# Patient Record
Sex: FEMALE | Race: WHITE | NOT HISPANIC OR LATINO | Employment: OTHER | URBAN - METROPOLITAN AREA
[De-identification: names, ages, dates, MRNs, and addresses within clinical notes are randomized per-mention and may not be internally consistent; named-entity substitution may affect disease eponyms.]

---

## 2017-01-13 DIAGNOSIS — G25.81 RESTLESS LEG SYNDROME: ICD-10-CM

## 2017-01-13 DIAGNOSIS — G40.909 SEIZURE DISORDER (HCC): ICD-10-CM

## 2017-01-13 RX ORDER — PHENOBARBITAL 100 MG/1
TABLET ORAL
Qty: 60 TABLET | Refills: 5 | OUTPATIENT
Start: 2017-01-13 | End: 2017-01-13 | Stop reason: SDUPTHER

## 2017-01-13 RX ORDER — GABAPENTIN 300 MG/1
300 CAPSULE ORAL 3 TIMES DAILY
Qty: 270 CAPSULE | Refills: 1
Start: 2017-01-13 | End: 2017-01-17 | Stop reason: SDUPTHER

## 2017-01-16 RX ORDER — PHENOBARBITAL 100 MG/1
TABLET ORAL
Qty: 60 TABLET | Refills: 0 | Status: SHIPPED | OUTPATIENT
Start: 2017-01-16 | End: 2017-01-17 | Stop reason: SDUPTHER

## 2017-01-16 NOTE — TELEPHONE ENCOUNTER
1/16/17    Can you print a refill Rx for this pt please?    Herson has been check, printed, and in herson folder waiting on response.     Last OV was on 10/25/16 and next OV is on 1/24/17.

## 2017-01-17 ENCOUNTER — TELEPHONE (OUTPATIENT)
Dept: INTERNAL MEDICINE | Facility: CLINIC | Age: 67
End: 2017-01-17

## 2017-01-17 DIAGNOSIS — G40.909 SEIZURE DISORDER (HCC): ICD-10-CM

## 2017-01-17 RX ORDER — PHENOBARBITAL 100 MG/1
TABLET ORAL
Qty: 60 TABLET | Refills: 0 | Status: CANCELLED | OUTPATIENT
Start: 2017-01-17

## 2017-01-17 RX ORDER — GABAPENTIN 300 MG/1
300 CAPSULE ORAL 3 TIMES DAILY
Qty: 270 CAPSULE | Refills: 1 | OUTPATIENT
Start: 2017-01-17 | End: 2020-11-01 | Stop reason: HOSPADM

## 2017-01-17 RX ORDER — PHENOBARBITAL 100 MG/1
TABLET ORAL
Qty: 60 TABLET | Refills: 0 | OUTPATIENT
Start: 2017-01-17 | End: 2017-02-22 | Stop reason: SDUPTHER

## 2017-01-17 NOTE — TELEPHONE ENCOUNTER
----- Message from Jenelle Pena sent at 1/17/2017  9:19 AM EST -----  Contact: PATIENT  PATIENT IS CALLING BACK ABOUT HER SEIZURE MEDICATION AND SAYS SHE REALLY NEEDS THIS SCRIPT TODAY. SHE'S STARTING TO FEEL LIKE SHE IS GOING HAVE A SEIZURE BECAUSE SHE HAS BEEN OUT OF THIS MEDICATION SINCE FRIDAY AND IS AFRAID SHE IS GOING TO GET SICK. SHE HAS CALLED SEVERAL TIMES THE PAST FEW DAYS ABOUT THIS MEDICATION. YOU CAN CALL HER BACK [phone]. CAN WE GET SIGNATURE SO THEY CAN  SCRIPT TODAY IS WHAT PATIENT IS CALLING ABOUT?

## 2017-01-17 NOTE — TELEPHONE ENCOUNTER
1/17/17    This msg has been completed under another msg dated for 1/17/17. Rx has been phoned in to pharmacy. Pt could not be notified because the pt's phone number and emergency contact number were disconnected.

## 2017-01-20 ENCOUNTER — APPOINTMENT (OUTPATIENT)
Dept: CARDIOLOGY | Facility: HOSPITAL | Age: 67
End: 2017-01-20
Attending: EMERGENCY MEDICINE

## 2017-01-20 ENCOUNTER — TELEPHONE (OUTPATIENT)
Dept: INTERNAL MEDICINE | Facility: CLINIC | Age: 67
End: 2017-01-20

## 2017-01-20 ENCOUNTER — APPOINTMENT (OUTPATIENT)
Dept: GENERAL RADIOLOGY | Facility: HOSPITAL | Age: 67
End: 2017-01-20

## 2017-01-20 ENCOUNTER — HOSPITAL ENCOUNTER (EMERGENCY)
Facility: HOSPITAL | Age: 67
Discharge: HOME | End: 2017-01-20
Attending: EMERGENCY MEDICINE | Admitting: EMERGENCY MEDICINE

## 2017-01-20 ENCOUNTER — APPOINTMENT (OUTPATIENT)
Dept: MRI IMAGING | Facility: HOSPITAL | Age: 67
End: 2017-01-20
Attending: EMERGENCY MEDICINE

## 2017-01-20 VITALS
SYSTOLIC BLOOD PRESSURE: 131 MMHG | TEMPERATURE: 98 F | OXYGEN SATURATION: 95 % | WEIGHT: 94 LBS | DIASTOLIC BLOOD PRESSURE: 91 MMHG | RESPIRATION RATE: 18 BRPM | BODY MASS INDEX: 19.73 KG/M2 | HEART RATE: 66 BPM | HEIGHT: 58 IN

## 2017-01-20 DIAGNOSIS — R29.898 WEAKNESS OF LEFT LOWER EXTREMITY: ICD-10-CM

## 2017-01-20 DIAGNOSIS — M79.605 LOWER EXTREMITY PAIN, LEFT: Primary | ICD-10-CM

## 2017-01-20 DIAGNOSIS — G40.409 TONIC-CLONIC GENERALIZED SEIZURE (HCC): ICD-10-CM

## 2017-01-20 DIAGNOSIS — M06.9 RHEUMATOID ARTHRITIS, INVOLVING UNSPECIFIED SITE, UNSPECIFIED RHEUMATOID FACTOR PRESENCE: ICD-10-CM

## 2017-01-20 LAB
ALBUMIN SERPL-MCNC: 4.3 G/DL (ref 3.2–4.8)
ALBUMIN/GLOB SERPL: 1.3 G/DL (ref 1.5–2.5)
ALP SERPL-CCNC: 90 U/L (ref 25–100)
ALT SERPL W P-5'-P-CCNC: 15 U/L (ref 7–40)
ANION GAP SERPL CALCULATED.3IONS-SCNC: 14 MMOL/L (ref 3–11)
AST SERPL-CCNC: 20 U/L (ref 0–33)
BASOPHILS # BLD AUTO: 0.02 10*3/MM3 (ref 0–0.2)
BASOPHILS NFR BLD AUTO: 0.3 % (ref 0–1)
BH CV LOWER VASCULAR LEFT COMMON FEMORAL AUGMENT: NORMAL
BH CV LOWER VASCULAR LEFT COMMON FEMORAL COMPRESS: NORMAL
BH CV LOWER VASCULAR LEFT COMMON FEMORAL PHASIC: NORMAL
BH CV LOWER VASCULAR LEFT COMMON FEMORAL SPONT: NORMAL
BH CV LOWER VASCULAR LEFT DISTAL FEMORAL COMPRESS: NORMAL
BH CV LOWER VASCULAR LEFT GASTRONEMIUS COMPRESS: NORMAL
BH CV LOWER VASCULAR LEFT GREATER SAPH AK COMPRESS: NORMAL
BH CV LOWER VASCULAR LEFT GREATER SAPH BK COMPRESS: NORMAL
BH CV LOWER VASCULAR LEFT LESSER SAPH COMPRESS: NORMAL
BH CV LOWER VASCULAR LEFT MID FEMORAL AUGMENT: NORMAL
BH CV LOWER VASCULAR LEFT MID FEMORAL COMPRESS: NORMAL
BH CV LOWER VASCULAR LEFT MID FEMORAL PHASIC: NORMAL
BH CV LOWER VASCULAR LEFT MID FEMORAL SPONT: NORMAL
BH CV LOWER VASCULAR LEFT PERONEAL COMPRESS: NORMAL
BH CV LOWER VASCULAR LEFT POPLITEAL AUGMENT: NORMAL
BH CV LOWER VASCULAR LEFT POPLITEAL COMPRESS: NORMAL
BH CV LOWER VASCULAR LEFT POPLITEAL PHASIC: NORMAL
BH CV LOWER VASCULAR LEFT POPLITEAL SPONT: NORMAL
BH CV LOWER VASCULAR LEFT POSTERIOR TIBIAL COMPRESS: NORMAL
BH CV LOWER VASCULAR LEFT PROXIMAL FEMORAL COMPRESS: NORMAL
BH CV LOWER VASCULAR LEFT SAPHENOFEMORAL JUNCTION AUGMENT: NORMAL
BH CV LOWER VASCULAR LEFT SAPHENOFEMORAL JUNCTION COMPRESS: NORMAL
BH CV LOWER VASCULAR LEFT SAPHENOFEMORAL JUNCTION PHASIC: NORMAL
BH CV LOWER VASCULAR LEFT SAPHENOFEMORAL JUNCTION SPONT: NORMAL
BH CV LOWER VASCULAR RIGHT COMMON FEMORAL AUGMENT: NORMAL
BH CV LOWER VASCULAR RIGHT COMMON FEMORAL COMPRESS: NORMAL
BH CV LOWER VASCULAR RIGHT COMMON FEMORAL PHASIC: NORMAL
BH CV LOWER VASCULAR RIGHT COMMON FEMORAL SPONT: NORMAL
BILIRUB SERPL-MCNC: 0.2 MG/DL (ref 0.3–1.2)
BNP SERPL-MCNC: 34 PG/ML (ref 0–100)
BUN BLD-MCNC: 7 MG/DL (ref 9–23)
BUN/CREAT SERPL: 10 (ref 7–25)
CALCIUM SPEC-SCNC: 9.6 MG/DL (ref 8.7–10.4)
CHLORIDE SERPL-SCNC: 96 MMOL/L (ref 99–109)
CO2 SERPL-SCNC: 23 MMOL/L (ref 20–31)
CREAT BLD-MCNC: 0.7 MG/DL (ref 0.6–1.3)
DEPRECATED RDW RBC AUTO: 54.6 FL (ref 37–54)
EOSINOPHIL # BLD AUTO: 0.15 10*3/MM3 (ref 0.1–0.3)
EOSINOPHIL NFR BLD AUTO: 2.1 % (ref 0–3)
ERYTHROCYTE [DISTWIDTH] IN BLOOD BY AUTOMATED COUNT: 16.2 % (ref 11.3–14.5)
GFR SERPL CREATININE-BSD FRML MDRD: 83 ML/MIN/1.73
GLOBULIN UR ELPH-MCNC: 3.2 GM/DL
GLUCOSE BLD-MCNC: 74 MG/DL (ref 70–100)
HCT VFR BLD AUTO: 35.7 % (ref 34.5–44)
HGB BLD-MCNC: 11.8 G/DL (ref 11.5–15.5)
HOLD SPECIMEN: NORMAL
HOLD SPECIMEN: NORMAL
IMM GRANULOCYTES # BLD: 0.02 10*3/MM3 (ref 0–0.03)
IMM GRANULOCYTES NFR BLD: 0.3 % (ref 0–0.6)
LIPASE SERPL-CCNC: 34 U/L (ref 6–51)
LYMPHOCYTES # BLD AUTO: 1.96 10*3/MM3 (ref 0.6–4.8)
LYMPHOCYTES NFR BLD AUTO: 27.8 % (ref 24–44)
MCH RBC QN AUTO: 30.4 PG (ref 27–31)
MCHC RBC AUTO-ENTMCNC: 33.1 G/DL (ref 32–36)
MCV RBC AUTO: 92 FL (ref 80–99)
MONOCYTES # BLD AUTO: 0.58 10*3/MM3 (ref 0–1)
MONOCYTES NFR BLD AUTO: 8.2 % (ref 0–12)
NEUTROPHILS # BLD AUTO: 4.32 10*3/MM3 (ref 1.5–8.3)
NEUTROPHILS NFR BLD AUTO: 61.3 % (ref 41–71)
PHENOBARB SERPL-MCNC: 34 MCG/ML (ref 15–40)
PLATELET # BLD AUTO: 310 10*3/MM3 (ref 150–450)
PMV BLD AUTO: 9.3 FL (ref 6–12)
POTASSIUM BLD-SCNC: 3.8 MMOL/L (ref 3.5–5.5)
PROT SERPL-MCNC: 7.5 G/DL (ref 5.7–8.2)
RBC # BLD AUTO: 3.88 10*6/MM3 (ref 3.89–5.14)
SODIUM BLD-SCNC: 133 MMOL/L (ref 132–146)
TROPONIN I SERPL-MCNC: 0 NG/ML (ref 0–0.07)
TROPONIN I SERPL-MCNC: 0.01 NG/ML (ref 0–0.07)
WBC NRBC COR # BLD: 7.05 10*3/MM3 (ref 3.5–10.8)
WHOLE BLOOD HOLD SPECIMEN: NORMAL
WHOLE BLOOD HOLD SPECIMEN: NORMAL

## 2017-01-20 PROCEDURE — 70551 MRI BRAIN STEM W/O DYE: CPT

## 2017-01-20 PROCEDURE — 71010 HC CHEST PA OR AP: CPT

## 2017-01-20 PROCEDURE — 36415 COLL VENOUS BLD VENIPUNCTURE: CPT

## 2017-01-20 PROCEDURE — 84484 ASSAY OF TROPONIN QUANT: CPT

## 2017-01-20 PROCEDURE — 93005 ELECTROCARDIOGRAM TRACING: CPT | Performed by: EMERGENCY MEDICINE

## 2017-01-20 PROCEDURE — 93971 EXTREMITY STUDY: CPT

## 2017-01-20 PROCEDURE — 83690 ASSAY OF LIPASE: CPT | Performed by: EMERGENCY MEDICINE

## 2017-01-20 PROCEDURE — 93971 EXTREMITY STUDY: CPT | Performed by: INTERNAL MEDICINE

## 2017-01-20 PROCEDURE — 86431 RHEUMATOID FACTOR QUANT: CPT | Performed by: HOSPITALIST

## 2017-01-20 PROCEDURE — 85025 COMPLETE CBC W/AUTO DIFF WBC: CPT | Performed by: EMERGENCY MEDICINE

## 2017-01-20 PROCEDURE — 80053 COMPREHEN METABOLIC PANEL: CPT | Performed by: EMERGENCY MEDICINE

## 2017-01-20 PROCEDURE — 99284 EMERGENCY DEPT VISIT MOD MDM: CPT

## 2017-01-20 PROCEDURE — 93005 ELECTROCARDIOGRAM TRACING: CPT

## 2017-01-20 PROCEDURE — 83880 ASSAY OF NATRIURETIC PEPTIDE: CPT | Performed by: EMERGENCY MEDICINE

## 2017-01-20 PROCEDURE — 80184 ASSAY OF PHENOBARBITAL: CPT | Performed by: EMERGENCY MEDICINE

## 2017-01-20 RX ORDER — ASPIRIN 81 MG/1
324 TABLET, CHEWABLE ORAL ONCE
Status: COMPLETED | OUTPATIENT
Start: 2017-01-20 | End: 2017-01-20

## 2017-01-20 RX ORDER — ACETAMINOPHEN 500 MG
1000 TABLET ORAL ONCE
Status: COMPLETED | OUTPATIENT
Start: 2017-01-20 | End: 2017-01-20

## 2017-01-20 RX ORDER — SODIUM CHLORIDE 0.9 % (FLUSH) 0.9 %
10 SYRINGE (ML) INJECTION AS NEEDED
Status: DISCONTINUED | OUTPATIENT
Start: 2017-01-20 | End: 2017-01-20 | Stop reason: HOSPADM

## 2017-01-20 RX ADMIN — ACETAMINOPHEN 1000 MG: 500 TABLET, FILM COATED ORAL at 15:34

## 2017-01-20 RX ADMIN — ASPIRIN 324 MG: 81 TABLET, CHEWABLE ORAL at 14:49

## 2017-01-20 NOTE — ED PROVIDER NOTES
Subjective   HPI Comments: An Abreu is a 66 y.o.female with a hx of seizures who presents to the ED c/o LLE pain, which onset this morning. Pt states this morning her left leg was too weak to support her. She notes pain behind her left knee for the last 2 hours. She notes she had a seizure, HA and CP yesterday. She was out of her phenobarbital for several days and restarted it 2 days ago. She has no CP upon evaluation in the ED. she tells me that her left leg gives out when she tries to walk on it.  She doesn't think that's due to pain but rather due to a new weakness in her leg.  Dr. Batres is her PCP.    SHx of CABG in 2003.    Patient is a 66 y.o. female presenting with lower extremity pain.   History provided by:  Patient  History limited by: Pt is a poor historian.  Lower Extremity Issue   Location:  Knee and leg  Injury: no    Leg location:  L leg  Knee location:  L knee  Pain details:     Radiates to:  Does not radiate    Severity:  Moderate    Onset quality:  Unable to specify    Timing:  Constant  Chronicity:  New  Dislocation: no    Foreign body present:  No foreign bodies  Associated symptoms: muscle weakness        Review of Systems   Cardiovascular: Positive for chest pain.   Neurological: Positive for seizures and headaches.   All other systems reviewed and are negative.      Past Medical History   Diagnosis Date   • Hyperlipidemia        Allergies   Allergen Reactions   • Carbamazepine    • Codeine    • Dilantin [Phenytoin]    • Keflex  [Cephalexin]    • Sulfamethoxazole-Trimethoprim        Past Surgical History   Procedure Laterality Date   • Appendectomy     • Cholecystectomy     • Colostomy     • Hysterectomy     • Iliac artery stent         History reviewed. No pertinent family history.    Social History     Social History   • Marital status:      Spouse name: N/A   • Number of children: N/A   • Years of education: N/A     Social History Main Topics   • Smoking status: Current  Every Day Smoker     Types: Cigarettes, Electronic Cigarette     Last attempt to quit: 8/12/2016   • Smokeless tobacco: Never Used      Comment: smokes 1 cigarette a day   • Alcohol use No   • Drug use: No   • Sexual activity: Defer     Other Topics Concern   • None     Social History Narrative         Objective   Physical Exam   Constitutional: She is oriented to person, place, and time. She appears well-developed and well-nourished.   HENT:   Head: Normocephalic and atraumatic.   Right Ear: External ear normal.   Left Ear: External ear normal.   Nose: Nose normal.   Eyes: Conjunctivae are normal.   Neck: Normal range of motion. Neck supple.   Cardiovascular: Normal rate, regular rhythm and normal heart sounds.  Exam reveals no gallop and no friction rub.    No murmur heard.  Pulmonary/Chest: Effort normal and breath sounds normal. No respiratory distress. She has no wheezes. She has no rales.   Abdominal: Soft. There is no tenderness.   Musculoskeletal: Normal range of motion. She exhibits tenderness (Left calf tenderness.). She exhibits no edema (No joint edema.).   Neurological: She is alert and oriented to person, place, and time.   Skin: Skin is warm and dry. No erythema (No joint erythema.).   Psychiatric: She has a normal mood and affect. Her behavior is normal. Judgment and thought content normal.   Nursing note and vitals reviewed.      Procedures         ED Course  ED Course   Comment By Time   I discussed Mrs. Abreu's Doppler results with the tach.  She has given me a preliminary report that there is no DVT. Bart Mendez MD 01/20 1600   Mrs. Hams MRI has been red and compared with a prior MRI.  She has had several prior strokes but there is nothing new.  It is not clear whether her left leg giving out is due to pain or any neurological problem.  It may be a radiculopathy although she has no back pain. Bart Mendez MD 01/20 1700   Her phenobarbital level is therapeutic. Bart Mendez MD  01/20 1701   Mrs. Abreu is resting comfortably.  Her phenobarbital level is therapeutic.  I discussed findings with her.  We are awaiting her second troponin, if that's reassuring Will discharge.  Will write a prescription for a walker.  I spoke with her about state [place] bitting her from driving for the next 90 days and she is aware of that.  She tells me her leg still hurts although appears very comfortable.  She relates that she has a history of rheumatoid arthritis, at this point I don't see evidence of an acute flareup of that. Bart Mendez MD 01/20 1709   Second troponin as negative. Bart Mendez MD 01/20 1737         Course of Care      Lab Results (last 24 hours)     Procedure Component Value Units Date/Time    CBC & Differential [[id number]] Collected:  01/20/17 1419    Specimen:  Blood Updated:  01/20/17 1435    Narrative:       The following orders were created for panel order CBC & Differential.  Procedure                               Abnormality         Status                     ---------                               -----------         ------                     CBC Auto Differential[[id number]]         Abnormal            Final result                 Please view results for these tests on the individual orders.    Comprehensive Metabolic Panel [[id number]]  (Abnormal) Collected:  01/20/17 1419    Specimen:  Blood Updated:  01/20/17 1521     Glucose 74 mg/dL      BUN 7 (L) mg/dL      Creatinine 0.70 mg/dL      Sodium 133 mmol/L      Potassium 3.8 mmol/L      Chloride 96 (L) mmol/L      CO2 23.0 mmol/L      Calcium 9.6 mg/dL      Total Protein 7.5 g/dL      Albumin 4.30 g/dL      ALT (SGPT) 15 U/L      AST (SGOT) 20 U/L      Alkaline Phosphatase 90 U/L      Total Bilirubin 0.2 (L) mg/dL      eGFR Non African Amer 83 mL/min/1.73      Globulin 3.2 gm/dL      A/G Ratio 1.3 (L) g/dL      BUN/Creatinine Ratio 10.0      Anion Gap 14.0 (H) mmol/L     Narrative:       [employer]  Guidelines    Stage                           Description                             GFR                      1                               Normal or High                          90+  2                               Mild decrease                            60-89  3                               Moderate decrease                   30-59  4                               Severe decrease                       15-29  5                               Kidney failure                             <15    Lipase [[id number]]  (Normal) Collected:  01/20/17 1419    Specimen:  Blood Updated:  01/20/17 1632     Lipase 34 U/L     BNP [[id number]]  (Normal) Collected:  01/20/17 1419    Specimen:  Blood Updated:  01/20/17 1456     BNP 34.0 pg/mL     CBC Auto Differential [[id number]]  (Abnormal) Collected:  01/20/17 1419    Specimen:  Blood Updated:  01/20/17 1435     WBC 7.05 10*3/mm3      RBC 3.88 (L) 10*6/mm3      Hemoglobin 11.8 g/dL      Hematocrit 35.7 %      MCV 92.0 fL      MCH 30.4 pg      MCHC 33.1 g/dL      RDW 16.2 (H) %      RDW-SD 54.6 (H) fl      MPV 9.3 fL      Platelets 310 10*3/mm3      Neutrophil % 61.3 %      Lymphocyte % 27.8 %      Monocyte % 8.2 %      Eosinophil % 2.1 %      Basophil % 0.3 %      Immature Grans % 0.3 %      Neutrophils, Absolute 4.32 10*3/mm3      Lymphocytes, Absolute 1.96 10*3/mm3      Monocytes, Absolute 0.58 10*3/mm3      Eosinophils, Absolute 0.15 10*3/mm3      Basophils, Absolute 0.02 10*3/mm3      Immature Grans, Absolute 0.02 10*3/mm3     Phenobarbital Level [[id number]]  (Normal) Collected:  01/20/17 1419    Specimen:  Blood Updated:  01/20/17 1700     Phenobarbital 34.0 mcg/mL     POC Troponin, Rapid [[id number]]  (Normal) Collected:  01/20/17 1422    Specimen:  Blood Updated:  01/20/17 1440     Troponin I 0.00 ng/mL       Serial Number: [id number]    : [id number]       POC Troponin, Rapid [[id number]]  (Normal) Collected:  01/20/17 1716    Specimen:  Blood Updated:  01/20/17 3644      Troponin I 0.01 ng/mL       Serial Number: [id number]    : [id number]             Note: In addition to lab results from this visit, the labs listed above may include labs taken at another facility or during a different encounter within the last 24 hours. Please correlate lab times with ED admission and discharge times for further clarification of the services performed during this visit.    MRI Brain Without Contrast   Preliminary Result   Old infarcts of the left cerebellum, left frontal lobe and   right corona radiata. No new infarct or other new intracranial disease   is seen.       DICTATED:     01/20/2017   EDITED:          01/20/2017          XR Chest 1 View   Preliminary Result   Stable portable chest exam with chronic appearing changes as   noted. No acute chest disease is seen.       D:  01/20/2017   E:  01/20/2017              Vitals:    01/20/17 1600 01/20/17 1725 01/20/17 1726 01/20/17 1755   BP: 139/58 131/91     BP Location:       Patient Position:       Pulse: 64  66    Resp:       Temp:    98 °F (36.7 °C)   TempSrc:    Oral   SpO2: 93% 94% 95%    Weight:       Height:           Medications   aspirin chewable tablet 324 mg (324 mg Oral Given 1/20/17 1449)   acetaminophen (TYLENOL) tablet 1,000 mg (1,000 mg Oral Given 1/20/17 1534)       ECG/EMG Results (last 24 hours)     Procedure Component Value Units Date/Time    ECG 12 Lead [[id number]] Collected:  01/20/17 1415     Updated:  01/20/17 1511    ECG 12 Lead [[id number]] Collected:  01/20/17 1709     Updated:  01/20/17 1729                      MDM  Number of Diagnoses or Management Options  Lower extremity pain, left: new and requires workup  Rheumatoid arthritis, involving unspecified site, unspecified rheumatoid factor presence:   Tonic-clonic generalized seizure:   Weakness of left lower extremity: new and requires workup     Amount and/or Complexity of Data Reviewed  Clinical lab tests: ordered and reviewed  Tests in the radiology section of CPT®:  ordered and reviewed  Review and summarize past medical records: yes  Independent visualization of images, tracings, or specimens: yes    Patient Progress  Patient progress: improved      Final diagnoses:   Lower extremity pain, left   Tonic-clonic generalized seizure   Weakness of left lower extremity   Rheumatoid arthritis, involving unspecified site, unspecified rheumatoid factor presence       Documentation assistance provided by aaliyah Rodrigez.  Information recorded by the scribe was done at my direction and has been verified and validated by me.     Jose Alfredo Rodrigez  01/20/17 1538       Jose Alfredo Rodrigez  01/20/17 1553       Jose Alfredo Rodrigez  01/20/17 1714       Jose Alfredo Rodrigez  01/20/17 1738       Bart Mendez MD  01/20/17 0223

## 2017-01-20 NOTE — TELEPHONE ENCOUNTER
Patient called and stated and that she was on her way to the ER because she was hurting and patient stated that she has been out of her seizure medication and she thinks she has had several seizure states she is in a lot of pain and would just like for Dr grayson to know the shape she is in.      Called patient no answer and no available voicemail     ----- Message from Jenelle Pena sent at 1/20/2017 10:11 AM EST -----  Contact: PATIENT  PATIENT THINKS SHE HAD A SEIZURE WHILE SHE WAS OUT OF HER MEDICATION SEIZURE MEDICATION AND HAS SOME CONCERNS TO TALK TO YOU ABOUT WHAT SHE IS EXPERINCING. SHE DOESN'T FEEL RIGHT IN THE HEAD AND IS VERY WEAK. SHE IS WORRIED AND WANTS YOU TO CALL HER BACK. SHE ALSO WANTS TO SEE IF DR. GRAYSON WILL REFER HER TO A NEUROLOGIST AND A DIFFERENT CARDIOLOGIST. SHE DID NOT CARE FOR THE LAST CARDIOLOGIST SHE SAW THAT WE REFERRED HER TO. YOU CAN CALL HER BACK [phone]

## 2017-01-24 ENCOUNTER — OFFICE VISIT (OUTPATIENT)
Dept: INTERNAL MEDICINE | Facility: CLINIC | Age: 67
End: 2017-01-24

## 2017-01-24 ENCOUNTER — HOSPITAL ENCOUNTER (OUTPATIENT)
Dept: GENERAL RADIOLOGY | Facility: HOSPITAL | Age: 67
Discharge: HOME | End: 2017-01-24
Attending: HOSPITALIST | Admitting: HOSPITALIST

## 2017-01-24 VITALS
OXYGEN SATURATION: 95 % | HEIGHT: 58 IN | HEART RATE: 70 BPM | DIASTOLIC BLOOD PRESSURE: 52 MMHG | SYSTOLIC BLOOD PRESSURE: 108 MMHG

## 2017-01-24 DIAGNOSIS — R29.898 WEAKNESS OF LEFT LEG: ICD-10-CM

## 2017-01-24 DIAGNOSIS — M19.90 ARTHRITIS: Primary | ICD-10-CM

## 2017-01-24 DIAGNOSIS — G89.29 CHRONIC MIDLINE LOW BACK PAIN WITH LEFT-SIDED SCIATICA: ICD-10-CM

## 2017-01-24 DIAGNOSIS — M54.42 CHRONIC MIDLINE LOW BACK PAIN WITH LEFT-SIDED SCIATICA: ICD-10-CM

## 2017-01-24 PROCEDURE — 72100 X-RAY EXAM L-S SPINE 2/3 VWS: CPT

## 2017-01-24 PROCEDURE — 72170 X-RAY EXAM OF PELVIS: CPT

## 2017-01-24 PROCEDURE — 99214 OFFICE O/P EST MOD 30 MIN: CPT | Performed by: HOSPITALIST

## 2017-01-24 NOTE — PROGRESS NOTES
"Subjective   An Abreu is a 66 y.o. female.   ED follow up visit ([facility]  due to Lower extremity pain, left; Tonic-clonic generalized seizure; Weakness of left lower extremity; Rheumatoid arthritis, involving unspecified site, unspecified rheumatoid factor presence )      HPI Comments: She states she is weak in her left leg. Since she woke up in the morning on 1/18 unable to walk due to weakness. She was seen in the ED and MRI Brain w/o contrast was negative for new stroke. She is having swelling in the left  leg. She had a seizure on 1/17/17 and she went to bed and woke up with weakness. Her daughter reports that she has been having seizures off and on. She has not been to the hopsital for these episodes because she has them all the time since she was a child. They took her to the hospital on the 20th because she was still having weakness. She is having joint pain but her back also hurts. She reports falling from a counter trying to get something from a cabinet that was up high. Not sure about the time course of this. She did not have a back xray in the ER. She says it her leg hurts or maybe her hips. She doesn't think she can do PT as a result.        The following portions of the patient's history were reviewed and updated as appropriate: allergies, current medications, past family history, past medical history, past social history, past surgical history and problem list.    Review of Systems   Constitutional: Positive for activity change.   HENT: Negative for congestion.    Cardiovascular: Positive for leg swelling. Negative for chest pain and palpitations.   Musculoskeletal: Positive for arthralgias, back pain, gait problem and neck pain.   Neurological: Negative for light-headedness and headaches.   Psychiatric/Behavioral: Negative for agitation, behavioral problems and confusion.       Objective  Blood pressure 108/52, pulse 70, height 58\" (147.3 cm), SpO2 95 " %.      Physical Exam   Constitutional: She appears listless.   HENT:   Head: Normocephalic and atraumatic.   Eyes: Conjunctivae and EOM are normal. Pupils are equal, round, and reactive to light.   Neck: Normal range of motion. Neck supple.   Cardiovascular: Normal rate, regular rhythm and normal heart sounds.    Pulmonary/Chest: Effort normal and breath sounds normal.   Musculoskeletal: Normal range of motion.   Neurological: She appears listless. A sensory deficit is present. She exhibits abnormal muscle tone.   Reflex Scores:       Patellar reflexes are 0 on the right side and 1+ on the left side.  Left leg feels numb and strength is 3/5 in the left leg . Normal in all other.       Assessment/Plan   An was seen today for ed follow up visit.    Diagnoses and all orders for this visit:    Arthritis  -     Rheumatoid Factor    Weakness of left leg  -     MRI Lumbar Spine With & Without Contrast  -     EMG & Nerve Conduction Test; Future    Chronic midline low back pain with left-sided sciatica  -     MRI Lumbar Spine With & Without Contrast  -     XR Spine Lumbar 2 or 3 View  -     XR Pelvis 1 or 2 View

## 2017-01-24 NOTE — MR AVS SNAPSHOT
An Abreu   1/24/2017 2:00 PM   Office Visit    Dept Phone:  [phone]   Encounter #:  [id number]    Provider:  Manjinder Batres MD   Department:  [facility] INTERNAL MEDICINE AND ENDOCRINOLOGY [place]                Your Full Care Plan              Your Updated Medication List          This list is accurate as of: 1/24/17  3:17 PM.  Always use your most recent med list.                albuterol 108 (90 BASE) MCG/ACT inhaler   Commonly known as:  VENTOLIN HFA   Inhale 2 puffs Every 4 (Four) Hours As Needed for wheezing or shortness of air.       clopidogrel 75 MG tablet   Commonly known as:  PLAVIX   Take 1 tablet by mouth Daily.       cyanocobalamin 1000 MCG/ML injection   Inject 1 mL into the shoulder, thigh, or buttocks Every 7 (Seven) Days.       diltiaZEM  MG 24 hr capsule   Commonly known as:  CARTIA XT   Take 1 capsule by mouth Daily.       esomeprazole 40 MG capsule   Commonly known as:  nexIUM   Take 1 capsule by mouth 2 (Two) Times a Day.       furosemide 40 MG tablet   Commonly known as:  LASIX   TAKE 1 TABLET BY MOUTH EVERY DAY       gabapentin 300 MG capsule   Commonly known as:  NEURONTIN   Take 1 capsule by mouth 3 (Three) Times a Day.       isosorbide mononitrate 60 MG 24 hr tablet   Commonly known as:  IMDUR   Take 1 tablet by mouth Daily.       lisinopril 40 MG tablet   Commonly known as:  PRINIVIL,ZESTRIL   Take 1 tablet by mouth Daily.       LORazepam 1 MG tablet   Commonly known as:  ATIVAN   Take 1 tablet by mouth 2 (Two) Times a Day.       Nitroglycerin 400 MCG/SPRAY aerosol solution       PHENobarbital 100 MG tablet   Commonly known as:  LUMINAL   TAKE 1 1/2 TABLET BY MOUTH EVERY DAY       * rosuvastatin 40 MG tablet   Commonly known as:  CRESTOR   Take 1 tablet by mouth Daily.       * rosuvastatin 40 MG tablet   Commonly known as:  CRESTOR   Take 1 tablet by mouth Daily.       SPIRIVA HANDIHALER 18 MCG per inhalation capsule   Generic drug:   tiotropium   Place 2 puffs into inhaler and inhale Daily.       * Notice:  This list has 2 medication(s) that are the same as other medications prescribed for you. Read the directions carefully, and ask your doctor or other care provider to review them with you.            We Performed the Following     MRI Lumbar Spine With & Without Contrast     Rheumatoid Factor     XR Pelvis 1 or 2 View     XR Spine Lumbar 2 or 3 View       You Were Diagnosed With        Codes Comments    Arthritis    -  Primary ICD-10-CM: M19.90  ICD-9-CM: 716.90     Weakness of left leg     ICD-10-CM: M62.81  ICD-9-CM: 729.89     Chronic midline low back pain with left-sided sciatica     ICD-10-CM: M54.42, G89.29  ICD-9-CM: 724.2, 724.3, 338.29       Instructions     None    Patient Instructions History      Upcoming Appointments     Visit Type Date Time Department    FOLLOW UP 2017  2:00 PM [facility]    FOLLOW UP 2017  3:00 PM [facility]      [employer] Signup     [facility] [employer] allows you to send messages to your doctor, view your test results, renew your prescriptions, schedule appointments, and more. To sign up, go to [employer] and click on the Sign Up Now link in the New User? box. Enter your [employer] Activation Code exactly as it appears below along with the last four digits of your Social Security Number and your Date of Birth () to complete the sign-up process. If you do not sign up before the expiration date, you must request a new code.    [employer] Activation Code: [email or web address]  Expires: 2/3/2017  5:13 PM    If you have questions, you can email [email or web address] or call [phone] to talk to our [employer] staff. Remember, [employer] is NOT to be used for urgent needs. For medical emergencies, dial 911.               Other Info from Your Visit           Your Appointments     2017  3:00 PM EST   Follow Up with Manjinder Batres MD   [facility] INTERNAL MEDICINE AND  "ENDOCRINOLOGY [place] (--)    [address]  [facility] [place] [address] [phone]           Arrive 15 minutes prior to appointment.              Allergies     Carbamazepine      Codeine      Dilantin [Phenytoin]      Keflex  [Cephalexin]      Sulfamethoxazole-trimethoprim        Reason for Visit     ED follow up visit ED follow visit from 1/20/17 at [facility]  due to Lower extremity pain, left; Tonic-clonic generalized seizure; Weakness of left lower extremity; Rheumatoid arthritis, involving unspecified site, unspecified rheumatoid factor presence       Vital Signs     Blood Pressure Pulse Height Oxygen Saturation Smoking Status       108/52 70 58\" (147.3 cm) 95% Current Every Day Smoker       Problems and Diagnoses Noted     Arthritis    Weakness of left leg        Chronic midline low back pain with left-sided sciatica            "

## 2017-01-25 ENCOUNTER — TELEPHONE (OUTPATIENT)
Dept: INTERNAL MEDICINE | Facility: CLINIC | Age: 67
End: 2017-01-25

## 2017-01-25 DIAGNOSIS — R29.898 LEFT LEG WEAKNESS: ICD-10-CM

## 2017-01-25 DIAGNOSIS — M19.90 ARTHRITIS: Primary | ICD-10-CM

## 2017-01-25 DIAGNOSIS — M54.42 CHRONIC LEFT-SIDED LOW BACK PAIN WITH LEFT-SIDED SCIATICA: ICD-10-CM

## 2017-01-25 DIAGNOSIS — G89.29 CHRONIC LEFT-SIDED LOW BACK PAIN WITH LEFT-SIDED SCIATICA: ICD-10-CM

## 2017-01-25 LAB — RHEUMATOID FACT SERPL-ACNC: NEGATIVE [IU]/ML

## 2017-01-25 RX ORDER — SULINDAC 200 MG/1
200 TABLET ORAL 2 TIMES DAILY
Qty: 60 TABLET | Refills: 0 | Status: SHIPPED | OUTPATIENT
Start: 2017-01-25 | End: 2017-04-15 | Stop reason: SDUPTHER

## 2017-01-25 NOTE — TELEPHONE ENCOUNTER
1/25/17    Called and left a v/m stating that a Rx was sent over to [facility] pharmacy on [address] today and they can call the pharmacy to see if it is ready for .

## 2017-01-25 NOTE — TELEPHONE ENCOUNTER
1/25/17    Do you want to send in some pain meds for this pt?    I see gabapentin 300 mg, 1 tab tid was phoned in on 1/17/17.    Please advise.

## 2017-01-25 NOTE — TELEPHONE ENCOUNTER
----- Message from Nikki Mahajan sent at 1/24/2017  4:20 PM EST -----  THE PATIENT IS NEEDING DR GRAYSON TO CALL HER IN SOME PAIN MEDICATION . CALL BACK NUMBER [phone]

## 2017-01-25 NOTE — TELEPHONE ENCOUNTER
She gets narcotics from someone else so  she can get sulindac 200 mg i sent the script in. Make sure it went to her correct pharmacy

## 2017-02-01 PROBLEM — G89.29 CHRONIC LEFT-SIDED LOW BACK PAIN WITH LEFT-SIDED SCIATICA: Status: ACTIVE | Noted: 2017-02-01

## 2017-02-01 PROBLEM — M54.42 CHRONIC LEFT-SIDED LOW BACK PAIN WITH LEFT-SIDED SCIATICA: Status: ACTIVE | Noted: 2017-02-01

## 2017-02-01 PROBLEM — R29.898 LEFT LEG WEAKNESS: Status: ACTIVE | Noted: 2017-02-01

## 2017-02-02 RX ORDER — ROSUVASTATIN CALCIUM 40 MG/1
TABLET, COATED ORAL
Qty: 30 TABLET | Refills: 5 | Status: SHIPPED | OUTPATIENT
Start: 2017-02-02 | End: 2018-02-08 | Stop reason: SDUPTHER

## 2017-02-06 ENCOUNTER — TELEPHONE (OUTPATIENT)
Dept: INTERNAL MEDICINE | Facility: CLINIC | Age: 67
End: 2017-02-06

## 2017-02-06 RX ORDER — DILTIAZEM HYDROCHLORIDE 240 MG/1
CAPSULE, EXTENDED RELEASE ORAL
Qty: 30 CAPSULE | Refills: 0 | Status: SHIPPED | OUTPATIENT
Start: 2017-02-06 | End: 2017-03-09 | Stop reason: SDUPTHER

## 2017-02-06 NOTE — TELEPHONE ENCOUNTER
----- Message from Calli Uribe sent at 2/6/2017 11:53 AM EST -----  Contact: PATIENT   PATIENT CALLED STATING SHE CAN'T GET AROUND AND THAT SHE IS NOT GETTING ANY BETTER. HIP BONE TO SPINE IS KILLING HER, SHE STATES SHE HAS NO STRENGTH, MRI IS SCHEDULED MARCH 7. SHE WOULD LIKE A RETURN CALL FROM DR GRAYSON.    CALL BACK #[phone]

## 2017-02-08 DIAGNOSIS — F41.9 ANXIETY: ICD-10-CM

## 2017-02-08 RX ORDER — LORAZEPAM 1 MG/1
TABLET ORAL
Qty: 60 TABLET | Refills: 0 | OUTPATIENT
Start: 2017-02-08 | End: 2017-02-22 | Stop reason: SDUPTHER

## 2017-02-08 NOTE — TELEPHONE ENCOUNTER
2/8/17    Can we phone this rx in to the pharmacy?    En has been checked and scanned into the pt's chart (1/19/17).    Last OV was on 1/24/17, next OV is on 2/20/17.

## 2017-02-08 NOTE — TELEPHONE ENCOUNTER
2/8/17    Phone lorazepam into pharmacy and pt's daughter was notified.      Pt is now requesting some pain medication because she is having severe pain in hips and she says her good leg is now giving out on her so now all she can do is lay around all day because she can barely move.    Her MRI is scheduled in March because the pt needs to be put to sleep for it.    Pt's daughter phone number is [phone]

## 2017-02-08 NOTE — TELEPHONE ENCOUNTER
----- Message from Calli Uribe sent at 2/8/2017 12:32 PM EST -----  Contact: PATIENT'S DAUGHTER  RE: RX FOR PAIN MED    MS VU'S DAUGHTER CALLED TODAY TO REQUEST AN ORDER FOR PAIN MEDICATION FOR HER MOTHER.

## 2017-02-09 NOTE — TELEPHONE ENCOUNTER
2/9/17    Called pt back and stated per Dr. Batres that the pt will not rec'v any narcotics for this pain. She has sent in meds for sulindac 200 mg tab, bid, qty 60 on 1/25/17, pt understood.    Pt was also advised that if she wants to have the MRI done sooner than in March, she will need to get it done without sedation, pt understood and asked if she can get the MRI scheduled sooner.

## 2017-02-10 ENCOUNTER — OFFICE VISIT (OUTPATIENT)
Dept: INTERNAL MEDICINE | Facility: CLINIC | Age: 67
End: 2017-02-10

## 2017-02-10 VITALS
OXYGEN SATURATION: 97 % | SYSTOLIC BLOOD PRESSURE: 142 MMHG | HEIGHT: 58 IN | HEART RATE: 72 BPM | DIASTOLIC BLOOD PRESSURE: 50 MMHG

## 2017-02-10 DIAGNOSIS — M54.32 SCIATICA OF LEFT SIDE: Primary | ICD-10-CM

## 2017-02-10 DIAGNOSIS — R82.90 ABNORMAL URINE: ICD-10-CM

## 2017-02-10 DIAGNOSIS — R39.198 DIFFICULTY IN URINATION: ICD-10-CM

## 2017-02-10 DIAGNOSIS — Z79.899 HIGH RISK MEDICATION USE: ICD-10-CM

## 2017-02-10 LAB
AMPHET+METHAMPHET UR QL: NEGATIVE
AMPHETAMINES UR QL: NEGATIVE
BARBITURATES UR QL SCN: POSITIVE
BENZODIAZ UR QL SCN: POSITIVE
BILIRUB BLD-MCNC: NEGATIVE MG/DL
BUPRENORPHINE SERPL-MCNC: NEGATIVE NG/ML
CANNABINOIDS SERPL QL: NEGATIVE
CLARITY, POC: CLEAR
COCAINE UR QL: NEGATIVE
COLOR UR: YELLOW
GLUCOSE UR STRIP-MCNC: NEGATIVE MG/DL
KETONES UR QL: NEGATIVE
LEUKOCYTE EST, POC: NEGATIVE
METHADONE UR QL SCN: NEGATIVE
NITRITE UR-MCNC: POSITIVE MG/ML
OPIATES UR QL: NEGATIVE
OXYCODONE UR QL SCN: NEGATIVE
PCP UR QL SCN: NEGATIVE
PH UR: 6.5 [PH] (ref 5–8)
PROPOXYPH UR QL: NEGATIVE
PROT UR STRIP-MCNC: NEGATIVE MG/DL
RBC # UR STRIP: NEGATIVE /UL
SP GR UR: 1.01 (ref 1–1.03)
TRICYCLICS UR QL SCN: NEGATIVE
UROBILINOGEN UR QL: NORMAL

## 2017-02-10 PROCEDURE — 87086 URINE CULTURE/COLONY COUNT: CPT | Performed by: HOSPITALIST

## 2017-02-10 PROCEDURE — 96372 THER/PROPH/DIAG INJ SC/IM: CPT | Performed by: HOSPITALIST

## 2017-02-10 PROCEDURE — 99213 OFFICE O/P EST LOW 20 MIN: CPT | Performed by: HOSPITALIST

## 2017-02-10 PROCEDURE — 81003 URINALYSIS AUTO W/O SCOPE: CPT | Performed by: HOSPITALIST

## 2017-02-10 PROCEDURE — 80306 DRUG TEST PRSMV INSTRMNT: CPT | Performed by: HOSPITALIST

## 2017-02-10 PROCEDURE — 87186 SC STD MICRODIL/AGAR DIL: CPT | Performed by: HOSPITALIST

## 2017-02-10 PROCEDURE — 87077 CULTURE AEROBIC IDENTIFY: CPT | Performed by: HOSPITALIST

## 2017-02-10 RX ORDER — METHYLPREDNISOLONE ACETATE 80 MG/ML
80 INJECTION, SUSPENSION INTRA-ARTICULAR; INTRALESIONAL; INTRAMUSCULAR; SOFT TISSUE ONCE
Status: COMPLETED | OUTPATIENT
Start: 2017-02-10 | End: 2017-02-10

## 2017-02-10 RX ORDER — LIDOCAINE 50 MG/G
OINTMENT TOPICAL
Qty: 30 G | Refills: 0 | Status: SHIPPED | OUTPATIENT
Start: 2017-02-10 | End: 2020-11-01 | Stop reason: HOSPADM

## 2017-02-10 RX ORDER — OXYCODONE HYDROCHLORIDE 5 MG/1
5 TABLET ORAL EVERY 8 HOURS PRN
Qty: 30 TABLET | Refills: 0 | Status: SHIPPED | OUTPATIENT
Start: 2017-02-10 | End: 2017-05-05

## 2017-02-10 RX ADMIN — METHYLPREDNISOLONE ACETATE 80 MG: 80 INJECTION, SUSPENSION INTRA-ARTICULAR; INTRALESIONAL; INTRAMUSCULAR; SOFT TISSUE at 17:07

## 2017-02-10 NOTE — PROGRESS NOTES
Subjective   An Abreu is a 66 y.o. female. fell on walker (about 5-6 days ago); Hip Pain (left hip); Leg Pain (left leg); and Balance Issues         HPI Comments: She complains of severe sciatica on the left side. Gabapentin is not helping and she didn't get any relief from the the sulindac. She had a normal pelvic xray. and her lumbar spine showed minimal djd. She is in severe pain. She has an MRI scheduled for next week.    Hip Pain      Leg Pain          The following portions of the patient's history were reviewed and updated as appropriate: allergies, current medications, past family history, past medical history, past social history, past surgical history and problem list.    Review of Systems   Musculoskeletal: Positive for arthralgias, back pain and gait problem.       Objective   Vitals:    02/10/17 1148   BP: 142/50   Pulse: 72   SpO2: 97%       Physical Exam   Constitutional: She is oriented to person, place, and time. She appears well-developed and well-nourished. She appears distressed.   HENT:   Head: Normocephalic and atraumatic.   Eyes: EOM are normal. Pupils are equal, round, and reactive to light.   Neck: Normal range of motion. Neck supple.   Musculoskeletal: She exhibits tenderness.   Neurological: She is alert and oriented to person, place, and time. She has normal reflexes.   Skin: Skin is warm and dry.       Assessment/Plan   An was seen today for fell on walker, hip pain, leg pain and balance issues.    Diagnoses and all orders for this visit:    Sciatica of left side  -     methylPREDNISolone acetate (DEPO-medrol) injection 80 mg; Inject 1 mL into the shoulder, thigh, or buttocks 1 (One) Time.  -     lidocaine (XYLOCAINE) 5 % ointment; Apply  topically Every 2 (Two) Hours As Needed for mild pain (1-3).  -     oxyCODONE (ROXICODONE) 5 MG immediate release tablet; Take 1 tablet by mouth Every 8 (Eight) Hours As Needed for moderate pain (4-6).    High risk medication use  -      Urine Drug Screen    Difficulty in urination  -     POC Urinalysis Dipstick, Automated      Results for orders placed or performed in visit on 01/24/17   Rheumatoid Factor   Result Value Ref Range    Rheumatoid Factor Qualitative Negative Negative

## 2017-02-12 DIAGNOSIS — N30.00 ACUTE CYSTITIS WITHOUT HEMATURIA: Primary | ICD-10-CM

## 2017-02-12 LAB — BACTERIA SPEC AEROBE CULT: ABNORMAL

## 2017-02-12 RX ORDER — LEVOFLOXACIN 250 MG/1
250 TABLET ORAL DAILY
Qty: 3 TABLET | Refills: 0 | Status: SHIPPED | OUTPATIENT
Start: 2017-02-12 | End: 2017-05-05

## 2017-02-15 ENCOUNTER — TELEPHONE (OUTPATIENT)
Dept: INTERNAL MEDICINE | Facility: CLINIC | Age: 67
End: 2017-02-15

## 2017-02-15 DIAGNOSIS — G89.29 CHRONIC LEFT-SIDED LOW BACK PAIN WITH LEFT-SIDED SCIATICA: Primary | ICD-10-CM

## 2017-02-15 DIAGNOSIS — M54.42 CHRONIC LEFT-SIDED LOW BACK PAIN WITH LEFT-SIDED SCIATICA: Primary | ICD-10-CM

## 2017-02-15 NOTE — TELEPHONE ENCOUNTER
----- Message from Calli Uribe sent at 2/15/2017  8:08 AM EST -----  Contact: PATIENT   PATIENT RETURNED CALL.

## 2017-02-15 NOTE — TELEPHONE ENCOUNTER
2/15/17    Pt is requesting a referral to [facility] @[phone]. Pt says she would like to start getting shots for her sciatica.     Can you put in an order for a referral?

## 2017-02-15 NOTE — TELEPHONE ENCOUNTER
----- Message from Nikki Mahajan sent at 2/15/2017  8:16 AM EST -----  PATIENT HAS JOSE A DOCTOR THAT IS WILLING TO GIVE HER A CORTISONE SHOT AND WOULD LIKE FOR DR GRAYSON TO GIVE HER A REFERRAL SO THAT SHE CAN GO SEE THE DOCTOR. AND SHE DIDN'T GIVE ME A NAME JUST A NUMBER TO THE PLACE WHICH [phone]. THE PATIENT'S CALL BACK NUMBER [phone]

## 2017-02-16 ENCOUNTER — TELEPHONE (OUTPATIENT)
Dept: INTERNAL MEDICINE | Facility: CLINIC | Age: 67
End: 2017-02-16

## 2017-02-16 NOTE — TELEPHONE ENCOUNTER
----- Message from Calli Uribe sent at 2/16/2017  9:14 AM EST -----  Contact: PATIENT   PATIENT RETURNED YOUR CALL    ALSO, MS VU STATES THAT SHE WILL NEED A PA FOR ONE OF THE MEDICATIONS DR GRAYSON PRESCRIBED BUT SHE DOESN'T KNOW WHICH ONE. SHE USES [facility] AT [facility].    CALL BACK #[phone]

## 2017-02-16 NOTE — TELEPHONE ENCOUNTER
2/16/17    Called the pharmacy and they said it looks like the rosuvastatin and lidocaine ointment may need a prior auth.    Submitted a PA through cover my meds, waiting on response.

## 2017-02-17 ENCOUNTER — TELEPHONE (OUTPATIENT)
Dept: INTERNAL MEDICINE | Facility: CLINIC | Age: 67
End: 2017-02-17

## 2017-02-17 NOTE — TELEPHONE ENCOUNTER
----- Message from Calli Uribe sent at 2/17/2017 10:52 AM EST -----  Contact: PATIENT   RE: REFERRAL     PATIENT STATES THAT IT WILL TAKE 2 MONTHS TO GET IN AT THE [facility] SHE WAS REFERRED TO. SHE WOULD LIKE TO KNOW IF SHE CAN GET A REFERRAL TO [facility] (?) ON [facility] IN [address]ING RIGHT BESIDE THE HOSPITAL.

## 2017-02-17 NOTE — TELEPHONE ENCOUNTER
Spoke with patient informed her that [facility] appointments are 4 months out she states she will just kept the other appointment and would like to know if Dr jett can give her something for pain until she is able to get into to see the pain doctor

## 2017-02-20 NOTE — TELEPHONE ENCOUNTER
Called patient and she states that if you are unable to give her something for pain can you give her some Steroids to help with her trigger points

## 2017-02-22 ENCOUNTER — TELEPHONE (OUTPATIENT)
Dept: INTERNAL MEDICINE | Facility: CLINIC | Age: 67
End: 2017-02-22

## 2017-02-22 DIAGNOSIS — F41.9 ANXIETY: ICD-10-CM

## 2017-02-22 DIAGNOSIS — G40.909 SEIZURE DISORDER (HCC): ICD-10-CM

## 2017-02-22 RX ORDER — PHENOBARBITAL 100 MG/1
TABLET ORAL
Qty: 60 TABLET | Refills: 2 | OUTPATIENT
Start: 2017-02-22 | End: 2017-07-07 | Stop reason: SDUPTHER

## 2017-02-22 RX ORDER — LORAZEPAM 1 MG/1
TABLET ORAL
Qty: 60 TABLET | Refills: 2 | OUTPATIENT
Start: 2017-02-22 | End: 2017-05-05 | Stop reason: SDUPTHER

## 2017-02-22 NOTE — TELEPHONE ENCOUNTER
2/22/17    Pt's daughter Stacie called to check to see if the pt's Lorazepam had been sent in to the pharmacy yet. The records show it was phoned in on 2/8/17 and that the pt's daughter was notified but she says that the pharmacy does not have the refill yet.    Pt's daughter also requested a refill of phenobarbital.    Phoned both rx's in to pharmacy ([facility] [address]).

## 2017-02-23 ENCOUNTER — TELEPHONE (OUTPATIENT)
Dept: INTERNAL MEDICINE | Facility: CLINIC | Age: 67
End: 2017-02-23

## 2017-02-23 RX ORDER — METHYLPREDNISOLONE 4 MG/1
16 TABLET ORAL DAILY
Qty: 30 TABLET | Refills: 0 | Status: SHIPPED | OUTPATIENT
Start: 2017-02-23 | End: 2017-05-05

## 2017-02-23 NOTE — TELEPHONE ENCOUNTER
----- Message from Nikki Mahajan sent at 2/21/2017  1:14 PM EST -----  PATIENT WOULD LIKE FOR DR GRAYSON TO GIVE HER A STEROID SHOT FOR EACH HIP. SHE WOULD LIKE FOR YOU TO GIVE HER A CALL BACK -[phone]

## 2017-02-24 RX ORDER — LORAZEPAM 1 MG/1
TABLET ORAL
Qty: 60 TABLET | Refills: 0 | OUTPATIENT
Start: 2017-02-24

## 2017-02-24 NOTE — TELEPHONE ENCOUNTER
2/24/17    Called pt back and she said that she does not like taking the pain medication because she does not like the way it makes her feel. I advised that her provider sent in a rx of medrol 4 mg tabs to her [facility] pharmacy, pt understood.    Pt went on to say that she has been having seizures again because her medication was not called in. I told the pt that when her daughter called on 2/22/17, I phoned in both her lorazepam and phenobarbital immediately after talking to her and sent it over to the [facility]'s pharmacy which is the pharmacy they typically use and the pharmacy should have called her to let her know it was ready for , pt understood.

## 2017-03-09 DIAGNOSIS — K21.9 GASTROESOPHAGEAL REFLUX DISEASE WITHOUT ESOPHAGITIS: ICD-10-CM

## 2017-03-09 RX ORDER — ESOMEPRAZOLE MAGNESIUM 40 MG/1
40 CAPSULE, DELAYED RELEASE ORAL 2 TIMES DAILY
Qty: 180 CAPSULE | Refills: 1 | Status: ON HOLD | OUTPATIENT
Start: 2017-03-09 | End: 2020-01-01

## 2017-03-09 RX ORDER — DILTIAZEM HYDROCHLORIDE 240 MG/1
CAPSULE, EXTENDED RELEASE ORAL
Qty: 30 CAPSULE | Refills: 5 | Status: SHIPPED | OUTPATIENT
Start: 2017-03-09 | End: 2017-09-07 | Stop reason: SDUPTHER

## 2017-04-05 RX ORDER — LISINOPRIL 40 MG/1
TABLET ORAL
Qty: 30 TABLET | Refills: 2 | Status: SHIPPED | OUTPATIENT
Start: 2017-04-05 | End: 2017-07-03 | Stop reason: SDUPTHER

## 2017-04-15 DIAGNOSIS — M19.90 ARTHRITIS: ICD-10-CM

## 2017-04-17 RX ORDER — SULINDAC 200 MG/1
TABLET ORAL
Qty: 60 TABLET | Refills: 0 | Status: SHIPPED | OUTPATIENT
Start: 2017-04-17 | End: 2017-05-05

## 2017-05-05 ENCOUNTER — OFFICE VISIT (OUTPATIENT)
Dept: INTERNAL MEDICINE | Facility: CLINIC | Age: 67
End: 2017-05-05

## 2017-05-05 VITALS
DIASTOLIC BLOOD PRESSURE: 60 MMHG | SYSTOLIC BLOOD PRESSURE: 150 MMHG | HEIGHT: 58 IN | HEART RATE: 68 BPM | WEIGHT: 95 LBS | BODY MASS INDEX: 19.94 KG/M2

## 2017-05-05 DIAGNOSIS — F41.9 ANXIETY: ICD-10-CM

## 2017-05-05 DIAGNOSIS — I25.10 CHRONIC CORONARY ARTERY DISEASE: Primary | ICD-10-CM

## 2017-05-05 DIAGNOSIS — K21.9 GASTROESOPHAGEAL REFLUX DISEASE WITHOUT ESOPHAGITIS: ICD-10-CM

## 2017-05-05 DIAGNOSIS — J43.8 OTHER EMPHYSEMA (HCC): ICD-10-CM

## 2017-05-05 DIAGNOSIS — G40.909 SEIZURE DISORDER (HCC): ICD-10-CM

## 2017-05-05 DIAGNOSIS — I10 ESSENTIAL HYPERTENSION: ICD-10-CM

## 2017-05-05 DIAGNOSIS — I73.9 PVD (PERIPHERAL VASCULAR DISEASE) (HCC): ICD-10-CM

## 2017-05-05 DIAGNOSIS — I65.29 STENOSIS OF CAROTID ARTERY, UNSPECIFIED LATERALITY: ICD-10-CM

## 2017-05-05 DIAGNOSIS — I50.20 SYSTOLIC CONGESTIVE HEART FAILURE, UNSPECIFIED CONGESTIVE HEART FAILURE CHRONICITY: ICD-10-CM

## 2017-05-05 DIAGNOSIS — M19.90 ARTHRITIS: ICD-10-CM

## 2017-05-05 DIAGNOSIS — K57.30 DIVERTICULOSIS OF LARGE INTESTINE WITHOUT HEMORRHAGE: ICD-10-CM

## 2017-05-05 DIAGNOSIS — E16.2 HYPOGLYCEMIA: ICD-10-CM

## 2017-05-05 DIAGNOSIS — E53.8 COBALAMIN DEFICIENCY: ICD-10-CM

## 2017-05-05 DIAGNOSIS — E78.00 HYPERCHOLESTEROLEMIA: ICD-10-CM

## 2017-05-05 PROCEDURE — 99214 OFFICE O/P EST MOD 30 MIN: CPT | Performed by: INTERNAL MEDICINE

## 2017-05-05 RX ORDER — ESCITALOPRAM OXALATE 5 MG/1
TABLET ORAL
Qty: 90 TABLET | Refills: 3 | Status: SHIPPED | OUTPATIENT
Start: 2017-05-05 | End: 2017-07-07 | Stop reason: SDUPTHER

## 2017-05-05 RX ORDER — ESCITALOPRAM OXALATE 5 MG/1
5 TABLET ORAL DAILY
Qty: 30 TABLET | Refills: 5 | Status: SHIPPED | OUTPATIENT
Start: 2017-05-05 | End: 2017-05-05 | Stop reason: SDUPTHER

## 2017-05-05 RX ORDER — LORAZEPAM 1 MG/1
TABLET ORAL
Qty: 60 TABLET | Refills: 2 | OUTPATIENT
Start: 2017-05-05 | End: 2017-07-07 | Stop reason: SDUPTHER

## 2017-05-08 ENCOUNTER — TELEPHONE (OUTPATIENT)
Dept: INTERNAL MEDICINE | Facility: CLINIC | Age: 67
End: 2017-05-08

## 2017-06-02 ENCOUNTER — OFFICE VISIT (OUTPATIENT)
Dept: INTERNAL MEDICINE | Facility: CLINIC | Age: 67
End: 2017-06-02

## 2017-06-02 VITALS
WEIGHT: 101 LBS | HEART RATE: 68 BPM | DIASTOLIC BLOOD PRESSURE: 60 MMHG | BODY MASS INDEX: 21.2 KG/M2 | HEIGHT: 58 IN | SYSTOLIC BLOOD PRESSURE: 150 MMHG

## 2017-06-02 DIAGNOSIS — G40.909 SEIZURE DISORDER (HCC): ICD-10-CM

## 2017-06-02 DIAGNOSIS — K21.9 GASTROESOPHAGEAL REFLUX DISEASE WITHOUT ESOPHAGITIS: ICD-10-CM

## 2017-06-02 DIAGNOSIS — E16.2 HYPOGLYCEMIA: ICD-10-CM

## 2017-06-02 DIAGNOSIS — L72.0 EPIDERMOID CYST OF EAR: ICD-10-CM

## 2017-06-02 DIAGNOSIS — I25.10 CHRONIC CORONARY ARTERY DISEASE: Primary | ICD-10-CM

## 2017-06-02 DIAGNOSIS — I65.29 STENOSIS OF CAROTID ARTERY, UNSPECIFIED LATERALITY: ICD-10-CM

## 2017-06-02 DIAGNOSIS — I50.22 CHRONIC SYSTOLIC CONGESTIVE HEART FAILURE (HCC): ICD-10-CM

## 2017-06-02 DIAGNOSIS — F41.9 ANXIETY: ICD-10-CM

## 2017-06-02 DIAGNOSIS — I10 ESSENTIAL HYPERTENSION: ICD-10-CM

## 2017-06-02 DIAGNOSIS — E53.8 COBALAMIN DEFICIENCY: ICD-10-CM

## 2017-06-02 DIAGNOSIS — K57.30 DIVERTICULOSIS OF LARGE INTESTINE WITHOUT HEMORRHAGE: ICD-10-CM

## 2017-06-02 DIAGNOSIS — E78.00 HYPERCHOLESTEROLEMIA: ICD-10-CM

## 2017-06-02 DIAGNOSIS — J43.8 OTHER EMPHYSEMA (HCC): ICD-10-CM

## 2017-06-02 DIAGNOSIS — M19.90 ARTHRITIS: ICD-10-CM

## 2017-06-02 DIAGNOSIS — G25.81 RESTLESS LEGS SYNDROME: ICD-10-CM

## 2017-06-02 DIAGNOSIS — I73.9 PVD (PERIPHERAL VASCULAR DISEASE) (HCC): ICD-10-CM

## 2017-06-02 LAB
ALBUMIN SERPL-MCNC: 4.3 G/DL (ref 3.2–4.8)
ALBUMIN/GLOB SERPL: 1.4 G/DL (ref 1.5–2.5)
ALP SERPL-CCNC: 87 U/L (ref 25–100)
ALT SERPL W P-5'-P-CCNC: 19 U/L (ref 7–40)
ANION GAP SERPL CALCULATED.3IONS-SCNC: 3 MMOL/L (ref 3–11)
ARTICHOKE IGE QN: 112 MG/DL (ref 0–130)
AST SERPL-CCNC: 24 U/L (ref 0–33)
BILIRUB SERPL-MCNC: 0.2 MG/DL (ref 0.3–1.2)
BUN BLD-MCNC: 7 MG/DL (ref 9–23)
BUN/CREAT SERPL: 10 (ref 7–25)
CALCIUM SPEC-SCNC: 9.6 MG/DL (ref 8.7–10.4)
CHLORIDE SERPL-SCNC: 99 MMOL/L (ref 99–109)
CHOLEST SERPL-MCNC: 196 MG/DL (ref 0–200)
CO2 SERPL-SCNC: 29 MMOL/L (ref 20–31)
CREAT BLD-MCNC: 0.7 MG/DL (ref 0.6–1.3)
DEPRECATED RDW RBC AUTO: 63.5 FL (ref 37–54)
ERYTHROCYTE [DISTWIDTH] IN BLOOD BY AUTOMATED COUNT: 17.7 % (ref 11.3–14.5)
GFR SERPL CREATININE-BSD FRML MDRD: 83 ML/MIN/1.73
GLOBULIN UR ELPH-MCNC: 3 GM/DL
GLUCOSE BLD-MCNC: 146 MG/DL (ref 70–100)
HCT VFR BLD AUTO: 35.2 % (ref 34.5–44)
HDLC SERPL-MCNC: 70 MG/DL (ref 40–60)
HGB BLD-MCNC: 11.1 G/DL (ref 11.5–15.5)
MCH RBC QN AUTO: 30.5 PG (ref 27–31)
MCHC RBC AUTO-ENTMCNC: 31.5 G/DL (ref 32–36)
MCV RBC AUTO: 96.7 FL (ref 80–99)
PHENOBARB SERPL-MCNC: 37 MCG/ML (ref 15–40)
PLATELET # BLD AUTO: 289 10*3/MM3 (ref 150–450)
PMV BLD AUTO: 9.7 FL (ref 6–12)
POTASSIUM BLD-SCNC: 4.7 MMOL/L (ref 3.5–5.5)
PROT SERPL-MCNC: 7.3 G/DL (ref 5.7–8.2)
RBC # BLD AUTO: 3.64 10*6/MM3 (ref 3.89–5.14)
SODIUM BLD-SCNC: 131 MMOL/L (ref 132–146)
TRIGL SERPL-MCNC: 68 MG/DL (ref 0–150)
TSH SERPL DL<=0.05 MIU/L-ACNC: 1.34 MIU/ML (ref 0.35–5.35)
VIT B12 BLD-MCNC: 401 PG/ML (ref 211–911)
WBC NRBC COR # BLD: 6.82 10*3/MM3 (ref 3.5–10.8)

## 2017-06-02 PROCEDURE — 80053 COMPREHEN METABOLIC PANEL: CPT | Performed by: INTERNAL MEDICINE

## 2017-06-02 PROCEDURE — 85027 COMPLETE CBC AUTOMATED: CPT | Performed by: INTERNAL MEDICINE

## 2017-06-02 PROCEDURE — 80061 LIPID PANEL: CPT | Performed by: INTERNAL MEDICINE

## 2017-06-02 PROCEDURE — 82607 VITAMIN B-12: CPT | Performed by: INTERNAL MEDICINE

## 2017-06-02 PROCEDURE — 80184 ASSAY OF PHENOBARBITAL: CPT | Performed by: INTERNAL MEDICINE

## 2017-06-02 PROCEDURE — 99214 OFFICE O/P EST MOD 30 MIN: CPT | Performed by: INTERNAL MEDICINE

## 2017-06-02 PROCEDURE — 84443 ASSAY THYROID STIM HORMONE: CPT | Performed by: INTERNAL MEDICINE

## 2017-06-02 RX ORDER — DOXAZOSIN 2 MG/1
2 TABLET ORAL NIGHTLY
Qty: 30 TABLET | Refills: 2 | Status: SHIPPED | OUTPATIENT
Start: 2017-06-02 | End: 2017-08-28 | Stop reason: SDUPTHER

## 2017-06-02 RX ORDER — AZITHROMYCIN 250 MG/1
TABLET, FILM COATED ORAL
Qty: 6 TABLET | Refills: 0 | Status: SHIPPED | OUTPATIENT
Start: 2017-06-02 | End: 2017-07-07

## 2017-06-02 NOTE — PROGRESS NOTES
Patient is a 67 y.o. female who is here for a follow up of chronic condition.  Chief Complaint   Patient presents with   • Hypertension         HPI:  Here for f/u.  Weight is up since last seen.  A lot of stress caring for her .  Still smoking.  Still with a lot of anxiety.  Energy level is not the best.  Sleeping is not good. No excessive bruising.      History:    Patient Active Problem List   Diagnosis   • Gastroesophageal reflux disease   • Angina pectoris   • Anxiety   • Arthritis   • Stenosis of carotid artery   • Chronic obstructive pulmonary disease   • Congestive heart failure   • Chronic coronary artery disease   • Essential hypertension   • Hypercholesterolemia   • Hypoglycemia   • Restless legs syndrome   • Seizure disorder   • Cobalamin deficiency   • PVD (peripheral vascular disease)   • Vertigo   • Chronic left-sided low back pain with left-sided sciatica   • Left leg weakness   • Diverticulosis of large intestine without hemorrhage   • Epidermoid cyst of ear       Past Medical History:   Diagnosis Date   • Hyperlipidemia        Past Surgical History:   Procedure Laterality Date   • APPENDECTOMY     • CHOLECYSTECTOMY     • 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   • CARTIA  MG 24 hr capsule TAKE 1 CAPSULE BY MOUTH EVERY DAY   • clopidogrel (PLAVIX) 75 MG tablet Take 1 tablet by mouth Daily.   • cyanocobalamin 1000 MCG/ML injection Inject 1 mL into the shoulder, thigh, or buttocks Every 7 (Seven) Days.   • escitalopram (LEXAPRO) 5 MG tablet TAKE 1 TABLET BY MOUTH DAILY   • esomeprazole (nexIUM) 40 MG capsule Take 1 capsule by mouth 2  (Two) Times a Day.   • furosemide (LASIX) 40 MG tablet TAKE 1 TABLET BY MOUTH EVERY DAY   • gabapentin (NEURONTIN) 300 MG capsule Take 1 capsule by mouth 3 (Three) Times a Day.   • isosorbide mononitrate (IMDUR) 60 MG 24 hr tablet Take 1 tablet by mouth Daily.   • lidocaine (XYLOCAINE) 5 % ointment Apply  topically Every 2 (Two) Hours As Needed for mild pain (1-3).   • lisinopril (PRINIVIL,ZESTRIL) 40 MG tablet TAKE 1 TABLET BY MOUTH DAILY   • LORazepam (ATIVAN) 1 MG tablet TAKE 1 TABLET BY MOUTH TWICE DAILY.   • Nitroglycerin 400 MCG/SPRAY aerosol solution U ONE SPRAY UNDER THE TONGUE AS NEEDED FOR CHEST PAIN Q 5 MINUTES X 3 PRN   • PHENobarbital (LUMINAL) 100 MG tablet TAKE 1 1/2 TABLET BY MOUTH EVERY DAY   • rosuvastatin (CRESTOR) 40 MG tablet TAKE 1 TABLET BY MOUTH DAILY   • Umeclidinium-Vilanterol (ANORO ELLIPTA) 62.5-25 MCG/INH aerosol powder  Inhale 1 puff Every Morning.     No current facility-administered medications on file prior to visit.        Family History   Problem Relation Age of Onset   • Arthritis Mother    • Cancer Mother    • Heart attack Father    • Hypertension Father    • Hyperlipidemia Father    • Stroke Father        Social History     Social History   • Marital status:      Spouse name: N/A   • Number of children: N/A   • Years of education: N/A     Occupational History   • Not on file.     Social History Main Topics   • Smoking status: Current Every Day Smoker     Types: Cigarettes, Electronic Cigarette     Last attempt to quit: 8/12/2016   • Smokeless tobacco: Never Used      Comment: smokes 1 cigarette a day   • Alcohol use No   • Drug use: No   • Sexual activity: Defer     Other Topics Concern   • Not on file     Social History Narrative         ROS:    Review of Systems   Constitutional: Positive for fatigue and unexpected weight change. Negative for chills, diaphoresis and fever.   HENT: Negative for congestion, ear pain, hearing loss, nosebleeds, postnasal drip, sinus  "pressure and sore throat.    Eyes: Negative for pain, discharge and itching.   Respiratory: Positive for cough and shortness of breath. Negative for chest tightness and wheezing.    Cardiovascular: Positive for chest pain and palpitations. Negative for leg swelling.   Gastrointestinal: Negative for abdominal distention, abdominal pain, blood in stool, constipation, diarrhea, nausea and vomiting.   Endocrine: Positive for cold intolerance. Negative for polydipsia and polyuria.   Genitourinary: Negative for difficulty urinating, dysuria, frequency and hematuria.   Musculoskeletal: Positive for arthralgias and back pain. Negative for gait problem, joint swelling and myalgias.   Skin: Negative for rash and wound.   Neurological: Positive for tremors, seizures and weakness. Negative for dizziness, syncope and headaches.   Psychiatric/Behavioral: Positive for dysphoric mood and sleep disturbance. The patient is nervous/anxious.        /60  Pulse 68  Ht 58\" (147.3 cm)  Wt 101 lb (45.8 kg)  BMI 21.11 kg/m2    Physical Exam:    Physical Exam   Constitutional: She is oriented to person, place, and time. She appears well-developed and well-nourished.   HENT:   Head: Normocephalic and atraumatic.   Right Ear: External ear normal.   Left Ear: External ear normal.   Mouth/Throat: Oropharynx is clear and moist.   Eyes: Conjunctivae and EOM are normal.   Neck: Normal range of motion. Neck supple.   Cardiovascular: Normal rate and regular rhythm.    Murmur (1/6) heard.  Pulmonary/Chest: Effort normal. She has wheezes.   diminished   Abdominal: Soft. Bowel sounds are normal. A hernia ( incisional) is present.   Healed surgical scars   Musculoskeletal: She exhibits deformity (mild kyphosis).   Lymphadenopathy:     She has no cervical adenopathy.   Neurological: She is alert and oriented to person, place, and time.   Skin: Skin is warm and dry.   Right posterior ear lobe cyst inflammed   Psychiatric: Her behavior is normal. " Thought content normal.   Flat affect       Procedure:      Discussion/Summary:  Htn-add cardura  Copd-advised tob cessation  A/D-cont current tx  Pvd/cad-counseled on tob cessation, cont RF mod  GERD-cont ppi  B12 def-cont replacement  Hyperlipidemia-labs today  sz-cont phenobarb, check level today  Epidermoid cyst-zpac     Reviewed prior records, extensively counseled on tob cessation  Labs noted and dw patient, will add zetia      Current Outpatient Prescriptions:   •  albuterol (VENTOLIN HFA) 108 (90 BASE) MCG/ACT inhaler, Inhale 2 puffs Every 4 (Four) Hours As Needed for wheezing or shortness of air., Disp: 18 g, Rfl: 0  •  CARTIA  MG 24 hr capsule, TAKE 1 CAPSULE BY MOUTH EVERY DAY, Disp: 30 capsule, Rfl: 5  •  clopidogrel (PLAVIX) 75 MG tablet, Take 1 tablet by mouth Daily., Disp: 30 tablet, Rfl: 5  •  cyanocobalamin 1000 MCG/ML injection, Inject 1 mL into the shoulder, thigh, or buttocks Every 7 (Seven) Days., Disp: 1 mL, Rfl: 7  •  escitalopram (LEXAPRO) 5 MG tablet, TAKE 1 TABLET BY MOUTH DAILY, Disp: 90 tablet, Rfl: 3  •  esomeprazole (nexIUM) 40 MG capsule, Take 1 capsule by mouth 2 (Two) Times a Day., Disp: 180 capsule, Rfl: 1  •  furosemide (LASIX) 40 MG tablet, TAKE 1 TABLET BY MOUTH EVERY DAY, Disp: 90 tablet, Rfl: 0  •  gabapentin (NEURONTIN) 300 MG capsule, Take 1 capsule by mouth 3 (Three) Times a Day., Disp: 270 capsule, Rfl: 1  •  isosorbide mononitrate (IMDUR) 60 MG 24 hr tablet, Take 1 tablet by mouth Daily., Disp: 90 tablet, Rfl: 5  •  lidocaine (XYLOCAINE) 5 % ointment, Apply  topically Every 2 (Two) Hours As Needed for mild pain (1-3)., Disp: 30 g, Rfl: 0  •  lisinopril (PRINIVIL,ZESTRIL) 40 MG tablet, TAKE 1 TABLET BY MOUTH DAILY, Disp: 30 tablet, Rfl: 2  •  LORazepam (ATIVAN) 1 MG tablet, TAKE 1 TABLET BY MOUTH TWICE DAILY., Disp: 60 tablet, Rfl: 2  •  Nitroglycerin 400 MCG/SPRAY aerosol solution, U ONE SPRAY UNDER THE TONGUE AS NEEDED FOR CHEST PAIN Q 5 MINUTES X 3 PRN, Disp: ,  Rfl: 5  •  PHENobarbital (LUMINAL) 100 MG tablet, TAKE 1 1/2 TABLET BY MOUTH EVERY DAY, Disp: 60 tablet, Rfl: 2  •  rosuvastatin (CRESTOR) 40 MG tablet, TAKE 1 TABLET BY MOUTH DAILY, Disp: 30 tablet, Rfl: 5  •  Umeclidinium-Vilanterol (ANORO ELLIPTA) 62.5-25 MCG/INH aerosol powder , Inhale 1 puff Every Morning., Disp: 30 each, Rfl: 5  •  azithromycin (ZITHROMAX Z-GABINO) 250 MG tablet, Take 2 tablets the first day, then 1 tablet daily for 4 days., Disp: 6 tablet, Rfl: 0  •  doxazosin (CARDURA) 2 MG tablet, Take 1 tablet by mouth Every Night., Disp: 30 tablet, Rfl: 2  •  ezetimibe (ZETIA) 10 MG tablet, Take 1 tablet by mouth Daily., Disp: 30 tablet, Rfl: 5        An was seen today for hypertension.    Diagnoses and all orders for this visit:    Chronic coronary artery disease  -     Ambulatory Referral to Cardiology    Chronic systolic congestive heart failure  -     Ambulatory Referral to Cardiology    Essential hypertension  -     doxazosin (CARDURA) 2 MG tablet; Take 1 tablet by mouth Every Night.  -     CBC (No Diff)    Hypercholesterolemia  -     Comprehensive Metabolic Panel  -     Lipid Panel  -     TSH  -     ezetimibe (ZETIA) 10 MG tablet; Take 1 tablet by mouth Daily.    PVD (peripheral vascular disease)    Stenosis of carotid artery, unspecified laterality    Other emphysema    Cobalamin deficiency  -     Vitamin B12    Diverticulosis of large intestine without hemorrhage    Gastroesophageal reflux disease without esophagitis    Hypoglycemia    Seizure disorder  -     Phenobarbital level    Arthritis    Anxiety    Restless legs syndrome    Epidermoid cyst of ear  -     azithromycin (ZITHROMAX Z-GABINO) 250 MG tablet; Take 2 tablets the first day, then 1 tablet daily for 4 days.

## 2017-06-03 RX ORDER — EZETIMIBE 10 MG/1
10 TABLET ORAL DAILY
Qty: 30 TABLET | Refills: 5 | Status: SHIPPED | OUTPATIENT
Start: 2017-06-03 | End: 2017-12-05 | Stop reason: SDUPTHER

## 2017-06-07 ENCOUNTER — TELEPHONE (OUTPATIENT)
Dept: INTERNAL MEDICINE | Facility: CLINIC | Age: 67
End: 2017-06-07

## 2017-06-07 NOTE — TELEPHONE ENCOUNTER
----- Message from Odilia Roberto sent at 6/6/2017  4:23 PM EDT -----  PATIENT IS REQUEST AN ORDER FOR PULSE OX, BP CUFF AND GLUCOSE METER    PLEASE CALL PATIENT TO DISCUSS [phone]      Have tried calling pt to see how to get orders to pt but unable to LM due to mailbox not being set up

## 2017-07-03 RX ORDER — LISINOPRIL 40 MG/1
TABLET ORAL
Qty: 30 TABLET | Refills: 0 | Status: SHIPPED | OUTPATIENT
Start: 2017-07-03 | End: 2017-08-31 | Stop reason: SDUPTHER

## 2017-07-05 RX ORDER — CLOPIDOGREL BISULFATE 75 MG/1
TABLET ORAL
Qty: 90 TABLET | Refills: 0 | Status: SHIPPED | OUTPATIENT
Start: 2017-07-05 | End: 2017-07-07

## 2017-07-07 ENCOUNTER — OFFICE VISIT (OUTPATIENT)
Dept: INTERNAL MEDICINE | Facility: CLINIC | Age: 67
End: 2017-07-07

## 2017-07-07 VITALS
HEART RATE: 68 BPM | SYSTOLIC BLOOD PRESSURE: 120 MMHG | BODY MASS INDEX: 20.61 KG/M2 | DIASTOLIC BLOOD PRESSURE: 50 MMHG | HEIGHT: 58 IN | WEIGHT: 98.2 LBS

## 2017-07-07 DIAGNOSIS — G89.29 CHRONIC LEFT-SIDED LOW BACK PAIN WITH LEFT-SIDED SCIATICA: ICD-10-CM

## 2017-07-07 DIAGNOSIS — M19.90 ARTHRITIS: ICD-10-CM

## 2017-07-07 DIAGNOSIS — G40.909 SEIZURE DISORDER (HCC): ICD-10-CM

## 2017-07-07 DIAGNOSIS — I50.22 CHRONIC SYSTOLIC CONGESTIVE HEART FAILURE (HCC): ICD-10-CM

## 2017-07-07 DIAGNOSIS — F41.9 ANXIETY: ICD-10-CM

## 2017-07-07 DIAGNOSIS — K57.30 DIVERTICULOSIS OF LARGE INTESTINE WITHOUT HEMORRHAGE: ICD-10-CM

## 2017-07-07 DIAGNOSIS — I65.29 STENOSIS OF CAROTID ARTERY, UNSPECIFIED LATERALITY: ICD-10-CM

## 2017-07-07 DIAGNOSIS — I25.10 CHRONIC CORONARY ARTERY DISEASE: Primary | ICD-10-CM

## 2017-07-07 DIAGNOSIS — R42 VERTIGO: ICD-10-CM

## 2017-07-07 DIAGNOSIS — J43.8 OTHER EMPHYSEMA (HCC): ICD-10-CM

## 2017-07-07 DIAGNOSIS — M54.42 CHRONIC LEFT-SIDED LOW BACK PAIN WITH LEFT-SIDED SCIATICA: ICD-10-CM

## 2017-07-07 DIAGNOSIS — I10 ESSENTIAL HYPERTENSION: ICD-10-CM

## 2017-07-07 DIAGNOSIS — G25.81 RESTLESS LEGS SYNDROME: ICD-10-CM

## 2017-07-07 DIAGNOSIS — I73.9 PVD (PERIPHERAL VASCULAR DISEASE) (HCC): ICD-10-CM

## 2017-07-07 DIAGNOSIS — E78.00 HYPERCHOLESTEROLEMIA: ICD-10-CM

## 2017-07-07 DIAGNOSIS — E53.8 COBALAMIN DEFICIENCY: ICD-10-CM

## 2017-07-07 DIAGNOSIS — K21.9 GASTROESOPHAGEAL REFLUX DISEASE WITHOUT ESOPHAGITIS: ICD-10-CM

## 2017-07-07 PROCEDURE — 99214 OFFICE O/P EST MOD 30 MIN: CPT | Performed by: INTERNAL MEDICINE

## 2017-07-07 RX ORDER — ESCITALOPRAM OXALATE 10 MG/1
10 TABLET ORAL EVERY MORNING
Qty: 90 TABLET | Refills: 3 | Status: SHIPPED | OUTPATIENT
Start: 2017-07-07 | End: 2018-07-03

## 2017-07-07 RX ORDER — LORAZEPAM 1 MG/1
TABLET ORAL
Qty: 60 TABLET | Refills: 1 | OUTPATIENT
Start: 2017-07-07 | End: 2017-09-07 | Stop reason: SDUPTHER

## 2017-07-07 NOTE — PROGRESS NOTES
Patient is a 67 y.o. female who is here for a follow up of chronic conditions.  Chief Complaint   Patient presents with   • Hypertension   • Hyperlipidemia   • Anxiety         HPI:  Here for f/u.  Tolerating zetia.  Does not check BP.  Feels sleepy most of the time.  Breathing is ok.  Continues to smoke.  GERD is controlled.  Energy level is poor.  Appetite is not the best.     History:    Patient Active Problem List   Diagnosis   • Gastroesophageal reflux disease   • Angina pectoris   • Anxiety   • Arthritis   • Stenosis of carotid artery   • Chronic obstructive pulmonary disease   • Congestive heart failure   • Chronic coronary artery disease   • Essential hypertension   • Hypercholesterolemia   • Hypoglycemia   • Restless legs syndrome   • Seizure disorder   • Cobalamin deficiency   • PVD (peripheral vascular disease)   • Vertigo   • Chronic left-sided low back pain with left-sided sciatica   • Left leg weakness   • Diverticulosis of large intestine without hemorrhage   • Epidermoid cyst of ear       Past Medical History:   Diagnosis Date   • Hyperlipidemia        Past Surgical History:   Procedure Laterality Date   • APPENDECTOMY     • CHOLECYSTECTOMY     • 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   • CARTIA  MG 24 hr capsule TAKE 1 CAPSULE BY MOUTH EVERY DAY   • clopidogrel (PLAVIX) 75 MG tablet Take 1 tablet by mouth Daily.   • cyanocobalamin 1000 MCG/ML injection Inject 1 mL into the shoulder, thigh, or buttocks Every 7 (Seven) Days.   • doxazosin (CARDURA) 2 MG tablet Take 1 tablet by mouth Every Night.   • esomeprazole (nexIUM) 40 MG capsule Take 1  capsule by mouth 2 (Two) Times a Day.   • ezetimibe (ZETIA) 10 MG tablet Take 1 tablet by mouth Daily.   • furosemide (LASIX) 40 MG tablet TAKE 1 TABLET BY MOUTH EVERY DAY   • gabapentin (NEURONTIN) 300 MG capsule Take 1 capsule by mouth 3 (Three) Times a Day.   • isosorbide mononitrate (IMDUR) 60 MG 24 hr tablet Take 1 tablet by mouth Daily.   • lidocaine (XYLOCAINE) 5 % ointment Apply  topically Every 2 (Two) Hours As Needed for mild pain (1-3).   • lisinopril (PRINIVIL,ZESTRIL) 40 MG tablet TAKE 1 TABLET BY MOUTH DAILY   • LORazepam (ATIVAN) 1 MG tablet TAKE 1 TABLET BY MOUTH TWICE DAILY.   • Nitroglycerin 400 MCG/SPRAY aerosol solution U ONE SPRAY UNDER THE TONGUE AS NEEDED FOR CHEST PAIN Q 5 MINUTES X 3 PRN   • PHENobarbital (LUMINAL) 100 MG tablet TAKE 1 1/2 TABLET BY MOUTH EVERY DAY   • rosuvastatin (CRESTOR) 40 MG tablet TAKE 1 TABLET BY MOUTH DAILY   • Umeclidinium-Vilanterol (ANORO ELLIPTA) 62.5-25 MCG/INH aerosol powder  Inhale 1 puff Every Morning.   • [DISCONTINUED] escitalopram (LEXAPRO) 5 MG tablet TAKE 1 TABLET BY MOUTH DAILY   • [DISCONTINUED] azithromycin (ZITHROMAX Z-GABINO) 250 MG tablet Take 2 tablets the first day, then 1 tablet daily for 4 days.   • [DISCONTINUED] clopidogrel (PLAVIX) 75 MG tablet TAKE 1 TABLET BY MOUTH EVERY DAY     No current facility-administered medications on file prior to visit.        Family History   Problem Relation Age of Onset   • Arthritis Mother    • Cancer Mother    • Heart attack Father    • Hypertension Father    • Hyperlipidemia Father    • Stroke Father        Social History     Social History   • Marital status:      Spouse name: N/A   • Number of children: N/A   • Years of education: N/A     Occupational History   • Not on file.     Social History Main Topics   • Smoking status: Current Every Day Smoker     Types: Cigarettes, Electronic Cigarette     Last attempt to quit: 8/12/2016   • Smokeless tobacco: Never Used      Comment: smokes 1 cigarette a day  "  • Alcohol use No   • Drug use: No   • Sexual activity: Defer     Other Topics Concern   • Not on file     Social History Narrative         ROS:    Review of Systems   Constitutional: Positive for fatigue and unexpected weight change. Negative for chills, diaphoresis and fever.   HENT: Negative for congestion, ear pain, hearing loss, nosebleeds, postnasal drip, sinus pressure and sore throat.    Eyes: Negative for pain, discharge and itching.   Respiratory: Positive for cough and shortness of breath. Negative for chest tightness and wheezing.    Cardiovascular: Positive for chest pain and palpitations. Negative for leg swelling.   Gastrointestinal: Negative for abdominal distention, abdominal pain, blood in stool, constipation, diarrhea, nausea and vomiting.   Endocrine: Positive for cold intolerance. Negative for polydipsia and polyuria.   Genitourinary: Negative for difficulty urinating, dysuria, frequency and hematuria.   Musculoskeletal: Positive for arthralgias and back pain. Negative for gait problem, joint swelling and myalgias.   Skin: Negative for rash and wound.   Neurological: Positive for tremors, seizures and weakness. Negative for dizziness, syncope and headaches.   Psychiatric/Behavioral: Positive for dysphoric mood and sleep disturbance. The patient is nervous/anxious.        /50  Pulse 68  Ht 58\" (147.3 cm)  Wt 98 lb 3.2 oz (44.5 kg)  BMI 20.52 kg/m2    Physical Exam:    Physical Exam   Constitutional: She is oriented to person, place, and time. She appears well-developed and well-nourished.   HENT:   Head: Normocephalic and atraumatic.   Right Ear: External ear normal.   Left Ear: External ear normal.   Mouth/Throat: Oropharynx is clear and moist.   Eyes: Conjunctivae and EOM are normal.   Neck: Normal range of motion. Neck supple.   Cardiovascular: Normal rate and regular rhythm.    Murmur (1/6) heard.  Pulmonary/Chest: Effort normal. She has wheezes.   diminished   Abdominal: Soft. " Bowel sounds are normal. A hernia ( incisional) is present.   Healed surgical scars   Musculoskeletal: She exhibits deformity (mild kyphosis).   Lymphadenopathy:     She has no cervical adenopathy.   Neurological: She is alert and oriented to person, place, and time.   Skin: Skin is warm and dry.   Psychiatric: Her behavior is normal. Thought content normal.   Flat affect       Procedure:      Discussion/Summary:    Htn-improved  Copd-advised tob cessation  A/D-cont current tx  Pvd/cad-counseled on tob cessation, cont RF mod  GERD-cont ppi  B12 def-cont replacement  Hyperlipidemia-labs on rtc  sz-cont phenobarb      Reviewed prior records, extensively counseled on tob cessation  Labs noted and dw patient       Current Outpatient Prescriptions:   •  albuterol (VENTOLIN HFA) 108 (90 BASE) MCG/ACT inhaler, Inhale 2 puffs Every 4 (Four) Hours As Needed for wheezing or shortness of air., Disp: 18 g, Rfl: 0  •  CARTIA  MG 24 hr capsule, TAKE 1 CAPSULE BY MOUTH EVERY DAY, Disp: 30 capsule, Rfl: 5  •  clopidogrel (PLAVIX) 75 MG tablet, Take 1 tablet by mouth Daily., Disp: 30 tablet, Rfl: 5  •  cyanocobalamin 1000 MCG/ML injection, Inject 1 mL into the shoulder, thigh, or buttocks Every 7 (Seven) Days., Disp: 1 mL, Rfl: 7  •  doxazosin (CARDURA) 2 MG tablet, Take 1 tablet by mouth Every Night., Disp: 30 tablet, Rfl: 2  •  escitalopram (LEXAPRO) 10 MG tablet, Take 1 tablet by mouth Every Morning., Disp: 90 tablet, Rfl: 3  •  esomeprazole (nexIUM) 40 MG capsule, Take 1 capsule by mouth 2 (Two) Times a Day., Disp: 180 capsule, Rfl: 1  •  ezetimibe (ZETIA) 10 MG tablet, Take 1 tablet by mouth Daily., Disp: 30 tablet, Rfl: 5  •  furosemide (LASIX) 40 MG tablet, TAKE 1 TABLET BY MOUTH EVERY DAY, Disp: 90 tablet, Rfl: 0  •  gabapentin (NEURONTIN) 300 MG capsule, Take 1 capsule by mouth 3 (Three) Times a Day., Disp: 270 capsule, Rfl: 1  •  isosorbide mononitrate (IMDUR) 60 MG 24 hr tablet, Take 1 tablet by mouth Daily., Disp:  90 tablet, Rfl: 5  •  lidocaine (XYLOCAINE) 5 % ointment, Apply  topically Every 2 (Two) Hours As Needed for mild pain (1-3)., Disp: 30 g, Rfl: 0  •  lisinopril (PRINIVIL,ZESTRIL) 40 MG tablet, TAKE 1 TABLET BY MOUTH DAILY, Disp: 30 tablet, Rfl: 0  •  LORazepam (ATIVAN) 1 MG tablet, TAKE 1 TABLET BY MOUTH TWICE DAILY., Disp: 60 tablet, Rfl: 2  •  Nitroglycerin 400 MCG/SPRAY aerosol solution, U ONE SPRAY UNDER THE TONGUE AS NEEDED FOR CHEST PAIN Q 5 MINUTES X 3 PRN, Disp: , Rfl: 5  •  PHENobarbital (LUMINAL) 100 MG tablet, TAKE 1 1/2 TABLET BY MOUTH EVERY DAY, Disp: 60 tablet, Rfl: 2  •  rosuvastatin (CRESTOR) 40 MG tablet, TAKE 1 TABLET BY MOUTH DAILY, Disp: 30 tablet, Rfl: 5  •  Umeclidinium-Vilanterol (ANORO ELLIPTA) 62.5-25 MCG/INH aerosol powder , Inhale 1 puff Every Morning., Disp: 30 each, Rfl: 5        An was seen today for hypertension, hyperlipidemia and anxiety.    Diagnoses and all orders for this visit:    Chronic coronary artery disease    Chronic systolic congestive heart failure    Essential hypertension    Hypercholesterolemia    PVD (peripheral vascular disease)    Stenosis of carotid artery, unspecified laterality    Other emphysema    Cobalamin deficiency    Diverticulosis of large intestine without hemorrhage    Gastroesophageal reflux disease without esophagitis    Seizure disorder    Arthritis    Anxiety  -     escitalopram (LEXAPRO) 10 MG tablet; Take 1 tablet by mouth Every Morning.    Chronic left-sided low back pain with left-sided sciatica    Restless legs syndrome    Vertigo

## 2017-07-10 RX ORDER — PHENOBARBITAL 100 MG/1
TABLET ORAL
Qty: 135 TABLET | Refills: 2 | OUTPATIENT
Start: 2017-07-10 | End: 2018-01-23 | Stop reason: SDUPTHER

## 2017-07-10 RX ORDER — PHENOBARBITAL 100 MG/1
TABLET ORAL
Qty: 60 TABLET | Refills: 0 | OUTPATIENT
Start: 2017-07-10

## 2017-08-02 ENCOUNTER — OFFICE VISIT (OUTPATIENT)
Dept: INTERNAL MEDICINE | Facility: CLINIC | Age: 67
End: 2017-08-02

## 2017-08-02 VITALS
SYSTOLIC BLOOD PRESSURE: 120 MMHG | HEART RATE: 64 BPM | HEIGHT: 58 IN | BODY MASS INDEX: 20.78 KG/M2 | WEIGHT: 99 LBS | DIASTOLIC BLOOD PRESSURE: 58 MMHG

## 2017-08-02 DIAGNOSIS — I73.9 PVD (PERIPHERAL VASCULAR DISEASE) (HCC): ICD-10-CM

## 2017-08-02 DIAGNOSIS — I10 ESSENTIAL HYPERTENSION: ICD-10-CM

## 2017-08-02 DIAGNOSIS — I20.9 ANGINA PECTORIS (HCC): Primary | ICD-10-CM

## 2017-08-02 DIAGNOSIS — I25.10 CHRONIC CORONARY ARTERY DISEASE: ICD-10-CM

## 2017-08-02 DIAGNOSIS — I65.29 STENOSIS OF CAROTID ARTERY, UNSPECIFIED LATERALITY: ICD-10-CM

## 2017-08-02 DIAGNOSIS — G25.81 RESTLESS LEGS SYNDROME: ICD-10-CM

## 2017-08-02 DIAGNOSIS — G40.909 SEIZURE DISORDER (HCC): ICD-10-CM

## 2017-08-02 DIAGNOSIS — E78.00 HYPERCHOLESTEROLEMIA: ICD-10-CM

## 2017-08-02 DIAGNOSIS — J43.8 OTHER EMPHYSEMA (HCC): ICD-10-CM

## 2017-08-02 DIAGNOSIS — F41.9 ANXIETY: ICD-10-CM

## 2017-08-02 DIAGNOSIS — I50.22 CHRONIC SYSTOLIC CONGESTIVE HEART FAILURE (HCC): ICD-10-CM

## 2017-08-02 DIAGNOSIS — K21.9 GASTROESOPHAGEAL REFLUX DISEASE WITHOUT ESOPHAGITIS: ICD-10-CM

## 2017-08-02 DIAGNOSIS — E53.8 COBALAMIN DEFICIENCY: ICD-10-CM

## 2017-08-02 DIAGNOSIS — K57.30 DIVERTICULOSIS OF LARGE INTESTINE WITHOUT HEMORRHAGE: ICD-10-CM

## 2017-08-02 DIAGNOSIS — M19.90 ARTHRITIS: ICD-10-CM

## 2017-08-02 PROCEDURE — 99214 OFFICE O/P EST MOD 30 MIN: CPT | Performed by: INTERNAL MEDICINE

## 2017-08-02 NOTE — PROGRESS NOTES
Patient is a 67 y.o. female who is here for a follow up of chronic condition.  Chief Complaint   Patient presents with   • Hypertension         HPI:  Here for f/u.  Anxiety is better.  Feels fatigued.  GERD is controlled.  Wants to sleep a lot.  No SZ.  Occasional pedal edema.  No nausea or emesis.  No fever or chills.      History:    Patient Active Problem List   Diagnosis   • Gastroesophageal reflux disease   • Angina pectoris   • Anxiety   • Arthritis   • Stenosis of carotid artery   • Chronic obstructive pulmonary disease   • Congestive heart failure   • Chronic coronary artery disease   • Essential hypertension   • Hypercholesterolemia   • Hypoglycemia   • Restless legs syndrome   • Seizure disorder   • Cobalamin deficiency   • PVD (peripheral vascular disease)   • Vertigo   • Chronic left-sided low back pain with left-sided sciatica   • Left leg weakness   • Diverticulosis of large intestine without hemorrhage   • Epidermoid cyst of ear       Past Medical History:   Diagnosis Date   • Hyperlipidemia        Past Surgical History:   Procedure Laterality Date   • APPENDECTOMY     • CHOLECYSTECTOMY     • 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   • CARTIA  MG 24 hr capsule TAKE 1 CAPSULE BY MOUTH EVERY DAY   • clopidogrel (PLAVIX) 75 MG tablet Take 1 tablet by mouth Daily.   • cyanocobalamin 1000 MCG/ML injection Inject 1 mL into the shoulder, thigh, or buttocks Every 7 (Seven) Days.   • doxazosin (CARDURA) 2 MG tablet Take 1 tablet by mouth Every Night.   • escitalopram (LEXAPRO) 10 MG tablet Take 1 tablet by mouth Every Morning.   • esomeprazole  (nexIUM) 40 MG capsule Take 1 capsule by mouth 2 (Two) Times a Day.   • ezetimibe (ZETIA) 10 MG tablet Take 1 tablet by mouth Daily.   • furosemide (LASIX) 40 MG tablet TAKE 1 TABLET BY MOUTH EVERY DAY   • gabapentin (NEURONTIN) 300 MG capsule Take 1 capsule by mouth 3 (Three) Times a Day.   • isosorbide mononitrate (IMDUR) 60 MG 24 hr tablet Take 1 tablet by mouth Daily.   • lidocaine (XYLOCAINE) 5 % ointment Apply  topically Every 2 (Two) Hours As Needed for mild pain (1-3).   • lisinopril (PRINIVIL,ZESTRIL) 40 MG tablet TAKE 1 TABLET BY MOUTH DAILY   • LORazepam (ATIVAN) 1 MG tablet TAKE 1 TABLET BY MOUTH TWICE DAILY(MUST LAST 30 DAYS)   • Nitroglycerin 400 MCG/SPRAY aerosol solution U ONE SPRAY UNDER THE TONGUE AS NEEDED FOR CHEST PAIN Q 5 MINUTES X 3 PRN   • PHENobarbital (LUMINAL) 100 MG tablet TAKE 1 AND 1/2 TABLETS BY MOUTH EVERY DAY   • rosuvastatin (CRESTOR) 40 MG tablet TAKE 1 TABLET BY MOUTH DAILY   • Umeclidinium-Vilanterol (ANORO ELLIPTA) 62.5-25 MCG/INH aerosol powder  Inhale 1 puff Every Morning.     No current facility-administered medications on file prior to visit.        Family History   Problem Relation Age of Onset   • Arthritis Mother    • Cancer Mother    • Heart attack Father    • Hypertension Father    • Hyperlipidemia Father    • Stroke Father        Social History     Social History   • Marital status:      Spouse name: N/A   • Number of children: N/A   • Years of education: N/A     Occupational History   • Not on file.     Social History Main Topics   • Smoking status: Current Every Day Smoker     Types: Cigarettes, Electronic Cigarette     Last attempt to quit: 8/12/2016   • Smokeless tobacco: Never Used      Comment: smokes 1 cigarette a day   • Alcohol use No   • Drug use: No   • Sexual activity: Defer     Other Topics Concern   • Not on file     Social History Narrative         ROS:    Review of Systems   Constitutional: Positive for fatigue and unexpected weight change.  "Negative for chills, diaphoresis and fever.   HENT: Negative for congestion, ear pain, hearing loss, nosebleeds, postnasal drip, sinus pressure and sore throat.    Eyes: Negative for pain, discharge and itching.   Respiratory: Positive for cough and shortness of breath. Negative for chest tightness and wheezing.    Cardiovascular: Positive for chest pain and palpitations. Negative for leg swelling.   Gastrointestinal: Negative for abdominal distention, abdominal pain, blood in stool, constipation, diarrhea, nausea and vomiting.   Endocrine: Positive for cold intolerance. Negative for polydipsia and polyuria.   Genitourinary: Negative for difficulty urinating, dysuria, frequency and hematuria.   Musculoskeletal: Positive for arthralgias and back pain. Negative for gait problem, joint swelling and myalgias.   Skin: Negative for rash and wound.   Neurological: Positive for tremors, seizures and weakness. Negative for dizziness, syncope and headaches.   Psychiatric/Behavioral: Positive for dysphoric mood and sleep disturbance. The patient is nervous/anxious.        /58 (BP Location: Left arm, Patient Position: Sitting)  Pulse 64  Ht 58\" (147.3 cm)  Wt 99 lb (44.9 kg)  BMI 20.69 kg/m2    Physical Exam:    Physical Exam   Constitutional: She is oriented to person, place, and time. She appears well-developed and well-nourished.   HENT:   Head: Normocephalic and atraumatic.   Right Ear: External ear normal.   Left Ear: External ear normal.   Mouth/Throat: Oropharynx is clear and moist.   Eyes: Conjunctivae and EOM are normal.   Neck: Normal range of motion. Neck supple.   Cardiovascular: Normal rate and regular rhythm.    Murmur (1/6) heard.  Pulmonary/Chest: Effort normal. She has wheezes.   diminished   Abdominal: Soft. Bowel sounds are normal. A hernia ( incisional) is present.   Healed surgical scars   Musculoskeletal: She exhibits deformity (mild kyphosis).   Lymphadenopathy:     She has no cervical " adenopathy.   Neurological: She is alert and oriented to person, place, and time.   Skin: Skin is warm and dry.   Psychiatric: Her behavior is normal. Thought content normal.   Flat affect       Procedure:      Discussion/Summary:    Htn-improved  Copd-advised tob cessation  A/D-cont current tx  Pvd/cad-counseled on tob cessation, cont RF mod  GERD-cont ppi  B12 def-cont replacement  Hyperlipidemia-labs on rtc  sz-cont phenobarb      Reviewed prior records, extensively counseled on tob cessation  Labs noted and dw patient                  Current Outpatient Prescriptions:   •  albuterol (VENTOLIN HFA) 108 (90 BASE) MCG/ACT inhaler, Inhale 2 puffs Every 4 (Four) Hours As Needed for wheezing or shortness of air., Disp: 18 g, Rfl: 0  •  CARTIA  MG 24 hr capsule, TAKE 1 CAPSULE BY MOUTH EVERY DAY, Disp: 30 capsule, Rfl: 5  •  clopidogrel (PLAVIX) 75 MG tablet, Take 1 tablet by mouth Daily., Disp: 30 tablet, Rfl: 5  •  cyanocobalamin 1000 MCG/ML injection, Inject 1 mL into the shoulder, thigh, or buttocks Every 7 (Seven) Days., Disp: 1 mL, Rfl: 7  •  doxazosin (CARDURA) 2 MG tablet, Take 1 tablet by mouth Every Night., Disp: 30 tablet, Rfl: 2  •  escitalopram (LEXAPRO) 10 MG tablet, Take 1 tablet by mouth Every Morning., Disp: 90 tablet, Rfl: 3  •  esomeprazole (nexIUM) 40 MG capsule, Take 1 capsule by mouth 2 (Two) Times a Day., Disp: 180 capsule, Rfl: 1  •  ezetimibe (ZETIA) 10 MG tablet, Take 1 tablet by mouth Daily., Disp: 30 tablet, Rfl: 5  •  furosemide (LASIX) 40 MG tablet, TAKE 1 TABLET BY MOUTH EVERY DAY, Disp: 90 tablet, Rfl: 0  •  gabapentin (NEURONTIN) 300 MG capsule, Take 1 capsule by mouth 3 (Three) Times a Day., Disp: 270 capsule, Rfl: 1  •  isosorbide mononitrate (IMDUR) 60 MG 24 hr tablet, Take 1 tablet by mouth Daily., Disp: 90 tablet, Rfl: 5  •  lidocaine (XYLOCAINE) 5 % ointment, Apply  topically Every 2 (Two) Hours As Needed for mild pain (1-3)., Disp: 30 g, Rfl: 0  •  lisinopril  (PRINIVIL,ZESTRIL) 40 MG tablet, TAKE 1 TABLET BY MOUTH DAILY, Disp: 30 tablet, Rfl: 0  •  LORazepam (ATIVAN) 1 MG tablet, TAKE 1 TABLET BY MOUTH TWICE DAILY(MUST LAST 30 DAYS), Disp: 60 tablet, Rfl: 1  •  Nitroglycerin 400 MCG/SPRAY aerosol solution, U ONE SPRAY UNDER THE TONGUE AS NEEDED FOR CHEST PAIN Q 5 MINUTES X 3 PRN, Disp: , Rfl: 5  •  PHENobarbital (LUMINAL) 100 MG tablet, TAKE 1 AND 1/2 TABLETS BY MOUTH EVERY DAY, Disp: 135 tablet, Rfl: 2  •  rosuvastatin (CRESTOR) 40 MG tablet, TAKE 1 TABLET BY MOUTH DAILY, Disp: 30 tablet, Rfl: 5  •  Umeclidinium-Vilanterol (ANORO ELLIPTA) 62.5-25 MCG/INH aerosol powder , Inhale 1 puff Every Morning., Disp: 30 each, Rfl: 5        An was seen today for hypertension.    Diagnoses and all orders for this visit:    Angina pectoris    Chronic coronary artery disease    Chronic systolic congestive heart failure    Essential hypertension    Hypercholesterolemia    PVD (peripheral vascular disease)    Stenosis of carotid artery, unspecified laterality    Other emphysema    Cobalamin deficiency    Diverticulosis of large intestine without hemorrhage    Gastroesophageal reflux disease without esophagitis    Seizure disorder    Arthritis    Anxiety    Restless legs syndrome

## 2017-08-03 RX ORDER — LISINOPRIL 40 MG/1
TABLET ORAL
Qty: 30 TABLET | Refills: 0 | OUTPATIENT
Start: 2017-08-03

## 2017-08-04 DIAGNOSIS — E78.2 MIXED HYPERLIPIDEMIA: ICD-10-CM

## 2017-08-04 RX ORDER — ROSUVASTATIN CALCIUM 40 MG/1
TABLET, COATED ORAL
Qty: 30 TABLET | Refills: 0 | OUTPATIENT
Start: 2017-08-04

## 2017-08-05 NOTE — TELEPHONE ENCOUNTER
Patient established care at a different office with Dr. Sb Ruiz, prescriber not at this practice.

## 2017-08-07 ENCOUNTER — CONSULT (OUTPATIENT)
Dept: CARDIOLOGY | Facility: CLINIC | Age: 67
End: 2017-08-07

## 2017-08-07 VITALS
SYSTOLIC BLOOD PRESSURE: 124 MMHG | WEIGHT: 99.6 LBS | BODY MASS INDEX: 20.91 KG/M2 | HEART RATE: 62 BPM | DIASTOLIC BLOOD PRESSURE: 64 MMHG | HEIGHT: 58 IN

## 2017-08-07 DIAGNOSIS — I73.9 PVD (PERIPHERAL VASCULAR DISEASE) (HCC): ICD-10-CM

## 2017-08-07 DIAGNOSIS — I25.10 ATHEROSCLEROSIS OF NATIVE CORONARY ARTERY OF NATIVE HEART WITHOUT ANGINA PECTORIS: ICD-10-CM

## 2017-08-07 DIAGNOSIS — R00.2 HEART PALPITATIONS: Primary | ICD-10-CM

## 2017-08-07 DIAGNOSIS — I10 ESSENTIAL HYPERTENSION: ICD-10-CM

## 2017-08-07 DIAGNOSIS — E78.00 HYPERCHOLESTEROLEMIA: ICD-10-CM

## 2017-08-07 DIAGNOSIS — I25.10 CHRONIC CORONARY ARTERY DISEASE: ICD-10-CM

## 2017-08-07 PROBLEM — I48.0 PAROXYSMAL ATRIAL FIBRILLATION (HCC): Status: ACTIVE | Noted: 2017-08-07

## 2017-08-07 PROCEDURE — 99214 OFFICE O/P EST MOD 30 MIN: CPT | Performed by: INTERNAL MEDICINE

## 2017-08-07 RX ORDER — NITROGLYCERIN 40 MG/1
1 PATCH TRANSDERMAL DAILY
COMMUNITY
End: 2017-09-08 | Stop reason: SDUPTHER

## 2017-08-07 NOTE — PROGRESS NOTES
Subjective:     Encounter Date:08/07/2017      Patient ID: An Abreu is a 67 y.o. female.    Chief Complaint:Chest Pain (Consult); Dizziness; Shortness of Breath; Irregular Heart Beat; Edema; and Leg Pain    PROBLEM LIST:  1. Coronary artery disease  a. 2003 CABG LIMA to LAD, VG to OM  b. 2004 VG to OM occluded. LIMA patent Cx intervention and RCA  c. 2008 stent for ISR  d. 2013 ISR and stenting of CX and RCA  e. 2016 normal MPS  2. Peripheral artery disease  a. Right iliac stent, incomplete database  3. Paroxysmal atrial fibrillation  a. Reportedly diagnosed 2016 during hospital admission.  4. Hypertension  5. Dyslipidemia  6. Borderline diabetes  7. History of CVA  8. Skin cancer with removal  9. Asthma  10. GERD  11. History of seizure  12. Sleep apnea, noncompliant with CPAP  13. Surgeries:  a. Appendectomy  b. Cholecystectomy  c. Colostomy (secondary to mesenteric ischemia) with eventual reversal  d. Hysterectomy  e. Exploratory laparotomy for ovarian cysts      Allergies   Allergen Reactions   • Carbamazepine    • Codeine    • Dilantin [Phenytoin]    • Keflex  [Cephalexin]    • Sulfamethoxazole-Trimethoprim          Current Outpatient Prescriptions:   •  albuterol (VENTOLIN HFA) 108 (90 BASE) MCG/ACT inhaler, Inhale 2 puffs Every 4 (Four) Hours As Needed for wheezing or shortness of air., Disp: 18 g, Rfl: 0  •  aspirin 81 MG tablet, Take 81 mg by mouth Daily., Disp: , Rfl:   •  CARTIA  MG 24 hr capsule, TAKE 1 CAPSULE BY MOUTH EVERY DAY, Disp: 30 capsule, Rfl: 5  •  clopidogrel (PLAVIX) 75 MG tablet, Take 1 tablet by mouth Daily., Disp: 30 tablet, Rfl: 5  •  cyanocobalamin 1000 MCG/ML injection, Inject 1 mL into the shoulder, thigh, or buttocks Every 7 (Seven) Days., Disp: 1 mL, Rfl: 7  •  doxazosin (CARDURA) 2 MG tablet, Take 1 tablet by mouth Every Night., Disp: 30 tablet, Rfl: 2  •  escitalopram (LEXAPRO) 10 MG tablet, Take 1 tablet by mouth Every Morning., Disp: 90 tablet, Rfl: 3  •   esomeprazole (nexIUM) 40 MG capsule, Take 1 capsule by mouth 2 (Two) Times a Day., Disp: 180 capsule, Rfl: 1  •  ezetimibe (ZETIA) 10 MG tablet, Take 1 tablet by mouth Daily., Disp: 30 tablet, Rfl: 5  •  furosemide (LASIX) 40 MG tablet, TAKE 1 TABLET BY MOUTH EVERY DAY, Disp: 90 tablet, Rfl: 0  •  gabapentin (NEURONTIN) 300 MG capsule, Take 1 capsule by mouth 3 (Three) Times a Day., Disp: 270 capsule, Rfl: 1  •  isosorbide mononitrate (IMDUR) 60 MG 24 hr tablet, Take 1 tablet by mouth Daily., Disp: 90 tablet, Rfl: 5  •  lidocaine (XYLOCAINE) 5 % ointment, Apply  topically Every 2 (Two) Hours As Needed for mild pain (1-3)., Disp: 30 g, Rfl: 0  •  lisinopril (PRINIVIL,ZESTRIL) 40 MG tablet, TAKE 1 TABLET BY MOUTH DAILY, Disp: 30 tablet, Rfl: 0  •  LORazepam (ATIVAN) 1 MG tablet, TAKE 1 TABLET BY MOUTH TWICE DAILY(MUST LAST 30 DAYS), Disp: 60 tablet, Rfl: 1  •  nitroglycerin (NITRODUR) 0.2 MG/HR patch, Place 1 patch on the skin Daily., Disp: , Rfl:   •  Nitroglycerin 400 MCG/SPRAY aerosol solution, U ONE SPRAY UNDER THE TONGUE AS NEEDED FOR CHEST PAIN Q 5 MINUTES X 3 PRN, Disp: , Rfl: 5  •  PHENobarbital (LUMINAL) 100 MG tablet, TAKE 1 AND 1/2 TABLETS BY MOUTH EVERY DAY, Disp: 135 tablet, Rfl: 2  •  rosuvastatin (CRESTOR) 40 MG tablet, TAKE 1 TABLET BY MOUTH DAILY, Disp: 30 tablet, Rfl: 5  •  Umeclidinium-Vilanterol (ANORO ELLIPTA) 62.5-25 MCG/INH aerosol powder , Inhale 1 puff Every Morning., Disp: 30 each, Rfl: 5        History of Present Illness    Patient is a 67-year-old  female who we are seeing today for further evaluation of tachypalpitations and generalized weakness.  Patient notes over the last 5 months she has had episodes of sudden onset of tachypalpitations.  This has sometimes associated chest pain.  She notes she will usually have to apply her oxygen and taken extra Cardizem.  The symptoms will last for roughly 30-40 minutes and then subside.  She will also at times have episodes of chest pain  without palpitations.  This is not always brought on with exertion.  However, complains of significant weakness.  Notes it is very hard for her to a bili percussion become so significantly weak.  Her palpitations that she has been experiencing lately feel similar to what she had when she was diagnosed with atrial fibrillation last year in the hospital.  No syncope, near syncope.  No edema.  Ambulation becomes dizzy, and short of breath.    The following portions of the patient's history were reviewed and updated as appropriate: allergies, current medications, past family history, past medical history, past social history, past surgical history and problem list.    Family History   Problem Relation Age of Onset   • Arthritis Mother    • Cancer Mother    • Heart attack Father    • Hypertension Father    • Hyperlipidemia Father    • Stroke Father    • Heart disease Brother      CAD   • Heart disease Child      CAD   • Heart disease Child      CAD       Social History   Substance Use Topics   • Smoking status: Current Every Day Smoker     Types: Cigarettes, Electronic Cigarette     Last attempt to quit: 8/12/2016   • Smokeless tobacco: Never Used      Comment: Pt was smoking 1 cigarette a day but due to losing her daughter 3 years ago she has started back smoking 1 PPD   • Alcohol use No         Review of Systems   Constitution: Positive for malaise/fatigue. Negative for fever and weakness.   HENT: Negative for headaches and nosebleeds.    Eyes: Negative for redness and visual disturbance.   Cardiovascular: Positive for palpitations. Negative for orthopnea and paroxysmal nocturnal dyspnea.   Respiratory: Positive for shortness of breath. Negative for cough, snoring, sputum production and wheezing.    Hematologic/Lymphatic: Negative for bleeding problem.   Skin: Negative for flushing, itching and rash.   Musculoskeletal: Positive for joint pain. Negative for falls and muscle cramps.   Gastrointestinal: Positive for  "heartburn. Negative for abdominal pain, diarrhea, nausea and vomiting.   Genitourinary: Negative for hematuria.   Neurological: Negative for excessive daytime sleepiness, dizziness and tremors.   Psychiatric/Behavioral: Negative for substance abuse. The patient is not nervous/anxious.           Objective:    height is 58\" (147.3 cm) and weight is 99 lb 9.6 oz (45.2 kg). Her blood pressure is 124/64 and her pulse is 62.         Physical Exam   Constitutional: She is oriented to person, place, and time. She appears well-developed and well-nourished.   HENT:   Head: Normocephalic and atraumatic.   Mouth/Throat: Oropharynx is clear and moist.   Eyes: Conjunctivae are normal. Pupils are equal, round, and reactive to light.   Neck: Normal carotid pulses and no JVD present. Carotid bruit is not present. No thyromegaly present.   Cardiovascular: Normal rate, regular rhythm, S1 normal and S2 normal.  Exam reveals no gallop and no friction rub.    No murmur heard.  Pulses:       Carotid pulses are 2+ on the right side, and 2+ on the left side.       Popliteal pulses are 0 on the right side, and 0 on the left side.        Dorsalis pedis pulses are 0 on the right side, and 0 on the left side.        Posterior tibial pulses are 0 on the right side, and 0 on the left side.   Pulmonary/Chest: No respiratory distress. She has decreased breath sounds. She has no wheezes. She has no rales. She exhibits no tenderness.   Abdominal: She exhibits no distension, no abdominal bruit and no mass. There is no hepatosplenomegaly. There is no tenderness. There is no rebound.   Musculoskeletal: She exhibits no edema, tenderness or deformity.   Lymphadenopathy:     She has no cervical adenopathy.   Neurological: She is alert and oriented to person, place, and time. She has normal strength.   Skin: Skin is warm and dry. No rash noted. No cyanosis. Nails show no clubbing.   Psychiatric: She has a normal mood and affect. Cognition and memory are " normal.       Procedures          Assessment:   Assessment/Plan      An was seen today for chest pain, dizziness, shortness of breath, irregular heart beat, edema and leg pain.    Diagnoses and all orders for this visit:    Heart palpitations  -     Holter / Event ZIO Patch; Future    Atherosclerosis of native coronary artery of native heart without angina pectoris  -     Stress Test With Myocardial Perfusion; Future    PVD (peripheral vascular disease)  -     Doppler Ankle Brachial Index Multi Level CAR; Future    Chronic coronary artery disease    Essential hypertension    Hypercholesterolemia      Coronary artery disease, chronic atypical chest pain  Peripheral arterial disease, like symptoms as well claudication  Hypertension controlled  Dyslipidemia on statin  Recurrent palpitations     The dictations will check a prolonged Holter monitor weight for her palpitations and rule out significant arrhythmia.  We'll check an adenosine Cardiolite study as well as ABIs with exercise.  Pending the above  Miriam PANIAGUA scribed portions of this dictation for  Dr. Thompson.  I, Chapo Thompson MD, personally performed the services described in this documentation as scribed by the above individual in my presence, and it is both accurate and complete    Dictated utilizing Dragon dictation

## 2017-08-18 ENCOUNTER — TELEPHONE (OUTPATIENT)
Dept: CARDIOLOGY | Facility: CLINIC | Age: 67
End: 2017-08-18

## 2017-08-18 NOTE — TELEPHONE ENCOUNTER
Mailed patient a letter with results of her 5 day heart monitor she wore. Sinus rhythm with first degree AV block. No other abnormalities noted.

## 2017-08-22 DIAGNOSIS — R00.2 PALPITATIONS: Primary | ICD-10-CM

## 2017-08-28 DIAGNOSIS — I10 ESSENTIAL HYPERTENSION: ICD-10-CM

## 2017-08-28 RX ORDER — DOXAZOSIN 2 MG/1
TABLET ORAL
Qty: 90 TABLET | Refills: 0 | Status: SHIPPED | OUTPATIENT
Start: 2017-08-28 | End: 2017-12-05 | Stop reason: SDUPTHER

## 2017-08-31 ENCOUNTER — TELEPHONE (OUTPATIENT)
Dept: INTERNAL MEDICINE | Facility: CLINIC | Age: 67
End: 2017-08-31

## 2017-08-31 RX ORDER — LISINOPRIL 40 MG/1
TABLET ORAL
Qty: 30 TABLET | Refills: 2 | Status: SHIPPED | OUTPATIENT
Start: 2017-08-31 | End: 2017-12-07 | Stop reason: SDUPTHER

## 2017-09-06 ENCOUNTER — APPOINTMENT (OUTPATIENT)
Dept: CARDIOLOGY | Facility: HOSPITAL | Age: 67
End: 2017-09-06
Attending: INTERNAL MEDICINE

## 2017-09-07 DIAGNOSIS — F41.9 ANXIETY: ICD-10-CM

## 2017-09-07 RX ORDER — DILTIAZEM HYDROCHLORIDE 240 MG/1
CAPSULE, EXTENDED RELEASE ORAL
Qty: 30 CAPSULE | Refills: 0 | Status: SHIPPED | OUTPATIENT
Start: 2017-09-07 | End: 2017-10-06 | Stop reason: SDUPTHER

## 2017-09-07 RX ORDER — LORAZEPAM 1 MG/1
TABLET ORAL
Qty: 60 TABLET | Refills: 2 | OUTPATIENT
Start: 2017-09-07 | End: 2017-12-05 | Stop reason: SDUPTHER

## 2017-09-08 RX ORDER — NITROGLYCERIN 40 MG/1
1 PATCH TRANSDERMAL DAILY
Qty: 30 PATCH | Refills: 0 | Status: SHIPPED | OUTPATIENT
Start: 2017-09-08 | End: 2017-09-11 | Stop reason: SDUPTHER

## 2017-09-11 ENCOUNTER — TELEPHONE (OUTPATIENT)
Dept: INTERNAL MEDICINE | Facility: CLINIC | Age: 67
End: 2017-09-11

## 2017-09-11 RX ORDER — NITROGLYCERIN 40 MG/1
PATCH TRANSDERMAL
Qty: 90 PATCH | Refills: 0 | Status: SHIPPED | OUTPATIENT
Start: 2017-09-11 | End: 2017-12-08 | Stop reason: SDUPTHER

## 2017-09-11 NOTE — TELEPHONE ENCOUNTER
Patient is calling to requesting a cream for her ear. Patient states she was seen about the infection on her outer ear before.

## 2017-09-28 ENCOUNTER — HOSPITAL ENCOUNTER (OUTPATIENT)
Dept: CARDIOLOGY | Facility: HOSPITAL | Age: 67
Discharge: HOME | End: 2017-09-28
Attending: INTERNAL MEDICINE

## 2017-09-28 ENCOUNTER — HOSPITAL ENCOUNTER (OUTPATIENT)
Dept: CARDIOLOGY | Facility: HOSPITAL | Age: 67
Discharge: HOME | End: 2017-09-28
Attending: INTERNAL MEDICINE | Admitting: INTERNAL MEDICINE

## 2017-09-28 DIAGNOSIS — I25.10 ATHEROSCLEROSIS OF NATIVE CORONARY ARTERY OF NATIVE HEART WITHOUT ANGINA PECTORIS: ICD-10-CM

## 2017-09-28 DIAGNOSIS — I73.9 PVD (PERIPHERAL VASCULAR DISEASE) (HCC): ICD-10-CM

## 2017-09-28 LAB
BH CV LOWER ARTERIAL LEFT ABI RATIO: 0.86
BH CV LOWER ARTERIAL LEFT DORSALIS PEDIS SYS MAX: 115 MMHG
BH CV LOWER ARTERIAL LEFT POST TIBIAL SYS MAX: 128 MMHG
BH CV LOWER ARTERIAL RIGHT ABI RATIO: 0.93
BH CV LOWER ARTERIAL RIGHT DORSALIS PEDIS SYS MAX: 138 MMHG
BH CV LOWER ARTERIAL RIGHT POST TIBIAL SYS MAX: 128 MMHG
BH CV STRESS BP STAGE 1: NORMAL
BH CV STRESS BP STAGE 3: NORMAL
BH CV STRESS COMMENTS STAGE 1: NORMAL
BH CV STRESS DOSE REGADENOSON STAGE 1: 0.4
BH CV STRESS DURATION MIN STAGE 1: 0
BH CV STRESS DURATION MIN STAGE 2: 1
BH CV STRESS DURATION MIN STAGE 3: 1
BH CV STRESS DURATION MIN STAGE 4: 1
BH CV STRESS DURATION SEC STAGE 1: 15
BH CV STRESS DURATION SEC STAGE 2: 0
BH CV STRESS GRADE STAGE 1: 10
BH CV STRESS HR STAGE 1: 77
BH CV STRESS HR STAGE 2: 81
BH CV STRESS HR STAGE 3: 76
BH CV STRESS HR STAGE 4: 70
BH CV STRESS METS STAGE 1: 5
BH CV STRESS PROTOCOL 1: NORMAL
BH CV STRESS RECOVERY BP: NORMAL MMHG
BH CV STRESS RECOVERY HR: 71 BPM
BH CV STRESS SPEED STAGE 1: 1.7
BH CV STRESS STAGE 1: 1
BH CV STRESS STAGE 2: 2
BH CV STRESS STAGE 3: 3
BH CV STRESS STAGE 4: 4
LV EF NUC BP: 82 %
MAXIMAL PREDICTED HEART RATE: 151 BPM
PERCENT MAX PREDICTED HR: 58.28 %
STRESS BASELINE BP: NORMAL MMHG
STRESS BASELINE HR: 57 BPM
STRESS PERCENT HR: 69 %
STRESS POST PEAK BP: NORMAL MMHG
STRESS POST PEAK HR: 88 BPM
STRESS TARGET HR: 128 BPM
UPPER ARTERIAL LEFT ARM BRACHIAL SYS MAX: 149 MMHG
UPPER ARTERIAL RIGHT ARM BRACHIAL SYS MAX: 140 MMHG

## 2017-09-28 PROCEDURE — 93923 UPR/LXTR ART STDY 3+ LVLS: CPT | Performed by: INTERNAL MEDICINE

## 2017-09-28 PROCEDURE — 93923 UPR/LXTR ART STDY 3+ LVLS: CPT

## 2017-09-28 PROCEDURE — 25010000002 REGADENOSON 0.4 MG/5ML SOLUTION: Performed by: INTERNAL MEDICINE

## 2017-09-28 PROCEDURE — 78452 HT MUSCLE IMAGE SPECT MULT: CPT

## 2017-09-28 PROCEDURE — 0 TECHNETIUM SESTAMIBI: Performed by: INTERNAL MEDICINE

## 2017-09-28 PROCEDURE — A9500 TC99M SESTAMIBI: HCPCS | Performed by: INTERNAL MEDICINE

## 2017-09-28 PROCEDURE — 93017 CV STRESS TEST TRACING ONLY: CPT

## 2017-09-28 PROCEDURE — 93018 CV STRESS TEST I&R ONLY: CPT | Performed by: INTERNAL MEDICINE

## 2017-09-28 PROCEDURE — 78452 HT MUSCLE IMAGE SPECT MULT: CPT | Performed by: INTERNAL MEDICINE

## 2017-09-28 RX ADMIN — TECHNETIUM TC-99M SESTAMIBI 1 DOSE: 1 INJECTION INTRAVENOUS at 11:35

## 2017-09-28 RX ADMIN — TECHNETIUM TC-99M SESTAMIBI 1 DOSE: 1 INJECTION INTRAVENOUS at 09:30

## 2017-09-28 RX ADMIN — REGADENOSON 0.4 MG: 0.08 INJECTION, SOLUTION INTRAVENOUS at 11:32

## 2017-10-02 RX ORDER — CLOPIDOGREL BISULFATE 75 MG/1
TABLET ORAL
Qty: 90 TABLET | Refills: 0 | OUTPATIENT
Start: 2017-10-02

## 2017-10-03 ENCOUNTER — OFFICE VISIT (OUTPATIENT)
Dept: INTERNAL MEDICINE | Facility: CLINIC | Age: 67
End: 2017-10-03

## 2017-10-03 VITALS
HEART RATE: 64 BPM | WEIGHT: 100.4 LBS | HEIGHT: 58 IN | SYSTOLIC BLOOD PRESSURE: 142 MMHG | DIASTOLIC BLOOD PRESSURE: 60 MMHG | BODY MASS INDEX: 21.07 KG/M2

## 2017-10-03 DIAGNOSIS — G25.81 RESTLESS LEGS SYNDROME: ICD-10-CM

## 2017-10-03 DIAGNOSIS — I10 ESSENTIAL HYPERTENSION: ICD-10-CM

## 2017-10-03 DIAGNOSIS — K21.9 GASTROESOPHAGEAL REFLUX DISEASE WITHOUT ESOPHAGITIS: ICD-10-CM

## 2017-10-03 DIAGNOSIS — M19.90 ARTHRITIS: ICD-10-CM

## 2017-10-03 DIAGNOSIS — I20.9 ANGINA PECTORIS (HCC): Primary | ICD-10-CM

## 2017-10-03 DIAGNOSIS — E78.00 HYPERCHOLESTEROLEMIA: ICD-10-CM

## 2017-10-03 DIAGNOSIS — D64.9 ANEMIA, UNSPECIFIED TYPE: ICD-10-CM

## 2017-10-03 DIAGNOSIS — I50.22 CHRONIC SYSTOLIC CONGESTIVE HEART FAILURE (HCC): ICD-10-CM

## 2017-10-03 DIAGNOSIS — F41.9 ANXIETY: ICD-10-CM

## 2017-10-03 DIAGNOSIS — J43.8 OTHER EMPHYSEMA (HCC): ICD-10-CM

## 2017-10-03 DIAGNOSIS — I65.29 STENOSIS OF CAROTID ARTERY, UNSPECIFIED LATERALITY: ICD-10-CM

## 2017-10-03 DIAGNOSIS — Z23 NEED FOR PROPHYLACTIC VACCINATION AND INOCULATION AGAINST CHOLERA ALONE: ICD-10-CM

## 2017-10-03 DIAGNOSIS — R42 VERTIGO: ICD-10-CM

## 2017-10-03 DIAGNOSIS — M54.42 CHRONIC LEFT-SIDED LOW BACK PAIN WITH LEFT-SIDED SCIATICA: ICD-10-CM

## 2017-10-03 DIAGNOSIS — G40.909 SEIZURE DISORDER (HCC): ICD-10-CM

## 2017-10-03 DIAGNOSIS — I73.9 PVD (PERIPHERAL VASCULAR DISEASE) (HCC): ICD-10-CM

## 2017-10-03 DIAGNOSIS — G89.29 CHRONIC LEFT-SIDED LOW BACK PAIN WITH LEFT-SIDED SCIATICA: ICD-10-CM

## 2017-10-03 DIAGNOSIS — I48.0 PAROXYSMAL ATRIAL FIBRILLATION (HCC): ICD-10-CM

## 2017-10-03 DIAGNOSIS — I25.10 CHRONIC CORONARY ARTERY DISEASE: ICD-10-CM

## 2017-10-03 DIAGNOSIS — E53.8 COBALAMIN DEFICIENCY: ICD-10-CM

## 2017-10-03 DIAGNOSIS — K57.30 DIVERTICULOSIS OF LARGE INTESTINE WITHOUT HEMORRHAGE: ICD-10-CM

## 2017-10-03 DIAGNOSIS — R73.09 ABNORMAL GLUCOSE: ICD-10-CM

## 2017-10-03 LAB
ALBUMIN SERPL-MCNC: 4.4 G/DL (ref 3.2–4.8)
ALBUMIN/GLOB SERPL: 1.5 G/DL (ref 1.5–2.5)
ALP SERPL-CCNC: 83 U/L (ref 25–100)
ALT SERPL W P-5'-P-CCNC: 21 U/L (ref 7–40)
ANION GAP SERPL CALCULATED.3IONS-SCNC: 2 MMOL/L (ref 3–11)
ARTICHOKE IGE QN: 85 MG/DL (ref 0–130)
AST SERPL-CCNC: 24 U/L (ref 0–33)
BILIRUB SERPL-MCNC: 0.2 MG/DL (ref 0.3–1.2)
BUN BLD-MCNC: 11 MG/DL (ref 9–23)
BUN/CREAT SERPL: 13.8 (ref 7–25)
CALCIUM SPEC-SCNC: 9.2 MG/DL (ref 8.7–10.4)
CHLORIDE SERPL-SCNC: 96 MMOL/L (ref 99–109)
CHOLEST SERPL-MCNC: 169 MG/DL (ref 0–200)
CO2 SERPL-SCNC: 29 MMOL/L (ref 20–31)
CREAT BLD-MCNC: 0.8 MG/DL (ref 0.6–1.3)
DEPRECATED RDW RBC AUTO: 53.4 FL (ref 37–54)
ERYTHROCYTE [DISTWIDTH] IN BLOOD BY AUTOMATED COUNT: 15.8 % (ref 11.3–14.5)
FOLATE SERPL-MCNC: 13.21 NG/ML (ref 3.2–20)
GFR SERPL CREATININE-BSD FRML MDRD: 71 ML/MIN/1.73
GLOBULIN UR ELPH-MCNC: 2.9 GM/DL
GLUCOSE BLD-MCNC: 76 MG/DL (ref 70–100)
HBA1C MFR BLD: 5.6 % (ref 4.8–5.6)
HCT VFR BLD AUTO: 32.8 % (ref 34.5–44)
HDLC SERPL-MCNC: 69 MG/DL (ref 40–60)
HGB BLD-MCNC: 10.7 G/DL (ref 11.5–15.5)
IRON 24H UR-MRATE: 21 MCG/DL (ref 50–175)
MCH RBC QN AUTO: 29.7 PG (ref 27–31)
MCHC RBC AUTO-ENTMCNC: 32.6 G/DL (ref 32–36)
MCV RBC AUTO: 91.1 FL (ref 80–99)
PLATELET # BLD AUTO: 260 10*3/MM3 (ref 150–450)
PMV BLD AUTO: 9.5 FL (ref 6–12)
POTASSIUM BLD-SCNC: 4.6 MMOL/L (ref 3.5–5.5)
PROT SERPL-MCNC: 7.3 G/DL (ref 5.7–8.2)
RBC # BLD AUTO: 3.6 10*6/MM3 (ref 3.89–5.14)
SODIUM BLD-SCNC: 127 MMOL/L (ref 132–146)
TRIGL SERPL-MCNC: 77 MG/DL (ref 0–150)
TSH SERPL DL<=0.05 MIU/L-ACNC: 1.9 MIU/ML (ref 0.35–5.35)
VIT B12 BLD-MCNC: 478 PG/ML (ref 211–911)
WBC NRBC COR # BLD: 5.11 10*3/MM3 (ref 3.5–10.8)

## 2017-10-03 PROCEDURE — 80061 LIPID PANEL: CPT | Performed by: INTERNAL MEDICINE

## 2017-10-03 PROCEDURE — 82746 ASSAY OF FOLIC ACID SERUM: CPT | Performed by: INTERNAL MEDICINE

## 2017-10-03 PROCEDURE — 83036 HEMOGLOBIN GLYCOSYLATED A1C: CPT | Performed by: INTERNAL MEDICINE

## 2017-10-03 PROCEDURE — 99406 BEHAV CHNG SMOKING 3-10 MIN: CPT | Performed by: INTERNAL MEDICINE

## 2017-10-03 PROCEDURE — 82607 VITAMIN B-12: CPT | Performed by: INTERNAL MEDICINE

## 2017-10-03 PROCEDURE — G0008 ADMIN INFLUENZA VIRUS VAC: HCPCS | Performed by: INTERNAL MEDICINE

## 2017-10-03 PROCEDURE — 84443 ASSAY THYROID STIM HORMONE: CPT | Performed by: INTERNAL MEDICINE

## 2017-10-03 PROCEDURE — 90662 IIV NO PRSV INCREASED AG IM: CPT | Performed by: INTERNAL MEDICINE

## 2017-10-03 PROCEDURE — 99214 OFFICE O/P EST MOD 30 MIN: CPT | Performed by: INTERNAL MEDICINE

## 2017-10-03 PROCEDURE — 85027 COMPLETE CBC AUTOMATED: CPT | Performed by: INTERNAL MEDICINE

## 2017-10-03 PROCEDURE — 80053 COMPREHEN METABOLIC PANEL: CPT | Performed by: INTERNAL MEDICINE

## 2017-10-03 PROCEDURE — 83540 ASSAY OF IRON: CPT | Performed by: INTERNAL MEDICINE

## 2017-10-03 RX ORDER — FERROUS SULFATE 325(65) MG
325 TABLET ORAL 2 TIMES DAILY
Qty: 60 TABLET | Refills: 5 | Status: ON HOLD | OUTPATIENT
Start: 2017-10-03 | End: 2020-01-01

## 2017-10-03 NOTE — PROGRESS NOTES
Patient is a 67 y.o. female who is here for a follow up of chronic conditions.  Chief Complaint   Patient presents with   • Hypertension   • Anxiety         HPI:  Here for f/u.  Appetite is better.  Trying to gain weight.  Trying to quit tobacco but still at 1/2 ppd or more.  Breathing is good.  No abdominal pains.  Has LE pains.  No seizures.  Sleep is good.      History:    Patient Active Problem List   Diagnosis   • Gastroesophageal reflux disease   • Angina pectoris   • Anxiety   • Arthritis   • Stenosis of carotid artery   • Chronic obstructive pulmonary disease   • Congestive heart failure   • Chronic coronary artery disease   • Essential hypertension   • Hypercholesterolemia   • Hypoglycemia   • Restless legs syndrome   • Seizure disorder   • Cobalamin deficiency   • PVD (peripheral vascular disease)   • Vertigo   • Chronic left-sided low back pain with left-sided sciatica   • Left leg weakness   • Diverticulosis of large intestine without hemorrhage   • Epidermoid cyst of ear   • Paroxysmal atrial fibrillation       Past Medical History:   Diagnosis Date   • Aneurysm    • Hyperlipidemia        Past Surgical History:   Procedure Laterality Date   • APPENDECTOMY     • CHOLECYSTECTOMY     • 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   • aspirin 81 MG tablet Take 81 mg by mouth Daily.   • CARTIA  MG 24 hr capsule TAKE 1 CAPSULE BY MOUTH EVERY DAY   • clopidogrel (PLAVIX) 75 MG tablet Take 1 tablet by mouth Daily.   • cyanocobalamin 1000 MCG/ML injection Inject 1 mL into the shoulder, thigh, or buttocks Every 7 (Seven) Days.   • doxazosin (CARDURA) 2 MG  tablet TAKE 1 TABLET BY MOUTH EVERY NIGHT   • escitalopram (LEXAPRO) 10 MG tablet Take 1 tablet by mouth Every Morning.   • esomeprazole (nexIUM) 40 MG capsule Take 1 capsule by mouth 2 (Two) Times a Day.   • ezetimibe (ZETIA) 10 MG tablet Take 1 tablet by mouth Daily.   • furosemide (LASIX) 40 MG tablet TAKE 1 TABLET BY MOUTH EVERY DAY   • gabapentin (NEURONTIN) 300 MG capsule Take 1 capsule by mouth 3 (Three) Times a Day.   • isosorbide mononitrate (IMDUR) 60 MG 24 hr tablet Take 1 tablet by mouth Daily.   • lidocaine (XYLOCAINE) 5 % ointment Apply  topically Every 2 (Two) Hours As Needed for mild pain (1-3).   • lisinopril (PRINIVIL,ZESTRIL) 40 MG tablet TAKE 1 TABLET BY MOUTH DAILY   • LORazepam (ATIVAN) 1 MG tablet TAKE 1 TABLET BY MOUTH TWICE DAILY. MUST LAST 30 DAYS.   • nitroglycerin (NITRODUR) 0.2 MG/HR patch APPLY 1 PATCH EXTERNALLY TO THE SKIN DAILY   • Nitroglycerin 400 MCG/SPRAY aerosol solution U ONE SPRAY UNDER THE TONGUE AS NEEDED FOR CHEST PAIN Q 5 MINUTES X 3 PRN   • PHENobarbital (LUMINAL) 100 MG tablet TAKE 1 AND 1/2 TABLETS BY MOUTH EVERY DAY   • rosuvastatin (CRESTOR) 40 MG tablet TAKE 1 TABLET BY MOUTH DAILY   • Umeclidinium-Vilanterol (ANORO ELLIPTA) 62.5-25 MCG/INH aerosol powder  Inhale 1 puff Every Morning.     No current facility-administered medications on file prior to visit.        Family History   Problem Relation Age of Onset   • Arthritis Mother    • Cancer Mother    • Heart attack Father    • Hypertension Father    • Hyperlipidemia Father    • Stroke Father    • Heart disease Brother      CAD   • Heart disease Child      CAD   • Heart disease Child      CAD       Social History     Social History   • Marital status:      Spouse name: N/A   • Number of children: N/A   • Years of education: N/A     Occupational History   • Not on file.     Social History Main Topics   • Smoking status: Current Every Day Smoker     Types: Cigarettes, Electronic Cigarette     Last attempt to  "quit: 8/12/2016   • Smokeless tobacco: Never Used      Comment: Pt was smoking 1 cigarette a day but due to losing her daughter 3 years ago she has started back smoking 1 PPD   • Alcohol use No   • Drug use: No   • Sexual activity: Defer     Other Topics Concern   • Not on file     Social History Narrative         ROS:    Review of Systems   Constitutional: Positive for fatigue and unexpected weight change. Negative for chills, diaphoresis and fever.   HENT: Negative for congestion, ear pain, hearing loss, nosebleeds, postnasal drip, sinus pressure and sore throat.    Eyes: Negative for pain, discharge and itching.   Respiratory: Positive for cough and shortness of breath. Negative for chest tightness and wheezing.    Cardiovascular: Positive for chest pain and palpitations. Negative for leg swelling.   Gastrointestinal: Negative for abdominal distention, abdominal pain, blood in stool, constipation, diarrhea, nausea and vomiting.        Refusing colonoscopy   Endocrine: Positive for cold intolerance. Negative for polydipsia and polyuria.   Genitourinary: Negative for difficulty urinating, dysuria, frequency and hematuria.   Musculoskeletal: Positive for arthralgias and back pain. Negative for gait problem, joint swelling and myalgias.   Skin: Negative for rash and wound.   Neurological: Positive for tremors and weakness. Negative for dizziness, seizures, syncope and headaches.   Psychiatric/Behavioral: Positive for dysphoric mood and sleep disturbance. The patient is nervous/anxious.        /60  Pulse 64  Ht 58\" (147.3 cm)  Wt 100 lb 6.4 oz (45.5 kg)  BMI 20.98 kg/m2    Physical Exam:    Physical Exam   Constitutional: She is oriented to person, place, and time. She appears well-developed and well-nourished.   HENT:   Head: Normocephalic and atraumatic.   Right Ear: External ear normal.   Left Ear: External ear normal.   Mouth/Throat: Oropharynx is clear and moist.   Eyes: Conjunctivae and EOM are normal. "   Neck: Normal range of motion. Neck supple.   Cardiovascular: Normal rate and regular rhythm.    Murmur (1/6) heard.  Pulmonary/Chest: Effort normal.   diminished   Abdominal: Soft. Bowel sounds are normal. A hernia ( incisional) is present.   Healed surgical scars   Musculoskeletal: She exhibits deformity (mild kyphosis).   Lymphadenopathy:     She has no cervical adenopathy.   Neurological: She is alert and oriented to person, place, and time.   Skin: Skin is warm and dry.   Psychiatric: Her behavior is normal. Thought content normal.   Flat affect   I advised the patient of the risks in continuing to use tobacco, and I provided this patient with smoking cessation educational materials.  I also discussed how to quit smoking and the patient has expressed the willingness to quit.      During this visit, I spent 3-10 mintues counseling the patient regarding smoking cessation.         Procedure:      Discussion/Summary:    Htn-advised low NA  Copd-advised tob cessation  A/D-cont current tx  Pvd/cad-counseled on tob cessation, cont RF mod  GERD-cont ppi  B12 def-cont replacement, recheck  Hyperlipidemia-labs today  sz-cont phenobarb      Reviewed prior records, extensively counseled on tob cessation  Advised need for colonoscopy sec to FE deficiency  Labs noted and dw patient   Flu shot today                    Current Outpatient Prescriptions:   •  albuterol (VENTOLIN HFA) 108 (90 BASE) MCG/ACT inhaler, Inhale 2 puffs Every 4 (Four) Hours As Needed for wheezing or shortness of air., Disp: 18 g, Rfl: 0  •  aspirin 81 MG tablet, Take 81 mg by mouth Daily., Disp: , Rfl:   •  CARTIA  MG 24 hr capsule, TAKE 1 CAPSULE BY MOUTH EVERY DAY, Disp: 30 capsule, Rfl: 0  •  clopidogrel (PLAVIX) 75 MG tablet, Take 1 tablet by mouth Daily., Disp: 30 tablet, Rfl: 5  •  cyanocobalamin 1000 MCG/ML injection, Inject 1 mL into the shoulder, thigh, or buttocks Every 7 (Seven) Days., Disp: 1 mL, Rfl: 7  •  doxazosin (CARDURA) 2 MG  tablet, TAKE 1 TABLET BY MOUTH EVERY NIGHT, Disp: 90 tablet, Rfl: 0  •  escitalopram (LEXAPRO) 10 MG tablet, Take 1 tablet by mouth Every Morning., Disp: 90 tablet, Rfl: 3  •  esomeprazole (nexIUM) 40 MG capsule, Take 1 capsule by mouth 2 (Two) Times a Day., Disp: 180 capsule, Rfl: 1  •  ezetimibe (ZETIA) 10 MG tablet, Take 1 tablet by mouth Daily., Disp: 30 tablet, Rfl: 5  •  furosemide (LASIX) 40 MG tablet, TAKE 1 TABLET BY MOUTH EVERY DAY, Disp: 90 tablet, Rfl: 0  •  gabapentin (NEURONTIN) 300 MG capsule, Take 1 capsule by mouth 3 (Three) Times a Day., Disp: 270 capsule, Rfl: 1  •  isosorbide mononitrate (IMDUR) 60 MG 24 hr tablet, Take 1 tablet by mouth Daily., Disp: 90 tablet, Rfl: 5  •  lidocaine (XYLOCAINE) 5 % ointment, Apply  topically Every 2 (Two) Hours As Needed for mild pain (1-3)., Disp: 30 g, Rfl: 0  •  lisinopril (PRINIVIL,ZESTRIL) 40 MG tablet, TAKE 1 TABLET BY MOUTH DAILY, Disp: 30 tablet, Rfl: 2  •  LORazepam (ATIVAN) 1 MG tablet, TAKE 1 TABLET BY MOUTH TWICE DAILY. MUST LAST 30 DAYS., Disp: 60 tablet, Rfl: 2  •  nitroglycerin (NITRODUR) 0.2 MG/HR patch, APPLY 1 PATCH EXTERNALLY TO THE SKIN DAILY, Disp: 90 patch, Rfl: 0  •  Nitroglycerin 400 MCG/SPRAY aerosol solution, U ONE SPRAY UNDER THE TONGUE AS NEEDED FOR CHEST PAIN Q 5 MINUTES X 3 PRN, Disp: , Rfl: 5  •  PHENobarbital (LUMINAL) 100 MG tablet, TAKE 1 AND 1/2 TABLETS BY MOUTH EVERY DAY, Disp: 135 tablet, Rfl: 2  •  rosuvastatin (CRESTOR) 40 MG tablet, TAKE 1 TABLET BY MOUTH DAILY, Disp: 30 tablet, Rfl: 5  •  Umeclidinium-Vilanterol (ANORO ELLIPTA) 62.5-25 MCG/INH aerosol powder , Inhale 1 puff Every Morning., Disp: 30 each, Rfl: 5  •  ferrous sulfate 325 (65 FE) MG tablet, Take 1 tablet by mouth 2 (Two) Times a Day. Take with meals, Disp: 60 tablet, Rfl: 5        An was seen today for hypertension and anxiety.    Diagnoses and all orders for this visit:    Angina pectoris    Chronic coronary artery disease    Chronic systolic congestive  heart failure    Essential hypertension    Hypercholesterolemia  -     Comprehensive Metabolic Panel  -     Lipid Panel  -     TSH    Paroxysmal atrial fibrillation    PVD (peripheral vascular disease)    Stenosis of carotid artery, unspecified laterality    Other emphysema    Cobalamin deficiency  -     CBC (No Diff)  -     Vitamin B12    Diverticulosis of large intestine without hemorrhage    Gastroesophageal reflux disease without esophagitis    Chronic left-sided low back pain with left-sided sciatica    Seizure disorder    Arthritis    Anxiety    Restless legs syndrome    Vertigo    Abnormal glucose  -     Hemoglobin A1c    Need for prophylactic vaccination and inoculation against cholera alone    Anemia, unspecified type  -     Iron  -     Folate  -     ferrous sulfate 325 (65 FE) MG tablet; Take 1 tablet by mouth 2 (Two) Times a Day. Take with meals    Other orders  -     Flu Vaccine High Dose PF 65YR+

## 2017-10-05 ENCOUNTER — TELEPHONE (OUTPATIENT)
Dept: INTERNAL MEDICINE | Facility: CLINIC | Age: 67
End: 2017-10-05

## 2017-10-05 RX ORDER — NITROFURANTOIN 25; 75 MG/1; MG/1
100 CAPSULE ORAL 2 TIMES DAILY
Qty: 10 CAPSULE | Refills: 0 | Status: SHIPPED | OUTPATIENT
Start: 2017-10-05 | End: 2018-01-02

## 2017-10-05 NOTE — TELEPHONE ENCOUNTER
Patient asked if she can have something called in for a bladder infection. Patient is in a lot of pain and possibly needs to call back to make sure it is only a bladder infection. A good call back number is [phone]. Thank you.

## 2017-10-06 RX ORDER — DILTIAZEM HYDROCHLORIDE 240 MG/1
CAPSULE, EXTENDED RELEASE ORAL
Qty: 30 CAPSULE | Refills: 5 | Status: SHIPPED | OUTPATIENT
Start: 2017-10-06 | End: 2017-12-08 | Stop reason: SDUPTHER

## 2017-10-16 ENCOUNTER — TELEPHONE (OUTPATIENT)
Dept: INTERNAL MEDICINE | Facility: CLINIC | Age: 67
End: 2017-10-16

## 2017-10-16 RX ORDER — METRONIDAZOLE 500 MG/1
500 TABLET ORAL 3 TIMES DAILY
Qty: 21 TABLET | Refills: 0 | Status: SHIPPED | OUTPATIENT
Start: 2017-10-16 | End: 2018-01-31

## 2017-11-08 DIAGNOSIS — J43.8 OTHER EMPHYSEMA (HCC): ICD-10-CM

## 2017-12-05 ENCOUNTER — TELEPHONE (OUTPATIENT)
Dept: INTERNAL MEDICINE | Facility: CLINIC | Age: 67
End: 2017-12-05

## 2017-12-05 DIAGNOSIS — E78.00 HYPERCHOLESTEROLEMIA: ICD-10-CM

## 2017-12-05 DIAGNOSIS — F41.9 ANXIETY: ICD-10-CM

## 2017-12-05 DIAGNOSIS — I10 ESSENTIAL HYPERTENSION: ICD-10-CM

## 2017-12-05 RX ORDER — EZETIMIBE 10 MG/1
TABLET ORAL
Qty: 90 TABLET | Refills: 0 | Status: SHIPPED | OUTPATIENT
Start: 2017-12-05 | End: 2018-03-07 | Stop reason: SDUPTHER

## 2017-12-05 RX ORDER — LORAZEPAM 1 MG/1
TABLET ORAL
Qty: 60 TABLET | Refills: 0 | OUTPATIENT
Start: 2017-12-05 | End: 2018-01-11 | Stop reason: SDUPTHER

## 2017-12-05 RX ORDER — DOXAZOSIN 2 MG/1
TABLET ORAL
Qty: 90 TABLET | Refills: 0 | Status: SHIPPED | OUTPATIENT
Start: 2017-12-05 | End: 2018-03-07 | Stop reason: SDUPTHER

## 2017-12-05 NOTE — TELEPHONE ENCOUNTER
SIRISHA FROM [facility] CALLED TO SEE IF YOU RECEIVED A REQUEST FOR A BACK BRACE FOR [place]? PLEASE GIVE SIRISHA A CALL BACK

## 2017-12-07 RX ORDER — LISINOPRIL 40 MG/1
TABLET ORAL
Qty: 30 TABLET | Refills: 1 | Status: SHIPPED | OUTPATIENT
Start: 2017-12-07 | End: 2018-02-08 | Stop reason: SDUPTHER

## 2017-12-07 RX ORDER — LISINOPRIL 40 MG/1
TABLET ORAL
Qty: 90 TABLET | Refills: 1 | OUTPATIENT
Start: 2017-12-07

## 2017-12-08 ENCOUNTER — TELEPHONE (OUTPATIENT)
Dept: INTERNAL MEDICINE | Facility: CLINIC | Age: 67
End: 2017-12-08

## 2017-12-08 DIAGNOSIS — I50.22 CHRONIC SYSTOLIC CONGESTIVE HEART FAILURE (HCC): ICD-10-CM

## 2017-12-08 DIAGNOSIS — I10 ESSENTIAL HYPERTENSION: ICD-10-CM

## 2017-12-08 RX ORDER — NITROGLYCERIN 40 MG/1
PATCH TRANSDERMAL
Qty: 90 PATCH | Refills: 0 | Status: SHIPPED | OUTPATIENT
Start: 2017-12-08 | End: 2018-03-07 | Stop reason: SDUPTHER

## 2017-12-08 RX ORDER — FUROSEMIDE 40 MG/1
40 TABLET ORAL DAILY
Qty: 90 TABLET | Refills: 0 | Status: SHIPPED | OUTPATIENT
Start: 2017-12-08 | End: 2018-03-07 | Stop reason: SDUPTHER

## 2017-12-08 RX ORDER — DILTIAZEM HYDROCHLORIDE 240 MG/1
240 CAPSULE, COATED, EXTENDED RELEASE ORAL DAILY
Qty: 90 CAPSULE | Refills: 0 | Status: SHIPPED | OUTPATIENT
Start: 2017-12-08 | End: 2018-06-13 | Stop reason: SDUPTHER

## 2017-12-08 RX ORDER — ISOSORBIDE MONONITRATE 60 MG/1
60 TABLET, EXTENDED RELEASE ORAL DAILY
Qty: 90 TABLET | Refills: 0 | Status: SHIPPED | OUTPATIENT
Start: 2017-12-08 | End: 2018-03-07 | Stop reason: SDUPTHER

## 2017-12-08 NOTE — TELEPHONE ENCOUNTER
1. Isosorbide 60 mg er #90 3 rf's 2. Furosemide 40mg #90 3. mycardia #90 4. lorazapam 10 I bid #[phone] ruthie

## 2017-12-11 RX ORDER — CLOPIDOGREL BISULFATE 75 MG/1
TABLET ORAL
Qty: 90 TABLET | Refills: 0 | Status: SHIPPED | OUTPATIENT
Start: 2017-12-11 | End: 2018-01-31

## 2017-12-14 ENCOUNTER — TELEPHONE (OUTPATIENT)
Dept: INTERNAL MEDICINE | Facility: CLINIC | Age: 67
End: 2017-12-14

## 2018-01-02 ENCOUNTER — OFFICE VISIT (OUTPATIENT)
Dept: INTERNAL MEDICINE | Facility: CLINIC | Age: 68
End: 2018-01-02

## 2018-01-02 VITALS
HEIGHT: 58 IN | HEART RATE: 72 BPM | BODY MASS INDEX: 20.78 KG/M2 | WEIGHT: 99 LBS | DIASTOLIC BLOOD PRESSURE: 70 MMHG | SYSTOLIC BLOOD PRESSURE: 150 MMHG

## 2018-01-02 DIAGNOSIS — I48.0 PAROXYSMAL ATRIAL FIBRILLATION (HCC): ICD-10-CM

## 2018-01-02 DIAGNOSIS — K57.30 DIVERTICULOSIS OF LARGE INTESTINE WITHOUT HEMORRHAGE: ICD-10-CM

## 2018-01-02 DIAGNOSIS — I10 ESSENTIAL HYPERTENSION: ICD-10-CM

## 2018-01-02 DIAGNOSIS — E53.8 COBALAMIN DEFICIENCY: ICD-10-CM

## 2018-01-02 DIAGNOSIS — I25.10 CHRONIC CORONARY ARTERY DISEASE: Primary | ICD-10-CM

## 2018-01-02 DIAGNOSIS — I73.9 PVD (PERIPHERAL VASCULAR DISEASE) (HCC): ICD-10-CM

## 2018-01-02 DIAGNOSIS — F41.9 ANXIETY: ICD-10-CM

## 2018-01-02 DIAGNOSIS — E78.00 HYPERCHOLESTEROLEMIA: ICD-10-CM

## 2018-01-02 DIAGNOSIS — G40.909 SEIZURE DISORDER (HCC): ICD-10-CM

## 2018-01-02 DIAGNOSIS — G25.81 RESTLESS LEGS SYNDROME: ICD-10-CM

## 2018-01-02 DIAGNOSIS — K21.9 GASTROESOPHAGEAL REFLUX DISEASE WITHOUT ESOPHAGITIS: ICD-10-CM

## 2018-01-02 DIAGNOSIS — I50.22 CHRONIC SYSTOLIC CONGESTIVE HEART FAILURE (HCC): ICD-10-CM

## 2018-01-02 DIAGNOSIS — M19.90 ARTHRITIS: ICD-10-CM

## 2018-01-02 DIAGNOSIS — J43.8 OTHER EMPHYSEMA (HCC): ICD-10-CM

## 2018-01-02 DIAGNOSIS — J20.8 ACUTE BRONCHITIS DUE TO OTHER SPECIFIED ORGANISMS: ICD-10-CM

## 2018-01-02 PROCEDURE — 99214 OFFICE O/P EST MOD 30 MIN: CPT | Performed by: INTERNAL MEDICINE

## 2018-01-02 RX ORDER — AZITHROMYCIN 250 MG/1
TABLET, FILM COATED ORAL
Qty: 6 TABLET | Refills: 0 | Status: SHIPPED | OUTPATIENT
Start: 2018-01-02 | End: 2018-01-31

## 2018-01-02 RX ORDER — BENZONATATE 200 MG/1
200 CAPSULE ORAL 3 TIMES DAILY PRN
Qty: 30 CAPSULE | Refills: 0 | Status: ON HOLD | OUTPATIENT
Start: 2018-01-02 | End: 2020-01-01

## 2018-01-02 NOTE — PROGRESS NOTES
Patient is a 67 y.o. female who is here for a follow up of chronic conditions.  Chief Complaint   Patient presents with   • Hypertension   • Hyperlipidemia         HPI:  Here for f/u.  Today c/o 3 week hx of cough and congestion.  Has discolored yellowish expectoration.  No increase in baseline sob.  Some chills.  No pleuritic pain.  Continues to smoke despite to the tune of 1/2 ppd. GERD is controlled.  No hemoptysis.     History:    Patient Active Problem List   Diagnosis   • Gastroesophageal reflux disease   • Angina pectoris   • Anxiety   • Arthritis   • Stenosis of carotid artery   • Chronic obstructive pulmonary disease   • Congestive heart failure   • Chronic coronary artery disease   • Essential hypertension   • Hypercholesterolemia   • Hypoglycemia   • Restless legs syndrome   • Seizure disorder   • Cobalamin deficiency   • PVD (peripheral vascular disease)   • Vertigo   • Chronic left-sided low back pain with left-sided sciatica   • Left leg weakness   • Diverticulosis of large intestine without hemorrhage   • Epidermoid cyst of ear   • Paroxysmal atrial fibrillation   • Acute bronchitis due to other specified organisms       Past Medical History:   Diagnosis Date   • Aneurysm    • Hyperlipidemia        Past Surgical History:   Procedure Laterality Date   • APPENDECTOMY     • CHOLECYSTECTOMY     • 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   • ANORO ELLIPTA 62.5-25 MCG/INH aerosol powder  INHALE 1 PUFF BY MOUTH EVERY MORNING   • aspirin 81 MG tablet Take 81 mg by mouth Daily.   • clopidogrel (PLAVIX) 75 MG tablet Take 1 tablet by mouth Daily.   •  clopidogrel (PLAVIX) 75 MG tablet TAKE 1 TABLET BY MOUTH EVERY DAY   • cyanocobalamin 1000 MCG/ML injection Inject 1 mL into the shoulder, thigh, or buttocks Every 7 (Seven) Days.   • diltiaZEM CD (CARTIA XT) 240 MG 24 hr capsule Take 1 capsule by mouth Daily.   • doxazosin (CARDURA) 2 MG tablet TAKE 1 TABLET BY MOUTH EVERY NIGHT   • escitalopram (LEXAPRO) 10 MG tablet Take 1 tablet by mouth Every Morning.   • esomeprazole (nexIUM) 40 MG capsule Take 1 capsule by mouth 2 (Two) Times a Day.   • ezetimibe (ZETIA) 10 MG tablet TAKE 1 TABLET BY MOUTH DAILY   • ferrous sulfate 325 (65 FE) MG tablet Take 1 tablet by mouth 2 (Two) Times a Day. Take with meals   • furosemide (LASIX) 40 MG tablet Take 1 tablet by mouth Daily.   • gabapentin (NEURONTIN) 300 MG capsule Take 1 capsule by mouth 3 (Three) Times a Day.   • isosorbide mononitrate (IMDUR) 60 MG 24 hr tablet Take 1 tablet by mouth Daily.   • lidocaine (XYLOCAINE) 5 % ointment Apply  topically Every 2 (Two) Hours As Needed for mild pain (1-3).   • lisinopril (PRINIVIL,ZESTRIL) 40 MG tablet TAKE 1 TABLET BY MOUTH DAILY   • LORazepam (ATIVAN) 1 MG tablet TAKE 1 TABLET BY MOUTH TWICE DAILY. MUST LAST 30 DAYS   • metroNIDAZOLE (FLAGYL) 500 MG tablet Take 1 tablet by mouth 3 (Three) Times a Day.   • nitroglycerin (NITRODUR) 0.2 MG/HR patch APPLY 1 PATCH EXTERNALLY TO THE SKIN DAILY   • Nitroglycerin 400 MCG/SPRAY aerosol solution U ONE SPRAY UNDER THE TONGUE AS NEEDED FOR CHEST PAIN Q 5 MINUTES X 3 PRN   • PHENobarbital (LUMINAL) 100 MG tablet TAKE 1 AND 1/2 TABLETS BY MOUTH EVERY DAY   • rosuvastatin (CRESTOR) 40 MG tablet TAKE 1 TABLET BY MOUTH DAILY   • varenicline (CHANTIX STARTING MONTH PAK) 0.5 MG X 11 & 1 MG X 42 tablet Take 0.5 mg one daily on days 1-2 and 0.5 mg twice daily on days 4-7. Then 1 mg twice daily for a total of 12 weeks.   • [DISCONTINUED] nitrofurantoin, macrocrystal-monohydrate, (MACROBID) 100 MG capsule Take 1 capsule by mouth 2 (Two) Times a Day.      No current facility-administered medications on file prior to visit.        Family History   Problem Relation Age of Onset   • Arthritis Mother    • Cancer Mother    • Heart attack Father    • Hypertension Father    • Hyperlipidemia Father    • Stroke Father    • Heart disease Brother      CAD   • Heart disease Child      CAD   • Heart disease Child      CAD       Social History     Social History   • Marital status:      Spouse name: N/A   • Number of children: N/A   • Years of education: N/A     Occupational History   • Not on file.     Social History Main Topics   • Smoking status: Current Every Day Smoker     Types: Cigarettes, Electronic Cigarette     Last attempt to quit: 8/12/2016   • Smokeless tobacco: Never Used      Comment: Pt was smoking 1 cigarette a day but due to losing her daughter 3 years ago she has started back smoking 1 PPD   • Alcohol use No   • Drug use: No   • Sexual activity: Defer     Other Topics Concern   • Not on file     Social History Narrative         ROS:    Review of Systems   Constitutional: Positive for fatigue and unexpected weight change. Negative for chills, diaphoresis and fever.   HENT: Negative for congestion, ear pain, hearing loss, nosebleeds, postnasal drip, sinus pressure and sore throat.    Eyes: Negative for pain, discharge and itching.   Respiratory: Positive for cough and shortness of breath. Negative for chest tightness and wheezing.    Cardiovascular: Positive for palpitations. Negative for chest pain and leg swelling.   Gastrointestinal: Negative for abdominal distention, abdominal pain, blood in stool, constipation, diarrhea, nausea and vomiting.        Refusing colonoscopy   Endocrine: Positive for cold intolerance. Negative for polydipsia and polyuria.   Genitourinary: Negative for difficulty urinating, dysuria, frequency and hematuria.   Musculoskeletal: Positive for arthralgias and back pain. Negative for gait problem, joint swelling and myalgias.  "  Skin: Negative for rash and wound.   Neurological: Positive for tremors and weakness. Negative for dizziness, seizures, syncope and headaches.   Psychiatric/Behavioral: Positive for dysphoric mood and sleep disturbance. The patient is nervous/anxious.        /70  Pulse 72  Ht 147.3 cm (57.99\")  Wt 44.9 kg (99 lb)  BMI 20.7 kg/m2    Physical Exam:    Physical Exam   Constitutional: She is oriented to person, place, and time. She appears well-developed and well-nourished.   HENT:   Head: Normocephalic and atraumatic.   Right Ear: External ear normal.   Left Ear: External ear normal.   Mouth/Throat: Oropharynx is clear and moist.   Eyes: Conjunctivae and EOM are normal.   Neck: Normal range of motion. Neck supple.   Cardiovascular: Normal rate and regular rhythm.    Murmur (1/6) heard.  Pulmonary/Chest: Effort normal. She has wheezes (RLL).   diminished   Abdominal: Soft. Bowel sounds are normal. A hernia ( incisional) is present.   Healed surgical scars   Musculoskeletal: She exhibits deformity (mild kyphosis).   Lymphadenopathy:     She has no cervical adenopathy.   Neurological: She is alert and oriented to person, place, and time.   Skin: Skin is warm and dry.   Psychiatric: Her behavior is normal. Thought content normal.   Flat affect       Procedure:      Discussion/Summary:    Htn-advised low NA  Copd-advised tob cessation  A/D-cont current tx  Pvd/cad-counseled on tob cessation, cont RF mod  GERD-cont ppi  B12 def-cont replacement, recheck noted  Hyperlipidemia-labs on rtc  Bronchitis-advised tob cessation and rx zpac  sz-cont phenobarb      Reviewed prior records, extensively counseled on tob cessation  Advised need for colonoscopy sec to FE deficiency  Labs noted and dw patient   Flu shot last visit                  Current Outpatient Prescriptions:   •  albuterol (VENTOLIN HFA) 108 (90 BASE) MCG/ACT inhaler, Inhale 2 puffs Every 4 (Four) Hours As Needed for wheezing or shortness of air., Disp: 18 " g, Rfl: 0  •  ANORO ELLIPTA 62.5-25 MCG/INH aerosol powder , INHALE 1 PUFF BY MOUTH EVERY MORNING, Disp: 1 each, Rfl: 5  •  aspirin 81 MG tablet, Take 81 mg by mouth Daily., Disp: , Rfl:   •  clopidogrel (PLAVIX) 75 MG tablet, Take 1 tablet by mouth Daily., Disp: 30 tablet, Rfl: 5  •  clopidogrel (PLAVIX) 75 MG tablet, TAKE 1 TABLET BY MOUTH EVERY DAY, Disp: 90 tablet, Rfl: 0  •  cyanocobalamin 1000 MCG/ML injection, Inject 1 mL into the shoulder, thigh, or buttocks Every 7 (Seven) Days., Disp: 1 mL, Rfl: 7  •  diltiaZEM CD (CARTIA XT) 240 MG 24 hr capsule, Take 1 capsule by mouth Daily., Disp: 90 capsule, Rfl: 0  •  doxazosin (CARDURA) 2 MG tablet, TAKE 1 TABLET BY MOUTH EVERY NIGHT, Disp: 90 tablet, Rfl: 0  •  escitalopram (LEXAPRO) 10 MG tablet, Take 1 tablet by mouth Every Morning., Disp: 90 tablet, Rfl: 3  •  esomeprazole (nexIUM) 40 MG capsule, Take 1 capsule by mouth 2 (Two) Times a Day., Disp: 180 capsule, Rfl: 1  •  ezetimibe (ZETIA) 10 MG tablet, TAKE 1 TABLET BY MOUTH DAILY, Disp: 90 tablet, Rfl: 0  •  ferrous sulfate 325 (65 FE) MG tablet, Take 1 tablet by mouth 2 (Two) Times a Day. Take with meals, Disp: 60 tablet, Rfl: 5  •  furosemide (LASIX) 40 MG tablet, Take 1 tablet by mouth Daily., Disp: 90 tablet, Rfl: 0  •  gabapentin (NEURONTIN) 300 MG capsule, Take 1 capsule by mouth 3 (Three) Times a Day., Disp: 270 capsule, Rfl: 1  •  isosorbide mononitrate (IMDUR) 60 MG 24 hr tablet, Take 1 tablet by mouth Daily., Disp: 90 tablet, Rfl: 0  •  lidocaine (XYLOCAINE) 5 % ointment, Apply  topically Every 2 (Two) Hours As Needed for mild pain (1-3)., Disp: 30 g, Rfl: 0  •  lisinopril (PRINIVIL,ZESTRIL) 40 MG tablet, TAKE 1 TABLET BY MOUTH DAILY, Disp: 30 tablet, Rfl: 1  •  LORazepam (ATIVAN) 1 MG tablet, TAKE 1 TABLET BY MOUTH TWICE DAILY. MUST LAST 30 DAYS, Disp: 60 tablet, Rfl: 0  •  metroNIDAZOLE (FLAGYL) 500 MG tablet, Take 1 tablet by mouth 3 (Three) Times a Day., Disp: 21 tablet, Rfl: 0  •  nitroglycerin  (NITRODUR) 0.2 MG/HR patch, APPLY 1 PATCH EXTERNALLY TO THE SKIN DAILY, Disp: 90 patch, Rfl: 0  •  Nitroglycerin 400 MCG/SPRAY aerosol solution, U ONE SPRAY UNDER THE TONGUE AS NEEDED FOR CHEST PAIN Q 5 MINUTES X 3 PRN, Disp: , Rfl: 5  •  PHENobarbital (LUMINAL) 100 MG tablet, TAKE 1 AND 1/2 TABLETS BY MOUTH EVERY DAY, Disp: 135 tablet, Rfl: 2  •  rosuvastatin (CRESTOR) 40 MG tablet, TAKE 1 TABLET BY MOUTH DAILY, Disp: 30 tablet, Rfl: 5  •  azithromycin (ZITHROMAX Z-GABINO) 250 MG tablet, Take 2 tablets the first day, then 1 tablet daily for 4 days., Disp: 6 tablet, Rfl: 0  •  benzonatate (TESSALON) 200 MG capsule, Take 1 capsule by mouth 3 (Three) Times a Day As Needed for Cough., Disp: 30 capsule, Rfl: 0  •  varenicline (CHANTIX STARTING MONTH GABINO) 0.5 MG X 11 & 1 MG X 42 tablet, Take 0.5 mg one daily on days 1-2 and 0.5 mg twice daily on days 4-7. Then 1 mg twice daily for a total of 12 weeks., Disp: 53 tablet, Rfl: 0        An was seen today for hypertension and hyperlipidemia.    Diagnoses and all orders for this visit:    Chronic coronary artery disease    Chronic systolic congestive heart failure    Essential hypertension    Hypercholesterolemia    Paroxysmal atrial fibrillation    PVD (peripheral vascular disease)    Other emphysema    Cobalamin deficiency    Diverticulosis of large intestine without hemorrhage    Gastroesophageal reflux disease without esophagitis    Seizure disorder    Arthritis    Anxiety    Restless legs syndrome    Acute bronchitis due to other specified organisms  -     azithromycin (ZITHROMAX Z-GABINO) 250 MG tablet; Take 2 tablets the first day, then 1 tablet daily for 4 days.  -     benzonatate (TESSALON) 200 MG capsule; Take 1 capsule by mouth 3 (Three) Times a Day As Needed for Cough.

## 2018-01-10 ENCOUNTER — TELEPHONE (OUTPATIENT)
Dept: INTERNAL MEDICINE | Facility: CLINIC | Age: 68
End: 2018-01-10

## 2018-01-11 DIAGNOSIS — F41.9 ANXIETY: ICD-10-CM

## 2018-01-11 RX ORDER — LORAZEPAM 1 MG/1
TABLET ORAL
Qty: 60 TABLET | Refills: 1 | OUTPATIENT
Start: 2018-01-11 | End: 2018-03-09 | Stop reason: SDUPTHER

## 2018-01-23 DIAGNOSIS — K21.9 GASTROESOPHAGEAL REFLUX DISEASE WITHOUT ESOPHAGITIS: ICD-10-CM

## 2018-01-23 DIAGNOSIS — G40.909 SEIZURE DISORDER (HCC): ICD-10-CM

## 2018-01-23 RX ORDER — PHENOBARBITAL 100 MG/1
TABLET ORAL
Qty: 135 TABLET | Refills: 0 | Status: SHIPPED | OUTPATIENT
Start: 2018-01-23 | End: 2018-05-15 | Stop reason: SDUPTHER

## 2018-01-23 RX ORDER — ESOMEPRAZOLE MAGNESIUM 40 MG/1
CAPSULE, DELAYED RELEASE ORAL
Qty: 180 CAPSULE | Refills: 0 | Status: SHIPPED | OUTPATIENT
Start: 2018-01-23 | End: 2018-01-31

## 2018-01-31 ENCOUNTER — OFFICE VISIT (OUTPATIENT)
Dept: INTERNAL MEDICINE | Facility: CLINIC | Age: 68
End: 2018-01-31

## 2018-01-31 VITALS
BODY MASS INDEX: 19.94 KG/M2 | DIASTOLIC BLOOD PRESSURE: 50 MMHG | SYSTOLIC BLOOD PRESSURE: 100 MMHG | HEART RATE: 60 BPM | WEIGHT: 95 LBS | HEIGHT: 58 IN

## 2018-01-31 DIAGNOSIS — G25.81 RESTLESS LEGS SYNDROME: ICD-10-CM

## 2018-01-31 DIAGNOSIS — M19.90 ARTHRITIS: ICD-10-CM

## 2018-01-31 DIAGNOSIS — E78.00 HYPERCHOLESTEROLEMIA: ICD-10-CM

## 2018-01-31 DIAGNOSIS — G40.909 SEIZURE DISORDER (HCC): ICD-10-CM

## 2018-01-31 DIAGNOSIS — E53.8 COBALAMIN DEFICIENCY: ICD-10-CM

## 2018-01-31 DIAGNOSIS — K57.30 DIVERTICULOSIS OF LARGE INTESTINE WITHOUT HEMORRHAGE: ICD-10-CM

## 2018-01-31 DIAGNOSIS — I25.10 CHRONIC CORONARY ARTERY DISEASE: Primary | ICD-10-CM

## 2018-01-31 DIAGNOSIS — E16.2 HYPOGLYCEMIA: ICD-10-CM

## 2018-01-31 DIAGNOSIS — I50.22 CHRONIC SYSTOLIC CONGESTIVE HEART FAILURE (HCC): ICD-10-CM

## 2018-01-31 DIAGNOSIS — F41.9 ANXIETY: ICD-10-CM

## 2018-01-31 DIAGNOSIS — J43.8 OTHER EMPHYSEMA (HCC): ICD-10-CM

## 2018-01-31 DIAGNOSIS — I10 ESSENTIAL HYPERTENSION: ICD-10-CM

## 2018-01-31 DIAGNOSIS — K21.9 GASTROESOPHAGEAL REFLUX DISEASE WITHOUT ESOPHAGITIS: ICD-10-CM

## 2018-01-31 DIAGNOSIS — I48.0 PAROXYSMAL ATRIAL FIBRILLATION (HCC): ICD-10-CM

## 2018-01-31 DIAGNOSIS — I73.9 PVD (PERIPHERAL VASCULAR DISEASE) (HCC): ICD-10-CM

## 2018-01-31 PROBLEM — J20.8 ACUTE BRONCHITIS DUE TO OTHER SPECIFIED ORGANISMS: Status: RESOLVED | Noted: 2018-01-02 | Resolved: 2018-01-31

## 2018-01-31 LAB
ANION GAP SERPL CALCULATED.3IONS-SCNC: 6 MMOL/L (ref 3–11)
BUN BLD-MCNC: 9 MG/DL (ref 9–23)
BUN/CREAT SERPL: 10 (ref 7–25)
CALCIUM SPEC-SCNC: 9.1 MG/DL (ref 8.7–10.4)
CHLORIDE SERPL-SCNC: 95 MMOL/L (ref 99–109)
CO2 SERPL-SCNC: 28 MMOL/L (ref 20–31)
CREAT BLD-MCNC: 0.9 MG/DL (ref 0.6–1.3)
DEPRECATED RDW RBC AUTO: 48.9 FL (ref 37–54)
ERYTHROCYTE [DISTWIDTH] IN BLOOD BY AUTOMATED COUNT: 13.8 % (ref 11.3–14.5)
GFR SERPL CREATININE-BSD FRML MDRD: 62 ML/MIN/1.73
GLUCOSE BLD-MCNC: 62 MG/DL (ref 70–100)
HCT VFR BLD AUTO: 36.7 % (ref 34.5–44)
HGB BLD-MCNC: 12.3 G/DL (ref 11.5–15.5)
MCH RBC QN AUTO: 32.5 PG (ref 27–31)
MCHC RBC AUTO-ENTMCNC: 33.5 G/DL (ref 32–36)
MCV RBC AUTO: 96.8 FL (ref 80–99)
PLATELET # BLD AUTO: 235 10*3/MM3 (ref 150–450)
PMV BLD AUTO: 10.6 FL (ref 6–12)
POTASSIUM BLD-SCNC: 3.5 MMOL/L (ref 3.5–5.5)
RBC # BLD AUTO: 3.79 10*6/MM3 (ref 3.89–5.14)
SODIUM BLD-SCNC: 129 MMOL/L (ref 132–146)
WBC NRBC COR # BLD: 4.23 10*3/MM3 (ref 3.5–10.8)

## 2018-01-31 PROCEDURE — 80048 BASIC METABOLIC PNL TOTAL CA: CPT | Performed by: INTERNAL MEDICINE

## 2018-01-31 PROCEDURE — 99214 OFFICE O/P EST MOD 30 MIN: CPT | Performed by: INTERNAL MEDICINE

## 2018-01-31 PROCEDURE — 85027 COMPLETE CBC AUTOMATED: CPT | Performed by: INTERNAL MEDICINE

## 2018-01-31 NOTE — PROGRESS NOTES
Patient is a 67 y.o. female who is here for a follow up of chronic conditions.  Chief Complaint   Patient presents with   • Hypertension   • Hyperlipidemia         HPI:  Here for f/u.  Doing ok except for her arthritis is bothersome.  Energy level is good, nicole with starting iron.  Continues to smoke.  Sleeping ok.  No recent sz.  Breathing is good.  No abdominal pains.     History:    Patient Active Problem List   Diagnosis   • Gastroesophageal reflux disease   • Angina pectoris   • Anxiety   • Arthritis   • Stenosis of carotid artery   • Chronic obstructive pulmonary disease   • Congestive heart failure   • Chronic coronary artery disease   • Essential hypertension   • Hypercholesterolemia   • Hypoglycemia   • Restless legs syndrome   • Seizure disorder   • Cobalamin deficiency   • PVD (peripheral vascular disease)   • Vertigo   • Chronic left-sided low back pain with left-sided sciatica   • Left leg weakness   • Diverticulosis of large intestine without hemorrhage   • Epidermoid cyst of ear   • Paroxysmal atrial fibrillation       Past Medical History:   Diagnosis Date   • Aneurysm    • Hyperlipidemia        Past Surgical History:   Procedure Laterality Date   • APPENDECTOMY     • CHOLECYSTECTOMY     • 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   • ANORO ELLIPTA 62.5-25 MCG/INH aerosol powder  INHALE 1 PUFF BY MOUTH EVERY MORNING   • aspirin 81 MG tablet Take 81 mg by mouth Daily.   • benzonatate (TESSALON) 200 MG capsule Take 1 capsule by mouth 3 (Three) Times a Day As Needed for Cough.   • clopidogrel (PLAVIX) 75 MG tablet Take 1 tablet by mouth Daily.   •  cyanocobalamin 1000 MCG/ML injection Inject 1 mL into the shoulder, thigh, or buttocks Every 7 (Seven) Days.   • diltiaZEM CD (CARTIA XT) 240 MG 24 hr capsule Take 1 capsule by mouth Daily.   • doxazosin (CARDURA) 2 MG tablet TAKE 1 TABLET BY MOUTH EVERY NIGHT   • escitalopram (LEXAPRO) 10 MG tablet Take 1 tablet by mouth Every Morning.   • esomeprazole (nexIUM) 40 MG capsule Take 1 capsule by mouth 2 (Two) Times a Day.   • ezetimibe (ZETIA) 10 MG tablet TAKE 1 TABLET BY MOUTH DAILY   • ferrous sulfate 325 (65 FE) MG tablet Take 1 tablet by mouth 2 (Two) Times a Day. Take with meals   • furosemide (LASIX) 40 MG tablet Take 1 tablet by mouth Daily.   • gabapentin (NEURONTIN) 300 MG capsule Take 1 capsule by mouth 3 (Three) Times a Day.   • isosorbide mononitrate (IMDUR) 60 MG 24 hr tablet Take 1 tablet by mouth Daily.   • lidocaine (XYLOCAINE) 5 % ointment Apply  topically Every 2 (Two) Hours As Needed for mild pain (1-3).   • lisinopril (PRINIVIL,ZESTRIL) 40 MG tablet TAKE 1 TABLET BY MOUTH DAILY   • LORazepam (ATIVAN) 1 MG tablet TAKE 1 TABLET BY MOUTH TWICE DAILY. MUST LAST 30 DAYS   • nitroglycerin (NITRODUR) 0.2 MG/HR patch APPLY 1 PATCH EXTERNALLY TO THE SKIN DAILY   • Nitroglycerin 400 MCG/SPRAY aerosol solution U ONE SPRAY UNDER THE TONGUE AS NEEDED FOR CHEST PAIN Q 5 MINUTES X 3 PRN   • PHENobarbital (LUMINAL) 100 MG tablet TAKE 1 1/2 TABLETS BY MOUTH EVERY DAY   • rosuvastatin (CRESTOR) 40 MG tablet TAKE 1 TABLET BY MOUTH DAILY   • [DISCONTINUED] azithromycin (ZITHROMAX Z-GABINO) 250 MG tablet Take 2 tablets the first day, then 1 tablet daily for 4 days.   • [DISCONTINUED] clopidogrel (PLAVIX) 75 MG tablet TAKE 1 TABLET BY MOUTH EVERY DAY   • [DISCONTINUED] esomeprazole (nexIUM) 40 MG capsule TAKE ONE CAPSULE BY MOUTH TWICE DAILY   • [DISCONTINUED] metroNIDAZOLE (FLAGYL) 500 MG tablet Take 1 tablet by mouth 3 (Three) Times a Day.   • [DISCONTINUED] varenicline (CHANTIX STARTING MONTH GABINO) 0.5 MG X 11 & 1 MG X  42 tablet Take 0.5 mg one daily on days 1-2 and 0.5 mg twice daily on days 4-7. Then 1 mg twice daily for a total of 12 weeks.     No current facility-administered medications on file prior to visit.        Family History   Problem Relation Age of Onset   • Arthritis Mother    • Cancer Mother    • Heart attack Father    • Hypertension Father    • Hyperlipidemia Father    • Stroke Father    • Heart disease Brother      CAD   • Heart disease Child      CAD   • Heart disease Child      CAD       Social History     Social History   • Marital status:      Spouse name: N/A   • Number of children: N/A   • Years of education: N/A     Occupational History   • Not on file.     Social History Main Topics   • Smoking status: Current Every Day Smoker     Types: Cigarettes, Electronic Cigarette     Last attempt to quit: 8/12/2016   • Smokeless tobacco: Never Used      Comment: Pt was smoking 1 cigarette a day but due to losing her daughter 3 years ago she has started back smoking 1 PPD   • Alcohol use No   • Drug use: No   • Sexual activity: Defer     Other Topics Concern   • Not on file     Social History Narrative         ROS:    Review of Systems   Constitutional: Positive for fatigue. Negative for chills, diaphoresis, fever and unexpected weight change.   HENT: Negative for congestion, ear pain, hearing loss, nosebleeds, postnasal drip, sinus pressure and sore throat.    Eyes: Negative for pain, discharge and itching.   Respiratory: Positive for shortness of breath. Negative for cough, chest tightness and wheezing.    Cardiovascular: Positive for palpitations. Negative for chest pain and leg swelling.   Gastrointestinal: Negative for abdominal distention, abdominal pain, blood in stool, constipation, diarrhea, nausea and vomiting.        Refusing colonoscopy   Endocrine: Positive for cold intolerance. Negative for polydipsia and polyuria.   Genitourinary: Negative for difficulty urinating, dysuria, frequency and  "hematuria.   Musculoskeletal: Positive for arthralgias and back pain. Negative for gait problem, joint swelling and myalgias.   Skin: Negative for rash and wound.   Neurological: Positive for tremors and weakness. Negative for dizziness, seizures, syncope and headaches.   Psychiatric/Behavioral: Positive for dysphoric mood and sleep disturbance. The patient is nervous/anxious.        /50  Pulse 60  Ht 147.3 cm (57.99\")  Wt 43.1 kg (95 lb)  BMI 19.86 kg/m2    Physical Exam:    Physical Exam   Constitutional: She is oriented to person, place, and time. She appears well-developed and well-nourished.   HENT:   Head: Normocephalic and atraumatic.   Right Ear: External ear normal.   Left Ear: External ear normal.   Mouth/Throat: Oropharynx is clear and moist.   Eyes: Conjunctivae and EOM are normal.   Neck: Normal range of motion. Neck supple.   Cardiovascular: Normal rate and regular rhythm.    Murmur (1/6) heard.  Pulmonary/Chest: Effort normal.   diminished   Abdominal: Soft. Bowel sounds are normal. A hernia ( incisional) is present.   Healed surgical scars   Musculoskeletal: She exhibits deformity (mild kyphosis).   Lymphadenopathy:     She has no cervical adenopathy.   Neurological: She is alert and oriented to person, place, and time.   Skin: Skin is warm and dry.   Psychiatric: Her behavior is normal. Thought content normal.   Flat affect       Procedure:      Discussion/Summary:    Htn-advised low NA  Copd-advised tob cessation  A/D-cont current tx  Pvd/cad-counseled on tob cessation, cont RF mod  GERD-cont ppi  B12 def-cont replacement, recheck noted  Hyperlipidemia-labs on rtc  sz-cont phenobarb      Reviewed prior records, extensively counseled on tob cessation  Advised need for colonoscopy sec to FE deficiency  Labs noted and dw patient   Flu shot last visit              Current Outpatient Prescriptions:   •  albuterol (VENTOLIN HFA) 108 (90 BASE) MCG/ACT inhaler, Inhale 2 puffs Every 4 (Four) Hours " As Needed for wheezing or shortness of air., Disp: 18 g, Rfl: 0  •  ANORO ELLIPTA 62.5-25 MCG/INH aerosol powder , INHALE 1 PUFF BY MOUTH EVERY MORNING, Disp: 1 each, Rfl: 5  •  aspirin 81 MG tablet, Take 81 mg by mouth Daily., Disp: , Rfl:   •  benzonatate (TESSALON) 200 MG capsule, Take 1 capsule by mouth 3 (Three) Times a Day As Needed for Cough., Disp: 30 capsule, Rfl: 0  •  clopidogrel (PLAVIX) 75 MG tablet, Take 1 tablet by mouth Daily., Disp: 30 tablet, Rfl: 5  •  cyanocobalamin 1000 MCG/ML injection, Inject 1 mL into the shoulder, thigh, or buttocks Every 7 (Seven) Days., Disp: 1 mL, Rfl: 7  •  diltiaZEM CD (CARTIA XT) 240 MG 24 hr capsule, Take 1 capsule by mouth Daily., Disp: 90 capsule, Rfl: 0  •  doxazosin (CARDURA) 2 MG tablet, TAKE 1 TABLET BY MOUTH EVERY NIGHT, Disp: 90 tablet, Rfl: 0  •  escitalopram (LEXAPRO) 10 MG tablet, Take 1 tablet by mouth Every Morning., Disp: 90 tablet, Rfl: 3  •  esomeprazole (nexIUM) 40 MG capsule, Take 1 capsule by mouth 2 (Two) Times a Day., Disp: 180 capsule, Rfl: 1  •  ezetimibe (ZETIA) 10 MG tablet, TAKE 1 TABLET BY MOUTH DAILY, Disp: 90 tablet, Rfl: 0  •  ferrous sulfate 325 (65 FE) MG tablet, Take 1 tablet by mouth 2 (Two) Times a Day. Take with meals, Disp: 60 tablet, Rfl: 5  •  furosemide (LASIX) 40 MG tablet, Take 1 tablet by mouth Daily., Disp: 90 tablet, Rfl: 0  •  gabapentin (NEURONTIN) 300 MG capsule, Take 1 capsule by mouth 3 (Three) Times a Day., Disp: 270 capsule, Rfl: 1  •  isosorbide mononitrate (IMDUR) 60 MG 24 hr tablet, Take 1 tablet by mouth Daily., Disp: 90 tablet, Rfl: 0  •  lidocaine (XYLOCAINE) 5 % ointment, Apply  topically Every 2 (Two) Hours As Needed for mild pain (1-3)., Disp: 30 g, Rfl: 0  •  lisinopril (PRINIVIL,ZESTRIL) 40 MG tablet, TAKE 1 TABLET BY MOUTH DAILY, Disp: 30 tablet, Rfl: 1  •  LORazepam (ATIVAN) 1 MG tablet, TAKE 1 TABLET BY MOUTH TWICE DAILY. MUST LAST 30 DAYS, Disp: 60 tablet, Rfl: 1  •  nitroglycerin (NITRODUR) 0.2 MG/HR  patch, APPLY 1 PATCH EXTERNALLY TO THE SKIN DAILY, Disp: 90 patch, Rfl: 0  •  Nitroglycerin 400 MCG/SPRAY aerosol solution, U ONE SPRAY UNDER THE TONGUE AS NEEDED FOR CHEST PAIN Q 5 MINUTES X 3 PRN, Disp: , Rfl: 5  •  PHENobarbital (LUMINAL) 100 MG tablet, TAKE 1 1/2 TABLETS BY MOUTH EVERY DAY, Disp: 135 tablet, Rfl: 0  •  rosuvastatin (CRESTOR) 40 MG tablet, TAKE 1 TABLET BY MOUTH DAILY, Disp: 30 tablet, Rfl: 5        An was seen today for hypertension and hyperlipidemia.    Diagnoses and all orders for this visit:    Chronic coronary artery disease    Chronic systolic congestive heart failure    Essential hypertension  -     Basic Metabolic Panel  -     CBC (No Diff)    Hypercholesterolemia    Paroxysmal atrial fibrillation    PVD (peripheral vascular disease)    Other emphysema    Cobalamin deficiency    Diverticulosis of large intestine without hemorrhage    Gastroesophageal reflux disease without esophagitis    Hypoglycemia    Seizure disorder    Arthritis    Anxiety    Restless legs syndrome

## 2018-02-08 RX ORDER — LISINOPRIL 40 MG/1
TABLET ORAL
Qty: 30 TABLET | Refills: 5 | Status: SHIPPED | OUTPATIENT
Start: 2018-02-08 | End: 2018-09-11 | Stop reason: SDUPTHER

## 2018-02-08 RX ORDER — ROSUVASTATIN CALCIUM 40 MG/1
TABLET, COATED ORAL
Qty: 30 TABLET | Refills: 5 | Status: SHIPPED | OUTPATIENT
Start: 2018-02-08 | End: 2018-09-11 | Stop reason: SDUPTHER

## 2018-02-08 RX ORDER — LISINOPRIL 40 MG/1
TABLET ORAL
Qty: 30 TABLET | Refills: 0 | OUTPATIENT
Start: 2018-02-08

## 2018-02-23 ENCOUNTER — HOSPITAL ENCOUNTER (EMERGENCY)
Facility: HOSPITAL | Age: 68
Discharge: HOME | End: 2018-02-23
Attending: EMERGENCY MEDICINE | Admitting: EMERGENCY MEDICINE

## 2018-02-23 ENCOUNTER — APPOINTMENT (OUTPATIENT)
Dept: CT IMAGING | Facility: HOSPITAL | Age: 68
End: 2018-02-23

## 2018-02-23 VITALS
HEIGHT: 57 IN | HEART RATE: 56 BPM | DIASTOLIC BLOOD PRESSURE: 50 MMHG | TEMPERATURE: 98 F | RESPIRATION RATE: 16 BRPM | OXYGEN SATURATION: 98 % | WEIGHT: 95 LBS | SYSTOLIC BLOOD PRESSURE: 106 MMHG | BODY MASS INDEX: 20.49 KG/M2

## 2018-02-23 DIAGNOSIS — E87.1 HYPONATREMIA: ICD-10-CM

## 2018-02-23 DIAGNOSIS — R10.32 LEFT LOWER QUADRANT ABDOMINAL PAIN OF UNKNOWN ETIOLOGY: Primary | ICD-10-CM

## 2018-02-23 LAB
ALBUMIN SERPL-MCNC: 4.4 G/DL (ref 3.2–4.8)
ALBUMIN/GLOB SERPL: 1.5 G/DL (ref 1.5–2.5)
ALP SERPL-CCNC: 87 U/L (ref 25–100)
ALT SERPL W P-5'-P-CCNC: 20 U/L (ref 7–40)
ANION GAP SERPL CALCULATED.3IONS-SCNC: 7 MMOL/L (ref 3–11)
AST SERPL-CCNC: 31 U/L (ref 0–33)
BASOPHILS # BLD AUTO: 0.02 10*3/MM3 (ref 0–0.2)
BASOPHILS NFR BLD AUTO: 0.4 % (ref 0–1)
BILIRUB SERPL-MCNC: 0.3 MG/DL (ref 0.3–1.2)
BILIRUB UR QL STRIP: NEGATIVE
BUN BLD-MCNC: 8 MG/DL (ref 9–23)
BUN/CREAT SERPL: 11.4 (ref 7–25)
CALCIUM SPEC-SCNC: 9.3 MG/DL (ref 8.7–10.4)
CHLORIDE SERPL-SCNC: 94 MMOL/L (ref 99–109)
CLARITY UR: CLEAR
CO2 SERPL-SCNC: 26 MMOL/L (ref 20–31)
COLOR UR: YELLOW
CREAT BLD-MCNC: 0.7 MG/DL (ref 0.6–1.3)
DEPRECATED RDW RBC AUTO: 49.8 FL (ref 37–54)
EOSINOPHIL # BLD AUTO: 0.22 10*3/MM3 (ref 0–0.3)
EOSINOPHIL NFR BLD AUTO: 4.9 % (ref 0–3)
ERYTHROCYTE [DISTWIDTH] IN BLOOD BY AUTOMATED COUNT: 14.3 % (ref 11.3–14.5)
GFR SERPL CREATININE-BSD FRML MDRD: 83 ML/MIN/1.73
GLOBULIN UR ELPH-MCNC: 3 GM/DL
GLUCOSE BLD-MCNC: 84 MG/DL (ref 70–100)
GLUCOSE UR STRIP-MCNC: NEGATIVE MG/DL
HCT VFR BLD AUTO: 37 % (ref 34.5–44)
HGB BLD-MCNC: 12.5 G/DL (ref 11.5–15.5)
HGB UR QL STRIP.AUTO: NEGATIVE
HOLD SPECIMEN: NORMAL
HOLD SPECIMEN: NORMAL
IMM GRANULOCYTES # BLD: 0.01 10*3/MM3 (ref 0–0.03)
IMM GRANULOCYTES NFR BLD: 0.2 % (ref 0–0.6)
KETONES UR QL STRIP: NEGATIVE
LEUKOCYTE ESTERASE UR QL STRIP.AUTO: NEGATIVE
LIPASE SERPL-CCNC: 32 U/L (ref 6–51)
LYMPHOCYTES # BLD AUTO: 1.87 10*3/MM3 (ref 0.6–4.8)
LYMPHOCYTES NFR BLD AUTO: 41.6 % (ref 24–44)
MCH RBC QN AUTO: 32.2 PG (ref 27–31)
MCHC RBC AUTO-ENTMCNC: 33.8 G/DL (ref 32–36)
MCV RBC AUTO: 95.4 FL (ref 80–99)
MONOCYTES # BLD AUTO: 0.37 10*3/MM3 (ref 0–1)
MONOCYTES NFR BLD AUTO: 8.2 % (ref 0–12)
NEUTROPHILS # BLD AUTO: 2 10*3/MM3 (ref 1.5–8.3)
NEUTROPHILS NFR BLD AUTO: 44.7 % (ref 41–71)
NITRITE UR QL STRIP: NEGATIVE
PH UR STRIP.AUTO: 7 [PH] (ref 5–8)
PLATELET # BLD AUTO: 256 10*3/MM3 (ref 150–450)
PMV BLD AUTO: 10.2 FL (ref 6–12)
POTASSIUM BLD-SCNC: 4.6 MMOL/L (ref 3.5–5.5)
PROT SERPL-MCNC: 7.4 G/DL (ref 5.7–8.2)
PROT UR QL STRIP: NEGATIVE
RBC # BLD AUTO: 3.88 10*6/MM3 (ref 3.89–5.14)
SODIUM BLD-SCNC: 127 MMOL/L (ref 132–146)
SP GR UR STRIP: <=1.005 (ref 1–1.03)
UROBILINOGEN UR QL STRIP: NORMAL
WBC NRBC COR # BLD: 4.49 10*3/MM3 (ref 3.5–10.8)
WHOLE BLOOD HOLD SPECIMEN: NORMAL
WHOLE BLOOD HOLD SPECIMEN: NORMAL

## 2018-02-23 PROCEDURE — 25010000002 ONDANSETRON PER 1 MG: Performed by: PHYSICIAN ASSISTANT

## 2018-02-23 PROCEDURE — 85025 COMPLETE CBC W/AUTO DIFF WBC: CPT | Performed by: EMERGENCY MEDICINE

## 2018-02-23 PROCEDURE — 80053 COMPREHEN METABOLIC PANEL: CPT | Performed by: EMERGENCY MEDICINE

## 2018-02-23 PROCEDURE — 83690 ASSAY OF LIPASE: CPT | Performed by: EMERGENCY MEDICINE

## 2018-02-23 PROCEDURE — 25010000002 FENTANYL CITRATE (PF) 100 MCG/2ML SOLUTION: Performed by: EMERGENCY MEDICINE

## 2018-02-23 PROCEDURE — 96375 TX/PRO/DX INJ NEW DRUG ADDON: CPT

## 2018-02-23 PROCEDURE — 81003 URINALYSIS AUTO W/O SCOPE: CPT | Performed by: EMERGENCY MEDICINE

## 2018-02-23 PROCEDURE — 96374 THER/PROPH/DIAG INJ IV PUSH: CPT

## 2018-02-23 PROCEDURE — 99284 EMERGENCY DEPT VISIT MOD MDM: CPT

## 2018-02-23 PROCEDURE — 74176 CT ABD & PELVIS W/O CONTRAST: CPT

## 2018-02-23 PROCEDURE — 96361 HYDRATE IV INFUSION ADD-ON: CPT

## 2018-02-23 RX ORDER — FENTANYL CITRATE 50 UG/ML
25 INJECTION, SOLUTION INTRAMUSCULAR; INTRAVENOUS ONCE
Status: COMPLETED | OUTPATIENT
Start: 2018-02-23 | End: 2018-02-23

## 2018-02-23 RX ORDER — ONDANSETRON 2 MG/ML
4 INJECTION INTRAMUSCULAR; INTRAVENOUS ONCE
Status: COMPLETED | OUTPATIENT
Start: 2018-02-23 | End: 2018-02-23

## 2018-02-23 RX ORDER — DICYCLOMINE HYDROCHLORIDE 10 MG/1
10 CAPSULE ORAL EVERY 8 HOURS PRN
Qty: 15 CAPSULE | Refills: 0 | Status: ON HOLD | OUTPATIENT
Start: 2018-02-23 | End: 2020-01-01

## 2018-02-23 RX ORDER — SODIUM CHLORIDE 0.9 % (FLUSH) 0.9 %
10 SYRINGE (ML) INJECTION AS NEEDED
Status: DISCONTINUED | OUTPATIENT
Start: 2018-02-23 | End: 2018-02-23 | Stop reason: HOSPADM

## 2018-02-23 RX ADMIN — ONDANSETRON HYDROCHLORIDE 4 MG: 2 INJECTION, SOLUTION INTRAMUSCULAR; INTRAVENOUS at 13:11

## 2018-02-23 RX ADMIN — SODIUM CHLORIDE 500 ML: 9 INJECTION, SOLUTION INTRAVENOUS at 15:43

## 2018-02-23 RX ADMIN — FENTANYL CITRATE 25 MCG: 50 INJECTION INTRAMUSCULAR; INTRAVENOUS at 13:10

## 2018-02-23 NOTE — DISCHARGE INSTRUCTIONS
Rest.  Bentyl as prescribed for abdominal pain.  Follow up with your PCP on Monday.  Return if worse.

## 2018-02-23 NOTE — ED PROVIDER NOTES
Subjective   HPI Comments: 67-year-old female presents to the emergency department with complaints of left lower quadrant and left groin pain for the past 3 days.  She has had nausea without vomiting.  She has had normal bowel movements.  Normal urination.  The patient notes a history of ventral hernias first diagnosed in 2010.  She also notes a history of mesenteric ischemia back in 2006 for which she was hospitalized at [facility] for 2 months and underwent multiple surgeries at that time.  She has done relatively well since then.  The patient has also had diverticulitis back in 2008.  She has a history of PAD and CAD and has had multiple stents arterial stents to perfuse the lower extremities.  She has a history of CABG in 2003.  The patient also has a history of COPD.  She continues to smoke less than 1 pack cigarettes daily.  She denies any alcohol use.  Her PCP is Diego Berry.    Patient is a 67 y.o. female presenting with abdominal pain.   History provided by:  Patient  Abdominal Pain   Pain location:  LLQ  Pain quality: aching    Pain radiates to:  Groin  Pain severity:  Moderate  Onset quality:  Gradual  Duration:  3 days  Timing:  Constant  Progression:  Worsening  Chronicity:  New  Relieved by:  Nothing  Worsened by:  Nothing  Ineffective treatments:  None tried  Associated symptoms: cough (chronic), nausea and shortness of breath (chronic)    Associated symptoms: no anorexia, no chest pain, no chills, no constipation, no diarrhea, no dysuria, no fever, no hematuria, no melena, no sore throat and no vomiting        Review of Systems   Constitutional: Negative for chills and fever.   HENT: Negative for congestion, ear pain, nosebleeds, rhinorrhea and sore throat.    Eyes: Negative for pain, discharge and visual disturbance.   Respiratory: Positive for cough (chronic) and shortness of breath (chronic). Negative for wheezing.    Cardiovascular: Negative for chest pain, palpitations and leg  swelling.   Gastrointestinal: Positive for abdominal pain and nausea. Negative for anorexia, blood in stool, constipation, diarrhea, melena and vomiting.   Endocrine: Negative.    Genitourinary: Negative for dysuria, hematuria and urgency.   Musculoskeletal: Negative for arthralgias and back pain.   Skin: Negative for pallor and rash.   Allergic/Immunologic: Negative for immunocompromised state.   Neurological: Negative for dizziness, speech difficulty, weakness and headaches.   Hematological: Negative for adenopathy. Does not bruise/bleed easily.   Psychiatric/Behavioral: Negative.        Past Medical History:   Diagnosis Date   • Aneurysm    • Hyperlipidemia        Allergies   Allergen Reactions   • Carbamazepine    • Codeine    • Dilantin [Phenytoin]    • Keflex  [Cephalexin]    • Sulfamethoxazole-Trimethoprim        Past Surgical History:   Procedure Laterality Date   • APPENDECTOMY     • CHOLECYSTECTOMY     • COLOSTOMY  2006    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   • EXPLORATORY LAPAROTOMY      sec to ovarian cysts   • HYSTERECTOMY     • ILIAC ARTERY STENT     • REVISION / TAKEDOWN COLOSTOMY  2008       Family History   Problem Relation Age of Onset   • Arthritis Mother    • Cancer Mother    • Heart attack Father    • Hypertension Father    • Hyperlipidemia Father    • Stroke Father    • Heart disease Brother      CAD   • Heart disease Child      CAD   • Heart disease Child      CAD       Social History     Social History   • Marital status:      Spouse name: N/A   • Number of children: N/A   • Years of education: N/A     Social History Main Topics   • Smoking status: Current Every Day Smoker     Types: Cigarettes, Electronic Cigarette     Last attempt to quit: 8/12/2016   • Smokeless tobacco: Never Used      Comment: Pt was smoking 1 cigarette a day but due to losing her daughter 3 years ago she has started back smoking 1 PPD   • Alcohol use No   • Drug use: No   • Sexual activity: Defer     Other Topics Concern    • None     Social History Narrative           Objective   Physical Exam   Constitutional: She is oriented to person, place, and time. She appears well-developed and well-nourished. No distress.   HENT:   Head: Normocephalic and atraumatic.   Nose: Nose normal.   Mouth/Throat: Oropharynx is clear and moist.   Eyes: EOM are normal. Pupils are equal, round, and reactive to light. No scleral icterus.   Neck: Normal range of motion. Neck supple.   Cardiovascular: Normal rate, regular rhythm, normal heart sounds and intact distal pulses.    No murmur heard.  Pulmonary/Chest: Effort normal. No respiratory distress. She has wheezes (mild diffuse). She has no rales. She exhibits no tenderness.   Abdominal: Soft. Bowel sounds are normal. There is tenderness (moderate LLQ tenderness;  palpable ventral hernia mid upper abdomen).   Musculoskeletal: Normal range of motion. She exhibits no edema or tenderness.   Neurological: She is alert and oriented to person, place, and time.   Skin: Skin is warm and dry. No rash noted. She is not diaphoretic.   Psychiatric: She has a normal mood and affect.   Nursing note and vitals reviewed.      Procedures         ED Course  ED Course      4:22 PM   Pt appears in no distress.  She is moderately hyponatremic at 127.  Otherwise, no concerning labs.  CT shows nothing acute.  Will d/c home on Bentyl and have f/u with her PCP or return if worse.  Recent Results (from the past 24 hour(s))   Urinalysis With / Culture If Indicated - Urine, Clean Catch    Collection Time: 02/23/18 11:35 AM   Result Value Ref Range    Color, UA Yellow Yellow, Straw    Appearance, UA Clear Clear    pH, UA 7.0 5.0 - 8.0    Specific Gravity, UA <=1.005 1.001 - 1.030    Glucose, UA Negative Negative    Ketones, UA Negative Negative    Bilirubin, UA Negative Negative    Blood, UA Negative Negative    Protein, UA Negative Negative    Leuk Esterase, UA Negative Negative    Nitrite, UA Negative Negative    Urobilinogen, UA  0.2 E.U./dL 0.2 - 1.0 E.U./dL   Comprehensive Metabolic Panel    Collection Time: 02/23/18 11:49 AM   Result Value Ref Range    Glucose 84 70 - 100 mg/dL    BUN 8 (L) 9 - 23 mg/dL    Creatinine 0.70 0.60 - 1.30 mg/dL    Sodium 127 (L) 132 - 146 mmol/L    Potassium 4.6 3.5 - 5.5 mmol/L    Chloride 94 (L) 99 - 109 mmol/L    CO2 26.0 20.0 - 31.0 mmol/L    Calcium 9.3 8.7 - 10.4 mg/dL    Total Protein 7.4 5.7 - 8.2 g/dL    Albumin 4.40 3.20 - 4.80 g/dL    ALT (SGPT) 20 7 - 40 U/L    AST (SGOT) 31 0 - 33 U/L    Alkaline Phosphatase 87 25 - 100 U/L    Total Bilirubin 0.3 0.3 - 1.2 mg/dL    eGFR Non African Amer 83 >60 mL/min/1.73    Globulin 3.0 gm/dL    A/G Ratio 1.5 1.5 - 2.5 g/dL    BUN/Creatinine Ratio 11.4 7.0 - 25.0    Anion Gap 7.0 3.0 - 11.0 mmol/L   Lipase    Collection Time: 02/23/18 11:49 AM   Result Value Ref Range    Lipase 32 6 - 51 U/L   Light Blue Top    Collection Time: 02/23/18 11:49 AM   Result Value Ref Range    Extra Tube hold for add-on    Green Top (Gel)    Collection Time: 02/23/18 11:49 AM   Result Value Ref Range    Extra Tube Hold for add-ons.    Lavender Top    Collection Time: 02/23/18 11:49 AM   Result Value Ref Range    Extra Tube hold for add-on    Gold Top - SST    Collection Time: 02/23/18 11:49 AM   Result Value Ref Range    Extra Tube Hold for add-ons.    CBC Auto Differential    Collection Time: 02/23/18 11:49 AM   Result Value Ref Range    WBC 4.49 3.50 - 10.80 10*3/mm3    RBC 3.88 (L) 3.89 - 5.14 10*6/mm3    Hemoglobin 12.5 11.5 - 15.5 g/dL    Hematocrit 37.0 34.5 - 44.0 %    MCV 95.4 80.0 - 99.0 fL    MCH 32.2 (H) 27.0 - 31.0 pg    MCHC 33.8 32.0 - 36.0 g/dL    RDW 14.3 11.3 - 14.5 %    RDW-SD 49.8 37.0 - 54.0 fl    MPV 10.2 6.0 - 12.0 fL    Platelets 256 150 - 450 10*3/mm3    Neutrophil % 44.7 41.0 - 71.0 %    Lymphocyte % 41.6 24.0 - 44.0 %    Monocyte % 8.2 0.0 - 12.0 %    Eosinophil % 4.9 (H) 0.0 - 3.0 %    Basophil % 0.4 0.0 - 1.0 %    Immature Grans % 0.2 0.0 - 0.6 %     Neutrophils, Absolute 2.00 1.50 - 8.30 10*3/mm3    Lymphocytes, Absolute 1.87 0.60 - 4.80 10*3/mm3    Monocytes, Absolute 0.37 0.00 - 1.00 10*3/mm3    Eosinophils, Absolute 0.22 0.00 - 0.30 10*3/mm3    Basophils, Absolute 0.02 0.00 - 0.20 10*3/mm3    Immature Grans, Absolute 0.01 0.00 - 0.03 10*3/mm3     Note: In addition to lab results from this visit, the labs listed above may include labs taken at another facility or during a different encounter within the last 24 hours. Please correlate lab times with ED admission and discharge times for further clarification of the services performed during this visit.    CT Abdomen Pelvis Without Contrast   Preliminary Result   1. No evidence of acute inflammatory focus or other clearly acute   disease is seen in the abdomen.   2. Mild symmetric dilatation of the renal collecting systems and   ureters, perhaps due to bladder distention.       D:  02/23/2018   E:  02/23/2018                    Vitals:    02/23/18 1410 02/23/18 1440 02/23/18 1545 02/23/18 1600   BP:   (!) 87/44 106/50   BP Location:   Left arm    Patient Position:   Lying    Pulse: 78 67 58 56   Resp:   16    Temp:       TempSrc:       SpO2: (!) 85% 97% 96% 98%   Weight:       Height:         Medications   sodium chloride 0.9 % flush 10 mL (not administered)   fentaNYL citrate (PF) (SUBLIMAZE) injection 25 mcg (25 mcg Intravenous Given 2/23/18 1310)   ondansetron (ZOFRAN) injection 4 mg (4 mg Intravenous Given 2/23/18 1311)   sodium chloride 0.9 % bolus 500 mL (0 mL Intravenous Stopped 2/23/18 1619)     ECG/EMG Results (last 24 hours)     ** No results found for the last 24 hours. **                    [facility]    Final diagnoses:   Left lower quadrant abdominal pain of unknown etiology   Hyponatremia            VANESSA Bravo  02/23/18 [id number]

## 2018-02-26 ENCOUNTER — TELEPHONE (OUTPATIENT)
Dept: INTERNAL MEDICINE | Facility: CLINIC | Age: 68
End: 2018-02-26

## 2018-02-26 NOTE — TELEPHONE ENCOUNTER
Patient called and said she was told by the er to follow up with dr. luciano about her hernia and her sodium being low. Patient also said her arm is swollen from ems trying to put an iv in her arm and its really painful. Patient said she doesn't have a way to get here. Please give pt a call.

## 2018-02-26 NOTE — TELEPHONE ENCOUNTER
Advised pt that we need to see her to be able to treat these problems. Also advised pt to elevated arm and ice

## 2018-03-07 DIAGNOSIS — E78.00 HYPERCHOLESTEROLEMIA: ICD-10-CM

## 2018-03-07 DIAGNOSIS — I50.22 CHRONIC SYSTOLIC CONGESTIVE HEART FAILURE (HCC): ICD-10-CM

## 2018-03-07 DIAGNOSIS — I10 ESSENTIAL HYPERTENSION: ICD-10-CM

## 2018-03-07 RX ORDER — FUROSEMIDE 40 MG/1
TABLET ORAL
Qty: 90 TABLET | Refills: 0 | Status: SHIPPED | OUTPATIENT
Start: 2018-03-07 | End: 2018-06-03 | Stop reason: SDUPTHER

## 2018-03-07 RX ORDER — ISOSORBIDE MONONITRATE 60 MG/1
TABLET, EXTENDED RELEASE ORAL
Qty: 90 TABLET | Refills: 0 | Status: SHIPPED | OUTPATIENT
Start: 2018-03-07 | End: 2018-06-03 | Stop reason: SDUPTHER

## 2018-03-07 RX ORDER — DOXAZOSIN 2 MG/1
TABLET ORAL
Qty: 90 TABLET | Refills: 0 | Status: SHIPPED | OUTPATIENT
Start: 2018-03-07 | End: 2018-06-03 | Stop reason: SDUPTHER

## 2018-03-07 RX ORDER — NITROGLYCERIN 40 MG/1
PATCH TRANSDERMAL
Qty: 90 PATCH | Refills: 0 | Status: SHIPPED | OUTPATIENT
Start: 2018-03-07 | End: 2018-06-03 | Stop reason: SDUPTHER

## 2018-03-07 RX ORDER — EZETIMIBE 10 MG/1
TABLET ORAL
Qty: 90 TABLET | Refills: 0 | Status: SHIPPED | OUTPATIENT
Start: 2018-03-07 | End: 2018-06-03 | Stop reason: SDUPTHER

## 2018-03-08 RX ORDER — CLOPIDOGREL BISULFATE 75 MG/1
TABLET ORAL
Qty: 90 TABLET | Refills: 0 | Status: SHIPPED | OUTPATIENT
Start: 2018-03-08 | End: 2018-03-29

## 2018-03-09 DIAGNOSIS — F41.9 ANXIETY: ICD-10-CM

## 2018-03-09 RX ORDER — LORAZEPAM 1 MG/1
TABLET ORAL
Qty: 60 TABLET | Refills: 1 | OUTPATIENT
Start: 2018-03-09 | End: 2018-05-11 | Stop reason: SDUPTHER

## 2018-03-26 DIAGNOSIS — K21.9 GASTROESOPHAGEAL REFLUX DISEASE WITHOUT ESOPHAGITIS: ICD-10-CM

## 2018-03-26 RX ORDER — ESOMEPRAZOLE MAGNESIUM 40 MG/1
CAPSULE, DELAYED RELEASE ORAL
Qty: 180 CAPSULE | Refills: 0 | Status: SHIPPED | OUTPATIENT
Start: 2018-03-26 | End: 2018-05-02

## 2018-03-29 ENCOUNTER — OFFICE VISIT (OUTPATIENT)
Dept: INTERNAL MEDICINE | Facility: CLINIC | Age: 68
End: 2018-03-29

## 2018-03-29 VITALS
HEART RATE: 72 BPM | BODY MASS INDEX: 20.71 KG/M2 | DIASTOLIC BLOOD PRESSURE: 60 MMHG | WEIGHT: 96 LBS | HEIGHT: 57 IN | SYSTOLIC BLOOD PRESSURE: 110 MMHG

## 2018-03-29 DIAGNOSIS — I48.0 PAROXYSMAL ATRIAL FIBRILLATION (HCC): ICD-10-CM

## 2018-03-29 DIAGNOSIS — K57.30 DIVERTICULOSIS OF LARGE INTESTINE WITHOUT HEMORRHAGE: ICD-10-CM

## 2018-03-29 DIAGNOSIS — E53.8 COBALAMIN DEFICIENCY: ICD-10-CM

## 2018-03-29 DIAGNOSIS — K21.9 GASTROESOPHAGEAL REFLUX DISEASE WITHOUT ESOPHAGITIS: ICD-10-CM

## 2018-03-29 DIAGNOSIS — I50.22 CHRONIC SYSTOLIC CONGESTIVE HEART FAILURE (HCC): ICD-10-CM

## 2018-03-29 DIAGNOSIS — E78.00 HYPERCHOLESTEROLEMIA: ICD-10-CM

## 2018-03-29 DIAGNOSIS — J43.8 OTHER EMPHYSEMA (HCC): ICD-10-CM

## 2018-03-29 DIAGNOSIS — I25.10 CHRONIC CORONARY ARTERY DISEASE: Primary | ICD-10-CM

## 2018-03-29 DIAGNOSIS — G40.909 SEIZURE DISORDER (HCC): ICD-10-CM

## 2018-03-29 DIAGNOSIS — I10 ESSENTIAL HYPERTENSION: ICD-10-CM

## 2018-03-29 DIAGNOSIS — I73.9 PVD (PERIPHERAL VASCULAR DISEASE) (HCC): ICD-10-CM

## 2018-03-29 DIAGNOSIS — F41.9 ANXIETY: ICD-10-CM

## 2018-03-29 DIAGNOSIS — J20.8 ACUTE BRONCHITIS DUE TO OTHER SPECIFIED ORGANISMS: ICD-10-CM

## 2018-03-29 DIAGNOSIS — G25.81 RESTLESS LEGS SYNDROME: ICD-10-CM

## 2018-03-29 DIAGNOSIS — M19.90 ARTHRITIS: ICD-10-CM

## 2018-03-29 LAB
ALBUMIN SERPL-MCNC: 4.2 G/DL (ref 3.2–4.8)
ALBUMIN/GLOB SERPL: 1.6 G/DL (ref 1.5–2.5)
ALP SERPL-CCNC: 92 U/L (ref 25–100)
ALT SERPL W P-5'-P-CCNC: 29 U/L (ref 7–40)
ANION GAP SERPL CALCULATED.3IONS-SCNC: 7 MMOL/L (ref 3–11)
ARTICHOKE IGE QN: 81 MG/DL (ref 0–130)
AST SERPL-CCNC: 30 U/L (ref 0–33)
BILIRUB SERPL-MCNC: 0.3 MG/DL (ref 0.3–1.2)
BUN BLD-MCNC: 15 MG/DL (ref 9–23)
BUN/CREAT SERPL: 21.4 (ref 7–25)
CALCIUM SPEC-SCNC: 9 MG/DL (ref 8.7–10.4)
CHLORIDE SERPL-SCNC: 97 MMOL/L (ref 99–109)
CHOLEST SERPL-MCNC: 152 MG/DL (ref 0–200)
CO2 SERPL-SCNC: 29 MMOL/L (ref 20–31)
CREAT BLD-MCNC: 0.7 MG/DL (ref 0.6–1.3)
GFR SERPL CREATININE-BSD FRML MDRD: 83 ML/MIN/1.73
GLOBULIN UR ELPH-MCNC: 2.6 GM/DL
GLUCOSE BLD-MCNC: 80 MG/DL (ref 70–100)
HDLC SERPL-MCNC: 56 MG/DL (ref 40–60)
PHENOBARB SERPL-MCNC: 31 MCG/ML (ref 15–40)
POTASSIUM BLD-SCNC: 4.5 MMOL/L (ref 3.5–5.5)
PROT SERPL-MCNC: 6.8 G/DL (ref 5.7–8.2)
SODIUM BLD-SCNC: 133 MMOL/L (ref 132–146)
TRIGL SERPL-MCNC: 72 MG/DL (ref 0–150)
VIT B12 BLD-MCNC: 549 PG/ML (ref 211–911)

## 2018-03-29 PROCEDURE — 80184 ASSAY OF PHENOBARBITAL: CPT | Performed by: INTERNAL MEDICINE

## 2018-03-29 PROCEDURE — 80061 LIPID PANEL: CPT | Performed by: INTERNAL MEDICINE

## 2018-03-29 PROCEDURE — 82607 VITAMIN B-12: CPT | Performed by: INTERNAL MEDICINE

## 2018-03-29 PROCEDURE — 80053 COMPREHEN METABOLIC PANEL: CPT | Performed by: INTERNAL MEDICINE

## 2018-03-29 PROCEDURE — 99214 OFFICE O/P EST MOD 30 MIN: CPT | Performed by: INTERNAL MEDICINE

## 2018-03-29 RX ORDER — DOXYCYCLINE HYCLATE 100 MG/1
100 CAPSULE ORAL 2 TIMES DAILY
Qty: 20 CAPSULE | Refills: 0 | Status: SHIPPED | OUTPATIENT
Start: 2018-03-29 | End: 2018-05-02

## 2018-03-29 NOTE — PROGRESS NOTES
Patient is a 67 y.o. female who is here for a follow up of chronic conditions.  Chief Complaint   Patient presents with   • Pain   • Anxiety         HPI:    Here for f/u.  Attempting to quit tobacco.  Little energy.  Breathing is not the best.  Occasional abdominal pains. Occasional edema.  No SZ or HA's.  GERD is controlled.   History:    Patient Active Problem List   Diagnosis   • Gastroesophageal reflux disease   • Angina pectoris   • Anxiety   • Arthritis   • Stenosis of carotid artery   • Chronic obstructive pulmonary disease   • Congestive heart failure   • Chronic coronary artery disease   • Essential hypertension   • Hypercholesterolemia   • Hypoglycemia   • Restless legs syndrome   • Seizure disorder   • Cobalamin deficiency   • PVD (peripheral vascular disease)   • Vertigo   • Chronic left-sided low back pain with left-sided sciatica   • Left leg weakness   • Diverticulosis of large intestine without hemorrhage   • Epidermoid cyst of ear   • Paroxysmal atrial fibrillation   • Acute bronchitis due to other specified organisms       Past Medical History:   Diagnosis Date   • Aneurysm    • Hyperlipidemia        Past Surgical History:   Procedure Laterality Date   • APPENDECTOMY     • CHOLECYSTECTOMY     • 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   • ANORO ELLIPTA 62.5-25 MCG/INH aerosol powder  INHALE 1 PUFF BY MOUTH EVERY MORNING   • aspirin 81 MG tablet Take 81 mg by mouth Daily.   • benzonatate (TESSALON) 200 MG capsule Take 1 capsule by mouth 3 (Three) Times a Day As Needed for Cough.   • clopidogrel (PLAVIX) 75 MG tablet Take 1 tablet by mouth Daily.    • dicyclomine (BENTYL) 10 MG capsule Take 1 capsule by mouth Every 8 (Eight) Hours As Needed (abdominal pain).   • diltiaZEM CD (CARTIA XT) 240 MG 24 hr capsule Take 1 capsule by mouth Daily.   • doxazosin (CARDURA) 2 MG tablet TAKE 1 TABLET BY MOUTH EVERY NIGHT   • escitalopram (LEXAPRO) 10 MG tablet Take 1 tablet by mouth Every Morning.   • esomeprazole (nexIUM) 40 MG capsule Take 1 capsule by mouth 2 (Two) Times a Day.   • esomeprazole (nexIUM) 40 MG capsule TAKE ONE CAPSULE BY MOUTH TWICE DAILY   • ezetimibe (ZETIA) 10 MG tablet TAKE 1 TABLET BY MOUTH DAILY   • ferrous sulfate 325 (65 FE) MG tablet Take 1 tablet by mouth 2 (Two) Times a Day. Take with meals   • furosemide (LASIX) 40 MG tablet TAKE 1 TABLET BY MOUTH DAILY   • gabapentin (NEURONTIN) 300 MG capsule Take 1 capsule by mouth 3 (Three) Times a Day.   • isosorbide mononitrate (IMDUR) 60 MG 24 hr tablet TAKE 1 TABLET BY MOUTH EVERY DAY   • lidocaine (XYLOCAINE) 5 % ointment Apply  topically Every 2 (Two) Hours As Needed for mild pain (1-3).   • lisinopril (PRINIVIL,ZESTRIL) 40 MG tablet TAKE 1 TABLET BY MOUTH EVERY DAY PLEASE SCHEDULE FOLLOW UP FOR FURTHER REFILLS   • LORazepam (ATIVAN) 1 MG tablet TAKE 1 TABLET BY MOUTH TWICE DAILY. MUST LAST 30 DAYS.   • nitroglycerin (NITRODUR) 0.2 MG/HR patch APPLY 1 PATCH EXTERNALLY TO THE SKIN DAILY   • Nitroglycerin 400 MCG/SPRAY aerosol solution U ONE SPRAY UNDER THE TONGUE AS NEEDED FOR CHEST PAIN Q 5 MINUTES X 3 PRN   • PHENobarbital (LUMINAL) 100 MG tablet TAKE 1 1/2 TABLETS BY MOUTH EVERY DAY   • rosuvastatin (CRESTOR) 40 MG tablet TAKE 1 TABLET BY MOUTH DAILY   • [DISCONTINUED] clopidogrel (PLAVIX) 75 MG tablet TAKE 1 TABLET BY MOUTH EVERY DAY   • [DISCONTINUED] cyanocobalamin 1000 MCG/ML injection Inject 1 mL into the shoulder, thigh, or buttocks Every 7 (Seven) Days.     No current facility-administered medications on file prior to visit.        Family History   Problem Relation Age of Onset   •  Arthritis Mother    • Cancer Mother    • Heart attack Father    • Hypertension Father    • Hyperlipidemia Father    • Stroke Father    • Heart disease Brother      CAD   • Heart disease Child      CAD   • Heart disease Child      CAD       Social History     Social History   • Marital status:      Spouse name: N/A   • Number of children: N/A   • Years of education: N/A     Occupational History   • Not on file.     Social History Main Topics   • Smoking status: Current Every Day Smoker     Types: Cigarettes, Electronic Cigarette     Last attempt to quit: 8/12/2016   • Smokeless tobacco: Never Used      Comment: Pt was smoking 1 cigarette a day but due to losing her daughter 3 years ago she has started back smoking 1 PPD   • Alcohol use No   • Drug use: No   • Sexual activity: Defer     Other Topics Concern   • Not on file     Social History Narrative   • No narrative on file         ROS:    Review of Systems   Constitutional: Positive for fatigue. Negative for chills, diaphoresis, fever and unexpected weight change.   HENT: Negative for congestion, ear pain, hearing loss, nosebleeds, postnasal drip, sinus pressure and sore throat.    Eyes: Negative for pain, discharge and itching.   Respiratory: Positive for shortness of breath. Negative for cough, chest tightness and wheezing.    Cardiovascular: Positive for palpitations. Negative for chest pain and leg swelling.   Gastrointestinal: Negative for abdominal distention, abdominal pain, blood in stool, constipation, diarrhea, nausea and vomiting.        Refusing colonoscopy   Endocrine: Positive for cold intolerance. Negative for polydipsia and polyuria.   Genitourinary: Negative for difficulty urinating, dysuria, frequency and hematuria.   Musculoskeletal: Positive for arthralgias and back pain. Negative for gait problem, joint swelling and myalgias.   Skin: Negative for rash and wound.   Neurological: Positive for tremors and weakness. Negative for dizziness,  "seizures, syncope and headaches.   Psychiatric/Behavioral: Positive for dysphoric mood and sleep disturbance. The patient is nervous/anxious.        /60   Pulse 72   Ht 144.8 cm (57.01\")   Wt 43.5 kg (96 lb)   BMI 20.77 kg/m²     Physical Exam:    Physical Exam   Constitutional: She is oriented to person, place, and time. She appears well-developed and well-nourished.   HENT:   Head: Normocephalic and atraumatic.   Right Ear: External ear normal.   Left Ear: External ear normal.   Mouth/Throat: Oropharynx is clear and moist.   Eyes: Conjunctivae and EOM are normal.   Neck: Normal range of motion. Neck supple.   Cardiovascular: Normal rate and regular rhythm.    Murmur (1/6) heard.  Pulmonary/Chest: Effort normal.   Diminished  Expiratory rhonchi   Abdominal: Soft. Bowel sounds are normal. A hernia ( incisional) is present.   Healed surgical scars   Musculoskeletal: She exhibits deformity (mild kyphosis).   Lymphadenopathy:     She has no cervical adenopathy.   Neurological: She is alert and oriented to person, place, and time.   Skin: Skin is warm and dry.   Psychiatric: Her behavior is normal. Thought content normal.   Flat affect       Procedure:      Discussion/Summary:    Htn-advised low NA  Copd-advised tob cessation  Bronchitis-rx doxy  A/D-cont current tx  Pvd/cad-counseled on tob cessation, cont RF mod  GERD-cont ppi  B12 def-cont replacement, recheck noted  Hyperlipidemia-labs today  Hyponatremia-recheck, may need to dc SSRI  sz-cont phenobarb, check level today      Reviewed prior records, extensively counseled on tob cessation  Advised need for colonoscopy sec to FE deficiency  Labs noted and dw patient   Flu shot last already given             Current Outpatient Prescriptions:   •  albuterol (VENTOLIN HFA) 108 (90 BASE) MCG/ACT inhaler, Inhale 2 puffs Every 4 (Four) Hours As Needed for wheezing or shortness of air., Disp: 18 g, Rfl: 0  •  ANORO ELLIPTA 62.5-25 MCG/INH aerosol powder , INHALE 1 " PUFF BY MOUTH EVERY MORNING, Disp: 1 each, Rfl: 5  •  aspirin 81 MG tablet, Take 81 mg by mouth Daily., Disp: , Rfl:   •  benzonatate (TESSALON) 200 MG capsule, Take 1 capsule by mouth 3 (Three) Times a Day As Needed for Cough., Disp: 30 capsule, Rfl: 0  •  clopidogrel (PLAVIX) 75 MG tablet, Take 1 tablet by mouth Daily., Disp: 30 tablet, Rfl: 5  •  dicyclomine (BENTYL) 10 MG capsule, Take 1 capsule by mouth Every 8 (Eight) Hours As Needed (abdominal pain)., Disp: 15 capsule, Rfl: 0  •  diltiaZEM CD (CARTIA XT) 240 MG 24 hr capsule, Take 1 capsule by mouth Daily., Disp: 90 capsule, Rfl: 0  •  doxazosin (CARDURA) 2 MG tablet, TAKE 1 TABLET BY MOUTH EVERY NIGHT, Disp: 90 tablet, Rfl: 0  •  escitalopram (LEXAPRO) 10 MG tablet, Take 1 tablet by mouth Every Morning., Disp: 90 tablet, Rfl: 3  •  esomeprazole (nexIUM) 40 MG capsule, Take 1 capsule by mouth 2 (Two) Times a Day., Disp: 180 capsule, Rfl: 1  •  esomeprazole (nexIUM) 40 MG capsule, TAKE ONE CAPSULE BY MOUTH TWICE DAILY, Disp: 180 capsule, Rfl: 0  •  ezetimibe (ZETIA) 10 MG tablet, TAKE 1 TABLET BY MOUTH DAILY, Disp: 90 tablet, Rfl: 0  •  ferrous sulfate 325 (65 FE) MG tablet, Take 1 tablet by mouth 2 (Two) Times a Day. Take with meals, Disp: 60 tablet, Rfl: 5  •  furosemide (LASIX) 40 MG tablet, TAKE 1 TABLET BY MOUTH DAILY, Disp: 90 tablet, Rfl: 0  •  gabapentin (NEURONTIN) 300 MG capsule, Take 1 capsule by mouth 3 (Three) Times a Day., Disp: 270 capsule, Rfl: 1  •  isosorbide mononitrate (IMDUR) 60 MG 24 hr tablet, TAKE 1 TABLET BY MOUTH EVERY DAY, Disp: 90 tablet, Rfl: 0  •  lidocaine (XYLOCAINE) 5 % ointment, Apply  topically Every 2 (Two) Hours As Needed for mild pain (1-3)., Disp: 30 g, Rfl: 0  •  lisinopril (PRINIVIL,ZESTRIL) 40 MG tablet, TAKE 1 TABLET BY MOUTH EVERY DAY PLEASE SCHEDULE FOLLOW UP FOR FURTHER REFILLS, Disp: 30 tablet, Rfl: 5  •  LORazepam (ATIVAN) 1 MG tablet, TAKE 1 TABLET BY MOUTH TWICE DAILY. MUST LAST 30 DAYS., Disp: 60 tablet, Rfl:  1  •  nitroglycerin (NITRODUR) 0.2 MG/HR patch, APPLY 1 PATCH EXTERNALLY TO THE SKIN DAILY, Disp: 90 patch, Rfl: 0  •  Nitroglycerin 400 MCG/SPRAY aerosol solution, U ONE SPRAY UNDER THE TONGUE AS NEEDED FOR CHEST PAIN Q 5 MINUTES X 3 PRN, Disp: , Rfl: 5  •  PHENobarbital (LUMINAL) 100 MG tablet, TAKE 1 1/2 TABLETS BY MOUTH EVERY DAY, Disp: 135 tablet, Rfl: 0  •  rosuvastatin (CRESTOR) 40 MG tablet, TAKE 1 TABLET BY MOUTH DAILY, Disp: 30 tablet, Rfl: 5  •  doxycycline (VIBRAMYCIN) 100 MG capsule, Take 1 capsule by mouth 2 (Two) Times a Day., Disp: 20 capsule, Rfl: 0        An was seen today for pain and anxiety.    Diagnoses and all orders for this visit:    Chronic coronary artery disease    Chronic systolic congestive heart failure    Essential hypertension    Hypercholesterolemia  -     Comprehensive Metabolic Panel  -     Lipid Panel    Paroxysmal atrial fibrillation    PVD (peripheral vascular disease)    Other emphysema    Cobalamin deficiency  -     Vitamin B12    Diverticulosis of large intestine without hemorrhage    Gastroesophageal reflux disease without esophagitis    Seizure disorder  -     Phenobarbital level    Arthritis    Anxiety    Restless legs syndrome    Acute bronchitis due to other specified organisms  -     doxycycline (VIBRAMYCIN) 100 MG capsule; Take 1 capsule by mouth 2 (Two) Times a Day.

## 2018-04-19 ENCOUNTER — TELEPHONE (OUTPATIENT)
Dept: INTERNAL MEDICINE | Facility: CLINIC | Age: 68
End: 2018-04-19

## 2018-04-19 NOTE — TELEPHONE ENCOUNTER
ERICA BRUCENDA'S STOMACH IS BOTHERING HER VERY BAD SO SHE WANTS YOU TO CALL IN HER NEXIUM EARLY. THEY WONT LET HER HAVE IT AT THE PHARMACY. ALSO SHE WANTS YOU TO CALL HER [phone].

## 2018-05-02 ENCOUNTER — OFFICE VISIT (OUTPATIENT)
Dept: INTERNAL MEDICINE | Facility: CLINIC | Age: 68
End: 2018-05-02

## 2018-05-02 VITALS
BODY MASS INDEX: 20.06 KG/M2 | DIASTOLIC BLOOD PRESSURE: 60 MMHG | SYSTOLIC BLOOD PRESSURE: 130 MMHG | HEIGHT: 57 IN | WEIGHT: 93 LBS | HEART RATE: 72 BPM

## 2018-05-02 DIAGNOSIS — I10 ESSENTIAL HYPERTENSION: ICD-10-CM

## 2018-05-02 DIAGNOSIS — M25.552 LEFT HIP PAIN: ICD-10-CM

## 2018-05-02 DIAGNOSIS — I50.22 CHRONIC SYSTOLIC CONGESTIVE HEART FAILURE (HCC): ICD-10-CM

## 2018-05-02 DIAGNOSIS — I20.9 ANGINA PECTORIS (HCC): Primary | ICD-10-CM

## 2018-05-02 DIAGNOSIS — G89.29 CHRONIC LEFT-SIDED LOW BACK PAIN WITH LEFT-SIDED SCIATICA: ICD-10-CM

## 2018-05-02 DIAGNOSIS — E53.8 COBALAMIN DEFICIENCY: ICD-10-CM

## 2018-05-02 DIAGNOSIS — M19.90 ARTHRITIS: ICD-10-CM

## 2018-05-02 DIAGNOSIS — G40.909 SEIZURE DISORDER (HCC): ICD-10-CM

## 2018-05-02 DIAGNOSIS — M54.42 CHRONIC LEFT-SIDED LOW BACK PAIN WITH LEFT-SIDED SCIATICA: ICD-10-CM

## 2018-05-02 DIAGNOSIS — I65.29 STENOSIS OF CAROTID ARTERY, UNSPECIFIED LATERALITY: ICD-10-CM

## 2018-05-02 DIAGNOSIS — I48.0 PAROXYSMAL ATRIAL FIBRILLATION (HCC): ICD-10-CM

## 2018-05-02 DIAGNOSIS — J43.8 OTHER EMPHYSEMA (HCC): ICD-10-CM

## 2018-05-02 DIAGNOSIS — G25.81 RESTLESS LEGS SYNDROME: ICD-10-CM

## 2018-05-02 DIAGNOSIS — F41.9 ANXIETY: ICD-10-CM

## 2018-05-02 DIAGNOSIS — I25.10 CHRONIC CORONARY ARTERY DISEASE: ICD-10-CM

## 2018-05-02 DIAGNOSIS — K57.30 DIVERTICULOSIS OF LARGE INTESTINE WITHOUT HEMORRHAGE: ICD-10-CM

## 2018-05-02 DIAGNOSIS — E78.00 HYPERCHOLESTEROLEMIA: ICD-10-CM

## 2018-05-02 DIAGNOSIS — I73.9 PVD (PERIPHERAL VASCULAR DISEASE) (HCC): ICD-10-CM

## 2018-05-02 DIAGNOSIS — K21.9 GASTROESOPHAGEAL REFLUX DISEASE WITHOUT ESOPHAGITIS: ICD-10-CM

## 2018-05-02 PROBLEM — L72.0: Status: RESOLVED | Noted: 2017-06-02 | Resolved: 2018-05-02

## 2018-05-02 PROBLEM — J20.8 ACUTE BRONCHITIS DUE TO OTHER SPECIFIED ORGANISMS: Status: RESOLVED | Noted: 2018-01-02 | Resolved: 2018-05-02

## 2018-05-02 PROCEDURE — 99214 OFFICE O/P EST MOD 30 MIN: CPT | Performed by: INTERNAL MEDICINE

## 2018-05-02 RX ORDER — PREDNISONE 10 MG/1
TABLET ORAL SEE ADMIN INSTRUCTIONS
Qty: 1 EACH | Refills: 0 | Status: SHIPPED | OUTPATIENT
Start: 2018-05-02 | End: 2018-07-03

## 2018-05-02 NOTE — PROGRESS NOTES
Patient is a 67 y.o. female who is here for L hip pain.  Chief Complaint   Patient presents with   • Hip Pain         HPI:  Patient c/o 2 week hx of left hip pain.  Worst with ambulation.  Improved with supine position.  No dizziness or lightheadedness.  Occasional CP.  Continues to smoke.  GERD is good.  No recent sz.     History:    Patient Active Problem List   Diagnosis   • Gastroesophageal reflux disease   • Angina pectoris   • Anxiety   • Arthritis   • Stenosis of carotid artery   • Chronic obstructive pulmonary disease   • Congestive heart failure   • Chronic coronary artery disease   • Essential hypertension   • Hypercholesterolemia   • Hypoglycemia   • Restless legs syndrome   • Seizure disorder   • Cobalamin deficiency   • PVD (peripheral vascular disease)   • Vertigo   • Chronic left-sided low back pain with left-sided sciatica   • Left leg weakness   • Diverticulosis of large intestine without hemorrhage   • Paroxysmal atrial fibrillation   • Left hip pain       Past Medical History:   Diagnosis Date   • Aneurysm    • Hyperlipidemia        Past Surgical History:   Procedure Laterality Date   • APPENDECTOMY     • CHOLECYSTECTOMY     • 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   • ANORO ELLIPTA 62.5-25 MCG/INH aerosol powder  INHALE 1 PUFF BY MOUTH EVERY MORNING   • aspirin 81 MG tablet Take 81 mg by mouth Daily.   • benzonatate (TESSALON) 200 MG capsule Take 1 capsule by mouth 3 (Three) Times a Day As Needed for Cough.   • clopidogrel (PLAVIX) 75 MG tablet Take 1 tablet by mouth Daily.   • dicyclomine (BENTYL) 10 MG capsule Take 1 capsule by mouth Every  8 (Eight) Hours As Needed (abdominal pain).   • diltiaZEM CD (CARTIA XT) 240 MG 24 hr capsule Take 1 capsule by mouth Daily.   • doxazosin (CARDURA) 2 MG tablet TAKE 1 TABLET BY MOUTH EVERY NIGHT   • escitalopram (LEXAPRO) 10 MG tablet Take 1 tablet by mouth Every Morning.   • esomeprazole (nexIUM) 40 MG capsule Take 1 capsule by mouth 2 (Two) Times a Day.   • ezetimibe (ZETIA) 10 MG tablet TAKE 1 TABLET BY MOUTH DAILY   • ferrous sulfate 325 (65 FE) MG tablet Take 1 tablet by mouth 2 (Two) Times a Day. Take with meals   • furosemide (LASIX) 40 MG tablet TAKE 1 TABLET BY MOUTH DAILY   • gabapentin (NEURONTIN) 300 MG capsule Take 1 capsule by mouth 3 (Three) Times a Day.   • isosorbide mononitrate (IMDUR) 60 MG 24 hr tablet TAKE 1 TABLET BY MOUTH EVERY DAY   • lidocaine (XYLOCAINE) 5 % ointment Apply  topically Every 2 (Two) Hours As Needed for mild pain (1-3).   • lisinopril (PRINIVIL,ZESTRIL) 40 MG tablet TAKE 1 TABLET BY MOUTH EVERY DAY PLEASE SCHEDULE FOLLOW UP FOR FURTHER REFILLS   • LORazepam (ATIVAN) 1 MG tablet TAKE 1 TABLET BY MOUTH TWICE DAILY. MUST LAST 30 DAYS.   • nitroglycerin (NITRODUR) 0.2 MG/HR patch APPLY 1 PATCH EXTERNALLY TO THE SKIN DAILY   • Nitroglycerin 400 MCG/SPRAY aerosol solution U ONE SPRAY UNDER THE TONGUE AS NEEDED FOR CHEST PAIN Q 5 MINUTES X 3 PRN   • PHENobarbital (LUMINAL) 100 MG tablet TAKE 1 1/2 TABLETS BY MOUTH EVERY DAY   • rosuvastatin (CRESTOR) 40 MG tablet TAKE 1 TABLET BY MOUTH DAILY   • [DISCONTINUED] doxycycline (VIBRAMYCIN) 100 MG capsule Take 1 capsule by mouth 2 (Two) Times a Day.   • [DISCONTINUED] esomeprazole (nexIUM) 40 MG capsule TAKE ONE CAPSULE BY MOUTH TWICE DAILY     No current facility-administered medications on file prior to visit.        Family History   Problem Relation Age of Onset   • Arthritis Mother    • Cancer Mother    • Heart attack Father    • Hypertension Father    • Hyperlipidemia Father    • Stroke Father    • Heart disease Brother      CAD   •  Heart disease Child      CAD   • Heart disease Child      CAD       Social History     Social History   • Marital status:      Spouse name: N/A   • Number of children: N/A   • Years of education: N/A     Occupational History   • Not on file.     Social History Main Topics   • Smoking status: Current Every Day Smoker     Types: Cigarettes, Electronic Cigarette     Last attempt to quit: 8/12/2016   • Smokeless tobacco: Never Used      Comment: Pt was smoking 1 cigarette a day but due to losing her daughter 3 years ago she has started back smoking 1 PPD   • Alcohol use No   • Drug use: No   • Sexual activity: Defer     Other Topics Concern   • Not on file     Social History Narrative   • No narrative on file         ROS:    Review of Systems   Constitutional: Positive for fatigue. Negative for chills, diaphoresis, fever and unexpected weight change.   HENT: Negative for congestion, ear pain, hearing loss, nosebleeds, postnasal drip, sinus pressure and sore throat.    Eyes: Negative for pain, discharge and itching.   Respiratory: Positive for shortness of breath. Negative for cough, chest tightness and wheezing.    Cardiovascular: Positive for palpitations. Negative for chest pain and leg swelling.   Gastrointestinal: Negative for abdominal distention, abdominal pain, blood in stool, constipation, diarrhea, nausea and vomiting.        Refusing colonoscopy   Endocrine: Positive for cold intolerance. Negative for polydipsia and polyuria.   Genitourinary: Negative for difficulty urinating, dysuria, frequency and hematuria.   Musculoskeletal: Positive for arthralgias, back pain and gait problem. Negative for joint swelling and myalgias.   Skin: Negative for rash and wound.   Neurological: Positive for tremors and weakness. Negative for dizziness, seizures, syncope and headaches.   Psychiatric/Behavioral: Positive for dysphoric mood and sleep disturbance. The patient is nervous/anxious.        /60   Pulse 72    "Ht 144.8 cm (57.01\")   Wt 42.2 kg (93 lb)   BMI 20.12 kg/m²     Physical Exam:    Physical Exam   Constitutional: She is oriented to person, place, and time. She appears well-developed and well-nourished.   HENT:   Head: Normocephalic and atraumatic.   Right Ear: External ear normal.   Left Ear: External ear normal.   Mouth/Throat: Oropharynx is clear and moist.   Eyes: Conjunctivae and EOM are normal.   Neck: Normal range of motion. Neck supple.   Cardiovascular: Normal rate and regular rhythm.    Murmur (1/6) heard.  Pulmonary/Chest: Effort normal.   Diminished  Expiratory rhonchi   Abdominal: Soft. Bowel sounds are normal. A hernia ( incisional) is present.   Healed surgical scars   Musculoskeletal: She exhibits tenderness (left hip) and deformity (mild kyphosis).   Lymphadenopathy:     She has no cervical adenopathy.   Neurological: She is alert and oriented to person, place, and time.   Skin: Skin is warm and dry.   Psychiatric: Her behavior is normal. Thought content normal.   Flat affect       Procedure:      Discussion/Summary:    Htn-advised low NA  Copd-advised tob cessation  A/D-cont current tx  Pvd/cad-counseled on tob cessation, cont RF mod  GERD-cont ppi  B12 def-cont replacement, recheck noted  Hyperlipidemia-labs noted  Hyponatremia-recheck noted  sz-cont phenobarb  Left hip bursitis-rx prednisone      Reviewed prior records, extensively counseled on tob cessation  Advised need for colonoscopy sec to FE deficiency  Labs noted and dw patient     Current Outpatient Prescriptions:   •  albuterol (VENTOLIN HFA) 108 (90 BASE) MCG/ACT inhaler, Inhale 2 puffs Every 4 (Four) Hours As Needed for wheezing or shortness of air., Disp: 18 g, Rfl: 0  •  ANORO ELLIPTA 62.5-25 MCG/INH aerosol powder , INHALE 1 PUFF BY MOUTH EVERY MORNING, Disp: 1 each, Rfl: 5  •  aspirin 81 MG tablet, Take 81 mg by mouth Daily., Disp: , Rfl:   •  benzonatate (TESSALON) 200 MG capsule, Take 1 capsule by mouth 3 (Three) Times a Day " As Needed for Cough., Disp: 30 capsule, Rfl: 0  •  clopidogrel (PLAVIX) 75 MG tablet, Take 1 tablet by mouth Daily., Disp: 30 tablet, Rfl: 5  •  dicyclomine (BENTYL) 10 MG capsule, Take 1 capsule by mouth Every 8 (Eight) Hours As Needed (abdominal pain)., Disp: 15 capsule, Rfl: 0  •  diltiaZEM CD (CARTIA XT) 240 MG 24 hr capsule, Take 1 capsule by mouth Daily., Disp: 90 capsule, Rfl: 0  •  doxazosin (CARDURA) 2 MG tablet, TAKE 1 TABLET BY MOUTH EVERY NIGHT, Disp: 90 tablet, Rfl: 0  •  escitalopram (LEXAPRO) 10 MG tablet, Take 1 tablet by mouth Every Morning., Disp: 90 tablet, Rfl: 3  •  esomeprazole (nexIUM) 40 MG capsule, Take 1 capsule by mouth 2 (Two) Times a Day., Disp: 180 capsule, Rfl: 1  •  ezetimibe (ZETIA) 10 MG tablet, TAKE 1 TABLET BY MOUTH DAILY, Disp: 90 tablet, Rfl: 0  •  ferrous sulfate 325 (65 FE) MG tablet, Take 1 tablet by mouth 2 (Two) Times a Day. Take with meals, Disp: 60 tablet, Rfl: 5  •  furosemide (LASIX) 40 MG tablet, TAKE 1 TABLET BY MOUTH DAILY, Disp: 90 tablet, Rfl: 0  •  gabapentin (NEURONTIN) 300 MG capsule, Take 1 capsule by mouth 3 (Three) Times a Day., Disp: 270 capsule, Rfl: 1  •  isosorbide mononitrate (IMDUR) 60 MG 24 hr tablet, TAKE 1 TABLET BY MOUTH EVERY DAY, Disp: 90 tablet, Rfl: 0  •  lidocaine (XYLOCAINE) 5 % ointment, Apply  topically Every 2 (Two) Hours As Needed for mild pain (1-3)., Disp: 30 g, Rfl: 0  •  lisinopril (PRINIVIL,ZESTRIL) 40 MG tablet, TAKE 1 TABLET BY MOUTH EVERY DAY PLEASE SCHEDULE FOLLOW UP FOR FURTHER REFILLS, Disp: 30 tablet, Rfl: 5  •  LORazepam (ATIVAN) 1 MG tablet, TAKE 1 TABLET BY MOUTH TWICE DAILY. MUST LAST 30 DAYS., Disp: 60 tablet, Rfl: 1  •  nitroglycerin (NITRODUR) 0.2 MG/HR patch, APPLY 1 PATCH EXTERNALLY TO THE SKIN DAILY, Disp: 90 patch, Rfl: 0  •  Nitroglycerin 400 MCG/SPRAY aerosol solution, U ONE SPRAY UNDER THE TONGUE AS NEEDED FOR CHEST PAIN Q 5 MINUTES X 3 PRN, Disp: , Rfl: 5  •  PHENobarbital (LUMINAL) 100 MG tablet, TAKE 1 1/2  TABLETS BY MOUTH EVERY DAY, Disp: 135 tablet, Rfl: 0  •  rosuvastatin (CRESTOR) 40 MG tablet, TAKE 1 TABLET BY MOUTH DAILY, Disp: 30 tablet, Rfl: 5  •  PredniSONE (DELTASONE) 10 MG (48) tablet pack, Take  by mouth See Admin Instructions., Disp: 1 each, Rfl: 0        An was seen today for hip pain.    Diagnoses and all orders for this visit:    Angina pectoris    Chronic coronary artery disease    Chronic systolic congestive heart failure    Essential hypertension    Hypercholesterolemia    Paroxysmal atrial fibrillation    PVD (peripheral vascular disease)    Stenosis of carotid artery, unspecified laterality    Other emphysema    Cobalamin deficiency    Diverticulosis of large intestine without hemorrhage    Gastroesophageal reflux disease without esophagitis    Chronic left-sided low back pain with left-sided sciatica    Seizure disorder    Arthritis    Anxiety    Restless legs syndrome    Left hip pain  -     PredniSONE (DELTASONE) 10 MG (48) tablet pack; Take  by mouth See Admin Instructions.

## 2018-05-11 DIAGNOSIS — F41.9 ANXIETY: ICD-10-CM

## 2018-05-11 RX ORDER — LORAZEPAM 1 MG/1
TABLET ORAL
Qty: 60 TABLET | Refills: 1 | OUTPATIENT
Start: 2018-05-11 | End: 2018-07-11 | Stop reason: SDUPTHER

## 2018-05-15 DIAGNOSIS — G40.909 SEIZURE DISORDER (HCC): ICD-10-CM

## 2018-05-15 RX ORDER — PHENOBARBITAL 100 MG/1
TABLET ORAL
Qty: 135 TABLET | Refills: 1 | Status: SHIPPED | OUTPATIENT
Start: 2018-05-15 | End: 2020-01-01

## 2018-06-03 DIAGNOSIS — I10 ESSENTIAL HYPERTENSION: ICD-10-CM

## 2018-06-03 DIAGNOSIS — E78.00 HYPERCHOLESTEROLEMIA: ICD-10-CM

## 2018-06-03 DIAGNOSIS — I50.22 CHRONIC SYSTOLIC CONGESTIVE HEART FAILURE (HCC): ICD-10-CM

## 2018-06-04 RX ORDER — ISOSORBIDE MONONITRATE 60 MG/1
TABLET, EXTENDED RELEASE ORAL
Qty: 90 TABLET | Refills: 0 | Status: SHIPPED | OUTPATIENT
Start: 2018-06-04 | End: 2018-09-11 | Stop reason: SDUPTHER

## 2018-06-04 RX ORDER — FUROSEMIDE 40 MG/1
TABLET ORAL
Qty: 90 TABLET | Refills: 0 | Status: SHIPPED | OUTPATIENT
Start: 2018-06-04 | End: 2018-09-11 | Stop reason: SDUPTHER

## 2018-06-04 RX ORDER — EZETIMIBE 10 MG/1
TABLET ORAL
Qty: 90 TABLET | Refills: 0 | Status: SHIPPED | OUTPATIENT
Start: 2018-06-04 | End: 2018-09-11 | Stop reason: SDUPTHER

## 2018-06-04 RX ORDER — CLOPIDOGREL BISULFATE 75 MG/1
TABLET ORAL
Qty: 90 TABLET | Refills: 0 | Status: SHIPPED | OUTPATIENT
Start: 2018-06-04 | End: 2018-07-03

## 2018-06-04 RX ORDER — NITROGLYCERIN 40 MG/1
PATCH TRANSDERMAL
Qty: 90 PATCH | Refills: 0 | Status: SHIPPED | OUTPATIENT
Start: 2018-06-04 | End: 2018-08-30 | Stop reason: SDUPTHER

## 2018-06-04 RX ORDER — DOXAZOSIN 2 MG/1
TABLET ORAL
Qty: 90 TABLET | Refills: 0 | Status: SHIPPED | OUTPATIENT
Start: 2018-06-04 | End: 2018-09-11 | Stop reason: SDUPTHER

## 2018-06-13 RX ORDER — DILTIAZEM HYDROCHLORIDE 240 MG/1
240 CAPSULE, EXTENDED RELEASE ORAL DAILY
Qty: 90 CAPSULE | Refills: 0 | Status: SHIPPED | OUTPATIENT
Start: 2018-06-13 | End: 2018-09-11 | Stop reason: SDUPTHER

## 2018-07-03 ENCOUNTER — OFFICE VISIT (OUTPATIENT)
Dept: INTERNAL MEDICINE | Facility: CLINIC | Age: 68
End: 2018-07-03

## 2018-07-03 ENCOUNTER — RESULTS ENCOUNTER (OUTPATIENT)
Dept: INTERNAL MEDICINE | Facility: CLINIC | Age: 68
End: 2018-07-03

## 2018-07-03 VITALS
BODY MASS INDEX: 20.28 KG/M2 | HEIGHT: 57 IN | DIASTOLIC BLOOD PRESSURE: 70 MMHG | WEIGHT: 94 LBS | HEART RATE: 68 BPM | SYSTOLIC BLOOD PRESSURE: 120 MMHG

## 2018-07-03 DIAGNOSIS — G40.909 SEIZURE DISORDER (HCC): ICD-10-CM

## 2018-07-03 DIAGNOSIS — I25.10 CHRONIC CORONARY ARTERY DISEASE: Primary | ICD-10-CM

## 2018-07-03 DIAGNOSIS — G25.81 RESTLESS LEGS SYNDROME: ICD-10-CM

## 2018-07-03 DIAGNOSIS — Z12.11 SCREEN FOR COLON CANCER: ICD-10-CM

## 2018-07-03 DIAGNOSIS — E78.00 HYPERCHOLESTEROLEMIA: ICD-10-CM

## 2018-07-03 DIAGNOSIS — I50.22 CHRONIC SYSTOLIC CONGESTIVE HEART FAILURE (HCC): ICD-10-CM

## 2018-07-03 DIAGNOSIS — I10 ESSENTIAL HYPERTENSION: ICD-10-CM

## 2018-07-03 DIAGNOSIS — M25.552 LEFT HIP PAIN: ICD-10-CM

## 2018-07-03 DIAGNOSIS — F41.9 ANXIETY: ICD-10-CM

## 2018-07-03 DIAGNOSIS — E53.8 COBALAMIN DEFICIENCY: ICD-10-CM

## 2018-07-03 DIAGNOSIS — I73.9 PVD (PERIPHERAL VASCULAR DISEASE) (HCC): ICD-10-CM

## 2018-07-03 DIAGNOSIS — J43.8 OTHER EMPHYSEMA (HCC): ICD-10-CM

## 2018-07-03 DIAGNOSIS — F32.89 OTHER DEPRESSION: ICD-10-CM

## 2018-07-03 DIAGNOSIS — K21.9 GASTROESOPHAGEAL REFLUX DISEASE WITHOUT ESOPHAGITIS: ICD-10-CM

## 2018-07-03 DIAGNOSIS — K57.30 DIVERTICULOSIS OF LARGE INTESTINE WITHOUT HEMORRHAGE: ICD-10-CM

## 2018-07-03 PROBLEM — F32.A DEPRESSION: Status: ACTIVE | Noted: 2018-07-03

## 2018-07-03 PROCEDURE — 99214 OFFICE O/P EST MOD 30 MIN: CPT | Performed by: INTERNAL MEDICINE

## 2018-07-03 NOTE — PROGRESS NOTES
"Patient is a 68 y.o. female who is here for a follow up of chronic conditions.  Chief Complaint   Patient presents with   • Hypertension   • Hyperlipidemia         HPI:    Here for f/u.  Patient reports \"her intestines are killing her\".  Breathing is ok.  Refusing colonoscopy.  No seizures.  Feels depressed.  A lot of anxiety also.  Her memory is not the best.   History:    Patient Active Problem List   Diagnosis   • Gastroesophageal reflux disease   • Angina pectoris (CMS/HCC)   • Anxiety   • Arthritis   • Stenosis of carotid artery   • Chronic obstructive pulmonary disease (CMS/HCC)   • Congestive heart failure (CMS/HCC)   • Chronic coronary artery disease   • Essential hypertension   • Hypercholesterolemia   • Hypoglycemia   • Restless legs syndrome   • Seizure disorder (CMS/HCC)   • Cobalamin deficiency   • PVD (peripheral vascular disease) (CMS/HCC)   • Vertigo   • Chronic left-sided low back pain with left-sided sciatica   • Left leg weakness   • Diverticulosis of large intestine without hemorrhage   • Paroxysmal atrial fibrillation (CMS/HCC)   • Left hip pain   • Depression       Past Medical History:   Diagnosis Date   • Aneurysm (CMS/HCC)    • Hyperlipidemia        Past Surgical History:   Procedure Laterality Date   • APPENDECTOMY     • CHOLECYSTECTOMY     • 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   • ANORO ELLIPTA 62.5-25 MCG/INH aerosol powder  INHALE 1 PUFF BY MOUTH EVERY MORNING   • aspirin 81 MG tablet Take 81 mg by mouth Daily.   • benzonatate (TESSALON) 200 MG capsule Take 1 capsule by mouth 3 (Three) Times a Day As Needed for Cough.   • CARTIA "  MG 24 hr capsule TAKE 1 CAPSULE BY MOUTH DAILY   • clopidogrel (PLAVIX) 75 MG tablet Take 1 tablet by mouth Daily.   • dicyclomine (BENTYL) 10 MG capsule Take 1 capsule by mouth Every 8 (Eight) Hours As Needed (abdominal pain).   • doxazosin (CARDURA) 2 MG tablet TAKE 1 TABLET BY MOUTH EVERY NIGHT   • esomeprazole (nexIUM) 40 MG capsule Take 1 capsule by mouth 2 (Two) Times a Day.   • ezetimibe (ZETIA) 10 MG tablet TAKE 1 TABLET BY MOUTH DAILY   • ferrous sulfate 325 (65 FE) MG tablet Take 1 tablet by mouth 2 (Two) Times a Day. Take with meals   • furosemide (LASIX) 40 MG tablet TAKE 1 TABLET BY MOUTH DAILY   • gabapentin (NEURONTIN) 300 MG capsule Take 1 capsule by mouth 3 (Three) Times a Day.   • isosorbide mononitrate (IMDUR) 60 MG 24 hr tablet TAKE 1 TABLET BY MOUTH EVERY DAY   • lidocaine (XYLOCAINE) 5 % ointment Apply  topically Every 2 (Two) Hours As Needed for mild pain (1-3).   • lisinopril (PRINIVIL,ZESTRIL) 40 MG tablet TAKE 1 TABLET BY MOUTH EVERY DAY PLEASE SCHEDULE FOLLOW UP FOR FURTHER REFILLS   • LORazepam (ATIVAN) 1 MG tablet TAKE 1 TABLET BY MOUTH TWICE DAILY(MUST LAST 30 DAYS)   • nitroglycerin (NITRODUR) 0.2 MG/HR patch APPLY 1 PATCH EXTERNALLY TO THE SKIN DAILY   • Nitroglycerin 400 MCG/SPRAY aerosol solution U ONE SPRAY UNDER THE TONGUE AS NEEDED FOR CHEST PAIN Q 5 MINUTES X 3 PRN   • PHENobarbital (LUMINAL) 100 MG tablet TAKE 1 1/2 TABLETS BY MOUTH EVERY DAY   • rosuvastatin (CRESTOR) 40 MG tablet TAKE 1 TABLET BY MOUTH DAILY   • [DISCONTINUED] escitalopram (LEXAPRO) 10 MG tablet Take 1 tablet by mouth Every Morning.   • [DISCONTINUED] clopidogrel (PLAVIX) 75 MG tablet TAKE 1 TABLET BY MOUTH EVERY DAY   • [DISCONTINUED] PredniSONE (DELTASONE) 10 MG (48) tablet pack Take  by mouth See Admin Instructions.     No current facility-administered medications on file prior to visit.        Family History   Problem Relation Age of Onset   • Arthritis Mother    • Cancer Mother    • Heart attack  Father    • Hypertension Father    • Hyperlipidemia Father    • Stroke Father    • Heart disease Brother         CAD   • Heart disease Child         CAD   • Heart disease Child         CAD       Social History     Social History   • Marital status:      Spouse name: N/A   • Number of children: N/A   • Years of education: N/A     Occupational History   • Not on file.     Social History Main Topics   • Smoking status: Current Every Day Smoker     Types: Cigarettes, Electronic Cigarette     Last attempt to quit: 8/12/2016   • Smokeless tobacco: Never Used      Comment: Pt was smoking 1 cigarette a day but due to losing her daughter 3 years ago she has started back smoking 1 PPD   • Alcohol use No   • Drug use: No   • Sexual activity: Defer     Other Topics Concern   • Not on file     Social History Narrative   • No narrative on file         Review of Systems   Constitutional: Positive for fatigue. Negative for chills, diaphoresis, fever and unexpected weight change.   HENT: Negative for congestion, ear pain, hearing loss, nosebleeds, postnasal drip, sinus pressure and sore throat.    Eyes: Negative for pain, discharge and itching.   Respiratory: Positive for shortness of breath. Negative for cough, chest tightness and wheezing.    Cardiovascular: Positive for palpitations. Negative for chest pain and leg swelling.   Gastrointestinal: Negative for abdominal distention, abdominal pain, blood in stool, constipation, diarrhea, nausea and vomiting.        Refusing colonoscopy   Endocrine: Positive for cold intolerance. Negative for polydipsia and polyuria.   Genitourinary: Negative for difficulty urinating, dysuria, frequency and hematuria.   Musculoskeletal: Positive for arthralgias, back pain and gait problem. Negative for joint swelling and myalgias.   Skin: Negative for rash and wound.   Neurological: Positive for tremors and weakness. Negative for dizziness, seizures, syncope and headaches.  "  Psychiatric/Behavioral: Positive for dysphoric mood and sleep disturbance. The patient is nervous/anxious.        /70   Pulse 68   Ht 144.8 cm (57.01\")   Wt 42.6 kg (94 lb)   BMI 20.34 kg/m²       Physical Exam   Constitutional: She is oriented to person, place, and time. She appears well-developed and well-nourished.   HENT:   Head: Normocephalic and atraumatic.   Right Ear: External ear normal.   Left Ear: External ear normal.   Mouth/Throat: Oropharynx is clear and moist.   Eyes: Conjunctivae and EOM are normal.   Neck: Normal range of motion. Neck supple.   Cardiovascular: Normal rate and regular rhythm.    Murmur (1/6) heard.  Pulmonary/Chest: Effort normal.   Diminished  Expiratory rhonchi   Abdominal: Soft. Bowel sounds are normal. A hernia ( incisional) is present.   Healed surgical scars   Musculoskeletal: She exhibits tenderness (left hip) and deformity (mild kyphosis).   Lymphadenopathy:     She has no cervical adenopathy.   Neurological: She is alert and oriented to person, place, and time.   Skin: Skin is warm and dry.   Psychiatric: Her behavior is normal. Thought content normal.   Flat affect       Procedure:      Discussion/Summary:    Htn-advised low NA  Copd-advised tob cessation  A/D-cont current tx  Pvd/cad-counseled on tob cessation, cont RF mod  GERD-cont ppi  B12 def-cont replacement, recheck noted  Hyperlipidemia-labs noted  Hyponatremia-recheck noted  sz-cont phenobarb  Depression-change to trintellix      Reviewed prior records, extensively counseled on tob cessation  Advised need for colonoscopy sec to FE deficiency, agrees to cologuard  Labs noted and dw patient     Current Outpatient Prescriptions:   •  albuterol (VENTOLIN HFA) 108 (90 BASE) MCG/ACT inhaler, Inhale 2 puffs Every 4 (Four) Hours As Needed for wheezing or shortness of air., Disp: 18 g, Rfl: 0  •  ANORO ELLIPTA 62.5-25 MCG/INH aerosol powder , INHALE 1 PUFF BY MOUTH EVERY MORNING, Disp: 1 each, Rfl: 5  •  aspirin " 81 MG tablet, Take 81 mg by mouth Daily., Disp: , Rfl:   •  benzonatate (TESSALON) 200 MG capsule, Take 1 capsule by mouth 3 (Three) Times a Day As Needed for Cough., Disp: 30 capsule, Rfl: 0  •  CARTIA  MG 24 hr capsule, TAKE 1 CAPSULE BY MOUTH DAILY, Disp: 90 capsule, Rfl: 0  •  clopidogrel (PLAVIX) 75 MG tablet, Take 1 tablet by mouth Daily., Disp: 30 tablet, Rfl: 5  •  dicyclomine (BENTYL) 10 MG capsule, Take 1 capsule by mouth Every 8 (Eight) Hours As Needed (abdominal pain)., Disp: 15 capsule, Rfl: 0  •  doxazosin (CARDURA) 2 MG tablet, TAKE 1 TABLET BY MOUTH EVERY NIGHT, Disp: 90 tablet, Rfl: 0  •  esomeprazole (nexIUM) 40 MG capsule, Take 1 capsule by mouth 2 (Two) Times a Day., Disp: 180 capsule, Rfl: 1  •  ezetimibe (ZETIA) 10 MG tablet, TAKE 1 TABLET BY MOUTH DAILY, Disp: 90 tablet, Rfl: 0  •  ferrous sulfate 325 (65 FE) MG tablet, Take 1 tablet by mouth 2 (Two) Times a Day. Take with meals, Disp: 60 tablet, Rfl: 5  •  furosemide (LASIX) 40 MG tablet, TAKE 1 TABLET BY MOUTH DAILY, Disp: 90 tablet, Rfl: 0  •  gabapentin (NEURONTIN) 300 MG capsule, Take 1 capsule by mouth 3 (Three) Times a Day., Disp: 270 capsule, Rfl: 1  •  isosorbide mononitrate (IMDUR) 60 MG 24 hr tablet, TAKE 1 TABLET BY MOUTH EVERY DAY, Disp: 90 tablet, Rfl: 0  •  lidocaine (XYLOCAINE) 5 % ointment, Apply  topically Every 2 (Two) Hours As Needed for mild pain (1-3)., Disp: 30 g, Rfl: 0  •  lisinopril (PRINIVIL,ZESTRIL) 40 MG tablet, TAKE 1 TABLET BY MOUTH EVERY DAY PLEASE SCHEDULE FOLLOW UP FOR FURTHER REFILLS, Disp: 30 tablet, Rfl: 5  •  LORazepam (ATIVAN) 1 MG tablet, TAKE 1 TABLET BY MOUTH TWICE DAILY(MUST LAST 30 DAYS), Disp: 60 tablet, Rfl: 1  •  nitroglycerin (NITRODUR) 0.2 MG/HR patch, APPLY 1 PATCH EXTERNALLY TO THE SKIN DAILY, Disp: 90 patch, Rfl: 0  •  Nitroglycerin 400 MCG/SPRAY aerosol solution, U ONE SPRAY UNDER THE TONGUE AS NEEDED FOR CHEST PAIN Q 5 MINUTES X 3 PRN, Disp: , Rfl: 5  •  PHENobarbital (LUMINAL) 100 MG  tablet, TAKE 1 1/2 TABLETS BY MOUTH EVERY DAY, Disp: 135 tablet, Rfl: 1  •  rosuvastatin (CRESTOR) 40 MG tablet, TAKE 1 TABLET BY MOUTH DAILY, Disp: 30 tablet, Rfl: 5  •  Vortioxetine HBr (TRINTELLIX) 5 MG tablet, Take 5 mg by mouth Daily With Breakfast., Disp: 30 tablet, Rfl: 5        An was seen today for hypertension and hyperlipidemia.    Diagnoses and all orders for this visit:    Chronic coronary artery disease    Chronic systolic congestive heart failure (CMS/HCC)    Essential hypertension    Hypercholesterolemia    PVD (peripheral vascular disease) (CMS/HCC)    Other emphysema (CMS/HCC)    Cobalamin deficiency    Diverticulosis of large intestine without hemorrhage    Gastroesophageal reflux disease without esophagitis    Left hip pain    Seizure disorder (CMS/HCC)    Anxiety    Restless legs syndrome    Other depression    Screen for colon cancer  -     Cologuard - Stool, Per Rectum; Future    Other orders  -     Vortioxetine HBr (TRINTELLIX) 5 MG tablet; Take 5 mg by mouth Daily With Breakfast.

## 2018-07-11 DIAGNOSIS — F41.9 ANXIETY: ICD-10-CM

## 2018-07-11 RX ORDER — LORAZEPAM 1 MG/1
1 TABLET ORAL 2 TIMES DAILY
Qty: 60 TABLET | Refills: 1 | Status: SHIPPED | OUTPATIENT
Start: 2018-07-11 | End: 2018-07-13 | Stop reason: SDUPTHER

## 2018-07-13 DIAGNOSIS — F41.9 ANXIETY: ICD-10-CM

## 2018-07-16 RX ORDER — LORAZEPAM 1 MG/1
TABLET ORAL
Qty: 60 TABLET | Refills: 2 | Status: SHIPPED | OUTPATIENT
Start: 2018-07-16 | End: 2021-01-01 | Stop reason: DRUGHIGH

## 2018-07-19 DIAGNOSIS — K21.9 GASTROESOPHAGEAL REFLUX DISEASE WITHOUT ESOPHAGITIS: ICD-10-CM

## 2018-07-19 RX ORDER — ESOMEPRAZOLE MAGNESIUM 40 MG/1
CAPSULE, DELAYED RELEASE ORAL
Qty: 180 CAPSULE | Refills: 0 | Status: SHIPPED | OUTPATIENT
Start: 2018-07-19 | End: 2018-11-06 | Stop reason: SDUPTHER

## 2018-08-30 ENCOUNTER — TELEPHONE (OUTPATIENT)
Dept: CARDIOLOGY | Facility: CLINIC | Age: 68
End: 2018-08-30

## 2018-08-30 RX ORDER — NITROGLYCERIN 40 MG/1
1 PATCH TRANSDERMAL DAILY
Qty: 90 PATCH | Refills: 0 | Status: SHIPPED | OUTPATIENT
Start: 2018-08-30 | End: 2018-10-29 | Stop reason: SDUPTHER

## 2018-09-11 DIAGNOSIS — E78.00 HYPERCHOLESTEROLEMIA: ICD-10-CM

## 2018-09-11 DIAGNOSIS — I50.22 CHRONIC SYSTOLIC CONGESTIVE HEART FAILURE (HCC): ICD-10-CM

## 2018-09-11 DIAGNOSIS — I10 ESSENTIAL HYPERTENSION: ICD-10-CM

## 2018-09-11 RX ORDER — LISINOPRIL 40 MG/1
TABLET ORAL
Qty: 30 TABLET | Refills: 3 | Status: SHIPPED | OUTPATIENT
Start: 2018-09-11 | End: 2018-12-29 | Stop reason: SDUPTHER

## 2018-09-11 RX ORDER — NITROGLYCERIN 40 MG/1
PATCH TRANSDERMAL
Qty: 90 PATCH | Refills: 0 | OUTPATIENT
Start: 2018-09-11

## 2018-09-11 RX ORDER — DILTIAZEM HYDROCHLORIDE 240 MG/1
240 CAPSULE, EXTENDED RELEASE ORAL DAILY
Qty: 90 CAPSULE | Refills: 0 | Status: SHIPPED | OUTPATIENT
Start: 2018-09-11 | End: 2018-11-26 | Stop reason: SDUPTHER

## 2018-09-11 RX ORDER — CLOPIDOGREL BISULFATE 75 MG/1
TABLET ORAL
Qty: 90 TABLET | Refills: 0 | Status: ON HOLD | OUTPATIENT
Start: 2018-09-11 | End: 2020-08-30 | Stop reason: SDUPTHER

## 2018-09-11 RX ORDER — LISINOPRIL 40 MG/1
TABLET ORAL
Qty: 30 TABLET | Refills: 0 | Status: SHIPPED | OUTPATIENT
Start: 2018-09-11 | End: 2018-09-11 | Stop reason: SDUPTHER

## 2018-09-11 RX ORDER — EZETIMIBE 10 MG/1
TABLET ORAL
Qty: 90 TABLET | Refills: 0 | Status: SHIPPED | OUTPATIENT
Start: 2018-09-11 | End: 2021-01-01

## 2018-09-11 RX ORDER — ISOSORBIDE MONONITRATE 60 MG/1
TABLET, EXTENDED RELEASE ORAL
Qty: 90 TABLET | Refills: 0 | Status: SHIPPED | OUTPATIENT
Start: 2018-09-11 | End: 2020-08-31 | Stop reason: HOSPADM

## 2018-09-11 RX ORDER — ROSUVASTATIN CALCIUM 40 MG/1
TABLET, COATED ORAL
Qty: 30 TABLET | Refills: 0 | Status: SHIPPED | OUTPATIENT
Start: 2018-09-11 | End: 2018-09-11 | Stop reason: SDUPTHER

## 2018-09-11 RX ORDER — FUROSEMIDE 40 MG/1
TABLET ORAL
Qty: 90 TABLET | Refills: 0 | Status: SHIPPED | OUTPATIENT
Start: 2018-09-11 | End: 2018-12-23 | Stop reason: SDUPTHER

## 2018-09-11 RX ORDER — ROSUVASTATIN CALCIUM 40 MG/1
TABLET, COATED ORAL
Qty: 30 TABLET | Refills: 3 | Status: SHIPPED | OUTPATIENT
Start: 2018-09-11 | End: 2018-12-29 | Stop reason: SDUPTHER

## 2018-09-11 RX ORDER — DOXAZOSIN 2 MG/1
TABLET ORAL
Qty: 90 TABLET | Refills: 0 | Status: SHIPPED | OUTPATIENT
Start: 2018-09-11 | End: 2018-12-23 | Stop reason: SDUPTHER

## 2018-10-29 RX ORDER — NITROGLYCERIN 40 MG/1
1 PATCH TRANSDERMAL DAILY
Qty: 90 PATCH | Refills: 0 | Status: SHIPPED | OUTPATIENT
Start: 2018-10-29 | End: 2019-04-04 | Stop reason: SDUPTHER

## 2018-11-06 DIAGNOSIS — K21.9 GASTROESOPHAGEAL REFLUX DISEASE WITHOUT ESOPHAGITIS: ICD-10-CM

## 2018-11-06 RX ORDER — ESOMEPRAZOLE MAGNESIUM 40 MG/1
CAPSULE, DELAYED RELEASE ORAL
Qty: 180 CAPSULE | Refills: 0 | Status: ON HOLD | OUTPATIENT
Start: 2018-11-06 | End: 2020-08-30 | Stop reason: SDUPTHER

## 2018-11-09 ENCOUNTER — TELEPHONE (OUTPATIENT)
Dept: INTERNAL MEDICINE | Facility: CLINIC | Age: 68
End: 2018-11-09

## 2018-11-09 NOTE — TELEPHONE ENCOUNTER
Pt wants you to call her back and it is about her meds and she wants an apt ASAP. Please call her back at [phone]

## 2018-11-09 NOTE — TELEPHONE ENCOUNTER
Tried calling pt to tell her that we can not fill medicines bc she has be seeing [facility] and they had been filling

## 2018-11-26 RX ORDER — DILTIAZEM HYDROCHLORIDE 240 MG/1
240 CAPSULE, EXTENDED RELEASE ORAL DAILY
Qty: 90 CAPSULE | Refills: 0 | Status: SHIPPED | OUTPATIENT
Start: 2018-11-26 | End: 2019-02-27 | Stop reason: SDUPTHER

## 2018-12-23 DIAGNOSIS — I10 ESSENTIAL HYPERTENSION: ICD-10-CM

## 2018-12-23 DIAGNOSIS — I50.22 CHRONIC SYSTOLIC CONGESTIVE HEART FAILURE (HCC): ICD-10-CM

## 2018-12-24 RX ORDER — FUROSEMIDE 40 MG/1
TABLET ORAL
Qty: 90 TABLET | Refills: 0 | Status: SHIPPED | OUTPATIENT
Start: 2018-12-24 | End: 2019-03-23 | Stop reason: SDUPTHER

## 2018-12-24 RX ORDER — DOXAZOSIN 2 MG/1
TABLET ORAL
Qty: 90 TABLET | Refills: 0 | Status: SHIPPED | OUTPATIENT
Start: 2018-12-24 | End: 2021-01-01

## 2018-12-31 RX ORDER — ROSUVASTATIN CALCIUM 40 MG/1
TABLET, COATED ORAL
Qty: 30 TABLET | Refills: 0 | Status: SHIPPED | OUTPATIENT
Start: 2018-12-31 | End: 2021-01-01 | Stop reason: SDUPTHER

## 2018-12-31 RX ORDER — LISINOPRIL 40 MG/1
TABLET ORAL
Qty: 30 TABLET | Refills: 0 | Status: ON HOLD | OUTPATIENT
Start: 2018-12-31 | End: 2020-08-31 | Stop reason: SDUPTHER

## 2019-01-23 DIAGNOSIS — F41.9 ANXIETY: ICD-10-CM

## 2019-01-23 RX ORDER — LORAZEPAM 1 MG/1
TABLET ORAL
Qty: 60 TABLET | Refills: 0 | OUTPATIENT
Start: 2019-01-23

## 2019-02-27 RX ORDER — DILTIAZEM HYDROCHLORIDE 240 MG/1
240 CAPSULE, EXTENDED RELEASE ORAL DAILY
Qty: 90 CAPSULE | Refills: 0 | Status: SHIPPED | OUTPATIENT
Start: 2019-02-27 | End: 2021-01-01

## 2019-02-28 DIAGNOSIS — K21.9 GASTROESOPHAGEAL REFLUX DISEASE WITHOUT ESOPHAGITIS: ICD-10-CM

## 2019-02-28 RX ORDER — ESOMEPRAZOLE MAGNESIUM 40 MG/1
CAPSULE, DELAYED RELEASE ORAL
Qty: 180 CAPSULE | Refills: 0 | OUTPATIENT
Start: 2019-02-28

## 2019-03-23 DIAGNOSIS — I50.22 CHRONIC SYSTOLIC CONGESTIVE HEART FAILURE (HCC): ICD-10-CM

## 2019-03-25 RX ORDER — FUROSEMIDE 40 MG/1
TABLET ORAL
Qty: 90 TABLET | Refills: 0 | Status: ON HOLD | OUTPATIENT
Start: 2019-03-25 | End: 2020-08-31 | Stop reason: SDUPTHER

## 2019-04-04 RX ORDER — ROSUVASTATIN CALCIUM 40 MG/1
TABLET, COATED ORAL
Qty: 30 TABLET | Refills: 0 | OUTPATIENT
Start: 2019-04-04

## 2019-04-05 RX ORDER — NITROGLYCERIN 40 MG/1
PATCH TRANSDERMAL
Qty: 30 PATCH | Refills: 6 | Status: SHIPPED | OUTPATIENT
Start: 2019-04-05 | End: 2020-05-20

## 2019-06-03 RX ORDER — DILTIAZEM HYDROCHLORIDE 240 MG/1
240 CAPSULE, EXTENDED RELEASE ORAL DAILY
Qty: 90 CAPSULE | Refills: 0 | OUTPATIENT
Start: 2019-06-03

## 2019-08-07 NOTE — TELEPHONE ENCOUNTER
ROGELIO FROM [employer] CALLED TO SEE IF YOU'VE RECEIVED A FAX ABOUT A BACK BRACE FOR THE PATIENT? PLEASE ROGELIO A CALL   Left arm;

## 2020-01-01 ENCOUNTER — APPOINTMENT (OUTPATIENT)
Dept: GENERAL RADIOLOGY | Facility: HOSPITAL | Age: 70
End: 2020-01-01

## 2020-01-01 ENCOUNTER — TELEPHONE (OUTPATIENT)
Dept: INTERNAL MEDICINE | Facility: CLINIC | Age: 70
End: 2020-01-01

## 2020-01-01 ENCOUNTER — READMISSION MANAGEMENT (OUTPATIENT)
Dept: CALL CENTER | Facility: HOSPITAL | Age: 70
End: 2020-01-01

## 2020-01-01 ENCOUNTER — HOSPITAL ENCOUNTER (INPATIENT)
Facility: HOSPITAL | Age: 70
LOS: 4 days | Discharge: HOME HEALTH | End: 2020-11-25
Attending: EMERGENCY MEDICINE | Admitting: INTERNAL MEDICINE

## 2020-01-01 ENCOUNTER — APPOINTMENT (OUTPATIENT)
Dept: CT IMAGING | Facility: HOSPITAL | Age: 70
End: 2020-01-01

## 2020-01-01 ENCOUNTER — TELEPHONE (OUTPATIENT)
Dept: EMERGENCY DEPT | Facility: HOSPITAL | Age: 70
End: 2020-01-01

## 2020-01-01 ENCOUNTER — TRANSITIONAL CARE MANAGEMENT TELEPHONE ENCOUNTER (OUTPATIENT)
Dept: CALL CENTER | Facility: HOSPITAL | Age: 70
End: 2020-01-01

## 2020-01-01 ENCOUNTER — OFFICE VISIT (OUTPATIENT)
Dept: INTERNAL MEDICINE | Facility: CLINIC | Age: 70
End: 2020-01-01

## 2020-01-01 ENCOUNTER — HOSPITAL ENCOUNTER (EMERGENCY)
Facility: HOSPITAL | Age: 70
Discharge: HOME | End: 2020-12-06
Attending: EMERGENCY MEDICINE | Admitting: EMERGENCY MEDICINE

## 2020-01-01 ENCOUNTER — HOSPITAL ENCOUNTER (EMERGENCY)
Facility: HOSPITAL | Age: 70
Discharge: HOME | End: 2020-11-09
Attending: EMERGENCY MEDICINE | Admitting: EMERGENCY MEDICINE

## 2020-01-01 VITALS
RESPIRATION RATE: 18 BRPM | SYSTOLIC BLOOD PRESSURE: 112 MMHG | BODY MASS INDEX: 16.18 KG/M2 | TEMPERATURE: 98.4 F | HEART RATE: 64 BPM | HEIGHT: 57 IN | DIASTOLIC BLOOD PRESSURE: 52 MMHG | OXYGEN SATURATION: 99 % | WEIGHT: 75 LBS

## 2020-01-01 VITALS
DIASTOLIC BLOOD PRESSURE: 75 MMHG | HEART RATE: 75 BPM | HEIGHT: 59 IN | OXYGEN SATURATION: 99 % | RESPIRATION RATE: 18 BRPM | WEIGHT: 95 LBS | TEMPERATURE: 98.9 F | BODY MASS INDEX: 19.15 KG/M2 | SYSTOLIC BLOOD PRESSURE: 145 MMHG

## 2020-01-01 VITALS
WEIGHT: 72.5 LBS | OXYGEN SATURATION: 99 % | BODY MASS INDEX: 14.62 KG/M2 | TEMPERATURE: 98 F | SYSTOLIC BLOOD PRESSURE: 135 MMHG | DIASTOLIC BLOOD PRESSURE: 55 MMHG | HEART RATE: 93 BPM | RESPIRATION RATE: 18 BRPM | HEIGHT: 59 IN

## 2020-01-01 VITALS
WEIGHT: 76.4 LBS | SYSTOLIC BLOOD PRESSURE: 101 MMHG | DIASTOLIC BLOOD PRESSURE: 62 MMHG | HEART RATE: 89 BPM | BODY MASS INDEX: 15.4 KG/M2 | HEIGHT: 59 IN | TEMPERATURE: 96.9 F | OXYGEN SATURATION: 88 %

## 2020-01-01 DIAGNOSIS — F41.9 ANXIETY: ICD-10-CM
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
## 2020-01-01 DIAGNOSIS — G40.909 SEIZURE DISORDER (HCC): Primary | ICD-10-CM

## 2020-01-01 DIAGNOSIS — G40.909 SEIZURE DISORDER (HCC): ICD-10-CM

## 2020-01-01 DIAGNOSIS — R06.00 DYSPNEA AND RESPIRATORY ABNORMALITIES: ICD-10-CM

## 2020-01-01 DIAGNOSIS — R06.89 DYSPNEA AND RESPIRATORY ABNORMALITIES: ICD-10-CM

## 2020-01-01 DIAGNOSIS — J44.9 CHRONIC OBSTRUCTIVE PULMONARY DISEASE, UNSPECIFIED COPD TYPE (HCC): ICD-10-CM

## 2020-01-01 DIAGNOSIS — R06.02 SOB (SHORTNESS OF BREATH): Primary | ICD-10-CM

## 2020-01-01 DIAGNOSIS — S22.000A COMPRESSION FRACTURE OF BODY OF THORACIC VERTEBRA (HCC): ICD-10-CM

## 2020-01-01 DIAGNOSIS — J96.02 ACUTE HYPERCAPNIC RESPIRATORY FAILURE (HCC): Primary | ICD-10-CM

## 2020-01-01 DIAGNOSIS — Z99.81 DEPENDENCE ON SUPPLEMENTAL OXYGEN: ICD-10-CM

## 2020-01-01 DIAGNOSIS — J44.1 COPD WITH ACUTE EXACERBATION (HCC): ICD-10-CM

## 2020-01-01 DIAGNOSIS — J44.1 COPD EXACERBATION (HCC): Primary | ICD-10-CM

## 2020-01-01 DIAGNOSIS — J44.9 CHRONIC OBSTRUCTIVE PULMONARY DISEASE, UNSPECIFIED COPD TYPE (HCC): Primary | ICD-10-CM

## 2020-01-01 DIAGNOSIS — Z74.09 IMMOBILITY: Primary | ICD-10-CM

## 2020-01-01 DIAGNOSIS — R82.81 PYURIA: ICD-10-CM

## 2020-01-01 LAB
ALBUMIN SERPL-MCNC: 3.4 G/DL (ref 3.5–5.2)
ALBUMIN SERPL-MCNC: 3.4 G/DL (ref 3.5–5.2)
ALBUMIN SERPL-MCNC: 3.6 G/DL (ref 3.5–5.2)
ALBUMIN/GLOB SERPL: 1 G/DL
ALBUMIN/GLOB SERPL: 1 G/DL
ALBUMIN/GLOB SERPL: 1.1 G/DL
ALP SERPL-CCNC: 122 U/L (ref 39–117)
ALP SERPL-CCNC: 90 U/L (ref 39–117)
ALP SERPL-CCNC: 96 U/L (ref 39–117)
ALT SERPL W P-5'-P-CCNC: 7 U/L (ref 1–33)
ALT SERPL W P-5'-P-CCNC: 9 U/L (ref 1–33)
ALT SERPL W P-5'-P-CCNC: 9 U/L (ref 1–33)
ANION GAP SERPL CALCULATED.3IONS-SCNC: 11 MMOL/L (ref 5–15)
ANION GAP SERPL CALCULATED.3IONS-SCNC: 12 MMOL/L (ref 5–15)
ANION GAP SERPL CALCULATED.3IONS-SCNC: 12 MMOL/L (ref 5–15)
ANION GAP SERPL CALCULATED.3IONS-SCNC: 14 MMOL/L (ref 5–15)
ANION GAP SERPL CALCULATED.3IONS-SCNC: 5 MMOL/L (ref 5–15)
ANION GAP SERPL CALCULATED.3IONS-SCNC: 8 MMOL/L (ref 5–15)
ARTERIAL PATENCY WRIST A: ABNORMAL
AST SERPL-CCNC: 13 U/L (ref 1–32)
AST SERPL-CCNC: 16 U/L (ref 1–32)
AST SERPL-CCNC: 18 U/L (ref 1–32)
ATMOSPHERIC PRESS: ABNORMAL MM[HG]
B PARAPERT DNA SPEC QL NAA+PROBE: NOT DETECTED
B PERT DNA SPEC QL NAA+PROBE: NOT DETECTED
BACTERIA SPEC AEROBE CULT: ABNORMAL
BACTERIA SPEC AEROBE CULT: ABNORMAL
BACTERIA SPEC AEROBE CULT: NORMAL
BACTERIA SPEC AEROBE CULT: NORMAL
BACTERIA UR QL AUTO: ABNORMAL /HPF
BASE EXCESS BLDA CALC-SCNC: 8.4 MMOL/L (ref 0–2)
BASE EXCESS BLDA CALC-SCNC: 8.8 MMOL/L (ref 0–2)
BASE EXCESS BLDA CALC-SCNC: 8.9 MMOL/L (ref 0–2)
BASOPHILS # BLD AUTO: 0.01 10*3/MM3 (ref 0–0.2)
BASOPHILS # BLD AUTO: 0.02 10*3/MM3 (ref 0–0.2)
BASOPHILS NFR BLD AUTO: 0.1 % (ref 0–1.5)
BASOPHILS NFR BLD AUTO: 0.2 % (ref 0–1.5)
BASOPHILS NFR BLD AUTO: 0.3 % (ref 0–1.5)
BASOPHILS NFR BLD AUTO: 0.5 % (ref 0–1.5)
BDY SITE: ABNORMAL
BILIRUB SERPL-MCNC: 0.2 MG/DL (ref 0–1.2)
BILIRUB SERPL-MCNC: 0.2 MG/DL (ref 0–1.2)
BILIRUB SERPL-MCNC: 0.4 MG/DL (ref 0–1.2)
BILIRUB UR QL STRIP: NEGATIVE
BODY TEMPERATURE: 37 C
BUN SERPL-MCNC: 10 MG/DL (ref 8–23)
BUN SERPL-MCNC: 13 MG/DL (ref 8–23)
BUN SERPL-MCNC: 6 MG/DL (ref 8–23)
BUN SERPL-MCNC: 9 MG/DL (ref 8–23)
BUN/CREAT SERPL: 16.2 (ref 7–25)
BUN/CREAT SERPL: 18.4 (ref 7–25)
BUN/CREAT SERPL: 20 (ref 7–25)
BUN/CREAT SERPL: 22.8 (ref 7–25)
BUN/CREAT SERPL: 23.7 (ref 7–25)
BUN/CREAT SERPL: 27.3 (ref 7–25)
C PNEUM DNA NPH QL NAA+NON-PROBE: NOT DETECTED
CALCIUM SPEC-SCNC: 8.5 MG/DL (ref 8.6–10.5)
CALCIUM SPEC-SCNC: 9.1 MG/DL (ref 8.6–10.5)
CALCIUM SPEC-SCNC: 9.2 MG/DL (ref 8.6–10.5)
CALCIUM SPEC-SCNC: 9.4 MG/DL (ref 8.6–10.5)
CALCIUM SPEC-SCNC: 9.5 MG/DL (ref 8.6–10.5)
CALCIUM SPEC-SCNC: 9.5 MG/DL (ref 8.6–10.5)
CHLORIDE SERPL-SCNC: 102 MMOL/L (ref 98–107)
CHLORIDE SERPL-SCNC: 91 MMOL/L (ref 98–107)
CHLORIDE SERPL-SCNC: 95 MMOL/L (ref 98–107)
CHLORIDE SERPL-SCNC: 97 MMOL/L (ref 98–107)
CHLORIDE SERPL-SCNC: 99 MMOL/L (ref 98–107)
CHLORIDE SERPL-SCNC: 99 MMOL/L (ref 98–107)
CLARITY UR: ABNORMAL
CLARITY UR: ABNORMAL
CLARITY UR: CLEAR
CLARITY UR: CLEAR
CO2 BLDA-SCNC: 37.9 MMOL/L (ref 22–33)
CO2 BLDA-SCNC: 39.4 MMOL/L (ref 22–33)
CO2 BLDA-SCNC: 39.6 MMOL/L (ref 22–33)
CO2 SERPL-SCNC: 28 MMOL/L (ref 22–29)
CO2 SERPL-SCNC: 28 MMOL/L (ref 22–29)
CO2 SERPL-SCNC: 30 MMOL/L (ref 22–29)
CO2 SERPL-SCNC: 31 MMOL/L (ref 22–29)
CO2 SERPL-SCNC: 33 MMOL/L (ref 22–29)
CO2 SERPL-SCNC: 34 MMOL/L (ref 22–29)
COHGB MFR BLD: 1.5 % (ref 0–2)
COLOR UR: YELLOW
CREAT SERPL-MCNC: 0.33 MG/DL (ref 0.57–1)
CREAT SERPL-MCNC: 0.37 MG/DL (ref 0.57–1)
CREAT SERPL-MCNC: 0.38 MG/DL (ref 0.57–1)
CREAT SERPL-MCNC: 0.49 MG/DL (ref 0.57–1)
CREAT SERPL-MCNC: 0.5 MG/DL (ref 0.57–1)
CREAT SERPL-MCNC: 0.57 MG/DL (ref 0.57–1)
DEPRECATED RDW RBC AUTO: 58.7 FL (ref 37–54)
DEPRECATED RDW RBC AUTO: 63.5 FL (ref 37–54)
DEPRECATED RDW RBC AUTO: 63.8 FL (ref 37–54)
DEPRECATED RDW RBC AUTO: 63.9 FL (ref 37–54)
DEPRECATED RDW RBC AUTO: 64.9 FL (ref 37–54)
DEPRECATED RDW RBC AUTO: 70.6 FL (ref 37–54)
EOSINOPHIL # BLD AUTO: 0.01 10*3/MM3 (ref 0–0.4)
EOSINOPHIL # BLD AUTO: 0.02 10*3/MM3 (ref 0–0.4)
EOSINOPHIL # BLD AUTO: 0.02 10*3/MM3 (ref 0–0.4)
EOSINOPHIL # BLD AUTO: 0.09 10*3/MM3 (ref 0–0.4)
EOSINOPHIL NFR BLD AUTO: 0.1 % (ref 0.3–6.2)
EOSINOPHIL NFR BLD AUTO: 0.2 % (ref 0.3–6.2)
EOSINOPHIL NFR BLD AUTO: 0.5 % (ref 0.3–6.2)
EOSINOPHIL NFR BLD AUTO: 1.4 % (ref 0.3–6.2)
EPAP: 0
EPAP: 0
ERYTHROCYTE [DISTWIDTH] IN BLOOD BY AUTOMATED COUNT: 16 % (ref 12.3–15.4)
ERYTHROCYTE [DISTWIDTH] IN BLOOD BY AUTOMATED COUNT: 16.1 % (ref 12.3–15.4)
ERYTHROCYTE [DISTWIDTH] IN BLOOD BY AUTOMATED COUNT: 16.2 % (ref 12.3–15.4)
ERYTHROCYTE [DISTWIDTH] IN BLOOD BY AUTOMATED COUNT: 16.3 % (ref 12.3–15.4)
ERYTHROCYTE [DISTWIDTH] IN BLOOD BY AUTOMATED COUNT: 16.4 % (ref 12.3–15.4)
ERYTHROCYTE [DISTWIDTH] IN BLOOD BY AUTOMATED COUNT: 16.7 % (ref 12.3–15.4)
FLUAV H1 2009 PAND RNA NPH QL NAA+PROBE: NOT DETECTED
FLUAV H1 HA GENE NPH QL NAA+PROBE: NOT DETECTED
FLUAV H3 RNA NPH QL NAA+PROBE: NOT DETECTED
FLUAV SUBTYP SPEC NAA+PROBE: NOT DETECTED
FLUBV RNA ISLT QL NAA+PROBE: NOT DETECTED
GFR SERPL CREATININE-BSD FRML MDRD: 104 ML/MIN/1.73
GFR SERPL CREATININE-BSD FRML MDRD: 121 ML/MIN/1.73
GFR SERPL CREATININE-BSD FRML MDRD: 124 ML/MIN/1.73
GFR SERPL CREATININE-BSD FRML MDRD: >150 ML/MIN/1.73
GLOBULIN UR ELPH-MCNC: 3.2 GM/DL
GLOBULIN UR ELPH-MCNC: 3.3 GM/DL
GLOBULIN UR ELPH-MCNC: 3.5 GM/DL
GLUCOSE BLDC GLUCOMTR-MCNC: 101 MG/DL (ref 70–130)
GLUCOSE BLDC GLUCOMTR-MCNC: 101 MG/DL (ref 70–130)
GLUCOSE BLDC GLUCOMTR-MCNC: 102 MG/DL (ref 70–130)
GLUCOSE BLDC GLUCOMTR-MCNC: 105 MG/DL (ref 70–130)
GLUCOSE BLDC GLUCOMTR-MCNC: 111 MG/DL (ref 70–130)
GLUCOSE BLDC GLUCOMTR-MCNC: 116 MG/DL (ref 70–130)
GLUCOSE BLDC GLUCOMTR-MCNC: 117 MG/DL (ref 70–130)
GLUCOSE BLDC GLUCOMTR-MCNC: 120 MG/DL (ref 70–130)
GLUCOSE BLDC GLUCOMTR-MCNC: 130 MG/DL (ref 70–130)
GLUCOSE BLDC GLUCOMTR-MCNC: 131 MG/DL (ref 70–130)
GLUCOSE BLDC GLUCOMTR-MCNC: 135 MG/DL (ref 70–130)
GLUCOSE BLDC GLUCOMTR-MCNC: 173 MG/DL (ref 70–130)
GLUCOSE BLDC GLUCOMTR-MCNC: 178 MG/DL (ref 70–130)
GLUCOSE BLDC GLUCOMTR-MCNC: 207 MG/DL (ref 70–130)
GLUCOSE BLDC GLUCOMTR-MCNC: 226 MG/DL (ref 70–130)
GLUCOSE BLDC GLUCOMTR-MCNC: 232 MG/DL (ref 70–130)
GLUCOSE BLDC GLUCOMTR-MCNC: 318 MG/DL (ref 70–130)
GLUCOSE BLDC GLUCOMTR-MCNC: 341 MG/DL (ref 70–130)
GLUCOSE BLDC GLUCOMTR-MCNC: 376 MG/DL (ref 70–130)
GLUCOSE BLDC GLUCOMTR-MCNC: 49 MG/DL (ref 70–130)
GLUCOSE BLDC GLUCOMTR-MCNC: 78 MG/DL (ref 70–130)
GLUCOSE BLDC GLUCOMTR-MCNC: 87 MG/DL (ref 70–130)
GLUCOSE BLDC GLUCOMTR-MCNC: 87 MG/DL (ref 70–130)
GLUCOSE BLDC GLUCOMTR-MCNC: 90 MG/DL (ref 70–130)
GLUCOSE BLDC GLUCOMTR-MCNC: 90 MG/DL (ref 70–130)
GLUCOSE BLDC GLUCOMTR-MCNC: 93 MG/DL (ref 70–130)
GLUCOSE BLDC GLUCOMTR-MCNC: 95 MG/DL (ref 70–130)
GLUCOSE BLDC GLUCOMTR-MCNC: 96 MG/DL (ref 70–130)
GLUCOSE SERPL-MCNC: 136 MG/DL (ref 65–99)
GLUCOSE SERPL-MCNC: 141 MG/DL (ref 65–99)
GLUCOSE SERPL-MCNC: 236 MG/DL (ref 65–99)
GLUCOSE SERPL-MCNC: 67 MG/DL (ref 65–99)
GLUCOSE SERPL-MCNC: 76 MG/DL (ref 65–99)
GLUCOSE SERPL-MCNC: 88 MG/DL (ref 65–99)
GLUCOSE UR STRIP-MCNC: NEGATIVE MG/DL
HADV DNA SPEC NAA+PROBE: NOT DETECTED
HBA1C MFR BLD: 5.1 % (ref 4.8–5.6)
HCO3 BLDA-SCNC: 35.9 MMOL/L (ref 20–26)
HCO3 BLDA-SCNC: 37.2 MMOL/L (ref 20–26)
HCO3 BLDA-SCNC: 37.3 MMOL/L (ref 20–26)
HCOV 229E RNA SPEC QL NAA+PROBE: NOT DETECTED
HCOV HKU1 RNA SPEC QL NAA+PROBE: NOT DETECTED
HCOV NL63 RNA SPEC QL NAA+PROBE: NOT DETECTED
HCOV OC43 RNA SPEC QL NAA+PROBE: NOT DETECTED
HCT VFR BLD AUTO: 32.5 % (ref 34–46.6)
HCT VFR BLD AUTO: 35 % (ref 34–46.6)
HCT VFR BLD AUTO: 35.7 % (ref 34–46.6)
HCT VFR BLD AUTO: 39.6 % (ref 34–46.6)
HCT VFR BLD AUTO: 41 % (ref 34–46.6)
HCT VFR BLD AUTO: 41.6 % (ref 34–46.6)
HCT VFR BLD CALC: 31.7 %
HCT VFR BLD CALC: 31.8 %
HCT VFR BLD CALC: 32 %
HGB BLD-MCNC: 10.4 G/DL (ref 12–15.9)
HGB BLD-MCNC: 11.3 G/DL (ref 12–15.9)
HGB BLD-MCNC: 11.4 G/DL (ref 12–15.9)
HGB BLD-MCNC: 11.5 G/DL (ref 12–15.9)
HGB BLD-MCNC: 12.1 G/DL (ref 12–15.9)
HGB BLD-MCNC: 9.7 G/DL (ref 12–15.9)
HGB BLDA-MCNC: 10.4 G/DL (ref 14–18)
HGB UR QL STRIP.AUTO: ABNORMAL
HGB UR QL STRIP.AUTO: NEGATIVE
HMPV RNA NPH QL NAA+NON-PROBE: NOT DETECTED
HOLD SPECIMEN: NORMAL
HPIV1 RNA SPEC QL NAA+PROBE: NOT DETECTED
HPIV2 RNA SPEC QL NAA+PROBE: NOT DETECTED
HPIV3 RNA NPH QL NAA+PROBE: NOT DETECTED
HPIV4 P GENE NPH QL NAA+PROBE: NOT DETECTED
HYALINE CASTS UR QL AUTO: ABNORMAL /LPF
IMM GRANULOCYTES # BLD AUTO: 0.01 10*3/MM3 (ref 0–0.05)
IMM GRANULOCYTES # BLD AUTO: 0.03 10*3/MM3 (ref 0–0.05)
IMM GRANULOCYTES # BLD AUTO: 0.04 10*3/MM3 (ref 0–0.05)
IMM GRANULOCYTES # BLD AUTO: 0.04 10*3/MM3 (ref 0–0.05)
IMM GRANULOCYTES NFR BLD AUTO: 0.3 % (ref 0–0.5)
IMM GRANULOCYTES NFR BLD AUTO: 0.4 % (ref 0–0.5)
IMM GRANULOCYTES NFR BLD AUTO: 0.5 % (ref 0–0.5)
IMM GRANULOCYTES NFR BLD AUTO: 0.6 % (ref 0–0.5)
INHALED O2 CONCENTRATION: 30 %
INHALED O2 CONCENTRATION: 36 %
INHALED O2 CONCENTRATION: 40 %
IPAP: 0
IPAP: 0
KETONES UR QL STRIP: ABNORMAL
KETONES UR QL STRIP: ABNORMAL
KETONES UR QL STRIP: NEGATIVE
KETONES UR QL STRIP: NEGATIVE
L PNEUMO1 AG UR QL IA: NEGATIVE
LEUKOCYTE ESTERASE UR QL STRIP.AUTO: ABNORMAL
LEUKOCYTE ESTERASE UR QL STRIP.AUTO: NEGATIVE
LYMPHOCYTES # BLD AUTO: 0.63 10*3/MM3 (ref 0.7–3.1)
LYMPHOCYTES # BLD AUTO: 0.75 10*3/MM3 (ref 0.7–3.1)
LYMPHOCYTES # BLD AUTO: 0.87 10*3/MM3 (ref 0.7–3.1)
LYMPHOCYTES # BLD AUTO: 0.96 10*3/MM3 (ref 0.7–3.1)
LYMPHOCYTES NFR BLD AUTO: 13.6 % (ref 19.6–45.3)
LYMPHOCYTES NFR BLD AUTO: 24.9 % (ref 19.6–45.3)
LYMPHOCYTES NFR BLD AUTO: 8.4 % (ref 19.6–45.3)
LYMPHOCYTES NFR BLD AUTO: 9.1 % (ref 19.6–45.3)
Lab: ABNORMAL
Lab: ABNORMAL
M PNEUMO IGG SER IA-ACNC: NOT DETECTED
MACROCYTES BLD QL SMEAR: NORMAL
MAGNESIUM SERPL-MCNC: 2 MG/DL (ref 1.6–2.4)
MCH RBC QN AUTO: 31.1 PG (ref 26.6–33)
MCH RBC QN AUTO: 31.1 PG (ref 26.6–33)
MCH RBC QN AUTO: 31.3 PG (ref 26.6–33)
MCH RBC QN AUTO: 31.3 PG (ref 26.6–33)
MCH RBC QN AUTO: 31.5 PG (ref 26.6–33)
MCH RBC QN AUTO: 31.6 PG (ref 26.6–33)
MCHC RBC AUTO-ENTMCNC: 27.4 G/DL (ref 31.5–35.7)
MCHC RBC AUTO-ENTMCNC: 29 G/DL (ref 31.5–35.7)
MCHC RBC AUTO-ENTMCNC: 29.5 G/DL (ref 31.5–35.7)
MCHC RBC AUTO-ENTMCNC: 29.7 G/DL (ref 31.5–35.7)
MCHC RBC AUTO-ENTMCNC: 29.8 G/DL (ref 31.5–35.7)
MCHC RBC AUTO-ENTMCNC: 31.7 G/DL (ref 31.5–35.7)
MCV RBC AUTO: 104.8 FL (ref 79–97)
MCV RBC AUTO: 105.5 FL (ref 79–97)
MCV RBC AUTO: 107 FL (ref 79–97)
MCV RBC AUTO: 107.9 FL (ref 79–97)
MCV RBC AUTO: 114.3 FL (ref 79–97)
MCV RBC AUTO: 98.3 FL (ref 79–97)
METHGB BLD QL: 0.3 % (ref 0–1.5)
METHGB BLD QL: 0.4 % (ref 0–1.5)
METHGB BLD QL: 0.4 % (ref 0–1.5)
MODALITY: ABNORMAL
MONOCYTES # BLD AUTO: 0.33 10*3/MM3 (ref 0.1–0.9)
MONOCYTES # BLD AUTO: 0.37 10*3/MM3 (ref 0.1–0.9)
MONOCYTES # BLD AUTO: 0.41 10*3/MM3 (ref 0.1–0.9)
MONOCYTES # BLD AUTO: 0.62 10*3/MM3 (ref 0.1–0.9)
MONOCYTES NFR BLD AUTO: 5.5 % (ref 5–12)
MONOCYTES NFR BLD AUTO: 5.8 % (ref 5–12)
MONOCYTES NFR BLD AUTO: 7.5 % (ref 5–12)
MONOCYTES NFR BLD AUTO: 8.5 % (ref 5–12)
NEUTROPHILS NFR BLD AUTO: 2.52 10*3/MM3 (ref 1.7–7)
NEUTROPHILS NFR BLD AUTO: 5.03 10*3/MM3 (ref 1.7–7)
NEUTROPHILS NFR BLD AUTO: 6.41 10*3/MM3 (ref 1.7–7)
NEUTROPHILS NFR BLD AUTO: 6.82 10*3/MM3 (ref 1.7–7)
NEUTROPHILS NFR BLD AUTO: 65.3 % (ref 42.7–76)
NEUTROPHILS NFR BLD AUTO: 78.3 % (ref 42.7–76)
NEUTROPHILS NFR BLD AUTO: 82.6 % (ref 42.7–76)
NEUTROPHILS NFR BLD AUTO: 85.4 % (ref 42.7–76)
NITRITE UR QL STRIP: NEGATIVE
NOTE: ABNORMAL
NOTIFIED BY: ABNORMAL
NOTIFIED BY: ABNORMAL
NOTIFIED WHO: ABNORMAL
NOTIFIED WHO: ABNORMAL
NRBC BLD AUTO-RTO: 0 /100 WBC (ref 0–0.2)
NT-PROBNP SERPL-MCNC: 1196 PG/ML (ref 0–900)
NT-PROBNP SERPL-MCNC: 218 PG/ML (ref 0–900)
OXYHGB MFR BLDV: 94.9 % (ref 94–99)
OXYHGB MFR BLDV: 95.5 % (ref 94–99)
OXYHGB MFR BLDV: 97.3 % (ref 94–99)
PAW @ PEAK INSP FLOW SETTING VENT: 0 CMH2O
PAW @ PEAK INSP FLOW SETTING VENT: 0 CMH2O
PCO2 BLDA: 66.2 MM HG (ref 35–45)
PCO2 BLDA: 73.3 MM HG (ref 35–45)
PCO2 BLDA: 75.8 MM HG (ref 35–45)
PCO2 TEMP ADJ BLD: 66.2 MM HG (ref 35–45)
PCO2 TEMP ADJ BLD: 73.3 MM HG (ref 35–45)
PCO2 TEMP ADJ BLD: 75.8 MM HG (ref 35–45)
PH BLDA: 7.3 PH UNITS (ref 7.35–7.45)
PH BLDA: 7.31 PH UNITS (ref 7.35–7.45)
PH BLDA: 7.34 PH UNITS (ref 7.35–7.45)
PH UR STRIP.AUTO: 5.5 [PH] (ref 5–8)
PH UR STRIP.AUTO: 8.5 [PH] (ref 5–8)
PH, TEMP CORRECTED: 7.3 PH UNITS
PH, TEMP CORRECTED: 7.31 PH UNITS
PH, TEMP CORRECTED: 7.34 PH UNITS
PHENOBARB SERPL-MCNC: 32.2 MCG/ML (ref 10–30)
PHENOBARB SERPL-MCNC: 35.2 MCG/ML (ref 10–30)
PHENOBARB SERPL-MCNC: 6.3 MCG/ML (ref 10–30)
PLAT MORPH BLD: NORMAL
PLATELET # BLD AUTO: 206 10*3/MM3 (ref 140–450)
PLATELET # BLD AUTO: 226 10*3/MM3 (ref 140–450)
PLATELET # BLD AUTO: 239 10*3/MM3 (ref 140–450)
PLATELET # BLD AUTO: 240 10*3/MM3 (ref 140–450)
PLATELET # BLD AUTO: 343 10*3/MM3 (ref 140–450)
PLATELET # BLD AUTO: 435 10*3/MM3 (ref 140–450)
PMV BLD AUTO: 10 FL (ref 6–12)
PMV BLD AUTO: 10.1 FL (ref 6–12)
PMV BLD AUTO: 10.4 FL (ref 6–12)
PMV BLD AUTO: 9.4 FL (ref 6–12)
PMV BLD AUTO: 9.5 FL (ref 6–12)
PMV BLD AUTO: 9.9 FL (ref 6–12)
PO2 BLDA: 113 MM HG (ref 83–108)
PO2 BLDA: 80.3 MM HG (ref 83–108)
PO2 BLDA: 90.1 MM HG (ref 83–108)
PO2 TEMP ADJ BLD: 113 MM HG (ref 83–108)
PO2 TEMP ADJ BLD: 80.3 MM HG (ref 83–108)
PO2 TEMP ADJ BLD: 90.1 MM HG (ref 83–108)
POTASSIUM SERPL-SCNC: 3.6 MMOL/L (ref 3.5–5.2)
POTASSIUM SERPL-SCNC: 3.9 MMOL/L (ref 3.5–5.2)
POTASSIUM SERPL-SCNC: 4.1 MMOL/L (ref 3.5–5.2)
POTASSIUM SERPL-SCNC: 4.5 MMOL/L (ref 3.5–5.2)
PROT SERPL-MCNC: 6.7 G/DL (ref 6–8.5)
PROT SERPL-MCNC: 6.8 G/DL (ref 6–8.5)
PROT SERPL-MCNC: 6.9 G/DL (ref 6–8.5)
PROT UR QL STRIP: ABNORMAL
PROT UR QL STRIP: NEGATIVE
QT INTERVAL: 350 MS
QT INTERVAL: 382 MS
QTC INTERVAL: 396 MS
QTC INTERVAL: 432 MS
RBC # BLD AUTO: 3.08 10*6/MM3 (ref 3.77–5.28)
RBC # BLD AUTO: 3.34 10*6/MM3 (ref 3.77–5.28)
RBC # BLD AUTO: 3.63 10*6/MM3 (ref 3.77–5.28)
RBC # BLD AUTO: 3.64 10*6/MM3 (ref 3.77–5.28)
RBC # BLD AUTO: 3.67 10*6/MM3 (ref 3.77–5.28)
RBC # BLD AUTO: 3.83 10*6/MM3 (ref 3.77–5.28)
RBC # UR: ABNORMAL /HPF
REF LAB TEST METHOD: ABNORMAL
RHINOVIRUS RNA SPEC NAA+PROBE: NOT DETECTED
RSV RNA NPH QL NAA+NON-PROBE: NOT DETECTED
S PNEUM AG SPEC QL LA: NEGATIVE
SARS-COV-2 RNA NPH QL NAA+NON-PROBE: NOT DETECTED
SODIUM SERPL-SCNC: 131 MMOL/L (ref 136–145)
SODIUM SERPL-SCNC: 137 MMOL/L (ref 136–145)
SODIUM SERPL-SCNC: 138 MMOL/L (ref 136–145)
SODIUM SERPL-SCNC: 141 MMOL/L (ref 136–145)
SP GR UR STRIP: 1.01 (ref 1–1.03)
SQUAMOUS #/AREA URNS HPF: ABNORMAL /HPF
TOTAL RATE: 0 BREATHS/MINUTE
TOTAL RATE: 0 BREATHS/MINUTE
TROPONIN T SERPL-MCNC: 0.01 NG/ML (ref 0–0.03)
TROPONIN T SERPL-MCNC: <0.01 NG/ML (ref 0–0.03)
UROBILINOGEN UR QL STRIP: ABNORMAL
UROBILINOGEN UR QL STRIP: NORMAL
VENTILATOR MODE: ABNORMAL
WBC # BLD AUTO: 3.8 10*3/MM3 (ref 3.4–10.8)
WBC # BLD AUTO: 3.86 10*3/MM3 (ref 3.4–10.8)
WBC # BLD AUTO: 4.04 10*3/MM3 (ref 3.4–10.8)
WBC # BLD AUTO: 6.42 10*3/MM3 (ref 3.4–10.8)
WBC # BLD AUTO: 7.51 10*3/MM3 (ref 3.4–10.8)
WBC # BLD AUTO: 8.26 10*3/MM3 (ref 3.4–10.8)
WBC MORPH BLD: NORMAL
WBC UR QL AUTO: ABNORMAL /HPF
WHOLE BLOOD HOLD SPECIMEN: NORMAL

## 2020-01-01 PROCEDURE — 94799 UNLISTED PULMONARY SVC/PX: CPT

## 2020-01-01 PROCEDURE — 25010000002 ENOXAPARIN PER 10 MG: Performed by: NURSE PRACTITIONER

## 2020-01-01 PROCEDURE — 63710000001 PREDNISONE PER 1 MG: Performed by: HOSPITALIST

## 2020-01-01 PROCEDURE — 80053 COMPREHEN METABOLIC PANEL: CPT | Performed by: PHYSICIAN ASSISTANT

## 2020-01-01 PROCEDURE — 87086 URINE CULTURE/COLONY COUNT: CPT | Performed by: NURSE PRACTITIONER

## 2020-01-01 PROCEDURE — 85025 COMPLETE CBC W/AUTO DIFF WBC: CPT | Performed by: PHYSICIAN ASSISTANT

## 2020-01-01 PROCEDURE — 87899 AGENT NOS ASSAY W/OPTIC: CPT | Performed by: INTERNAL MEDICINE

## 2020-01-01 PROCEDURE — 93005 ELECTROCARDIOGRAM TRACING: CPT

## 2020-01-01 PROCEDURE — 97110 THERAPEUTIC EXERCISES: CPT

## 2020-01-01 PROCEDURE — 97116 GAIT TRAINING THERAPY: CPT

## 2020-01-01 PROCEDURE — P9612 CATHETERIZE FOR URINE SPEC: HCPCS

## 2020-01-01 PROCEDURE — 87186 SC STD MICRODIL/AGAR DIL: CPT | Performed by: NURSE PRACTITIONER

## 2020-01-01 PROCEDURE — 71250 CT THORAX DX C-: CPT

## 2020-01-01 PROCEDURE — G0008 ADMIN INFLUENZA VIRUS VAC: HCPCS | Performed by: INTERNAL MEDICINE

## 2020-01-01 PROCEDURE — 25010000002 HEPARIN (PORCINE) PER 1000 UNITS

## 2020-01-01 PROCEDURE — 80048 BASIC METABOLIC PNL TOTAL CA: CPT | Performed by: HOSPITALIST

## 2020-01-01 PROCEDURE — 25010000002 METHYLPREDNISOLONE PER 125 MG: Performed by: NURSE PRACTITIONER

## 2020-01-01 PROCEDURE — 80053 COMPREHEN METABOLIC PANEL: CPT | Performed by: EMERGENCY MEDICINE

## 2020-01-01 PROCEDURE — 85025 COMPLETE CBC W/AUTO DIFF WBC: CPT | Performed by: EMERGENCY MEDICINE

## 2020-01-01 PROCEDURE — 82962 GLUCOSE BLOOD TEST: CPT

## 2020-01-01 PROCEDURE — 94660 CPAP INITIATION&MGMT: CPT

## 2020-01-01 PROCEDURE — 83880 ASSAY OF NATRIURETIC PEPTIDE: CPT | Performed by: EMERGENCY MEDICINE

## 2020-01-01 PROCEDURE — 84484 ASSAY OF TROPONIN QUANT: CPT | Performed by: EMERGENCY MEDICINE

## 2020-01-01 PROCEDURE — 87077 CULTURE AEROBIC IDENTIFY: CPT | Performed by: NURSE PRACTITIONER

## 2020-01-01 PROCEDURE — 36600 WITHDRAWAL OF ARTERIAL BLOOD: CPT

## 2020-01-01 PROCEDURE — 87147 CULTURE TYPE IMMUNOLOGIC: CPT | Performed by: PHYSICIAN ASSISTANT

## 2020-01-01 PROCEDURE — 71045 X-RAY EXAM CHEST 1 VIEW: CPT

## 2020-01-01 PROCEDURE — 25010000002 INFLUENZA VAC SPLIT QUAD 0.5 ML SUSPENSION PREFILLED SYRINGE: Performed by: INTERNAL MEDICINE

## 2020-01-01 PROCEDURE — 99284 EMERGENCY DEPT VISIT MOD MDM: CPT

## 2020-01-01 PROCEDURE — 82805 BLOOD GASES W/O2 SATURATION: CPT

## 2020-01-01 PROCEDURE — 80184 ASSAY OF PHENOBARBITAL: CPT | Performed by: EMERGENCY MEDICINE

## 2020-01-01 PROCEDURE — 81001 URINALYSIS AUTO W/SCOPE: CPT | Performed by: EMERGENCY MEDICINE

## 2020-01-01 PROCEDURE — 85027 COMPLETE CBC AUTOMATED: CPT | Performed by: HOSPITALIST

## 2020-01-01 PROCEDURE — 0202U NFCT DS 22 TRGT SARS-COV-2: CPT | Performed by: EMERGENCY MEDICINE

## 2020-01-01 PROCEDURE — 83036 HEMOGLOBIN GLYCOSYLATED A1C: CPT | Performed by: INTERNAL MEDICINE

## 2020-01-01 PROCEDURE — 97165 OT EVAL LOW COMPLEX 30 MIN: CPT

## 2020-01-01 PROCEDURE — 97535 SELF CARE MNGMENT TRAINING: CPT

## 2020-01-01 PROCEDURE — 87086 URINE CULTURE/COLONY COUNT: CPT | Performed by: PHYSICIAN ASSISTANT

## 2020-01-01 PROCEDURE — 99223 1ST HOSP IP/OBS HIGH 75: CPT | Performed by: INTERNAL MEDICINE

## 2020-01-01 PROCEDURE — 90686 IIV4 VACC NO PRSV 0.5 ML IM: CPT | Performed by: INTERNAL MEDICINE

## 2020-01-01 PROCEDURE — 99285 EMERGENCY DEPT VISIT HI MDM: CPT

## 2020-01-01 PROCEDURE — 25010000002 METHYLPREDNISOLONE PER 125 MG: Performed by: EMERGENCY MEDICINE

## 2020-01-01 PROCEDURE — 81001 URINALYSIS AUTO W/SCOPE: CPT | Performed by: NURSE PRACTITIONER

## 2020-01-01 PROCEDURE — 72131 CT LUMBAR SPINE W/O DYE: CPT

## 2020-01-01 PROCEDURE — 87040 BLOOD CULTURE FOR BACTERIA: CPT | Performed by: INTERNAL MEDICINE

## 2020-01-01 PROCEDURE — 99233 SBSQ HOSP IP/OBS HIGH 50: CPT | Performed by: HOSPITALIST

## 2020-01-01 PROCEDURE — 99214 OFFICE O/P EST MOD 30 MIN: CPT | Performed by: INTERNAL MEDICINE

## 2020-01-01 PROCEDURE — 97530 THERAPEUTIC ACTIVITIES: CPT

## 2020-01-01 PROCEDURE — 93005 ELECTROCARDIOGRAM TRACING: CPT | Performed by: EMERGENCY MEDICINE

## 2020-01-01 PROCEDURE — 83735 ASSAY OF MAGNESIUM: CPT | Performed by: EMERGENCY MEDICINE

## 2020-01-01 PROCEDURE — 96374 THER/PROPH/DIAG INJ IV PUSH: CPT

## 2020-01-01 PROCEDURE — 81003 URINALYSIS AUTO W/O SCOPE: CPT | Performed by: EMERGENCY MEDICINE

## 2020-01-01 PROCEDURE — 99239 HOSP IP/OBS DSCHRG MGMT >30: CPT | Performed by: INTERNAL MEDICINE

## 2020-01-01 PROCEDURE — 97162 PT EVAL MOD COMPLEX 30 MIN: CPT

## 2020-01-01 PROCEDURE — 74176 CT ABD & PELVIS W/O CONTRAST: CPT

## 2020-01-01 PROCEDURE — 85025 COMPLETE CBC W/AUTO DIFF WBC: CPT | Performed by: NURSE PRACTITIONER

## 2020-01-01 PROCEDURE — 80048 BASIC METABOLIC PNL TOTAL CA: CPT | Performed by: NURSE PRACTITIONER

## 2020-01-01 PROCEDURE — 99222 1ST HOSP IP/OBS MODERATE 55: CPT | Performed by: PSYCHIATRY & NEUROLOGY

## 2020-01-01 PROCEDURE — 85007 BL SMEAR W/DIFF WBC COUNT: CPT | Performed by: NURSE PRACTITIONER

## 2020-01-01 PROCEDURE — 73030 X-RAY EXAM OF SHOULDER: CPT

## 2020-01-01 PROCEDURE — 81001 URINALYSIS AUTO W/SCOPE: CPT | Performed by: PHYSICIAN ASSISTANT

## 2020-01-01 PROCEDURE — 80184 ASSAY OF PHENOBARBITAL: CPT | Performed by: PHYSICIAN ASSISTANT

## 2020-01-01 RX ORDER — SODIUM CHLORIDE 0.9 % (FLUSH) 0.9 %
10 SYRINGE (ML) INJECTION AS NEEDED
Status: DISCONTINUED | OUTPATIENT
Start: 2020-01-01 | End: 2020-01-01 | Stop reason: HOSPADM

## 2020-01-01 RX ORDER — PREDNISONE 20 MG/1
60 TABLET ORAL DAILY
Qty: 12 TABLET | Refills: 0 | Status: SHIPPED | OUTPATIENT
Start: 2020-01-01 | End: 2020-01-01

## 2020-01-01 RX ORDER — FERROUS SULFATE 325(65) MG
325 TABLET ORAL
Status: ON HOLD | COMMUNITY
End: 2021-01-01

## 2020-01-01 RX ORDER — IPRATROPIUM BROMIDE AND ALBUTEROL SULFATE 2.5; .5 MG/3ML; MG/3ML
3 SOLUTION RESPIRATORY (INHALATION) EVERY 4 HOURS PRN
Status: DISCONTINUED | OUTPATIENT
Start: 2020-01-01 | End: 2020-01-01 | Stop reason: HOSPADM

## 2020-01-01 RX ORDER — FUROSEMIDE 40 MG/1
40 TABLET ORAL DAILY
COMMUNITY
End: 2021-01-01 | Stop reason: SDUPTHER

## 2020-01-01 RX ORDER — DOXYCYCLINE 100 MG/1
100 CAPSULE ORAL EVERY 12 HOURS SCHEDULED
Status: DISCONTINUED | OUTPATIENT
Start: 2020-01-01 | End: 2020-01-01 | Stop reason: HOSPADM

## 2020-01-01 RX ORDER — ALBUTEROL SULFATE 90 UG/1
AEROSOL, METERED RESPIRATORY (INHALATION)
Qty: 18 G | Refills: 0 | OUTPATIENT
Start: 2020-01-01

## 2020-01-01 RX ORDER — PHENOBARBITAL 32.4 MG/1
145.8 TABLET ORAL NIGHTLY
Status: DISCONTINUED | OUTPATIENT
Start: 2020-01-01 | End: 2020-01-01 | Stop reason: HOSPADM

## 2020-01-01 RX ORDER — ASPIRIN 81 MG/1
81 TABLET ORAL DAILY
Status: DISCONTINUED | OUTPATIENT
Start: 2020-01-01 | End: 2020-01-01 | Stop reason: HOSPADM

## 2020-01-01 RX ORDER — IPRATROPIUM BROMIDE AND ALBUTEROL SULFATE 2.5; .5 MG/3ML; MG/3ML
3 SOLUTION RESPIRATORY (INHALATION)
Status: DISCONTINUED | OUTPATIENT
Start: 2020-01-01 | End: 2020-01-01 | Stop reason: HOSPADM

## 2020-01-01 RX ORDER — ACETAMINOPHEN 160 MG/5ML
650 SOLUTION ORAL EVERY 4 HOURS PRN
Status: DISCONTINUED | OUTPATIENT
Start: 2020-01-01 | End: 2020-01-01 | Stop reason: HOSPADM

## 2020-01-01 RX ORDER — CLOPIDOGREL BISULFATE 75 MG/1
75 TABLET ORAL DAILY
Status: DISCONTINUED | OUTPATIENT
Start: 2020-01-01 | End: 2020-01-01 | Stop reason: HOSPADM

## 2020-01-01 RX ORDER — LISINOPRIL 40 MG/1
40 TABLET ORAL DAILY
Status: DISCONTINUED | OUTPATIENT
Start: 2020-01-01 | End: 2020-01-01 | Stop reason: HOSPADM

## 2020-01-01 RX ORDER — DILTIAZEM HYDROCHLORIDE 240 MG/1
240 CAPSULE, COATED, EXTENDED RELEASE ORAL DAILY
Status: DISCONTINUED | OUTPATIENT
Start: 2020-01-01 | End: 2020-01-01 | Stop reason: HOSPADM

## 2020-01-01 RX ORDER — GUAIFENESIN 600 MG/1
600 TABLET, EXTENDED RELEASE ORAL EVERY 12 HOURS SCHEDULED
Status: DISCONTINUED | OUTPATIENT
Start: 2020-01-01 | End: 2020-01-01 | Stop reason: HOSPADM

## 2020-01-01 RX ORDER — HYDROCODONE BITARTRATE AND ACETAMINOPHEN 10; 325 MG/1; MG/1
1 TABLET ORAL EVERY 6 HOURS PRN
Qty: 20 TABLET | Refills: 0 | Status: SHIPPED | OUTPATIENT
Start: 2020-01-01 | End: 2020-01-01

## 2020-01-01 RX ORDER — DOXYCYCLINE 100 MG/1
100 CAPSULE ORAL EVERY 12 HOURS SCHEDULED
Qty: 9 CAPSULE | Refills: 0 | Status: SHIPPED | OUTPATIENT
Start: 2020-01-01 | End: 2020-01-01

## 2020-01-01 RX ORDER — LOPERAMIDE HYDROCHLORIDE 2 MG/1
4 CAPSULE ORAL 4 TIMES DAILY PRN
Status: DISCONTINUED | OUTPATIENT
Start: 2020-01-01 | End: 2020-01-01 | Stop reason: HOSPADM

## 2020-01-01 RX ORDER — TERAZOSIN 2 MG/1
2 CAPSULE ORAL NIGHTLY
Status: DISCONTINUED | OUTPATIENT
Start: 2020-01-01 | End: 2020-01-01 | Stop reason: HOSPADM

## 2020-01-01 RX ORDER — ROSUVASTATIN CALCIUM 20 MG/1
40 TABLET, COATED ORAL DAILY
Status: DISCONTINUED | OUTPATIENT
Start: 2020-01-01 | End: 2020-01-01 | Stop reason: HOSPADM

## 2020-01-01 RX ORDER — PREDNISONE 20 MG/1
40 TABLET ORAL DAILY
Qty: 6 TABLET | Refills: 0 | Status: SHIPPED | OUTPATIENT
Start: 2020-01-01 | End: 2020-01-01

## 2020-01-01 RX ORDER — DEXTROSE MONOHYDRATE 25 G/50ML
INJECTION, SOLUTION INTRAVENOUS
Status: COMPLETED
Start: 2020-01-01 | End: 2020-01-01

## 2020-01-01 RX ORDER — PHENOBARBITAL 100 MG/1
TABLET ORAL
Qty: 45 TABLET | Refills: 0 | Status: SHIPPED | OUTPATIENT
Start: 2020-01-01 | End: 2020-01-01

## 2020-01-01 RX ORDER — ACETAMINOPHEN 325 MG/1
650 TABLET ORAL EVERY 4 HOURS PRN
Status: DISCONTINUED | OUTPATIENT
Start: 2020-01-01 | End: 2020-01-01 | Stop reason: HOSPADM

## 2020-01-01 RX ORDER — PANTOPRAZOLE SODIUM 40 MG/1
40 TABLET, DELAYED RELEASE ORAL
Status: DISCONTINUED | OUTPATIENT
Start: 2020-01-01 | End: 2020-01-01 | Stop reason: HOSPADM

## 2020-01-01 RX ORDER — CITALOPRAM 20 MG/1
20 TABLET ORAL DAILY
Qty: 30 TABLET | Refills: 3 | Status: SHIPPED | OUTPATIENT
Start: 2020-01-01 | End: 2021-01-01 | Stop reason: SDUPTHER

## 2020-01-01 RX ORDER — HEPARIN SODIUM 5000 [USP'U]/ML
2500 INJECTION, SOLUTION INTRAVENOUS; SUBCUTANEOUS EVERY 8 HOURS SCHEDULED
Status: DISCONTINUED | OUTPATIENT
Start: 2020-01-01 | End: 2020-01-01 | Stop reason: HOSPADM

## 2020-01-01 RX ORDER — PHENOBARBITAL 100 MG/1
TABLET ORAL
Qty: 45 TABLET | Refills: 0 | Status: SHIPPED | OUTPATIENT
Start: 2020-01-01 | End: 2021-01-01

## 2020-01-01 RX ORDER — HYDROCODONE BITARTRATE AND ACETAMINOPHEN 5; 325 MG/1; MG/1
1 TABLET ORAL ONCE
Status: COMPLETED | OUTPATIENT
Start: 2020-01-01 | End: 2020-01-01

## 2020-01-01 RX ORDER — ISOSORBIDE MONONITRATE 60 MG/1
60 TABLET, EXTENDED RELEASE ORAL DAILY
COMMUNITY
End: 2021-01-01 | Stop reason: SDUPTHER

## 2020-01-01 RX ORDER — PREDNISONE 20 MG/1
20 TABLET ORAL
Status: DISCONTINUED | OUTPATIENT
Start: 2020-01-01 | End: 2020-01-01 | Stop reason: HOSPADM

## 2020-01-01 RX ORDER — LISINOPRIL 40 MG/1
40 TABLET ORAL DAILY
COMMUNITY
End: 2021-01-01

## 2020-01-01 RX ORDER — SODIUM CHLORIDE 0.9 % (FLUSH) 0.9 %
10 SYRINGE (ML) INJECTION EVERY 12 HOURS SCHEDULED
Status: DISCONTINUED | OUTPATIENT
Start: 2020-01-01 | End: 2020-01-01 | Stop reason: HOSPADM

## 2020-01-01 RX ORDER — METHYLPREDNISOLONE SODIUM SUCCINATE 125 MG/2ML
125 INJECTION, POWDER, LYOPHILIZED, FOR SOLUTION INTRAMUSCULAR; INTRAVENOUS ONCE
Status: COMPLETED | OUTPATIENT
Start: 2020-01-01 | End: 2020-01-01

## 2020-01-01 RX ORDER — POTASSIUM CHLORIDE 20 MEQ/1
20 TABLET, EXTENDED RELEASE ORAL DAILY
COMMUNITY
End: 2021-01-01 | Stop reason: SDUPTHER

## 2020-01-01 RX ORDER — METHYLPREDNISOLONE SODIUM SUCCINATE 125 MG/2ML
60 INJECTION, POWDER, LYOPHILIZED, FOR SOLUTION INTRAMUSCULAR; INTRAVENOUS EVERY 8 HOURS SCHEDULED
Status: DISCONTINUED | OUTPATIENT
Start: 2020-01-01 | End: 2020-01-01

## 2020-01-01 RX ORDER — ALBUTEROL SULFATE 2.5 MG/3ML
2.5 SOLUTION RESPIRATORY (INHALATION) EVERY 4 HOURS PRN
Qty: 90 VIAL | Refills: 5 | Status: SHIPPED | OUTPATIENT
Start: 2020-01-01 | End: 2021-01-01

## 2020-01-01 RX ORDER — PHENOBARBITAL 97.2 MG/1
150 TABLET ORAL NIGHTLY
Status: DISCONTINUED | OUTPATIENT
Start: 2020-01-01 | End: 2020-01-01 | Stop reason: RX

## 2020-01-01 RX ORDER — ACETAMINOPHEN 650 MG/1
650 SUPPOSITORY RECTAL EVERY 4 HOURS PRN
Status: DISCONTINUED | OUTPATIENT
Start: 2020-01-01 | End: 2020-01-01 | Stop reason: HOSPADM

## 2020-01-01 RX ORDER — PANTOPRAZOLE SODIUM 40 MG/1
40 TABLET, DELAYED RELEASE ORAL
Qty: 60 TABLET | Refills: 0 | Status: SHIPPED | OUTPATIENT
Start: 2020-01-01 | End: 2021-01-01

## 2020-01-01 RX ORDER — FERROUS SULFATE 325(65) MG
325 TABLET ORAL
Status: DISCONTINUED | OUTPATIENT
Start: 2020-01-01 | End: 2020-01-01 | Stop reason: HOSPADM

## 2020-01-01 RX ORDER — FUROSEMIDE 20 MG/1
20 TABLET ORAL DAILY
Status: DISCONTINUED | OUTPATIENT
Start: 2020-01-01 | End: 2020-01-01 | Stop reason: HOSPADM

## 2020-01-01 RX ORDER — GUAIFENESIN 600 MG/1
600 TABLET, EXTENDED RELEASE ORAL EVERY 12 HOURS SCHEDULED
Qty: 60 TABLET | Refills: 0 | Status: SHIPPED | OUTPATIENT
Start: 2020-01-01 | End: 2021-01-01

## 2020-01-01 RX ORDER — IPRATROPIUM BROMIDE AND ALBUTEROL SULFATE 2.5; .5 MG/3ML; MG/3ML
3 SOLUTION RESPIRATORY (INHALATION) EVERY 4 HOURS PRN
Qty: 360 ML | Refills: 0 | Status: SHIPPED | OUTPATIENT
Start: 2020-01-01 | End: 2021-01-01 | Stop reason: SDUPTHER

## 2020-01-01 RX ORDER — NICOTINE 21 MG/24HR
1 PATCH, TRANSDERMAL 24 HOURS TRANSDERMAL
Status: DISCONTINUED | OUTPATIENT
Start: 2020-01-01 | End: 2020-01-01 | Stop reason: HOSPADM

## 2020-01-01 RX ORDER — DEXTROSE MONOHYDRATE 25 G/50ML
25 INJECTION, SOLUTION INTRAVENOUS ONCE
Status: COMPLETED | OUTPATIENT
Start: 2020-01-01 | End: 2020-01-01

## 2020-01-01 RX ORDER — ALBUTEROL SULFATE 90 UG/1
2 AEROSOL, METERED RESPIRATORY (INHALATION) EVERY 4 HOURS PRN
Qty: 18 G | Refills: 0 | Status: SHIPPED | OUTPATIENT
Start: 2020-01-01 | End: 2020-01-01

## 2020-01-01 RX ADMIN — HEPARIN SODIUM 2500 UNITS: 5000 INJECTION, SOLUTION INTRAVENOUS; SUBCUTANEOUS at 20:44

## 2020-01-01 RX ADMIN — FUROSEMIDE 20 MG: 20 TABLET ORAL at 09:59

## 2020-01-01 RX ADMIN — PANTOPRAZOLE SODIUM 40 MG: 40 TABLET, DELAYED RELEASE ORAL at 11:14

## 2020-01-01 RX ADMIN — IPRATROPIUM BROMIDE AND ALBUTEROL SULFATE 3 ML: 2.5; .5 SOLUTION RESPIRATORY (INHALATION) at 23:26

## 2020-01-01 RX ADMIN — IPRATROPIUM BROMIDE AND ALBUTEROL SULFATE 3 ML: 2.5; .5 SOLUTION RESPIRATORY (INHALATION) at 07:23

## 2020-01-01 RX ADMIN — METHYLPREDNISOLONE SODIUM SUCCINATE 125 MG: 125 INJECTION, POWDER, FOR SOLUTION INTRAMUSCULAR; INTRAVENOUS at 21:46

## 2020-01-01 RX ADMIN — PANTOPRAZOLE SODIUM 40 MG: 40 TABLET, DELAYED RELEASE ORAL at 18:19

## 2020-01-01 RX ADMIN — IPRATROPIUM BROMIDE AND ALBUTEROL SULFATE 3 ML: 2.5; .5 SOLUTION RESPIRATORY (INHALATION) at 07:42

## 2020-01-01 RX ADMIN — IPRATROPIUM BROMIDE AND ALBUTEROL SULFATE 3 ML: 2.5; .5 SOLUTION RESPIRATORY (INHALATION) at 14:53

## 2020-01-01 RX ADMIN — AZTREONAM 1000 MG: 1 INJECTION, POWDER, LYOPHILIZED, FOR SOLUTION INTRAMUSCULAR; INTRAVENOUS at 02:30

## 2020-01-01 RX ADMIN — ROSUVASTATIN CALCIUM 40 MG: 20 TABLET, COATED ORAL at 09:58

## 2020-01-01 RX ADMIN — Medication 1 PATCH: at 05:52

## 2020-01-01 RX ADMIN — TERAZOSIN HYDROCHLORIDE 2 MG: 2 CAPSULE ORAL at 21:32

## 2020-01-01 RX ADMIN — IPRATROPIUM BROMIDE AND ALBUTEROL SULFATE 3 ML: 2.5; .5 SOLUTION RESPIRATORY (INHALATION) at 18:35

## 2020-01-01 RX ADMIN — ASPIRIN 81 MG: 81 TABLET, FILM COATED ORAL at 09:59

## 2020-01-01 RX ADMIN — HEPARIN SODIUM 2500 UNITS: 5000 INJECTION, SOLUTION INTRAVENOUS; SUBCUTANEOUS at 13:41

## 2020-01-01 RX ADMIN — ROSUVASTATIN CALCIUM 40 MG: 20 TABLET, COATED ORAL at 09:23

## 2020-01-01 RX ADMIN — CLOPIDOGREL BISULFATE 75 MG: 75 TABLET ORAL at 11:14

## 2020-01-01 RX ADMIN — DILTIAZEM HYDROCHLORIDE 240 MG: 240 CAPSULE, EXTENDED RELEASE ORAL at 09:23

## 2020-01-01 RX ADMIN — DOXYCYCLINE 100 MG: 100 CAPSULE ORAL at 09:59

## 2020-01-01 RX ADMIN — TERAZOSIN HYDROCHLORIDE 2 MG: 2 CAPSULE ORAL at 21:29

## 2020-01-01 RX ADMIN — PREDNISONE 20 MG: 20 TABLET ORAL at 11:19

## 2020-01-01 RX ADMIN — IPRATROPIUM BROMIDE AND ALBUTEROL SULFATE 3 ML: 2.5; .5 SOLUTION RESPIRATORY (INHALATION) at 03:49

## 2020-01-01 RX ADMIN — DOXYCYCLINE 100 MG: 100 INJECTION, POWDER, LYOPHILIZED, FOR SOLUTION INTRAVENOUS at 05:57

## 2020-01-01 RX ADMIN — Medication 1 PATCH: at 05:58

## 2020-01-01 RX ADMIN — FUROSEMIDE 20 MG: 20 TABLET ORAL at 09:23

## 2020-01-01 RX ADMIN — IPRATROPIUM BROMIDE AND ALBUTEROL SULFATE 3 ML: 2.5; .5 SOLUTION RESPIRATORY (INHALATION) at 18:53

## 2020-01-01 RX ADMIN — METHYLPREDNISOLONE SODIUM SUCCINATE 60 MG: 125 INJECTION, POWDER, FOR SOLUTION INTRAMUSCULAR; INTRAVENOUS at 02:29

## 2020-01-01 RX ADMIN — IPRATROPIUM BROMIDE AND ALBUTEROL SULFATE 3 ML: 2.5; .5 SOLUTION RESPIRATORY (INHALATION) at 03:36

## 2020-01-01 RX ADMIN — IPRATROPIUM BROMIDE AND ALBUTEROL SULFATE 3 ML: 2.5; .5 SOLUTION RESPIRATORY (INHALATION) at 07:57

## 2020-01-01 RX ADMIN — IPRATROPIUM BROMIDE AND ALBUTEROL SULFATE 3 ML: 2.5; .5 SOLUTION RESPIRATORY (INHALATION) at 19:39

## 2020-01-01 RX ADMIN — PREDNISONE 20 MG: 20 TABLET ORAL at 09:58

## 2020-01-01 RX ADMIN — HEPARIN SODIUM 2500 UNITS: 5000 INJECTION, SOLUTION INTRAVENOUS; SUBCUTANEOUS at 13:12

## 2020-01-01 RX ADMIN — IPRATROPIUM BROMIDE AND ALBUTEROL SULFATE 3 ML: 2.5; .5 SOLUTION RESPIRATORY (INHALATION) at 23:50

## 2020-01-01 RX ADMIN — METHYLPREDNISOLONE SODIUM SUCCINATE 60 MG: 125 INJECTION, POWDER, FOR SOLUTION INTRAMUSCULAR; INTRAVENOUS at 09:02

## 2020-01-01 RX ADMIN — INFLUENZA VIRUS VACCINE 0.5 ML: 15; 15; 15; 15 SUSPENSION INTRAMUSCULAR at 15:45

## 2020-01-01 RX ADMIN — IPRATROPIUM BROMIDE AND ALBUTEROL SULFATE 3 ML: 2.5; .5 SOLUTION RESPIRATORY (INHALATION) at 15:38

## 2020-01-01 RX ADMIN — IPRATROPIUM BROMIDE AND ALBUTEROL SULFATE 3 ML: 2.5; .5 SOLUTION RESPIRATORY (INHALATION) at 12:10

## 2020-01-01 RX ADMIN — AZTREONAM 1000 MG: 1 INJECTION, POWDER, LYOPHILIZED, FOR SOLUTION INTRAMUSCULAR; INTRAVENOUS at 18:19

## 2020-01-01 RX ADMIN — CLOPIDOGREL BISULFATE 75 MG: 75 TABLET ORAL at 09:23

## 2020-01-01 RX ADMIN — HEPARIN SODIUM 2500 UNITS: 5000 INJECTION, SOLUTION INTRAVENOUS; SUBCUTANEOUS at 15:44

## 2020-01-01 RX ADMIN — IPRATROPIUM BROMIDE AND ALBUTEROL SULFATE 3 ML: 2.5; .5 SOLUTION RESPIRATORY (INHALATION) at 12:41

## 2020-01-01 RX ADMIN — LOPERAMIDE HYDROCHLORIDE 4 MG: 2 CAPSULE ORAL at 13:06

## 2020-01-01 RX ADMIN — LISINOPRIL 40 MG: 40 TABLET ORAL at 11:13

## 2020-01-01 RX ADMIN — DILTIAZEM HYDROCHLORIDE 240 MG: 240 CAPSULE, EXTENDED RELEASE ORAL at 11:12

## 2020-01-01 RX ADMIN — DEXTROSE MONOHYDRATE 25 G: 500 INJECTION PARENTERAL at 19:46

## 2020-01-01 RX ADMIN — FUROSEMIDE 20 MG: 20 TABLET ORAL at 11:15

## 2020-01-01 RX ADMIN — PHENOBARBITAL 145.8 MG: 32.4 TABLET ORAL at 21:35

## 2020-01-01 RX ADMIN — GUAIFENESIN 600 MG: 600 TABLET, EXTENDED RELEASE ORAL at 11:19

## 2020-01-01 RX ADMIN — Medication 1 PATCH: at 05:43

## 2020-01-01 RX ADMIN — HEPARIN SODIUM 2500 UNITS: 5000 INJECTION, SOLUTION INTRAVENOUS; SUBCUTANEOUS at 05:43

## 2020-01-01 RX ADMIN — ROSUVASTATIN CALCIUM 40 MG: 20 TABLET, COATED ORAL at 11:13

## 2020-01-01 RX ADMIN — ENOXAPARIN SODIUM 40 MG: 40 INJECTION SUBCUTANEOUS at 09:02

## 2020-01-01 RX ADMIN — FERROUS SULFATE TAB 325 MG (65 MG ELEMENTAL FE) 325 MG: 325 (65 FE) TAB at 09:58

## 2020-01-01 RX ADMIN — DOXYCYCLINE 100 MG: 100 CAPSULE ORAL at 09:23

## 2020-01-01 RX ADMIN — CLOPIDOGREL BISULFATE 75 MG: 75 TABLET ORAL at 09:58

## 2020-01-01 RX ADMIN — PREDNISONE 20 MG: 20 TABLET ORAL at 09:24

## 2020-01-01 RX ADMIN — SODIUM CHLORIDE, PRESERVATIVE FREE 10 ML: 5 INJECTION INTRAVENOUS at 21:39

## 2020-01-01 RX ADMIN — HEPARIN SODIUM 2500 UNITS: 5000 INJECTION, SOLUTION INTRAVENOUS; SUBCUTANEOUS at 05:57

## 2020-01-01 RX ADMIN — FERROUS SULFATE TAB 325 MG (65 MG ELEMENTAL FE) 325 MG: 325 (65 FE) TAB at 09:24

## 2020-01-01 RX ADMIN — PHENOBARBITAL 145.8 MG: 32.4 TABLET ORAL at 21:32

## 2020-01-01 RX ADMIN — LOPERAMIDE HYDROCHLORIDE 4 MG: 2 CAPSULE ORAL at 21:31

## 2020-01-01 RX ADMIN — DOXYCYCLINE 100 MG: 100 INJECTION, POWDER, LYOPHILIZED, FOR SOLUTION INTRAVENOUS at 02:30

## 2020-01-01 RX ADMIN — AZTREONAM 1000 MG: 1 INJECTION, POWDER, LYOPHILIZED, FOR SOLUTION INTRAMUSCULAR; INTRAVENOUS at 09:02

## 2020-01-01 RX ADMIN — GUAIFENESIN 600 MG: 600 TABLET, EXTENDED RELEASE ORAL at 09:58

## 2020-01-01 RX ADMIN — PANTOPRAZOLE SODIUM 40 MG: 40 TABLET, DELAYED RELEASE ORAL at 17:22

## 2020-01-01 RX ADMIN — DOXYCYCLINE 100 MG: 100 CAPSULE ORAL at 21:31

## 2020-01-01 RX ADMIN — PANTOPRAZOLE SODIUM 40 MG: 40 TABLET, DELAYED RELEASE ORAL at 17:37

## 2020-01-01 RX ADMIN — IPRATROPIUM BROMIDE AND ALBUTEROL SULFATE 3 ML: 2.5; .5 SOLUTION RESPIRATORY (INHALATION) at 07:08

## 2020-01-01 RX ADMIN — GUAIFENESIN 600 MG: 600 TABLET, EXTENDED RELEASE ORAL at 09:23

## 2020-01-01 RX ADMIN — DOXYCYCLINE 100 MG: 100 INJECTION, POWDER, LYOPHILIZED, FOR SOLUTION INTRAVENOUS at 18:19

## 2020-01-01 RX ADMIN — Medication 1 PATCH: at 02:29

## 2020-01-01 RX ADMIN — METHYLPREDNISOLONE SODIUM SUCCINATE 60 MG: 125 INJECTION, POWDER, FOR SOLUTION INTRAMUSCULAR; INTRAVENOUS at 00:15

## 2020-01-01 RX ADMIN — PANTOPRAZOLE SODIUM 40 MG: 40 TABLET, DELAYED RELEASE ORAL at 18:33

## 2020-01-01 RX ADMIN — DILTIAZEM HYDROCHLORIDE 240 MG: 240 CAPSULE, EXTENDED RELEASE ORAL at 09:58

## 2020-01-01 RX ADMIN — SODIUM CHLORIDE, PRESERVATIVE FREE 10 ML: 5 INJECTION INTRAVENOUS at 02:29

## 2020-01-01 RX ADMIN — SODIUM CHLORIDE, PRESERVATIVE FREE 10 ML: 5 INJECTION INTRAVENOUS at 09:24

## 2020-01-01 RX ADMIN — AZTREONAM 1000 MG: 1 INJECTION, POWDER, LYOPHILIZED, FOR SOLUTION INTRAMUSCULAR; INTRAVENOUS at 01:46

## 2020-01-01 RX ADMIN — METHYLPREDNISOLONE SODIUM SUCCINATE 60 MG: 125 INJECTION, POWDER, FOR SOLUTION INTRAMUSCULAR; INTRAVENOUS at 18:19

## 2020-01-01 RX ADMIN — LISINOPRIL 40 MG: 40 TABLET ORAL at 09:59

## 2020-01-01 RX ADMIN — GUAIFENESIN 600 MG: 600 TABLET, EXTENDED RELEASE ORAL at 21:31

## 2020-01-01 RX ADMIN — DOXYCYCLINE 100 MG: 100 CAPSULE ORAL at 20:43

## 2020-01-01 RX ADMIN — IPRATROPIUM BROMIDE AND ALBUTEROL SULFATE 3 ML: 2.5; .5 SOLUTION RESPIRATORY (INHALATION) at 02:52

## 2020-01-01 RX ADMIN — LISINOPRIL 40 MG: 40 TABLET ORAL at 09:23

## 2020-01-01 RX ADMIN — FERROUS SULFATE TAB 325 MG (65 MG ELEMENTAL FE) 325 MG: 325 (65 FE) TAB at 11:14

## 2020-01-01 RX ADMIN — PANTOPRAZOLE SODIUM 40 MG: 40 TABLET, DELAYED RELEASE ORAL at 09:59

## 2020-01-01 RX ADMIN — DEXTROSE MONOHYDRATE 25 G: 25 INJECTION, SOLUTION INTRAVENOUS at 19:46

## 2020-01-01 RX ADMIN — ASPIRIN 81 MG: 81 TABLET, FILM COATED ORAL at 11:14

## 2020-01-01 RX ADMIN — DOXYCYCLINE 100 MG: 100 CAPSULE ORAL at 13:06

## 2020-01-01 RX ADMIN — HYDROCODONE BITARTRATE AND ACETAMINOPHEN 1 TABLET: 5; 325 TABLET ORAL at 18:35

## 2020-01-01 RX ADMIN — GUAIFENESIN 600 MG: 600 TABLET, EXTENDED RELEASE ORAL at 20:43

## 2020-01-01 RX ADMIN — TERAZOSIN HYDROCHLORIDE 2 MG: 2 CAPSULE ORAL at 20:41

## 2020-01-01 RX ADMIN — PANTOPRAZOLE SODIUM 40 MG: 40 TABLET, DELAYED RELEASE ORAL at 09:23

## 2020-01-01 RX ADMIN — SODIUM CHLORIDE, PRESERVATIVE FREE 10 ML: 5 INJECTION INTRAVENOUS at 20:44

## 2020-01-01 RX ADMIN — HEPARIN SODIUM 2500 UNITS: 5000 INJECTION, SOLUTION INTRAVENOUS; SUBCUTANEOUS at 21:31

## 2020-01-01 RX ADMIN — SODIUM CHLORIDE, PRESERVATIVE FREE 10 ML: 5 INJECTION INTRAVENOUS at 21:33

## 2020-01-01 RX ADMIN — IPRATROPIUM BROMIDE AND ALBUTEROL SULFATE 3 ML: 2.5; .5 SOLUTION RESPIRATORY (INHALATION) at 23:21

## 2020-01-01 RX ADMIN — HEPARIN SODIUM 2500 UNITS: 5000 INJECTION, SOLUTION INTRAVENOUS; SUBCUTANEOUS at 05:52

## 2020-01-01 RX ADMIN — ASPIRIN 81 MG: 81 TABLET, FILM COATED ORAL at 09:24

## 2020-01-01 RX ADMIN — PHENOBARBITAL 145.8 MG: 32.4 TABLET ORAL at 20:39

## 2020-01-02 DIAGNOSIS — I10 ESSENTIAL HYPERTENSION: ICD-10-CM

## 2020-01-02 RX ORDER — DOXAZOSIN 2 MG/1
TABLET ORAL
Qty: 90 TABLET | Refills: 0 | OUTPATIENT
Start: 2020-01-02

## 2020-05-20 RX ORDER — NITROGLYCERIN 40 MG/1
1 PATCH TRANSDERMAL DAILY
Qty: 30 PATCH | Refills: 1 | Status: SHIPPED | OUTPATIENT
Start: 2020-05-20 | End: 2020-08-31 | Stop reason: HOSPADM

## 2020-07-30 RX ORDER — NITROGLYCERIN 40 MG/1
PATCH TRANSDERMAL
Qty: 30 PATCH | Refills: 1 | OUTPATIENT
Start: 2020-07-30

## 2020-08-29 ENCOUNTER — APPOINTMENT (OUTPATIENT)
Dept: GENERAL RADIOLOGY | Facility: HOSPITAL | Age: 70
End: 2020-08-29

## 2020-08-29 ENCOUNTER — APPOINTMENT (OUTPATIENT)
Dept: CT IMAGING | Facility: HOSPITAL | Age: 70
End: 2020-08-29

## 2020-08-29 ENCOUNTER — HOSPITAL ENCOUNTER (OUTPATIENT)
Facility: HOSPITAL | Age: 70
Setting detail: OBSERVATION
Discharge: HOME | End: 2020-08-31
Attending: EMERGENCY MEDICINE | Admitting: INTERNAL MEDICINE

## 2020-08-29 DIAGNOSIS — I50.22 CHRONIC SYSTOLIC CONGESTIVE HEART FAILURE (HCC): ICD-10-CM

## 2020-08-29 DIAGNOSIS — E16.2 HYPOGLYCEMIA: ICD-10-CM

## 2020-08-29 DIAGNOSIS — K62.5 RECTAL BLEEDING: ICD-10-CM

## 2020-08-29 DIAGNOSIS — R11.2 NON-INTRACTABLE VOMITING WITH NAUSEA, UNSPECIFIED VOMITING TYPE: ICD-10-CM

## 2020-08-29 DIAGNOSIS — J43.8 OTHER EMPHYSEMA (HCC): ICD-10-CM

## 2020-08-29 DIAGNOSIS — N30.00 ACUTE CYSTITIS WITHOUT HEMATURIA: Primary | ICD-10-CM

## 2020-08-29 DIAGNOSIS — I10 ESSENTIAL HYPERTENSION: ICD-10-CM

## 2020-08-29 DIAGNOSIS — R33.9 URINARY RETENTION: ICD-10-CM

## 2020-08-29 DIAGNOSIS — R10.84 GENERALIZED ABDOMINAL PAIN: ICD-10-CM

## 2020-08-29 DIAGNOSIS — I48.0 PAF (PAROXYSMAL ATRIAL FIBRILLATION) (HCC): ICD-10-CM

## 2020-08-29 DIAGNOSIS — Z74.09 IMPAIRED MOBILITY AND ADLS: ICD-10-CM

## 2020-08-29 DIAGNOSIS — Z78.9 IMPAIRED MOBILITY AND ADLS: ICD-10-CM

## 2020-08-29 DIAGNOSIS — N39.0 ACUTE UTI (URINARY TRACT INFECTION): ICD-10-CM

## 2020-08-29 PROBLEM — R10.9 ABDOMINAL PAIN: Status: ACTIVE | Noted: 2020-08-29

## 2020-08-29 PROBLEM — R29.898 LEFT LEG WEAKNESS: Status: RESOLVED | Noted: 2017-02-01 | Resolved: 2020-08-29

## 2020-08-29 PROBLEM — R19.5 FECAL OCCULT BLOOD TEST POSITIVE: Status: ACTIVE | Noted: 2020-08-29

## 2020-08-29 PROBLEM — Z72.0 TOBACCO ABUSE: Status: ACTIVE | Noted: 2020-08-29

## 2020-08-29 PROBLEM — Z99.81 DEPENDENCE ON SUPPLEMENTAL OXYGEN: Status: ACTIVE | Noted: 2020-08-29

## 2020-08-29 PROBLEM — M54.42 CHRONIC LEFT-SIDED LOW BACK PAIN WITH LEFT-SIDED SCIATICA: Status: RESOLVED | Noted: 2017-02-01 | Resolved: 2020-08-29

## 2020-08-29 PROBLEM — M25.552 LEFT HIP PAIN: Status: RESOLVED | Noted: 2018-05-02 | Resolved: 2020-08-29

## 2020-08-29 PROBLEM — G89.29 CHRONIC LEFT-SIDED LOW BACK PAIN WITH LEFT-SIDED SCIATICA: Status: RESOLVED | Noted: 2017-02-01 | Resolved: 2020-08-29

## 2020-08-29 PROBLEM — F32.A DEPRESSION: Status: RESOLVED | Noted: 2018-07-03 | Resolved: 2020-08-29

## 2020-08-29 PROBLEM — K57.30 DIVERTICULOSIS OF LARGE INTESTINE WITHOUT HEMORRHAGE: Status: RESOLVED | Noted: 2017-05-05 | Resolved: 2020-08-29

## 2020-08-29 LAB
ALBUMIN SERPL-MCNC: 4 G/DL (ref 3.5–5.2)
ALBUMIN/GLOB SERPL: 1 G/DL
ALP SERPL-CCNC: 145 U/L (ref 39–117)
ALT SERPL W P-5'-P-CCNC: 25 U/L (ref 1–33)
ANION GAP SERPL CALCULATED.3IONS-SCNC: 9 MMOL/L (ref 5–15)
AST SERPL-CCNC: 22 U/L (ref 1–32)
BACTERIA UR QL AUTO: ABNORMAL /HPF
BASOPHILS # BLD AUTO: 0.02 10*3/MM3 (ref 0–0.2)
BASOPHILS NFR BLD AUTO: 0.3 % (ref 0–1.5)
BILIRUB SERPL-MCNC: <0.2 MG/DL (ref 0–1.2)
BILIRUB UR QL STRIP: NEGATIVE
BUN SERPL-MCNC: 15 MG/DL (ref 8–23)
BUN/CREAT SERPL: 20.8 (ref 7–25)
CALCIUM SPEC-SCNC: 9.4 MG/DL (ref 8.6–10.5)
CHLORIDE SERPL-SCNC: 97 MMOL/L (ref 98–107)
CLARITY UR: CLEAR
CO2 SERPL-SCNC: 28 MMOL/L (ref 22–29)
COLOR UR: YELLOW
CREAT SERPL-MCNC: 0.72 MG/DL (ref 0.57–1)
D-LACTATE SERPL-SCNC: 1.3 MMOL/L (ref 0.5–2)
DEPRECATED RDW RBC AUTO: 59.2 FL (ref 37–54)
DEVELOPER EXPIRATION DATE: ABNORMAL
DEVELOPER LOT NUMBER: ABNORMAL
EOSINOPHIL # BLD AUTO: 0.14 10*3/MM3 (ref 0–0.4)
EOSINOPHIL NFR BLD AUTO: 2.2 % (ref 0.3–6.2)
ERYTHROCYTE [DISTWIDTH] IN BLOOD BY AUTOMATED COUNT: 15.8 % (ref 12.3–15.4)
EXPIRATION DATE: ABNORMAL
FECAL OCCULT BLOOD SCREEN, POC: POSITIVE
GFR SERPL CREATININE-BSD FRML MDRD: 80 ML/MIN/1.73
GLOBULIN UR ELPH-MCNC: 4.2 GM/DL
GLUCOSE BLDC GLUCOMTR-MCNC: 119 MG/DL (ref 70–130)
GLUCOSE BLDC GLUCOMTR-MCNC: 190 MG/DL (ref 70–130)
GLUCOSE SERPL-MCNC: 56 MG/DL (ref 65–99)
GLUCOSE UR STRIP-MCNC: ABNORMAL MG/DL
HCT VFR BLD AUTO: 39.2 % (ref 34–46.6)
HGB BLD-MCNC: 12.2 G/DL (ref 12–15.9)
HGB UR QL STRIP.AUTO: NEGATIVE
HOLD SPECIMEN: NORMAL
HOLD SPECIMEN: NORMAL
HYALINE CASTS UR QL AUTO: ABNORMAL /LPF
IMM GRANULOCYTES # BLD AUTO: 0.03 10*3/MM3 (ref 0–0.05)
IMM GRANULOCYTES NFR BLD AUTO: 0.5 % (ref 0–0.5)
KETONES UR QL STRIP: NEGATIVE
LEUKOCYTE ESTERASE UR QL STRIP.AUTO: ABNORMAL
LIPASE SERPL-CCNC: 31 U/L (ref 13–60)
LYMPHOCYTES # BLD AUTO: 2.1 10*3/MM3 (ref 0.7–3.1)
LYMPHOCYTES NFR BLD AUTO: 33.2 % (ref 19.6–45.3)
Lab: ABNORMAL
MCH RBC QN AUTO: 31.4 PG (ref 26.6–33)
MCHC RBC AUTO-ENTMCNC: 31.1 G/DL (ref 31.5–35.7)
MCV RBC AUTO: 100.8 FL (ref 79–97)
MONOCYTES # BLD AUTO: 0.58 10*3/MM3 (ref 0.1–0.9)
MONOCYTES NFR BLD AUTO: 9.2 % (ref 5–12)
NEGATIVE CONTROL: NEGATIVE
NEUTROPHILS NFR BLD AUTO: 3.45 10*3/MM3 (ref 1.7–7)
NEUTROPHILS NFR BLD AUTO: 54.6 % (ref 42.7–76)
NITRITE UR QL STRIP: POSITIVE
NRBC BLD AUTO-RTO: 0 /100 WBC (ref 0–0.2)
PH UR STRIP.AUTO: 5.5 [PH] (ref 5–8)
PLATELET # BLD AUTO: 378 10*3/MM3 (ref 140–450)
PMV BLD AUTO: 9.7 FL (ref 6–12)
POSITIVE CONTROL: POSITIVE
POTASSIUM SERPL-SCNC: 4.7 MMOL/L (ref 3.5–5.2)
PROCALCITONIN SERPL-MCNC: 0.04 NG/ML (ref 0–0.25)
PROT SERPL-MCNC: 8.2 G/DL (ref 6–8.5)
PROT UR QL STRIP: NEGATIVE
RBC # BLD AUTO: 3.89 10*6/MM3 (ref 3.77–5.28)
RBC # UR: ABNORMAL /HPF
REF LAB TEST METHOD: ABNORMAL
SODIUM SERPL-SCNC: 134 MMOL/L (ref 136–145)
SP GR UR STRIP: 1.01 (ref 1–1.03)
SQUAMOUS #/AREA URNS HPF: ABNORMAL /HPF
UROBILINOGEN UR QL STRIP: ABNORMAL
WBC # BLD AUTO: 6.32 10*3/MM3 (ref 3.4–10.8)
WBC UR QL AUTO: ABNORMAL /HPF
WHOLE BLOOD HOLD SPECIMEN: NORMAL
WHOLE BLOOD HOLD SPECIMEN: NORMAL

## 2020-08-29 PROCEDURE — 83605 ASSAY OF LACTIC ACID: CPT | Performed by: EMERGENCY MEDICINE

## 2020-08-29 PROCEDURE — 82962 GLUCOSE BLOOD TEST: CPT

## 2020-08-29 PROCEDURE — 25010000002 LEVOFLOXACIN PER 250 MG: Performed by: PHYSICIAN ASSISTANT

## 2020-08-29 PROCEDURE — 93005 ELECTROCARDIOGRAM TRACING: CPT | Performed by: PHYSICIAN ASSISTANT

## 2020-08-29 PROCEDURE — 87186 SC STD MICRODIL/AGAR DIL: CPT | Performed by: NURSE PRACTITIONER

## 2020-08-29 PROCEDURE — 87088 URINE BACTERIA CULTURE: CPT | Performed by: NURSE PRACTITIONER

## 2020-08-29 PROCEDURE — G0378 HOSPITAL OBSERVATION PER HR: HCPCS

## 2020-08-29 PROCEDURE — 99220 PR INITIAL OBSERVATION CARE/DAY 70 MINUTES: CPT | Performed by: NURSE PRACTITIONER

## 2020-08-29 PROCEDURE — 51798 US URINE CAPACITY MEASURE: CPT

## 2020-08-29 PROCEDURE — 81001 URINALYSIS AUTO W/SCOPE: CPT | Performed by: EMERGENCY MEDICINE

## 2020-08-29 PROCEDURE — 94640 AIRWAY INHALATION TREATMENT: CPT

## 2020-08-29 PROCEDURE — 84145 PROCALCITONIN (PCT): CPT | Performed by: PHYSICIAN ASSISTANT

## 2020-08-29 PROCEDURE — 83690 ASSAY OF LIPASE: CPT | Performed by: EMERGENCY MEDICINE

## 2020-08-29 PROCEDURE — 25010000002 MORPHINE PER 10 MG: Performed by: EMERGENCY MEDICINE

## 2020-08-29 PROCEDURE — 25010000002 IOPAMIDOL 61 % SOLUTION: Performed by: EMERGENCY MEDICINE

## 2020-08-29 PROCEDURE — 74177 CT ABD & PELVIS W/CONTRAST: CPT

## 2020-08-29 PROCEDURE — 96375 TX/PRO/DX INJ NEW DRUG ADDON: CPT

## 2020-08-29 PROCEDURE — 85025 COMPLETE CBC W/AUTO DIFF WBC: CPT | Performed by: EMERGENCY MEDICINE

## 2020-08-29 PROCEDURE — 94799 UNLISTED PULMONARY SVC/PX: CPT

## 2020-08-29 PROCEDURE — 82270 OCCULT BLOOD FECES: CPT | Performed by: PHYSICIAN ASSISTANT

## 2020-08-29 PROCEDURE — 99285 EMERGENCY DEPT VISIT HI MDM: CPT

## 2020-08-29 PROCEDURE — 87086 URINE CULTURE/COLONY COUNT: CPT | Performed by: NURSE PRACTITIONER

## 2020-08-29 PROCEDURE — 80053 COMPREHEN METABOLIC PANEL: CPT | Performed by: EMERGENCY MEDICINE

## 2020-08-29 PROCEDURE — 25010000002 ONDANSETRON PER 1 MG: Performed by: EMERGENCY MEDICINE

## 2020-08-29 PROCEDURE — 71045 X-RAY EXAM CHEST 1 VIEW: CPT

## 2020-08-29 PROCEDURE — 87040 BLOOD CULTURE FOR BACTERIA: CPT | Performed by: PHYSICIAN ASSISTANT

## 2020-08-29 RX ORDER — ONDANSETRON 2 MG/ML
4 INJECTION INTRAMUSCULAR; INTRAVENOUS ONCE
Status: COMPLETED | OUTPATIENT
Start: 2020-08-29 | End: 2020-08-29

## 2020-08-29 RX ORDER — DEXTROSE MONOHYDRATE 25 G/50ML
25 INJECTION, SOLUTION INTRAVENOUS
Status: DISCONTINUED | OUTPATIENT
Start: 2020-08-29 | End: 2020-08-31 | Stop reason: HOSPADM

## 2020-08-29 RX ORDER — SODIUM CHLORIDE 0.9 % (FLUSH) 0.9 %
10 SYRINGE (ML) INJECTION AS NEEDED
Status: DISCONTINUED | OUTPATIENT
Start: 2020-08-29 | End: 2020-08-31 | Stop reason: HOSPADM

## 2020-08-29 RX ORDER — MORPHINE SULFATE 4 MG/ML
4 INJECTION, SOLUTION INTRAMUSCULAR; INTRAVENOUS ONCE
Status: COMPLETED | OUTPATIENT
Start: 2020-08-29 | End: 2020-08-29

## 2020-08-29 RX ORDER — TERAZOSIN 2 MG/1
2 CAPSULE ORAL NIGHTLY
Status: DISCONTINUED | OUTPATIENT
Start: 2020-08-29 | End: 2020-08-31 | Stop reason: HOSPADM

## 2020-08-29 RX ORDER — IPRATROPIUM BROMIDE AND ALBUTEROL SULFATE 2.5; .5 MG/3ML; MG/3ML
3 SOLUTION RESPIRATORY (INHALATION) EVERY 4 HOURS PRN
Status: DISCONTINUED | OUTPATIENT
Start: 2020-08-29 | End: 2020-08-31 | Stop reason: HOSPADM

## 2020-08-29 RX ORDER — FERROUS SULFATE 325(65) MG
325 TABLET ORAL 2 TIMES DAILY WITH MEALS
Status: DISCONTINUED | OUTPATIENT
Start: 2020-08-30 | End: 2020-08-31 | Stop reason: HOSPADM

## 2020-08-29 RX ORDER — LEVOFLOXACIN 5 MG/ML
750 INJECTION, SOLUTION INTRAVENOUS ONCE
Status: COMPLETED | OUTPATIENT
Start: 2020-08-29 | End: 2020-08-30

## 2020-08-29 RX ORDER — ASPIRIN 81 MG/1
81 TABLET ORAL DAILY
Status: DISCONTINUED | OUTPATIENT
Start: 2020-08-30 | End: 2020-08-31 | Stop reason: HOSPADM

## 2020-08-29 RX ORDER — PANTOPRAZOLE SODIUM 40 MG/10ML
40 INJECTION, POWDER, LYOPHILIZED, FOR SOLUTION INTRAVENOUS ONCE
Status: COMPLETED | OUTPATIENT
Start: 2020-08-29 | End: 2020-08-29

## 2020-08-29 RX ORDER — ACETAMINOPHEN 325 MG/1
650 TABLET ORAL EVERY 4 HOURS PRN
Status: DISCONTINUED | OUTPATIENT
Start: 2020-08-29 | End: 2020-08-31 | Stop reason: HOSPADM

## 2020-08-29 RX ORDER — DILTIAZEM HYDROCHLORIDE 240 MG/1
240 CAPSULE, COATED, EXTENDED RELEASE ORAL DAILY
Status: DISCONTINUED | OUTPATIENT
Start: 2020-08-30 | End: 2020-08-31 | Stop reason: HOSPADM

## 2020-08-29 RX ORDER — DICYCLOMINE HYDROCHLORIDE 10 MG/1
10 CAPSULE ORAL EVERY 8 HOURS PRN
Status: DISCONTINUED | OUTPATIENT
Start: 2020-08-29 | End: 2020-08-31 | Stop reason: HOSPADM

## 2020-08-29 RX ORDER — LEVOFLOXACIN 5 MG/ML
500 INJECTION, SOLUTION INTRAVENOUS EVERY 24 HOURS
Status: DISCONTINUED | OUTPATIENT
Start: 2020-08-30 | End: 2020-08-30

## 2020-08-29 RX ORDER — SODIUM CHLORIDE 9 MG/ML
75 INJECTION, SOLUTION INTRAVENOUS CONTINUOUS
Status: DISCONTINUED | OUTPATIENT
Start: 2020-08-29 | End: 2020-08-31 | Stop reason: HOSPADM

## 2020-08-29 RX ORDER — DEXTROSE MONOHYDRATE 25 G/50ML
25 INJECTION, SOLUTION INTRAVENOUS ONCE
Status: COMPLETED | OUTPATIENT
Start: 2020-08-29 | End: 2020-08-29

## 2020-08-29 RX ORDER — CLOPIDOGREL BISULFATE 75 MG/1
75 TABLET ORAL DAILY
Status: DISCONTINUED | OUTPATIENT
Start: 2020-08-30 | End: 2020-08-31 | Stop reason: HOSPADM

## 2020-08-29 RX ORDER — PHENOBARBITAL 97.2 MG/1
145.8 TABLET ORAL DAILY
Status: DISCONTINUED | OUTPATIENT
Start: 2020-08-30 | End: 2020-08-31 | Stop reason: HOSPADM

## 2020-08-29 RX ORDER — IPRATROPIUM BROMIDE AND ALBUTEROL SULFATE 2.5; .5 MG/3ML; MG/3ML
3 SOLUTION RESPIRATORY (INHALATION)
Status: DISCONTINUED | OUTPATIENT
Start: 2020-08-29 | End: 2020-08-31 | Stop reason: HOSPADM

## 2020-08-29 RX ORDER — ROSUVASTATIN CALCIUM 20 MG/1
40 TABLET, COATED ORAL DAILY
Status: DISCONTINUED | OUTPATIENT
Start: 2020-08-29 | End: 2020-08-31 | Stop reason: HOSPADM

## 2020-08-29 RX ORDER — NICOTINE POLACRILEX 4 MG
15 LOZENGE BUCCAL
Status: DISCONTINUED | OUTPATIENT
Start: 2020-08-29 | End: 2020-08-31 | Stop reason: HOSPADM

## 2020-08-29 RX ORDER — PANTOPRAZOLE SODIUM 40 MG/1
40 TABLET, DELAYED RELEASE ORAL
Status: DISCONTINUED | OUTPATIENT
Start: 2020-08-30 | End: 2020-08-31 | Stop reason: HOSPADM

## 2020-08-29 RX ORDER — SODIUM CHLORIDE 0.9 % (FLUSH) 0.9 %
10 SYRINGE (ML) INJECTION EVERY 12 HOURS SCHEDULED
Status: DISCONTINUED | OUTPATIENT
Start: 2020-08-29 | End: 2020-08-31 | Stop reason: HOSPADM

## 2020-08-29 RX ADMIN — SODIUM CHLORIDE 1000 ML: 9 INJECTION, SOLUTION INTRAVENOUS at 15:41

## 2020-08-29 RX ADMIN — LEVOFLOXACIN 750 MG: 5 INJECTION, SOLUTION INTRAVENOUS at 21:00

## 2020-08-29 RX ADMIN — MORPHINE SULFATE 4 MG: 4 INJECTION, SOLUTION INTRAMUSCULAR; INTRAVENOUS at 14:57

## 2020-08-29 RX ADMIN — PANTOPRAZOLE SODIUM 40 MG: 40 INJECTION, POWDER, FOR SOLUTION INTRAVENOUS at 14:57

## 2020-08-29 RX ADMIN — IPRATROPIUM BROMIDE AND ALBUTEROL SULFATE 3 ML: 2.5; .5 SOLUTION RESPIRATORY (INHALATION) at 20:12

## 2020-08-29 RX ADMIN — IOPAMIDOL 70 ML: 612 INJECTION, SOLUTION INTRAVENOUS at 16:39

## 2020-08-29 RX ADMIN — SODIUM CHLORIDE 75 ML/HR: 9 INJECTION, SOLUTION INTRAVENOUS at 20:59

## 2020-08-29 RX ADMIN — ONDANSETRON 4 MG: 2 INJECTION INTRAMUSCULAR; INTRAVENOUS at 14:57

## 2020-08-29 RX ADMIN — ROSUVASTATIN CALCIUM 40 MG: 20 TABLET, COATED ORAL at 20:29

## 2020-08-29 RX ADMIN — SODIUM CHLORIDE 1000 ML: 9 INJECTION, SOLUTION INTRAVENOUS at 23:33

## 2020-08-29 RX ADMIN — DEXTROSE MONOHYDRATE 25 G: 25 INJECTION, SOLUTION INTRAVENOUS at 15:14

## 2020-08-29 RX ADMIN — SODIUM CHLORIDE 500 ML: 9 INJECTION, SOLUTION INTRAVENOUS at 20:28

## 2020-08-30 LAB
ANION GAP SERPL CALCULATED.3IONS-SCNC: 6 MMOL/L (ref 5–15)
BASOPHILS # BLD AUTO: 0.01 10*3/MM3 (ref 0–0.2)
BASOPHILS NFR BLD AUTO: 0.2 % (ref 0–1.5)
BUN SERPL-MCNC: 10 MG/DL (ref 8–23)
BUN/CREAT SERPL: 16.1 (ref 7–25)
CALCIUM SPEC-SCNC: 7.9 MG/DL (ref 8.6–10.5)
CHLORIDE SERPL-SCNC: 104 MMOL/L (ref 98–107)
CO2 SERPL-SCNC: 25 MMOL/L (ref 22–29)
CREAT SERPL-MCNC: 0.62 MG/DL (ref 0.57–1)
DEPRECATED RDW RBC AUTO: 60.4 FL (ref 37–54)
EOSINOPHIL # BLD AUTO: 0.18 10*3/MM3 (ref 0–0.4)
EOSINOPHIL NFR BLD AUTO: 3.6 % (ref 0.3–6.2)
ERYTHROCYTE [DISTWIDTH] IN BLOOD BY AUTOMATED COUNT: 15.8 % (ref 12.3–15.4)
GFR SERPL CREATININE-BSD FRML MDRD: 95 ML/MIN/1.73
GLUCOSE BLDC GLUCOMTR-MCNC: 103 MG/DL (ref 70–130)
GLUCOSE BLDC GLUCOMTR-MCNC: 97 MG/DL (ref 70–130)
GLUCOSE SERPL-MCNC: 93 MG/DL (ref 65–99)
HCT VFR BLD AUTO: 29.1 % (ref 34–46.6)
HGB BLD-MCNC: 9 G/DL (ref 12–15.9)
IMM GRANULOCYTES # BLD AUTO: 0.01 10*3/MM3 (ref 0–0.05)
IMM GRANULOCYTES NFR BLD AUTO: 0.2 % (ref 0–0.5)
LYMPHOCYTES # BLD AUTO: 1.61 10*3/MM3 (ref 0.7–3.1)
LYMPHOCYTES NFR BLD AUTO: 32.6 % (ref 19.6–45.3)
MAGNESIUM SERPL-MCNC: 1.4 MG/DL (ref 1.6–2.4)
MCH RBC QN AUTO: 32.4 PG (ref 26.6–33)
MCHC RBC AUTO-ENTMCNC: 30.9 G/DL (ref 31.5–35.7)
MCV RBC AUTO: 104.7 FL (ref 79–97)
MONOCYTES # BLD AUTO: 0.47 10*3/MM3 (ref 0.1–0.9)
MONOCYTES NFR BLD AUTO: 9.5 % (ref 5–12)
NEUTROPHILS NFR BLD AUTO: 2.66 10*3/MM3 (ref 1.7–7)
NEUTROPHILS NFR BLD AUTO: 53.9 % (ref 42.7–76)
NRBC BLD AUTO-RTO: 0 /100 WBC (ref 0–0.2)
PLATELET # BLD AUTO: 269 10*3/MM3 (ref 140–450)
PMV BLD AUTO: 9.5 FL (ref 6–12)
POTASSIUM SERPL-SCNC: 4.3 MMOL/L (ref 3.5–5.2)
RBC # BLD AUTO: 2.78 10*6/MM3 (ref 3.77–5.28)
SODIUM SERPL-SCNC: 135 MMOL/L (ref 136–145)
WBC # BLD AUTO: 4.94 10*3/MM3 (ref 3.4–10.8)

## 2020-08-30 PROCEDURE — 85025 COMPLETE CBC W/AUTO DIFF WBC: CPT | Performed by: NURSE PRACTITIONER

## 2020-08-30 PROCEDURE — 83735 ASSAY OF MAGNESIUM: CPT | Performed by: NURSE PRACTITIONER

## 2020-08-30 PROCEDURE — 82962 GLUCOSE BLOOD TEST: CPT

## 2020-08-30 PROCEDURE — 25010000002 MAGNESIUM SULFATE IN D5W 1G/100ML (PREMIX) 1-5 GM/100ML-% SOLUTION

## 2020-08-30 PROCEDURE — 96365 THER/PROPH/DIAG IV INF INIT: CPT

## 2020-08-30 PROCEDURE — 94799 UNLISTED PULMONARY SVC/PX: CPT

## 2020-08-30 PROCEDURE — G0378 HOSPITAL OBSERVATION PER HR: HCPCS

## 2020-08-30 PROCEDURE — 96361 HYDRATE IV INFUSION ADD-ON: CPT

## 2020-08-30 PROCEDURE — 99225 PR SBSQ OBSERVATION CARE/DAY 25 MINUTES: CPT | Performed by: INTERNAL MEDICINE

## 2020-08-30 PROCEDURE — 25010000002 LEVOFLOXACIN PER 250 MG

## 2020-08-30 PROCEDURE — 80048 BASIC METABOLIC PNL TOTAL CA: CPT | Performed by: NURSE PRACTITIONER

## 2020-08-30 PROCEDURE — 51798 US URINE CAPACITY MEASURE: CPT

## 2020-08-30 RX ORDER — MAGNESIUM SULFATE HEPTAHYDRATE 40 MG/ML
2 INJECTION, SOLUTION INTRAVENOUS AS NEEDED
Status: DISCONTINUED | OUTPATIENT
Start: 2020-08-30 | End: 2020-08-31 | Stop reason: HOSPADM

## 2020-08-30 RX ORDER — HYDROCODONE BITARTRATE AND ACETAMINOPHEN 5; 325 MG/1; MG/1
1 TABLET ORAL EVERY 6 HOURS PRN
Status: DISCONTINUED | OUTPATIENT
Start: 2020-08-30 | End: 2020-08-31 | Stop reason: HOSPADM

## 2020-08-30 RX ORDER — HYDROCODONE BITARTRATE AND ACETAMINOPHEN 10; 325 MG/1; MG/1
1 TABLET ORAL EVERY 6 HOURS PRN
Status: ON HOLD | COMMUNITY
End: 2020-01-01 | Stop reason: SDUPTHER

## 2020-08-30 RX ORDER — BETHANECHOL CHLORIDE 25 MG/1
25 TABLET ORAL 2 TIMES DAILY
Status: DISCONTINUED | OUTPATIENT
Start: 2020-08-30 | End: 2020-08-31 | Stop reason: HOSPADM

## 2020-08-30 RX ORDER — LEVOFLOXACIN 5 MG/ML
250 INJECTION, SOLUTION INTRAVENOUS EVERY 24 HOURS
Status: DISCONTINUED | OUTPATIENT
Start: 2020-08-30 | End: 2020-08-31

## 2020-08-30 RX ORDER — MAGNESIUM SULFATE HEPTAHYDRATE 40 MG/ML
4 INJECTION, SOLUTION INTRAVENOUS AS NEEDED
Status: DISCONTINUED | OUTPATIENT
Start: 2020-08-30 | End: 2020-08-31 | Stop reason: HOSPADM

## 2020-08-30 RX ORDER — MAGNESIUM SULFATE 1 G/100ML
1 INJECTION INTRAVENOUS AS NEEDED
Status: DISCONTINUED | OUTPATIENT
Start: 2020-08-30 | End: 2020-08-31 | Stop reason: HOSPADM

## 2020-08-30 RX ADMIN — SODIUM CHLORIDE, PRESERVATIVE FREE 10 ML: 5 INJECTION INTRAVENOUS at 20:07

## 2020-08-30 RX ADMIN — IPRATROPIUM BROMIDE AND ALBUTEROL SULFATE 3 ML: 2.5; .5 SOLUTION RESPIRATORY (INHALATION) at 16:11

## 2020-08-30 RX ADMIN — SODIUM CHLORIDE 75 ML/HR: 9 INJECTION, SOLUTION INTRAVENOUS at 19:39

## 2020-08-30 RX ADMIN — PANTOPRAZOLE SODIUM 40 MG: 40 TABLET, DELAYED RELEASE ORAL at 09:03

## 2020-08-30 RX ADMIN — LEVOFLOXACIN 250 MG: 5 INJECTION, SOLUTION INTRAVENOUS at 20:01

## 2020-08-30 RX ADMIN — DILTIAZEM HYDROCHLORIDE 240 MG: 240 CAPSULE, COATED, EXTENDED RELEASE ORAL at 09:00

## 2020-08-30 RX ADMIN — FERROUS SULFATE TAB 325 MG (65 MG ELEMENTAL FE) 325 MG: 325 (65 FE) TAB at 18:26

## 2020-08-30 RX ADMIN — MAGNESIUM SULFATE HEPTAHYDRATE 1 G: 1 INJECTION, SOLUTION INTRAVENOUS at 09:33

## 2020-08-30 RX ADMIN — BETHANECHOL CHLORIDE 25 MG: 25 TABLET ORAL at 18:26

## 2020-08-30 RX ADMIN — FERROUS SULFATE TAB 325 MG (65 MG ELEMENTAL FE) 325 MG: 325 (65 FE) TAB at 09:03

## 2020-08-30 RX ADMIN — MAGNESIUM SULFATE HEPTAHYDRATE 1 G: 1 INJECTION, SOLUTION INTRAVENOUS at 11:35

## 2020-08-30 RX ADMIN — PANTOPRAZOLE SODIUM 40 MG: 40 TABLET, DELAYED RELEASE ORAL at 18:25

## 2020-08-30 RX ADMIN — TERAZOSIN HYDROCHLORIDE 2 MG: 2 CAPSULE ORAL at 20:04

## 2020-08-30 RX ADMIN — CLOPIDOGREL BISULFATE 75 MG: 75 TABLET ORAL at 09:01

## 2020-08-30 RX ADMIN — IPRATROPIUM BROMIDE AND ALBUTEROL SULFATE 3 ML: 2.5; .5 SOLUTION RESPIRATORY (INHALATION) at 12:52

## 2020-08-30 RX ADMIN — IPRATROPIUM BROMIDE AND ALBUTEROL SULFATE 3 ML: 2.5; .5 SOLUTION RESPIRATORY (INHALATION) at 20:10

## 2020-08-30 RX ADMIN — MAGNESIUM SULFATE HEPTAHYDRATE 1 G: 1 INJECTION, SOLUTION INTRAVENOUS at 13:40

## 2020-08-30 RX ADMIN — PHENOBARBITAL 145.8 MG: 97.2 TABLET ORAL at 09:04

## 2020-08-30 RX ADMIN — ASPIRIN 81 MG: 81 TABLET, COATED ORAL at 09:04

## 2020-08-30 RX ADMIN — ROSUVASTATIN CALCIUM 40 MG: 20 TABLET, COATED ORAL at 20:04

## 2020-08-30 RX ADMIN — IPRATROPIUM BROMIDE AND ALBUTEROL SULFATE 3 ML: 2.5; .5 SOLUTION RESPIRATORY (INHALATION) at 07:59

## 2020-08-31 ENCOUNTER — READMISSION MANAGEMENT (OUTPATIENT)
Dept: CALL CENTER | Facility: HOSPITAL | Age: 70
End: 2020-08-31

## 2020-08-31 VITALS
BODY MASS INDEX: 19.63 KG/M2 | HEART RATE: 73 BPM | WEIGHT: 91 LBS | HEIGHT: 57 IN | DIASTOLIC BLOOD PRESSURE: 60 MMHG | TEMPERATURE: 97.8 F | OXYGEN SATURATION: 100 % | SYSTOLIC BLOOD PRESSURE: 134 MMHG | RESPIRATION RATE: 20 BRPM

## 2020-08-31 PROBLEM — R10.9 ABDOMINAL PAIN: Status: RESOLVED | Noted: 2020-08-29 | Resolved: 2020-08-31

## 2020-08-31 PROBLEM — R33.9 URINARY RETENTION: Status: RESOLVED | Noted: 2020-08-29 | Resolved: 2020-08-31

## 2020-08-31 LAB
BACTERIA SPEC AEROBE CULT: ABNORMAL
BASOPHILS # BLD AUTO: 0.01 10*3/MM3 (ref 0–0.2)
BASOPHILS NFR BLD AUTO: 0.2 % (ref 0–1.5)
DEPRECATED RDW RBC AUTO: 57.9 FL (ref 37–54)
EOSINOPHIL # BLD AUTO: 0.17 10*3/MM3 (ref 0–0.4)
EOSINOPHIL NFR BLD AUTO: 3.2 % (ref 0.3–6.2)
ERYTHROCYTE [DISTWIDTH] IN BLOOD BY AUTOMATED COUNT: 15.4 % (ref 12.3–15.4)
HCT VFR BLD AUTO: 35.6 % (ref 34–46.6)
HGB BLD-MCNC: 10.7 G/DL (ref 12–15.9)
IMM GRANULOCYTES # BLD AUTO: 0.02 10*3/MM3 (ref 0–0.05)
IMM GRANULOCYTES NFR BLD AUTO: 0.4 % (ref 0–0.5)
LYMPHOCYTES # BLD AUTO: 1.26 10*3/MM3 (ref 0.7–3.1)
LYMPHOCYTES NFR BLD AUTO: 23.7 % (ref 19.6–45.3)
MAGNESIUM SERPL-MCNC: 1.7 MG/DL (ref 1.6–2.4)
MCH RBC QN AUTO: 30.7 PG (ref 26.6–33)
MCHC RBC AUTO-ENTMCNC: 30.1 G/DL (ref 31.5–35.7)
MCV RBC AUTO: 102.3 FL (ref 79–97)
MONOCYTES # BLD AUTO: 0.28 10*3/MM3 (ref 0.1–0.9)
MONOCYTES NFR BLD AUTO: 5.3 % (ref 5–12)
NEUTROPHILS NFR BLD AUTO: 3.58 10*3/MM3 (ref 1.7–7)
NEUTROPHILS NFR BLD AUTO: 67.2 % (ref 42.7–76)
NRBC BLD AUTO-RTO: 0 /100 WBC (ref 0–0.2)
PLATELET # BLD AUTO: 329 10*3/MM3 (ref 140–450)
PMV BLD AUTO: 9.8 FL (ref 6–12)
RBC # BLD AUTO: 3.48 10*6/MM3 (ref 3.77–5.28)
WBC # BLD AUTO: 5.32 10*3/MM3 (ref 3.4–10.8)

## 2020-08-31 PROCEDURE — 99217 PR OBSERVATION CARE DISCHARGE MANAGEMENT: CPT | Performed by: INTERNAL MEDICINE

## 2020-08-31 PROCEDURE — 97161 PT EVAL LOW COMPLEX 20 MIN: CPT

## 2020-08-31 PROCEDURE — 83735 ASSAY OF MAGNESIUM: CPT | Performed by: INTERNAL MEDICINE

## 2020-08-31 PROCEDURE — 94799 UNLISTED PULMONARY SVC/PX: CPT

## 2020-08-31 PROCEDURE — 97165 OT EVAL LOW COMPLEX 30 MIN: CPT

## 2020-08-31 PROCEDURE — G0378 HOSPITAL OBSERVATION PER HR: HCPCS

## 2020-08-31 PROCEDURE — 51798 US URINE CAPACITY MEASURE: CPT

## 2020-08-31 PROCEDURE — 85025 COMPLETE CBC W/AUTO DIFF WBC: CPT | Performed by: INTERNAL MEDICINE

## 2020-08-31 PROCEDURE — 96361 HYDRATE IV INFUSION ADD-ON: CPT

## 2020-08-31 RX ORDER — LORAZEPAM 1 MG/1
1 TABLET ORAL EVERY 12 HOURS SCHEDULED
Status: DISCONTINUED | OUTPATIENT
Start: 2020-08-31 | End: 2020-08-31 | Stop reason: HOSPADM

## 2020-08-31 RX ORDER — FUROSEMIDE 20 MG/1
20 TABLET ORAL DAILY
Qty: 30 TABLET | Refills: 0 | Status: ON HOLD | OUTPATIENT
Start: 2020-08-31 | End: 2020-01-01

## 2020-08-31 RX ORDER — BETHANECHOL CHLORIDE 25 MG/1
25 TABLET ORAL 2 TIMES DAILY
Qty: 60 TABLET | Refills: 0 | Status: SHIPPED | OUTPATIENT
Start: 2020-08-31 | End: 2020-09-30

## 2020-08-31 RX ORDER — DOXYCYCLINE 100 MG/1
100 CAPSULE ORAL EVERY 12 HOURS SCHEDULED
Status: DISCONTINUED | OUTPATIENT
Start: 2020-08-31 | End: 2020-08-31 | Stop reason: HOSPADM

## 2020-08-31 RX ORDER — LISINOPRIL 10 MG/1
40 TABLET ORAL DAILY
Qty: 120 TABLET | Refills: 0 | Status: ON HOLD | OUTPATIENT
Start: 2020-08-31 | End: 2020-01-01

## 2020-08-31 RX ORDER — DOXYCYCLINE 100 MG/1
100 CAPSULE ORAL EVERY 12 HOURS SCHEDULED
Qty: 13 CAPSULE | Refills: 0 | Status: SHIPPED | OUTPATIENT
Start: 2020-08-31 | End: 2020-09-07

## 2020-08-31 RX ADMIN — BETHANECHOL CHLORIDE 25 MG: 25 TABLET ORAL at 08:33

## 2020-08-31 RX ADMIN — IPRATROPIUM BROMIDE AND ALBUTEROL SULFATE 3 ML: 2.5; .5 SOLUTION RESPIRATORY (INHALATION) at 08:43

## 2020-08-31 RX ADMIN — DILTIAZEM HYDROCHLORIDE 240 MG: 240 CAPSULE, COATED, EXTENDED RELEASE ORAL at 08:33

## 2020-08-31 RX ADMIN — DICLOFENAC 2 G: 10 GEL TOPICAL at 11:59

## 2020-08-31 RX ADMIN — ASPIRIN 81 MG: 81 TABLET, COATED ORAL at 08:34

## 2020-08-31 RX ADMIN — SODIUM CHLORIDE, PRESERVATIVE FREE 10 ML: 5 INJECTION INTRAVENOUS at 08:36

## 2020-08-31 RX ADMIN — HYDROCODONE BITARTRATE AND ACETAMINOPHEN 1 TABLET: 5; 325 TABLET ORAL at 00:00

## 2020-08-31 RX ADMIN — PANTOPRAZOLE SODIUM 40 MG: 40 TABLET, DELAYED RELEASE ORAL at 08:34

## 2020-08-31 RX ADMIN — PHENOBARBITAL 145.8 MG: 97.2 TABLET ORAL at 08:34

## 2020-08-31 RX ADMIN — CLOPIDOGREL BISULFATE 75 MG: 75 TABLET ORAL at 08:34

## 2020-08-31 RX ADMIN — LORAZEPAM 1 MG: 1 TABLET ORAL at 08:34

## 2020-08-31 RX ADMIN — FERROUS SULFATE TAB 325 MG (65 MG ELEMENTAL FE) 325 MG: 325 (65 FE) TAB at 08:34

## 2020-08-31 RX ADMIN — DOXYCYCLINE 100 MG: 100 CAPSULE ORAL at 11:56

## 2020-08-31 RX ADMIN — SODIUM CHLORIDE 75 ML/HR: 9 INJECTION, SOLUTION INTRAVENOUS at 07:29

## 2020-08-31 RX ADMIN — AZTREONAM 1 G: 1 INJECTION, POWDER, LYOPHILIZED, FOR SOLUTION INTRAMUSCULAR; INTRAVENOUS at 11:59

## 2020-08-31 NOTE — OUTREACH NOTE
Prep Survey      Responses   [facility] patient discharged from?  [place]   Is LACE score < 7 ?  No   Eligibility  [facility]   Date of Admission  08/29/20   Date of Discharge  08/31/20   Discharge Disposition  Home or Self Care   Discharge diagnosis  Acute UTI    COVID-19 Test Status  Not tested   Does the patient have one of the following disease processes/diagnoses(primary or secondary)?  Other   Does the patient have Home health ordered?  Yes   What is the Home health agency?   current with [facility]     Is there a DME ordered?  Yes   What DME was ordered?  [facility] from [place]   Prep survey completed?  Yes          Carly Steen RN

## 2020-09-01 ENCOUNTER — TRANSITIONAL CARE MANAGEMENT TELEPHONE ENCOUNTER (OUTPATIENT)
Dept: CALL CENTER | Facility: HOSPITAL | Age: 70
End: 2020-09-01

## 2020-09-01 NOTE — OUTREACH NOTE
Call Center TCM Note      Responses   [facility] patient discharged from?  [place]   COVID-19 Test Status  Not tested   Does the patient have one of the following disease processes/diagnoses(primary or secondary)?  Other   TCM attempt successful?  Yes   Call start time  1316   Call end time  1328   Discharge diagnosis  Acute UTI    Is patient permission given to speak with other caregiver?  Yes   List who call center can speak with  daughterLory POA   Person spoke with today (if not patient) and relationship  daughterLory   Meds reviewed with patient/caregiver?  Yes   Is the patient having any side effects they believe may be caused by any medication additions or changes?  No   Does the patient have all medications ordered at discharge?  No   What is keeping the patient from filling the prescriptions?  -- [daughter states that she will be picking up meds from pharmacy today. ]   Nursing Interventions  No intervention needed   Nursing Interventions  -- [Advised for daughter to  meds today. Patient to complete antibiotics to treat UTI. ]   Does the patient have a primary care provider?   Yes   Comments regarding PCP  Daughter reports that patient has [facility] provider that comes to the home. Patient unable to go to office for appts.    Has the patient kept scheduled appointments due by today?  N/A   What is the Home health agency?   [facility]    What DME was ordered?  BSC from [place]   Has all DME been delivered?  Yes   Pulse Ox monitoring  None   Psychosocial issues?  No   Did the patient receive a copy of their discharge instructions?  Yes   Nursing interventions  Reviewed instructions with patient [daughter]   What is the patient's perception of their health status since discharge?  Improving   Is the patient/caregiver able to teach back signs and symptoms related to disease process for when to call PCP?  Yes   Is the patient/caregiver able to teach back signs and symptoms  related to disease process for when to call 911?  Yes   Is the patient/caregiver able to teach back the hierarchy of who to call/visit for symptoms/problems? PCP, Specialist, Home health nurse, Urgent Care, ED, 911  Yes   TCM call completed?  Yes          Kayleigh Walker RN    9/1/2020, 13:28

## 2020-09-03 LAB
BACTERIA SPEC AEROBE CULT: NORMAL
BACTERIA SPEC AEROBE CULT: NORMAL

## 2020-09-09 ENCOUNTER — READMISSION MANAGEMENT (OUTPATIENT)
Dept: CALL CENTER | Facility: HOSPITAL | Age: 70
End: 2020-09-09

## 2020-09-09 NOTE — OUTREACH NOTE
Medical Week 2 Survey      Responses   [facility] patient discharged from?  [place]   COVID-19 Test Status  Not tested   Does the patient have one of the following disease processes/diagnoses(primary or secondary)?  Other   Week 2 attempt successful?  Yes   Call start time  1402   Discharge diagnosis  Acute UTI    Call end time  1419   Person spoke with today (if not patient) and relationship  daughterLory reviewed with patient/caregiver?  Yes   Is the patient having any side effects they believe may be caused by any medication additions or changes?  No   Does the patient have all medications ordered at discharge?  Yes   Is the patient taking all medications as directed (includes completed medication regime)?  Yes   Comments regarding appointments  has [facility] appt on 9/14/20, has [facility] appt on 9/16/20   Does the patient have a primary care provider?   Yes   Does the patient have an appointment with their PCP within 7 days of discharge?  Greater than 7 days   What is preventing the patient from scheduling follow up appointments within 7 days of discharge?  Earlier appointment not available   Nursing Interventions  Verified appointment date/time/provider   Has the patient kept scheduled appointments due by today?  N/A   What is the [facility] health agency?   [facility]   Has home health visited the patient within 72 hours of discharge?  No   Home health comments  has not been in touch with [facility], phone # given to daughter, plans to call today   Pulse Ox monitoring  None   Psychosocial issues?  No   Comments  pt had fall since returning home, fractured right arm, seen at  ED, has [facility] appt 9/16/20 to have arm reset   Did the patient receive a copy of their discharge instructions?  Yes   Nursing interventions  Reviewed instructions with patient   What is the patient's perception of their health status since discharge?  Improving   Is the patient/caregiver able to teach back signs and symptoms related  to disease process for when to call PCP?  Yes   Is the patient/caregiver able to teach back signs and symptoms related to disease process for when to call 911?  Yes   Is the patient/caregiver able to teach back the hierarchy of who to call/visit for symptoms/problems? PCP, Specialist, Home health nurse, Urgent Care, ED, 911  Yes   Week 2 Call Completed?  Yes          Zabrina Jovel RN

## 2020-09-16 ENCOUNTER — READMISSION MANAGEMENT (OUTPATIENT)
Dept: CALL CENTER | Facility: HOSPITAL | Age: 70
End: 2020-09-16

## 2020-09-16 NOTE — OUTREACH NOTE
Medical Week 3 Survey      Responses   [facility] patient discharged from?  [place]   COVID-19 Test Status  Not tested   Does the patient have one of the following disease processes/diagnoses(primary or secondary)?  Other   Week 3 attempt successful?  No   Unsuccessful attempts  Attempt 1          Rani Oreilly RN

## 2020-09-18 ENCOUNTER — READMISSION MANAGEMENT (OUTPATIENT)
Dept: CALL CENTER | Facility: HOSPITAL | Age: 70
End: 2020-09-18

## 2020-09-18 NOTE — OUTREACH NOTE
Medical Week 3 Survey      Responses   [facility] patient discharged from?  [place]   COVID-19 Test Status  Not tested   Does the patient have one of the following disease processes/diagnoses(primary or secondary)?  Other   Week 3 attempt successful?  Yes   Call start time  1108   Call end time  1112   Discharge diagnosis  Acute UTI    List who call center can speak with  Lory zaidi POA   Person spoke with today (if not patient) and relationship  daughterLory   Meds reviewed with patient/caregiver?  Yes   Is the patient taking all medications as directed (includes completed medication regime)?  Yes   Has the patient kept scheduled appointments due by today?  Yes   Has home health visited the patient within 72 hours of discharge?  N/A   What DME was ordered?  BSC from [place]   Has all DME been delivered?  Yes   Pulse Ox monitoring  Intermittent   Pulse Ox device source  Other   What is the patient's perception of their health status since discharge?  New symptoms unrelated to diagnosis   Week 3 Call Completed?  Yes   Wrap up additional comments  Pt fell two weeks ago, broke her left arm.  Is not going to require surgery.  Still taking meds for UTI.  Has kept appts.  Pain is managed with medication.           Annel Hatfield, RN

## 2020-09-29 ENCOUNTER — READMISSION MANAGEMENT (OUTPATIENT)
Dept: CALL CENTER | Facility: HOSPITAL | Age: 70
End: 2020-09-29

## 2020-09-29 NOTE — OUTREACH NOTE
Medical Week 4 Survey      Responses   [facility] patient discharged from?  [place]   COVID-19 Test Status  Not tested   Does the patient have one of the following disease processes/diagnoses(primary or secondary)?  Other   Week 4 attempt successful?  No          Sheila Palma RN

## 2020-10-25 ENCOUNTER — APPOINTMENT (OUTPATIENT)
Dept: GENERAL RADIOLOGY | Facility: HOSPITAL | Age: 70
End: 2020-10-25

## 2020-10-25 ENCOUNTER — APPOINTMENT (OUTPATIENT)
Dept: CT IMAGING | Facility: HOSPITAL | Age: 70
End: 2020-10-25

## 2020-10-25 ENCOUNTER — HOSPITAL ENCOUNTER (INPATIENT)
Facility: HOSPITAL | Age: 70
LOS: 7 days | Discharge: HOME HEALTH | End: 2020-11-01
Attending: EMERGENCY MEDICINE | Admitting: INTERNAL MEDICINE

## 2020-10-25 DIAGNOSIS — Z74.09 IMPAIRED MOBILITY AND ACTIVITIES OF DAILY LIVING: ICD-10-CM

## 2020-10-25 DIAGNOSIS — Z78.9 IMPAIRED MOBILITY AND ACTIVITIES OF DAILY LIVING: ICD-10-CM

## 2020-10-25 DIAGNOSIS — T17.908A ASPIRATION INTO AIRWAY, INITIAL ENCOUNTER: ICD-10-CM

## 2020-10-25 DIAGNOSIS — R11.2 NAUSEA AND VOMITING, INTRACTABILITY OF VOMITING NOT SPECIFIED, UNSPECIFIED VOMITING TYPE: ICD-10-CM

## 2020-10-25 DIAGNOSIS — F41.9 ANXIETY: ICD-10-CM

## 2020-10-25 DIAGNOSIS — G40.909 SEIZURE DISORDER (HCC): ICD-10-CM

## 2020-10-25 DIAGNOSIS — I10 ESSENTIAL HYPERTENSION: ICD-10-CM

## 2020-10-25 DIAGNOSIS — R13.12 OROPHARYNGEAL DYSPHAGIA: ICD-10-CM

## 2020-10-25 DIAGNOSIS — R06.03 ACUTE RESPIRATORY DISTRESS: Primary | ICD-10-CM

## 2020-10-25 DIAGNOSIS — R06.89 HYPERCAPNIA: ICD-10-CM

## 2020-10-25 DIAGNOSIS — J43.8 OTHER EMPHYSEMA (HCC): ICD-10-CM

## 2020-10-25 PROBLEM — J96.02 ACUTE RESPIRATORY FAILURE WITH HYPERCAPNIA (HCC): Status: ACTIVE | Noted: 2020-10-25

## 2020-10-25 PROBLEM — I25.10 CAD (CORONARY ARTERY DISEASE): Status: ACTIVE | Noted: 2020-10-25

## 2020-10-25 LAB
ALBUMIN SERPL-MCNC: 3.8 G/DL (ref 3.5–5.2)
ALBUMIN/GLOB SERPL: 1.1 G/DL
ALP SERPL-CCNC: 98 U/L (ref 39–117)
ALT SERPL W P-5'-P-CCNC: 8 U/L (ref 1–33)
AMPHET+METHAMPHET UR QL: NEGATIVE
AMPHETAMINES UR QL: NEGATIVE
ANION GAP SERPL CALCULATED.3IONS-SCNC: 8 MMOL/L (ref 5–15)
ARTERIAL PATENCY WRIST A: ABNORMAL
ARTERIAL PATENCY WRIST A: ABNORMAL
AST SERPL-CCNC: 15 U/L (ref 1–32)
ATMOSPHERIC PRESS: ABNORMAL MM[HG]
ATMOSPHERIC PRESS: ABNORMAL MM[HG]
B PARAPERT DNA SPEC QL NAA+PROBE: NOT DETECTED
B PERT DNA SPEC QL NAA+PROBE: NOT DETECTED
BACTERIA UR QL AUTO: ABNORMAL /HPF
BARBITURATES UR QL SCN: POSITIVE
BASE EXCESS BLDA CALC-SCNC: 6.7 MMOL/L (ref 0–2)
BASE EXCESS BLDA CALC-SCNC: 8.7 MMOL/L (ref 0–2)
BASOPHILS # BLD MANUAL: 0 10*3/MM3 (ref 0–0.2)
BASOPHILS NFR BLD AUTO: 0 % (ref 0–1.5)
BDY SITE: ABNORMAL
BENZODIAZ UR QL SCN: POSITIVE
BILIRUB SERPL-MCNC: 0.4 MG/DL (ref 0–1.2)
BILIRUB UR QL STRIP: NEGATIVE
BODY TEMPERATURE: 37 C
BODY TEMPERATURE: 37 C
BUN SERPL-MCNC: 11 MG/DL (ref 8–23)
BUN/CREAT SERPL: 21.6 (ref 7–25)
BUPRENORPHINE SERPL-MCNC: NEGATIVE NG/ML
C PNEUM DNA NPH QL NAA+NON-PROBE: NOT DETECTED
CALCIUM SPEC-SCNC: 9.7 MG/DL (ref 8.6–10.5)
CANNABINOIDS SERPL QL: NEGATIVE
CHLORIDE SERPL-SCNC: 104 MMOL/L (ref 98–107)
CLARITY UR: CLEAR
CO2 BLDA-SCNC: 36.7 MMOL/L (ref 22–33)
CO2 BLDA-SCNC: 38.6 MMOL/L (ref 22–33)
CO2 SERPL-SCNC: 34 MMOL/L (ref 22–29)
COCAINE UR QL: NEGATIVE
COHGB MFR BLD: 0.9 % (ref 0–2)
COHGB MFR BLD: 1.1 % (ref 0–2)
COLOR UR: YELLOW
CREAT SERPL-MCNC: 0.51 MG/DL (ref 0.57–1)
D-LACTATE SERPL-SCNC: 1.2 MMOL/L (ref 0.5–2)
DEPRECATED RDW RBC AUTO: 63.5 FL (ref 37–54)
EOSINOPHIL # BLD MANUAL: 0 10*3/MM3 (ref 0–0.4)
EOSINOPHIL NFR BLD MANUAL: 0 % (ref 0.3–6.2)
EPAP: 0
EPAP: 0
ERYTHROCYTE [DISTWIDTH] IN BLOOD BY AUTOMATED COUNT: 15.8 % (ref 12.3–15.4)
ETHANOL BLD-MCNC: <10 MG/DL (ref 0–10)
FLUAV H1 2009 PAND RNA NPH QL NAA+PROBE: NOT DETECTED
FLUAV H1 HA GENE NPH QL NAA+PROBE: NOT DETECTED
FLUAV H3 RNA NPH QL NAA+PROBE: NOT DETECTED
FLUAV SUBTYP SPEC NAA+PROBE: NOT DETECTED
FLUBV RNA ISLT QL NAA+PROBE: NOT DETECTED
GFR SERPL CREATININE-BSD FRML MDRD: 119 ML/MIN/1.73
GLOBULIN UR ELPH-MCNC: 3.4 GM/DL
GLUCOSE SERPL-MCNC: 132 MG/DL (ref 65–99)
GLUCOSE UR STRIP-MCNC: NEGATIVE MG/DL
HADV DNA SPEC NAA+PROBE: NOT DETECTED
HCO3 BLDA-SCNC: 34.7 MMOL/L (ref 20–26)
HCO3 BLDA-SCNC: 36.5 MMOL/L (ref 20–26)
HCOV 229E RNA SPEC QL NAA+PROBE: NOT DETECTED
HCOV HKU1 RNA SPEC QL NAA+PROBE: NOT DETECTED
HCOV NL63 RNA SPEC QL NAA+PROBE: NOT DETECTED
HCOV OC43 RNA SPEC QL NAA+PROBE: NOT DETECTED
HCT VFR BLD AUTO: 42.4 % (ref 34–46.6)
HCT VFR BLD CALC: 34.4 %
HCT VFR BLD CALC: 37.5 %
HGB BLD-MCNC: 12.5 G/DL (ref 12–15.9)
HGB BLDA-MCNC: 11.2 G/DL (ref 14–18)
HGB BLDA-MCNC: 12.2 G/DL (ref 14–18)
HGB UR QL STRIP.AUTO: NEGATIVE
HMPV RNA NPH QL NAA+NON-PROBE: NOT DETECTED
HOLD SPECIMEN: NORMAL
HOLD SPECIMEN: NORMAL
HPIV1 RNA SPEC QL NAA+PROBE: NOT DETECTED
HPIV2 RNA SPEC QL NAA+PROBE: NOT DETECTED
HPIV3 RNA NPH QL NAA+PROBE: NOT DETECTED
HPIV4 P GENE NPH QL NAA+PROBE: NOT DETECTED
HYALINE CASTS UR QL AUTO: ABNORMAL /LPF
INHALED O2 CONCENTRATION: 21 %
INHALED O2 CONCENTRATION: 36 %
IPAP: 0
IPAP: 0
KETONES UR QL STRIP: NEGATIVE
LEUKOCYTE ESTERASE UR QL STRIP.AUTO: ABNORMAL
LYMPHOCYTES # BLD MANUAL: 0.55 10*3/MM3 (ref 0.7–3.1)
LYMPHOCYTES NFR BLD MANUAL: 7 % (ref 5–12)
LYMPHOCYTES NFR BLD MANUAL: 9 % (ref 19.6–45.3)
Lab: ABNORMAL
M PNEUMO IGG SER IA-ACNC: NOT DETECTED
MAGNESIUM SERPL-MCNC: 1.9 MG/DL (ref 1.6–2.4)
MCH RBC QN AUTO: 31.7 PG (ref 26.6–33)
MCHC RBC AUTO-ENTMCNC: 29.5 G/DL (ref 31.5–35.7)
MCV RBC AUTO: 107.6 FL (ref 79–97)
METAMYELOCYTES NFR BLD MANUAL: 6 % (ref 0–0)
METHADONE UR QL SCN: NEGATIVE
METHGB BLD QL: 0.2 % (ref 0–1.5)
METHGB BLD QL: 0.3 % (ref 0–1.5)
MODALITY: ABNORMAL
MODALITY: ABNORMAL
MONOCYTES # BLD AUTO: 0.43 10*3/MM3 (ref 0.1–0.9)
NEUTROPHILS # BLD AUTO: 4.74 10*3/MM3 (ref 1.7–7)
NEUTROPHILS NFR BLD MANUAL: 46 % (ref 42.7–76)
NEUTS BAND NFR BLD MANUAL: 32 % (ref 0–5)
NITRITE UR QL STRIP: NEGATIVE
NOTE: ABNORMAL
NOTE: ABNORMAL
NOTIFIED BY: ABNORMAL
NOTIFIED WHO: ABNORMAL
NT-PROBNP SERPL-MCNC: 1448 PG/ML (ref 0–900)
OPIATES UR QL: POSITIVE
OXYCODONE UR QL SCN: NEGATIVE
OXYHGB MFR BLDV: 92.5 % (ref 94–99)
OXYHGB MFR BLDV: 97.6 % (ref 94–99)
PAW @ PEAK INSP FLOW SETTING VENT: 0 CMH2O
PAW @ PEAK INSP FLOW SETTING VENT: 0 CMH2O
PCO2 BLDA: 65.7 MM HG (ref 35–45)
PCO2 BLDA: 67.5 MM HG (ref 35–45)
PCO2 TEMP ADJ BLD: 65.7 MM HG (ref 35–45)
PCO2 TEMP ADJ BLD: 67.5 MM HG (ref 35–45)
PCP UR QL SCN: NEGATIVE
PH BLDA: 7.33 PH UNITS (ref 7.35–7.45)
PH BLDA: 7.34 PH UNITS (ref 7.35–7.45)
PH UR STRIP.AUTO: 5.5 [PH] (ref 5–8)
PH, TEMP CORRECTED: 7.33 PH UNITS
PH, TEMP CORRECTED: 7.34 PH UNITS
PHENOBARB SERPL-MCNC: <2.4 MCG/ML (ref 10–30)
PLAT MORPH BLD: NORMAL
PLATELET # BLD AUTO: 190 10*3/MM3 (ref 140–450)
PMV BLD AUTO: 9.9 FL (ref 6–12)
PO2 BLDA: 116 MM HG (ref 83–108)
PO2 BLDA: 71.2 MM HG (ref 83–108)
PO2 TEMP ADJ BLD: 116 MM HG (ref 83–108)
PO2 TEMP ADJ BLD: 71.2 MM HG (ref 83–108)
POTASSIUM SERPL-SCNC: 3.4 MMOL/L (ref 3.5–5.2)
PROPOXYPH UR QL: NEGATIVE
PROT SERPL-MCNC: 7.2 G/DL (ref 6–8.5)
PROT UR QL STRIP: ABNORMAL
RBC # BLD AUTO: 3.94 10*6/MM3 (ref 3.77–5.28)
RBC # UR: ABNORMAL /HPF
RBC MORPH BLD: NORMAL
REF LAB TEST METHOD: ABNORMAL
RHINOVIRUS RNA SPEC NAA+PROBE: NOT DETECTED
RSV RNA NPH QL NAA+NON-PROBE: NOT DETECTED
SARS-COV-2 RNA NPH QL NAA+NON-PROBE: NOT DETECTED
SODIUM SERPL-SCNC: 146 MMOL/L (ref 136–145)
SP GR UR STRIP: 1.02 (ref 1–1.03)
SQUAMOUS #/AREA URNS HPF: ABNORMAL /HPF
TOTAL RATE: 0 BREATHS/MINUTE
TOTAL RATE: 0 BREATHS/MINUTE
TRICYCLICS UR QL SCN: NEGATIVE
TROPONIN T SERPL-MCNC: <0.01 NG/ML (ref 0–0.03)
UROBILINOGEN UR QL STRIP: ABNORMAL
WBC # BLD AUTO: 6.08 10*3/MM3 (ref 3.4–10.8)
WBC MORPH BLD: NORMAL
WBC UR QL AUTO: ABNORMAL /HPF
WHOLE BLOOD HOLD SPECIMEN: NORMAL
WHOLE BLOOD HOLD SPECIMEN: NORMAL

## 2020-10-25 PROCEDURE — 70450 CT HEAD/BRAIN W/O DYE: CPT

## 2020-10-25 PROCEDURE — 81001 URINALYSIS AUTO W/SCOPE: CPT | Performed by: EMERGENCY MEDICINE

## 2020-10-25 PROCEDURE — 0202U NFCT DS 22 TRGT SARS-COV-2: CPT | Performed by: EMERGENCY MEDICINE

## 2020-10-25 PROCEDURE — 87040 BLOOD CULTURE FOR BACTERIA: CPT | Performed by: EMERGENCY MEDICINE

## 2020-10-25 PROCEDURE — 83880 ASSAY OF NATRIURETIC PEPTIDE: CPT | Performed by: EMERGENCY MEDICINE

## 2020-10-25 PROCEDURE — 82805 BLOOD GASES W/O2 SATURATION: CPT

## 2020-10-25 PROCEDURE — 36600 WITHDRAWAL OF ARTERIAL BLOOD: CPT

## 2020-10-25 PROCEDURE — 71250 CT THORAX DX C-: CPT

## 2020-10-25 PROCEDURE — P9612 CATHETERIZE FOR URINE SPEC: HCPCS

## 2020-10-25 PROCEDURE — 80307 DRUG TEST PRSMV CHEM ANLYZR: CPT | Performed by: EMERGENCY MEDICINE

## 2020-10-25 PROCEDURE — 83605 ASSAY OF LACTIC ACID: CPT | Performed by: EMERGENCY MEDICINE

## 2020-10-25 PROCEDURE — 85007 BL SMEAR W/DIFF WBC COUNT: CPT | Performed by: EMERGENCY MEDICINE

## 2020-10-25 PROCEDURE — 94660 CPAP INITIATION&MGMT: CPT

## 2020-10-25 PROCEDURE — 25010000002 VANCOMYCIN PER 500 MG: Performed by: EMERGENCY MEDICINE

## 2020-10-25 PROCEDURE — 73090 X-RAY EXAM OF FOREARM: CPT

## 2020-10-25 PROCEDURE — 94640 AIRWAY INHALATION TREATMENT: CPT

## 2020-10-25 PROCEDURE — 80053 COMPREHEN METABOLIC PANEL: CPT | Performed by: EMERGENCY MEDICINE

## 2020-10-25 PROCEDURE — 93005 ELECTROCARDIOGRAM TRACING: CPT | Performed by: EMERGENCY MEDICINE

## 2020-10-25 PROCEDURE — 71045 X-RAY EXAM CHEST 1 VIEW: CPT

## 2020-10-25 PROCEDURE — 99285 EMERGENCY DEPT VISIT HI MDM: CPT

## 2020-10-25 PROCEDURE — 25010000002 HEPARIN (PORCINE) PER 1000 UNITS: Performed by: NURSE PRACTITIONER

## 2020-10-25 PROCEDURE — 85025 COMPLETE CBC W/AUTO DIFF WBC: CPT | Performed by: EMERGENCY MEDICINE

## 2020-10-25 PROCEDURE — 94799 UNLISTED PULMONARY SVC/PX: CPT

## 2020-10-25 PROCEDURE — 84484 ASSAY OF TROPONIN QUANT: CPT | Performed by: EMERGENCY MEDICINE

## 2020-10-25 PROCEDURE — 80184 ASSAY OF PHENOBARBITAL: CPT | Performed by: EMERGENCY MEDICINE

## 2020-10-25 PROCEDURE — 99223 1ST HOSP IP/OBS HIGH 75: CPT | Performed by: FAMILY MEDICINE

## 2020-10-25 PROCEDURE — 83735 ASSAY OF MAGNESIUM: CPT | Performed by: NURSE PRACTITIONER

## 2020-10-25 RX ORDER — PANTOPRAZOLE SODIUM 40 MG/1
40 TABLET, DELAYED RELEASE ORAL DAILY
Status: DISCONTINUED | OUTPATIENT
Start: 2020-10-26 | End: 2020-10-27

## 2020-10-25 RX ORDER — SODIUM CHLORIDE 0.9 % (FLUSH) 0.9 %
10 SYRINGE (ML) INJECTION EVERY 12 HOURS SCHEDULED
Status: DISCONTINUED | OUTPATIENT
Start: 2020-10-25 | End: 2020-11-01 | Stop reason: HOSPADM

## 2020-10-25 RX ORDER — HEPARIN SODIUM 5000 [USP'U]/ML
5000 INJECTION, SOLUTION INTRAVENOUS; SUBCUTANEOUS EVERY 12 HOURS SCHEDULED
Status: DISCONTINUED | OUTPATIENT
Start: 2020-10-25 | End: 2020-11-01 | Stop reason: HOSPADM

## 2020-10-25 RX ORDER — LISINOPRIL 40 MG/1
40 TABLET ORAL DAILY
Status: DISCONTINUED | OUTPATIENT
Start: 2020-10-26 | End: 2020-10-29

## 2020-10-25 RX ORDER — HYDROCODONE BITARTRATE AND ACETAMINOPHEN 5; 325 MG/1; MG/1
1 TABLET ORAL EVERY 6 HOURS PRN
Status: DISCONTINUED | OUTPATIENT
Start: 2020-10-25 | End: 2020-10-29

## 2020-10-25 RX ORDER — DILTIAZEM HYDROCHLORIDE 240 MG/1
240 CAPSULE, COATED, EXTENDED RELEASE ORAL DAILY
Status: DISCONTINUED | OUTPATIENT
Start: 2020-10-26 | End: 2020-10-27

## 2020-10-25 RX ORDER — ACETAMINOPHEN 325 MG/1
650 TABLET ORAL EVERY 4 HOURS PRN
Status: DISCONTINUED | OUTPATIENT
Start: 2020-10-25 | End: 2020-10-29

## 2020-10-25 RX ORDER — ACETAMINOPHEN 650 MG/1
650 SUPPOSITORY RECTAL EVERY 4 HOURS PRN
Status: DISCONTINUED | OUTPATIENT
Start: 2020-10-25 | End: 2020-10-29

## 2020-10-25 RX ORDER — FERROUS SULFATE 325(65) MG
325 TABLET ORAL
Status: DISCONTINUED | OUTPATIENT
Start: 2020-10-26 | End: 2020-10-28

## 2020-10-25 RX ORDER — FUROSEMIDE 20 MG/1
20 TABLET ORAL DAILY
Status: DISCONTINUED | OUTPATIENT
Start: 2020-10-26 | End: 2020-10-29

## 2020-10-25 RX ORDER — ROSUVASTATIN CALCIUM 20 MG/1
40 TABLET, COATED ORAL NIGHTLY
Status: DISCONTINUED | OUTPATIENT
Start: 2020-10-25 | End: 2020-10-29

## 2020-10-25 RX ORDER — POTASSIUM CHLORIDE 7.45 MG/ML
10 INJECTION INTRAVENOUS
Status: DISCONTINUED | OUTPATIENT
Start: 2020-10-25 | End: 2020-10-29

## 2020-10-25 RX ORDER — TERAZOSIN 2 MG/1
2 CAPSULE ORAL NIGHTLY
Status: DISCONTINUED | OUTPATIENT
Start: 2020-10-25 | End: 2020-10-29

## 2020-10-25 RX ORDER — ASPIRIN 81 MG/1
81 TABLET ORAL DAILY
Status: DISCONTINUED | OUTPATIENT
Start: 2020-10-26 | End: 2020-10-28

## 2020-10-25 RX ORDER — POTASSIUM CHLORIDE 1.5 G/1.77G
40 POWDER, FOR SOLUTION ORAL AS NEEDED
Status: DISCONTINUED | OUTPATIENT
Start: 2020-10-25 | End: 2020-10-29

## 2020-10-25 RX ORDER — ALBUTEROL SULFATE 2.5 MG/3ML
2.5 SOLUTION RESPIRATORY (INHALATION)
Status: DISCONTINUED | OUTPATIENT
Start: 2020-10-25 | End: 2020-11-01 | Stop reason: HOSPADM

## 2020-10-25 RX ORDER — POTASSIUM CHLORIDE 750 MG/1
40 CAPSULE, EXTENDED RELEASE ORAL AS NEEDED
Status: DISCONTINUED | OUTPATIENT
Start: 2020-10-25 | End: 2020-10-29

## 2020-10-25 RX ORDER — CLOPIDOGREL BISULFATE 75 MG/1
75 TABLET ORAL DAILY
Status: DISCONTINUED | OUTPATIENT
Start: 2020-10-26 | End: 2020-10-29

## 2020-10-25 RX ORDER — SODIUM CHLORIDE 0.9 % (FLUSH) 0.9 %
10 SYRINGE (ML) INJECTION AS NEEDED
Status: DISCONTINUED | OUTPATIENT
Start: 2020-10-25 | End: 2020-10-30 | Stop reason: SDUPTHER

## 2020-10-25 RX ORDER — SODIUM CHLORIDE 0.9 % (FLUSH) 0.9 %
10 SYRINGE (ML) INJECTION AS NEEDED
Status: DISCONTINUED | OUTPATIENT
Start: 2020-10-25 | End: 2020-11-01 | Stop reason: HOSPADM

## 2020-10-25 RX ORDER — ACETAMINOPHEN 160 MG/5ML
650 SOLUTION ORAL EVERY 4 HOURS PRN
Status: DISCONTINUED | OUTPATIENT
Start: 2020-10-25 | End: 2020-10-29

## 2020-10-25 RX ADMIN — SODIUM CHLORIDE, PRESERVATIVE FREE 10 ML: 5 INJECTION INTRAVENOUS at 21:47

## 2020-10-25 RX ADMIN — ALBUTEROL SULFATE 2.5 MG: 2.5 SOLUTION RESPIRATORY (INHALATION) at 20:47

## 2020-10-25 RX ADMIN — VANCOMYCIN HYDROCHLORIDE 750 MG: 750 INJECTION, SOLUTION INTRAVENOUS at 18:02

## 2020-10-25 RX ADMIN — AZTREONAM 1 G: 1 INJECTION, POWDER, LYOPHILIZED, FOR SOLUTION INTRAMUSCULAR; INTRAVENOUS at 19:00

## 2020-10-25 RX ADMIN — HEPARIN SODIUM 5000 UNITS: 5000 INJECTION, SOLUTION INTRAVENOUS; SUBCUTANEOUS at 21:48

## 2020-10-26 ENCOUNTER — APPOINTMENT (OUTPATIENT)
Dept: GENERAL RADIOLOGY | Facility: HOSPITAL | Age: 70
End: 2020-10-26

## 2020-10-26 ENCOUNTER — APPOINTMENT (OUTPATIENT)
Dept: CARDIOLOGY | Facility: HOSPITAL | Age: 70
End: 2020-10-26

## 2020-10-26 LAB
ANION GAP SERPL CALCULATED.3IONS-SCNC: 8 MMOL/L (ref 5–15)
BACTERIA UR QL AUTO: ABNORMAL /HPF
BASOPHILS # BLD AUTO: 0.04 10*3/MM3 (ref 0–0.2)
BASOPHILS NFR BLD AUTO: 0.8 % (ref 0–1.5)
BH CV ECHO MEAS - AI DEC SLOPE: 486.2 CM/SEC^2
BH CV ECHO MEAS - AI MAX PG: 73.9 MMHG
BH CV ECHO MEAS - AI MAX VEL: 429.7 CM/SEC
BH CV ECHO MEAS - AI P1/2T: 258.9 MSEC
BH CV ECHO MEAS - AO MAX PG (FULL): 4.2 MMHG
BH CV ECHO MEAS - AO MAX PG: 8.6 MMHG
BH CV ECHO MEAS - AO MEAN PG (FULL): 1.5 MMHG
BH CV ECHO MEAS - AO MEAN PG: 3.4 MMHG
BH CV ECHO MEAS - AO ROOT AREA (BSA CORRECTED): 2
BH CV ECHO MEAS - AO ROOT AREA: 6.6 CM^2
BH CV ECHO MEAS - AO ROOT DIAM: 2.9 CM
BH CV ECHO MEAS - AO V2 MAX: 146.6 CM/SEC
BH CV ECHO MEAS - AO V2 MEAN: 79.6 CM/SEC
BH CV ECHO MEAS - AO V2 VTI: 25.9 CM
BH CV ECHO MEAS - AVA(I,A): 2.3 CM^2
BH CV ECHO MEAS - AVA(I,D): 2.3 CM^2
BH CV ECHO MEAS - AVA(V,A): 1.8 CM^2
BH CV ECHO MEAS - AVA(V,D): 1.8 CM^2
BH CV ECHO MEAS - BSA(HAYCOCK): 1.4 M^2
BH CV ECHO MEAS - BSA: 1.4 M^2
BH CV ECHO MEAS - BZI_BMI: 18.3 KILOGRAMS/M^2
BH CV ECHO MEAS - BZI_METRIC_HEIGHT: 157.5 CM
BH CV ECHO MEAS - BZI_METRIC_WEIGHT: 45.4 KG
BH CV ECHO MEAS - EDV(CUBED): 87.9 ML
BH CV ECHO MEAS - EDV(MOD-SP2): 67 ML
BH CV ECHO MEAS - EDV(MOD-SP4): 60 ML
BH CV ECHO MEAS - EDV(TEICH): 89.9 ML
BH CV ECHO MEAS - EF(CUBED): 68.2 %
BH CV ECHO MEAS - EF(MOD-BP): 63 %
BH CV ECHO MEAS - EF(MOD-SP2): 61.2 %
BH CV ECHO MEAS - EF(MOD-SP4): 63.3 %
BH CV ECHO MEAS - EF(TEICH): 59.9 %
BH CV ECHO MEAS - ESV(CUBED): 27.9 ML
BH CV ECHO MEAS - ESV(MOD-SP2): 26 ML
BH CV ECHO MEAS - ESV(MOD-SP4): 22 ML
BH CV ECHO MEAS - ESV(TEICH): 36 ML
BH CV ECHO MEAS - FS: 31.7 %
BH CV ECHO MEAS - IVS/LVPW: 1
BH CV ECHO MEAS - IVSD: 0.76 CM
BH CV ECHO MEAS - LA DIMENSION: 2.8 CM
BH CV ECHO MEAS - LA/AO: 0.96
BH CV ECHO MEAS - LAD MAJOR: 3.9 CM
BH CV ECHO MEAS - LAT PEAK E' VEL: 6.8 CM/SEC
BH CV ECHO MEAS - LATERAL E/E' RATIO: 11.3
BH CV ECHO MEAS - LV DIASTOLIC VOL/BSA (35-75): 42.1 ML/M^2
BH CV ECHO MEAS - LV MASS(C)D: 102.2 GRAMS
BH CV ECHO MEAS - LV MASS(C)DI: 71.8 GRAMS/M^2
BH CV ECHO MEAS - LV MAX PG: 4.4 MMHG
BH CV ECHO MEAS - LV MEAN PG: 1.9 MMHG
BH CV ECHO MEAS - LV SYSTOLIC VOL/BSA (12-30): 15.5 ML/M^2
BH CV ECHO MEAS - LV V1 MAX: 104.7 CM/SEC
BH CV ECHO MEAS - LV V1 MEAN: 64 CM/SEC
BH CV ECHO MEAS - LV V1 VTI: 23.3 CM
BH CV ECHO MEAS - LVIDD: 4.4 CM
BH CV ECHO MEAS - LVIDS: 3 CM
BH CV ECHO MEAS - LVLD AP2: 6.4 CM
BH CV ECHO MEAS - LVLD AP4: 6.7 CM
BH CV ECHO MEAS - LVLS AP2: 5.4 CM
BH CV ECHO MEAS - LVLS AP4: 5.5 CM
BH CV ECHO MEAS - LVOT AREA (M): 2.5 CM^2
BH CV ECHO MEAS - LVOT AREA: 2.6 CM^2
BH CV ECHO MEAS - LVOT DIAM: 1.8 CM
BH CV ECHO MEAS - LVPWD: 0.74 CM
BH CV ECHO MEAS - MED PEAK E' VEL: 6.5 CM/SEC
BH CV ECHO MEAS - MEDIAL E/E' RATIO: 11.9
BH CV ECHO MEAS - MV A MAX VEL: 74 CM/SEC
BH CV ECHO MEAS - MV DEC TIME: 0.14 SEC
BH CV ECHO MEAS - MV E MAX VEL: 78.7 CM/SEC
BH CV ECHO MEAS - MV E/A: 1.1
BH CV ECHO MEAS - PA ACC SLOPE: 707.8 CM/SEC^2
BH CV ECHO MEAS - PA ACC TIME: 0.23 SEC
BH CV ECHO MEAS - PA PR(ACCEL): -26 MMHG
BH CV ECHO MEAS - PULM DIAS VEL: 36.6 CM/SEC
BH CV ECHO MEAS - PULM S/D: 1.3
BH CV ECHO MEAS - PULM SYS VEL: 48.6 CM/SEC
BH CV ECHO MEAS - RAP SYSTOLE: 3 MMHG
BH CV ECHO MEAS - RVSP: 31 MMHG
BH CV ECHO MEAS - SI(AO): 120.2 ML/M^2
BH CV ECHO MEAS - SI(CUBED): 42.1 ML/M^2
BH CV ECHO MEAS - SI(LVOT): 41.8 ML/M^2
BH CV ECHO MEAS - SI(MOD-SP2): 28.8 ML/M^2
BH CV ECHO MEAS - SI(MOD-SP4): 26.7 ML/M^2
BH CV ECHO MEAS - SI(TEICH): 37.8 ML/M^2
BH CV ECHO MEAS - SV(AO): 171.1 ML
BH CV ECHO MEAS - SV(CUBED): 59.9 ML
BH CV ECHO MEAS - SV(LVOT): 59.5 ML
BH CV ECHO MEAS - SV(MOD-SP2): 41 ML
BH CV ECHO MEAS - SV(MOD-SP4): 38 ML
BH CV ECHO MEAS - SV(TEICH): 53.9 ML
BH CV ECHO MEAS - TAPSE (>1.6): 1.6 CM
BH CV ECHO MEAS - TR MAX PG: 28 MMHG
BH CV ECHO MEAS - TR MAX VEL: 263 CM/SEC
BH CV ECHO MEAS - TV MAX PG: 27.4 MMHG
BH CV ECHO MEAS - TV V2 MAX: 261.7 CM/SEC
BH CV ECHO MEASUREMENTS AVERAGE E/E' RATIO: 11.83
BH CV VAS BP LEFT ARM: NORMAL MMHG
BH CV XLRA - RV BASE: 3.3 CM
BH CV XLRA - RV LENGTH: 5.8 CM
BH CV XLRA - RV MID: 2.7 CM
BH CV XLRA - TDI S': 11.6 CM/SEC
BILIRUB UR QL STRIP: NEGATIVE
BUN SERPL-MCNC: 11 MG/DL (ref 8–23)
BUN/CREAT SERPL: 25 (ref 7–25)
CALCIUM SPEC-SCNC: 9.6 MG/DL (ref 8.6–10.5)
CHLORIDE SERPL-SCNC: 105 MMOL/L (ref 98–107)
CLARITY UR: CLEAR
CO2 SERPL-SCNC: 33 MMOL/L (ref 22–29)
COLOR UR: YELLOW
CREAT SERPL-MCNC: 0.44 MG/DL (ref 0.57–1)
DEPRECATED RDW RBC AUTO: 62.4 FL (ref 37–54)
EOSINOPHIL # BLD AUTO: 0.17 10*3/MM3 (ref 0–0.4)
EOSINOPHIL NFR BLD AUTO: 3.5 % (ref 0.3–6.2)
ERYTHROCYTE [DISTWIDTH] IN BLOOD BY AUTOMATED COUNT: 15.8 % (ref 12.3–15.4)
GFR SERPL CREATININE-BSD FRML MDRD: 141 ML/MIN/1.73
GLUCOSE SERPL-MCNC: 115 MG/DL (ref 65–99)
GLUCOSE UR STRIP-MCNC: NEGATIVE MG/DL
HCT VFR BLD AUTO: 41.6 % (ref 34–46.6)
HGB BLD-MCNC: 12.2 G/DL (ref 12–15.9)
HGB UR QL STRIP.AUTO: NEGATIVE
HYALINE CASTS UR QL AUTO: ABNORMAL /LPF
IMM GRANULOCYTES # BLD AUTO: 0.02 10*3/MM3 (ref 0–0.05)
IMM GRANULOCYTES NFR BLD AUTO: 0.4 % (ref 0–0.5)
KETONES UR QL STRIP: NEGATIVE
LEFT ATRIUM VOLUME INDEX: 19 ML/M^2
LEFT ATRIUM VOLUME: 27 ML
LEUKOCYTE ESTERASE UR QL STRIP.AUTO: NEGATIVE
LYMPHOCYTES # BLD AUTO: 0.37 10*3/MM3 (ref 0.7–3.1)
LYMPHOCYTES NFR BLD AUTO: 7.6 % (ref 19.6–45.3)
MCH RBC QN AUTO: 31.3 PG (ref 26.6–33)
MCHC RBC AUTO-ENTMCNC: 29.3 G/DL (ref 31.5–35.7)
MCV RBC AUTO: 106.7 FL (ref 79–97)
MONOCYTES # BLD AUTO: 0.45 10*3/MM3 (ref 0.1–0.9)
MONOCYTES NFR BLD AUTO: 9.2 % (ref 5–12)
MRSA DNA SPEC QL NAA+PROBE: NEGATIVE
NEUTROPHILS NFR BLD AUTO: 3.83 10*3/MM3 (ref 1.7–7)
NEUTROPHILS NFR BLD AUTO: 78.5 % (ref 42.7–76)
NITRITE UR QL STRIP: NEGATIVE
NRBC BLD AUTO-RTO: 0 /100 WBC (ref 0–0.2)
PA ADP PRP-ACNC: 158 PRU
PH UR STRIP.AUTO: 6 [PH] (ref 5–8)
PLAT MORPH BLD: NORMAL
PLATELET # BLD AUTO: 162 10*3/MM3 (ref 140–450)
PMV BLD AUTO: 10 FL (ref 6–12)
POTASSIUM SERPL-SCNC: 3.5 MMOL/L (ref 3.5–5.2)
PROT UR QL STRIP: ABNORMAL
RBC # BLD AUTO: 3.9 10*6/MM3 (ref 3.77–5.28)
RBC # UR: ABNORMAL /HPF
RBC MORPH BLD: NORMAL
REF LAB TEST METHOD: ABNORMAL
SODIUM SERPL-SCNC: 146 MMOL/L (ref 136–145)
SP GR UR STRIP: 1.01 (ref 1–1.03)
SQUAMOUS #/AREA URNS HPF: ABNORMAL /HPF
TROPONIN T SERPL-MCNC: <0.01 NG/ML (ref 0–0.03)
UROBILINOGEN UR QL STRIP: ABNORMAL
WBC # BLD AUTO: 4.88 10*3/MM3 (ref 3.4–10.8)
WBC MORPH BLD: NORMAL
WBC UR QL AUTO: ABNORMAL /HPF

## 2020-10-26 PROCEDURE — 85025 COMPLETE CBC W/AUTO DIFF WBC: CPT | Performed by: NURSE PRACTITIONER

## 2020-10-26 PROCEDURE — 80048 BASIC METABOLIC PNL TOTAL CA: CPT | Performed by: NURSE PRACTITIONER

## 2020-10-26 PROCEDURE — 99222 1ST HOSP IP/OBS MODERATE 55: CPT | Performed by: PSYCHIATRY & NEUROLOGY

## 2020-10-26 PROCEDURE — 84484 ASSAY OF TROPONIN QUANT: CPT | Performed by: NURSE PRACTITIONER

## 2020-10-26 PROCEDURE — 87641 MR-STAPH DNA AMP PROBE: CPT

## 2020-10-26 PROCEDURE — 94660 CPAP INITIATION&MGMT: CPT

## 2020-10-26 PROCEDURE — 74018 RADEX ABDOMEN 1 VIEW: CPT

## 2020-10-26 PROCEDURE — 94799 UNLISTED PULMONARY SVC/PX: CPT

## 2020-10-26 PROCEDURE — 81001 URINALYSIS AUTO W/SCOPE: CPT | Performed by: NURSE PRACTITIONER

## 2020-10-26 PROCEDURE — 93306 TTE W/DOPPLER COMPLETE: CPT

## 2020-10-26 PROCEDURE — 93306 TTE W/DOPPLER COMPLETE: CPT | Performed by: INTERNAL MEDICINE

## 2020-10-26 PROCEDURE — 92610 EVALUATE SWALLOWING FUNCTION: CPT

## 2020-10-26 PROCEDURE — 92611 MOTION FLUOROSCOPY/SWALLOW: CPT

## 2020-10-26 PROCEDURE — 85007 BL SMEAR W/DIFF WBC COUNT: CPT | Performed by: NURSE PRACTITIONER

## 2020-10-26 PROCEDURE — 99233 SBSQ HOSP IP/OBS HIGH 50: CPT | Performed by: INTERNAL MEDICINE

## 2020-10-26 PROCEDURE — 25010000002 HEPARIN (PORCINE) PER 1000 UNITS: Performed by: NURSE PRACTITIONER

## 2020-10-26 PROCEDURE — 74230 X-RAY XM SWLNG FUNCJ C+: CPT

## 2020-10-26 PROCEDURE — 85576 BLOOD PLATELET AGGREGATION: CPT | Performed by: PSYCHIATRY & NEUROLOGY

## 2020-10-26 RX ORDER — PHENOBARBITAL 64.8 MG/1
64.8 TABLET ORAL EVERY 12 HOURS SCHEDULED
Status: DISCONTINUED | OUTPATIENT
Start: 2020-10-26 | End: 2020-10-28

## 2020-10-26 RX ORDER — CITALOPRAM 20 MG/1
20 TABLET ORAL DAILY
COMMUNITY
End: 2020-11-01 | Stop reason: HOSPADM

## 2020-10-26 RX ADMIN — ALBUTEROL SULFATE 2.5 MG: 2.5 SOLUTION RESPIRATORY (INHALATION) at 03:14

## 2020-10-26 RX ADMIN — BARIUM SULFATE 100 ML: 0.81 POWDER, FOR SUSPENSION ORAL at 14:34

## 2020-10-26 RX ADMIN — FUROSEMIDE 20 MG: 20 TABLET ORAL at 08:29

## 2020-10-26 RX ADMIN — ALBUTEROL SULFATE 2.5 MG: 2.5 SOLUTION RESPIRATORY (INHALATION) at 23:10

## 2020-10-26 RX ADMIN — BARIUM SULFATE 20 ML: 400 PASTE ORAL at 14:34

## 2020-10-26 RX ADMIN — FERROUS SULFATE TAB 325 MG (65 MG ELEMENTAL FE) 325 MG: 325 (65 FE) TAB at 08:27

## 2020-10-26 RX ADMIN — TERAZOSIN HYDROCHLORIDE 2 MG: 2 CAPSULE ORAL at 21:56

## 2020-10-26 RX ADMIN — ROSUVASTATIN CALCIUM 40 MG: 20 TABLET, COATED ORAL at 21:56

## 2020-10-26 RX ADMIN — ALBUTEROL SULFATE 2.5 MG: 2.5 SOLUTION RESPIRATORY (INHALATION) at 19:29

## 2020-10-26 RX ADMIN — ALBUTEROL SULFATE 2.5 MG: 2.5 SOLUTION RESPIRATORY (INHALATION) at 06:49

## 2020-10-26 RX ADMIN — AZTREONAM 1 G: 1 INJECTION, POWDER, LYOPHILIZED, FOR SOLUTION INTRAMUSCULAR; INTRAVENOUS at 10:10

## 2020-10-26 RX ADMIN — AZTREONAM 1 G: 1 INJECTION, POWDER, LYOPHILIZED, FOR SOLUTION INTRAMUSCULAR; INTRAVENOUS at 02:33

## 2020-10-26 RX ADMIN — HEPARIN SODIUM 5000 UNITS: 5000 INJECTION, SOLUTION INTRAVENOUS; SUBCUTANEOUS at 21:56

## 2020-10-26 RX ADMIN — LISINOPRIL 40 MG: 40 TABLET ORAL at 08:27

## 2020-10-26 RX ADMIN — ALBUTEROL SULFATE 2.5 MG: 2.5 SOLUTION RESPIRATORY (INHALATION) at 00:45

## 2020-10-26 RX ADMIN — ALBUTEROL SULFATE 2.5 MG: 2.5 SOLUTION RESPIRATORY (INHALATION) at 10:36

## 2020-10-26 RX ADMIN — SODIUM CHLORIDE, PRESERVATIVE FREE 10 ML: 5 INJECTION INTRAVENOUS at 21:56

## 2020-10-26 RX ADMIN — BARIUM SULFATE 10 ML: 400 SUSPENSION ORAL at 14:33

## 2020-10-26 RX ADMIN — PANTOPRAZOLE SODIUM 40 MG: 40 TABLET, DELAYED RELEASE ORAL at 08:29

## 2020-10-26 RX ADMIN — DOXYCYCLINE 100 MG: 100 INJECTION, POWDER, LYOPHILIZED, FOR SOLUTION INTRAVENOUS at 23:00

## 2020-10-26 RX ADMIN — SODIUM CHLORIDE, PRESERVATIVE FREE 10 ML: 5 INJECTION INTRAVENOUS at 08:30

## 2020-10-26 RX ADMIN — BARIUM SULFATE 50 ML: 400 SUSPENSION ORAL at 14:34

## 2020-10-26 RX ADMIN — HEPARIN SODIUM 5000 UNITS: 5000 INJECTION, SOLUTION INTRAVENOUS; SUBCUTANEOUS at 08:27

## 2020-10-26 RX ADMIN — PHENOBARBITAL 64.8 MG: 32.4 TABLET ORAL at 13:08

## 2020-10-26 RX ADMIN — ASPIRIN 81 MG: 81 TABLET, COATED ORAL at 08:27

## 2020-10-26 RX ADMIN — AZTREONAM 1 G: 1 INJECTION, POWDER, LYOPHILIZED, FOR SOLUTION INTRAMUSCULAR; INTRAVENOUS at 17:00

## 2020-10-26 RX ADMIN — DILTIAZEM HYDROCHLORIDE 240 MG: 240 CAPSULE, COATED, EXTENDED RELEASE ORAL at 08:27

## 2020-10-26 RX ADMIN — DOXYCYCLINE 100 MG: 100 INJECTION, POWDER, LYOPHILIZED, FOR SOLUTION INTRAVENOUS at 13:09

## 2020-10-27 ENCOUNTER — APPOINTMENT (OUTPATIENT)
Dept: NEUROLOGY | Facility: HOSPITAL | Age: 70
End: 2020-10-27

## 2020-10-27 ENCOUNTER — APPOINTMENT (OUTPATIENT)
Dept: GENERAL RADIOLOGY | Facility: HOSPITAL | Age: 70
End: 2020-10-27

## 2020-10-27 LAB
ANION GAP SERPL CALCULATED.3IONS-SCNC: 12 MMOL/L (ref 5–15)
BASOPHILS # BLD MANUAL: 0 10*3/MM3 (ref 0–0.2)
BASOPHILS NFR BLD AUTO: 0 % (ref 0–1.5)
BUN SERPL-MCNC: 10 MG/DL (ref 8–23)
BUN/CREAT SERPL: 25 (ref 7–25)
CALCIUM SPEC-SCNC: 9.1 MG/DL (ref 8.6–10.5)
CHLORIDE SERPL-SCNC: 99 MMOL/L (ref 98–107)
CO2 SERPL-SCNC: 30 MMOL/L (ref 22–29)
CREAT SERPL-MCNC: 0.4 MG/DL (ref 0.57–1)
DEPRECATED RDW RBC AUTO: 60.1 FL (ref 37–54)
EOSINOPHIL # BLD MANUAL: 0 10*3/MM3 (ref 0–0.4)
EOSINOPHIL NFR BLD MANUAL: 0 % (ref 0.3–6.2)
ERYTHROCYTE [DISTWIDTH] IN BLOOD BY AUTOMATED COUNT: 15.2 % (ref 12.3–15.4)
GFR SERPL CREATININE-BSD FRML MDRD: >150 ML/MIN/1.73
GLUCOSE SERPL-MCNC: 70 MG/DL (ref 65–99)
HCT VFR BLD AUTO: 36.3 % (ref 34–46.6)
HGB BLD-MCNC: 11.1 G/DL (ref 12–15.9)
LYMPHOCYTES # BLD MANUAL: 0.61 10*3/MM3 (ref 0.7–3.1)
LYMPHOCYTES NFR BLD MANUAL: 10 % (ref 19.6–45.3)
LYMPHOCYTES NFR BLD MANUAL: 2 % (ref 5–12)
MACROCYTES BLD QL SMEAR: ABNORMAL
MCH RBC QN AUTO: 32.3 PG (ref 26.6–33)
MCHC RBC AUTO-ENTMCNC: 30.6 G/DL (ref 31.5–35.7)
MCV RBC AUTO: 105.5 FL (ref 79–97)
MONOCYTES # BLD AUTO: 0.12 10*3/MM3 (ref 0.1–0.9)
NEUTROPHILS # BLD AUTO: 5.38 10*3/MM3 (ref 1.7–7)
NEUTROPHILS NFR BLD MANUAL: 70 % (ref 42.7–76)
NEUTS BAND NFR BLD MANUAL: 18 % (ref 0–5)
PHENOBARB SERPL-MCNC: 5 MCG/ML (ref 10–30)
PHOSPHATE SERPL-MCNC: 2.8 MG/DL (ref 2.5–4.5)
PLAT MORPH BLD: NORMAL
PLATELET # BLD AUTO: 158 10*3/MM3 (ref 140–450)
PMV BLD AUTO: 10.4 FL (ref 6–12)
POTASSIUM SERPL-SCNC: 2.9 MMOL/L (ref 3.5–5.2)
POTASSIUM SERPL-SCNC: 3.8 MMOL/L (ref 3.5–5.2)
RBC # BLD AUTO: 3.44 10*6/MM3 (ref 3.77–5.28)
SODIUM SERPL-SCNC: 141 MMOL/L (ref 136–145)
WBC # BLD AUTO: 6.11 10*3/MM3 (ref 3.4–10.8)
WBC MORPH BLD: NORMAL

## 2020-10-27 PROCEDURE — 85007 BL SMEAR W/DIFF WBC COUNT: CPT | Performed by: INTERNAL MEDICINE

## 2020-10-27 PROCEDURE — 94799 UNLISTED PULMONARY SVC/PX: CPT

## 2020-10-27 PROCEDURE — 84100 ASSAY OF PHOSPHORUS: CPT

## 2020-10-27 PROCEDURE — 85025 COMPLETE CBC W/AUTO DIFF WBC: CPT | Performed by: INTERNAL MEDICINE

## 2020-10-27 PROCEDURE — 84132 ASSAY OF SERUM POTASSIUM: CPT | Performed by: INTERNAL MEDICINE

## 2020-10-27 PROCEDURE — 80184 ASSAY OF PHENOBARBITAL: CPT | Performed by: PSYCHIATRY & NEUROLOGY

## 2020-10-27 PROCEDURE — 97535 SELF CARE MNGMENT TRAINING: CPT

## 2020-10-27 PROCEDURE — 25010000003 POTASSIUM CHLORIDE 10 MEQ/100ML SOLUTION: Performed by: NURSE PRACTITIONER

## 2020-10-27 PROCEDURE — 97166 OT EVAL MOD COMPLEX 45 MIN: CPT

## 2020-10-27 PROCEDURE — 99231 SBSQ HOSP IP/OBS SF/LOW 25: CPT | Performed by: PSYCHIATRY & NEUROLOGY

## 2020-10-27 PROCEDURE — 80048 BASIC METABOLIC PNL TOTAL CA: CPT | Performed by: INTERNAL MEDICINE

## 2020-10-27 PROCEDURE — 25010000002 HEPARIN (PORCINE) PER 1000 UNITS: Performed by: NURSE PRACTITIONER

## 2020-10-27 PROCEDURE — 99233 SBSQ HOSP IP/OBS HIGH 50: CPT | Performed by: INTERNAL MEDICINE

## 2020-10-27 PROCEDURE — 97161 PT EVAL LOW COMPLEX 20 MIN: CPT

## 2020-10-27 PROCEDURE — 95816 EEG AWAKE AND DROWSY: CPT

## 2020-10-27 PROCEDURE — 74018 RADEX ABDOMEN 1 VIEW: CPT

## 2020-10-27 PROCEDURE — 99222 1ST HOSP IP/OBS MODERATE 55: CPT | Performed by: INTERNAL MEDICINE

## 2020-10-27 RX ORDER — DILTIAZEM HYDROCHLORIDE 60 MG/1
60 TABLET, FILM COATED ORAL EVERY 6 HOURS
Status: DISCONTINUED | OUTPATIENT
Start: 2020-10-27 | End: 2020-10-29

## 2020-10-27 RX ORDER — PANTOPRAZOLE SODIUM 40 MG/10ML
40 INJECTION, POWDER, LYOPHILIZED, FOR SOLUTION INTRAVENOUS
Status: DISCONTINUED | OUTPATIENT
Start: 2020-10-27 | End: 2020-11-01 | Stop reason: HOSPADM

## 2020-10-27 RX ORDER — LORAZEPAM 0.5 MG/1
0.5 TABLET ORAL EVERY 8 HOURS
Status: DISCONTINUED | OUTPATIENT
Start: 2020-10-27 | End: 2020-10-29

## 2020-10-27 RX ADMIN — POTASSIUM CHLORIDE 10 MEQ: 7.46 INJECTION, SOLUTION INTRAVENOUS at 15:46

## 2020-10-27 RX ADMIN — AZTREONAM 1 G: 1 INJECTION, POWDER, LYOPHILIZED, FOR SOLUTION INTRAMUSCULAR; INTRAVENOUS at 19:21

## 2020-10-27 RX ADMIN — POTASSIUM CHLORIDE 10 MEQ: 7.46 INJECTION, SOLUTION INTRAVENOUS at 16:55

## 2020-10-27 RX ADMIN — ALBUTEROL SULFATE 2.5 MG: 2.5 SOLUTION RESPIRATORY (INHALATION) at 20:07

## 2020-10-27 RX ADMIN — ROSUVASTATIN CALCIUM 40 MG: 20 TABLET, COATED ORAL at 21:52

## 2020-10-27 RX ADMIN — POTASSIUM CHLORIDE 10 MEQ: 7.46 INJECTION, SOLUTION INTRAVENOUS at 11:01

## 2020-10-27 RX ADMIN — HYDROCODONE BITARTRATE AND ACETAMINOPHEN 1 TABLET: 5; 325 TABLET ORAL at 18:08

## 2020-10-27 RX ADMIN — AZTREONAM 1 G: 1 INJECTION, POWDER, LYOPHILIZED, FOR SOLUTION INTRAMUSCULAR; INTRAVENOUS at 01:08

## 2020-10-27 RX ADMIN — LORAZEPAM 0.5 MG: 0.5 TABLET ORAL at 14:28

## 2020-10-27 RX ADMIN — HEPARIN SODIUM 5000 UNITS: 5000 INJECTION, SOLUTION INTRAVENOUS; SUBCUTANEOUS at 21:51

## 2020-10-27 RX ADMIN — ASPIRIN 81 MG: 81 TABLET, COATED ORAL at 09:17

## 2020-10-27 RX ADMIN — DILTIAZEM HYDROCHLORIDE 60 MG: 60 TABLET, FILM COATED ORAL at 21:51

## 2020-10-27 RX ADMIN — TERAZOSIN HYDROCHLORIDE 2 MG: 2 CAPSULE ORAL at 21:52

## 2020-10-27 RX ADMIN — DILTIAZEM HYDROCHLORIDE 60 MG: 60 TABLET, FILM COATED ORAL at 09:16

## 2020-10-27 RX ADMIN — PHENOBARBITAL 64.8 MG: 32.4 TABLET ORAL at 00:04

## 2020-10-27 RX ADMIN — ALBUTEROL SULFATE 2.5 MG: 2.5 SOLUTION RESPIRATORY (INHALATION) at 14:33

## 2020-10-27 RX ADMIN — ALBUTEROL SULFATE 2.5 MG: 2.5 SOLUTION RESPIRATORY (INHALATION) at 07:03

## 2020-10-27 RX ADMIN — DOXYCYCLINE 100 MG: 100 INJECTION, POWDER, LYOPHILIZED, FOR SOLUTION INTRAVENOUS at 16:10

## 2020-10-27 RX ADMIN — FUROSEMIDE 20 MG: 20 TABLET ORAL at 09:16

## 2020-10-27 RX ADMIN — LORAZEPAM 0.5 MG: 0.5 TABLET ORAL at 21:52

## 2020-10-27 RX ADMIN — HEPARIN SODIUM 5000 UNITS: 5000 INJECTION, SOLUTION INTRAVENOUS; SUBCUTANEOUS at 09:51

## 2020-10-27 RX ADMIN — PANTOPRAZOLE SODIUM 40 MG: 40 INJECTION, POWDER, FOR SOLUTION INTRAVENOUS at 09:51

## 2020-10-27 RX ADMIN — POTASSIUM CHLORIDE 10 MEQ: 7.46 INJECTION, SOLUTION INTRAVENOUS at 14:28

## 2020-10-27 RX ADMIN — CLOPIDOGREL BISULFATE 75 MG: 75 TABLET ORAL at 09:17

## 2020-10-27 RX ADMIN — PHENOBARBITAL 64.8 MG: 32.4 TABLET ORAL at 21:52

## 2020-10-27 RX ADMIN — AZTREONAM 1 G: 1 INJECTION, POWDER, LYOPHILIZED, FOR SOLUTION INTRAMUSCULAR; INTRAVENOUS at 11:57

## 2020-10-27 RX ADMIN — DILTIAZEM HYDROCHLORIDE 60 MG: 60 TABLET, FILM COATED ORAL at 16:08

## 2020-10-27 RX ADMIN — POTASSIUM CHLORIDE 10 MEQ: 7.46 INJECTION, SOLUTION INTRAVENOUS at 09:23

## 2020-10-27 RX ADMIN — SODIUM CHLORIDE, PRESERVATIVE FREE 10 ML: 5 INJECTION INTRAVENOUS at 21:52

## 2020-10-27 RX ADMIN — HYDROCODONE BITARTRATE AND ACETAMINOPHEN 1 TABLET: 5; 325 TABLET ORAL at 11:29

## 2020-10-27 RX ADMIN — PHENOBARBITAL 64.8 MG: 32.4 TABLET ORAL at 09:18

## 2020-10-27 RX ADMIN — LISINOPRIL 40 MG: 40 TABLET ORAL at 09:17

## 2020-10-27 RX ADMIN — POTASSIUM CHLORIDE 10 MEQ: 7.46 INJECTION, SOLUTION INTRAVENOUS at 12:57

## 2020-10-28 ENCOUNTER — APPOINTMENT (OUTPATIENT)
Dept: GENERAL RADIOLOGY | Facility: HOSPITAL | Age: 70
End: 2020-10-28

## 2020-10-28 LAB
ALBUMIN SERPL-MCNC: 3.2 G/DL (ref 3.5–5.2)
ALP SERPL-CCNC: 87 U/L (ref 39–117)
ALT SERPL W P-5'-P-CCNC: 7 U/L (ref 1–33)
ANION GAP SERPL CALCULATED.3IONS-SCNC: 11 MMOL/L (ref 5–15)
AST SERPL-CCNC: 14 U/L (ref 1–32)
BACTERIA SPEC AEROBE CULT: ABNORMAL
BASOPHILS # BLD AUTO: 0.01 10*3/MM3 (ref 0–0.2)
BASOPHILS NFR BLD AUTO: 0.2 % (ref 0–1.5)
BILIRUB SERPL-MCNC: 0.3 MG/DL (ref 0–1.2)
BUN SERPL-MCNC: 9 MG/DL (ref 8–23)
CALCIUM SPEC-SCNC: 9.3 MG/DL (ref 8.6–10.5)
CHLORIDE SERPL-SCNC: 102 MMOL/L (ref 98–107)
CHOLEST SERPL-MCNC: 95 MG/DL (ref 0–200)
CO2 SERPL-SCNC: 28 MMOL/L (ref 22–29)
CREAT SERPL-MCNC: 0.46 MG/DL (ref 0.57–1)
DEPRECATED RDW RBC AUTO: 60.1 FL (ref 37–54)
EOSINOPHIL # BLD AUTO: 0.1 10*3/MM3 (ref 0–0.4)
EOSINOPHIL NFR BLD AUTO: 1.9 % (ref 0.3–6.2)
ERYTHROCYTE [DISTWIDTH] IN BLOOD BY AUTOMATED COUNT: 15.5 % (ref 12.3–15.4)
GLUCOSE SERPL-MCNC: 81 MG/DL (ref 65–99)
GRAM STN SPEC: ABNORMAL
HCT VFR BLD AUTO: 38.4 % (ref 34–46.6)
HGB BLD-MCNC: 11.3 G/DL (ref 12–15.9)
IMM GRANULOCYTES # BLD AUTO: 0.02 10*3/MM3 (ref 0–0.05)
IMM GRANULOCYTES NFR BLD AUTO: 0.4 % (ref 0–0.5)
ISOLATED FROM: ABNORMAL
LYMPHOCYTES # BLD AUTO: 1.11 10*3/MM3 (ref 0.7–3.1)
LYMPHOCYTES NFR BLD AUTO: 21.1 % (ref 19.6–45.3)
MAGNESIUM SERPL-MCNC: 1.6 MG/DL (ref 1.6–2.4)
MCH RBC QN AUTO: 31 PG (ref 26.6–33)
MCHC RBC AUTO-ENTMCNC: 29.4 G/DL (ref 31.5–35.7)
MCV RBC AUTO: 105.5 FL (ref 79–97)
MONOCYTES # BLD AUTO: 0.2 10*3/MM3 (ref 0.1–0.9)
MONOCYTES NFR BLD AUTO: 3.8 % (ref 5–12)
NEUTROPHILS NFR BLD AUTO: 3.81 10*3/MM3 (ref 1.7–7)
NEUTROPHILS NFR BLD AUTO: 72.6 % (ref 42.7–76)
NRBC BLD AUTO-RTO: 0 /100 WBC (ref 0–0.2)
PHENOBARB SERPL-MCNC: 9.5 MCG/ML (ref 10–30)
PHOSPHATE SERPL-MCNC: 2.3 MG/DL (ref 2.5–4.5)
PLATELET # BLD AUTO: 152 10*3/MM3 (ref 140–450)
PMV BLD AUTO: 10.4 FL (ref 6–12)
POTASSIUM SERPL-SCNC: 3.6 MMOL/L (ref 3.5–5.2)
PROT SERPL-MCNC: 6.2 G/DL (ref 6–8.5)
RBC # BLD AUTO: 3.64 10*6/MM3 (ref 3.77–5.28)
SODIUM SERPL-SCNC: 141 MMOL/L (ref 136–145)
TRIGL SERPL-MCNC: 112 MG/DL (ref 0–150)
WBC # BLD AUTO: 5.25 10*3/MM3 (ref 3.4–10.8)

## 2020-10-28 PROCEDURE — 84100 ASSAY OF PHOSPHORUS: CPT

## 2020-10-28 PROCEDURE — 97530 THERAPEUTIC ACTIVITIES: CPT

## 2020-10-28 PROCEDURE — 92526 ORAL FUNCTION THERAPY: CPT

## 2020-10-28 PROCEDURE — 83735 ASSAY OF MAGNESIUM: CPT

## 2020-10-28 PROCEDURE — 99232 SBSQ HOSP IP/OBS MODERATE 35: CPT | Performed by: NURSE PRACTITIONER

## 2020-10-28 PROCEDURE — 94799 UNLISTED PULMONARY SVC/PX: CPT

## 2020-10-28 PROCEDURE — 80053 COMPREHEN METABOLIC PANEL: CPT

## 2020-10-28 PROCEDURE — 80184 ASSAY OF PHENOBARBITAL: CPT

## 2020-10-28 PROCEDURE — 82465 ASSAY BLD/SERUM CHOLESTEROL: CPT

## 2020-10-28 PROCEDURE — 74018 RADEX ABDOMEN 1 VIEW: CPT

## 2020-10-28 PROCEDURE — 94660 CPAP INITIATION&MGMT: CPT

## 2020-10-28 PROCEDURE — 25010000003 MAGNESIUM SULFATE 4 GM/100ML SOLUTION: Performed by: NURSE PRACTITIONER

## 2020-10-28 PROCEDURE — 99231 SBSQ HOSP IP/OBS SF/LOW 25: CPT | Performed by: PSYCHIATRY & NEUROLOGY

## 2020-10-28 PROCEDURE — 84478 ASSAY OF TRIGLYCERIDES: CPT

## 2020-10-28 PROCEDURE — 25010000002 HEPARIN (PORCINE) PER 1000 UNITS: Performed by: NURSE PRACTITIONER

## 2020-10-28 PROCEDURE — 85025 COMPLETE CBC W/AUTO DIFF WBC: CPT | Performed by: INTERNAL MEDICINE

## 2020-10-28 PROCEDURE — 97116 GAIT TRAINING THERAPY: CPT

## 2020-10-28 RX ORDER — FERROUS SULFATE 300 MG/5ML
300 LIQUID (ML) ORAL DAILY
Status: DISCONTINUED | OUTPATIENT
Start: 2020-10-29 | End: 2020-11-01 | Stop reason: HOSPADM

## 2020-10-28 RX ORDER — PHENOBARBITAL 20 MG/5ML
60 SOLUTION ORAL EVERY 12 HOURS
Status: DISCONTINUED | OUTPATIENT
Start: 2020-10-28 | End: 2020-11-01 | Stop reason: HOSPADM

## 2020-10-28 RX ORDER — ASPIRIN 81 MG/1
81 TABLET, CHEWABLE ORAL DAILY
Status: DISCONTINUED | OUTPATIENT
Start: 2020-10-28 | End: 2020-10-29

## 2020-10-28 RX ORDER — MAGNESIUM SULFATE HEPTAHYDRATE 40 MG/ML
2 INJECTION, SOLUTION INTRAVENOUS AS NEEDED
Status: DISCONTINUED | OUTPATIENT
Start: 2020-10-28 | End: 2020-11-01 | Stop reason: HOSPADM

## 2020-10-28 RX ORDER — MAGNESIUM SULFATE HEPTAHYDRATE 40 MG/ML
4 INJECTION, SOLUTION INTRAVENOUS AS NEEDED
Status: DISCONTINUED | OUTPATIENT
Start: 2020-10-28 | End: 2020-11-01 | Stop reason: HOSPADM

## 2020-10-28 RX ADMIN — DOXYCYCLINE 100 MG: 100 INJECTION, POWDER, LYOPHILIZED, FOR SOLUTION INTRAVENOUS at 04:55

## 2020-10-28 RX ADMIN — PANTOPRAZOLE SODIUM 40 MG: 40 INJECTION, POWDER, FOR SOLUTION INTRAVENOUS at 09:37

## 2020-10-28 RX ADMIN — DILTIAZEM HYDROCHLORIDE 60 MG: 60 TABLET, FILM COATED ORAL at 15:00

## 2020-10-28 RX ADMIN — DOXYCYCLINE 100 MG: 100 INJECTION, POWDER, LYOPHILIZED, FOR SOLUTION INTRAVENOUS at 15:19

## 2020-10-28 RX ADMIN — LORAZEPAM 0.5 MG: 0.5 TABLET ORAL at 04:56

## 2020-10-28 RX ADMIN — MAGNESIUM SULFATE HEPTAHYDRATE 4 G: 40 INJECTION, SOLUTION INTRAVENOUS at 11:22

## 2020-10-28 RX ADMIN — ALBUTEROL SULFATE 2.5 MG: 2.5 SOLUTION RESPIRATORY (INHALATION) at 07:21

## 2020-10-28 RX ADMIN — CLOPIDOGREL BISULFATE 75 MG: 75 TABLET ORAL at 09:32

## 2020-10-28 RX ADMIN — HEPARIN SODIUM 5000 UNITS: 5000 INJECTION, SOLUTION INTRAVENOUS; SUBCUTANEOUS at 09:37

## 2020-10-28 RX ADMIN — AZTREONAM 1 G: 1 INJECTION, POWDER, LYOPHILIZED, FOR SOLUTION INTRAMUSCULAR; INTRAVENOUS at 01:17

## 2020-10-28 RX ADMIN — ALBUTEROL SULFATE 2.5 MG: 2.5 SOLUTION RESPIRATORY (INHALATION) at 13:02

## 2020-10-28 RX ADMIN — AZTREONAM 1 G: 1 INJECTION, POWDER, LYOPHILIZED, FOR SOLUTION INTRAMUSCULAR; INTRAVENOUS at 09:38

## 2020-10-28 RX ADMIN — AZTREONAM 1 G: 1 INJECTION, POWDER, LYOPHILIZED, FOR SOLUTION INTRAMUSCULAR; INTRAVENOUS at 17:05

## 2020-10-28 RX ADMIN — ALBUTEROL SULFATE 2.5 MG: 2.5 SOLUTION RESPIRATORY (INHALATION) at 00:39

## 2020-10-28 RX ADMIN — PHENOBARBITAL 64.8 MG: 32.4 TABLET ORAL at 09:37

## 2020-10-28 RX ADMIN — LISINOPRIL 40 MG: 40 TABLET ORAL at 09:37

## 2020-10-28 RX ADMIN — DILTIAZEM HYDROCHLORIDE 60 MG: 60 TABLET, FILM COATED ORAL at 09:32

## 2020-10-28 RX ADMIN — POTASSIUM PHOSPHATE, MONOBASIC AND POTASSIUM PHOSPHATE, DIBASIC 15 MMOL: 224; 236 INJECTION, SOLUTION, CONCENTRATE INTRAVENOUS at 14:07

## 2020-10-28 RX ADMIN — ALBUTEROL SULFATE 2.5 MG: 2.5 SOLUTION RESPIRATORY (INHALATION) at 22:09

## 2020-10-28 RX ADMIN — ALBUTEROL SULFATE 2.5 MG: 2.5 SOLUTION RESPIRATORY (INHALATION) at 03:00

## 2020-10-28 RX ADMIN — FUROSEMIDE 20 MG: 20 TABLET ORAL at 09:37

## 2020-10-28 RX ADMIN — HYDROCODONE BITARTRATE AND ACETAMINOPHEN 1 TABLET: 5; 325 TABLET ORAL at 06:15

## 2020-10-28 RX ADMIN — ASPIRIN 81 MG: 81 TABLET, CHEWABLE ORAL at 09:37

## 2020-10-28 RX ADMIN — LORAZEPAM 0.5 MG: 0.5 TABLET ORAL at 14:06

## 2020-10-28 RX ADMIN — HYDROCODONE BITARTRATE AND ACETAMINOPHEN 1 TABLET: 5; 325 TABLET ORAL at 14:59

## 2020-10-29 ENCOUNTER — APPOINTMENT (OUTPATIENT)
Dept: GENERAL RADIOLOGY | Facility: HOSPITAL | Age: 70
End: 2020-10-29

## 2020-10-29 LAB
GLUCOSE BLDC GLUCOMTR-MCNC: 146 MG/DL (ref 70–130)
GLUCOSE BLDC GLUCOMTR-MCNC: 148 MG/DL (ref 70–130)
PHOSPHATE SERPL-MCNC: 3.3 MG/DL (ref 2.5–4.5)
QT INTERVAL: 466 MS
QT INTERVAL: 494 MS
QTC INTERVAL: 503 MS
QTC INTERVAL: 533 MS

## 2020-10-29 PROCEDURE — 99231 SBSQ HOSP IP/OBS SF/LOW 25: CPT | Performed by: PSYCHIATRY & NEUROLOGY

## 2020-10-29 PROCEDURE — 99232 SBSQ HOSP IP/OBS MODERATE 35: CPT | Performed by: NURSE PRACTITIONER

## 2020-10-29 PROCEDURE — 74018 RADEX ABDOMEN 1 VIEW: CPT

## 2020-10-29 PROCEDURE — 25010000002 HEPARIN (PORCINE) PER 1000 UNITS: Performed by: NURSE PRACTITIONER

## 2020-10-29 PROCEDURE — 92526 ORAL FUNCTION THERAPY: CPT

## 2020-10-29 PROCEDURE — 97116 GAIT TRAINING THERAPY: CPT

## 2020-10-29 PROCEDURE — 84100 ASSAY OF PHOSPHORUS: CPT

## 2020-10-29 PROCEDURE — 97535 SELF CARE MNGMENT TRAINING: CPT

## 2020-10-29 PROCEDURE — 97530 THERAPEUTIC ACTIVITIES: CPT

## 2020-10-29 PROCEDURE — 94799 UNLISTED PULMONARY SVC/PX: CPT

## 2020-10-29 PROCEDURE — 82962 GLUCOSE BLOOD TEST: CPT

## 2020-10-29 RX ORDER — HYDROCODONE BITARTRATE AND ACETAMINOPHEN 5; 325 MG/1; MG/1
1 TABLET ORAL EVERY 6 HOURS PRN
Status: DISCONTINUED | OUTPATIENT
Start: 2020-10-29 | End: 2020-11-01 | Stop reason: HOSPADM

## 2020-10-29 RX ORDER — POTASSIUM CHLORIDE 1.5 G/1.77G
40 POWDER, FOR SOLUTION ORAL AS NEEDED
Status: DISCONTINUED | OUTPATIENT
Start: 2020-10-29 | End: 2020-11-01 | Stop reason: HOSPADM

## 2020-10-29 RX ORDER — ROSUVASTATIN CALCIUM 20 MG/1
40 TABLET, COATED ORAL NIGHTLY
Status: DISCONTINUED | OUTPATIENT
Start: 2020-10-29 | End: 2020-11-01 | Stop reason: HOSPADM

## 2020-10-29 RX ORDER — LORAZEPAM 0.5 MG/1
0.5 TABLET ORAL EVERY 8 HOURS
Status: DISCONTINUED | OUTPATIENT
Start: 2020-10-29 | End: 2020-10-30

## 2020-10-29 RX ORDER — ACETAMINOPHEN 325 MG/1
650 TABLET ORAL EVERY 4 HOURS PRN
Status: DISCONTINUED | OUTPATIENT
Start: 2020-10-29 | End: 2020-11-01 | Stop reason: HOSPADM

## 2020-10-29 RX ORDER — CLOPIDOGREL BISULFATE 75 MG/1
75 TABLET ORAL DAILY
Status: DISCONTINUED | OUTPATIENT
Start: 2020-10-29 | End: 2020-11-01 | Stop reason: HOSPADM

## 2020-10-29 RX ORDER — ACETAMINOPHEN 650 MG/1
650 SUPPOSITORY RECTAL EVERY 4 HOURS PRN
Status: DISCONTINUED | OUTPATIENT
Start: 2020-10-29 | End: 2020-11-01 | Stop reason: HOSPADM

## 2020-10-29 RX ORDER — DILTIAZEM HYDROCHLORIDE 60 MG/1
60 TABLET, FILM COATED ORAL EVERY 6 HOURS
Status: DISCONTINUED | OUTPATIENT
Start: 2020-10-29 | End: 2020-10-30

## 2020-10-29 RX ORDER — LISINOPRIL 40 MG/1
40 TABLET ORAL DAILY
Status: DISCONTINUED | OUTPATIENT
Start: 2020-10-29 | End: 2020-11-01 | Stop reason: HOSPADM

## 2020-10-29 RX ORDER — FUROSEMIDE 20 MG/1
20 TABLET ORAL DAILY
Status: DISCONTINUED | OUTPATIENT
Start: 2020-10-29 | End: 2020-11-01 | Stop reason: HOSPADM

## 2020-10-29 RX ORDER — ACETAMINOPHEN 160 MG/5ML
650 SOLUTION ORAL EVERY 4 HOURS PRN
Status: DISCONTINUED | OUTPATIENT
Start: 2020-10-29 | End: 2020-11-01 | Stop reason: HOSPADM

## 2020-10-29 RX ORDER — TERAZOSIN 2 MG/1
2 CAPSULE ORAL NIGHTLY
Status: DISCONTINUED | OUTPATIENT
Start: 2020-10-29 | End: 2020-11-01 | Stop reason: HOSPADM

## 2020-10-29 RX ORDER — POTASSIUM CHLORIDE 7.45 MG/ML
10 INJECTION INTRAVENOUS
Status: DISCONTINUED | OUTPATIENT
Start: 2020-10-29 | End: 2020-11-01 | Stop reason: HOSPADM

## 2020-10-29 RX ORDER — ASPIRIN 81 MG/1
81 TABLET, CHEWABLE ORAL DAILY
Status: DISCONTINUED | OUTPATIENT
Start: 2020-10-29 | End: 2020-11-01 | Stop reason: HOSPADM

## 2020-10-29 RX ADMIN — CLOPIDOGREL BISULFATE 75 MG: 75 TABLET ORAL at 09:39

## 2020-10-29 RX ADMIN — LORAZEPAM 0.5 MG: 0.5 TABLET ORAL at 12:46

## 2020-10-29 RX ADMIN — AZTREONAM 1 G: 1 INJECTION, POWDER, LYOPHILIZED, FOR SOLUTION INTRAMUSCULAR; INTRAVENOUS at 09:39

## 2020-10-29 RX ADMIN — HYDROCODONE BITARTRATE AND ACETAMINOPHEN 1 TABLET: 5; 325 TABLET ORAL at 09:39

## 2020-10-29 RX ADMIN — AZTREONAM 1 G: 1 INJECTION, POWDER, LYOPHILIZED, FOR SOLUTION INTRAMUSCULAR; INTRAVENOUS at 02:46

## 2020-10-29 RX ADMIN — DOXYCYCLINE 100 MG: 100 INJECTION, POWDER, LYOPHILIZED, FOR SOLUTION INTRAVENOUS at 17:00

## 2020-10-29 RX ADMIN — ASPIRIN 81 MG: 81 TABLET, CHEWABLE ORAL at 09:39

## 2020-10-29 RX ADMIN — HEPARIN SODIUM 5000 UNITS: 5000 INJECTION, SOLUTION INTRAVENOUS; SUBCUTANEOUS at 21:05

## 2020-10-29 RX ADMIN — PANTOPRAZOLE SODIUM 40 MG: 40 INJECTION, POWDER, FOR SOLUTION INTRAVENOUS at 09:39

## 2020-10-29 RX ADMIN — ALBUTEROL SULFATE 2.5 MG: 2.5 SOLUTION RESPIRATORY (INHALATION) at 23:14

## 2020-10-29 RX ADMIN — ALBUTEROL SULFATE 2.5 MG: 2.5 SOLUTION RESPIRATORY (INHALATION) at 11:56

## 2020-10-29 RX ADMIN — DILTIAZEM HYDROCHLORIDE 60 MG: 60 TABLET, FILM COATED ORAL at 09:39

## 2020-10-29 RX ADMIN — DILTIAZEM HYDROCHLORIDE 60 MG: 60 TABLET, FILM COATED ORAL at 03:38

## 2020-10-29 RX ADMIN — LISINOPRIL 40 MG: 40 TABLET ORAL at 09:39

## 2020-10-29 RX ADMIN — PHENOBARBITAL 60 MG: 20 ELIXIR ORAL at 09:47

## 2020-10-29 RX ADMIN — MINERAL SUPPLEMENT IRON 300 MG / 5 ML STRENGTH LIQUID 100 PER BOX UNFLAVORED 300 MG: at 09:39

## 2020-10-29 RX ADMIN — HEPARIN SODIUM 5000 UNITS: 5000 INJECTION, SOLUTION INTRAVENOUS; SUBCUTANEOUS at 09:39

## 2020-10-29 RX ADMIN — DOXYCYCLINE 100 MG: 100 INJECTION, POWDER, LYOPHILIZED, FOR SOLUTION INTRAVENOUS at 03:38

## 2020-10-29 RX ADMIN — ALBUTEROL SULFATE 2.5 MG: 2.5 SOLUTION RESPIRATORY (INHALATION) at 07:28

## 2020-10-29 RX ADMIN — FUROSEMIDE 20 MG: 20 TABLET ORAL at 09:39

## 2020-10-29 RX ADMIN — DILTIAZEM HYDROCHLORIDE 60 MG: 60 TABLET, FILM COATED ORAL at 16:46

## 2020-10-29 RX ADMIN — ALBUTEROL SULFATE 2.5 MG: 2.5 SOLUTION RESPIRATORY (INHALATION) at 02:55

## 2020-10-29 RX ADMIN — AZTREONAM 1 G: 1 INJECTION, POWDER, LYOPHILIZED, FOR SOLUTION INTRAMUSCULAR; INTRAVENOUS at 18:26

## 2020-10-29 RX ADMIN — ALBUTEROL SULFATE 2.5 MG: 2.5 SOLUTION RESPIRATORY (INHALATION) at 19:24

## 2020-10-29 RX ADMIN — LORAZEPAM 0.5 MG: 0.5 TABLET ORAL at 05:48

## 2020-10-30 ENCOUNTER — APPOINTMENT (OUTPATIENT)
Dept: GENERAL RADIOLOGY | Facility: HOSPITAL | Age: 70
End: 2020-10-30

## 2020-10-30 LAB
BACTERIA SPEC AEROBE CULT: NORMAL
GLUCOSE BLDC GLUCOMTR-MCNC: 129 MG/DL (ref 70–130)
GLUCOSE BLDC GLUCOMTR-MCNC: 144 MG/DL (ref 70–130)
GLUCOSE BLDC GLUCOMTR-MCNC: 96 MG/DL (ref 70–130)
PHENOBARB SERPL-MCNC: 13.1 MCG/ML (ref 10–30)
PHOSPHATE SERPL-MCNC: 3.1 MG/DL (ref 2.5–4.5)

## 2020-10-30 PROCEDURE — 99232 SBSQ HOSP IP/OBS MODERATE 35: CPT | Performed by: NURSE PRACTITIONER

## 2020-10-30 PROCEDURE — 99231 SBSQ HOSP IP/OBS SF/LOW 25: CPT | Performed by: PSYCHIATRY & NEUROLOGY

## 2020-10-30 PROCEDURE — 82962 GLUCOSE BLOOD TEST: CPT

## 2020-10-30 PROCEDURE — 25010000002 HEPARIN (PORCINE) PER 1000 UNITS: Performed by: NURSE PRACTITIONER

## 2020-10-30 PROCEDURE — 80184 ASSAY OF PHENOBARBITAL: CPT | Performed by: PSYCHIATRY & NEUROLOGY

## 2020-10-30 PROCEDURE — 94799 UNLISTED PULMONARY SVC/PX: CPT

## 2020-10-30 PROCEDURE — 74230 X-RAY XM SWLNG FUNCJ C+: CPT

## 2020-10-30 PROCEDURE — 84100 ASSAY OF PHOSPHORUS: CPT

## 2020-10-30 PROCEDURE — 92611 MOTION FLUOROSCOPY/SWALLOW: CPT

## 2020-10-30 RX ORDER — LORAZEPAM 0.5 MG/1
0.5 TABLET ORAL ONCE
Status: COMPLETED | OUTPATIENT
Start: 2020-10-30 | End: 2020-10-30

## 2020-10-30 RX ORDER — LORAZEPAM 0.5 MG/1
0.5 TABLET ORAL EVERY 8 HOURS SCHEDULED
Status: DISCONTINUED | OUTPATIENT
Start: 2020-10-30 | End: 2020-11-01 | Stop reason: HOSPADM

## 2020-10-30 RX ADMIN — HEPARIN SODIUM 5000 UNITS: 5000 INJECTION, SOLUTION INTRAVENOUS; SUBCUTANEOUS at 22:38

## 2020-10-30 RX ADMIN — BARIUM SULFATE 100 ML: 0.81 POWDER, FOR SUSPENSION ORAL at 10:35

## 2020-10-30 RX ADMIN — LORAZEPAM 0.5 MG: 0.5 TABLET ORAL at 07:19

## 2020-10-30 RX ADMIN — LISINOPRIL 40 MG: 40 TABLET ORAL at 08:04

## 2020-10-30 RX ADMIN — HYDROCODONE BITARTRATE AND ACETAMINOPHEN 1 TABLET: 5; 325 TABLET ORAL at 18:04

## 2020-10-30 RX ADMIN — MINERAL SUPPLEMENT IRON 300 MG / 5 ML STRENGTH LIQUID 100 PER BOX UNFLAVORED 300 MG: at 08:03

## 2020-10-30 RX ADMIN — DOXYCYCLINE 100 MG: 100 INJECTION, POWDER, LYOPHILIZED, FOR SOLUTION INTRAVENOUS at 03:57

## 2020-10-30 RX ADMIN — DILTIAZEM HYDROCHLORIDE 60 MG: 60 TABLET, FILM COATED ORAL at 17:05

## 2020-10-30 RX ADMIN — DOXYCYCLINE 100 MG: 100 INJECTION, POWDER, LYOPHILIZED, FOR SOLUTION INTRAVENOUS at 16:57

## 2020-10-30 RX ADMIN — ASPIRIN 81 MG: 81 TABLET, CHEWABLE ORAL at 08:04

## 2020-10-30 RX ADMIN — LORAZEPAM 0.5 MG: 0.5 TABLET ORAL at 00:56

## 2020-10-30 RX ADMIN — PHENOBARBITAL 60 MG: 20 ELIXIR ORAL at 23:59

## 2020-10-30 RX ADMIN — SODIUM CHLORIDE, PRESERVATIVE FREE 10 ML: 5 INJECTION INTRAVENOUS at 08:05

## 2020-10-30 RX ADMIN — DILTIAZEM HYDROCHLORIDE 60 MG: 60 TABLET, FILM COATED ORAL at 00:55

## 2020-10-30 RX ADMIN — DILTIAZEM HYDROCHLORIDE 60 MG: 60 TABLET, FILM COATED ORAL at 22:38

## 2020-10-30 RX ADMIN — HYDROCODONE BITARTRATE AND ACETAMINOPHEN 1 TABLET: 5; 325 TABLET ORAL at 07:22

## 2020-10-30 RX ADMIN — ALBUTEROL SULFATE 2.5 MG: 2.5 SOLUTION RESPIRATORY (INHALATION) at 12:17

## 2020-10-30 RX ADMIN — PHENOBARBITAL 60 MG: 20 ELIXIR ORAL at 08:04

## 2020-10-30 RX ADMIN — TERAZOSIN HYDROCHLORIDE 2 MG: 2 CAPSULE ORAL at 00:55

## 2020-10-30 RX ADMIN — FUROSEMIDE 20 MG: 20 TABLET ORAL at 08:04

## 2020-10-30 RX ADMIN — CLOPIDOGREL BISULFATE 75 MG: 75 TABLET ORAL at 08:04

## 2020-10-30 RX ADMIN — ALBUTEROL SULFATE 2.5 MG: 2.5 SOLUTION RESPIRATORY (INHALATION) at 08:21

## 2020-10-30 RX ADMIN — BARIUM SULFATE 20 ML: 400 PASTE ORAL at 10:38

## 2020-10-30 RX ADMIN — ROSUVASTATIN CALCIUM 40 MG: 20 TABLET, COATED ORAL at 00:56

## 2020-10-30 RX ADMIN — PANTOPRAZOLE SODIUM 40 MG: 40 INJECTION, POWDER, FOR SOLUTION INTRAVENOUS at 08:05

## 2020-10-30 RX ADMIN — HEPARIN SODIUM 5000 UNITS: 5000 INJECTION, SOLUTION INTRAVENOUS; SUBCUTANEOUS at 08:04

## 2020-10-30 RX ADMIN — LORAZEPAM 0.5 MG: 0.5 TABLET ORAL at 17:04

## 2020-10-30 RX ADMIN — LORAZEPAM 0.5 MG: 0.5 TABLET ORAL at 22:38

## 2020-10-30 RX ADMIN — AZTREONAM 1 G: 1 INJECTION, POWDER, LYOPHILIZED, FOR SOLUTION INTRAMUSCULAR; INTRAVENOUS at 01:10

## 2020-10-30 RX ADMIN — ALBUTEROL SULFATE 2.5 MG: 2.5 SOLUTION RESPIRATORY (INHALATION) at 03:37

## 2020-10-30 RX ADMIN — BARIUM SULFATE 50 ML: 400 SUSPENSION ORAL at 10:39

## 2020-10-30 RX ADMIN — PHENOBARBITAL 60 MG: 20 ELIXIR ORAL at 00:54

## 2020-10-30 RX ADMIN — ROSUVASTATIN CALCIUM 40 MG: 20 TABLET, COATED ORAL at 22:38

## 2020-10-30 RX ADMIN — AZTREONAM 1 G: 1 INJECTION, POWDER, LYOPHILIZED, FOR SOLUTION INTRAMUSCULAR; INTRAVENOUS at 17:59

## 2020-10-30 RX ADMIN — ALBUTEROL SULFATE 2.5 MG: 2.5 SOLUTION RESPIRATORY (INHALATION) at 15:23

## 2020-10-30 RX ADMIN — TERAZOSIN HYDROCHLORIDE 2 MG: 2 CAPSULE ORAL at 22:38

## 2020-10-30 RX ADMIN — DILTIAZEM HYDROCHLORIDE 60 MG: 60 TABLET, FILM COATED ORAL at 08:03

## 2020-10-30 RX ADMIN — ALBUTEROL SULFATE 2.5 MG: 2.5 SOLUTION RESPIRATORY (INHALATION) at 19:12

## 2020-10-30 RX ADMIN — AZTREONAM 1 G: 1 INJECTION, POWDER, LYOPHILIZED, FOR SOLUTION INTRAMUSCULAR; INTRAVENOUS at 09:23

## 2020-10-30 RX ADMIN — BARIUM SULFATE 10 ML: 400 SUSPENSION ORAL at 10:40

## 2020-10-31 PROCEDURE — 25010000002 HEPARIN (PORCINE) PER 1000 UNITS: Performed by: NURSE PRACTITIONER

## 2020-10-31 PROCEDURE — 94799 UNLISTED PULMONARY SVC/PX: CPT

## 2020-10-31 PROCEDURE — 94660 CPAP INITIATION&MGMT: CPT

## 2020-10-31 PROCEDURE — 99233 SBSQ HOSP IP/OBS HIGH 50: CPT | Performed by: INTERNAL MEDICINE

## 2020-10-31 RX ADMIN — AZTREONAM 1 G: 1 INJECTION, POWDER, LYOPHILIZED, FOR SOLUTION INTRAMUSCULAR; INTRAVENOUS at 09:07

## 2020-10-31 RX ADMIN — SODIUM CHLORIDE, PRESERVATIVE FREE 10 ML: 5 INJECTION INTRAVENOUS at 09:06

## 2020-10-31 RX ADMIN — AZTREONAM 1 G: 1 INJECTION, POWDER, LYOPHILIZED, FOR SOLUTION INTRAMUSCULAR; INTRAVENOUS at 03:04

## 2020-10-31 RX ADMIN — LORAZEPAM 0.5 MG: 0.5 TABLET ORAL at 05:36

## 2020-10-31 RX ADMIN — LISINOPRIL 40 MG: 40 TABLET ORAL at 09:05

## 2020-10-31 RX ADMIN — AZTREONAM 1 G: 1 INJECTION, POWDER, LYOPHILIZED, FOR SOLUTION INTRAMUSCULAR; INTRAVENOUS at 18:03

## 2020-10-31 RX ADMIN — ACETAMINOPHEN 650 MG: 325 TABLET, FILM COATED ORAL at 14:08

## 2020-10-31 RX ADMIN — ALBUTEROL SULFATE 2.5 MG: 2.5 SOLUTION RESPIRATORY (INHALATION) at 11:34

## 2020-10-31 RX ADMIN — ALBUTEROL SULFATE 2.5 MG: 2.5 SOLUTION RESPIRATORY (INHALATION) at 18:33

## 2020-10-31 RX ADMIN — PANTOPRAZOLE SODIUM 40 MG: 40 INJECTION, POWDER, FOR SOLUTION INTRAVENOUS at 09:05

## 2020-10-31 RX ADMIN — ALBUTEROL SULFATE 2.5 MG: 2.5 SOLUTION RESPIRATORY (INHALATION) at 08:19

## 2020-10-31 RX ADMIN — HEPARIN SODIUM 5000 UNITS: 5000 INJECTION, SOLUTION INTRAVENOUS; SUBCUTANEOUS at 09:05

## 2020-10-31 RX ADMIN — LORAZEPAM 0.5 MG: 0.5 TABLET ORAL at 22:16

## 2020-10-31 RX ADMIN — HYDROCODONE BITARTRATE AND ACETAMINOPHEN 1 TABLET: 5; 325 TABLET ORAL at 00:35

## 2020-10-31 RX ADMIN — PHENOBARBITAL 60 MG: 20 ELIXIR ORAL at 09:05

## 2020-10-31 RX ADMIN — ROSUVASTATIN CALCIUM 40 MG: 20 TABLET, COATED ORAL at 22:16

## 2020-10-31 RX ADMIN — FUROSEMIDE 20 MG: 20 TABLET ORAL at 09:05

## 2020-10-31 RX ADMIN — MINERAL SUPPLEMENT IRON 300 MG / 5 ML STRENGTH LIQUID 100 PER BOX UNFLAVORED 300 MG: at 09:05

## 2020-10-31 RX ADMIN — HEPARIN SODIUM 5000 UNITS: 5000 INJECTION, SOLUTION INTRAVENOUS; SUBCUTANEOUS at 22:15

## 2020-10-31 RX ADMIN — DOXYCYCLINE 100 MG: 100 INJECTION, POWDER, LYOPHILIZED, FOR SOLUTION INTRAVENOUS at 15:04

## 2020-10-31 RX ADMIN — ASPIRIN 81 MG: 81 TABLET, CHEWABLE ORAL at 09:05

## 2020-10-31 RX ADMIN — CLOPIDOGREL BISULFATE 75 MG: 75 TABLET ORAL at 09:05

## 2020-10-31 RX ADMIN — ALBUTEROL SULFATE 2.5 MG: 2.5 SOLUTION RESPIRATORY (INHALATION) at 16:10

## 2020-10-31 RX ADMIN — ALBUTEROL SULFATE 2.5 MG: 2.5 SOLUTION RESPIRATORY (INHALATION) at 23:04

## 2020-10-31 RX ADMIN — TERAZOSIN HYDROCHLORIDE 2 MG: 2 CAPSULE ORAL at 22:16

## 2020-10-31 RX ADMIN — HYDROCODONE BITARTRATE AND ACETAMINOPHEN 1 TABLET: 5; 325 TABLET ORAL at 09:04

## 2020-10-31 RX ADMIN — DOXYCYCLINE 100 MG: 100 INJECTION, POWDER, LYOPHILIZED, FOR SOLUTION INTRAVENOUS at 04:05

## 2020-10-31 RX ADMIN — PHENOBARBITAL 60 MG: 20 ELIXIR ORAL at 22:16

## 2020-10-31 RX ADMIN — DILTIAZEM HYDROCHLORIDE 60 MG: 60 TABLET, FILM COATED ORAL at 22:16

## 2020-10-31 RX ADMIN — ALBUTEROL SULFATE 2.5 MG: 2.5 SOLUTION RESPIRATORY (INHALATION) at 03:15

## 2020-10-31 RX ADMIN — DILTIAZEM HYDROCHLORIDE 60 MG: 60 TABLET, FILM COATED ORAL at 09:05

## 2020-10-31 RX ADMIN — LORAZEPAM 0.5 MG: 0.5 TABLET ORAL at 14:11

## 2020-10-31 RX ADMIN — DILTIAZEM HYDROCHLORIDE 60 MG: 60 TABLET, FILM COATED ORAL at 14:11

## 2020-11-01 VITALS
HEART RATE: 84 BPM | BODY MASS INDEX: 17.13 KG/M2 | WEIGHT: 93.1 LBS | HEIGHT: 62 IN | OXYGEN SATURATION: 92 % | RESPIRATION RATE: 16 BRPM | DIASTOLIC BLOOD PRESSURE: 59 MMHG | TEMPERATURE: 98.3 F | SYSTOLIC BLOOD PRESSURE: 89 MMHG

## 2020-11-01 PROBLEM — E43 SEVERE MALNUTRITION (HCC): Status: ACTIVE | Noted: 2020-11-01

## 2020-11-01 LAB — PHENOBARB SERPL-MCNC: 19.1 MCG/ML (ref 10–30)

## 2020-11-01 PROCEDURE — 99239 HOSP IP/OBS DSCHRG MGMT >30: CPT | Performed by: INTERNAL MEDICINE

## 2020-11-01 PROCEDURE — 94799 UNLISTED PULMONARY SVC/PX: CPT

## 2020-11-01 PROCEDURE — 25010000002 HEPARIN (PORCINE) PER 1000 UNITS: Performed by: NURSE PRACTITIONER

## 2020-11-01 PROCEDURE — 80184 ASSAY OF PHENOBARBITAL: CPT | Performed by: INTERNAL MEDICINE

## 2020-11-01 RX ORDER — DOXYCYCLINE HYCLATE 50 MG/1
100 CAPSULE ORAL 2 TIMES DAILY
Qty: 4 CAPSULE | Refills: 0 | Status: SHIPPED | OUTPATIENT
Start: 2020-11-01 | End: 2020-11-02

## 2020-11-01 RX ORDER — DOXYCYCLINE HYCLATE 50 MG/1
100 CAPSULE ORAL 2 TIMES DAILY
Qty: 4 CAPSULE | Refills: 0 | Status: SHIPPED | OUTPATIENT
Start: 2020-11-01 | End: 2020-11-01 | Stop reason: SDUPTHER

## 2020-11-01 RX ADMIN — DILTIAZEM HYDROCHLORIDE 60 MG: 60 TABLET, FILM COATED ORAL at 02:00

## 2020-11-01 RX ADMIN — ALBUTEROL SULFATE 2.5 MG: 2.5 SOLUTION RESPIRATORY (INHALATION) at 03:55

## 2020-11-01 RX ADMIN — HYDROCODONE BITARTRATE AND ACETAMINOPHEN 1 TABLET: 5; 325 TABLET ORAL at 13:20

## 2020-11-01 RX ADMIN — SODIUM CHLORIDE, PRESERVATIVE FREE 10 ML: 5 INJECTION INTRAVENOUS at 09:20

## 2020-11-01 RX ADMIN — LORAZEPAM 0.5 MG: 0.5 TABLET ORAL at 05:51

## 2020-11-01 RX ADMIN — LORAZEPAM 0.5 MG: 0.5 TABLET ORAL at 13:20

## 2020-11-01 RX ADMIN — FUROSEMIDE 20 MG: 20 TABLET ORAL at 10:49

## 2020-11-01 RX ADMIN — DOXYCYCLINE 100 MG: 100 INJECTION, POWDER, LYOPHILIZED, FOR SOLUTION INTRAVENOUS at 05:50

## 2020-11-01 RX ADMIN — HYDROCODONE BITARTRATE AND ACETAMINOPHEN 1 TABLET: 5; 325 TABLET ORAL at 03:00

## 2020-11-01 RX ADMIN — PANTOPRAZOLE SODIUM 40 MG: 40 INJECTION, POWDER, FOR SOLUTION INTRAVENOUS at 09:18

## 2020-11-01 RX ADMIN — MINERAL SUPPLEMENT IRON 300 MG / 5 ML STRENGTH LIQUID 100 PER BOX UNFLAVORED 300 MG: at 09:19

## 2020-11-01 RX ADMIN — AZTREONAM 1 G: 1 INJECTION, POWDER, LYOPHILIZED, FOR SOLUTION INTRAMUSCULAR; INTRAVENOUS at 09:19

## 2020-11-01 RX ADMIN — HEPARIN SODIUM 5000 UNITS: 5000 INJECTION, SOLUTION INTRAVENOUS; SUBCUTANEOUS at 09:18

## 2020-11-01 RX ADMIN — ALBUTEROL SULFATE 2.5 MG: 2.5 SOLUTION RESPIRATORY (INHALATION) at 16:06

## 2020-11-01 RX ADMIN — ALBUTEROL SULFATE 2.5 MG: 2.5 SOLUTION RESPIRATORY (INHALATION) at 07:27

## 2020-11-01 RX ADMIN — ASPIRIN 81 MG: 81 TABLET, CHEWABLE ORAL at 09:18

## 2020-11-01 RX ADMIN — CLOPIDOGREL BISULFATE 75 MG: 75 TABLET ORAL at 09:18

## 2020-11-01 RX ADMIN — DILTIAZEM HYDROCHLORIDE 60 MG: 60 TABLET, FILM COATED ORAL at 13:19

## 2020-11-01 RX ADMIN — AZTREONAM 1 G: 1 INJECTION, POWDER, LYOPHILIZED, FOR SOLUTION INTRAMUSCULAR; INTRAVENOUS at 02:00

## 2020-11-01 RX ADMIN — PHENOBARBITAL 60 MG: 20 ELIXIR ORAL at 10:48

## 2020-11-01 RX ADMIN — ACETAMINOPHEN 650 MG: 325 TABLET, FILM COATED ORAL at 07:50

## 2020-11-01 RX ADMIN — DILTIAZEM HYDROCHLORIDE 60 MG: 60 TABLET, FILM COATED ORAL at 09:18

## 2020-11-01 RX ADMIN — LISINOPRIL 40 MG: 40 TABLET ORAL at 09:18

## 2020-11-02 ENCOUNTER — READMISSION MANAGEMENT (OUTPATIENT)
Dept: CALL CENTER | Facility: HOSPITAL | Age: 70
End: 2020-11-02

## 2020-11-02 NOTE — OUTREACH NOTE
Prep Survey      Responses   [facility]?  [place]   Is LACE score < 7 ?  No   Eligibility  [facility]   Discharge diagnosis  Acute respiratory failure with hypercapnia,  COPD   Does the patient have one of the following disease processes/diagnoses(primary or secondary)?  COPD/Pneumonia   Does the patient have Home health ordered?  Yes   What is the Home health agency?   [facility]    Is there a DME ordered?  No   Comments regarding appointments  Follow up with Marianne French MD 4-6 weeks   Medication alerts for this patient  see AVS   Prep survey completed?  Yes          Eli Chan RN

## 2020-11-04 NOTE — OUTREACH NOTE
COPD/PN Week 1 Survey      Responses   [facility] patient discharged from?  [place]   Does the patient have one of the following disease processes/diagnoses(primary or secondary)?  COPD/Pneumonia   Was the primary reason for admission:  COPD exacerbation   Week 1 attempt successful?  No   Unsuccessful attempts  Attempt 1          Yovany Almanzar RN

## 2020-11-09 NOTE — ED NOTES
Lab called and notified ed staff pt blood has hemolyzed and needs to be redrawn     Pual Garcia, OPAL  11/09/20 [id number]

## 2020-11-09 NOTE — ED PROVIDER NOTES
"Subjective   70-year-old female arrives via EMS with complaints of low back pain.  The patient states that she lost her balance yesterday in her home and fell backwards, landing on her bottom.  She denies any other injury.  She also notes some suprapubic abdominal pain.  No dysuria or gross hematuria.  No nausea or vomiting or diarrhea.  According to EMS, the patient's daughter expressed concern about the patient was \"possibly going to have a seizure\" as she has been off of her phenobarbital for a while.  The patient denies having any recent seizure.  She states that she has had a seizure disorder since she was 6 years old.  She states that \"they will not refill my phenobarbital\".  She denies any head trauma or headache.  No neck pain.  No known COVID-19 exposure.  No fever, cough or shortness of breath.  No loss of taste or smell.  The patient is able to ambulate on her own.  According to EMS, she was sitting out on her porch when they arrived and was off of her oxygen, with sats in the upper 80s.  The patient states she has a history of COPD and is on home O2.  She also has a history of coronary artery disease, paroxysmal A. fib, hyperlipidemia, hypertension, COPD, CHF, anxiety and the aforementioned epilepsy.  She has had previous appendectomy, hysterectomy, cholecystectomy and iliac artery stents.  She smokes 2 packs of cigarettes daily.  She denies any alcohol use.          Review of Systems   Constitutional: Negative for chills and fever.   HENT: Negative for congestion and sore throat.    Respiratory: Positive for shortness of breath (Chronic). Negative for cough.    Cardiovascular: Negative for chest pain and palpitations.   Gastrointestinal: Negative for abdominal pain, blood in stool, constipation, diarrhea, nausea and vomiting.   Endocrine: Negative for polydipsia, polyphagia and polyuria.   Genitourinary: Negative for dysuria, flank pain and hematuria.   Musculoskeletal: Positive for back pain. Negative " for neck pain.   Skin: Negative for wound.   Allergic/Immunologic: Negative for immunocompromised state.   Neurological: Negative for dizziness, seizures, light-headedness and headaches.   Hematological: Negative.    Psychiatric/Behavioral: Negative.        Past Medical History:   Diagnosis Date   • Aneurysm (CMS/[facility])    • Angina pectoris (CMS/[facility]) 6/20/2016   • Anxiety 6/20/2016   • Arthritis 6/20/2016   • CAD (coronary artery disease)    • Carotid artery stenosis    • CHF (congestive heart failure) (CMS/[facility])    • Chronic coronary artery disease 6/20/2016 2003 CABG LIMA to LAD, VG to OM 2004 VG to OM occluded. LIMA patent Cx intervention and RCA 2008 stent for ISR 2013 ISR and stenting of CX and RCA 2016 normal MPS   • Chronic left-sided low back pain with left-sided sciatica 2/1/2017   • Chronic obstructive pulmonary disease (CMS/[facility]) 6/20/2016   • Cobalamin deficiency 6/20/2016   • Congestive heart failure (CMS/[facility]) 6/20/2016   • COPD (chronic obstructive pulmonary disease) (CMS/[facility])    • Depression 7/3/2018   • Diverticulosis    • Diverticulosis of large intestine without hemorrhage 5/5/2017   • GERD (gastroesophageal reflux disease)    • GIB (gastrointestinal bleeding)     recurrent    • HTN (hypertension)    • Hypercholesterolemia 6/20/2016   • Hyperlipidemia    • Mesenteric ischemia (CMS/[facility]) 2006    s/p resection    • Mood disorder (CMS/[facility])    • Oxygen dependent     3 liters @ all times.    • PAF (paroxysmal atrial fibrillation) (CMS/[facility])    • Paroxysmal atrial fibrillation (CMS/[facility]) 8/7/2017   • PVD (peripheral vascular disease) (CMS/[facility])    • Restless legs syndrome 6/20/2016   • Seizure disorder (CMS/[facility]) 6/20/2016   • Seizures (CMS/[facility])    • Stenosis of carotid artery 6/20/2016   • Vertigo 9/28/2016       Allergies   Allergen Reactions   • Keflex [Cephalexin] Shortness Of Breath and Rash     Patients power of  states patient had to be taken to ER due to reaction.   •  Sulfamethoxazole-Trimethoprim Shortness Of Breath and Rash     Patients power of  stated patient had to be taken to ER due to reaction.   • Carbamazepine    • Codeine        Past Surgical History:   Procedure Laterality Date   • APPENDECTOMY     • CHOLECYSTECTOMY     • COLOSTOMY  2006    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   • EXPLORATORY LAPAROTOMY      sec to ovarian cysts   • HYSTERECTOMY     • ILIAC ARTERY STENT     • REVISION / TAKEDOWN COLOSTOMY  2008       Family History   Problem Relation Age of Onset   • Arthritis Mother    • Cancer Mother    • Heart attack Father    • Hypertension Father    • Hyperlipidemia Father    • Stroke Father    • Heart disease Brother         CAD   • Heart disease Child         CAD   • Heart disease Child         CAD       Social History     Socioeconomic History   • Marital status:      Spouse name: Not on file   • Number of children: Not on file   • Years of education: Not on file   • Highest education level: Not on file   Tobacco Use   • Smoking status: Current Every Day Smoker     Packs/day: 1.00     Years: 40.00     Pack years: 40.00     Types: Cigarettes, Electronic Cigarette   • Smokeless tobacco: Never Used   • Tobacco comment: <0.5ppd    Substance and Sexual Activity   • Alcohol use: Never     Frequency: Never   • Drug use: Never   • Sexual activity: Defer   Social History Narrative    Lives w/ her daughter. Ambulates w/ a walker. Independent w/ most ADLs.            Objective   Physical Exam  HENT:      Head: Atraumatic.      Right Ear: Ear canal normal.      Left Ear: Ear canal normal.      Nose: Nose normal.      Mouth/Throat:      Mouth: Mucous membranes are moist.   Eyes:      Pupils: Pupils are equal, round, and reactive to light.   Neck:      Musculoskeletal: Normal range of motion. No muscular tenderness.   Cardiovascular:      Rate and Rhythm: Normal rate and regular rhythm.      Pulses: Normal pulses.      Heart sounds: Normal heart sounds. No murmur.    Pulmonary:      Effort: Pulmonary effort is normal.      Breath sounds: Normal breath sounds. No wheezing, rhonchi or rales.      Comments: Moderately diminished breath sounds bilaterally  Chest:      Chest wall: No tenderness.   Abdominal:      General: Bowel sounds are normal.      Tenderness: There is no abdominal tenderness.   Musculoskeletal: Normal range of motion.         General: No tenderness.      Comments: No tenderness on palpation over the lumbar spine.  Normal range of motion both legs without pain or deformity.   Skin:     General: Skin is warm and dry.      Capillary Refill: Capillary refill takes less than 2 seconds.   Neurological:      General: No focal deficit present.      Mental Status: She is alert and oriented to person, place, and time.   Psychiatric:         Mood and Affect: Mood normal.         Procedures           ED Course        CT shows compression fractures of T11 and T12 with 25-50% loss of height with no evidence of spinal cord compression.  Otherwise, nothing acute.  Her phenobarbital level is subtherapeutic.  I spoke with the pt about her labs and imaging results.  She states she wants to go home and refuses admission.  I spoke with her daughter by phone who advised that her doctor had not called in her phenobarb or lorazepam for over a month.   I advised that I would write for the phenobarb but they would need to continue working with her doctor on refills of the lorazepam.  Pt was ambulated here with a walker and was able to ambulate without assistance.  The daughter states if she wants to go home to go ahead and discharge her and she will come pick her up.                                       MDM    Final diagnoses:   Compression fracture of body of thoracic vertebra (CMS/HCC)   Fall, initial encounter            Sae Amaya PA  11/09/20 2429     Patient/Family

## 2020-11-09 NOTE — ED NOTES
Updating family about status of patient with patients permission      Paul Garcia, OPAL  11/09/20 3253

## 2020-11-09 NOTE — OUTREACH NOTE
COPD/PN Week 1 Survey      Responses   [facility] patient discharged from?  [place]   Does the patient have one of the following disease processes/diagnoses(primary or secondary)?  COPD/Pneumonia   Was the primary reason for admission:  COPD exacerbation   Week 1 attempt successful?  No   Unsuccessful attempts  Attempt 2 [PATIENT IN THE ED AT THE TIME OF THE CALL]          Maureen Hernández LPN

## 2020-11-09 NOTE — DISCHARGE INSTRUCTIONS
Rest.  Resume current meds.  Follow up with Dr. Hager about spinal fractures.  Return to the ER if acutely worse.

## 2020-11-12 NOTE — OUTREACH NOTE
"COPD/PN Week 1 Survey      Responses   [facility] patient discharged from?  [place]   Does the patient have one of the following disease processes/diagnoses(primary or secondary)?  COPD/Pneumonia   Was the primary reason for admission:  COPD exacerbation   Week 1 attempt successful?  Yes   Call start time  1253   Call end time  1314   Discharge diagnosis  Acute respiratory failure with hypercapnia,  COPD   Is patient permission given to speak with other caregiver?  Yes   Person spoke with today (if not patient) and relationship  Daughter   Meds reviewed with patient/caregiver?  Yes   Does the patient have all medications ordered at discharge?  Yes   Prescription comments  Daughter states patient's PCP was not visiting and patient does not have refills.     Comments regarding appointments  Patient was provided with number for Dr. French and daughter states she will schedule an appointment.    Does the patient have a primary care provider?   No   PCP Nursing Intervention  Provided number to obtain PCP   Does the patient have an appointment with their PCP or specialist within 7 days of discharge?  No   Comments regarding PCP  Daughter states PCP is no longer visiting patient.  She was provided with patient connection hub to find a new PCP.     What is preventing the patient from scheduling follow up appointments within 7 days of discharge?  Unsure of when or with whom   Nursing Interventions  Educated patient on importance of making appointment, Advised patient to make appointment   Has the patient kept scheduled appointments due by today?  N/A   Notified Case Management  PCP needed, Transportation   What [facility] agency?   [facility]   Has home health visited the patient within 72 hours of discharge?  Yes   Psychosocial issues?  No   Comments  Patient fell in kitchen(unsure of date), HH visited took BP which was \"over 200\" was seen in ED 11/9/2020.  Patient now 2 fractured vertebrae.   Did the patient " receive a copy of their discharge instructions?  Yes   Nursing interventions  Reviewed instructions with patient   What is the patient's perception of their health status since discharge?  New symptoms unrelated to diagnosis [Patient had a fall and has fractured some vertabrae, seen in ED 11/9/2020]   Nursing Interventions  Nurse provided patient education   Is the patient/caregiver able to teach back the hierarchy of who to call/visit for symptoms/problems? PCP, Specialist, Home health nurse, Urgent Care, ED, 911  Yes   Week 1 call completed?  Yes   Wrap up additional comments  Patient fell and was seen in ED on 11/9/2020.  Assisted with obtaining a PCP during this call.  HH has been visiting.            Pam Medina RN

## 2020-11-12 NOTE — OUTREACH NOTE
Case Management Call Center Follow-up      Responses   [facility] Call Center Tracking Reason?  Medication Clarification   Has the Call Center Case Management Follow-up issue been resolved?  Yes   Follow-up Comments  SW spoke with patient's daughter about establishing a PCP appointment to get medication for patient. Daughter explained she is going to contact  Hub to establish PCP appointment today.          Grazyna Tolliver) IZZY Lujan    11/12/2020, 14:08 EST

## 2020-11-21 PROBLEM — R82.81 PYURIA: Status: ACTIVE | Noted: 2020-01-01

## 2020-11-21 PROBLEM — J96.02 ACUTE HYPERCAPNIC RESPIRATORY FAILURE (HCC): Status: ACTIVE | Noted: 2020-01-01

## 2020-11-22 PROBLEM — J44.1 COPD WITH ACUTE EXACERBATION (HCC): Status: ACTIVE | Noted: 2020-01-01

## 2020-11-22 PROBLEM — J96.12 CHRONIC RESPIRATORY FAILURE WITH HYPERCAPNIA (HCC): Status: ACTIVE | Noted: 2020-01-01

## 2020-11-22 PROBLEM — J18.9 PNEUMONIA: Status: ACTIVE | Noted: 2020-01-01

## 2020-11-22 PROBLEM — D53.9 MACROCYTIC ANEMIA: Status: ACTIVE | Noted: 2020-01-01

## 2020-11-22 NOTE — OUTREACH NOTE
COPD/PN Week 2 Survey      Responses   [facility] patient discharged from?  [place]   Does the patient have one of the following disease processes/diagnoses(primary or secondary)?  COPD/Pneumonia   Was the primary reason for admission:  COPD exacerbation   Week 2 attempt successful?  No   Revoke  Readmitted          Anu Diaz RN

## 2020-11-25 PROBLEM — J44.1 COPD WITH ACUTE EXACERBATION (HCC): Status: RESOLVED | Noted: 2020-01-01 | Resolved: 2020-01-01

## 2020-11-25 PROBLEM — J96.02 ACUTE HYPERCAPNIC RESPIRATORY FAILURE (HCC): Status: RESOLVED | Noted: 2020-01-01 | Resolved: 2020-01-01

## 2020-11-25 PROBLEM — J18.9 PNEUMONIA: Status: RESOLVED | Noted: 2020-01-01 | Resolved: 2020-01-01

## 2020-11-25 NOTE — TELEPHONE ENCOUNTER
Caller: [facility]    Relationship to patient:     Best call back number: [phone]  New or established patient?  [] New  [x] Established    Date of discharge: 11/25/20  [facility]ility [facility] from: [facility]    Diagnosis/Symptoms: gastro esophageal reflux disease; COPD    Length of stay (If applicable): 4 DAYS    Specialty Only: Did you see a Psychiatric provider?    [x] Yes  [] No  If so, who?

## 2020-11-26 NOTE — OUTREACH NOTE
Prep Survey      Responses   [facility] patient discharged from?  [place]   Is LACE score < 7 ?  No   Eligibility  [facility]   Date of Admission  11/21/20   Date of Discharge  11/25/20   Discharge Disposition  Home or Self Care   Discharge diagnosis  Metabolic encephalopathy due to hypercarbic respiratory failure and hypoglycemia,     COPD with exacerbation,    L humerus fracture    Does the patient have one of the following disease processes/diagnoses(primary or secondary)?  COPD/Pneumonia   Does the patient have Home health ordered?  Yes   [facility] agency?   [facility]    Is there a DME ordered?  No   Prep survey completed?  Yes          Carly Steen RN

## 2020-11-27 NOTE — OUTREACH NOTE
"Call Center TCM Note      Responses   [facility] patient discharged from?  [place]   Does the patient have one of the following disease processes/diagnoses(primary or secondary)?  COPD/Pneumonia   Was the primary reason for admission:  COPD exacerbation   TCM attempt successful?  Yes [Verbal release is over a year old]   Call start time  1417   Call end time  1434   Discharge diagnosis  Metabolic encephalopathy due to hypercarbic respiratory failure and hypoglycemia,     COPD with exacerbation,    L humerus fracture    Is patient permission given to speak with other caregiver?  Yes   List who call center can speak with  Lory   Person spoke with today (if not patient) and relationship  Lory-daughter and patient    Meds reviewed with patient/caregiver?  Yes   Is the patient having any side effects they believe may be caused by any medication additions or changes?  No   Does the patient have all medications ordered at discharge?  Yes   Is the patient taking all medications as directed (includes completed medication regime)?  Yes   Does the patient have a primary care provider?   Yes   Does the patient have an appointment with their PCP or specialist within 7 days of discharge?  Yes   Comments regarding PCP  Hospital d/c f/u appt is on 12/2/20 at 10:30 am    Has the patient kept scheduled appointments due by today?  N/A   What is the Home health agency?   [facility]   Pulse Ox monitoring  Intermittent   Pulse Ox device source  Patient   O2 Sat comments  Lory was Thanksgiving cooking dinner and didn't know O2 sat   O2 Sat: education provided  Sat levels   O2 Sat education comments  Pt wears 3 L of O2 continuous. Pt and Lory are reporting, \"the oxygen machine does not blow out enough O2 to keep my oxygen up.\" Pt receives O2 from [facility].    Psychosocial issues?  Yes   Comments  Lory reports patients O2 has been below 90%    Did the patient receive a copy of their discharge instructions?  Yes   Nursing " interventions  Reviewed instructions with patient   What is the patient's perception of their health status since discharge?  Improving   Nursing Interventions  Nurse provided patient education   Are the patient's immunizations up to date?   Yes   If the patient is a current smoker, are they able to teach back resources for cessation?  Smoking cessation medications   Is the patient/caregiver able to teach back the hierarchy of who to call/visit for symptoms/problems? PCP, Specialist, Home health nurse, Urgent Care, ED, 911  Yes   Is the patient able to teach back COPD zones?  Yes   Nursing interventions  Education provided on various zones   Patient reports what zone on this call?  Yellow Zone   Yellow Zone  Increased shortness of air, Fever or feeling like have a chest cold, Unable to complete daily activities   Yellow interventions  Continue to use daily medications, Use oxygen as ordered, Get plenty of rest, Do not smoke   TCM call completed?  Yes          Sienna Dominguez RN    11/27/2020, 14:38 EST

## 2020-11-30 NOTE — OUTREACH NOTE
Case Management Call Center Follow-up      Responses   [facility] Call Center Tracking Reason?  Other (specify in comments)   Other Tracking Comments  Home O2 equipment   Has the Call Center Case Management Follow-up issue been resolved?  Yes   Follow-up Comments  RAMSEY left a message for pt and spoke to pt's daughter/POA.  RAMSEY called and spoke with Mundo at [facility]. She stated that in order for to have increased litre flow at home, a new oxygen order will be needed from PCP office.  SW explained this to pt's daughter.  Pt has a PCP appointment on 12/2/20.  Daughter stated they will follow-up with the O2 needs at this appointment.  No further needs are required.          IZZY Farley    11/30/2020, 11:16 EST

## 2020-12-02 NOTE — PROGRESS NOTES
Office Note      Date: 2020  Patient Name: An Abreu  MRN: [id number]  : 1950    Chief Complaint   Patient presents with   • Follow-up     SHANTAL       History of Present Illness: An Abreu is a 70 y.o. female who presents for Follow-up (SHANTAL). Hospitalized from - form acute hypercapnic respiratory failure. Has COPD and is on home o2 3-4 L. She is complaining of SOB today. Her current O2 tank she brought with her to clinic has no oxygen. She has another tank at home but does not have the cord connections to contact the nasal canula to the o2 tank. She is currently using the clinic's o2 tank and is on 4L.     D/c'd on doxy for PNA.     Requesting refill ativan. Last refill per PDMP database was in August. Requires this due to PTSD and anxiety.     Is sleepy today b/c she could not get enough rest last night.     Has f/u with ortho for left humerus fracture.     Follows w/ palliative care for pain meds.   Subjective      Review of Systems:   Pertinent review of systems per HPI.    Review of Systems   Constitutional: Positive for fatigue. Negative for activity change, appetite change, chills, diaphoresis, fever and unexpected weight change.   HENT: Negative for congestion, dental problem, drooling, ear discharge, ear pain, facial swelling, hearing loss and mouth sores.    Eyes: Negative for pain, discharge and itching.   Respiratory: Positive for shortness of breath. Negative for apnea, cough, choking and chest tightness.    Cardiovascular: Negative for chest pain, palpitations and leg swelling.   Gastrointestinal: Negative for abdominal distention, abdominal pain, blood in stool, constipation and diarrhea.   Endocrine: Negative for cold intolerance, heat intolerance, polydipsia and polyuria.   Genitourinary: Negative for difficulty urinating, dysuria, frequency and hematuria.   Skin: Negative for color change, pallor, rash and wound.   Allergic/Immunologic: Negative for  "environmental allergies, food allergies and immunocompromised state.   Neurological: Negative for dizziness, weakness and light-headedness.   Psychiatric/Behavioral: Positive for sleep disturbance. Negative for agitation, behavioral problems, confusion, decreased concentration and self-injury. The patient is nervous/anxious.    All other systems reviewed and are negative.    Allergies   Allergen Reactions   • Keflex [Cephalexin] Shortness Of Breath and Rash     Patients power of  states patient had to be taken to ER due to reaction.   • Sulfamethoxazole-Trimethoprim Shortness Of Breath and Rash     Patients power of  stated patient had to be taken to ER due to reaction.   • Carbamazepine    • Codeine        Objective     Physical Exam:  Vital Signs:   Vitals:    12/02/20 1015   BP: 101/62   Pulse: 89   Temp: 96.9 °F (36.1 °C)   TempSrc: Temporal   SpO2: (!) 88%  Comment: on 4 liters   Weight: 34.7 kg (76 lb 6.4 oz)   Height: 149.9 cm (59\")      Body mass index is 15.43 kg/m².    Physical Exam  Vitals signs and nursing note reviewed.   Constitutional:       General: She is not in acute distress.     Appearance: She is well-developed.   HENT:      Head: Normocephalic and atraumatic.      Right Ear: External ear normal.      Left Ear: External ear normal.   Cardiovascular:      Rate and Rhythm: Normal rate and regular rhythm.      Heart sounds: Normal heart sounds. No murmur. No friction rub. No gallop.    Pulmonary:      Effort: Pulmonary effort is normal. No respiratory distress.      Breath sounds: Normal breath sounds. No wheezing or rales.   Skin:     General: Skin is warm and dry.      Coloration: Skin is not pale.   Psychiatric:      Comments: Dosing off to sleep during exam, easily arousible         Assessment / Plan      Assessment & Plan:    1. SOB (shortness of breath)   is arranging for pt to take clinic's o2 tank to hold her over until HH can get her o2 tank supplies  - " Ambulatory Referral to Home Health  - Miscellaneous DME    2. Chronic obstructive pulmonary disease, unspecified COPD type (CMS/HCC)  Continue oxygen. No wheezing on exam.     3. Anxiety  Discussed that cannot prescribe ativan until get old pcp's records.   Advised she discuss this with her palliative care physician.   Concerning that she is very sleepy in the exam room today and apparently per daughter has episodes of confusion.       Zabrina Parker MD  12/02/2020     Please note that portions of this note may have been completed with a voice recognition program. Efforts were made to edit the dictations, but occasionally words are mistranscribed.

## 2020-12-03 NOTE — OUTREACH NOTE
COPD/PN Week 2 Survey      Responses   [facility] patient discharged from?  [place]   Does the patient have one of the following disease processes/diagnoses(primary or secondary)?  COPD/Pneumonia   Was the primary reason for admission:  COPD exacerbation   Week 2 attempt successful?  No   Unsuccessful attempts  Attempt 1          Sheila Palma RN

## 2020-12-06 NOTE — ED PROVIDER NOTES
EMERGENCY DEPARTMENT ENCOUNTER      Pt Name: An Abreu  MRN: [id number]  YOB: 1950  Date of evaluation: 12/5/2020  Provider: Vic Kaminski DO    CHIEF COMPLAINT       Chief Complaint   Patient presents with   • Shortness of Breath   • Weakness - Generalized         HISTORY OF PRESENT ILLNESS  (Location/Symptom, Timing/Onset, Context/Setting, Quality, Duration, Modifying Factors, Severity.)   An Abreu is a 70 y.o. female who presents to the emergency department for evaluation generalized weakness, cough and shortness of breath with a history of COPD.  She does wear supplemental oxygen at home, mainly at night, 2 L nasal cannula.  She denies any hemoptysis.  Denies any fevers, does endorse some generalized weakness which has been slightly worse than normal.  She denies any active chest pain, no unilateral weakness, numbness or tingling.  She does state a history of COPD for which she takes breathing treatments and wears oxygen at home as needed.  States she has been tested for coronavirus in the past, negative results at that time.  Denies any known exposures, no sick contacts.  Patient denies any other acute systemic complaints at this time.  Patient lives at home with her daughter, uses a walker for ambulation assistance, performs all of her own activities of daily living.      Nursing notes were reviewed.    REVIEW OF SYSTEMS    (2-9 systems for level 4, 10 or more for level 5)   ROS:  General:  No fevers, no chills, + generalized weakness  Cardiovascular:  No chest pain, no palpitations  Respiratory:  + Chronic shortness of breath, cough, no wheezing  Gastrointestinal:  No pain, no nausea, no vomiting, no diarrhea  Musculoskeletal:  No muscle pain, no joint pain  Skin:  No rash, no easy bruising  Neurologic:  No speech problems, no headache, no extremity numbness, no extremity tingling, no extremity weakness  Psychiatric:  No anxiety  Genitourinary:  No dysuria, no  hematuria    Except as noted above the remainder of the review of systems was reviewed and negative.       PAST MEDICAL HISTORY     Past Medical History:   Diagnosis Date   • Aneurysm (CMS/HCC)    • Angina pectoris (CMS/HCC) 6/20/2016   • Anxiety 6/20/2016   • Arthritis 6/20/2016   • CAD (coronary artery disease)    • Carotid artery stenosis    • CHF (congestive heart failure) (CMS/HCC)    • Chronic coronary artery disease 6/20/2016 2003 CABG LIMA to LAD, VG to OM 2004 VG to OM occluded. LIMA patent Cx intervention and RCA 2008 stent for ISR 2013 ISR and stenting of CX and RCA 2016 normal MPS   • Chronic left-sided low back pain with left-sided sciatica 2/1/2017   • Chronic obstructive pulmonary disease (CMS/HCC) 6/20/2016   • Cobalamin deficiency 6/20/2016   • Congestive heart failure (CMS/HCC) 6/20/2016   • COPD (chronic obstructive pulmonary disease) (CMS/HCC)    • Depression 7/3/2018   • Diverticulosis    • Diverticulosis of large intestine without hemorrhage 5/5/2017   • GERD (gastroesophageal reflux disease)    • GIB (gastrointestinal bleeding)     recurrent    • HTN (hypertension)    • Hypercholesterolemia 6/20/2016   • Hyperlipidemia    • Mesenteric ischemia (CMS/HCC) 2006    s/p resection    • Mood disorder (CMS/HCC)    • Oxygen dependent     3 liters @ all times.    • PAF (paroxysmal atrial fibrillation) (CMS/HCC)    • Paroxysmal atrial fibrillation (CMS/HCC) 8/7/2017   • PVD (peripheral vascular disease) (CMS/HCC)    • Restless legs syndrome 6/20/2016   • Seizure disorder (CMS/HCC) 6/20/2016   • Seizures (CMS/[facility])    • Stenosis of carotid artery 6/20/2016   • Vertigo 9/28/2016         SURGICAL HISTORY       Past Surgical History:   Procedure Laterality Date   • APPENDECTOMY     • CHOLECYSTECTOMY     • COLOSTOMY  2006    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   • EXPLORATORY LAPAROTOMY      sec to ovarian cysts   • HYSTERECTOMY     • ILIAC ARTERY STENT     • REVISION / TAKEDOWN COLOSTOMY  2008         CURRENT  MEDICATIONS       Current Facility-Administered Medications:   •  sodium chloride 0.9 % flush 10 mL, 10 mL, Intravenous, PRN, Vic Kaminski, DO    Current Outpatient Medications:   •  albuterol (PROVENTIL) (2.5 MG/3ML) 0.083% nebulizer solution, Take 2.5 mg by nebulization Every 4 (Four) Hours As Needed for Wheezing or Shortness of Air., Disp: 90 vial, Rfl: 5  •  ANORO ELLIPTA 62.5-25 MCG/INH aerosol powder , INHALE 1 PUFF BY MOUTH EVERY MORNING, Disp: 1 each, Rfl: 5  •  aspirin 81 MG tablet, Take 81 mg by mouth Daily., Disp: , Rfl:   •  CARTIA  MG 24 hr capsule, TAKE 1 CAPSULE BY MOUTH DAILY, Disp: 90 capsule, Rfl: 0  •  citalopram (CeleXA) 20 MG tablet, Take 1 tablet by mouth Daily., Disp: 30 tablet, Rfl: 3  •  clopidogrel (PLAVIX) 75 MG tablet, Take 1 tablet by mouth Daily., Disp: 30 tablet, Rfl: 5  •  doxazosin (CARDURA) 2 MG tablet, TAKE 1 TABLET BY MOUTH EVERY NIGHT, Disp: 90 tablet, Rfl: 0  •  ezetimibe (ZETIA) 10 MG tablet, TAKE 1 TABLET BY MOUTH DAILY, Disp: 90 tablet, Rfl: 0  •  ferrous sulfate 325 (65 FE) MG tablet, Take 325 mg by mouth Daily With Breakfast., Disp: , Rfl:   •  furosemide (LASIX) 40 MG tablet, Take 40 mg by mouth Daily., Disp: , Rfl:   •  guaiFENesin (MUCINEX) 600 MG 12 hr tablet, Take 1 tablet by mouth Every 12 (Twelve) Hours., Disp: 60 tablet, Rfl: 0  •  ipratropium-albuterol (DUO-NEB) 0.5-2.5 mg/3 ml nebulizer, Take 3 mL by nebulization Every 4 (Four) Hours As Needed for Wheezing., Disp: 360 mL, Rfl: 0  •  isosorbide mononitrate (IMDUR) 60 MG 24 hr tablet, Take 60 mg by mouth Daily., Disp: , Rfl:   •  lisinopril (PRINIVIL,ZESTRIL) 40 MG tablet, Take 40 mg by mouth Daily., Disp: , Rfl:   •  LORazepam (ATIVAN) 1 MG tablet, TAKE 1 TABLET BY MOUTH TWICE DAILY. MUST LAST 30 DAYS., Disp: 60 tablet, Rfl: 2  •  pantoprazole (PROTONIX) 40 MG EC tablet, Take 1 tablet by mouth 2 (Two) Times a Day Before Meals., Disp: 60 tablet, Rfl: 0  •  PHENobarbital (LUMINAL) 100 MG tablet, 1 1/2  tablet po daily., Disp: 45 tablet, Rfl: 0  •  potassium chloride (K-DUR,KLOR-CON) 20 MEQ CR tablet, Take 20 mEq by mouth Daily., Disp: , Rfl:   •  predniSONE (DELTASONE) 20 MG tablet, Take 3 tablets by mouth Daily for 4 days., Disp: 12 tablet, Rfl: 0  •  rosuvastatin (CRESTOR) 40 MG tablet, TAKE 1 TABLET BY MOUTH DAILY, Disp: 30 tablet, Rfl: 0    ALLERGIES     Keflex [cephalexin], Sulfamethoxazole-trimethoprim, Carbamazepine, and Codeine    FAMILY HISTORY       Family History   Problem Relation Age of Onset   • Arthritis Mother    • Cancer Mother    • Heart attack Father    • Hypertension Father    • Hyperlipidemia Father    • Stroke Father    • Heart disease Brother         CAD   • Heart disease Child         CAD   • Heart disease Child         CAD          SOCIAL HISTORY       Social History     Socioeconomic History   • Marital status:      Spouse name: Not on file   • Number of children: Not on file   • Years of education: Not on file   • Highest education level: Not on file   Tobacco Use   • Smoking status: Current Every Day Smoker     Packs/day: 1.00     Years: 40.00     Pack years: 40.00     Types: Cigarettes, Electronic Cigarette   • Smokeless tobacco: Never Used   • Tobacco comment: <0.5ppd    Substance and Sexual Activity   • Alcohol use: Never     Frequency: Never   • Drug use: Never   • Sexual activity: Defer   Social History Narrative    Lives w/ her daughter. Ambulates w/ a walker. Independent w/ most ADLs.          PHYSICAL EXAM    (up to 7 for level 4, 8 or more for level 5)     Vitals:    12/05/20 2100 12/05/20 2130 12/05/20 2200 12/05/20 2300   BP: 104/48 123/51 116/49 115/57   Pulse: 71 70 70 70   Resp: 18  18 18   Temp:       SpO2: 100% 100% 100% 99%   Weight:       Height:           Physical Exam  General : Patient is elderly, generally frail, awake, alert, oriented, in no acute distress, nontoxic appearing  HEENT: Pupils are equally round and reactive to light, EOMI, conjunctivae clear,  sclerae white, there is no injection no icterus.   Neck: Neck is supple, full range of motion, trachea midline  Cardiac: Heart regular rate, rhythm  Lungs: Lungs with no acute respiratory distress, no accessory muscle usage, no audible stridor  Abdomen: Abdomen is soft, nondistended.  Musculoskeletal: No peripheral edema, 5 out of 5 strength in all 4 extremities.  No focal muscle deficits are appreciated  Neuro: Motor intact, sensory intact, level of consciousness is normal, GCS 15  Dermatology: Skin is warm and dry  Psych: Mentation is grossly normal, cognition is grossly normal. Affect is appropriate.      DIAGNOSTIC RESULTS     EKG: All EKG's are interpreted by the Emergency Department Physician who either signs or Co-signs this chart in the absence of a cardiologist.    ECG 12 Lead   Final Result   Test Reason : SOA Protocol   Blood Pressure : **/** mmHG   Vent. Rate : 077 BPM     Atrial Rate : 077 BPM      P-R Int : 130 ms          QRS Dur : 080 ms       QT Int : 350 ms       P-R-T Axes : 059 081 037 degrees      QTc Int : 396 ms      Normal sinus rhythm   T wave abnormality, consider lateral ischemia   Abnormal ECG   When compared with ECG of 21-NOV-2020 19:45,   t wave changes on old EKG noted   Non-specific change in ST segment in Inferior leads   Confirmed by CHERRIE PRICE MD ([id number]) on 12/5/2020 7:24:14 PM      Referred By:  ED MD           Confirmed By:CHERRIE PRICE MD          RADIOLOGY:   Non-plain film images such as CT, Ultrasound and MRI are read by the radiologist. Plain radiographic images are visualized and preliminarily interpreted by the emergency physician with the below findings:      [] Radiologist's Report Reviewed:  CT Chest Without Contrast   Final Result   1. No acute pulmonary process.      2 Small nodule right upper lobe. Follow-up recommendation provided below.      3.Findings unlikely to represent COVID-19. Alternative diagnosis favored.    Please note, these assessments apply only  to PUI COVID 19.    **CT may be negative in the early stages of COVID 19.       Please see the following excerpt from the Fleischner's thoracic Society recommendations for pulmonary nodule follow-up:     FLEISCHNER SOCIETY PULMONARY NODULE FOLLOW UP:     Solitary nodule size: <6 mm    *   low risk patients: no follow-up needed   *   high risk patients: optional CT at 12 months      [ NOTE:    *  - low risk patients: minimal or absent history of smoking and or other known risk factors   *  - high risk patients: history of smoking or of other known risk factors (e.g. first degree relative with lung cancer, or exposure to asbestos, radon, uranium)]       .             Signer Name: Krupa Giles MD    Signed: 12/5/2020 8:55 PM    Workstation Name: [facility]     Radiology [facility]      XR Chest 1 View   Final Result   No acute disease. Subacute comminuted left proximal humeral fracture.             Signer Name: Krupa Giles MD    Signed: 12/5/2020 8:28 PM    Workstation Name: [facility]            ED BEDSIDE ULTRASOUND:   Performed by ED Physician - none    LABS:    I have reviewed and interpreted all of the currently available lab results from this visit (if applicable):  Results for orders placed or performed during the hospital encounter of 12/05/20   Comprehensive Metabolic Panel    Specimen: Blood   Result Value Ref Range    Glucose 136 (H) 65 - 99 mg/dL    BUN 9 8 - 23 mg/dL    Creatinine 0.38 (L) 0.57 - 1.00 mg/dL    Sodium 138 136 - 145 mmol/L    Potassium 4.5 3.5 - 5.2 mmol/L    Chloride 97 (L) 98 - 107 mmol/L    CO2 33.0 (H) 22.0 - 29.0 mmol/L    Calcium 9.5 8.6 - 10.5 mg/dL    Total Protein 6.7 6.0 - 8.5 g/dL    Albumin 3.40 (L) 3.50 - 5.20 g/dL    ALT (SGPT) 9 1 - 33 U/L    AST (SGOT) 16 1 - 32 U/L    Alkaline Phosphatase 96 39 - 117 U/L    Total Bilirubin 0.2 0.0 - 1.2 mg/dL    eGFR Non African Amer >150 >60 mL/min/1.73    Globulin 3.3 gm/dL     A/G Ratio 1.0 g/dL    BUN/Creatinine Ratio 23.7 7.0 - 25.0    Anion Gap 8.0 5.0 - 15.0 mmol/L   BNP    Specimen: Blood   Result Value Ref Range    proBNP 218.0 0.0 - 900.0 pg/mL   Troponin    Specimen: Blood   Result Value Ref Range    Troponin T <0.010 0.000 - 0.030 ng/mL   CBC Auto Differential    Specimen: Blood   Result Value Ref Range    WBC 6.42 3.40 - 10.80 10*3/mm3    RBC 3.34 (L) 3.77 - 5.28 10*6/mm3    Hemoglobin 10.4 (L) 12.0 - 15.9 g/dL    Hematocrit 35.0 34.0 - 46.6 %    .8 (H) 79.0 - 97.0 fL    MCH 31.1 26.6 - 33.0 pg    MCHC 29.7 (L) 31.5 - 35.7 g/dL    RDW 16.7 (H) 12.3 - 15.4 %    RDW-SD 63.9 (H) 37.0 - 54.0 fl    MPV 9.5 6.0 - 12.0 fL    Platelets 343 140 - 450 10*3/mm3    Neutrophil % 78.3 (H) 42.7 - 76.0 %    Lymphocyte % 13.6 (L) 19.6 - 45.3 %    Monocyte % 5.8 5.0 - 12.0 %    Eosinophil % 1.4 0.3 - 6.2 %    Basophil % 0.3 0.0 - 1.5 %    Immature Grans % 0.6 (H) 0.0 - 0.5 %    Neutrophils, Absolute 5.03 1.70 - 7.00 10*3/mm3    Lymphocytes, Absolute 0.87 0.70 - 3.10 10*3/mm3    Monocytes, Absolute 0.37 0.10 - 0.90 10*3/mm3    Eosinophils, Absolute 0.09 0.00 - 0.40 10*3/mm3    Basophils, Absolute 0.02 0.00 - 0.20 10*3/mm3    Immature Grans, Absolute 0.04 0.00 - 0.05 10*3/mm3    nRBC 0.0 0.0 - 0.2 /100 WBC   Phenobarbital Level    Specimen: Blood   Result Value Ref Range    Phenobarbital 35.2 (H) 10.0 - 30.0 mcg/mL   Urinalysis With Microscopic If Indicated (No Culture) - Urine, Catheter    Specimen: Urine, Catheter   Result Value Ref Range    Color, UA Yellow Yellow, Straw    Appearance, UA Clear Clear    pH, UA 5.5 5.0 - 8.0    Specific Gravity, UA 1.012 1.001 - 1.030    Glucose, UA Negative Negative    Ketones, UA Negative Negative    Bilirubin, UA Negative Negative    Blood, UA Negative Negative    Protein, UA Negative Negative    Leuk Esterase, UA Negative Negative    Nitrite, UA Negative Negative    Urobilinogen, UA 0.2 E.U./dL 0.2 - 1.0 E.U./dL   ECG 12 Lead   Result Value Ref Range     QT Interval 350 ms    QTC Interval 396 ms   Light Blue Top   Result Value Ref Range    Extra Tube hold for add-on    Green Top (Gel)   Result Value Ref Range    Extra Tube Hold for add-ons.    Lavender Top   Result Value Ref Range    Extra Tube hold for add-on    Gold Top - SST   Result Value Ref Range    Extra Tube Hold for add-ons.         All other labs were within normal range or not returned as of this dictation.      EMERGENCY DEPARTMENT COURSE and DIFFERENTIAL DIAGNOSIS/MDM:   Vitals:    Vitals:    12/05/20 2100 12/05/20 2130 12/05/20 2200 12/05/20 2300   BP: 104/48 123/51 116/49 115/57   Pulse: 71 70 70 70   Resp: 18  18 18   Temp:       SpO2: 100% 100% 100% 99%   Weight:       Height:                Patient with a history of multiple chronic comorbidities including reactive airway disease with no shortness of breath over the last few days.  She is nontoxic-appearing.  She is elderly, frail in general.  She is on 2 L nasal cannula which is her home oxygen at baseline and she is in the low 90s which is abnormal for her.  No significant respiratory distress is appreciated.  We did obtain IV, labs, imaging for further evaluation.  Imaging consistent with the patient's known reactive airway, no signs of coronavirus infection this time, no large or superimposed pneumonia is noted.  White count 6.4, blood work and electrolytes are stable.  I did update the patient these results.  We will continue treatment with her underlying reactive airway disease, COPD.  She states she is feeling much better, would prefer to continue with her treatments and therapies at home she is much more comfortable there given the current pandemic situation.  We will refill her DuoNeb nebulizer therapies that she has her nebulizer at home and needs a refill.  We will do a few days of steroids, inhalers, monitoring her symptoms.  She will continue with her home oxygen is also previously prescribed.  She will follow-up with her PCP in a  couple days for reevaluation.  Any worsening symptoms or further concerns the meantime she will return to the emergency department. I had a discussion with the patient/family regarding diagnosis, diagnostic results, treatment plan, and medications.  The patient/family indicated understanding of these instructions.  I spent adequate time at the bedside proceeding discharge necessary to personally discuss the aftercare instructions, giving patient education, providing explanations of the results of our evaluations/findings, and my decision making to assure that the patient/family understand the plan of care.  Time was allotted to answer questions at that time and throughout the ED course.  Emphasis was placed on timely follow-up after discharge.  I also discussed the potential for the development of an acute emergent condition requiring further evaluation, admission, or even surgical intervention. I discussed that we found nothing during the visit today indicating the need for further workup, admission, or the presence of an unstable medical condition.  I encouraged the patient to return to the emergency department immediately for ANY concerns, worsening, new complaints, or if symptoms persist and unable to seek follow-up in a timely fashion.  The patient/family expressed understanding and agreement with this plan.  The patient will follow-up with their PCP in 1-2 days for reevaluation.       MEDICATIONS ADMINISTERED IN ED:  Medications   sodium chloride 0.9 % flush 10 mL (has no administration in time range)   methylPREDNISolone sodium succinate (SOLU-Medrol) injection 125 mg (125 mg Intravenous Given 12/5/20 2146)       PROCEDURES:  Procedures    CRITICAL CARE TIME    Total Critical Care time was 0 minutes, excluding separately reportable procedures.   There was a high probability of clinically significant/life threatening deterioration in the patient's condition which required my urgent intervention.      FINAL  IMPRESSION      1. COPD exacerbation (CMS/[facility])    2. Dyspnea and respiratory abnormalities          DISPOSITION/PLAN     ED Disposition     ED Disposition Condition Comment    Discharge Stable           PATIENT REFERRED TO:  Zabrina Lemon MD  [address]  [facility] [address] [phone]    In 2 days      [facility] Emergency Department  [address] [facility] [address] [phone]    If symptoms worsen      DISCHARGE MEDICATIONS:     Medication List      START taking these medications    ipratropium-albuterol 0.5-2.5 mg/3 ml nebulizer  Commonly known as: DUO-NEB  Take 3 mL by nebulization Every 4 (Four) Hours As Needed for Wheezing.     predniSONE 20 MG tablet  Commonly known as: DELTASONE  Take 3 tablets by mouth Daily for 4 days.        CONTINUE taking these medications    albuterol (2.5 MG/3ML) 0.083% nebulizer solution  Commonly known as: PROVENTIL  Take 2.5 mg by nebulization Every 4 (Four) Hours As Needed for Wheezing or Shortness of Air.     Anoro Ellipta 62.5-25 MCG/INH aerosol powder  inhaler  Generic drug: umeclidinium-vilanterol  INHALE 1 PUFF BY MOUTH EVERY MORNING     aspirin 81 MG tablet     Cartia  MG 24 hr capsule  Generic drug: dilTIAZem CD  TAKE 1 CAPSULE BY MOUTH DAILY     citalopram 20 MG tablet  Commonly known as: CeleXA  Take 1 tablet by mouth Daily.     clopidogrel 75 MG tablet  Commonly known as: PLAVIX  Take 1 tablet by mouth Daily.     doxazosin 2 MG tablet  Commonly known as: CARDURA  TAKE 1 TABLET BY MOUTH EVERY NIGHT     ezetimibe 10 MG tablet  Commonly known as: ZETIA  TAKE 1 TABLET BY MOUTH DAILY     ferrous sulfate 325 (65 FE) MG tablet     furosemide 40 MG tablet  Commonly known as: LASIX     guaiFENesin 600 MG 12 hr tablet  Commonly known as: MUCINEX  Take 1 tablet by mouth Every 12 (Twelve) Hours.     isosorbide mononitrate 60 MG 24 hr tablet  Commonly known as: IMDUR     lisinopril 40 MG tablet  Commonly known as:  PRINIVIL,ZESTRIL     LORazepam 1 MG tablet  Commonly known as: ATIVAN  TAKE 1 TABLET BY MOUTH TWICE DAILY. MUST LAST 30 DAYS.     pantoprazole 40 MG EC tablet  Commonly known as: PROTONIX  Take 1 tablet by mouth 2 (Two) Times a Day Before Meals.     PHENobarbital 100 MG tablet  Commonly known as: LUMINAL  1 1/2 tablet po daily.     potassium chloride 20 MEQ CR tablet  Commonly known as: K-DUR,KLOR-CON     rosuvastatin 40 MG tablet  Commonly known as: CRESTOR  TAKE 1 TABLET BY MOUTH DAILY           Where to Get Your Medications      These medications were sent to [id number] - [place], [place] - [facility] [address] AT [facility] [phone] [facility] [phone]   [address] [facility] [address]    Phone: [phone]   · ipratropium-albuterol 0.5-2.5 mg/3 ml nebulizer  · predniSONE 20 MG tablet             Comment: Please note this report has been produced using speech recognition software.      Vic Kaminski DO  Attending Emergency Physician               Vic Kaminski DO  12/05/20 1235

## 2020-12-06 NOTE — ED NOTES
[facility] called for transport. ETA 15-20 minutes. Pt's family, Lory [phone], called and updated on D/C plans.      Rob Mcgee RN  12/06/20 0030

## 2020-12-10 NOTE — TELEPHONE ENCOUNTER
[facility] CALLED AND WOULD LIKE TO KNOW IF PCP WILL SIGN PT HOME HEALTH ORDERS.    PLEASE ADVISE.  CALL BACK:[phone]

## 2020-12-15 NOTE — OUTREACH NOTE
COPD/PN Week 3 Survey      Responses   [facility] patient discharged from?  [place]   Does the patient have one of the following disease processes/diagnoses(primary or secondary)?  COPD/Pneumonia   Was the primary reason for admission:  COPD exacerbation   Week 3 attempt successful?  No   Unsuccessful attempts  Attempt 1          Sujatha Louie RN

## 2020-12-17 NOTE — TELEPHONE ENCOUNTER
MARLENE WITH [facility] NURSE CALLED TO REPORT THAT PATIENT FELL AT APPROX. @ 12 PM TODAY. PATIENT ALSO HAS AHIGH PRESSURE, LAST RECORDED 173/89 TAKING AT 12:10PM. PATIENT HAS INCREASED RIGH TKNEE PAIN- PAIN LEVEL AT 7.       MARLENE CONTI CONTACT: [phone]    HOME HEALTH NURSE IS REQUESTING A CALL BACK FROM DOCTOR TO DISCUSS PATIENT INCIDENT TODAY.     THANKS

## 2020-12-17 NOTE — TELEPHONE ENCOUNTER
SUZE FROM [facility] CALLED AND SAID THAT THEY ARE NEEDING ANY OFFICE NOTES DEMOGRAPHICS FROM NOW UNTIL 6 MONTHS - A YEAR FAXED OVER TO HER FOR THE PATIENT STATING ANYTHING ABOUT OXYGEN. SUZE ALSO SAID PT IS NEEDING A SMALL NEBULIZER MASK TO FIT HER FACE ALONG WITH A NEBULIZER (PATIENT HAS BEEN USING A FAMILY MEMBER NEB). SUZE CAN BE REACHED [phone].

## 2020-12-18 NOTE — TELEPHONE ENCOUNTER
Spoke with Estephania, she states patient needs order for a Nebulizer machine as well, she is using a family members at this time. Patient is switching Oxygen company to Patient aids.

## 2020-12-21 NOTE — OUTREACH NOTE
COPD/PN Week 3 Survey      Responses   [facility] patient discharged from?  [place]   Does the patient have one of the following disease processes/diagnoses(primary or secondary)?  COPD/Pneumonia   Was the primary reason for admission:  COPD exacerbation   Week 3 attempt successful?  No   Unsuccessful attempts  Attempt 2          Christina Burr RN

## 2020-12-23 NOTE — TELEPHONE ENCOUNTER
Estephania from  and a home health called requesting verbal orders for social work visits. Estephania us requesting 2 more visits with the patient.     Call back [phone]

## 2020-12-24 NOTE — TELEPHONE ENCOUNTER
Verbal orders given to Lory JOSE nurse who is supposed to be going out and seeing her today.

## 2020-12-28 NOTE — TELEPHONE ENCOUNTER
SUZE WITH [facility] CALLED REQUESTING SOCIAL WORK ORDERS TO BE ABLE TO DELIVER HER SUPPLIES.    SHE STATED SHE CALLED LAST WEEK BUT HAS NOT HEARD ANYTHING BACK YET.    PLEASE ADVISE AND CALL SUZE ASAP [phone]

## 2021-01-01 ENCOUNTER — APPOINTMENT (OUTPATIENT)
Dept: CT IMAGING | Facility: HOSPITAL | Age: 71
End: 2021-01-01

## 2021-01-01 ENCOUNTER — OFFICE VISIT (OUTPATIENT)
Dept: INTERNAL MEDICINE | Facility: CLINIC | Age: 71
End: 2021-01-01

## 2021-01-01 ENCOUNTER — TELEPHONE (OUTPATIENT)
Dept: INTERNAL MEDICINE | Facility: CLINIC | Age: 71
End: 2021-01-01

## 2021-01-01 ENCOUNTER — READMISSION MANAGEMENT (OUTPATIENT)
Dept: CALL CENTER | Facility: HOSPITAL | Age: 71
End: 2021-01-01

## 2021-01-01 ENCOUNTER — APPOINTMENT (OUTPATIENT)
Dept: GENERAL RADIOLOGY | Facility: HOSPITAL | Age: 71
End: 2021-01-01

## 2021-01-01 ENCOUNTER — TRANSITIONAL CARE MANAGEMENT TELEPHONE ENCOUNTER (OUTPATIENT)
Dept: CALL CENTER | Facility: HOSPITAL | Age: 71
End: 2021-01-01

## 2021-01-01 ENCOUNTER — ANESTHESIA EVENT (OUTPATIENT)
Dept: GASTROENTEROLOGY | Facility: HOSPITAL | Age: 71
End: 2021-01-01

## 2021-01-01 ENCOUNTER — DOCUMENTATION (OUTPATIENT)
Dept: HOSPICE | Facility: HOSPITAL | Age: 71
End: 2021-01-01

## 2021-01-01 ENCOUNTER — PATIENT OUTREACH (OUTPATIENT)
Dept: CASE MANAGEMENT | Facility: OTHER | Age: 71
End: 2021-01-01

## 2021-01-01 ENCOUNTER — HOSPITAL ENCOUNTER (OUTPATIENT)
Facility: HOSPITAL | Age: 71
Setting detail: OBSERVATION
Discharge: HOME | End: 2021-05-06
Attending: EMERGENCY MEDICINE | Admitting: INTERNAL MEDICINE

## 2021-01-01 ENCOUNTER — OFFICE VISIT (OUTPATIENT)
Dept: PULMONOLOGY | Facility: CLINIC | Age: 71
End: 2021-01-01

## 2021-01-01 ENCOUNTER — HOSPITAL ENCOUNTER (INPATIENT)
Facility: HOSPITAL | Age: 71
LOS: 6 days | Discharge: HOSPICE | End: 2021-10-23
Attending: EMERGENCY MEDICINE | Admitting: INTERNAL MEDICINE

## 2021-01-01 ENCOUNTER — HOSPITAL ENCOUNTER (OUTPATIENT)
Facility: HOSPITAL | Age: 71
Setting detail: OBSERVATION
Discharge: HOME | End: 2021-02-21
Attending: EMERGENCY MEDICINE | Admitting: INTERNAL MEDICINE

## 2021-01-01 ENCOUNTER — HOME HEALTH ADMISSION (OUTPATIENT)
Dept: HOME HEALTH SERVICES | Facility: HOME HEALTHCARE | Age: 71
End: 2021-01-01

## 2021-01-01 ENCOUNTER — ANESTHESIA (OUTPATIENT)
Dept: GASTROENTEROLOGY | Facility: HOSPITAL | Age: 71
End: 2021-01-01

## 2021-01-01 ENCOUNTER — OUTSIDE FACILITY SERVICE (OUTPATIENT)
Dept: INTERNAL MEDICINE | Facility: CLINIC | Age: 71
End: 2021-01-01

## 2021-01-01 ENCOUNTER — HOSPITAL ENCOUNTER (INPATIENT)
Facility: HOSPITAL | Age: 71
LOS: 4 days | Discharge: HOME | End: 2021-07-05
Attending: EMERGENCY MEDICINE | Admitting: FAMILY MEDICINE

## 2021-01-01 ENCOUNTER — APPOINTMENT (OUTPATIENT)
Dept: CARDIOLOGY | Facility: HOSPITAL | Age: 71
End: 2021-01-01

## 2021-01-01 ENCOUNTER — HOSPITAL ENCOUNTER (OUTPATIENT)
Facility: HOSPITAL | Age: 71
Setting detail: OBSERVATION
Discharge: HOME | End: 2021-03-29
Attending: EMERGENCY MEDICINE | Admitting: NURSE PRACTITIONER

## 2021-01-01 ENCOUNTER — APPOINTMENT (OUTPATIENT)
Dept: CT IMAGING | Facility: HOSPITAL | Age: 71
DRG: 871 | End: 2021-01-01
Payer: MEDICARE

## 2021-01-01 ENCOUNTER — HOSPITAL ENCOUNTER (INPATIENT)
Facility: HOSPITAL | Age: 71
LOS: 8 days | Discharge: HOME HEALTH | End: 2021-06-23
Attending: EMERGENCY MEDICINE | Admitting: FAMILY MEDICINE

## 2021-01-01 ENCOUNTER — TELEPHONE (OUTPATIENT)
Dept: PULMONOLOGY | Facility: CLINIC | Age: 71
End: 2021-01-01

## 2021-01-01 ENCOUNTER — HOSPITAL ENCOUNTER (OUTPATIENT)
Dept: GENERAL RADIOLOGY | Facility: HOSPITAL | Age: 71
Discharge: HOME | End: 2021-10-12
Admitting: FAMILY MEDICINE

## 2021-01-01 ENCOUNTER — HOSPITAL ENCOUNTER (INPATIENT)
Facility: HOSPITAL | Age: 71
LOS: 3 days | Discharge: HOME HEALTH | DRG: 871 | End: 2021-05-25
Attending: EMERGENCY MEDICINE | Admitting: FAMILY MEDICINE
Payer: MEDICARE

## 2021-01-01 ENCOUNTER — TELEPHONE (OUTPATIENT)
Dept: GASTROENTEROLOGY | Facility: CLINIC | Age: 71
End: 2021-01-01

## 2021-01-01 ENCOUNTER — APPOINTMENT (OUTPATIENT)
Dept: GENERAL RADIOLOGY | Facility: HOSPITAL | Age: 71
DRG: 871 | End: 2021-01-01
Payer: MEDICARE

## 2021-01-01 VITALS
DIASTOLIC BLOOD PRESSURE: 90 MMHG | TEMPERATURE: 97.7 F | HEIGHT: 59 IN | HEART RATE: 94 BPM | SYSTOLIC BLOOD PRESSURE: 160 MMHG | BODY MASS INDEX: 16.73 KG/M2 | RESPIRATION RATE: 16 BRPM | OXYGEN SATURATION: 98 % | WEIGHT: 83 LBS

## 2021-01-01 VITALS
BODY MASS INDEX: 18 KG/M2 | RESPIRATION RATE: 28 BRPM | WEIGHT: 80 LBS | OXYGEN SATURATION: 98 % | HEART RATE: 84 BPM | DIASTOLIC BLOOD PRESSURE: 60 MMHG | HEIGHT: 56 IN | SYSTOLIC BLOOD PRESSURE: 122 MMHG | TEMPERATURE: 97.4 F

## 2021-01-01 VITALS
DIASTOLIC BLOOD PRESSURE: 72 MMHG | BODY MASS INDEX: 17.26 KG/M2 | OXYGEN SATURATION: 98 % | WEIGHT: 80 LBS | HEIGHT: 57 IN | TEMPERATURE: 97.1 F | SYSTOLIC BLOOD PRESSURE: 170 MMHG | RESPIRATION RATE: 20 BRPM | HEART RATE: 97 BPM

## 2021-01-01 VITALS
DIASTOLIC BLOOD PRESSURE: 80 MMHG | HEART RATE: 90 BPM | SYSTOLIC BLOOD PRESSURE: 158 MMHG | TEMPERATURE: 97.4 F | RESPIRATION RATE: 16 BRPM | WEIGHT: 86.2 LBS | HEIGHT: 59 IN | OXYGEN SATURATION: 95 % | BODY MASS INDEX: 17.38 KG/M2

## 2021-01-01 VITALS
BODY MASS INDEX: 17.28 KG/M2 | SYSTOLIC BLOOD PRESSURE: 120 MMHG | TEMPERATURE: 97.5 F | HEART RATE: 87 BPM | OXYGEN SATURATION: 100 % | DIASTOLIC BLOOD PRESSURE: 58 MMHG | WEIGHT: 76.8 LBS | HEIGHT: 56 IN

## 2021-01-01 VITALS
SYSTOLIC BLOOD PRESSURE: 124 MMHG | HEART RATE: 80 BPM | RESPIRATION RATE: 18 BRPM | TEMPERATURE: 98.7 F | HEIGHT: 57 IN | DIASTOLIC BLOOD PRESSURE: 60 MMHG | OXYGEN SATURATION: 95 % | WEIGHT: 81.5 LBS | DIASTOLIC BLOOD PRESSURE: 65 MMHG | BODY MASS INDEX: 17.58 KG/M2 | BODY MASS INDEX: 16.67 KG/M2 | TEMPERATURE: 98 F | OXYGEN SATURATION: 98 % | SYSTOLIC BLOOD PRESSURE: 127 MMHG | HEART RATE: 77 BPM | HEIGHT: 57 IN | RESPIRATION RATE: 18 BRPM | WEIGHT: 77.25 LBS

## 2021-01-01 VITALS
HEIGHT: 57 IN | HEART RATE: 94 BPM | SYSTOLIC BLOOD PRESSURE: 140 MMHG | BODY MASS INDEX: 17.69 KG/M2 | WEIGHT: 82 LBS | RESPIRATION RATE: 18 BRPM | DIASTOLIC BLOOD PRESSURE: 92 MMHG | OXYGEN SATURATION: 92 % | TEMPERATURE: 97.2 F

## 2021-01-01 VITALS
RESPIRATION RATE: 18 BRPM | OXYGEN SATURATION: 88 % | TEMPERATURE: 98.5 F | SYSTOLIC BLOOD PRESSURE: 145 MMHG | DIASTOLIC BLOOD PRESSURE: 60 MMHG | HEART RATE: 94 BPM | BODY MASS INDEX: 17.4 KG/M2 | HEIGHT: 59 IN | WEIGHT: 86.3 LBS

## 2021-01-01 VITALS
BODY MASS INDEX: 18.22 KG/M2 | OXYGEN SATURATION: 90 % | WEIGHT: 81 LBS | DIASTOLIC BLOOD PRESSURE: 58 MMHG | SYSTOLIC BLOOD PRESSURE: 142 MMHG | HEART RATE: 86 BPM | TEMPERATURE: 97.1 F | HEIGHT: 56 IN

## 2021-01-01 VITALS
HEIGHT: 55 IN | RESPIRATION RATE: 18 BRPM | DIASTOLIC BLOOD PRESSURE: 45 MMHG | BODY MASS INDEX: 20.62 KG/M2 | HEART RATE: 74 BPM | OXYGEN SATURATION: 93 % | TEMPERATURE: 97.7 F | SYSTOLIC BLOOD PRESSURE: 115 MMHG | WEIGHT: 89.1 LBS

## 2021-01-01 VITALS
HEART RATE: 79 BPM | BODY MASS INDEX: 18.27 KG/M2 | WEIGHT: 84.7 LBS | OXYGEN SATURATION: 93 % | DIASTOLIC BLOOD PRESSURE: 46 MMHG | HEIGHT: 57 IN | SYSTOLIC BLOOD PRESSURE: 107 MMHG | TEMPERATURE: 97.9 F | RESPIRATION RATE: 18 BRPM

## 2021-01-01 VITALS
SYSTOLIC BLOOD PRESSURE: 110 MMHG | WEIGHT: 104.1 LBS | HEIGHT: 57 IN | DIASTOLIC BLOOD PRESSURE: 61 MMHG | TEMPERATURE: 98 F | HEART RATE: 71 BPM | OXYGEN SATURATION: 94 % | RESPIRATION RATE: 20 BRPM | BODY MASS INDEX: 22.46 KG/M2

## 2021-01-01 VITALS
SYSTOLIC BLOOD PRESSURE: 122 MMHG | HEART RATE: 84 BPM | WEIGHT: 89.1 LBS | RESPIRATION RATE: 16 BRPM | HEIGHT: 60 IN | DIASTOLIC BLOOD PRESSURE: 58 MMHG | OXYGEN SATURATION: 97 % | TEMPERATURE: 98.8 F | BODY MASS INDEX: 17.49 KG/M2

## 2021-01-01 VITALS
HEART RATE: 78 BPM | WEIGHT: 81.2 LBS | SYSTOLIC BLOOD PRESSURE: 142 MMHG | TEMPERATURE: 96.8 F | DIASTOLIC BLOOD PRESSURE: 62 MMHG | OXYGEN SATURATION: 99 % | BODY MASS INDEX: 18.22 KG/M2

## 2021-01-01 VITALS
HEIGHT: 56 IN | HEART RATE: 88 BPM | SYSTOLIC BLOOD PRESSURE: 138 MMHG | OXYGEN SATURATION: 94 % | DIASTOLIC BLOOD PRESSURE: 58 MMHG | BODY MASS INDEX: 18.63 KG/M2 | TEMPERATURE: 96.6 F | WEIGHT: 82.8 LBS

## 2021-01-01 DIAGNOSIS — G40.909 SEIZURE DISORDER (HCC): ICD-10-CM

## 2021-01-01 DIAGNOSIS — R53.81 DEBILITY: ICD-10-CM

## 2021-01-01 DIAGNOSIS — I25.10 CORONARY ARTERY DISEASE INVOLVING NATIVE HEART WITHOUT ANGINA PECTORIS, UNSPECIFIED VESSEL OR LESION TYPE: ICD-10-CM

## 2021-01-01 DIAGNOSIS — J43.9 PULMONARY EMPHYSEMA, UNSPECIFIED EMPHYSEMA TYPE (HCC): ICD-10-CM

## 2021-01-01 DIAGNOSIS — A41.9 SEPSIS ASSOCIATED HYPOTENSION (HCC): ICD-10-CM

## 2021-01-01 DIAGNOSIS — N30.90 CYSTITIS: ICD-10-CM

## 2021-01-01 DIAGNOSIS — J44.9 CHRONIC OBSTRUCTIVE PULMONARY DISEASE, UNSPECIFIED COPD TYPE (HCC): ICD-10-CM

## 2021-01-01 DIAGNOSIS — Z00.00 MEDICARE ANNUAL WELLNESS VISIT, SUBSEQUENT: Primary | ICD-10-CM

## 2021-01-01 DIAGNOSIS — I25.10 CORONARY ARTERY DISEASE INVOLVING NATIVE HEART WITHOUT ANGINA PECTORIS, UNSPECIFIED VESSEL OR LESION TYPE: Primary | ICD-10-CM

## 2021-01-01 DIAGNOSIS — J43.8 OTHER EMPHYSEMA (HCC): ICD-10-CM

## 2021-01-01 DIAGNOSIS — I21.A1 TYPE 2 MI (MYOCARDIAL INFARCTION) (HCC): ICD-10-CM

## 2021-01-01 DIAGNOSIS — J44.1 COPD EXACERBATION (HCC): ICD-10-CM

## 2021-01-01 DIAGNOSIS — I95.9 SEPSIS ASSOCIATED HYPOTENSION (HCC): ICD-10-CM

## 2021-01-01 DIAGNOSIS — R19.5 FECAL OCCULT BLOOD TEST POSITIVE: ICD-10-CM

## 2021-01-01 DIAGNOSIS — G89.29 CHRONIC BILATERAL LOW BACK PAIN WITHOUT SCIATICA: ICD-10-CM

## 2021-01-01 DIAGNOSIS — Z86.73 HISTORY OF CVA (CEREBROVASCULAR ACCIDENT): Chronic | ICD-10-CM

## 2021-01-01 DIAGNOSIS — K92.2 ACUTE UPPER GI BLEED: Primary | ICD-10-CM

## 2021-01-01 DIAGNOSIS — J44.9 COPD MIXED TYPE (HCC): ICD-10-CM

## 2021-01-01 DIAGNOSIS — F41.9 ANXIETY: ICD-10-CM

## 2021-01-01 DIAGNOSIS — Z86.79 HISTORY OF CHF (CONGESTIVE HEART FAILURE): ICD-10-CM

## 2021-01-01 DIAGNOSIS — I48.0 PAROXYSMAL ATRIAL FIBRILLATION (HCC): ICD-10-CM

## 2021-01-01 DIAGNOSIS — J96.22 ACUTE ON CHRONIC RESPIRATORY FAILURE WITH HYPERCAPNIA (HCC): ICD-10-CM

## 2021-01-01 DIAGNOSIS — R11.0 NAUSEA: ICD-10-CM

## 2021-01-01 DIAGNOSIS — Z91.81 H/O FALL: ICD-10-CM

## 2021-01-01 DIAGNOSIS — Z72.0 TOBACCO ABUSE: ICD-10-CM

## 2021-01-01 DIAGNOSIS — K21.9 CHRONIC GERD: ICD-10-CM

## 2021-01-01 DIAGNOSIS — I95.0 IDIOPATHIC HYPOTENSION: ICD-10-CM

## 2021-01-01 DIAGNOSIS — E53.8 VITAMIN B12 DEFICIENCY: ICD-10-CM

## 2021-01-01 DIAGNOSIS — J96.21 ACUTE ON CHRONIC RESPIRATORY FAILURE WITH HYPOXIA (HCC): ICD-10-CM

## 2021-01-01 DIAGNOSIS — E78.2 MIXED HYPERLIPIDEMIA: ICD-10-CM

## 2021-01-01 DIAGNOSIS — E83.42 HYPOMAGNESEMIA: ICD-10-CM

## 2021-01-01 DIAGNOSIS — Z74.09 IMPAIRED FUNCTIONAL MOBILITY, BALANCE, GAIT, AND ENDURANCE: ICD-10-CM

## 2021-01-01 DIAGNOSIS — M54.50 CHRONIC BILATERAL LOW BACK PAIN WITHOUT SCIATICA: ICD-10-CM

## 2021-01-01 DIAGNOSIS — J41.1 MUCOPURULENT CHRONIC BRONCHITIS (HCC): Primary | ICD-10-CM

## 2021-01-01 DIAGNOSIS — Z59.9 FINANCIAL DIFFICULTIES: Primary | ICD-10-CM

## 2021-01-01 DIAGNOSIS — I35.1 NONRHEUMATIC AORTIC VALVE INSUFFICIENCY: ICD-10-CM

## 2021-01-01 DIAGNOSIS — N39.0 ACUTE URINARY TRACT INFECTION: ICD-10-CM

## 2021-01-01 DIAGNOSIS — R77.8 ELEVATED TROPONIN: ICD-10-CM

## 2021-01-01 DIAGNOSIS — G40.909 SEIZURE DISORDER (HCC): Chronic | ICD-10-CM

## 2021-01-01 DIAGNOSIS — R30.0 DYSURIA: ICD-10-CM

## 2021-01-01 DIAGNOSIS — R06.02 SOB (SHORTNESS OF BREATH): ICD-10-CM

## 2021-01-01 DIAGNOSIS — N30.00 ACUTE CYSTITIS WITHOUT HEMATURIA: Primary | ICD-10-CM

## 2021-01-01 DIAGNOSIS — I73.9 PAD (PERIPHERAL ARTERY DISEASE) (HCC): Chronic | ICD-10-CM

## 2021-01-01 DIAGNOSIS — R10.9 ACUTE ABDOMINAL PAIN: ICD-10-CM

## 2021-01-01 DIAGNOSIS — J18.9 PNEUMONIA DUE TO INFECTIOUS ORGANISM, UNSPECIFIED LATERALITY, UNSPECIFIED PART OF LUNG: Primary | ICD-10-CM

## 2021-01-01 DIAGNOSIS — J96.11 CHRONIC RESPIRATORY FAILURE WITH HYPOXIA AND HYPERCAPNIA (HCC): ICD-10-CM

## 2021-01-01 DIAGNOSIS — I10 ESSENTIAL HYPERTENSION: ICD-10-CM

## 2021-01-01 DIAGNOSIS — H93.8X1 MASS OF RIGHT EAR: ICD-10-CM

## 2021-01-01 DIAGNOSIS — G89.29 CHRONIC BILATERAL LOW BACK PAIN WITHOUT SCIATICA: Primary | ICD-10-CM

## 2021-01-01 DIAGNOSIS — R50.9 FEVER IN ADULT: ICD-10-CM

## 2021-01-01 DIAGNOSIS — E44.0 MODERATE MALNUTRITION (HCC): ICD-10-CM

## 2021-01-01 DIAGNOSIS — M54.50 CHRONIC BILATERAL LOW BACK PAIN WITHOUT SCIATICA: Primary | ICD-10-CM

## 2021-01-01 DIAGNOSIS — I21.4 NSTEMI (NON-ST ELEVATED MYOCARDIAL INFARCTION) (HCC): ICD-10-CM

## 2021-01-01 DIAGNOSIS — Z74.09 IMPAIRED MOBILITY AND ADLS: ICD-10-CM

## 2021-01-01 DIAGNOSIS — L98.9 SKIN LESION: ICD-10-CM

## 2021-01-01 DIAGNOSIS — J44.1 COPD WITH ACUTE EXACERBATION (HCC): ICD-10-CM

## 2021-01-01 DIAGNOSIS — R07.81 RIB PAIN ON RIGHT SIDE: ICD-10-CM

## 2021-01-01 DIAGNOSIS — R06.89 HYPERCAPNIA: ICD-10-CM

## 2021-01-01 DIAGNOSIS — R13.12 OROPHARYNGEAL DYSPHAGIA: ICD-10-CM

## 2021-01-01 DIAGNOSIS — J96.20 ACUTE ON CHRONIC RESPIRATORY FAILURE, UNSPECIFIED WHETHER WITH HYPOXIA OR HYPERCAPNIA (HCC): ICD-10-CM

## 2021-01-01 DIAGNOSIS — S32.10XD CLOSED FRACTURE OF SACRUM WITH ROUTINE HEALING, UNSPECIFIED PORTION OF SACRUM, SUBSEQUENT ENCOUNTER: ICD-10-CM

## 2021-01-01 DIAGNOSIS — G93.41 METABOLIC ENCEPHALOPATHY: ICD-10-CM

## 2021-01-01 DIAGNOSIS — J41.1 MUCOPURULENT CHRONIC BRONCHITIS (HCC): ICD-10-CM

## 2021-01-01 DIAGNOSIS — D50.0 IRON DEFICIENCY ANEMIA DUE TO CHRONIC BLOOD LOSS: ICD-10-CM

## 2021-01-01 DIAGNOSIS — R65.10 SIRS (SYSTEMIC INFLAMMATORY RESPONSE SYNDROME) (HCC): Primary | ICD-10-CM

## 2021-01-01 DIAGNOSIS — I48.0 PAROXYSMAL ATRIAL FIBRILLATION (HCC): Chronic | ICD-10-CM

## 2021-01-01 DIAGNOSIS — Z86.73 HISTORY OF CVA (CEREBROVASCULAR ACCIDENT): ICD-10-CM

## 2021-01-01 DIAGNOSIS — Z91.199 NON-COMPLIANCE: Chronic | ICD-10-CM

## 2021-01-01 DIAGNOSIS — R09.02 HYPOXIA: ICD-10-CM

## 2021-01-01 DIAGNOSIS — J18.9 PNEUMONIA DUE TO INFECTIOUS ORGANISM, UNSPECIFIED LATERALITY, UNSPECIFIED PART OF LUNG: ICD-10-CM

## 2021-01-01 DIAGNOSIS — G25.81 RLS (RESTLESS LEGS SYNDROME): Primary | ICD-10-CM

## 2021-01-01 DIAGNOSIS — N39.0 ACUTE UTI: ICD-10-CM

## 2021-01-01 DIAGNOSIS — D50.0 IRON DEFICIENCY ANEMIA DUE TO CHRONIC BLOOD LOSS: Chronic | ICD-10-CM

## 2021-01-01 DIAGNOSIS — E16.2 HYPOGLYCEMIA: Primary | ICD-10-CM

## 2021-01-01 DIAGNOSIS — Z99.81 DEPENDENCE ON SUPPLEMENTAL OXYGEN: ICD-10-CM

## 2021-01-01 DIAGNOSIS — J96.12 CHRONIC RESPIRATORY FAILURE WITH HYPOXIA AND HYPERCAPNIA (HCC): ICD-10-CM

## 2021-01-01 DIAGNOSIS — K21.9 GASTROESOPHAGEAL REFLUX DISEASE WITHOUT ESOPHAGITIS: ICD-10-CM

## 2021-01-01 DIAGNOSIS — K92.2 GASTROINTESTINAL HEMORRHAGE, UNSPECIFIED GASTROINTESTINAL HEMORRHAGE TYPE: ICD-10-CM

## 2021-01-01 DIAGNOSIS — R33.8 ACUTE URINARY RETENTION: ICD-10-CM

## 2021-01-01 DIAGNOSIS — R64 PULMONARY CACHEXIA DUE TO CHRONIC OBSTRUCTIVE PULMONARY DISEASE (HCC): ICD-10-CM

## 2021-01-01 DIAGNOSIS — D72.829 LEUKOCYTOSIS, UNSPECIFIED TYPE: ICD-10-CM

## 2021-01-01 DIAGNOSIS — I10 ESSENTIAL HYPERTENSION: Primary | ICD-10-CM

## 2021-01-01 DIAGNOSIS — J44.9 CHRONIC OBSTRUCTIVE PULMONARY DISEASE, UNSPECIFIED COPD TYPE: ICD-10-CM

## 2021-01-01 DIAGNOSIS — J43.8 OTHER EMPHYSEMA: ICD-10-CM

## 2021-01-01 DIAGNOSIS — R40.0 SOMNOLENCE: ICD-10-CM

## 2021-01-01 DIAGNOSIS — S42.202A CLOSED FRACTURE OF PROXIMAL END OF LEFT HUMERUS, UNSPECIFIED FRACTURE MORPHOLOGY, INITIAL ENCOUNTER: ICD-10-CM

## 2021-01-01 DIAGNOSIS — Z91.81 H/O FALL: Primary | ICD-10-CM

## 2021-01-01 DIAGNOSIS — J44.1 COPD EXACERBATION (HCC): Primary | ICD-10-CM

## 2021-01-01 DIAGNOSIS — J44.9 COPD WITH HYPOXIA: Primary | ICD-10-CM

## 2021-01-01 DIAGNOSIS — J18.9 COMMUNITY ACQUIRED PNEUMONIA, UNSPECIFIED LATERALITY: Primary | ICD-10-CM

## 2021-01-01 DIAGNOSIS — R53.81 DEBILITY: Chronic | ICD-10-CM

## 2021-01-01 DIAGNOSIS — S32.10XA CLOSED FRACTURE OF SACRUM, UNSPECIFIED PORTION OF SACRUM, INITIAL ENCOUNTER (HCC): ICD-10-CM

## 2021-01-01 DIAGNOSIS — R41.82 ALTERED MENTAL STATUS, UNSPECIFIED ALTERED MENTAL STATUS TYPE: ICD-10-CM

## 2021-01-01 DIAGNOSIS — Z78.9 IMPAIRED MOBILITY AND ADLS: ICD-10-CM

## 2021-01-01 DIAGNOSIS — I73.9 PAD (PERIPHERAL ARTERY DISEASE) (HCC): ICD-10-CM

## 2021-01-01 DIAGNOSIS — Z72.0 TOBACCO ABUSE: Chronic | ICD-10-CM

## 2021-01-01 DIAGNOSIS — J96.11 CHRONIC RESPIRATORY FAILURE WITH HYPOXIA (HCC): ICD-10-CM

## 2021-01-01 DIAGNOSIS — J96.22 ACUTE ON CHRONIC RESPIRATORY FAILURE WITH HYPERCAPNIA (HCC): Primary | ICD-10-CM

## 2021-01-01 DIAGNOSIS — R07.89 RIGHT-SIDED CHEST WALL PAIN: ICD-10-CM

## 2021-01-01 DIAGNOSIS — D62 ACUTE BLOOD LOSS ANEMIA: ICD-10-CM

## 2021-01-01 DIAGNOSIS — J44.9 PULMONARY CACHEXIA DUE TO CHRONIC OBSTRUCTIVE PULMONARY DISEASE (HCC): ICD-10-CM

## 2021-01-01 LAB
ABO GROUP BLD: NORMAL
ABO GROUP BLD: NORMAL
ADV 40+41 DNA STL QL NAA+NON-PROBE: NOT DETECTED
ALBUMIN SERPL-MCNC: 2.9 G/DL (ref 3.5–5.2)
ALBUMIN SERPL-MCNC: 3 G/DL (ref 3.5–5.2)
ALBUMIN SERPL-MCNC: 3.1 G/DL (ref 3.5–5.2)
ALBUMIN SERPL-MCNC: 3.4 G/DL (ref 3.5–5.2)
ALBUMIN SERPL-MCNC: 3.5 G/DL (ref 3.5–5.2)
ALBUMIN SERPL-MCNC: 3.7 G/DL (ref 3.5–5.2)
ALBUMIN SERPL-MCNC: 3.7 G/DL (ref 3.5–5.2)
ALBUMIN SERPL-MCNC: 3.9 G/DL (ref 3.5–5.2)
ALBUMIN SERPL-MCNC: 4 G/DL (ref 3.5–5.2)
ALBUMIN SERPL-MCNC: 4.1 G/DL (ref 3.5–5.2)
ALBUMIN SERPL-MCNC: 4.3 G/DL (ref 3.5–5.2)
ALBUMIN/GLOB SERPL: 0.9 G/DL
ALBUMIN/GLOB SERPL: 0.9 G/DL
ALBUMIN/GLOB SERPL: 1 G/DL
ALBUMIN/GLOB SERPL: 1.1 G/DL
ALBUMIN/GLOB SERPL: 1.1 G/DL
ALBUMIN/GLOB SERPL: 1.2 G/DL
ALBUMIN/GLOB SERPL: 1.3 G/DL
ALP SERPL-CCNC: 103 U/L (ref 39–117)
ALP SERPL-CCNC: 114 U/L (ref 39–117)
ALP SERPL-CCNC: 119 U/L (ref 39–117)
ALP SERPL-CCNC: 124 U/L (ref 39–117)
ALP SERPL-CCNC: 126 U/L (ref 39–117)
ALP SERPL-CCNC: 136 U/L (ref 39–117)
ALP SERPL-CCNC: 139 U/L (ref 39–117)
ALP SERPL-CCNC: 142 U/L (ref 39–117)
ALP SERPL-CCNC: 189 U/L (ref 39–117)
ALP SERPL-CCNC: 84 U/L (ref 39–117)
ALP SERPL-CCNC: 97 U/L (ref 39–117)
ALT SERPL W P-5'-P-CCNC: 12 U/L (ref 1–33)
ALT SERPL W P-5'-P-CCNC: 16 U/L (ref 1–33)
ALT SERPL W P-5'-P-CCNC: 16 U/L (ref 1–33)
ALT SERPL W P-5'-P-CCNC: 17 U/L (ref 1–33)
ALT SERPL W P-5'-P-CCNC: 19 U/L (ref 1–33)
ALT SERPL W P-5'-P-CCNC: 19 U/L (ref 1–33)
ALT SERPL W P-5'-P-CCNC: 24 U/L (ref 1–33)
ALT SERPL W P-5'-P-CCNC: 33 U/L (ref 1–33)
ALT SERPL W P-5'-P-CCNC: 38 U/L (ref 1–33)
ALT SERPL W P-5'-P-CCNC: 52 U/L (ref 1–33)
ALT SERPL W P-5'-P-CCNC: 62 U/L (ref 1–33)
AMMONIA BLD-SCNC: 42 UMOL/L (ref 11–51)
AMPHET+METHAMPHET UR QL: NEGATIVE
AMPHET+METHAMPHET UR QL: NEGATIVE
AMPHETAMINES UR QL: NEGATIVE
AMPHETAMINES UR QL: NEGATIVE
ANION GAP SERPL CALCULATED.3IONS-SCNC: 11 MMOL/L (ref 5–15)
ANION GAP SERPL CALCULATED.3IONS-SCNC: 14 MMOL/L (ref 5–15)
ANION GAP SERPL CALCULATED.3IONS-SCNC: 14 MMOL/L (ref 5–15)
ANION GAP SERPL CALCULATED.3IONS-SCNC: 4 MMOL/L (ref 5–15)
ANION GAP SERPL CALCULATED.3IONS-SCNC: 5 MMOL/L (ref 5–15)
ANION GAP SERPL CALCULATED.3IONS-SCNC: 6 MMOL/L (ref 5–15)
ANION GAP SERPL CALCULATED.3IONS-SCNC: 7 MMOL/L (ref 5–15)
ANION GAP SERPL CALCULATED.3IONS-SCNC: 8 MMOL/L (ref 5–15)
ANION GAP SERPL CALCULATED.3IONS-SCNC: 9 MMOL/L (ref 5–15)
ANISOCYTOSIS BLD QL: NORMAL
ANTI-S: NORMAL
AORTIC DIMENSIONLESS INDEX: 0.6 (DI)
APTT PPP: 28.9 SECONDS (ref 22–39)
ARTERIAL PATENCY WRIST A: ABNORMAL
AST SERPL-CCNC: 18 U/L (ref 1–32)
AST SERPL-CCNC: 19 U/L (ref 1–32)
AST SERPL-CCNC: 19 U/L (ref 1–32)
AST SERPL-CCNC: 22 U/L (ref 1–32)
AST SERPL-CCNC: 23 U/L (ref 1–32)
AST SERPL-CCNC: 29 U/L (ref 1–32)
AST SERPL-CCNC: 35 U/L (ref 1–32)
AST SERPL-CCNC: 39 U/L (ref 1–32)
AST SERPL-CCNC: 52 U/L (ref 1–32)
AST SERPL-CCNC: 54 U/L (ref 1–32)
AST SERPL-CCNC: 58 U/L (ref 1–32)
ASTRO TYP 1-8 RNA STL QL NAA+NON-PROBE: NOT DETECTED
ATMOSPHERIC PRESS: ABNORMAL MM[HG]
B PARAPERT DNA SPEC QL NAA+PROBE: NOT DETECTED
B PERT DNA SPEC QL NAA+PROBE: NOT DETECTED
BACTERIA SPEC AEROBE CULT: ABNORMAL
BACTERIA SPEC AEROBE CULT: NORMAL
BACTERIA SPEC RESP CULT: NORMAL
BACTERIA SPEC RESP CULT: NORMAL
BACTERIA UR QL AUTO: ABNORMAL /HPF
BACTERIA UR QL AUTO: NORMAL /HPF
BACTERIA UR QL AUTO: NORMAL /HPF
BARBITURATES UR QL SCN: POSITIVE
BARBITURATES UR QL SCN: POSITIVE
BASE EXCESS BLDA CALC-SCNC: 10.1 MMOL/L (ref 0–2)
BASE EXCESS BLDA CALC-SCNC: 10.3 MMOL/L (ref 0–2)
BASE EXCESS BLDA CALC-SCNC: 10.8 MMOL/L (ref 0–2)
BASE EXCESS BLDA CALC-SCNC: 12.2 MMOL/L (ref 0–2)
BASE EXCESS BLDA CALC-SCNC: 15.5 MMOL/L (ref 0–2)
BASE EXCESS BLDA CALC-SCNC: 3.6 MMOL/L (ref 0–2)
BASE EXCESS BLDA CALC-SCNC: 5.1 MMOL/L (ref 0–2)
BASE EXCESS BLDA CALC-SCNC: 5.4 MMOL/L (ref 0–2)
BASE EXCESS BLDA CALC-SCNC: 7.3 MMOL/L (ref 0–2)
BASE EXCESS BLDA CALC-SCNC: 8.8 MMOL/L (ref 0–2)
BASE EXCESS BLDA CALC-SCNC: 9.5 MMOL/L (ref 0–2)
BASOPHILS # BLD AUTO: 0 10*3/MM3 (ref 0–0.2)
BASOPHILS # BLD AUTO: 0.01 10*3/MM3 (ref 0–0.2)
BASOPHILS # BLD AUTO: 0.02 10*3/MM3 (ref 0–0.2)
BASOPHILS # BLD AUTO: 0.03 10*3/MM3 (ref 0–0.2)
BASOPHILS # BLD MANUAL: 0 10*3/MM3 (ref 0–0.2)
BASOPHILS NFR BLD AUTO: 0 % (ref 0–1.5)
BASOPHILS NFR BLD AUTO: 0 % (ref 0–1.5)
BASOPHILS NFR BLD AUTO: 0.1 % (ref 0–1.5)
BASOPHILS NFR BLD AUTO: 0.2 % (ref 0–1.5)
BASOPHILS NFR BLD AUTO: 0.3 % (ref 0–1.5)
BASOPHILS NFR BLD AUTO: 0.4 % (ref 0–1.5)
BASOPHILS NFR BLD AUTO: 0.4 % (ref 0–1.5)
BDY SITE: ABNORMAL
BENZODIAZ UR QL SCN: POSITIVE
BENZODIAZ UR QL SCN: POSITIVE
BH BB BLOOD EXPIRATION DATE: NORMAL
BH BB BLOOD EXPIRATION DATE: NORMAL
BH BB BLOOD TYPE BARCODE: 6200
BH BB BLOOD TYPE BARCODE: 6200
BH BB DISPENSE STATUS: NORMAL
BH BB DISPENSE STATUS: NORMAL
BH BB PRODUCT CODE: NORMAL
BH BB PRODUCT CODE: NORMAL
BH BB UNIT NUMBER: NORMAL
BH BB UNIT NUMBER: NORMAL
BH CV ECHO MEAS - AI DEC SLOPE: 368.7 CM/SEC^2
BH CV ECHO MEAS - AI MAX PG: 66.7 MMHG
BH CV ECHO MEAS - AI MAX VEL: 408.3 CM/SEC
BH CV ECHO MEAS - AI P1/2T: 324.3 MSEC
BH CV ECHO MEAS - AO MAX PG (FULL): 13.5 MMHG
BH CV ECHO MEAS - AO MAX PG: 17 MMHG
BH CV ECHO MEAS - AO MEAN PG (FULL): 5.3 MMHG
BH CV ECHO MEAS - AO MEAN PG: 7 MMHG
BH CV ECHO MEAS - AO ROOT AREA (BSA CORRECTED): 2.3
BH CV ECHO MEAS - AO ROOT AREA: 7.5 CM^2
BH CV ECHO MEAS - AO ROOT DIAM: 3.1 CM
BH CV ECHO MEAS - AO V2 MAX: 207 CM/SEC
BH CV ECHO MEAS - AO V2 MEAN: 119.1 CM/SEC
BH CV ECHO MEAS - AO V2 VTI: 36.5 CM
BH CV ECHO MEAS - ASC AORTA: 2.6 CM
BH CV ECHO MEAS - AVA(I,A): 1.7 CM^2
BH CV ECHO MEAS - AVA(I,D): 1.7 CM^2
BH CV ECHO MEAS - AVA(V,A): 1.4 CM^2
BH CV ECHO MEAS - AVA(V,D): 1.4 CM^2
BH CV ECHO MEAS - BSA(HAYCOCK): 1.3 M^2
BH CV ECHO MEAS - BSA: 1.3 M^2
BH CV ECHO MEAS - BZI_BMI: 17.4 KILOGRAMS/M^2
BH CV ECHO MEAS - BZI_METRIC_HEIGHT: 152.4 CM
BH CV ECHO MEAS - BZI_METRIC_WEIGHT: 40.4 KG
BH CV ECHO MEAS - EDV(CUBED): 83.7 ML
BH CV ECHO MEAS - EDV(MOD-SP2): 104 ML
BH CV ECHO MEAS - EDV(MOD-SP4): 63 ML
BH CV ECHO MEAS - EDV(TEICH): 86.5 ML
BH CV ECHO MEAS - EF(CUBED): 72.6 %
BH CV ECHO MEAS - EF(MOD-BP): 75 %
BH CV ECHO MEAS - EF(MOD-SP2): 74 %
BH CV ECHO MEAS - EF(MOD-SP4): 69.8 %
BH CV ECHO MEAS - EF(TEICH): 64.5 %
BH CV ECHO MEAS - ESV(CUBED): 23 ML
BH CV ECHO MEAS - ESV(MOD-SP2): 27 ML
BH CV ECHO MEAS - ESV(MOD-SP4): 19 ML
BH CV ECHO MEAS - ESV(TEICH): 30.7 ML
BH CV ECHO MEAS - FS: 35 %
BH CV ECHO MEAS - IVS/LVPW: 1.2
BH CV ECHO MEAS - IVSD: 1 CM
BH CV ECHO MEAS - LA DIMENSION: 4.1 CM
BH CV ECHO MEAS - LA/AO: 1.3
BH CV ECHO MEAS - LAD MAJOR: 4.3 CM
BH CV ECHO MEAS - LAT PEAK E' VEL: 8.1 CM/SEC
BH CV ECHO MEAS - LATERAL E/E' RATIO: 8.9
BH CV ECHO MEAS - LV DIASTOLIC VOL/BSA (35-75): 47.6 ML/M^2
BH CV ECHO MEAS - LV IVRT: 0.07 SEC
BH CV ECHO MEAS - LV MASS(C)D: 132.2 GRAMS
BH CV ECHO MEAS - LV MASS(C)DI: 99.9 GRAMS/M^2
BH CV ECHO MEAS - LV MAX PG: 3.2 MMHG
BH CV ECHO MEAS - LV MEAN PG: 1.4 MMHG
BH CV ECHO MEAS - LV SYSTOLIC VOL/BSA (12-30): 14.4 ML/M^2
BH CV ECHO MEAS - LV V1 MAX: 88.8 CM/SEC
BH CV ECHO MEAS - LV V1 MEAN: 53.4 CM/SEC
BH CV ECHO MEAS - LV V1 VTI: 20.5 CM
BH CV ECHO MEAS - LVIDD: 4.4 CM
BH CV ECHO MEAS - LVIDS: 2.8 CM
BH CV ECHO MEAS - LVLD AP2: 7.1 CM
BH CV ECHO MEAS - LVLD AP4: 6.4 CM
BH CV ECHO MEAS - LVLS AP2: 5.4 CM
BH CV ECHO MEAS - LVLS AP4: 5.1 CM
BH CV ECHO MEAS - LVOT AREA (M): 3.1 CM^2
BH CV ECHO MEAS - LVOT AREA: 3.1 CM^2
BH CV ECHO MEAS - LVOT DIAM: 2 CM
BH CV ECHO MEAS - LVPWD: 0.83 CM
BH CV ECHO MEAS - MED PEAK E' VEL: 11.1 CM/SEC
BH CV ECHO MEAS - MEDIAL E/E' RATIO: 6.6
BH CV ECHO MEAS - MV A MAX VEL: 102.7 CM/SEC
BH CV ECHO MEAS - MV DEC TIME: 0.2 SEC
BH CV ECHO MEAS - MV E MAX VEL: 73.9 CM/SEC
BH CV ECHO MEAS - MV E/A: 0.72
BH CV ECHO MEAS - PA ACC SLOPE: 1456 CM/SEC^2
BH CV ECHO MEAS - PA ACC TIME: 0.07 SEC
BH CV ECHO MEAS - PA MAX PG: 13.2 MMHG
BH CV ECHO MEAS - PA PR(ACCEL): 48.1 MMHG
BH CV ECHO MEAS - PA V2 MAX: 181.3 CM/SEC
BH CV ECHO MEAS - PAPD(PI EDV): 7 MMHG
BH CV ECHO MEAS - PI END-D VEL: 137 CM/SEC
BH CV ECHO MEAS - PULM A REVS VEL: 25.8 CM/SEC
BH CV ECHO MEAS - PULM DIAS VEL: 35.1 CM/SEC
BH CV ECHO MEAS - PULM S/D: 1.4
BH CV ECHO MEAS - PULM SYS VEL: 48.3 CM/SEC
BH CV ECHO MEAS - SI(AO): 205.7 ML/M^2
BH CV ECHO MEAS - SI(CUBED): 45.9 ML/M^2
BH CV ECHO MEAS - SI(LVOT): 48 ML/M^2
BH CV ECHO MEAS - SI(MOD-SP2): 58.2 ML/M^2
BH CV ECHO MEAS - SI(MOD-SP4): 33.3 ML/M^2
BH CV ECHO MEAS - SI(TEICH): 42.2 ML/M^2
BH CV ECHO MEAS - SV(AO): 272.2 ML
BH CV ECHO MEAS - SV(CUBED): 60.7 ML
BH CV ECHO MEAS - SV(LVOT): 63.4 ML
BH CV ECHO MEAS - SV(MOD-SP2): 77 ML
BH CV ECHO MEAS - SV(MOD-SP4): 44 ML
BH CV ECHO MEAS - SV(TEICH): 55.8 ML
BH CV ECHO MEAS - TAPSE (>1.6): 1.7 CM
BH CV ECHO MEAS - TR MAX PG: 35 MMHG
BH CV ECHO MEAS - TR MAX VEL: 297 CM/SEC
BH CV ECHO MEASUREMENTS AVERAGE E/E' RATIO: 7.7
BH CV VAS BP LEFT ARM: NORMAL MMHG
BH CV XLRA - RV BASE: 5.3 CM
BH CV XLRA - RV LENGTH: 6.2 CM
BH CV XLRA - RV MID: 4 CM
BILIRUB SERPL-MCNC: 0.2 MG/DL (ref 0–1.2)
BILIRUB SERPL-MCNC: 0.2 MG/DL (ref 0–1.2)
BILIRUB SERPL-MCNC: 0.3 MG/DL (ref 0–1.2)
BILIRUB SERPL-MCNC: 0.4 MG/DL (ref 0–1.2)
BILIRUB SERPL-MCNC: <0.2 MG/DL (ref 0–1.2)
BILIRUB UR QL STRIP: NEGATIVE
BLD GP AB SCN SERPL QL: POSITIVE
BODY TEMPERATURE: 37 C
BUN SERPL-MCNC: 10 MG/DL (ref 8–23)
BUN SERPL-MCNC: 11 MG/DL (ref 8–23)
BUN SERPL-MCNC: 12 MG/DL (ref 8–23)
BUN SERPL-MCNC: 13 MG/DL (ref 8–23)
BUN SERPL-MCNC: 15 MG/DL (ref 8–23)
BUN SERPL-MCNC: 17 MG/DL (ref 8–23)
BUN SERPL-MCNC: 17 MG/DL (ref 8–23)
BUN SERPL-MCNC: 18 MG/DL (ref 8–23)
BUN SERPL-MCNC: 19 MG/DL (ref 8–23)
BUN SERPL-MCNC: 23 MG/DL (ref 8–23)
BUN SERPL-MCNC: 28 MG/DL (ref 8–23)
BUN SERPL-MCNC: 3 MG/DL (ref 8–23)
BUN SERPL-MCNC: 3 MG/DL (ref 8–23)
BUN SERPL-MCNC: 6 MG/DL (ref 8–23)
BUN SERPL-MCNC: 7 MG/DL (ref 8–23)
BUN SERPL-MCNC: 8 MG/DL (ref 8–23)
BUN SERPL-MCNC: 9 MG/DL (ref 8–23)
BUN/CREAT SERPL: 10.1 (ref 7–25)
BUN/CREAT SERPL: 12.5 (ref 7–25)
BUN/CREAT SERPL: 15.4 (ref 7–25)
BUN/CREAT SERPL: 15.7 (ref 7–25)
BUN/CREAT SERPL: 17.4 (ref 7–25)
BUN/CREAT SERPL: 17.4 (ref 7–25)
BUN/CREAT SERPL: 18 (ref 7–25)
BUN/CREAT SERPL: 18.2 (ref 7–25)
BUN/CREAT SERPL: 18.4 (ref 7–25)
BUN/CREAT SERPL: 19.3 (ref 7–25)
BUN/CREAT SERPL: 19.7 (ref 7–25)
BUN/CREAT SERPL: 20.7 (ref 7–25)
BUN/CREAT SERPL: 21.1 (ref 7–25)
BUN/CREAT SERPL: 21.7 (ref 7–25)
BUN/CREAT SERPL: 25.7 (ref 7–25)
BUN/CREAT SERPL: 25.9 (ref 7–25)
BUN/CREAT SERPL: 26.1 (ref 7–25)
BUN/CREAT SERPL: 30.5 (ref 7–25)
BUN/CREAT SERPL: 33.3 (ref 7–25)
BUN/CREAT SERPL: 34 (ref 7–25)
BUN/CREAT SERPL: 35.8 (ref 7–25)
BUN/CREAT SERPL: 42.6 (ref 7–25)
BUN/CREAT SERPL: 51.9 (ref 7–25)
BUN/CREAT SERPL: 7.1 (ref 7–25)
BUN/CREAT SERPL: 8.6 (ref 7–25)
BUN/CREAT SERPL: 9.8 (ref 7–25)
BUPRENORPHINE SERPL-MCNC: NEGATIVE NG/ML
BUPRENORPHINE SERPL-MCNC: NEGATIVE NG/ML
C CAYETANENSIS DNA STL QL NAA+NON-PROBE: NOT DETECTED
C COLI+JEJ+UPSA DNA STL QL NAA+NON-PROBE: NOT DETECTED
C DIFF TOX GENS STL QL NAA+PROBE: NOT DETECTED
C DIFF TOX GENS STL QL NAA+PROBE: NOT DETECTED
C PNEUM DNA NPH QL NAA+NON-PROBE: NOT DETECTED
CALCIUM SPEC-SCNC: 7.3 MG/DL (ref 8.6–10.5)
CALCIUM SPEC-SCNC: 7.8 MG/DL (ref 8.6–10.5)
CALCIUM SPEC-SCNC: 7.9 MG/DL (ref 8.6–10.5)
CALCIUM SPEC-SCNC: 8.1 MG/DL (ref 8.6–10.5)
CALCIUM SPEC-SCNC: 8.2 MG/DL (ref 8.6–10.5)
CALCIUM SPEC-SCNC: 8.3 MG/DL (ref 8.6–10.5)
CALCIUM SPEC-SCNC: 8.3 MG/DL (ref 8.6–10.5)
CALCIUM SPEC-SCNC: 8.4 MG/DL (ref 8.6–10.5)
CALCIUM SPEC-SCNC: 8.6 MG/DL (ref 8.6–10.5)
CALCIUM SPEC-SCNC: 8.6 MG/DL (ref 8.6–10.5)
CALCIUM SPEC-SCNC: 8.7 MG/DL (ref 8.6–10.5)
CALCIUM SPEC-SCNC: 8.8 MG/DL (ref 8.6–10.5)
CALCIUM SPEC-SCNC: 9 MG/DL (ref 8.6–10.5)
CALCIUM SPEC-SCNC: 9 MG/DL (ref 8.6–10.5)
CALCIUM SPEC-SCNC: 9.1 MG/DL (ref 8.6–10.5)
CALCIUM SPEC-SCNC: 9.1 MG/DL (ref 8.6–10.5)
CALCIUM SPEC-SCNC: 9.2 MG/DL (ref 8.6–10.5)
CALCIUM SPEC-SCNC: 9.2 MG/DL (ref 8.6–10.5)
CALCIUM SPEC-SCNC: 9.3 MG/DL (ref 8.6–10.5)
CANNABINOIDS SERPL QL: NEGATIVE
CANNABINOIDS SERPL QL: NEGATIVE
CHLORIDE SERPL-SCNC: 102 MMOL/L (ref 98–107)
CHLORIDE SERPL-SCNC: 102 MMOL/L (ref 98–107)
CHLORIDE SERPL-SCNC: 103 MMOL/L (ref 98–107)
CHLORIDE SERPL-SCNC: 105 MMOL/L (ref 98–107)
CHLORIDE SERPL-SCNC: 108 MMOL/L (ref 98–107)
CHLORIDE SERPL-SCNC: 109 MMOL/L (ref 98–107)
CHLORIDE SERPL-SCNC: 91 MMOL/L (ref 98–107)
CHLORIDE SERPL-SCNC: 92 MMOL/L (ref 98–107)
CHLORIDE SERPL-SCNC: 93 MMOL/L (ref 98–107)
CHLORIDE SERPL-SCNC: 94 MMOL/L (ref 98–107)
CHLORIDE SERPL-SCNC: 95 MMOL/L (ref 98–107)
CHLORIDE SERPL-SCNC: 96 MMOL/L (ref 98–107)
CHLORIDE SERPL-SCNC: 97 MMOL/L (ref 98–107)
CHLORIDE SERPL-SCNC: 98 MMOL/L (ref 98–107)
CHLORIDE SERPL-SCNC: 99 MMOL/L (ref 98–107)
CHLORIDE SERPL-SCNC: 99 MMOL/L (ref 98–107)
CHOLEST SERPL-MCNC: 181 MG/DL (ref 0–200)
CHOLEST SERPL-MCNC: 211 MG/DL (ref 0–200)
CK SERPL-CCNC: 70 U/L (ref 20–180)
CLARITY UR: ABNORMAL
CLARITY UR: ABNORMAL
CLARITY UR: CLEAR
CO2 BLDA-SCNC: 32.7 MMOL/L (ref 22–33)
CO2 BLDA-SCNC: 33 MMOL/L (ref 22–33)
CO2 BLDA-SCNC: 33.5 MMOL/L (ref 22–33)
CO2 BLDA-SCNC: 35.9 MMOL/L (ref 22–33)
CO2 BLDA-SCNC: 36.2 MMOL/L (ref 22–33)
CO2 BLDA-SCNC: 36.7 MMOL/L (ref 22–33)
CO2 BLDA-SCNC: 38.1 MMOL/L (ref 22–33)
CO2 BLDA-SCNC: 39.4 MMOL/L (ref 22–33)
CO2 BLDA-SCNC: 40.5 MMOL/L (ref 22–33)
CO2 BLDA-SCNC: 42 MMOL/L (ref 22–33)
CO2 BLDA-SCNC: 44.1 MMOL/L (ref 22–33)
CO2 SERPL-SCNC: 25 MMOL/L (ref 22–29)
CO2 SERPL-SCNC: 26 MMOL/L (ref 22–29)
CO2 SERPL-SCNC: 26 MMOL/L (ref 22–29)
CO2 SERPL-SCNC: 28 MMOL/L (ref 22–29)
CO2 SERPL-SCNC: 29 MMOL/L (ref 22–29)
CO2 SERPL-SCNC: 30 MMOL/L (ref 22–29)
CO2 SERPL-SCNC: 31 MMOL/L (ref 22–29)
CO2 SERPL-SCNC: 32 MMOL/L (ref 22–29)
CO2 SERPL-SCNC: 33 MMOL/L (ref 22–29)
CO2 SERPL-SCNC: 34 MMOL/L (ref 22–29)
CO2 SERPL-SCNC: 34 MMOL/L (ref 22–29)
CO2 SERPL-SCNC: 35 MMOL/L (ref 22–29)
CO2 SERPL-SCNC: 36 MMOL/L (ref 22–29)
CO2 SERPL-SCNC: 38 MMOL/L (ref 22–29)
CO2 SERPL-SCNC: 39 MMOL/L (ref 22–29)
COCAINE UR QL: NEGATIVE
COCAINE UR QL: NEGATIVE
COHGB MFR BLD: 0.8 % (ref 0–2)
COHGB MFR BLD: 1 % (ref 0–2)
COHGB MFR BLD: 1 % (ref 0–2)
COHGB MFR BLD: 1.4 % (ref 0–2)
COHGB MFR BLD: 2.2 % (ref 0–2)
COHGB MFR BLD: 2.3 % (ref 0–2)
COHGB MFR BLD: 2.7 % (ref 0–2)
COHGB MFR BLD: 3.1 % (ref 0–2)
COHGB MFR BLD: 3.5 % (ref 0–2)
COHGB MFR BLD: 3.6 % (ref 0–2)
COHGB MFR BLD: 4.4 % (ref 0–2)
COLOR UR: YELLOW
CORTIS SERPL-MCNC: 24.45 MCG/DL
CREAT SERPL-MCNC: 0.35 MG/DL (ref 0.57–1)
CREAT SERPL-MCNC: 0.42 MG/DL (ref 0.57–1)
CREAT SERPL-MCNC: 0.46 MG/DL (ref 0.57–1)
CREAT SERPL-MCNC: 0.5 MG/DL (ref 0.57–1)
CREAT SERPL-MCNC: 0.5 MG/DL (ref 0.57–1)
CREAT SERPL-MCNC: 0.51 MG/DL (ref 0.57–1)
CREAT SERPL-MCNC: 0.51 MG/DL (ref 0.57–1)
CREAT SERPL-MCNC: 0.52 MG/DL (ref 0.57–1)
CREAT SERPL-MCNC: 0.53 MG/DL (ref 0.57–1)
CREAT SERPL-MCNC: 0.54 MG/DL (ref 0.57–1)
CREAT SERPL-MCNC: 0.54 MG/DL (ref 0.57–1)
CREAT SERPL-MCNC: 0.55 MG/DL (ref 0.57–1)
CREAT SERPL-MCNC: 0.56 MG/DL (ref 0.57–1)
CREAT SERPL-MCNC: 0.57 MG/DL (ref 0.57–1)
CREAT SERPL-MCNC: 0.57 MG/DL (ref 0.57–1)
CREAT SERPL-MCNC: 0.58 MG/DL (ref 0.57–1)
CREAT SERPL-MCNC: 0.58 MG/DL (ref 0.57–1)
CREAT SERPL-MCNC: 0.59 MG/DL (ref 0.57–1)
CREAT SERPL-MCNC: 0.6 MG/DL (ref 0.57–1)
CREAT SERPL-MCNC: 0.61 MG/DL (ref 0.57–1)
CREAT SERPL-MCNC: 0.61 MG/DL (ref 0.57–1)
CREAT SERPL-MCNC: 0.74 MG/DL (ref 0.57–1)
CREAT SERPL-MCNC: 0.79 MG/DL (ref 0.57–1)
CREAT SERPL-MCNC: 1.03 MG/DL (ref 0.57–1)
CROSSMATCH INTERPRETATION: NORMAL
CROSSMATCH INTERPRETATION: NORMAL
CRP SERPL-MCNC: 20.19 MG/DL (ref 0–0.5)
CRYPTOSP DNA STL QL NAA+NON-PROBE: NOT DETECTED
CYTO UR: NORMAL
D-LACTATE SERPL-SCNC: 0.3 MMOL/L (ref 0.5–2)
D-LACTATE SERPL-SCNC: 0.7 MMOL/L (ref 0.5–2)
D-LACTATE SERPL-SCNC: 0.7 MMOL/L (ref 0.5–2)
D-LACTATE SERPL-SCNC: 0.8 MMOL/L (ref 0.5–2)
D-LACTATE SERPL-SCNC: 1 MMOL/L (ref 0.5–2)
D-LACTATE SERPL-SCNC: 1.2 MMOL/L (ref 0.5–2)
D-LACTATE SERPL-SCNC: 1.2 MMOL/L (ref 0.5–2)
D-LACTATE SERPL-SCNC: 1.4 MMOL/L (ref 0.5–2)
D-LACTATE SERPL-SCNC: 2.1 MMOL/L (ref 0.5–2)
D-LACTATE SERPL-SCNC: 2.2 MMOL/L (ref 0.5–2)
DEPRECATED RDW RBC AUTO: 54.3 FL (ref 37–54)
DEPRECATED RDW RBC AUTO: 54.4 FL (ref 37–54)
DEPRECATED RDW RBC AUTO: 54.6 FL (ref 37–54)
DEPRECATED RDW RBC AUTO: 54.8 FL (ref 37–54)
DEPRECATED RDW RBC AUTO: 55 FL (ref 37–54)
DEPRECATED RDW RBC AUTO: 56 FL (ref 37–54)
DEPRECATED RDW RBC AUTO: 56.5 FL (ref 37–54)
DEPRECATED RDW RBC AUTO: 56.6 FL (ref 37–54)
DEPRECATED RDW RBC AUTO: 56.8 FL (ref 37–54)
DEPRECATED RDW RBC AUTO: 56.8 FL (ref 37–54)
DEPRECATED RDW RBC AUTO: 56.9 FL (ref 37–54)
DEPRECATED RDW RBC AUTO: 57.1 FL (ref 37–54)
DEPRECATED RDW RBC AUTO: 57.1 FL (ref 37–54)
DEPRECATED RDW RBC AUTO: 57.2 FL (ref 37–54)
DEPRECATED RDW RBC AUTO: 57.2 FL (ref 37–54)
DEPRECATED RDW RBC AUTO: 58 FL (ref 37–54)
DEPRECATED RDW RBC AUTO: 58.4 FL (ref 37–54)
DEPRECATED RDW RBC AUTO: 58.4 FL (ref 37–54)
DEPRECATED RDW RBC AUTO: 58.9 FL (ref 37–54)
DEPRECATED RDW RBC AUTO: 59.9 FL (ref 37–54)
DEPRECATED RDW RBC AUTO: 63.5 FL (ref 37–54)
DEPRECATED RDW RBC AUTO: 74.8 FL (ref 37–54)
DEPRECATED RDW RBC AUTO: 75.6 FL (ref 37–54)
DEPRECATED RDW RBC AUTO: 81 FL (ref 37–54)
DEPRECATED RDW RBC AUTO: 82.7 FL (ref 37–54)
E HISTOLYT DNA STL QL NAA+NON-PROBE: NOT DETECTED
EAEC PAA PLAS AGGR+AATA ST NAA+NON-PRB: NOT DETECTED
EC STX1+STX2 GENES STL QL NAA+NON-PROBE: NOT DETECTED
EOSINOPHIL # BLD AUTO: 0 10*3/MM3 (ref 0–0.4)
EOSINOPHIL # BLD AUTO: 0.02 10*3/MM3 (ref 0–0.4)
EOSINOPHIL # BLD AUTO: 0.02 10*3/MM3 (ref 0–0.4)
EOSINOPHIL # BLD AUTO: 0.03 10*3/MM3 (ref 0–0.4)
EOSINOPHIL # BLD AUTO: 0.03 10*3/MM3 (ref 0–0.4)
EOSINOPHIL # BLD AUTO: 0.04 10*3/MM3 (ref 0–0.4)
EOSINOPHIL # BLD AUTO: 0.07 10*3/MM3 (ref 0–0.4)
EOSINOPHIL # BLD AUTO: 0.09 10*3/MM3 (ref 0–0.4)
EOSINOPHIL # BLD AUTO: 0.11 10*3/MM3 (ref 0–0.4)
EOSINOPHIL # BLD AUTO: 0.14 10*3/MM3 (ref 0–0.4)
EOSINOPHIL # BLD AUTO: 0.15 10*3/MM3 (ref 0–0.4)
EOSINOPHIL # BLD AUTO: 0.16 10*3/MM3 (ref 0–0.4)
EOSINOPHIL # BLD AUTO: 0.16 10*3/MM3 (ref 0–0.4)
EOSINOPHIL # BLD AUTO: 0.19 10*3/MM3 (ref 0–0.4)
EOSINOPHIL # BLD AUTO: 0.28 10*3/MM3 (ref 0–0.4)
EOSINOPHIL # BLD AUTO: 0.34 10*3/MM3 (ref 0–0.4)
EOSINOPHIL # BLD MANUAL: 0.28 10*3/MM3 (ref 0–0.4)
EOSINOPHIL NFR BLD AUTO: 0 % (ref 0.3–6.2)
EOSINOPHIL NFR BLD AUTO: 0.2 % (ref 0.3–6.2)
EOSINOPHIL NFR BLD AUTO: 0.2 % (ref 0.3–6.2)
EOSINOPHIL NFR BLD AUTO: 0.3 % (ref 0.3–6.2)
EOSINOPHIL NFR BLD AUTO: 0.7 % (ref 0.3–6.2)
EOSINOPHIL NFR BLD AUTO: 0.7 % (ref 0.3–6.2)
EOSINOPHIL NFR BLD AUTO: 1 % (ref 0.3–6.2)
EOSINOPHIL NFR BLD AUTO: 1.1 % (ref 0.3–6.2)
EOSINOPHIL NFR BLD AUTO: 1.3 % (ref 0.3–6.2)
EOSINOPHIL NFR BLD AUTO: 2.3 % (ref 0.3–6.2)
EOSINOPHIL NFR BLD AUTO: 2.5 % (ref 0.3–6.2)
EOSINOPHIL NFR BLD AUTO: 2.8 % (ref 0.3–6.2)
EOSINOPHIL NFR BLD AUTO: 2.8 % (ref 0.3–6.2)
EOSINOPHIL NFR BLD AUTO: 3 % (ref 0.3–6.2)
EOSINOPHIL NFR BLD AUTO: 4.4 % (ref 0.3–6.2)
EOSINOPHIL NFR BLD AUTO: 6.9 % (ref 0.3–6.2)
EOSINOPHIL NFR BLD MANUAL: 7 % (ref 0.3–6.2)
EPAP: 0
EPAP: 6
EPEC EAE GENE STL QL NAA+NON-PROBE: NOT DETECTED
ERYTHROCYTE [DISTWIDTH] IN BLOOD BY AUTOMATED COUNT: 14.5 % (ref 12.3–15.4)
ERYTHROCYTE [DISTWIDTH] IN BLOOD BY AUTOMATED COUNT: 14.6 % (ref 12.3–15.4)
ERYTHROCYTE [DISTWIDTH] IN BLOOD BY AUTOMATED COUNT: 15 % (ref 12.3–15.4)
ERYTHROCYTE [DISTWIDTH] IN BLOOD BY AUTOMATED COUNT: 15.1 % (ref 12.3–15.4)
ERYTHROCYTE [DISTWIDTH] IN BLOOD BY AUTOMATED COUNT: 15.2 % (ref 12.3–15.4)
ERYTHROCYTE [DISTWIDTH] IN BLOOD BY AUTOMATED COUNT: 15.6 % (ref 12.3–15.4)
ERYTHROCYTE [DISTWIDTH] IN BLOOD BY AUTOMATED COUNT: 15.8 % (ref 12.3–15.4)
ERYTHROCYTE [DISTWIDTH] IN BLOOD BY AUTOMATED COUNT: 15.9 % (ref 12.3–15.4)
ERYTHROCYTE [DISTWIDTH] IN BLOOD BY AUTOMATED COUNT: 16 % (ref 12.3–15.4)
ERYTHROCYTE [DISTWIDTH] IN BLOOD BY AUTOMATED COUNT: 16 % (ref 12.3–15.4)
ERYTHROCYTE [DISTWIDTH] IN BLOOD BY AUTOMATED COUNT: 16.1 % (ref 12.3–15.4)
ERYTHROCYTE [DISTWIDTH] IN BLOOD BY AUTOMATED COUNT: 16.2 % (ref 12.3–15.4)
ERYTHROCYTE [DISTWIDTH] IN BLOOD BY AUTOMATED COUNT: 16.3 % (ref 12.3–15.4)
ERYTHROCYTE [DISTWIDTH] IN BLOOD BY AUTOMATED COUNT: 16.4 % (ref 12.3–15.4)
ERYTHROCYTE [DISTWIDTH] IN BLOOD BY AUTOMATED COUNT: 16.5 % (ref 12.3–15.4)
ERYTHROCYTE [DISTWIDTH] IN BLOOD BY AUTOMATED COUNT: 17.2 % (ref 12.3–15.4)
ERYTHROCYTE [DISTWIDTH] IN BLOOD BY AUTOMATED COUNT: 17.2 % (ref 12.3–15.4)
ERYTHROCYTE [DISTWIDTH] IN BLOOD BY AUTOMATED COUNT: 20.4 % (ref 12.3–15.4)
ERYTHROCYTE [DISTWIDTH] IN BLOOD BY AUTOMATED COUNT: 21.5 % (ref 12.3–15.4)
ERYTHROCYTE [DISTWIDTH] IN BLOOD BY AUTOMATED COUNT: 22.4 % (ref 12.3–15.4)
ERYTHROCYTE [DISTWIDTH] IN BLOOD BY AUTOMATED COUNT: 22.5 % (ref 12.3–15.4)
ETEC LTA+ST1A+ST1B TOX ST NAA+NON-PROBE: NOT DETECTED
FERRITIN SERPL-MCNC: 13.16 NG/ML (ref 13–150)
FERRITIN SERPL-MCNC: 75.99 NG/ML (ref 13–150)
FLUAV RNA RESP QL NAA+PROBE: NOT DETECTED
FLUAV SUBTYP SPEC NAA+PROBE: NOT DETECTED
FLUAV SUBTYP SPEC NAA+PROBE: NOT DETECTED
FLUBV RNA ISLT QL NAA+PROBE: NOT DETECTED
FLUBV RNA ISLT QL NAA+PROBE: NOT DETECTED
FLUBV RNA RESP QL NAA+PROBE: NOT DETECTED
FOLATE SERPL-MCNC: 17.7 NG/ML (ref 4.78–24.2)
G LAMBLIA DNA STL QL NAA+NON-PROBE: NOT DETECTED
GFR SERPL CREATININE-BSD FRML MDRD: 100 ML/MIN/1.73
GFR SERPL CREATININE-BSD FRML MDRD: 102 ML/MIN/1.73
GFR SERPL CREATININE-BSD FRML MDRD: 102 ML/MIN/1.73
GFR SERPL CREATININE-BSD FRML MDRD: 104 ML/MIN/1.73
GFR SERPL CREATININE-BSD FRML MDRD: 104 ML/MIN/1.73
GFR SERPL CREATININE-BSD FRML MDRD: 106 ML/MIN/1.73
GFR SERPL CREATININE-BSD FRML MDRD: 109 ML/MIN/1.73
GFR SERPL CREATININE-BSD FRML MDRD: 111 ML/MIN/1.73
GFR SERPL CREATININE-BSD FRML MDRD: 111 ML/MIN/1.73
GFR SERPL CREATININE-BSD FRML MDRD: 113 ML/MIN/1.73
GFR SERPL CREATININE-BSD FRML MDRD: 116 ML/MIN/1.73
GFR SERPL CREATININE-BSD FRML MDRD: 118 ML/MIN/1.73
GFR SERPL CREATININE-BSD FRML MDRD: 119 ML/MIN/1.73
GFR SERPL CREATININE-BSD FRML MDRD: 121 ML/MIN/1.73
GFR SERPL CREATININE-BSD FRML MDRD: 121 ML/MIN/1.73
GFR SERPL CREATININE-BSD FRML MDRD: 133 ML/MIN/1.73
GFR SERPL CREATININE-BSD FRML MDRD: 148 ML/MIN/1.73
GFR SERPL CREATININE-BSD FRML MDRD: 53 ML/MIN/1.73
GFR SERPL CREATININE-BSD FRML MDRD: 71 ML/MIN/1.73
GFR SERPL CREATININE-BSD FRML MDRD: 77 ML/MIN/1.73
GFR SERPL CREATININE-BSD FRML MDRD: 96 ML/MIN/1.73
GFR SERPL CREATININE-BSD FRML MDRD: 96 ML/MIN/1.73
GFR SERPL CREATININE-BSD FRML MDRD: 98 ML/MIN/1.73
GFR SERPL CREATININE-BSD FRML MDRD: >150 ML/MIN/1.73
GLOBULIN UR ELPH-MCNC: 2.8 GM/DL
GLOBULIN UR ELPH-MCNC: 2.9 GM/DL
GLOBULIN UR ELPH-MCNC: 3 GM/DL
GLOBULIN UR ELPH-MCNC: 3.2 GM/DL
GLOBULIN UR ELPH-MCNC: 3.2 GM/DL
GLOBULIN UR ELPH-MCNC: 3.3 GM/DL
GLOBULIN UR ELPH-MCNC: 3.4 GM/DL
GLOBULIN UR ELPH-MCNC: 3.5 GM/DL
GLOBULIN UR ELPH-MCNC: 3.9 GM/DL
GLUCOSE BLDC GLUCOMTR-MCNC: 108 MG/DL (ref 70–130)
GLUCOSE BLDC GLUCOMTR-MCNC: 109 MG/DL (ref 70–130)
GLUCOSE BLDC GLUCOMTR-MCNC: 111 MG/DL (ref 70–130)
GLUCOSE BLDC GLUCOMTR-MCNC: 123 MG/DL (ref 70–130)
GLUCOSE BLDC GLUCOMTR-MCNC: 130 MG/DL (ref 70–130)
GLUCOSE BLDC GLUCOMTR-MCNC: 131 MG/DL (ref 70–130)
GLUCOSE BLDC GLUCOMTR-MCNC: 132 MG/DL (ref 70–130)
GLUCOSE BLDC GLUCOMTR-MCNC: 134 MG/DL (ref 70–130)
GLUCOSE BLDC GLUCOMTR-MCNC: 135 MG/DL (ref 70–130)
GLUCOSE BLDC GLUCOMTR-MCNC: 137 MG/DL (ref 70–130)
GLUCOSE BLDC GLUCOMTR-MCNC: 147 MG/DL (ref 70–130)
GLUCOSE BLDC GLUCOMTR-MCNC: 162 MG/DL (ref 70–130)
GLUCOSE BLDC GLUCOMTR-MCNC: 163 MG/DL (ref 70–130)
GLUCOSE BLDC GLUCOMTR-MCNC: 163 MG/DL (ref 70–130)
GLUCOSE BLDC GLUCOMTR-MCNC: 195 MG/DL (ref 70–130)
GLUCOSE BLDC GLUCOMTR-MCNC: 200 MG/DL (ref 70–130)
GLUCOSE BLDC GLUCOMTR-MCNC: 252 MG/DL (ref 70–130)
GLUCOSE BLDC GLUCOMTR-MCNC: 261 MG/DL (ref 70–130)
GLUCOSE BLDC GLUCOMTR-MCNC: 276 MG/DL (ref 70–130)
GLUCOSE BLDC GLUCOMTR-MCNC: 61 MG/DL (ref 70–130)
GLUCOSE BLDC GLUCOMTR-MCNC: 63 MG/DL (ref 70–130)
GLUCOSE BLDC GLUCOMTR-MCNC: 70 MG/DL (ref 70–130)
GLUCOSE BLDC GLUCOMTR-MCNC: 81 MG/DL (ref 70–130)
GLUCOSE BLDC GLUCOMTR-MCNC: 84 MG/DL (ref 70–130)
GLUCOSE BLDC GLUCOMTR-MCNC: 94 MG/DL (ref 70–130)
GLUCOSE BLDC GLUCOMTR-MCNC: 98 MG/DL (ref 70–130)
GLUCOSE BLDC GLUCOMTR-MCNC: 99 MG/DL (ref 70–130)
GLUCOSE SERPL-MCNC: 104 MG/DL (ref 65–99)
GLUCOSE SERPL-MCNC: 105 MG/DL (ref 65–99)
GLUCOSE SERPL-MCNC: 110 MG/DL (ref 65–99)
GLUCOSE SERPL-MCNC: 111 MG/DL (ref 65–99)
GLUCOSE SERPL-MCNC: 113 MG/DL (ref 65–99)
GLUCOSE SERPL-MCNC: 120 MG/DL (ref 65–99)
GLUCOSE SERPL-MCNC: 128 MG/DL (ref 65–99)
GLUCOSE SERPL-MCNC: 135 MG/DL (ref 65–99)
GLUCOSE SERPL-MCNC: 141 MG/DL (ref 65–99)
GLUCOSE SERPL-MCNC: 144 MG/DL (ref 65–99)
GLUCOSE SERPL-MCNC: 176 MG/DL (ref 65–99)
GLUCOSE SERPL-MCNC: 205 MG/DL (ref 65–99)
GLUCOSE SERPL-MCNC: 251 MG/DL (ref 65–99)
GLUCOSE SERPL-MCNC: 43 MG/DL (ref 65–99)
GLUCOSE SERPL-MCNC: 61 MG/DL (ref 65–99)
GLUCOSE SERPL-MCNC: 72 MG/DL (ref 65–99)
GLUCOSE SERPL-MCNC: 75 MG/DL (ref 65–99)
GLUCOSE SERPL-MCNC: 76 MG/DL (ref 65–99)
GLUCOSE SERPL-MCNC: 82 MG/DL (ref 65–99)
GLUCOSE SERPL-MCNC: 83 MG/DL (ref 65–99)
GLUCOSE SERPL-MCNC: 84 MG/DL (ref 65–99)
GLUCOSE SERPL-MCNC: 84 MG/DL (ref 65–99)
GLUCOSE SERPL-MCNC: 88 MG/DL (ref 65–99)
GLUCOSE SERPL-MCNC: 91 MG/DL (ref 65–99)
GLUCOSE SERPL-MCNC: 92 MG/DL (ref 65–99)
GLUCOSE SERPL-MCNC: 97 MG/DL (ref 65–99)
GLUCOSE UR STRIP-MCNC: NEGATIVE MG/DL
GRAM STN SPEC: NORMAL
HADV DNA SPEC NAA+PROBE: NOT DETECTED
HBA1C MFR BLD: 5.5 % (ref 4.8–5.6)
HCO3 BLDA-SCNC: 31.2 MMOL/L (ref 20–26)
HCO3 BLDA-SCNC: 31.4 MMOL/L (ref 20–26)
HCO3 BLDA-SCNC: 31.4 MMOL/L (ref 20–26)
HCO3 BLDA-SCNC: 34 MMOL/L (ref 20–26)
HCO3 BLDA-SCNC: 34.8 MMOL/L (ref 20–26)
HCO3 BLDA-SCNC: 35.3 MMOL/L (ref 20–26)
HCO3 BLDA-SCNC: 35.9 MMOL/L (ref 20–26)
HCO3 BLDA-SCNC: 37.4 MMOL/L (ref 20–26)
HCO3 BLDA-SCNC: 38.4 MMOL/L (ref 20–26)
HCO3 BLDA-SCNC: 40.4 MMOL/L (ref 20–26)
HCO3 BLDA-SCNC: 41.3 MMOL/L (ref 20–26)
HCOV 229E RNA SPEC QL NAA+PROBE: NOT DETECTED
HCOV HKU1 RNA SPEC QL NAA+PROBE: NOT DETECTED
HCOV NL63 RNA SPEC QL NAA+PROBE: NOT DETECTED
HCOV OC43 RNA SPEC QL NAA+PROBE: NOT DETECTED
HCT VFR BLD AUTO: 24.8 % (ref 34–46.6)
HCT VFR BLD AUTO: 28 % (ref 34–46.6)
HCT VFR BLD AUTO: 30 % (ref 34–46.6)
HCT VFR BLD AUTO: 30.1 % (ref 34–46.6)
HCT VFR BLD AUTO: 30.4 % (ref 34–46.6)
HCT VFR BLD AUTO: 30.8 % (ref 34–46.6)
HCT VFR BLD AUTO: 30.9 % (ref 34–46.6)
HCT VFR BLD AUTO: 31.6 % (ref 34–46.6)
HCT VFR BLD AUTO: 32.2 % (ref 34–46.6)
HCT VFR BLD AUTO: 32.8 % (ref 34–46.6)
HCT VFR BLD AUTO: 33.6 % (ref 34–46.6)
HCT VFR BLD AUTO: 33.7 % (ref 34–46.6)
HCT VFR BLD AUTO: 34.8 % (ref 34–46.6)
HCT VFR BLD AUTO: 35.3 % (ref 34–46.6)
HCT VFR BLD AUTO: 36.1 % (ref 34–46.6)
HCT VFR BLD AUTO: 37.7 % (ref 34–46.6)
HCT VFR BLD AUTO: 38.4 % (ref 34–46.6)
HCT VFR BLD AUTO: 38.7 % (ref 34–46.6)
HCT VFR BLD AUTO: 39.1 % (ref 34–46.6)
HCT VFR BLD AUTO: 40.3 % (ref 34–46.6)
HCT VFR BLD AUTO: 40.6 % (ref 34–46.6)
HCT VFR BLD AUTO: 40.9 % (ref 34–46.6)
HCT VFR BLD AUTO: 42.1 % (ref 34–46.6)
HCT VFR BLD AUTO: 42.6 % (ref 34–46.6)
HCT VFR BLD AUTO: 44.2 % (ref 34–46.6)
HCT VFR BLD CALC: 21.1 %
HCT VFR BLD CALC: 21.5 %
HCT VFR BLD CALC: 26.4 %
HCT VFR BLD CALC: 26.7 %
HCT VFR BLD CALC: 26.9 %
HCT VFR BLD CALC: 29.9 %
HCT VFR BLD CALC: 31.4 %
HCT VFR BLD CALC: 31.9 %
HCT VFR BLD CALC: 35 %
HCT VFR BLD CALC: 36.4 %
HCT VFR BLD CALC: 37.3 %
HDLC SERPL-MCNC: 52 MG/DL (ref 40–60)
HDLC SERPL-MCNC: 61 MG/DL (ref 40–60)
HEMOCCULT STL QL: POSITIVE
HGB BLD-MCNC: 10.2 G/DL (ref 12–15.9)
HGB BLD-MCNC: 10.3 G/DL (ref 12–15.9)
HGB BLD-MCNC: 10.9 G/DL (ref 12–15.9)
HGB BLD-MCNC: 11.1 G/DL (ref 12–15.9)
HGB BLD-MCNC: 11.3 G/DL (ref 12–15.9)
HGB BLD-MCNC: 11.6 G/DL (ref 12–15.9)
HGB BLD-MCNC: 12.2 G/DL (ref 12–15.9)
HGB BLD-MCNC: 12.3 G/DL (ref 12–15.9)
HGB BLD-MCNC: 12.7 G/DL (ref 12–15.9)
HGB BLD-MCNC: 12.7 G/DL (ref 12–15.9)
HGB BLD-MCNC: 13 G/DL (ref 12–15.9)
HGB BLD-MCNC: 13.4 G/DL (ref 12–15.9)
HGB BLD-MCNC: 13.6 G/DL (ref 12–15.9)
HGB BLD-MCNC: 13.9 G/DL (ref 12–15.9)
HGB BLD-MCNC: 7.3 G/DL (ref 12–15.9)
HGB BLD-MCNC: 8.1 G/DL (ref 12–15.9)
HGB BLD-MCNC: 8.9 G/DL (ref 12–15.9)
HGB BLD-MCNC: 9.1 G/DL (ref 12–15.9)
HGB BLD-MCNC: 9.4 G/DL (ref 12–15.9)
HGB BLD-MCNC: 9.4 G/DL (ref 12–15.9)
HGB BLD-MCNC: 9.5 G/DL (ref 12–15.9)
HGB BLD-MCNC: 9.5 G/DL (ref 12–15.9)
HGB BLD-MCNC: 9.6 G/DL (ref 12–15.9)
HGB BLD-MCNC: 9.9 G/DL (ref 12–15.9)
HGB BLDA-MCNC: 10.2 G/DL (ref 14–18)
HGB BLDA-MCNC: 10.4 G/DL (ref 14–18)
HGB BLDA-MCNC: 11.4 G/DL (ref 14–18)
HGB BLDA-MCNC: 11.9 G/DL (ref 14–18)
HGB BLDA-MCNC: 12.2 G/DL (ref 14–18)
HGB BLDA-MCNC: 6.9 G/DL (ref 14–18)
HGB BLDA-MCNC: 7 G/DL (ref 14–18)
HGB BLDA-MCNC: 8.6 G/DL (ref 14–18)
HGB BLDA-MCNC: 8.7 G/DL (ref 14–18)
HGB BLDA-MCNC: 8.8 G/DL (ref 14–18)
HGB BLDA-MCNC: 9.8 G/DL (ref 14–18)
HGB UR QL STRIP.AUTO: NEGATIVE
HMPV RNA NPH QL NAA+NON-PROBE: NOT DETECTED
HOLD SPECIMEN: NORMAL
HPIV1 RNA SPEC QL NAA+PROBE: NOT DETECTED
HPIV2 RNA SPEC QL NAA+PROBE: NOT DETECTED
HPIV3 RNA NPH QL NAA+PROBE: NOT DETECTED
HPIV4 P GENE NPH QL NAA+PROBE: NOT DETECTED
HYALINE CASTS UR QL AUTO: ABNORMAL /LPF
HYALINE CASTS UR QL AUTO: NORMAL /LPF
HYALINE CASTS UR QL AUTO: NORMAL /LPF
IMM GRANULOCYTES # BLD AUTO: 0 10*3/MM3 (ref 0–0.05)
IMM GRANULOCYTES # BLD AUTO: 0.01 10*3/MM3 (ref 0–0.05)
IMM GRANULOCYTES # BLD AUTO: 0.02 10*3/MM3 (ref 0–0.05)
IMM GRANULOCYTES # BLD AUTO: 0.03 10*3/MM3 (ref 0–0.05)
IMM GRANULOCYTES # BLD AUTO: 0.04 10*3/MM3 (ref 0–0.05)
IMM GRANULOCYTES # BLD AUTO: 0.05 10*3/MM3 (ref 0–0.05)
IMM GRANULOCYTES # BLD AUTO: 0.06 10*3/MM3 (ref 0–0.05)
IMM GRANULOCYTES # BLD AUTO: 0.06 10*3/MM3 (ref 0–0.05)
IMM GRANULOCYTES # BLD AUTO: 0.07 10*3/MM3 (ref 0–0.05)
IMM GRANULOCYTES # BLD AUTO: 0.08 10*3/MM3 (ref 0–0.05)
IMM GRANULOCYTES NFR BLD AUTO: 0 % (ref 0–0.5)
IMM GRANULOCYTES NFR BLD AUTO: 0.2 % (ref 0–0.5)
IMM GRANULOCYTES NFR BLD AUTO: 0.3 % (ref 0–0.5)
IMM GRANULOCYTES NFR BLD AUTO: 0.4 % (ref 0–0.5)
IMM GRANULOCYTES NFR BLD AUTO: 0.5 % (ref 0–0.5)
IMM GRANULOCYTES NFR BLD AUTO: 0.5 % (ref 0–0.5)
IMM GRANULOCYTES NFR BLD AUTO: 0.6 % (ref 0–0.5)
IMM GRANULOCYTES NFR BLD AUTO: 0.6 % (ref 0–0.5)
IMM GRANULOCYTES NFR BLD AUTO: 0.8 % (ref 0–0.5)
IMM GRANULOCYTES NFR BLD AUTO: 0.8 % (ref 0–0.5)
IMM GRANULOCYTES NFR BLD AUTO: 0.9 % (ref 0–0.5)
IMM GRANULOCYTES NFR BLD AUTO: 1.2 % (ref 0–0.5)
INHALED O2 CONCENTRATION: 28 %
INHALED O2 CONCENTRATION: 30 %
INHALED O2 CONCENTRATION: 30 %
INHALED O2 CONCENTRATION: 32 %
INHALED O2 CONCENTRATION: 36 %
INHALED O2 CONCENTRATION: 40 %
INHALED O2 CONCENTRATION: 40 %
INHALED O2 CONCENTRATION: 60 %
INR PPP: 1 (ref 0.85–1.16)
INR PPP: 1.02 (ref 0.85–1.16)
INR PPP: 1.07 (ref 0.85–1.16)
IPAP: 0
IPAP: 16
IRON 24H UR-MRATE: 21 MCG/DL (ref 37–145)
IRON SATN MFR SERPL: 6 % (ref 20–50)
KETONES UR QL STRIP: ABNORMAL
KETONES UR QL STRIP: ABNORMAL
KETONES UR QL STRIP: NEGATIVE
LAB AP CASE REPORT: NORMAL
LAB AP CLINICAL INFORMATION: NORMAL
LACTATE HOLD SPECIMEN: NORMAL
LDLC SERPL CALC-MCNC: 108 MG/DL (ref 0–100)
LDLC SERPL CALC-MCNC: 138 MG/DL (ref 0–100)
LDLC/HDLC SERPL: 1.76 {RATIO}
LDLC/HDLC SERPL: 2.61 {RATIO}
LEFT ATRIUM VOLUME INDEX: 37.8 ML/M^2
LEFT ATRIUM VOLUME: 50 ML
LEUKOCYTE ESTERASE UR QL STRIP.AUTO: ABNORMAL
LEUKOCYTE ESTERASE UR QL STRIP.AUTO: NEGATIVE
LV EF 2D ECHO EST: 70 %
LYMPHOCYTES # BLD AUTO: 0.34 10*3/MM3 (ref 0.7–3.1)
LYMPHOCYTES # BLD AUTO: 0.36 10*3/MM3 (ref 0.7–3.1)
LYMPHOCYTES # BLD AUTO: 0.44 10*3/MM3 (ref 0.7–3.1)
LYMPHOCYTES # BLD AUTO: 0.59 10*3/MM3 (ref 0.7–3.1)
LYMPHOCYTES # BLD AUTO: 0.68 10*3/MM3 (ref 0.7–3.1)
LYMPHOCYTES # BLD AUTO: 0.96 10*3/MM3 (ref 0.7–3.1)
LYMPHOCYTES # BLD AUTO: 1.03 10*3/MM3 (ref 0.7–3.1)
LYMPHOCYTES # BLD AUTO: 1.09 10*3/MM3 (ref 0.7–3.1)
LYMPHOCYTES # BLD AUTO: 1.17 10*3/MM3 (ref 0.7–3.1)
LYMPHOCYTES # BLD AUTO: 1.22 10*3/MM3 (ref 0.7–3.1)
LYMPHOCYTES # BLD AUTO: 1.31 10*3/MM3 (ref 0.7–3.1)
LYMPHOCYTES # BLD AUTO: 1.54 10*3/MM3 (ref 0.7–3.1)
LYMPHOCYTES # BLD AUTO: 1.61 10*3/MM3 (ref 0.7–3.1)
LYMPHOCYTES # BLD AUTO: 1.65 10*3/MM3 (ref 0.7–3.1)
LYMPHOCYTES # BLD AUTO: 1.67 10*3/MM3 (ref 0.7–3.1)
LYMPHOCYTES # BLD AUTO: 1.73 10*3/MM3 (ref 0.7–3.1)
LYMPHOCYTES # BLD AUTO: 1.84 10*3/MM3 (ref 0.7–3.1)
LYMPHOCYTES # BLD AUTO: 2 10*3/MM3 (ref 0.7–3.1)
LYMPHOCYTES # BLD AUTO: 2.05 10*3/MM3 (ref 0.7–3.1)
LYMPHOCYTES # BLD AUTO: 2.09 10*3/MM3 (ref 0.7–3.1)
LYMPHOCYTES # BLD MANUAL: 0.95 10*3/MM3 (ref 0.7–3.1)
LYMPHOCYTES NFR BLD AUTO: 10.7 % (ref 19.6–45.3)
LYMPHOCYTES NFR BLD AUTO: 13.5 % (ref 19.6–45.3)
LYMPHOCYTES NFR BLD AUTO: 15.2 % (ref 19.6–45.3)
LYMPHOCYTES NFR BLD AUTO: 15.4 % (ref 19.6–45.3)
LYMPHOCYTES NFR BLD AUTO: 17.2 % (ref 19.6–45.3)
LYMPHOCYTES NFR BLD AUTO: 17.7 % (ref 19.6–45.3)
LYMPHOCYTES NFR BLD AUTO: 19.1 % (ref 19.6–45.3)
LYMPHOCYTES NFR BLD AUTO: 22.2 % (ref 19.6–45.3)
LYMPHOCYTES NFR BLD AUTO: 22.2 % (ref 19.6–45.3)
LYMPHOCYTES NFR BLD AUTO: 23.8 % (ref 19.6–45.3)
LYMPHOCYTES NFR BLD AUTO: 25 % (ref 19.6–45.3)
LYMPHOCYTES NFR BLD AUTO: 28.8 % (ref 19.6–45.3)
LYMPHOCYTES NFR BLD AUTO: 29 % (ref 19.6–45.3)
LYMPHOCYTES NFR BLD AUTO: 30.5 % (ref 19.6–45.3)
LYMPHOCYTES NFR BLD AUTO: 30.6 % (ref 19.6–45.3)
LYMPHOCYTES NFR BLD AUTO: 31.2 % (ref 19.6–45.3)
LYMPHOCYTES NFR BLD AUTO: 5 % (ref 19.6–45.3)
LYMPHOCYTES NFR BLD AUTO: 5.1 % (ref 19.6–45.3)
LYMPHOCYTES NFR BLD AUTO: 7.8 % (ref 19.6–45.3)
LYMPHOCYTES NFR BLD AUTO: 9.5 % (ref 19.6–45.3)
LYMPHOCYTES NFR BLD MANUAL: 24 % (ref 19.6–45.3)
LYMPHOCYTES NFR BLD MANUAL: 9 % (ref 5–12)
Lab: ABNORMAL
Lab: NORMAL
M PNEUMO IGG SER IA-ACNC: NOT DETECTED
MAGNESIUM SERPL-MCNC: 1.5 MG/DL (ref 1.6–2.4)
MAGNESIUM SERPL-MCNC: 1.6 MG/DL (ref 1.6–2.4)
MAGNESIUM SERPL-MCNC: 1.6 MG/DL (ref 1.6–2.4)
MAGNESIUM SERPL-MCNC: 1.8 MG/DL (ref 1.6–2.4)
MAGNESIUM SERPL-MCNC: 1.8 MG/DL (ref 1.6–2.4)
MAGNESIUM SERPL-MCNC: 1.9 MG/DL (ref 1.6–2.4)
MAGNESIUM SERPL-MCNC: 2.2 MG/DL (ref 1.6–2.4)
MAGNESIUM SERPL-MCNC: 2.3 MG/DL (ref 1.6–2.4)
MAGNESIUM SERPL-MCNC: 2.7 MG/DL (ref 1.6–2.4)
MAGNESIUM SERPL-MCNC: 3 MG/DL (ref 1.6–2.4)
MCH RBC QN AUTO: 27.6 PG (ref 26.6–33)
MCH RBC QN AUTO: 27.7 PG (ref 26.6–33)
MCH RBC QN AUTO: 27.7 PG (ref 26.6–33)
MCH RBC QN AUTO: 27.8 PG (ref 26.6–33)
MCH RBC QN AUTO: 27.9 PG (ref 26.6–33)
MCH RBC QN AUTO: 27.9 PG (ref 26.6–33)
MCH RBC QN AUTO: 28 PG (ref 26.6–33)
MCH RBC QN AUTO: 28.8 PG (ref 26.6–33)
MCH RBC QN AUTO: 29.1 PG (ref 26.6–33)
MCH RBC QN AUTO: 29.3 PG (ref 26.6–33)
MCH RBC QN AUTO: 29.4 PG (ref 26.6–33)
MCH RBC QN AUTO: 29.7 PG (ref 26.6–33)
MCH RBC QN AUTO: 30.1 PG (ref 26.6–33)
MCH RBC QN AUTO: 30.1 PG (ref 26.6–33)
MCH RBC QN AUTO: 30.2 PG (ref 26.6–33)
MCH RBC QN AUTO: 30.6 PG (ref 26.6–33)
MCH RBC QN AUTO: 30.7 PG (ref 26.6–33)
MCH RBC QN AUTO: 30.9 PG (ref 26.6–33)
MCH RBC QN AUTO: 31 PG (ref 26.6–33)
MCH RBC QN AUTO: 31.1 PG (ref 26.6–33)
MCH RBC QN AUTO: 31.2 PG (ref 26.6–33)
MCH RBC QN AUTO: 31.3 PG (ref 26.6–33)
MCH RBC QN AUTO: 31.3 PG (ref 26.6–33)
MCH RBC QN AUTO: 31.8 PG (ref 26.6–33)
MCH RBC QN AUTO: 32.2 PG (ref 26.6–33)
MCHC RBC AUTO-ENTMCNC: 28.8 G/DL (ref 31.5–35.7)
MCHC RBC AUTO-ENTMCNC: 28.9 G/DL (ref 31.5–35.7)
MCHC RBC AUTO-ENTMCNC: 28.9 G/DL (ref 31.5–35.7)
MCHC RBC AUTO-ENTMCNC: 29 G/DL (ref 31.5–35.7)
MCHC RBC AUTO-ENTMCNC: 29.3 G/DL (ref 31.5–35.7)
MCHC RBC AUTO-ENTMCNC: 29.4 G/DL (ref 31.5–35.7)
MCHC RBC AUTO-ENTMCNC: 29.5 G/DL (ref 31.5–35.7)
MCHC RBC AUTO-ENTMCNC: 29.6 G/DL (ref 31.5–35.7)
MCHC RBC AUTO-ENTMCNC: 29.7 G/DL (ref 31.5–35.7)
MCHC RBC AUTO-ENTMCNC: 30.2 G/DL (ref 31.5–35.7)
MCHC RBC AUTO-ENTMCNC: 30.4 G/DL (ref 31.5–35.7)
MCHC RBC AUTO-ENTMCNC: 30.6 G/DL (ref 31.5–35.7)
MCHC RBC AUTO-ENTMCNC: 30.8 G/DL (ref 31.5–35.7)
MCHC RBC AUTO-ENTMCNC: 31.3 G/DL (ref 31.5–35.7)
MCHC RBC AUTO-ENTMCNC: 31.3 G/DL (ref 31.5–35.7)
MCHC RBC AUTO-ENTMCNC: 31.5 G/DL (ref 31.5–35.7)
MCHC RBC AUTO-ENTMCNC: 31.5 G/DL (ref 31.5–35.7)
MCHC RBC AUTO-ENTMCNC: 31.8 G/DL (ref 31.5–35.7)
MCHC RBC AUTO-ENTMCNC: 32.1 G/DL (ref 31.5–35.7)
MCHC RBC AUTO-ENTMCNC: 32.6 G/DL (ref 31.5–35.7)
MCHC RBC AUTO-ENTMCNC: 33.3 G/DL (ref 31.5–35.7)
MCV RBC AUTO: 100.3 FL (ref 79–97)
MCV RBC AUTO: 100.9 FL (ref 79–97)
MCV RBC AUTO: 100.9 FL (ref 79–97)
MCV RBC AUTO: 101.6 FL (ref 79–97)
MCV RBC AUTO: 102.7 FL (ref 79–97)
MCV RBC AUTO: 104.5 FL (ref 79–97)
MCV RBC AUTO: 104.7 FL (ref 79–97)
MCV RBC AUTO: 105.5 FL (ref 79–97)
MCV RBC AUTO: 91.7 FL (ref 79–97)
MCV RBC AUTO: 93.9 FL (ref 79–97)
MCV RBC AUTO: 93.9 FL (ref 79–97)
MCV RBC AUTO: 94.2 FL (ref 79–97)
MCV RBC AUTO: 94.4 FL (ref 79–97)
MCV RBC AUTO: 94.7 FL (ref 79–97)
MCV RBC AUTO: 95 FL (ref 79–97)
MCV RBC AUTO: 95.6 FL (ref 79–97)
MCV RBC AUTO: 97.3 FL (ref 79–97)
MCV RBC AUTO: 97.6 FL (ref 79–97)
MCV RBC AUTO: 97.8 FL (ref 79–97)
MCV RBC AUTO: 98.1 FL (ref 79–97)
MCV RBC AUTO: 98.3 FL (ref 79–97)
MCV RBC AUTO: 98.5 FL (ref 79–97)
MCV RBC AUTO: 98.7 FL (ref 79–97)
METHADONE UR QL SCN: NEGATIVE
METHADONE UR QL SCN: NEGATIVE
METHGB BLD QL: 0.2 % (ref 0–1.5)
METHGB BLD QL: 0.3 % (ref 0–1.5)
METHGB BLD QL: 0.4 % (ref 0–1.5)
METHGB BLD QL: 0.5 % (ref 0–1.5)
METHGB BLD QL: 0.7 % (ref 0–1.5)
METHGB BLD QL: 0.7 % (ref 0–1.5)
METHGB BLD QL: 0.8 % (ref 0–1.5)
METHYLMALONATE SERPL-SCNC: 150 NMOL/L (ref 0–378)
MODALITY: ABNORMAL
MONOCYTES # BLD AUTO: 0.05 10*3/MM3 (ref 0.1–0.9)
MONOCYTES # BLD AUTO: 0.06 10*3/MM3 (ref 0.1–0.9)
MONOCYTES # BLD AUTO: 0.1 10*3/MM3 (ref 0.1–0.9)
MONOCYTES # BLD AUTO: 0.11 10*3/MM3 (ref 0.1–0.9)
MONOCYTES # BLD AUTO: 0.2 10*3/MM3 (ref 0.1–0.9)
MONOCYTES # BLD AUTO: 0.28 10*3/MM3 (ref 0.1–0.9)
MONOCYTES # BLD AUTO: 0.29 10*3/MM3 (ref 0.1–0.9)
MONOCYTES # BLD AUTO: 0.35 10*3/MM3 (ref 0.1–0.9)
MONOCYTES # BLD AUTO: 0.36 10*3/MM3 (ref 0.1–0.9)
MONOCYTES # BLD AUTO: 0.38 10*3/MM3 (ref 0.1–0.9)
MONOCYTES # BLD AUTO: 0.41 10*3/MM3 (ref 0.1–0.9)
MONOCYTES # BLD AUTO: 0.42 10*3/MM3 (ref 0.1–0.9)
MONOCYTES # BLD AUTO: 0.43 10*3/MM3 (ref 0.1–0.9)
MONOCYTES # BLD AUTO: 0.43 10*3/MM3 (ref 0.1–0.9)
MONOCYTES # BLD AUTO: 0.44 10*3/MM3 (ref 0.1–0.9)
MONOCYTES # BLD AUTO: 0.46 10*3/MM3 (ref 0.1–0.9)
MONOCYTES # BLD AUTO: 0.48 10*3/MM3 (ref 0.1–0.9)
MONOCYTES # BLD AUTO: 0.62 10*3/MM3 (ref 0.1–0.9)
MONOCYTES # BLD AUTO: 0.63 10*3/MM3 (ref 0.1–0.9)
MONOCYTES # BLD AUTO: 0.72 10*3/MM3 (ref 0.1–0.9)
MONOCYTES # BLD AUTO: 0.94 10*3/MM3 (ref 0.1–0.9)
MONOCYTES NFR BLD AUTO: 1.4 % (ref 5–12)
MONOCYTES NFR BLD AUTO: 1.5 % (ref 5–12)
MONOCYTES NFR BLD AUTO: 1.6 % (ref 5–12)
MONOCYTES NFR BLD AUTO: 1.8 % (ref 5–12)
MONOCYTES NFR BLD AUTO: 3.1 % (ref 5–12)
MONOCYTES NFR BLD AUTO: 4.6 % (ref 5–12)
MONOCYTES NFR BLD AUTO: 4.6 % (ref 5–12)
MONOCYTES NFR BLD AUTO: 4.7 % (ref 5–12)
MONOCYTES NFR BLD AUTO: 5.7 % (ref 5–12)
MONOCYTES NFR BLD AUTO: 5.8 % (ref 5–12)
MONOCYTES NFR BLD AUTO: 5.9 % (ref 5–12)
MONOCYTES NFR BLD AUTO: 6.1 % (ref 5–12)
MONOCYTES NFR BLD AUTO: 6.2 % (ref 5–12)
MONOCYTES NFR BLD AUTO: 6.7 % (ref 5–12)
MONOCYTES NFR BLD AUTO: 6.7 % (ref 5–12)
MONOCYTES NFR BLD AUTO: 7.2 % (ref 5–12)
MONOCYTES NFR BLD AUTO: 7.6 % (ref 5–12)
MONOCYTES NFR BLD AUTO: 8.1 % (ref 5–12)
MONOCYTES NFR BLD AUTO: 8.3 % (ref 5–12)
MONOCYTES NFR BLD AUTO: 8.6 % (ref 5–12)
MRSA DNA SPEC QL NAA+PROBE: NEGATIVE
NEUTROPHILS # BLD AUTO: 2.38 10*3/MM3 (ref 1.7–7)
NEUTROPHILS NFR BLD AUTO: 11.72 10*3/MM3 (ref 1.7–7)
NEUTROPHILS NFR BLD AUTO: 12.41 10*3/MM3 (ref 1.7–7)
NEUTROPHILS NFR BLD AUTO: 12.5 10*3/MM3 (ref 1.7–7)
NEUTROPHILS NFR BLD AUTO: 2.63 10*3/MM3 (ref 1.7–7)
NEUTROPHILS NFR BLD AUTO: 2.76 10*3/MM3 (ref 1.7–7)
NEUTROPHILS NFR BLD AUTO: 3.03 10*3/MM3 (ref 1.7–7)
NEUTROPHILS NFR BLD AUTO: 3.17 10*3/MM3 (ref 1.7–7)
NEUTROPHILS NFR BLD AUTO: 3.48 10*3/MM3 (ref 1.7–7)
NEUTROPHILS NFR BLD AUTO: 3.67 10*3/MM3 (ref 1.7–7)
NEUTROPHILS NFR BLD AUTO: 3.67 10*3/MM3 (ref 1.7–7)
NEUTROPHILS NFR BLD AUTO: 3.92 10*3/MM3 (ref 1.7–7)
NEUTROPHILS NFR BLD AUTO: 4.01 10*3/MM3 (ref 1.7–7)
NEUTROPHILS NFR BLD AUTO: 4.06 10*3/MM3 (ref 1.7–7)
NEUTROPHILS NFR BLD AUTO: 4.46 10*3/MM3 (ref 1.7–7)
NEUTROPHILS NFR BLD AUTO: 5.01 10*3/MM3 (ref 1.7–7)
NEUTROPHILS NFR BLD AUTO: 5.01 10*3/MM3 (ref 1.7–7)
NEUTROPHILS NFR BLD AUTO: 5.4 10*3/MM3 (ref 1.7–7)
NEUTROPHILS NFR BLD AUTO: 5.6 10*3/MM3 (ref 1.7–7)
NEUTROPHILS NFR BLD AUTO: 57.3 % (ref 42.7–76)
NEUTROPHILS NFR BLD AUTO: 59.4 % (ref 42.7–76)
NEUTROPHILS NFR BLD AUTO: 6.36 10*3/MM3 (ref 1.7–7)
NEUTROPHILS NFR BLD AUTO: 6.55 10*3/MM3 (ref 1.7–7)
NEUTROPHILS NFR BLD AUTO: 63 % (ref 42.7–76)
NEUTROPHILS NFR BLD AUTO: 63.7 % (ref 42.7–76)
NEUTROPHILS NFR BLD AUTO: 64.6 % (ref 42.7–76)
NEUTROPHILS NFR BLD AUTO: 64.6 % (ref 42.7–76)
NEUTROPHILS NFR BLD AUTO: 65.8 % (ref 42.7–76)
NEUTROPHILS NFR BLD AUTO: 65.9 % (ref 42.7–76)
NEUTROPHILS NFR BLD AUTO: 66.6 % (ref 42.7–76)
NEUTROPHILS NFR BLD AUTO: 69.6 % (ref 42.7–76)
NEUTROPHILS NFR BLD AUTO: 74.9 % (ref 42.7–76)
NEUTROPHILS NFR BLD AUTO: 75.5 % (ref 42.7–76)
NEUTROPHILS NFR BLD AUTO: 76.7 % (ref 42.7–76)
NEUTROPHILS NFR BLD AUTO: 79.2 % (ref 42.7–76)
NEUTROPHILS NFR BLD AUTO: 82.5 % (ref 42.7–76)
NEUTROPHILS NFR BLD AUTO: 84.4 % (ref 42.7–76)
NEUTROPHILS NFR BLD AUTO: 85.4 % (ref 42.7–76)
NEUTROPHILS NFR BLD AUTO: 89.7 % (ref 42.7–76)
NEUTROPHILS NFR BLD AUTO: 90.5 % (ref 42.7–76)
NEUTROPHILS NFR BLD AUTO: 91.2 % (ref 42.7–76)
NEUTROPHILS NFR BLD MANUAL: 60 % (ref 42.7–76)
NITRITE UR QL STRIP: NEGATIVE
NITRITE UR QL STRIP: POSITIVE
NOROVIRUS GI+II RNA STL QL NAA+NON-PROBE: NOT DETECTED
NOTE: ABNORMAL
NOTIFIED BY: ABNORMAL
NOTIFIED WHO: ABNORMAL
NRBC BLD AUTO-RTO: 0 /100 WBC (ref 0–0.2)
NT-PROBNP SERPL-MCNC: 2774 PG/ML (ref 0–900)
NT-PROBNP SERPL-MCNC: 3221 PG/ML (ref 0–900)
NT-PROBNP SERPL-MCNC: 390.4 PG/ML (ref 0–900)
NT-PROBNP SERPL-MCNC: ABNORMAL PG/ML (ref 0–900)
OPIATES UR QL: NEGATIVE
OPIATES UR QL: POSITIVE
OXYCODONE UR QL SCN: NEGATIVE
OXYCODONE UR QL SCN: NEGATIVE
OXYHGB MFR BLDV: 87.1 % (ref 94–99)
OXYHGB MFR BLDV: 90.8 % (ref 94–99)
OXYHGB MFR BLDV: 91 % (ref 94–99)
OXYHGB MFR BLDV: 91.9 % (ref 94–99)
OXYHGB MFR BLDV: 93.4 % (ref 94–99)
OXYHGB MFR BLDV: 95.2 % (ref 94–99)
OXYHGB MFR BLDV: 96.3 % (ref 94–99)
OXYHGB MFR BLDV: 96.4 % (ref 94–99)
OXYHGB MFR BLDV: 97.5 % (ref 94–99)
OXYHGB MFR BLDV: 97.5 % (ref 94–99)
OXYHGB MFR BLDV: 97.8 % (ref 94–99)
P SHIGELLOIDES DNA STL QL NAA+NON-PROBE: NOT DETECTED
PATH REPORT.FINAL DX SPEC: NORMAL
PATH REPORT.GROSS SPEC: NORMAL
PAW @ PEAK INSP FLOW SETTING VENT: 0 CMH2O
PCO2 BLDA: 46.3 MM HG (ref 35–45)
PCO2 BLDA: 46.9 MM HG (ref 35–45)
PCO2 BLDA: 50.6 MM HG (ref 35–45)
PCO2 BLDA: 51.6 MM HG (ref 35–45)
PCO2 BLDA: 53.2 MM HG (ref 35–45)
PCO2 BLDA: 63.8 MM HG (ref 35–45)
PCO2 BLDA: 65.7 MM HG (ref 35–45)
PCO2 BLDA: 66.8 MM HG (ref 35–45)
PCO2 BLDA: 69.3 MM HG (ref 35–45)
PCO2 BLDA: 69.6 MM HG (ref 35–45)
PCO2 BLDA: 90.4 MM HG (ref 35–45)
PCO2 TEMP ADJ BLD: 46.3 MM HG (ref 35–45)
PCO2 TEMP ADJ BLD: 46.9 MM HG (ref 35–45)
PCO2 TEMP ADJ BLD: 50.6 MM HG (ref 35–45)
PCO2 TEMP ADJ BLD: 51.6 MM HG (ref 35–45)
PCO2 TEMP ADJ BLD: 53.2 MM HG (ref 35–45)
PCO2 TEMP ADJ BLD: 63.8 MM HG (ref 35–45)
PCO2 TEMP ADJ BLD: 65.7 MM HG (ref 35–45)
PCO2 TEMP ADJ BLD: 66.8 MM HG (ref 35–45)
PCO2 TEMP ADJ BLD: 69.3 MM HG (ref 35–45)
PCO2 TEMP ADJ BLD: 69.6 MM HG (ref 35–45)
PCO2 TEMP ADJ BLD: 90.4 MM HG (ref 35–45)
PCP UR QL SCN: NEGATIVE
PCP UR QL SCN: NEGATIVE
PEEP RESPIRATORY: 5 CM[H2O]
PH BLDA: 7.27 PH UNITS (ref 7.35–7.45)
PH BLDA: 7.29 PH UNITS (ref 7.35–7.45)
PH BLDA: 7.32 PH UNITS (ref 7.35–7.45)
PH BLDA: 7.33 PH UNITS (ref 7.35–7.45)
PH BLDA: 7.35 PH UNITS (ref 7.35–7.45)
PH BLDA: 7.36 PH UNITS (ref 7.35–7.45)
PH BLDA: 7.38 PH UNITS (ref 7.35–7.45)
PH BLDA: 7.4 PH UNITS (ref 7.35–7.45)
PH BLDA: 7.48 PH UNITS (ref 7.35–7.45)
PH BLDA: 7.49 PH UNITS (ref 7.35–7.45)
PH BLDA: 7.5 PH UNITS (ref 7.35–7.45)
PH UR STRIP.AUTO: 5.5 [PH] (ref 5–8)
PH UR STRIP.AUTO: 6 [PH] (ref 5–8)
PH UR STRIP.AUTO: 6.5 [PH] (ref 5–8)
PH UR STRIP.AUTO: 7 [PH] (ref 5–8)
PH UR STRIP.AUTO: 7 [PH] (ref 5–8)
PH UR STRIP.AUTO: 8.5 [PH] (ref 5–8)
PH UR STRIP.AUTO: 8.5 [PH] (ref 5–8)
PH, TEMP CORRECTED: 7.27 PH UNITS
PH, TEMP CORRECTED: 7.29 PH UNITS
PH, TEMP CORRECTED: 7.32 PH UNITS
PH, TEMP CORRECTED: 7.33 PH UNITS
PH, TEMP CORRECTED: 7.35 PH UNITS
PH, TEMP CORRECTED: 7.36 PH UNITS
PH, TEMP CORRECTED: 7.38 PH UNITS
PH, TEMP CORRECTED: 7.4 PH UNITS
PH, TEMP CORRECTED: 7.48 PH UNITS
PH, TEMP CORRECTED: 7.49 PH UNITS
PH, TEMP CORRECTED: 7.5 PH UNITS
PHENOBARB SERPL-MCNC: 11.1 MCG/ML (ref 10–30)
PHENOBARB SERPL-MCNC: 25.7 MCG/ML (ref 10–30)
PHENOBARB SERPL-MCNC: 34.1 MCG/ML (ref 10–30)
PHENOBARB SERPL-MCNC: 36.9 MCG/ML (ref 10–30)
PHENOBARB SERPL-MCNC: 7.9 MCG/ML (ref 10–30)
PHOSPHATE SERPL-MCNC: 2.6 MG/DL (ref 2.5–4.5)
PHOSPHATE SERPL-MCNC: 2.9 MG/DL (ref 2.5–4.5)
PHOSPHATE SERPL-MCNC: 2.9 MG/DL (ref 2.5–4.5)
PLAT MORPH BLD: NORMAL
PLATELET # BLD AUTO: 173 10*3/MM3 (ref 140–450)
PLATELET # BLD AUTO: 180 10*3/MM3 (ref 140–450)
PLATELET # BLD AUTO: 196 10*3/MM3 (ref 140–450)
PLATELET # BLD AUTO: 201 10*3/MM3 (ref 140–450)
PLATELET # BLD AUTO: 202 10*3/MM3 (ref 140–450)
PLATELET # BLD AUTO: 208 10*3/MM3 (ref 140–450)
PLATELET # BLD AUTO: 208 10*3/MM3 (ref 140–450)
PLATELET # BLD AUTO: 221 10*3/MM3 (ref 140–450)
PLATELET # BLD AUTO: 225 10*3/MM3 (ref 140–450)
PLATELET # BLD AUTO: 233 10*3/MM3 (ref 140–450)
PLATELET # BLD AUTO: 234 10*3/MM3 (ref 140–450)
PLATELET # BLD AUTO: 235 10*3/MM3 (ref 140–450)
PLATELET # BLD AUTO: 242 10*3/MM3 (ref 140–450)
PLATELET # BLD AUTO: 245 10*3/MM3 (ref 140–450)
PLATELET # BLD AUTO: 246 10*3/MM3 (ref 140–450)
PLATELET # BLD AUTO: 255 10*3/MM3 (ref 140–450)
PLATELET # BLD AUTO: 281 10*3/MM3 (ref 140–450)
PLATELET # BLD AUTO: 291 10*3/MM3 (ref 140–450)
PLATELET # BLD AUTO: 292 10*3/MM3 (ref 140–450)
PLATELET # BLD AUTO: 295 10*3/MM3 (ref 140–450)
PLATELET # BLD AUTO: 303 10*3/MM3 (ref 140–450)
PLATELET # BLD AUTO: 315 10*3/MM3 (ref 140–450)
PLATELET # BLD AUTO: 320 10*3/MM3 (ref 140–450)
PLATELET # BLD AUTO: 334 10*3/MM3 (ref 140–450)
PLATELET # BLD AUTO: 349 10*3/MM3 (ref 140–450)
PMV BLD AUTO: 10 FL (ref 6–12)
PMV BLD AUTO: 10.1 FL (ref 6–12)
PMV BLD AUTO: 10.3 FL (ref 6–12)
PMV BLD AUTO: 10.4 FL (ref 6–12)
PMV BLD AUTO: 10.4 FL (ref 6–12)
PMV BLD AUTO: 10.8 FL (ref 6–12)
PMV BLD AUTO: 10.9 FL (ref 6–12)
PMV BLD AUTO: 9.3 FL (ref 6–12)
PMV BLD AUTO: 9.4 FL (ref 6–12)
PMV BLD AUTO: 9.7 FL (ref 6–12)
PMV BLD AUTO: 9.8 FL (ref 6–12)
PMV BLD AUTO: 9.9 FL (ref 6–12)
PO2 BLDA: 102 MM HG (ref 83–108)
PO2 BLDA: 118 MM HG (ref 83–108)
PO2 BLDA: 122 MM HG (ref 83–108)
PO2 BLDA: 127 MM HG (ref 83–108)
PO2 BLDA: 141 MM HG (ref 83–108)
PO2 BLDA: 273 MM HG (ref 83–108)
PO2 BLDA: 55.9 MM HG (ref 83–108)
PO2 BLDA: 70.9 MM HG (ref 83–108)
PO2 BLDA: 72.3 MM HG (ref 83–108)
PO2 BLDA: 73.7 MM HG (ref 83–108)
PO2 BLDA: 93.9 MM HG (ref 83–108)
PO2 TEMP ADJ BLD: 102 MM HG (ref 83–108)
PO2 TEMP ADJ BLD: 118 MM HG (ref 83–108)
PO2 TEMP ADJ BLD: 122 MM HG (ref 83–108)
PO2 TEMP ADJ BLD: 127 MM HG (ref 83–108)
PO2 TEMP ADJ BLD: 141 MM HG (ref 83–108)
PO2 TEMP ADJ BLD: 273 MM HG (ref 83–108)
PO2 TEMP ADJ BLD: 55.9 MM HG (ref 83–108)
PO2 TEMP ADJ BLD: 70.9 MM HG (ref 83–108)
PO2 TEMP ADJ BLD: 72.3 MM HG (ref 83–108)
PO2 TEMP ADJ BLD: 73.7 MM HG (ref 83–108)
PO2 TEMP ADJ BLD: 93.9 MM HG (ref 83–108)
POTASSIUM SERPL-SCNC: 3.1 MMOL/L (ref 3.5–5.2)
POTASSIUM SERPL-SCNC: 3.3 MMOL/L (ref 3.5–5.2)
POTASSIUM SERPL-SCNC: 3.4 MMOL/L (ref 3.5–5.2)
POTASSIUM SERPL-SCNC: 3.5 MMOL/L (ref 3.5–5.2)
POTASSIUM SERPL-SCNC: 3.6 MMOL/L (ref 3.5–5.2)
POTASSIUM SERPL-SCNC: 3.6 MMOL/L (ref 3.5–5.2)
POTASSIUM SERPL-SCNC: 3.7 MMOL/L (ref 3.5–5.2)
POTASSIUM SERPL-SCNC: 4 MMOL/L (ref 3.5–5.2)
POTASSIUM SERPL-SCNC: 4 MMOL/L (ref 3.5–5.2)
POTASSIUM SERPL-SCNC: 4.1 MMOL/L (ref 3.5–5.2)
POTASSIUM SERPL-SCNC: 4.2 MMOL/L (ref 3.5–5.2)
POTASSIUM SERPL-SCNC: 4.2 MMOL/L (ref 3.5–5.2)
POTASSIUM SERPL-SCNC: 4.3 MMOL/L (ref 3.5–5.2)
POTASSIUM SERPL-SCNC: 4.4 MMOL/L (ref 3.5–5.2)
POTASSIUM SERPL-SCNC: 4.5 MMOL/L (ref 3.5–5.2)
POTASSIUM SERPL-SCNC: 4.6 MMOL/L (ref 3.5–5.2)
POTASSIUM SERPL-SCNC: 4.7 MMOL/L (ref 3.5–5.2)
POTASSIUM SERPL-SCNC: 4.8 MMOL/L (ref 3.5–5.2)
POTASSIUM SERPL-SCNC: 5 MMOL/L (ref 3.5–5.2)
POTASSIUM SERPL-SCNC: 5 MMOL/L (ref 3.5–5.2)
POTASSIUM SERPL-SCNC: 5.1 MMOL/L (ref 3.5–5.2)
POTASSIUM SERPL-SCNC: 5.2 MMOL/L (ref 3.5–5.2)
POTASSIUM SERPL-SCNC: 5.2 MMOL/L (ref 3.5–5.2)
POTASSIUM SERPL-SCNC: 5.4 MMOL/L (ref 3.5–5.2)
PROCALCITONIN SERPL-MCNC: 0.05 NG/ML (ref 0–0.25)
PROCALCITONIN SERPL-MCNC: 0.08 NG/ML (ref 0–0.25)
PROCALCITONIN SERPL-MCNC: 0.1 NG/ML (ref 0–0.25)
PROCALCITONIN SERPL-MCNC: 0.11 NG/ML (ref 0–0.25)
PROPOXYPH UR QL: NEGATIVE
PROPOXYPH UR QL: NEGATIVE
PROT SERPL-MCNC: 6 G/DL (ref 6–8.5)
PROT SERPL-MCNC: 6.1 G/DL (ref 6–8.5)
PROT SERPL-MCNC: 6.3 G/DL (ref 6–8.5)
PROT SERPL-MCNC: 6.4 G/DL (ref 6–8.5)
PROT SERPL-MCNC: 6.6 G/DL (ref 6–8.5)
PROT SERPL-MCNC: 6.8 G/DL (ref 6–8.5)
PROT SERPL-MCNC: 7.2 G/DL (ref 6–8.5)
PROT SERPL-MCNC: 7.3 G/DL (ref 6–8.5)
PROT SERPL-MCNC: 7.4 G/DL (ref 6–8.5)
PROT SERPL-MCNC: 7.8 G/DL (ref 6–8.5)
PROT SERPL-MCNC: 7.9 G/DL (ref 6–8.5)
PROT UR QL STRIP: ABNORMAL
PROT UR QL STRIP: NEGATIVE
PROTHROMBIN TIME: 12.9 SECONDS (ref 11.5–14)
PROTHROMBIN TIME: 13.1 SECONDS (ref 11.4–14.4)
PROTHROMBIN TIME: 13.5 SECONDS (ref 11.4–14.4)
QT INTERVAL: 342 MS
QT INTERVAL: 344 MS
QT INTERVAL: 354 MS
QT INTERVAL: 378 MS
QT INTERVAL: 386 MS
QT INTERVAL: 408 MS
QT INTERVAL: 416 MS
QT INTERVAL: 436 MS
QTC INTERVAL: 420 MS
QTC INTERVAL: 432 MS
QTC INTERVAL: 435 MS
QTC INTERVAL: 459 MS
QTC INTERVAL: 461 MS
QTC INTERVAL: 461 MS
QTC INTERVAL: 467 MS
QTC INTERVAL: 468 MS
RBC # BLD AUTO: 2.64 10*6/MM3 (ref 3.77–5.28)
RBC # BLD AUTO: 2.87 10*6/MM3 (ref 3.77–5.28)
RBC # BLD AUTO: 2.93 10*6/MM3 (ref 3.77–5.28)
RBC # BLD AUTO: 3.03 10*6/MM3 (ref 3.77–5.28)
RBC # BLD AUTO: 3.11 10*6/MM3 (ref 3.77–5.28)
RBC # BLD AUTO: 3.19 10*6/MM3 (ref 3.77–5.28)
RBC # BLD AUTO: 3.27 10*6/MM3 (ref 3.77–5.28)
RBC # BLD AUTO: 3.3 10*6/MM3 (ref 3.77–5.28)
RBC # BLD AUTO: 3.33 10*6/MM3 (ref 3.77–5.28)
RBC # BLD AUTO: 3.37 10*6/MM3 (ref 3.77–5.28)
RBC # BLD AUTO: 3.42 10*6/MM3 (ref 3.77–5.28)
RBC # BLD AUTO: 3.43 10*6/MM3 (ref 3.77–5.28)
RBC # BLD AUTO: 3.5 10*6/MM3 (ref 3.77–5.28)
RBC # BLD AUTO: 3.56 10*6/MM3 (ref 3.77–5.28)
RBC # BLD AUTO: 3.67 10*6/MM3 (ref 3.77–5.28)
RBC # BLD AUTO: 3.71 10*6/MM3 (ref 3.77–5.28)
RBC # BLD AUTO: 3.96 10*6/MM3 (ref 3.77–5.28)
RBC # BLD AUTO: 3.99 10*6/MM3 (ref 3.77–5.28)
RBC # BLD AUTO: 4.04 10*6/MM3 (ref 3.77–5.28)
RBC # BLD AUTO: 4.05 10*6/MM3 (ref 3.77–5.28)
RBC # BLD AUTO: 4.14 10*6/MM3 (ref 3.77–5.28)
RBC # BLD AUTO: 4.16 10*6/MM3 (ref 3.77–5.28)
RBC # BLD AUTO: 4.22 10*6/MM3 (ref 3.77–5.28)
RBC # BLD AUTO: 4.29 10*6/MM3 (ref 3.77–5.28)
RBC # BLD AUTO: 4.5 10*6/MM3 (ref 3.77–5.28)
RBC # UR: ABNORMAL /HPF
RBC # UR: NORMAL /HPF
RBC # UR: NORMAL /HPF
RBC MORPH BLD: NORMAL
REF LAB TEST METHOD: ABNORMAL
REF LAB TEST METHOD: NORMAL
REF LAB TEST METHOD: NORMAL
RH BLD: POSITIVE
RH BLD: POSITIVE
RHINOVIRUS RNA SPEC NAA+PROBE: NOT DETECTED
RSV RNA NPH QL NAA+NON-PROBE: NOT DETECTED
RVA RNA STL QL NAA+NON-PROBE: NOT DETECTED
S ENT+BONG DNA STL QL NAA+NON-PROBE: NOT DETECTED
SAPO I+II+IV+V RNA STL QL NAA+NON-PROBE: NOT DETECTED
SARS-COV-2 RDRP RESP QL NAA+PROBE: NORMAL
SARS-COV-2 RDRP RESP QL NAA+PROBE: NORMAL
SARS-COV-2 RNA NPH QL NAA+NON-PROBE: NOT DETECTED
SARS-COV-2 RNA PNL SPEC NAA+PROBE: NOT DETECTED
SARS-COV-2 RNA RESP QL NAA+PROBE: NOT DETECTED
SET MECH RESP RATE: 16
SHIGELLA SP+EIEC IPAH ST NAA+NON-PROBE: NOT DETECTED
SODIUM SERPL-SCNC: 131 MMOL/L (ref 136–145)
SODIUM SERPL-SCNC: 132 MMOL/L (ref 136–145)
SODIUM SERPL-SCNC: 132 MMOL/L (ref 136–145)
SODIUM SERPL-SCNC: 133 MMOL/L (ref 136–145)
SODIUM SERPL-SCNC: 134 MMOL/L (ref 136–145)
SODIUM SERPL-SCNC: 135 MMOL/L (ref 136–145)
SODIUM SERPL-SCNC: 136 MMOL/L (ref 136–145)
SODIUM SERPL-SCNC: 137 MMOL/L (ref 136–145)
SODIUM SERPL-SCNC: 137 MMOL/L (ref 136–145)
SODIUM SERPL-SCNC: 138 MMOL/L (ref 136–145)
SODIUM SERPL-SCNC: 139 MMOL/L (ref 136–145)
SODIUM SERPL-SCNC: 140 MMOL/L (ref 136–145)
SODIUM SERPL-SCNC: 140 MMOL/L (ref 136–145)
SP GR UR STRIP: 1.01 (ref 1–1.03)
SP GR UR STRIP: <=1.005 (ref 1–1.03)
SP GR UR STRIP: >=1.03 (ref 1–1.03)
SQUAMOUS #/AREA URNS HPF: ABNORMAL /HPF
SQUAMOUS #/AREA URNS HPF: NORMAL /HPF
SQUAMOUS #/AREA URNS HPF: NORMAL /HPF
T&S EXPIRATION DATE: NORMAL
T4 FREE SERPL-MCNC: 0.93 NG/DL (ref 0.93–1.7)
T4 FREE SERPL-MCNC: 1.16 NG/DL (ref 0.93–1.7)
TIBC SERPL-MCNC: 353 MCG/DL (ref 298–536)
TOTAL RATE: 0 BREATHS/MINUTE
TOTAL RATE: 18 BREATHS/MINUTE
TRANSFERRIN SERPL-MCNC: 237 MG/DL (ref 200–360)
TRICYCLICS UR QL SCN: NEGATIVE
TRICYCLICS UR QL SCN: NEGATIVE
TRIGL SERPL-MCNC: 117 MG/DL (ref 0–150)
TRIGL SERPL-MCNC: 64 MG/DL (ref 0–150)
TROPONIN T SERPL-MCNC: 0.02 NG/ML (ref 0–0.03)
TROPONIN T SERPL-MCNC: 0.03 NG/ML (ref 0–0.03)
TROPONIN T SERPL-MCNC: 0.04 NG/ML (ref 0–0.03)
TROPONIN T SERPL-MCNC: 0.04 NG/ML (ref 0–0.03)
TROPONIN T SERPL-MCNC: 0.05 NG/ML (ref 0–0.03)
TROPONIN T SERPL-MCNC: 0.06 NG/ML (ref 0–0.03)
TROPONIN T SERPL-MCNC: 0.09 NG/ML (ref 0–0.03)
TROPONIN T SERPL-MCNC: 0.15 NG/ML (ref 0–0.03)
TROPONIN T SERPL-MCNC: 0.38 NG/ML (ref 0–0.03)
TROPONIN T SERPL-MCNC: 0.39 NG/ML (ref 0–0.03)
TROPONIN T SERPL-MCNC: 0.43 NG/ML (ref 0–0.03)
TROPONIN T SERPL-MCNC: <0.01 NG/ML (ref 0–0.03)
TROPONIN T SERPL-MCNC: <0.01 NG/ML (ref 0–0.03)
TSH SERPL DL<=0.05 MIU/L-ACNC: 0.38 UIU/ML (ref 0.27–4.2)
TSH SERPL DL<=0.05 MIU/L-ACNC: 0.76 UIU/ML (ref 0.27–4.2)
TSH SERPL DL<=0.05 MIU/L-ACNC: 3.22 UIU/ML (ref 0.27–4.2)
UNIT  ABO: NORMAL
UNIT  ABO: NORMAL
UNIT  RH: NORMAL
UNIT  RH: NORMAL
UROBILINOGEN UR QL STRIP: ABNORMAL
UROBILINOGEN UR QL STRIP: NORMAL
V CHOL+PARA+VUL DNA STL QL NAA+NON-PROBE: NOT DETECTED
V CHOLERAE DNA STL QL NAA+NON-PROBE: NOT DETECTED
VENTILATOR MODE: ABNORMAL
VIT B12 BLD-MCNC: 259 PG/ML (ref 211–946)
VLDLC SERPL-MCNC: 12 MG/DL (ref 5–40)
VLDLC SERPL-MCNC: 21 MG/DL (ref 5–40)
VT ON VENT VENT: 0.35 ML
WBC # BLD AUTO: 13.7 10*3/MM3 (ref 3.4–10.8)
WBC # BLD AUTO: 13.74 10*3/MM3 (ref 3.4–10.8)
WBC # BLD AUTO: 15.05 10*3/MM3 (ref 3.4–10.8)
WBC # BLD AUTO: 3.27 10*3/MM3 (ref 3.4–10.8)
WBC # BLD AUTO: 3.43 10*3/MM3 (ref 3.4–10.8)
WBC # BLD AUTO: 3.97 10*3/MM3 (ref 3.4–10.8)
WBC # BLD AUTO: 4.37 10*3/MM3 (ref 3.4–10.8)
WBC # BLD AUTO: 4.91 10*3/MM3 (ref 3.4–10.8)
WBC # BLD AUTO: 5.28 10*3/MM3 (ref 3.4–10.8)
WBC # BLD AUTO: 5.29 10*3/MM3 (ref 3.4–10.8)
WBC # BLD AUTO: 5.67 10*3/MM3 (ref 3.4–10.8)
WBC # BLD AUTO: 5.76 10*3/MM3 (ref 3.4–10.8)
WBC # BLD AUTO: 5.81 10*3/MM3 (ref 3.4–10.8)
WBC # BLD AUTO: 6.32 10*3/MM3 (ref 3.4–10.8)
WBC # BLD AUTO: 6.38 10*3/MM3 (ref 3.4–10.8)
WBC # BLD AUTO: 6.4 10*3/MM3 (ref 3.4–10.8)
WBC # BLD AUTO: 6.69 10*3/MM3 (ref 3.4–10.8)
WBC # BLD AUTO: 6.84 10*3/MM3 (ref 3.4–10.8)
WBC # BLD AUTO: 7.03 10*3/MM3 (ref 3.4–10.8)
WBC # BLD AUTO: 7.04 10*3/MM3 (ref 3.4–10.8)
WBC # BLD AUTO: 7.98 10*3/MM3 (ref 3.4–10.8)
WBC # BLD AUTO: 8.2 10*3/MM3 (ref 3.4–10.8)
WBC # BLD AUTO: 8.41 10*3/MM3 (ref 3.4–10.8)
WBC # BLD AUTO: 8.68 10*3/MM3 (ref 3.4–10.8)
WBC # BLD AUTO: 9.1 10*3/MM3 (ref 3.4–10.8)
WBC MORPH BLD: NORMAL
WBC UR QL AUTO: ABNORMAL /HPF
WBC UR QL AUTO: NORMAL /HPF
WBC UR QL AUTO: NORMAL /HPF
WHOLE BLOOD HOLD SPECIMEN: NORMAL
Y ENTEROCOL DNA STL QL NAA+NON-PROBE: NOT DETECTED
YEAST URNS QL MICRO: ABNORMAL /HPF

## 2021-01-01 PROCEDURE — 94799 UNLISTED PULMONARY SVC/PX: CPT

## 2021-01-01 PROCEDURE — 25010000003 POTASSIUM CHLORIDE PER 2 MEQ: Performed by: NURSE PRACTITIONER

## 2021-01-01 PROCEDURE — 85027 COMPLETE CBC AUTOMATED: CPT | Performed by: PHYSICIAN ASSISTANT

## 2021-01-01 PROCEDURE — 99239 HOSP IP/OBS DSCHRG MGMT >30: CPT | Performed by: FAMILY MEDICINE

## 2021-01-01 PROCEDURE — 83735 ASSAY OF MAGNESIUM: CPT | Performed by: INTERNAL MEDICINE

## 2021-01-01 PROCEDURE — P9612 CATHETERIZE FOR URINE SPEC: HCPCS

## 2021-01-01 PROCEDURE — 1170F FXNL STATUS ASSESSED: CPT | Performed by: FAMILY MEDICINE

## 2021-01-01 PROCEDURE — 25010000002 HYDROCORTISONE SODIUM SUCCINATE 100 MG RECONSTITUTED SOLUTION: Performed by: FAMILY MEDICINE

## 2021-01-01 PROCEDURE — 99226 PR SBSQ OBSERVATION CARE/DAY 35 MINUTES: CPT | Performed by: FAMILY MEDICINE

## 2021-01-01 PROCEDURE — 5A1935Z RESPIRATORY VENTILATION, LESS THAN 24 CONSECUTIVE HOURS: ICD-10-PCS | Performed by: FAMILY MEDICINE

## 2021-01-01 PROCEDURE — 97530 THERAPEUTIC ACTIVITIES: CPT

## 2021-01-01 PROCEDURE — 25010000002 NA FERRIC GLUC CPLX PER 12.5 MG: Performed by: INTERNAL MEDICINE

## 2021-01-01 PROCEDURE — A9270 NON-COVERED ITEM OR SERVICE: HCPCS | Performed by: NURSE PRACTITIONER

## 2021-01-01 PROCEDURE — 97535 SELF CARE MNGMENT TRAINING: CPT | Performed by: OCCUPATIONAL THERAPIST

## 2021-01-01 PROCEDURE — 94660 CPAP INITIATION&MGMT: CPT

## 2021-01-01 PROCEDURE — 96372 THER/PROPH/DIAG INJ SC/IM: CPT

## 2021-01-01 PROCEDURE — 25010000002 ENOXAPARIN PER 10 MG: Performed by: NURSE PRACTITIONER

## 2021-01-01 PROCEDURE — 99214 OFFICE O/P EST MOD 30 MIN: CPT | Performed by: FAMILY MEDICINE

## 2021-01-01 PROCEDURE — 25010000002 ONDANSETRON PER 1 MG: Performed by: EMERGENCY MEDICINE

## 2021-01-01 PROCEDURE — 25010000003 MAGNESIUM SULFATE 4 GM/100ML SOLUTION: Performed by: NURSE PRACTITIONER

## 2021-01-01 PROCEDURE — 94640 AIRWAY INHALATION TREATMENT: CPT

## 2021-01-01 PROCEDURE — 85025 COMPLETE CBC W/AUTO DIFF WBC: CPT | Performed by: EMERGENCY MEDICINE

## 2021-01-01 PROCEDURE — 97116 GAIT TRAINING THERAPY: CPT

## 2021-01-01 PROCEDURE — 85610 PROTHROMBIN TIME: CPT | Performed by: PHYSICIAN ASSISTANT

## 2021-01-01 PROCEDURE — G0378 HOSPITAL OBSERVATION PER HR: HCPCS

## 2021-01-01 PROCEDURE — 87040 BLOOD CULTURE FOR BACTERIA: CPT | Performed by: EMERGENCY MEDICINE

## 2021-01-01 PROCEDURE — 63710000001 PROMETHAZINE PER 25 MG: Performed by: FAMILY MEDICINE

## 2021-01-01 PROCEDURE — 25010000002 HEPARIN (PORCINE) PER 1000 UNITS: Performed by: INTERNAL MEDICINE

## 2021-01-01 PROCEDURE — 71101 X-RAY EXAM UNILAT RIBS/CHEST: CPT

## 2021-01-01 PROCEDURE — 25010000002 PIPERACILLIN SOD-TAZOBACTAM PER 1 G: Performed by: INTERNAL MEDICINE

## 2021-01-01 PROCEDURE — 84443 ASSAY THYROID STIM HORMONE: CPT | Performed by: NURSE PRACTITIONER

## 2021-01-01 PROCEDURE — 85610 PROTHROMBIN TIME: CPT | Performed by: EMERGENCY MEDICINE

## 2021-01-01 PROCEDURE — 63710000001 DIPHENHYDRAMINE PER 50 MG: Performed by: INTERNAL MEDICINE

## 2021-01-01 PROCEDURE — 25010000002 FENTANYL CITRATE (PF) 50 MCG/ML SOLUTION: Performed by: NURSE PRACTITIONER

## 2021-01-01 PROCEDURE — 99217 PR OBSERVATION CARE DISCHARGE MANAGEMENT: CPT | Performed by: INTERNAL MEDICINE

## 2021-01-01 PROCEDURE — 85025 COMPLETE CBC W/AUTO DIFF WBC: CPT | Performed by: INTERNAL MEDICINE

## 2021-01-01 PROCEDURE — 97166 OT EVAL MOD COMPLEX 45 MIN: CPT

## 2021-01-01 PROCEDURE — 83050 HGB METHEMOGLOBIN QUAN: CPT

## 2021-01-01 PROCEDURE — 87186 SC STD MICRODIL/AGAR DIL: CPT | Performed by: EMERGENCY MEDICINE

## 2021-01-01 PROCEDURE — 87635 SARS-COV-2 COVID-19 AMP PRB: CPT | Performed by: NURSE PRACTITIONER

## 2021-01-01 PROCEDURE — 63710000001 FERROUS SULFATE 325 (65 FE) MG TABLET: Performed by: NURSE PRACTITIONER

## 2021-01-01 PROCEDURE — 97162 PT EVAL MOD COMPLEX 30 MIN: CPT

## 2021-01-01 PROCEDURE — 25010000002 PROPOFOL 10 MG/ML EMULSION: Performed by: NURSE PRACTITIONER

## 2021-01-01 PROCEDURE — 96376 TX/PRO/DX INJ SAME DRUG ADON: CPT

## 2021-01-01 PROCEDURE — 80061 LIPID PANEL: CPT | Performed by: PHYSICIAN ASSISTANT

## 2021-01-01 PROCEDURE — 84484 ASSAY OF TROPONIN QUANT: CPT | Performed by: PHYSICIAN ASSISTANT

## 2021-01-01 PROCEDURE — 63710000001 GUAIFENESIN 600 MG TABLET SUSTAINED-RELEASE 12 HOUR: Performed by: NURSE PRACTITIONER

## 2021-01-01 PROCEDURE — 63710000001 ROSUVASTATIN 20 MG TABLET: Performed by: NURSE PRACTITIONER

## 2021-01-01 PROCEDURE — 74018 RADEX ABDOMEN 1 VIEW: CPT

## 2021-01-01 PROCEDURE — 96365 THER/PROPH/DIAG IV INF INIT: CPT

## 2021-01-01 PROCEDURE — 96367 TX/PROPH/DG ADDL SEQ IV INF: CPT

## 2021-01-01 PROCEDURE — 82962 GLUCOSE BLOOD TEST: CPT

## 2021-01-01 PROCEDURE — 83540 ASSAY OF IRON: CPT | Performed by: PHYSICIAN ASSISTANT

## 2021-01-01 PROCEDURE — 63710000001 PREDNISONE PER 1 MG: Performed by: FAMILY MEDICINE

## 2021-01-01 PROCEDURE — 99495 TRANSJ CARE MGMT MOD F2F 14D: CPT | Performed by: INTERNAL MEDICINE

## 2021-01-01 PROCEDURE — 99223 1ST HOSP IP/OBS HIGH 75: CPT | Performed by: NURSE PRACTITIONER

## 2021-01-01 PROCEDURE — 93005 ELECTROCARDIOGRAM TRACING: CPT | Performed by: EMERGENCY MEDICINE

## 2021-01-01 PROCEDURE — 25010000002 METHYLPREDNISOLONE PER 125 MG: Performed by: INTERNAL MEDICINE

## 2021-01-01 PROCEDURE — 84484 ASSAY OF TROPONIN QUANT: CPT | Performed by: EMERGENCY MEDICINE

## 2021-01-01 PROCEDURE — 25010000002 NALOXONE PER 1 MG: Performed by: EMERGENCY MEDICINE

## 2021-01-01 PROCEDURE — 80184 ASSAY OF PHENOBARBITAL: CPT | Performed by: NURSE PRACTITIONER

## 2021-01-01 PROCEDURE — 25010000002 CEFTRIAXONE PER 250 MG: Performed by: EMERGENCY MEDICINE

## 2021-01-01 PROCEDURE — 63710000001 LORAZEPAM 0.5 MG TABLET: Performed by: NURSE PRACTITIONER

## 2021-01-01 PROCEDURE — 97165 OT EVAL LOW COMPLEX 30 MIN: CPT

## 2021-01-01 PROCEDURE — 83880 ASSAY OF NATRIURETIC PEPTIDE: CPT | Performed by: EMERGENCY MEDICINE

## 2021-01-01 PROCEDURE — 81001 URINALYSIS AUTO W/SCOPE: CPT | Performed by: EMERGENCY MEDICINE

## 2021-01-01 PROCEDURE — 99291 CRITICAL CARE FIRST HOUR: CPT | Performed by: INTERNAL MEDICINE

## 2021-01-01 PROCEDURE — 82375 ASSAY CARBOXYHB QUANT: CPT

## 2021-01-01 PROCEDURE — 80053 COMPREHEN METABOLIC PANEL: CPT | Performed by: INTERNAL MEDICINE

## 2021-01-01 PROCEDURE — 82272 OCCULT BLD FECES 1-3 TESTS: CPT | Performed by: INTERNAL MEDICINE

## 2021-01-01 PROCEDURE — 63710000001 PREDNISONE PER 1 MG: Performed by: INTERNAL MEDICINE

## 2021-01-01 PROCEDURE — 0097U HC BIOFIRE FILMARRAY GI PANEL: CPT | Performed by: INTERNAL MEDICINE

## 2021-01-01 PROCEDURE — 85025 COMPLETE CBC W/AUTO DIFF WBC: CPT | Performed by: FAMILY MEDICINE

## 2021-01-01 PROCEDURE — 96375 TX/PRO/DX INJ NEW DRUG ADDON: CPT

## 2021-01-01 PROCEDURE — 25010000002 METHYLPREDNISOLONE PER 125 MG: Performed by: EMERGENCY MEDICINE

## 2021-01-01 PROCEDURE — 86870 RBC ANTIBODY IDENTIFICATION: CPT | Performed by: EMERGENCY MEDICINE

## 2021-01-01 PROCEDURE — 25010000002 ENOXAPARIN PER 10 MG: Performed by: INTERNAL MEDICINE

## 2021-01-01 PROCEDURE — 85025 COMPLETE CBC W/AUTO DIFF WBC: CPT | Performed by: NURSE PRACTITIONER

## 2021-01-01 PROCEDURE — 71045 X-RAY EXAM CHEST 1 VIEW: CPT

## 2021-01-01 PROCEDURE — 87493 C DIFF AMPLIFIED PROBE: CPT | Performed by: INTERNAL MEDICINE

## 2021-01-01 PROCEDURE — 82805 BLOOD GASES W/O2 SATURATION: CPT

## 2021-01-01 PROCEDURE — 80184 ASSAY OF PHENOBARBITAL: CPT | Performed by: INTERNAL MEDICINE

## 2021-01-01 PROCEDURE — 99285 EMERGENCY DEPT VISIT HI MDM: CPT

## 2021-01-01 PROCEDURE — 99213 OFFICE O/P EST LOW 20 MIN: CPT | Performed by: INTERNAL MEDICINE

## 2021-01-01 PROCEDURE — 99223 1ST HOSP IP/OBS HIGH 75: CPT | Performed by: INTERNAL MEDICINE

## 2021-01-01 PROCEDURE — 97110 THERAPEUTIC EXERCISES: CPT

## 2021-01-01 PROCEDURE — 82533 TOTAL CORTISOL: CPT | Performed by: INTERNAL MEDICINE

## 2021-01-01 PROCEDURE — 0 IOPAMIDOL PER 1 ML: Performed by: EMERGENCY MEDICINE

## 2021-01-01 PROCEDURE — 99233 SBSQ HOSP IP/OBS HIGH 50: CPT | Performed by: INTERNAL MEDICINE

## 2021-01-01 PROCEDURE — 25010000002 PROPOFOL 10 MG/ML EMULSION: Performed by: NURSE ANESTHETIST, CERTIFIED REGISTERED

## 2021-01-01 PROCEDURE — 63710000001 INSULIN LISPRO (HUMAN) PER 5 UNITS: Performed by: INTERNAL MEDICINE

## 2021-01-01 PROCEDURE — 83605 ASSAY OF LACTIC ACID: CPT | Performed by: NURSE PRACTITIONER

## 2021-01-01 PROCEDURE — 63710000001 CLOPIDOGREL 75 MG TABLET: Performed by: NURSE PRACTITIONER

## 2021-01-01 PROCEDURE — 25010000002 ONDANSETRON PER 1 MG: Performed by: INTERNAL MEDICINE

## 2021-01-01 PROCEDURE — 96361 HYDRATE IV INFUSION ADD-ON: CPT

## 2021-01-01 PROCEDURE — 83605 ASSAY OF LACTIC ACID: CPT | Performed by: INTERNAL MEDICINE

## 2021-01-01 PROCEDURE — 85007 BL SMEAR W/DIFF WBC COUNT: CPT | Performed by: EMERGENCY MEDICINE

## 2021-01-01 PROCEDURE — 92610 EVALUATE SWALLOWING FUNCTION: CPT

## 2021-01-01 PROCEDURE — 99291 CRITICAL CARE FIRST HOUR: CPT

## 2021-01-01 PROCEDURE — A9270 NON-COVERED ITEM OR SERVICE: HCPCS | Performed by: FAMILY MEDICINE

## 2021-01-01 PROCEDURE — 63710000001 DILTIAZEM CD 240 MG CAPSULE SUSTAINED-RELEASE 24 HR: Performed by: NURSE PRACTITIONER

## 2021-01-01 PROCEDURE — 84484 ASSAY OF TROPONIN QUANT: CPT | Performed by: INTERNAL MEDICINE

## 2021-01-01 PROCEDURE — 72192 CT PELVIS W/O DYE: CPT

## 2021-01-01 PROCEDURE — 25010000002 METHYLPREDNISOLONE PER 40 MG: Performed by: INTERNAL MEDICINE

## 2021-01-01 PROCEDURE — 93306 TTE W/DOPPLER COMPLETE: CPT

## 2021-01-01 PROCEDURE — 25010000003 POTASSIUM CHLORIDE PER 2 MEQ: Performed by: INTERNAL MEDICINE

## 2021-01-01 PROCEDURE — 83880 ASSAY OF NATRIURETIC PEPTIDE: CPT | Performed by: NURSE PRACTITIONER

## 2021-01-01 PROCEDURE — 25010000002 PIPERACILLIN SOD-TAZOBACTAM PER 1 G: Performed by: NURSE PRACTITIONER

## 2021-01-01 PROCEDURE — 25010000002 AZITHROMYCIN PER 500 MG: Performed by: INTERNAL MEDICINE

## 2021-01-01 PROCEDURE — 1125F AMNT PAIN NOTED PAIN PRSNT: CPT | Performed by: FAMILY MEDICINE

## 2021-01-01 PROCEDURE — 63710000001 HYDROCODONE-ACETAMINOPHEN 10-325 MG TABLET: Performed by: NURSE PRACTITIONER

## 2021-01-01 PROCEDURE — 83605 ASSAY OF LACTIC ACID: CPT | Performed by: PHYSICIAN ASSISTANT

## 2021-01-01 PROCEDURE — 80048 BASIC METABOLIC PNL TOTAL CA: CPT | Performed by: INTERNAL MEDICINE

## 2021-01-01 PROCEDURE — 85025 COMPLETE CBC W/AUTO DIFF WBC: CPT | Performed by: PHYSICIAN ASSISTANT

## 2021-01-01 PROCEDURE — 36600 WITHDRAWAL OF ARTERIAL BLOOD: CPT

## 2021-01-01 PROCEDURE — 99215 OFFICE O/P EST HI 40 MIN: CPT | Performed by: INTERNAL MEDICINE

## 2021-01-01 PROCEDURE — 83735 ASSAY OF MAGNESIUM: CPT | Performed by: PHYSICIAN ASSISTANT

## 2021-01-01 PROCEDURE — 51798 US URINE CAPACITY MEASURE: CPT

## 2021-01-01 PROCEDURE — 80048 BASIC METABOLIC PNL TOTAL CA: CPT | Performed by: NURSE PRACTITIONER

## 2021-01-01 PROCEDURE — 25010000002 ONDANSETRON PER 1 MG: Performed by: PHYSICIAN ASSISTANT

## 2021-01-01 PROCEDURE — 71275 CT ANGIOGRAPHY CHEST: CPT

## 2021-01-01 PROCEDURE — 85018 HEMOGLOBIN: CPT | Performed by: PHYSICIAN ASSISTANT

## 2021-01-01 PROCEDURE — 87493 C DIFF AMPLIFIED PROBE: CPT | Performed by: NURSE PRACTITIONER

## 2021-01-01 PROCEDURE — 63710000001 ASPIRIN 81 MG TABLET DELAYED-RELEASE: Performed by: NURSE PRACTITIONER

## 2021-01-01 PROCEDURE — 86901 BLOOD TYPING SEROLOGIC RH(D): CPT

## 2021-01-01 PROCEDURE — 87077 CULTURE AEROBIC IDENTIFY: CPT | Performed by: EMERGENCY MEDICINE

## 2021-01-01 PROCEDURE — 96366 THER/PROPH/DIAG IV INF ADDON: CPT

## 2021-01-01 PROCEDURE — 25010000002 SUCCINYLCHOLINE PER 20 MG: Performed by: EMERGENCY MEDICINE

## 2021-01-01 PROCEDURE — 80053 COMPREHEN METABOLIC PANEL: CPT | Performed by: EMERGENCY MEDICINE

## 2021-01-01 PROCEDURE — 96372 THER/PROPH/DIAG INJ SC/IM: CPT | Performed by: FAMILY MEDICINE

## 2021-01-01 PROCEDURE — 31500 INSERT EMERGENCY AIRWAY: CPT

## 2021-01-01 PROCEDURE — 99214 OFFICE O/P EST MOD 30 MIN: CPT | Performed by: INTERNAL MEDICINE

## 2021-01-01 PROCEDURE — 99232 SBSQ HOSP IP/OBS MODERATE 35: CPT | Performed by: INTERNAL MEDICINE

## 2021-01-01 PROCEDURE — G0438 PPPS, INITIAL VISIT: HCPCS | Performed by: FAMILY MEDICINE

## 2021-01-01 PROCEDURE — 86900 BLOOD TYPING SEROLOGIC ABO: CPT | Performed by: EMERGENCY MEDICINE

## 2021-01-01 PROCEDURE — C9803 HOPD COVID-19 SPEC COLLECT: HCPCS | Performed by: NURSE PRACTITIONER

## 2021-01-01 PROCEDURE — 81003 URINALYSIS AUTO W/O SCOPE: CPT | Performed by: INTERNAL MEDICINE

## 2021-01-01 PROCEDURE — 1111F DSCHRG MED/CURRENT MED MERGE: CPT | Performed by: INTERNAL MEDICINE

## 2021-01-01 PROCEDURE — 92611 MOTION FLUOROSCOPY/SWALLOW: CPT

## 2021-01-01 PROCEDURE — 86900 BLOOD TYPING SEROLOGIC ABO: CPT

## 2021-01-01 PROCEDURE — 25010000002 NALOXONE PER 1 MG

## 2021-01-01 PROCEDURE — 99231 SBSQ HOSP IP/OBS SF/LOW 25: CPT | Performed by: FAMILY MEDICINE

## 2021-01-01 PROCEDURE — 99220 PR INITIAL OBSERVATION CARE/DAY 70 MINUTES: CPT | Performed by: INTERNAL MEDICINE

## 2021-01-01 PROCEDURE — C9803 HOPD COVID-19 SPEC COLLECT: HCPCS

## 2021-01-01 PROCEDURE — 93005 ELECTROCARDIOGRAM TRACING: CPT | Performed by: NURSE PRACTITIONER

## 2021-01-01 PROCEDURE — 73060 X-RAY EXAM OF HUMERUS: CPT

## 2021-01-01 PROCEDURE — C1894 INTRO/SHEATH, NON-LASER: HCPCS

## 2021-01-01 PROCEDURE — 63710000001 AMOXICILLIN-CLAVULANATE 875-125 MG TABLET: Performed by: FAMILY MEDICINE

## 2021-01-01 PROCEDURE — 92526 ORAL FUNCTION THERAPY: CPT | Performed by: SPEECH-LANGUAGE PATHOLOGIST

## 2021-01-01 PROCEDURE — 0DB98ZX EXCISION OF DUODENUM, VIA NATURAL OR ARTIFICIAL OPENING ENDOSCOPIC, DIAGNOSTIC: ICD-10-PCS | Performed by: INTERNAL MEDICINE

## 2021-01-01 PROCEDURE — 85027 COMPLETE CBC AUTOMATED: CPT | Performed by: NURSE PRACTITIONER

## 2021-01-01 PROCEDURE — 25010000002 METHYLPREDNISOLONE PER 125 MG: Performed by: FAMILY MEDICINE

## 2021-01-01 PROCEDURE — 25010000002 CEFTRIAXONE PER 250 MG: Performed by: FAMILY MEDICINE

## 2021-01-01 PROCEDURE — 85007 BL SMEAR W/DIFF WBC COUNT: CPT | Performed by: PHYSICIAN ASSISTANT

## 2021-01-01 PROCEDURE — 25010000002 CEFTRIAXONE PER 250 MG: Performed by: INTERNAL MEDICINE

## 2021-01-01 PROCEDURE — 87077 CULTURE AEROBIC IDENTIFY: CPT | Performed by: NURSE PRACTITIONER

## 2021-01-01 PROCEDURE — 1160F RVW MEDS BY RX/DR IN RCRD: CPT | Performed by: FAMILY MEDICINE

## 2021-01-01 PROCEDURE — C1751 CATH, INF, PER/CENT/MIDLINE: HCPCS

## 2021-01-01 PROCEDURE — 0 IOPAMIDOL PER 1 ML: Performed by: INTERNAL MEDICINE

## 2021-01-01 PROCEDURE — 71250 CT THORAX DX C-: CPT

## 2021-01-01 PROCEDURE — 94002 VENT MGMT INPAT INIT DAY: CPT

## 2021-01-01 PROCEDURE — 87641 MR-STAPH DNA AMP PROBE: CPT

## 2021-01-01 PROCEDURE — 86920 COMPATIBILITY TEST SPIN: CPT

## 2021-01-01 PROCEDURE — 84100 ASSAY OF PHOSPHORUS: CPT | Performed by: INTERNAL MEDICINE

## 2021-01-01 PROCEDURE — 25010000002 NALOXONE PER 1 MG: Performed by: INTERNAL MEDICINE

## 2021-01-01 PROCEDURE — 99284 EMERGENCY DEPT VISIT MOD MDM: CPT

## 2021-01-01 PROCEDURE — 97165 OT EVAL LOW COMPLEX 30 MIN: CPT | Performed by: OCCUPATIONAL THERAPIST

## 2021-01-01 PROCEDURE — 25010000002 PROPOFOL 10 MG/ML EMULSION: Performed by: ANESTHESIOLOGY

## 2021-01-01 PROCEDURE — 97161 PT EVAL LOW COMPLEX 20 MIN: CPT

## 2021-01-01 PROCEDURE — 82140 ASSAY OF AMMONIA: CPT | Performed by: PHYSICIAN ASSISTANT

## 2021-01-01 PROCEDURE — 87086 URINE CULTURE/COLONY COUNT: CPT | Performed by: EMERGENCY MEDICINE

## 2021-01-01 PROCEDURE — 87186 SC STD MICRODIL/AGAR DIL: CPT | Performed by: NURSE PRACTITIONER

## 2021-01-01 PROCEDURE — 99233 SBSQ HOSP IP/OBS HIGH 50: CPT | Performed by: FAMILY MEDICINE

## 2021-01-01 PROCEDURE — 73030 X-RAY EXAM OF SHOULDER: CPT

## 2021-01-01 PROCEDURE — 25010000002 HEPARIN (PORCINE) PER 1000 UNITS: Performed by: NURSE PRACTITIONER

## 2021-01-01 PROCEDURE — 97535 SELF CARE MNGMENT TRAINING: CPT

## 2021-01-01 PROCEDURE — 25010000002 CEFTRIAXONE PER 250 MG: Performed by: NURSE PRACTITIONER

## 2021-01-01 PROCEDURE — 84145 PROCALCITONIN (PCT): CPT | Performed by: NURSE PRACTITIONER

## 2021-01-01 PROCEDURE — 87070 CULTURE OTHR SPECIMN AEROBIC: CPT | Performed by: NURSE PRACTITIONER

## 2021-01-01 PROCEDURE — 92526 ORAL FUNCTION THERAPY: CPT

## 2021-01-01 PROCEDURE — 84132 ASSAY OF SERUM POTASSIUM: CPT | Performed by: INTERNAL MEDICINE

## 2021-01-01 PROCEDURE — 25510000002 IOPAMIDOL PER 1 ML: Performed by: EMERGENCY MEDICINE

## 2021-01-01 PROCEDURE — 25010000002 LEVOFLOXACIN PER 250 MG: Performed by: NURSE PRACTITIONER

## 2021-01-01 PROCEDURE — 87086 URINE CULTURE/COLONY COUNT: CPT | Performed by: INTERNAL MEDICINE

## 2021-01-01 PROCEDURE — 0202U NFCT DS 22 TRGT SARS-COV-2: CPT | Performed by: INTERNAL MEDICINE

## 2021-01-01 PROCEDURE — 99213 OFFICE O/P EST LOW 20 MIN: CPT | Performed by: PHYSICIAN ASSISTANT

## 2021-01-01 PROCEDURE — 85730 THROMBOPLASTIN TIME PARTIAL: CPT | Performed by: PHYSICIAN ASSISTANT

## 2021-01-01 PROCEDURE — 80306 DRUG TEST PRSMV INSTRMNT: CPT | Performed by: PHYSICIAN ASSISTANT

## 2021-01-01 PROCEDURE — 94003 VENT MGMT INPAT SUBQ DAY: CPT

## 2021-01-01 PROCEDURE — 80184 ASSAY OF PHENOBARBITAL: CPT | Performed by: EMERGENCY MEDICINE

## 2021-01-01 PROCEDURE — 87636 SARSCOV2 & INF A&B AMP PRB: CPT | Performed by: NURSE PRACTITIONER

## 2021-01-01 PROCEDURE — 82728 ASSAY OF FERRITIN: CPT | Performed by: INTERNAL MEDICINE

## 2021-01-01 PROCEDURE — 73502 X-RAY EXAM HIP UNI 2-3 VIEWS: CPT

## 2021-01-01 PROCEDURE — 99239 HOSP IP/OBS DSCHRG MGMT >30: CPT | Performed by: INTERNAL MEDICINE

## 2021-01-01 PROCEDURE — P9016 RBC LEUKOCYTES REDUCED: HCPCS

## 2021-01-01 PROCEDURE — 84466 ASSAY OF TRANSFERRIN: CPT | Performed by: PHYSICIAN ASSISTANT

## 2021-01-01 PROCEDURE — 25010000002 VANCOMYCIN PER 500 MG: Performed by: EMERGENCY MEDICINE

## 2021-01-01 PROCEDURE — 87636 SARSCOV2 & INF A&B AMP PRB: CPT | Performed by: EMERGENCY MEDICINE

## 2021-01-01 PROCEDURE — 25010000002 VANCOMYCIN PER 500 MG: Performed by: NURSE PRACTITIONER

## 2021-01-01 PROCEDURE — 80048 BASIC METABOLIC PNL TOTAL CA: CPT | Performed by: PHYSICIAN ASSISTANT

## 2021-01-01 PROCEDURE — 84439 ASSAY OF FREE THYROXINE: CPT | Performed by: NURSE PRACTITIONER

## 2021-01-01 PROCEDURE — 86902 BLOOD TYPE ANTIGEN DONOR EA: CPT

## 2021-01-01 PROCEDURE — 82746 ASSAY OF FOLIC ACID SERUM: CPT | Performed by: PHYSICIAN ASSISTANT

## 2021-01-01 PROCEDURE — 86901 BLOOD TYPING SEROLOGIC RH(D): CPT | Performed by: EMERGENCY MEDICINE

## 2021-01-01 PROCEDURE — 86922 COMPATIBILITY TEST ANTIGLOB: CPT

## 2021-01-01 PROCEDURE — 87040 BLOOD CULTURE FOR BACTERIA: CPT | Performed by: NURSE PRACTITIONER

## 2021-01-01 PROCEDURE — 76937 US GUIDE VASCULAR ACCESS: CPT | Performed by: NURSE PRACTITIONER

## 2021-01-01 PROCEDURE — 96368 THER/DIAG CONCURRENT INF: CPT

## 2021-01-01 PROCEDURE — 82550 ASSAY OF CK (CPK): CPT | Performed by: EMERGENCY MEDICINE

## 2021-01-01 PROCEDURE — 99397 PER PM REEVAL EST PAT 65+ YR: CPT | Performed by: FAMILY MEDICINE

## 2021-01-01 PROCEDURE — 84145 PROCALCITONIN (PCT): CPT | Performed by: EMERGENCY MEDICINE

## 2021-01-01 PROCEDURE — 25010000002 CEFTRIAXONE: Performed by: NURSE PRACTITIONER

## 2021-01-01 PROCEDURE — 99225 PR SBSQ OBSERVATION CARE/DAY 25 MINUTES: CPT | Performed by: INTERNAL MEDICINE

## 2021-01-01 PROCEDURE — 25810000003 LACTATED RINGERS PER 1000 ML: Performed by: INTERNAL MEDICINE

## 2021-01-01 PROCEDURE — 87205 SMEAR GRAM STAIN: CPT | Performed by: NURSE PRACTITIONER

## 2021-01-01 PROCEDURE — 25010000002 METHYLPREDNISOLONE PER 125 MG: Performed by: PHYSICIAN ASSISTANT

## 2021-01-01 PROCEDURE — 82728 ASSAY OF FERRITIN: CPT | Performed by: PHYSICIAN ASSISTANT

## 2021-01-01 PROCEDURE — 25010000002 HYDRALAZINE PER 20 MG: Performed by: FAMILY MEDICINE

## 2021-01-01 PROCEDURE — 84132 ASSAY OF SERUM POTASSIUM: CPT | Performed by: FAMILY MEDICINE

## 2021-01-01 PROCEDURE — 43239 EGD BIOPSY SINGLE/MULTIPLE: CPT | Performed by: INTERNAL MEDICINE

## 2021-01-01 PROCEDURE — 84443 ASSAY THYROID STIM HORMONE: CPT | Performed by: INTERNAL MEDICINE

## 2021-01-01 PROCEDURE — 63710000001 CITALOPRAM 20 MG TABLET: Performed by: NURSE PRACTITIONER

## 2021-01-01 PROCEDURE — 36556 INSERT NON-TUNNEL CV CATH: CPT | Performed by: NURSE PRACTITIONER

## 2021-01-01 PROCEDURE — 93010 ELECTROCARDIOGRAM REPORT: CPT | Performed by: INTERNAL MEDICINE

## 2021-01-01 PROCEDURE — 99224 PR SBSQ OBSERVATION CARE/DAY 15 MINUTES: CPT | Performed by: FAMILY MEDICINE

## 2021-01-01 PROCEDURE — 88305 TISSUE EXAM BY PATHOLOGIST: CPT | Performed by: INTERNAL MEDICINE

## 2021-01-01 PROCEDURE — 74177 CT ABD & PELVIS W/CONTRAST: CPT

## 2021-01-01 PROCEDURE — 25010000002 PROPOFOL 10 MG/ML EMULSION

## 2021-01-01 PROCEDURE — 74174 CTA ABD&PLVS W/CONTRAST: CPT

## 2021-01-01 PROCEDURE — 99226 PR SBSQ OBSERVATION CARE/DAY 35 MINUTES: CPT | Performed by: INTERNAL MEDICINE

## 2021-01-01 PROCEDURE — 93005 ELECTROCARDIOGRAM TRACING: CPT

## 2021-01-01 PROCEDURE — 83735 ASSAY OF MAGNESIUM: CPT | Performed by: EMERGENCY MEDICINE

## 2021-01-01 PROCEDURE — 63710000001 DOXYCYCLINE 100 MG CAPSULE: Performed by: FAMILY MEDICINE

## 2021-01-01 PROCEDURE — 0DJD8ZZ INSPECTION OF LOWER INTESTINAL TRACT, VIA NATURAL OR ARTIFICIAL OPENING ENDOSCOPIC: ICD-10-PCS | Performed by: INTERNAL MEDICINE

## 2021-01-01 PROCEDURE — 87040 BLOOD CULTURE FOR BACTERIA: CPT | Performed by: INTERNAL MEDICINE

## 2021-01-01 PROCEDURE — 83735 ASSAY OF MAGNESIUM: CPT | Performed by: NURSE PRACTITIONER

## 2021-01-01 PROCEDURE — 25810000003 SODIUM CHLORIDE 0.9 % SOLUTION: Performed by: NURSE PRACTITIONER

## 2021-01-01 PROCEDURE — 83036 HEMOGLOBIN GLYCOSYLATED A1C: CPT | Performed by: INTERNAL MEDICINE

## 2021-01-01 PROCEDURE — 84100 ASSAY OF PHOSPHORUS: CPT | Performed by: NURSE PRACTITIONER

## 2021-01-01 PROCEDURE — 72125 CT NECK SPINE W/O DYE: CPT

## 2021-01-01 PROCEDURE — 93306 TTE W/DOPPLER COMPLETE: CPT | Performed by: INTERNAL MEDICINE

## 2021-01-01 PROCEDURE — 51701 INSERT BLADDER CATHETER: CPT

## 2021-01-01 PROCEDURE — 0DB68ZX EXCISION OF STOMACH, VIA NATURAL OR ARTIFICIAL OPENING ENDOSCOPIC, DIAGNOSTIC: ICD-10-PCS | Performed by: INTERNAL MEDICINE

## 2021-01-01 PROCEDURE — 87086 URINE CULTURE/COLONY COUNT: CPT | Performed by: NURSE PRACTITIONER

## 2021-01-01 PROCEDURE — U0003 INFECTIOUS AGENT DETECTION BY NUCLEIC ACID (DNA OR RNA); SEVERE ACUTE RESPIRATORY SYNDROME CORONAVIRUS 2 (SARS-COV-2) (CORONAVIRUS DISEASE [COVID-19]), AMPLIFIED PROBE TECHNIQUE, MAKING USE OF HIGH THROUGHPUT TECHNOLOGIES AS DESCRIBED BY CMS-2020-01-R: HCPCS | Performed by: INTERNAL MEDICINE

## 2021-01-01 PROCEDURE — 82607 VITAMIN B-12: CPT | Performed by: PHYSICIAN ASSISTANT

## 2021-01-01 PROCEDURE — 80048 BASIC METABOLIC PNL TOTAL CA: CPT | Performed by: FAMILY MEDICINE

## 2021-01-01 PROCEDURE — 63710000001 PANTOPRAZOLE 40 MG TABLET DELAYED-RELEASE: Performed by: NURSE PRACTITIONER

## 2021-01-01 PROCEDURE — 83921 ORGANIC ACID SINGLE QUANT: CPT | Performed by: INTERNAL MEDICINE

## 2021-01-01 PROCEDURE — 87635 SARS-COV-2 COVID-19 AMP PRB: CPT | Performed by: EMERGENCY MEDICINE

## 2021-01-01 PROCEDURE — 25010000003 MAGNESIUM SULFATE 4 GM/100ML SOLUTION: Performed by: FAMILY MEDICINE

## 2021-01-01 PROCEDURE — 70450 CT HEAD/BRAIN W/O DYE: CPT

## 2021-01-01 PROCEDURE — 85027 COMPLETE CBC AUTOMATED: CPT | Performed by: INTERNAL MEDICINE

## 2021-01-01 PROCEDURE — 84443 ASSAY THYROID STIM HORMONE: CPT | Performed by: EMERGENCY MEDICINE

## 2021-01-01 PROCEDURE — 80306 DRUG TEST PRSMV INSTRMNT: CPT | Performed by: EMERGENCY MEDICINE

## 2021-01-01 PROCEDURE — 25010000002 MORPHINE PER 10 MG: Performed by: EMERGENCY MEDICINE

## 2021-01-01 PROCEDURE — 81001 URINALYSIS AUTO W/SCOPE: CPT | Performed by: INTERNAL MEDICINE

## 2021-01-01 PROCEDURE — 99217 PR OBSERVATION CARE DISCHARGE MANAGEMENT: CPT | Performed by: FAMILY MEDICINE

## 2021-01-01 PROCEDURE — 83605 ASSAY OF LACTIC ACID: CPT | Performed by: EMERGENCY MEDICINE

## 2021-01-01 PROCEDURE — 83735 ASSAY OF MAGNESIUM: CPT | Performed by: FAMILY MEDICINE

## 2021-01-01 PROCEDURE — G0180 MD CERTIFICATION HHA PATIENT: HCPCS | Performed by: INTERNAL MEDICINE

## 2021-01-01 PROCEDURE — 74230 X-RAY XM SWLNG FUNCJ C+: CPT

## 2021-01-01 PROCEDURE — 99232 SBSQ HOSP IP/OBS MODERATE 35: CPT | Performed by: NURSE PRACTITIONER

## 2021-01-01 PROCEDURE — 05HY33Z INSERTION OF INFUSION DEVICE INTO UPPER VEIN, PERCUTANEOUS APPROACH: ICD-10-PCS | Performed by: INTERNAL MEDICINE

## 2021-01-01 PROCEDURE — 36430 TRANSFUSION BLD/BLD COMPNT: CPT

## 2021-01-01 PROCEDURE — 86140 C-REACTIVE PROTEIN: CPT | Performed by: INTERNAL MEDICINE

## 2021-01-01 PROCEDURE — 25010000002 CYANOCOBALAMIN PER 1000 MCG: Performed by: INTERNAL MEDICINE

## 2021-01-01 PROCEDURE — 86850 RBC ANTIBODY SCREEN: CPT | Performed by: EMERGENCY MEDICINE

## 2021-01-01 PROCEDURE — 25010000002 DEXAMETHASONE PER 1 MG: Performed by: INTERNAL MEDICINE

## 2021-01-01 PROCEDURE — 84439 ASSAY OF FREE THYROXINE: CPT | Performed by: EMERGENCY MEDICINE

## 2021-01-01 PROCEDURE — 84484 ASSAY OF TROPONIN QUANT: CPT | Performed by: NURSE PRACTITIONER

## 2021-01-01 PROCEDURE — 99232 SBSQ HOSP IP/OBS MODERATE 35: CPT | Performed by: FAMILY MEDICINE

## 2021-01-01 PROCEDURE — 93005 ELECTROCARDIOGRAM TRACING: CPT | Performed by: PHYSICIAN ASSISTANT

## 2021-01-01 PROCEDURE — 45378 DIAGNOSTIC COLONOSCOPY: CPT | Performed by: INTERNAL MEDICINE

## 2021-01-01 PROCEDURE — 99231 SBSQ HOSP IP/OBS SF/LOW 25: CPT | Performed by: PHYSICIAN ASSISTANT

## 2021-01-01 PROCEDURE — 86905 BLOOD TYPING RBC ANTIGENS: CPT

## 2021-01-01 PROCEDURE — 25010000002 FUROSEMIDE PER 20 MG: Performed by: INTERNAL MEDICINE

## 2021-01-01 RX ORDER — IPRATROPIUM BROMIDE AND ALBUTEROL SULFATE 2.5; .5 MG/3ML; MG/3ML
3 SOLUTION RESPIRATORY (INHALATION) EVERY 4 HOURS PRN
Status: DISCONTINUED | OUTPATIENT
Start: 2021-01-01 | End: 2021-01-01 | Stop reason: HOSPADM

## 2021-01-01 RX ORDER — CITALOPRAM 20 MG/1
20 TABLET ORAL DAILY
Status: DISCONTINUED | OUTPATIENT
Start: 2021-01-01 | End: 2021-01-01 | Stop reason: HOSPADM

## 2021-01-01 RX ORDER — BETHANECHOL CHLORIDE 10 MG/1
10 TABLET ORAL 3 TIMES DAILY
Status: DISCONTINUED | OUTPATIENT
Start: 2021-01-01 | End: 2021-01-01 | Stop reason: HOSPADM

## 2021-01-01 RX ORDER — HYDROCODONE BITARTRATE AND ACETAMINOPHEN 10; 325 MG/1; MG/1
1 TABLET ORAL EVERY 6 HOURS PRN
Status: DISCONTINUED | OUTPATIENT
Start: 2021-01-01 | End: 2021-01-01 | Stop reason: HOSPADM

## 2021-01-01 RX ORDER — ONDANSETRON 2 MG/ML
4 INJECTION INTRAMUSCULAR; INTRAVENOUS EVERY 6 HOURS PRN
Status: DISCONTINUED | OUTPATIENT
Start: 2021-01-01 | End: 2021-01-01 | Stop reason: HOSPADM

## 2021-01-01 RX ORDER — SODIUM CHLORIDE 0.9 % (FLUSH) 0.9 %
10 SYRINGE (ML) INJECTION AS NEEDED
Status: DISCONTINUED | OUTPATIENT
Start: 2021-01-01 | End: 2021-01-01 | Stop reason: HOSPADM

## 2021-01-01 RX ORDER — NALOXONE HCL 0.4 MG/ML
0.4 VIAL (ML) INJECTION ONCE
Status: COMPLETED | OUTPATIENT
Start: 2021-01-01 | End: 2021-01-01

## 2021-01-01 RX ORDER — PROMETHAZINE HYDROCHLORIDE 25 MG/1
25 TABLET ORAL EVERY 6 HOURS PRN
Qty: 30 TABLET | Refills: 3 | Status: SHIPPED | OUTPATIENT
Start: 2021-01-01

## 2021-01-01 RX ORDER — SODIUM CHLORIDE 0.9 % (FLUSH) 0.9 %
10 SYRINGE (ML) INJECTION AS NEEDED
Status: DISCONTINUED | OUTPATIENT
Start: 2021-01-01 | End: 2021-01-01

## 2021-01-01 RX ORDER — POTASSIUM CHLORIDE 1.5 G/1.77G
40 POWDER, FOR SOLUTION ORAL AS NEEDED
Status: DISCONTINUED | OUTPATIENT
Start: 2021-01-01 | End: 2021-01-01 | Stop reason: HOSPADM

## 2021-01-01 RX ORDER — FUROSEMIDE 40 MG/1
40 TABLET ORAL DAILY
Qty: 90 TABLET | Refills: 3 | Status: SHIPPED | OUTPATIENT
Start: 2021-01-01

## 2021-01-01 RX ORDER — ALBUTEROL SULFATE 90 UG/1
2 AEROSOL, METERED RESPIRATORY (INHALATION) EVERY 4 HOURS PRN
Qty: 18 G | Refills: 2 | Status: SHIPPED | OUTPATIENT
Start: 2021-01-01 | End: 2021-01-01 | Stop reason: HOSPADM

## 2021-01-01 RX ORDER — ISOSORBIDE MONONITRATE 60 MG/1
60 TABLET, EXTENDED RELEASE ORAL DAILY
Status: DISCONTINUED | OUTPATIENT
Start: 2021-01-01 | End: 2021-01-01

## 2021-01-01 RX ORDER — IPRATROPIUM BROMIDE AND ALBUTEROL SULFATE 2.5; .5 MG/3ML; MG/3ML
3 SOLUTION RESPIRATORY (INHALATION)
Status: DISCONTINUED | OUTPATIENT
Start: 2021-01-01 | End: 2021-01-01 | Stop reason: HOSPADM

## 2021-01-01 RX ORDER — PHENOBARBITAL 100 MG/1
TABLET ORAL
Qty: 45 TABLET | Refills: 3 | Status: SHIPPED | OUTPATIENT
Start: 2021-01-01 | End: 2021-01-01 | Stop reason: SDUPTHER

## 2021-01-01 RX ORDER — PANTOPRAZOLE SODIUM 40 MG/10ML
40 INJECTION, POWDER, LYOPHILIZED, FOR SOLUTION INTRAVENOUS
Status: DISCONTINUED | OUTPATIENT
Start: 2021-01-01 | End: 2021-01-01

## 2021-01-01 RX ORDER — ALBUTEROL SULFATE 90 UG/1
2 AEROSOL, METERED RESPIRATORY (INHALATION) EVERY 4 HOURS PRN
Status: DISCONTINUED | OUTPATIENT
Start: 2021-01-01 | End: 2021-01-01 | Stop reason: SDUPTHER

## 2021-01-01 RX ORDER — SODIUM CHLORIDE 0.9 % (FLUSH) 0.9 %
10 SYRINGE (ML) INJECTION EVERY 12 HOURS SCHEDULED
Status: DISCONTINUED | OUTPATIENT
Start: 2021-01-01 | End: 2021-01-01 | Stop reason: HOSPADM

## 2021-01-01 RX ORDER — GUAIFENESIN 600 MG/1
600 TABLET, EXTENDED RELEASE ORAL EVERY 12 HOURS PRN
Status: DISCONTINUED | OUTPATIENT
Start: 2021-01-01 | End: 2021-01-01 | Stop reason: HOSPADM

## 2021-01-01 RX ORDER — ROPINIROLE 0.25 MG/1
.25-.5 TABLET, FILM COATED ORAL NIGHTLY
Qty: 60 TABLET | Refills: 1 | Status: SHIPPED | OUTPATIENT
Start: 2021-01-01

## 2021-01-01 RX ORDER — SODIUM CHLORIDE, SODIUM LACTATE, POTASSIUM CHLORIDE, CALCIUM CHLORIDE 600; 310; 30; 20 MG/100ML; MG/100ML; MG/100ML; MG/100ML
INJECTION, SOLUTION INTRAVENOUS CONTINUOUS PRN
Status: DISCONTINUED | OUTPATIENT
Start: 2021-01-01 | End: 2021-01-01 | Stop reason: SURG

## 2021-01-01 RX ORDER — ALBUTEROL SULFATE 2.5 MG/3ML
2.5 SOLUTION RESPIRATORY (INHALATION) EVERY 4 HOURS PRN
Status: DISCONTINUED | OUTPATIENT
Start: 2021-01-01 | End: 2021-01-01 | Stop reason: HOSPADM

## 2021-01-01 RX ORDER — DILTIAZEM HYDROCHLORIDE 240 MG/1
240 CAPSULE, COATED, EXTENDED RELEASE ORAL DAILY
Status: DISCONTINUED | OUTPATIENT
Start: 2021-01-01 | End: 2021-01-01 | Stop reason: HOSPADM

## 2021-01-01 RX ORDER — MAGNESIUM SULFATE HEPTAHYDRATE 40 MG/ML
2 INJECTION, SOLUTION INTRAVENOUS AS NEEDED
Status: DISCONTINUED | OUTPATIENT
Start: 2021-01-01 | End: 2021-01-01 | Stop reason: HOSPADM

## 2021-01-01 RX ORDER — BUDESONIDE AND FORMOTEROL FUMARATE DIHYDRATE 80; 4.5 UG/1; UG/1
2 AEROSOL RESPIRATORY (INHALATION)
Status: DISCONTINUED | OUTPATIENT
Start: 2021-01-01 | End: 2021-01-01

## 2021-01-01 RX ORDER — ONDANSETRON 4 MG/1
4 TABLET, FILM COATED ORAL EVERY 6 HOURS PRN
Status: DISCONTINUED | OUTPATIENT
Start: 2021-01-01 | End: 2021-01-01 | Stop reason: HOSPADM

## 2021-01-01 RX ORDER — ALBUTEROL SULFATE 2.5 MG/3ML
2.5 SOLUTION RESPIRATORY (INHALATION) EVERY 4 HOURS PRN
Status: DISCONTINUED | OUTPATIENT
Start: 2021-01-01 | End: 2021-01-01

## 2021-01-01 RX ORDER — IPRATROPIUM BROMIDE AND ALBUTEROL SULFATE 2.5; .5 MG/3ML; MG/3ML
3 SOLUTION RESPIRATORY (INHALATION)
Status: DISCONTINUED | OUTPATIENT
Start: 2021-01-01 | End: 2021-01-01

## 2021-01-01 RX ORDER — LISINOPRIL 40 MG/1
40 TABLET ORAL DAILY
Status: DISCONTINUED | OUTPATIENT
Start: 2021-01-01 | End: 2021-01-01 | Stop reason: HOSPADM

## 2021-01-01 RX ORDER — BENZONATATE 100 MG/1
100 CAPSULE ORAL 3 TIMES DAILY PRN
Qty: 30 CAPSULE | Refills: 0 | Status: SHIPPED | OUTPATIENT
Start: 2021-01-01 | End: 2021-01-01

## 2021-01-01 RX ORDER — PANTOPRAZOLE SODIUM 40 MG/1
40 TABLET, DELAYED RELEASE ORAL
Qty: 60 TABLET | Refills: 0 | Status: SHIPPED | OUTPATIENT
Start: 2021-01-01 | End: 2021-01-01

## 2021-01-01 RX ORDER — PROPOFOL 10 MG/ML
VIAL (ML) INTRAVENOUS AS NEEDED
Status: DISCONTINUED | OUTPATIENT
Start: 2021-01-01 | End: 2021-01-01 | Stop reason: SURG

## 2021-01-01 RX ORDER — ALBUTEROL SULFATE 90 UG/1
2 AEROSOL, METERED RESPIRATORY (INHALATION) EVERY 4 HOURS PRN
Qty: 18 G | Refills: 5 | Status: ON HOLD | OUTPATIENT
Start: 2021-01-01 | End: 2021-01-01 | Stop reason: SDUPTHER

## 2021-01-01 RX ORDER — POTASSIUM CHLORIDE 20 MEQ/1
20 TABLET, EXTENDED RELEASE ORAL DAILY
Qty: 30 TABLET | Refills: 2 | Status: SHIPPED | OUTPATIENT
Start: 2021-01-01 | End: 2021-01-01 | Stop reason: SDUPTHER

## 2021-01-01 RX ORDER — DILTIAZEM HYDROCHLORIDE 240 MG/1
240 CAPSULE, COATED, EXTENDED RELEASE ORAL DAILY
Status: DISCONTINUED | OUTPATIENT
Start: 2021-01-01 | End: 2021-01-01

## 2021-01-01 RX ORDER — BUDESONIDE 0.5 MG/2ML
0.5 INHALANT ORAL
Status: DISCONTINUED | OUTPATIENT
Start: 2021-01-01 | End: 2021-01-01 | Stop reason: HOSPADM

## 2021-01-01 RX ORDER — LORAZEPAM 0.5 MG/1
0.5 TABLET ORAL 2 TIMES DAILY
Status: DISCONTINUED | OUTPATIENT
Start: 2021-01-01 | End: 2021-01-01 | Stop reason: HOSPADM

## 2021-01-01 RX ORDER — ROSUVASTATIN CALCIUM 20 MG/1
40 TABLET, COATED ORAL NIGHTLY
Status: DISCONTINUED | OUTPATIENT
Start: 2021-01-01 | End: 2021-01-01 | Stop reason: HOSPADM

## 2021-01-01 RX ORDER — CALCIUM CARBONATE 200(500)MG
2 TABLET,CHEWABLE ORAL 2 TIMES DAILY PRN
Status: DISCONTINUED | OUTPATIENT
Start: 2021-01-01 | End: 2021-01-01 | Stop reason: HOSPADM

## 2021-01-01 RX ORDER — ACETAMINOPHEN 325 MG/1
650 TABLET ORAL EVERY 4 HOURS PRN
Status: DISCONTINUED | OUTPATIENT
Start: 2021-01-01 | End: 2021-01-01 | Stop reason: HOSPADM

## 2021-01-01 RX ORDER — FAMOTIDINE 20 MG/1
20 TABLET, FILM COATED ORAL ONCE
Status: CANCELLED | OUTPATIENT
Start: 2021-01-01 | End: 2021-01-01

## 2021-01-01 RX ORDER — MAGNESIUM CARB/ALUMINUM HYDROX 105-160MG
296 TABLET,CHEWABLE ORAL ONCE
Status: COMPLETED | OUTPATIENT
Start: 2021-01-01 | End: 2021-01-01

## 2021-01-01 RX ORDER — PREDNISONE 10 MG/1
10 TABLET ORAL DAILY
Status: DISCONTINUED | OUTPATIENT
Start: 2021-01-01 | End: 2021-01-01 | Stop reason: HOSPADM

## 2021-01-01 RX ORDER — NICOTINE POLACRILEX 4 MG
15 LOZENGE BUCCAL
Status: DISCONTINUED | OUTPATIENT
Start: 2021-01-01 | End: 2021-01-01 | Stop reason: HOSPADM

## 2021-01-01 RX ORDER — SODIUM CHLORIDE 9 MG/ML
999 INJECTION, SOLUTION INTRAVENOUS CONTINUOUS
Status: DISCONTINUED | OUTPATIENT
Start: 2021-01-01 | End: 2021-01-01

## 2021-01-01 RX ORDER — ROSUVASTATIN CALCIUM 20 MG/1
40 TABLET, COATED ORAL DAILY
Status: DISCONTINUED | OUTPATIENT
Start: 2021-01-01 | End: 2021-01-01 | Stop reason: HOSPADM

## 2021-01-01 RX ORDER — ACETAMINOPHEN 325 MG/1
650 TABLET ORAL EVERY 8 HOURS
Status: DISCONTINUED | OUTPATIENT
Start: 2021-01-01 | End: 2021-01-01 | Stop reason: HOSPADM

## 2021-01-01 RX ORDER — DIPHENHYDRAMINE HCL 25 MG
25 CAPSULE ORAL EVERY 6 HOURS PRN
Status: DISCONTINUED | OUTPATIENT
Start: 2021-01-01 | End: 2021-01-01 | Stop reason: HOSPADM

## 2021-01-01 RX ORDER — FERROUS SULFATE 325(65) MG
325 TABLET ORAL
Status: DISCONTINUED | OUTPATIENT
Start: 2021-01-01 | End: 2021-01-01 | Stop reason: HOSPADM

## 2021-01-01 RX ORDER — HYDROCODONE BITARTRATE AND ACETAMINOPHEN 10; 325 MG/1; MG/1
1 TABLET ORAL EVERY 12 HOURS PRN
Status: DISCONTINUED | OUTPATIENT
Start: 2021-01-01 | End: 2021-01-01

## 2021-01-01 RX ORDER — CLOPIDOGREL BISULFATE 75 MG/1
75 TABLET ORAL DAILY
Status: DISCONTINUED | OUTPATIENT
Start: 2021-01-01 | End: 2021-01-01 | Stop reason: HOSPADM

## 2021-01-01 RX ORDER — ONDANSETRON 4 MG/1
4 TABLET, FILM COATED ORAL EVERY 6 HOURS PRN
Qty: 12 TABLET | Refills: 0 | Status: SHIPPED | OUTPATIENT
Start: 2021-01-01

## 2021-01-01 RX ORDER — NITROFURANTOIN 25; 75 MG/1; MG/1
100 CAPSULE ORAL 2 TIMES DAILY
Qty: 10 CAPSULE | Refills: 0 | Status: SHIPPED | OUTPATIENT
Start: 2021-01-01 | End: 2021-01-01 | Stop reason: HOSPADM

## 2021-01-01 RX ORDER — FENTANYL CITRATE 50 UG/ML
50 INJECTION, SOLUTION INTRAMUSCULAR; INTRAVENOUS
Status: DISCONTINUED | OUTPATIENT
Start: 2021-01-01 | End: 2021-01-01 | Stop reason: HOSPADM

## 2021-01-01 RX ORDER — CETIRIZINE HYDROCHLORIDE 10 MG/1
5 TABLET ORAL DAILY
Qty: 30 TABLET | Refills: 10 | Status: SHIPPED | OUTPATIENT
Start: 2021-01-01 | End: 2021-01-01

## 2021-01-01 RX ORDER — LORAZEPAM 1 MG/1
1 TABLET ORAL 2 TIMES DAILY
Qty: 60 TABLET | Refills: 0 | Status: SHIPPED | OUTPATIENT
Start: 2021-01-01

## 2021-01-01 RX ORDER — HEPARIN SODIUM 5000 [USP'U]/ML
5000 INJECTION, SOLUTION INTRAVENOUS; SUBCUTANEOUS EVERY 12 HOURS SCHEDULED
Status: DISCONTINUED | OUTPATIENT
Start: 2021-01-01 | End: 2021-01-01 | Stop reason: HOSPADM

## 2021-01-01 RX ORDER — PHENOBARBITAL 97.2 MG/1
145.8 TABLET ORAL DAILY
Status: DISCONTINUED | OUTPATIENT
Start: 2021-01-01 | End: 2021-01-01 | Stop reason: HOSPADM

## 2021-01-01 RX ORDER — MAGNESIUM SULFATE HEPTAHYDRATE 40 MG/ML
4 INJECTION, SOLUTION INTRAVENOUS AS NEEDED
Status: DISCONTINUED | OUTPATIENT
Start: 2021-01-01 | End: 2021-01-01 | Stop reason: HOSPADM

## 2021-01-01 RX ORDER — ALBUTEROL SULFATE 2.5 MG/3ML
10 SOLUTION RESPIRATORY (INHALATION) CONTINUOUS
Status: DISCONTINUED | OUTPATIENT
Start: 2021-01-01 | End: 2021-01-01

## 2021-01-01 RX ORDER — DILTIAZEM HYDROCHLORIDE 240 MG/1
240 CAPSULE, COATED, EXTENDED RELEASE ORAL DAILY
COMMUNITY
End: 2021-01-01 | Stop reason: SDUPTHER

## 2021-01-01 RX ORDER — SUCCINYLCHOLINE CHLORIDE 20 MG/ML
INJECTION INTRAMUSCULAR; INTRAVENOUS
Status: COMPLETED | OUTPATIENT
Start: 2021-01-01 | End: 2021-01-01

## 2021-01-01 RX ORDER — TERAZOSIN 2 MG/1
2 CAPSULE ORAL NIGHTLY
Refills: 0 | Status: DISCONTINUED | OUTPATIENT
Start: 2021-01-01 | End: 2021-01-01 | Stop reason: HOSPADM

## 2021-01-01 RX ORDER — ROSUVASTATIN CALCIUM 20 MG/1
40 TABLET, COATED ORAL NIGHTLY
Status: DISCONTINUED | OUTPATIENT
Start: 2021-01-01 | End: 2021-01-01

## 2021-01-01 RX ORDER — DEXTROSE MONOHYDRATE 50 MG/ML
25 INJECTION, SOLUTION INTRAVENOUS CONTINUOUS
Status: DISCONTINUED | OUTPATIENT
Start: 2021-01-01 | End: 2021-01-01

## 2021-01-01 RX ORDER — CEFUROXIME AXETIL 250 MG/1
500 TABLET ORAL EVERY 12 HOURS SCHEDULED
Status: DISCONTINUED | OUTPATIENT
Start: 2021-01-01 | End: 2021-01-01 | Stop reason: HOSPADM

## 2021-01-01 RX ORDER — IPRATROPIUM BROMIDE AND ALBUTEROL SULFATE 2.5; .5 MG/3ML; MG/3ML
3 SOLUTION RESPIRATORY (INHALATION) ONCE
Status: COMPLETED | OUTPATIENT
Start: 2021-01-01 | End: 2021-01-01

## 2021-01-01 RX ORDER — DILTIAZEM HYDROCHLORIDE 240 MG/1
240 CAPSULE, EXTENDED RELEASE ORAL DAILY
Qty: 30 CAPSULE | Refills: 3 | Status: SHIPPED | OUTPATIENT
Start: 2021-01-01 | End: 2021-01-01

## 2021-01-01 RX ORDER — PHENOBARBITAL 97.2 MG/1
145.8 TABLET ORAL DAILY
Status: DISCONTINUED | OUTPATIENT
Start: 2021-01-01 | End: 2021-01-01

## 2021-01-01 RX ORDER — ALBUTEROL SULFATE 90 UG/1
2 AEROSOL, METERED RESPIRATORY (INHALATION) EVERY 4 HOURS PRN
Status: DISCONTINUED | OUTPATIENT
Start: 2021-01-01 | End: 2021-01-01 | Stop reason: HOSPADM

## 2021-01-01 RX ORDER — PREDNISONE 20 MG/1
20 TABLET ORAL
Status: DISCONTINUED | OUTPATIENT
Start: 2021-01-01 | End: 2021-01-01

## 2021-01-01 RX ORDER — HYDROCODONE BITARTRATE AND ACETAMINOPHEN 10; 325 MG/1; MG/1
1 TABLET ORAL EVERY 4 HOURS PRN
Status: DISCONTINUED | OUTPATIENT
Start: 2021-01-01 | End: 2021-01-01

## 2021-01-01 RX ORDER — CYANOCOBALAMIN 1000 UG/ML
1000 INJECTION, SOLUTION INTRAMUSCULAR; SUBCUTANEOUS ONCE
Status: COMPLETED | OUTPATIENT
Start: 2021-01-01 | End: 2021-01-01

## 2021-01-01 RX ORDER — DOCUSATE SODIUM 100 MG/1
100 CAPSULE, LIQUID FILLED ORAL 2 TIMES DAILY
Status: DISCONTINUED | OUTPATIENT
Start: 2021-01-01 | End: 2021-01-01 | Stop reason: HOSPADM

## 2021-01-01 RX ORDER — ACETAMINOPHEN 160 MG/5ML
650 SOLUTION ORAL EVERY 6 HOURS PRN
Status: DISCONTINUED | OUTPATIENT
Start: 2021-01-01 | End: 2021-01-01 | Stop reason: HOSPADM

## 2021-01-01 RX ORDER — ALBUTEROL SULFATE 2.5 MG/3ML
2.5 SOLUTION RESPIRATORY (INHALATION) EVERY 4 HOURS PRN
Qty: 3 ML | Refills: 12 | Status: SHIPPED | OUTPATIENT
Start: 2021-01-01 | End: 2021-01-01 | Stop reason: SDUPTHER

## 2021-01-01 RX ORDER — LORAZEPAM 0.5 MG/1
0.5 TABLET ORAL 2 TIMES DAILY PRN
Qty: 60 TABLET | Refills: 0 | Status: SHIPPED | OUTPATIENT
Start: 2021-01-01 | End: 2021-01-01 | Stop reason: SDUPTHER

## 2021-01-01 RX ORDER — CITALOPRAM 20 MG/1
20 TABLET ORAL DAILY
Status: DISCONTINUED | OUTPATIENT
Start: 2021-01-01 | End: 2021-01-01

## 2021-01-01 RX ORDER — FLUTICASONE PROPIONATE 50 MCG
2 SPRAY, SUSPENSION (ML) NASAL DAILY
Status: DISCONTINUED | OUTPATIENT
Start: 2021-01-01 | End: 2021-01-01 | Stop reason: HOSPADM

## 2021-01-01 RX ORDER — ALBUTEROL SULFATE 2.5 MG/3ML
2.5 SOLUTION RESPIRATORY (INHALATION)
Status: DISCONTINUED | OUTPATIENT
Start: 2021-01-01 | End: 2021-01-01

## 2021-01-01 RX ORDER — HEPARIN SODIUM 5000 [USP'U]/ML
2500 INJECTION, SOLUTION INTRAVENOUS; SUBCUTANEOUS EVERY 12 HOURS SCHEDULED
Status: DISCONTINUED | OUTPATIENT
Start: 2021-01-01 | End: 2021-01-01 | Stop reason: HOSPADM

## 2021-01-01 RX ORDER — DOXYCYCLINE 100 MG/1
100 CAPSULE ORAL EVERY 12 HOURS SCHEDULED
Status: COMPLETED | OUTPATIENT
Start: 2021-01-01 | End: 2021-01-01

## 2021-01-01 RX ORDER — PREDNISONE 20 MG/1
40 TABLET ORAL
Qty: 6 TABLET | Refills: 0 | Status: SHIPPED | OUTPATIENT
Start: 2021-01-01 | End: 2021-01-01

## 2021-01-01 RX ORDER — CALCIUM CARBONATE 200(500)MG
2 TABLET,CHEWABLE ORAL 3 TIMES DAILY PRN
Status: DISCONTINUED | OUTPATIENT
Start: 2021-01-01 | End: 2021-01-01 | Stop reason: HOSPADM

## 2021-01-01 RX ORDER — PANTOPRAZOLE SODIUM 40 MG/1
40 TABLET, DELAYED RELEASE ORAL EVERY MORNING
Status: CANCELLED | OUTPATIENT
Start: 2021-01-01

## 2021-01-01 RX ORDER — ASPIRIN 81 MG/1
81 TABLET ORAL ONCE
Status: COMPLETED | OUTPATIENT
Start: 2021-01-01 | End: 2021-01-01

## 2021-01-01 RX ORDER — PANTOPRAZOLE SODIUM 40 MG/1
40 TABLET, DELAYED RELEASE ORAL
Status: DISCONTINUED | OUTPATIENT
Start: 2021-01-01 | End: 2021-01-01 | Stop reason: HOSPADM

## 2021-01-01 RX ORDER — ASPIRIN 81 MG/1
81 TABLET ORAL DAILY
Status: DISCONTINUED | OUTPATIENT
Start: 2021-01-01 | End: 2021-01-01

## 2021-01-01 RX ORDER — ALBUTEROL SULFATE 90 UG/1
2 AEROSOL, METERED RESPIRATORY (INHALATION) EVERY 4 HOURS PRN
Qty: 18 G | Refills: 0 | Status: SHIPPED | OUTPATIENT
Start: 2021-01-01 | End: 2021-01-01 | Stop reason: SDUPTHER

## 2021-01-01 RX ORDER — PHENOBARBITAL 100 MG/1
150 TABLET ORAL NIGHTLY
Status: DISCONTINUED | OUTPATIENT
Start: 2021-01-01 | End: 2021-01-01

## 2021-01-01 RX ORDER — ALBUTEROL SULFATE 90 UG/1
AEROSOL, METERED RESPIRATORY (INHALATION)
Qty: 18 G | Refills: 0 | Status: SHIPPED | OUTPATIENT
Start: 2021-01-01 | End: 2021-01-01 | Stop reason: SDUPTHER

## 2021-01-01 RX ORDER — ACETYLCYSTEINE 200 MG/ML
1.5 SOLUTION ORAL; RESPIRATORY (INHALATION) ONCE
Status: COMPLETED | OUTPATIENT
Start: 2021-01-01 | End: 2021-01-01

## 2021-01-01 RX ORDER — BUDESONIDE AND FORMOTEROL FUMARATE DIHYDRATE 160; 4.5 UG/1; UG/1
2 AEROSOL RESPIRATORY (INHALATION)
Status: DISCONTINUED | OUTPATIENT
Start: 2021-01-01 | End: 2021-01-01 | Stop reason: HOSPADM

## 2021-01-01 RX ORDER — POTASSIUM CHLORIDE 7.45 MG/ML
10 INJECTION INTRAVENOUS
Status: DISCONTINUED | OUTPATIENT
Start: 2021-01-01 | End: 2021-01-01 | Stop reason: HOSPADM

## 2021-01-01 RX ORDER — MAGNESIUM CARB/ALUMINUM HYDROX 105-160MG
296 TABLET,CHEWABLE ORAL ONCE
Status: DISCONTINUED | OUTPATIENT
Start: 2021-01-01 | End: 2021-01-01

## 2021-01-01 RX ORDER — LIDOCAINE 50 MG/G
1 PATCH TOPICAL
Status: DISCONTINUED | OUTPATIENT
Start: 2021-01-01 | End: 2021-01-01 | Stop reason: HOSPADM

## 2021-01-01 RX ORDER — PHENOBARBITAL 100 MG/1
150 TABLET ORAL DAILY
Qty: 45 TABLET | Refills: 0 | Status: SHIPPED | OUTPATIENT
Start: 2021-01-01 | End: 2021-01-01 | Stop reason: SDUPTHER

## 2021-01-01 RX ORDER — LORAZEPAM 1 MG/1
TABLET ORAL
Qty: 60 TABLET | Refills: 2 | Status: CANCELLED | OUTPATIENT
Start: 2021-01-01

## 2021-01-01 RX ORDER — ALBUTEROL SULFATE 90 UG/1
2 AEROSOL, METERED RESPIRATORY (INHALATION) EVERY 4 HOURS PRN
Qty: 18 G | Refills: 2 | Status: SHIPPED | OUTPATIENT
Start: 2021-01-01 | End: 2021-01-01 | Stop reason: SDUPTHER

## 2021-01-01 RX ORDER — CEFTRIAXONE SODIUM 1 G/50ML
1 INJECTION, SOLUTION INTRAVENOUS
Status: COMPLETED | OUTPATIENT
Start: 2021-01-01 | End: 2021-01-01

## 2021-01-01 RX ORDER — PREDNISONE 20 MG/1
20 TABLET ORAL DAILY
Qty: 2 TABLET | Refills: 0 | Status: SHIPPED | OUTPATIENT
Start: 2021-01-01 | End: 2021-01-01

## 2021-01-01 RX ORDER — CHLORHEXIDINE GLUCONATE 0.12 MG/ML
15 RINSE ORAL EVERY 12 HOURS SCHEDULED
Status: DISCONTINUED | OUTPATIENT
Start: 2021-01-01 | End: 2021-01-01

## 2021-01-01 RX ORDER — METHYLPREDNISOLONE SODIUM SUCCINATE 125 MG/2ML
125 INJECTION, POWDER, LYOPHILIZED, FOR SOLUTION INTRAMUSCULAR; INTRAVENOUS ONCE
Status: COMPLETED | OUTPATIENT
Start: 2021-01-01 | End: 2021-01-01

## 2021-01-01 RX ORDER — IBUPROFEN 200 MG
200 TABLET ORAL EVERY 4 HOURS PRN
Status: DISCONTINUED | OUTPATIENT
Start: 2021-01-01 | End: 2021-01-01 | Stop reason: HOSPADM

## 2021-01-01 RX ORDER — MORPHINE SULFATE 2 MG/ML
2 INJECTION, SOLUTION INTRAMUSCULAR; INTRAVENOUS ONCE
Status: COMPLETED | OUTPATIENT
Start: 2021-01-01 | End: 2021-01-01

## 2021-01-01 RX ORDER — FUROSEMIDE 40 MG/1
40 TABLET ORAL DAILY
Qty: 30 TABLET | Refills: 2 | Status: SHIPPED | OUTPATIENT
Start: 2021-01-01 | End: 2021-01-01 | Stop reason: HOSPADM

## 2021-01-01 RX ORDER — PHENOBARBITAL 100 MG/1
TABLET ORAL
Qty: 45 TABLET | Refills: 0 | OUTPATIENT
Start: 2021-01-01

## 2021-01-01 RX ORDER — PROMETHAZINE HYDROCHLORIDE 12.5 MG/1
12.5 TABLET ORAL DAILY PRN
Qty: 30 TABLET | Refills: 2 | Status: SHIPPED | OUTPATIENT
Start: 2021-01-01 | End: 2021-01-01 | Stop reason: SDUPTHER

## 2021-01-01 RX ORDER — DILTIAZEM HYDROCHLORIDE 60 MG/1
60 TABLET, FILM COATED ORAL EVERY 6 HOURS SCHEDULED
Status: DISCONTINUED | OUTPATIENT
Start: 2021-01-01 | End: 2021-01-01 | Stop reason: HOSPADM

## 2021-01-01 RX ORDER — LEVOFLOXACIN 5 MG/ML
500 INJECTION, SOLUTION INTRAVENOUS ONCE
Status: COMPLETED | OUTPATIENT
Start: 2021-01-01 | End: 2021-01-01

## 2021-01-01 RX ORDER — LORAZEPAM 0.5 MG/1
0.5 TABLET ORAL EVERY 12 HOURS SCHEDULED
Status: DISCONTINUED | OUTPATIENT
Start: 2021-01-01 | End: 2021-01-01 | Stop reason: HOSPADM

## 2021-01-01 RX ORDER — PHENOBARBITAL 100 MG/1
150 TABLET ORAL DAILY
Status: DISCONTINUED | OUTPATIENT
Start: 2021-01-01 | End: 2021-01-01

## 2021-01-01 RX ORDER — PREDNISONE 20 MG/1
40 TABLET ORAL
Status: DISCONTINUED | OUTPATIENT
Start: 2021-01-01 | End: 2021-01-01 | Stop reason: HOSPADM

## 2021-01-01 RX ORDER — FAMOTIDINE 10 MG/ML
20 INJECTION, SOLUTION INTRAVENOUS ONCE
Status: CANCELLED | OUTPATIENT
Start: 2021-01-01 | End: 2021-01-01

## 2021-01-01 RX ORDER — PROMETHAZINE HYDROCHLORIDE 25 MG/1
25 TABLET ORAL EVERY 8 HOURS PRN
Status: DISCONTINUED | OUTPATIENT
Start: 2021-01-01 | End: 2021-01-01 | Stop reason: HOSPADM

## 2021-01-01 RX ORDER — ALBUTEROL SULFATE 2.5 MG/3ML
2.5 SOLUTION RESPIRATORY (INHALATION) EVERY 4 HOURS PRN
Qty: 3 ML | Refills: 12 | Status: SHIPPED | OUTPATIENT
Start: 2021-01-01

## 2021-01-01 RX ORDER — PREDNISONE 20 MG/1
20 TABLET ORAL DAILY
Status: DISCONTINUED | OUTPATIENT
Start: 2021-01-01 | End: 2021-01-01 | Stop reason: HOSPADM

## 2021-01-01 RX ORDER — HYDROCODONE BITARTRATE AND ACETAMINOPHEN 10; 325 MG/1; MG/1
1 TABLET ORAL EVERY 12 HOURS PRN
Qty: 60 TABLET | Refills: 0 | Status: SHIPPED | OUTPATIENT
Start: 2021-01-01

## 2021-01-01 RX ORDER — ACETAMINOPHEN 500 MG
500 TABLET ORAL
Status: DISCONTINUED | OUTPATIENT
Start: 2021-01-01 | End: 2021-01-01

## 2021-01-01 RX ORDER — ACETAMINOPHEN 325 MG/1
650 TABLET ORAL 3 TIMES DAILY
Status: DISCONTINUED | OUTPATIENT
Start: 2021-01-01 | End: 2021-01-01 | Stop reason: HOSPADM

## 2021-01-01 RX ORDER — LIDOCAINE HYDROCHLORIDE 10 MG/ML
0.5 INJECTION, SOLUTION EPIDURAL; INFILTRATION; INTRACAUDAL; PERINEURAL ONCE AS NEEDED
Status: CANCELLED | OUTPATIENT
Start: 2021-01-01

## 2021-01-01 RX ORDER — PANTOPRAZOLE SODIUM 40 MG/1
40 TABLET, DELAYED RELEASE ORAL
Status: DISCONTINUED | OUTPATIENT
Start: 2021-01-01 | End: 2021-01-01

## 2021-01-01 RX ORDER — CLOPIDOGREL BISULFATE 75 MG/1
75 TABLET ORAL DAILY
Status: DISCONTINUED | OUTPATIENT
Start: 2021-01-01 | End: 2021-01-01

## 2021-01-01 RX ORDER — GUAIFENESIN 600 MG/1
600 TABLET, EXTENDED RELEASE ORAL EVERY 12 HOURS SCHEDULED
Status: DISCONTINUED | OUTPATIENT
Start: 2021-01-01 | End: 2021-01-01 | Stop reason: HOSPADM

## 2021-01-01 RX ORDER — ACETAMINOPHEN 500 MG
1000 TABLET ORAL ONCE
Status: COMPLETED | OUTPATIENT
Start: 2021-01-01 | End: 2021-01-01

## 2021-01-01 RX ORDER — NALOXONE HCL 0.4 MG/ML
0.4 VIAL (ML) INJECTION
Status: DISCONTINUED | OUTPATIENT
Start: 2021-01-01 | End: 2021-01-01 | Stop reason: HOSPADM

## 2021-01-01 RX ORDER — ASPIRIN 81 MG/1
81 TABLET ORAL DAILY
Status: DISCONTINUED | OUTPATIENT
Start: 2021-01-01 | End: 2021-01-01 | Stop reason: HOSPADM

## 2021-01-01 RX ORDER — ALBUTEROL SULFATE 90 UG/1
2 AEROSOL, METERED RESPIRATORY (INHALATION) EVERY 4 HOURS PRN
Qty: 18 G | Refills: 5 | Status: SHIPPED | OUTPATIENT
Start: 2021-01-01 | End: 2021-01-01 | Stop reason: SDUPTHER

## 2021-01-01 RX ORDER — LORAZEPAM 0.5 MG/1
0.5 TABLET ORAL 2 TIMES DAILY PRN
Qty: 60 TABLET | Refills: 0 | Status: SHIPPED | OUTPATIENT
Start: 2021-01-01 | End: 2021-01-01

## 2021-01-01 RX ORDER — FUROSEMIDE 20 MG/1
20 TABLET ORAL DAILY
Status: DISCONTINUED | OUTPATIENT
Start: 2021-01-01 | End: 2021-01-01

## 2021-01-01 RX ORDER — PREDNISONE 1 MG/1
5 TABLET ORAL DAILY
Qty: 90 TABLET | Refills: 3 | Status: SHIPPED | OUTPATIENT
Start: 2021-01-01 | End: 2021-01-01

## 2021-01-01 RX ORDER — HYDROCODONE BITARTRATE AND ACETAMINOPHEN 10; 325 MG/1; MG/1
1 TABLET ORAL EVERY 12 HOURS PRN
Qty: 60 TABLET | Refills: 0 | Status: SHIPPED | OUTPATIENT
Start: 2021-01-01 | End: 2021-01-01 | Stop reason: SDUPTHER

## 2021-01-01 RX ORDER — AMOXICILLIN AND CLAVULANATE POTASSIUM 875; 125 MG/1; MG/1
1 TABLET, FILM COATED ORAL EVERY 12 HOURS SCHEDULED
Status: DISCONTINUED | OUTPATIENT
Start: 2021-01-01 | End: 2021-01-01 | Stop reason: HOSPADM

## 2021-01-01 RX ORDER — LORAZEPAM 0.5 MG/1
0.5 TABLET ORAL ONCE
Status: COMPLETED | OUTPATIENT
Start: 2021-01-01 | End: 2021-01-01

## 2021-01-01 RX ORDER — HYDROCODONE BITARTRATE AND ACETAMINOPHEN 10; 325 MG/1; MG/1
1 TABLET ORAL EVERY 12 HOURS PRN
Qty: 60 TABLET | Refills: 0 | Status: CANCELLED | OUTPATIENT
Start: 2021-01-01

## 2021-01-01 RX ORDER — ACETAMINOPHEN 160 MG/5ML
650 SOLUTION ORAL EVERY 4 HOURS PRN
Status: DISCONTINUED | OUTPATIENT
Start: 2021-01-01 | End: 2021-01-01 | Stop reason: HOSPADM

## 2021-01-01 RX ORDER — IPRATROPIUM BROMIDE AND ALBUTEROL SULFATE 2.5; .5 MG/3ML; MG/3ML
3 SOLUTION RESPIRATORY (INHALATION) EVERY 4 HOURS PRN
Qty: 360 ML | Refills: 5 | Status: ON HOLD | OUTPATIENT
Start: 2021-01-01 | End: 2021-01-01

## 2021-01-01 RX ORDER — ROPINIROLE 0.5 MG/1
0.25 TABLET, FILM COATED ORAL NIGHTLY
Status: DISCONTINUED | OUTPATIENT
Start: 2021-01-01 | End: 2021-01-01 | Stop reason: HOSPADM

## 2021-01-01 RX ORDER — SODIUM CHLORIDE, SODIUM LACTATE, POTASSIUM CHLORIDE, CALCIUM CHLORIDE 600; 310; 30; 20 MG/100ML; MG/100ML; MG/100ML; MG/100ML
75 INJECTION, SOLUTION INTRAVENOUS CONTINUOUS
Status: DISCONTINUED | OUTPATIENT
Start: 2021-01-01 | End: 2021-01-01

## 2021-01-01 RX ORDER — LABETALOL HYDROCHLORIDE 5 MG/ML
10 INJECTION, SOLUTION INTRAVENOUS EVERY 6 HOURS PRN
Status: DISCONTINUED | OUTPATIENT
Start: 2021-01-01 | End: 2021-01-01 | Stop reason: HOSPADM

## 2021-01-01 RX ORDER — HYDROCODONE BITARTRATE AND ACETAMINOPHEN 5; 325 MG/1; MG/1
1 TABLET ORAL EVERY 6 HOURS PRN
Status: DISCONTINUED | OUTPATIENT
Start: 2021-01-01 | End: 2021-01-01 | Stop reason: HOSPADM

## 2021-01-01 RX ORDER — POTASSIUM CHLORIDE 750 MG/1
40 CAPSULE, EXTENDED RELEASE ORAL AS NEEDED
Status: DISCONTINUED | OUTPATIENT
Start: 2021-01-01 | End: 2021-01-01 | Stop reason: HOSPADM

## 2021-01-01 RX ORDER — CYANOCOBALAMIN 1000 UG/ML
1000 INJECTION, SOLUTION INTRAMUSCULAR; SUBCUTANEOUS WEEKLY
Qty: 5 ML | Refills: 0 | Status: SHIPPED | OUTPATIENT
Start: 2021-01-01 | End: 2021-01-01

## 2021-01-01 RX ORDER — PHENOBARBITAL 32.4 MG/1
97.2 TABLET ORAL NIGHTLY
Status: DISCONTINUED | OUTPATIENT
Start: 2021-01-01 | End: 2021-01-01 | Stop reason: HOSPADM

## 2021-01-01 RX ORDER — HEPARIN SODIUM 5000 [USP'U]/ML
5000 INJECTION, SOLUTION INTRAVENOUS; SUBCUTANEOUS EVERY 8 HOURS SCHEDULED
Status: DISCONTINUED | OUTPATIENT
Start: 2021-01-01 | End: 2021-01-01 | Stop reason: HOSPADM

## 2021-01-01 RX ORDER — MIDODRINE HYDROCHLORIDE 5 MG/1
5 TABLET ORAL
Status: DISPENSED | OUTPATIENT
Start: 2021-01-01 | End: 2021-01-01

## 2021-01-01 RX ORDER — LORAZEPAM 1 MG/1
TABLET ORAL
Qty: 60 TABLET | Refills: 2 | OUTPATIENT
Start: 2021-01-01

## 2021-01-01 RX ORDER — ALBUTEROL SULFATE 90 UG/1
2 AEROSOL, METERED RESPIRATORY (INHALATION) EVERY 4 HOURS PRN
Qty: 18 G | Refills: 0 | Status: SHIPPED | OUTPATIENT
Start: 2021-01-01 | End: 2021-01-01

## 2021-01-01 RX ORDER — ASPIRIN 81 MG/1
81 TABLET, CHEWABLE ORAL DAILY
Status: DISCONTINUED | OUTPATIENT
Start: 2021-01-01 | End: 2021-01-01 | Stop reason: HOSPADM

## 2021-01-01 RX ORDER — LORAZEPAM 1 MG/1
1 TABLET ORAL 2 TIMES DAILY PRN
Status: DISCONTINUED | OUTPATIENT
Start: 2021-01-01 | End: 2021-01-01 | Stop reason: HOSPADM

## 2021-01-01 RX ORDER — FUROSEMIDE 10 MG/ML
40 INJECTION INTRAMUSCULAR; INTRAVENOUS ONCE
Status: COMPLETED | OUTPATIENT
Start: 2021-01-01 | End: 2021-01-01

## 2021-01-01 RX ORDER — IPRATROPIUM BROMIDE AND ALBUTEROL SULFATE 2.5; .5 MG/3ML; MG/3ML
3 SOLUTION RESPIRATORY (INHALATION) EVERY 4 HOURS PRN
Qty: 360 ML | Refills: 5 | Status: SHIPPED | OUTPATIENT
Start: 2021-01-01 | End: 2021-01-01 | Stop reason: SDUPTHER

## 2021-01-01 RX ORDER — NOREPINEPHRINE BIT/0.9 % NACL 8 MG/250ML
INFUSION BOTTLE (ML) INTRAVENOUS
Status: COMPLETED
Start: 2021-01-01 | End: 2021-01-01

## 2021-01-01 RX ORDER — PANTOPRAZOLE SODIUM 40 MG/1
40 TABLET, DELAYED RELEASE ORAL
Refills: 5 | Status: DISCONTINUED | OUTPATIENT
Start: 2021-01-01 | End: 2021-01-01

## 2021-01-01 RX ORDER — FUROSEMIDE 40 MG/1
40 TABLET ORAL DAILY
COMMUNITY
End: 2021-01-01 | Stop reason: SDUPTHER

## 2021-01-01 RX ORDER — HYDROCODONE BITARTRATE AND ACETAMINOPHEN 10; 325 MG/1; MG/1
1 TABLET ORAL EVERY 8 HOURS PRN
Status: DISCONTINUED | OUTPATIENT
Start: 2021-01-01 | End: 2021-01-01 | Stop reason: HOSPADM

## 2021-01-01 RX ORDER — CEFUROXIME AXETIL 500 MG/1
500 TABLET ORAL EVERY 12 HOURS SCHEDULED
Qty: 5 TABLET | Refills: 0 | Status: SHIPPED | OUTPATIENT
Start: 2021-01-01 | End: 2021-01-01

## 2021-01-01 RX ORDER — HYDROCODONE BITARTRATE AND ACETAMINOPHEN 10; 325 MG/1; MG/1
1 TABLET ORAL EVERY 6 HOURS PRN
Qty: 120 TABLET | Refills: 0 | OUTPATIENT
Start: 2021-01-01

## 2021-01-01 RX ORDER — ACETAMINOPHEN 160 MG/5ML
650 SOLUTION ORAL EVERY 4 HOURS PRN
Status: DISCONTINUED | OUTPATIENT
Start: 2021-01-01 | End: 2021-01-01

## 2021-01-01 RX ORDER — LORAZEPAM 1 MG/1
1 TABLET ORAL ONCE
Status: COMPLETED | OUTPATIENT
Start: 2021-01-01 | End: 2021-01-01

## 2021-01-01 RX ORDER — SODIUM CHLORIDE 0.9 % (FLUSH) 0.9 %
10 SYRINGE (ML) INJECTION AS NEEDED
Status: CANCELLED | OUTPATIENT
Start: 2021-01-01

## 2021-01-01 RX ORDER — CHOLECALCIFEROL (VITAMIN D3) 125 MCG
5 CAPSULE ORAL NIGHTLY
Status: DISCONTINUED | OUTPATIENT
Start: 2021-01-01 | End: 2021-01-01 | Stop reason: HOSPADM

## 2021-01-01 RX ORDER — PANTOPRAZOLE SODIUM 40 MG/1
40 TABLET, DELAYED RELEASE ORAL DAILY
Status: DISCONTINUED | OUTPATIENT
Start: 2021-01-01 | End: 2021-01-01 | Stop reason: HOSPADM

## 2021-01-01 RX ORDER — MORPHINE SULFATE 2 MG/ML
1 INJECTION, SOLUTION INTRAMUSCULAR; INTRAVENOUS EVERY 4 HOURS PRN
Status: DISCONTINUED | OUTPATIENT
Start: 2021-01-01 | End: 2021-01-01 | Stop reason: HOSPADM

## 2021-01-01 RX ORDER — ASPIRIN 81 MG/1
81 TABLET, CHEWABLE ORAL DAILY
Status: DISCONTINUED | OUTPATIENT
Start: 2021-01-01 | End: 2021-01-01

## 2021-01-01 RX ORDER — LORAZEPAM 1 MG/1
1 TABLET ORAL 2 TIMES DAILY
Qty: 60 TABLET | Refills: 0 | Status: SHIPPED | OUTPATIENT
Start: 2021-01-01 | End: 2021-01-01 | Stop reason: SDUPTHER

## 2021-01-01 RX ORDER — PEG-3350, SODIUM SULFATE, SODIUM CHLORIDE, POTASSIUM CHLORIDE, SODIUM ASCORBATE AND ASCORBIC ACID 7.5-2.691G
1000 KIT ORAL
Status: COMPLETED | OUTPATIENT
Start: 2021-01-01 | End: 2021-01-01

## 2021-01-01 RX ORDER — LIDOCAINE 50 MG/G
1 PATCH TOPICAL
Status: DISCONTINUED | OUTPATIENT
Start: 2021-01-01 | End: 2021-01-01 | Stop reason: SDUPTHER

## 2021-01-01 RX ORDER — ONDANSETRON 4 MG/1
4 TABLET, FILM COATED ORAL EVERY 6 HOURS PRN
Qty: 20 TABLET | Refills: 2 | Status: SHIPPED | OUTPATIENT
Start: 2021-01-01 | End: 2021-01-01

## 2021-01-01 RX ORDER — LIDOCAINE 50 MG/G
1 PATCH TOPICAL EVERY 24 HOURS
Status: CANCELLED | OUTPATIENT
Start: 2021-01-01

## 2021-01-01 RX ORDER — PROMETHAZINE HYDROCHLORIDE 12.5 MG/1
12.5 TABLET ORAL DAILY PRN
Qty: 30 TABLET | Refills: 2 | Status: SHIPPED | OUTPATIENT
Start: 2021-01-01 | End: 2021-01-01 | Stop reason: DRUGHIGH

## 2021-01-01 RX ORDER — AMOXICILLIN AND CLAVULANATE POTASSIUM 875; 125 MG/1; MG/1
1 TABLET, FILM COATED ORAL EVERY 12 HOURS SCHEDULED
Qty: 7 TABLET | Refills: 0 | Status: SHIPPED | OUTPATIENT
Start: 2021-01-01 | End: 2021-01-01

## 2021-01-01 RX ORDER — NICOTINE 21 MG/24HR
1 PATCH, TRANSDERMAL 24 HOURS TRANSDERMAL EVERY 24 HOURS
Qty: 28 EACH | Refills: 5 | Status: SHIPPED | OUTPATIENT
Start: 2021-01-01 | End: 2021-01-01

## 2021-01-01 RX ORDER — SODIUM CHLORIDE 9 MG/ML
50 INJECTION, SOLUTION INTRAVENOUS CONTINUOUS
Status: DISCONTINUED | OUTPATIENT
Start: 2021-01-01 | End: 2021-01-01

## 2021-01-01 RX ORDER — CLOPIDOGREL BISULFATE 75 MG/1
75 TABLET ORAL DAILY
Qty: 90 TABLET | Refills: 3 | Status: SHIPPED | OUTPATIENT
Start: 2021-01-01

## 2021-01-01 RX ORDER — ALBUTEROL SULFATE 90 UG/1
2 AEROSOL, METERED RESPIRATORY (INHALATION) ONCE
Status: COMPLETED | OUTPATIENT
Start: 2021-01-01 | End: 2021-01-01

## 2021-01-01 RX ORDER — PANTOPRAZOLE SODIUM 40 MG/10ML
80 INJECTION, POWDER, LYOPHILIZED, FOR SOLUTION INTRAVENOUS ONCE
Status: COMPLETED | OUTPATIENT
Start: 2021-01-01 | End: 2021-01-01

## 2021-01-01 RX ORDER — METHYLPREDNISOLONE SODIUM SUCCINATE 40 MG/ML
40 INJECTION, POWDER, LYOPHILIZED, FOR SOLUTION INTRAMUSCULAR; INTRAVENOUS EVERY 12 HOURS SCHEDULED
Status: DISCONTINUED | OUTPATIENT
Start: 2021-01-01 | End: 2021-01-01

## 2021-01-01 RX ORDER — LIDOCAINE 50 MG/G
1 PATCH TOPICAL EVERY 24 HOURS
Qty: 30 PATCH | Refills: 2 | Status: SHIPPED | OUTPATIENT
Start: 2021-01-01

## 2021-01-01 RX ORDER — ISOSORBIDE MONONITRATE 60 MG/1
60 TABLET, EXTENDED RELEASE ORAL DAILY
Status: DISCONTINUED | OUTPATIENT
Start: 2021-01-01 | End: 2021-01-01 | Stop reason: HOSPADM

## 2021-01-01 RX ORDER — ACETAMINOPHEN 650 MG/1
650 SUPPOSITORY RECTAL EVERY 4 HOURS PRN
Status: DISCONTINUED | OUTPATIENT
Start: 2021-01-01 | End: 2021-01-01 | Stop reason: HOSPADM

## 2021-01-01 RX ORDER — PHENOBARBITAL 20 MG/5ML
150 SOLUTION ORAL NIGHTLY
Status: DISCONTINUED | OUTPATIENT
Start: 2021-01-01 | End: 2021-01-01 | Stop reason: ALTCHOICE

## 2021-01-01 RX ORDER — PANTOPRAZOLE SODIUM 40 MG/10ML
40 INJECTION, POWDER, LYOPHILIZED, FOR SOLUTION INTRAVENOUS EVERY 12 HOURS SCHEDULED
Status: DISCONTINUED | OUTPATIENT
Start: 2021-01-01 | End: 2021-01-01

## 2021-01-01 RX ORDER — DILTIAZEM HYDROCHLORIDE 240 MG/1
240 CAPSULE, COATED, EXTENDED RELEASE ORAL DAILY
Qty: 90 CAPSULE | Refills: 3 | Status: SHIPPED | OUTPATIENT
Start: 2021-01-01 | End: 2021-01-01 | Stop reason: SDUPTHER

## 2021-01-01 RX ORDER — CETIRIZINE HYDROCHLORIDE 10 MG/1
5 TABLET ORAL DAILY
Status: DISCONTINUED | OUTPATIENT
Start: 2021-01-01 | End: 2021-01-01 | Stop reason: HOSPADM

## 2021-01-01 RX ORDER — FUROSEMIDE 20 MG/1
20 TABLET ORAL DAILY
Status: DISCONTINUED | OUTPATIENT
Start: 2021-01-01 | End: 2021-01-01 | Stop reason: HOSPADM

## 2021-01-01 RX ORDER — SODIUM CHLORIDE 9 MG/ML
100 INJECTION, SOLUTION INTRAVENOUS CONTINUOUS
Status: DISCONTINUED | OUTPATIENT
Start: 2021-01-01 | End: 2021-01-01

## 2021-01-01 RX ORDER — ONDANSETRON 2 MG/ML
4 INJECTION INTRAMUSCULAR; INTRAVENOUS ONCE
Status: COMPLETED | OUTPATIENT
Start: 2021-01-01 | End: 2021-01-01

## 2021-01-01 RX ORDER — METHYLPREDNISOLONE SODIUM SUCCINATE 125 MG/2ML
60 INJECTION, POWDER, LYOPHILIZED, FOR SOLUTION INTRAMUSCULAR; INTRAVENOUS EVERY 8 HOURS
Status: DISCONTINUED | OUTPATIENT
Start: 2021-01-01 | End: 2021-01-01

## 2021-01-01 RX ORDER — IPRATROPIUM BROMIDE AND ALBUTEROL SULFATE 2.5; .5 MG/3ML; MG/3ML
3 SOLUTION RESPIRATORY (INHALATION) EVERY 4 HOURS PRN
Status: DISCONTINUED | OUTPATIENT
Start: 2021-01-01 | End: 2021-01-01 | Stop reason: SDUPTHER

## 2021-01-01 RX ORDER — PHENOBARBITAL 100 MG/1
TABLET ORAL
Qty: 45 TABLET | OUTPATIENT
Start: 2021-01-01

## 2021-01-01 RX ORDER — PROPOFOL 10 MG/ML
VIAL (ML) INTRAVENOUS
Status: COMPLETED
Start: 2021-01-01 | End: 2021-01-01

## 2021-01-01 RX ORDER — NOREPINEPHRINE BIT/0.9 % NACL 8 MG/250ML
.02-.3 INFUSION BOTTLE (ML) INTRAVENOUS
Status: DISCONTINUED | OUTPATIENT
Start: 2021-01-01 | End: 2021-01-01

## 2021-01-01 RX ORDER — HYDRALAZINE HYDROCHLORIDE 20 MG/ML
10 INJECTION INTRAMUSCULAR; INTRAVENOUS EVERY 6 HOURS PRN
Status: DISCONTINUED | OUTPATIENT
Start: 2021-01-01 | End: 2021-01-01 | Stop reason: HOSPADM

## 2021-01-01 RX ORDER — DILTIAZEM HYDROCHLORIDE 60 MG/1
60 TABLET, FILM COATED ORAL EVERY 6 HOURS SCHEDULED
Status: DISCONTINUED | OUTPATIENT
Start: 2021-01-01 | End: 2021-01-01

## 2021-01-01 RX ORDER — FENTANYL CITRATE 50 UG/ML
25 INJECTION, SOLUTION INTRAMUSCULAR; INTRAVENOUS
Status: DISCONTINUED | OUTPATIENT
Start: 2021-01-01 | End: 2021-01-01

## 2021-01-01 RX ORDER — SODIUM CHLORIDE, SODIUM LACTATE, POTASSIUM CHLORIDE, CALCIUM CHLORIDE 600; 310; 30; 20 MG/100ML; MG/100ML; MG/100ML; MG/100ML
30 INJECTION, SOLUTION INTRAVENOUS CONTINUOUS PRN
Status: DISCONTINUED | OUTPATIENT
Start: 2021-01-01 | End: 2021-01-01

## 2021-01-01 RX ORDER — PREDNISONE 20 MG/1
40 TABLET ORAL ONCE
Status: COMPLETED | OUTPATIENT
Start: 2021-01-01 | End: 2021-01-01

## 2021-01-01 RX ORDER — IPRATROPIUM BROMIDE AND ALBUTEROL SULFATE 2.5; .5 MG/3ML; MG/3ML
3 SOLUTION RESPIRATORY (INHALATION) EVERY 4 HOURS PRN
Status: DISCONTINUED | OUTPATIENT
Start: 2021-01-01 | End: 2021-01-01

## 2021-01-01 RX ORDER — LISINOPRIL 40 MG/1
40 TABLET ORAL DAILY
Status: ON HOLD | COMMUNITY
End: 2021-01-01

## 2021-01-01 RX ORDER — HYDROCODONE BITARTRATE AND ACETAMINOPHEN 10; 325 MG/1; MG/1
1 TABLET ORAL EVERY 6 HOURS PRN
Status: CANCELLED | OUTPATIENT
Start: 2021-01-01

## 2021-01-01 RX ORDER — ROSUVASTATIN CALCIUM 40 MG/1
40 TABLET, COATED ORAL DAILY
Qty: 30 TABLET | Refills: 2 | Status: SHIPPED | OUTPATIENT
Start: 2021-01-01 | End: 2021-01-01 | Stop reason: SDUPTHER

## 2021-01-01 RX ORDER — HYDROCODONE BITARTRATE AND ACETAMINOPHEN 5; 325 MG/1; MG/1
1 TABLET ORAL EVERY 4 HOURS PRN
Status: DISCONTINUED | OUTPATIENT
Start: 2021-01-01 | End: 2021-01-01 | Stop reason: HOSPADM

## 2021-01-01 RX ORDER — METHYLPREDNISOLONE SODIUM SUCCINATE 125 MG/2ML
125 INJECTION, POWDER, LYOPHILIZED, FOR SOLUTION INTRAMUSCULAR; INTRAVENOUS ONCE
Status: DISCONTINUED | OUTPATIENT
Start: 2021-01-01 | End: 2021-01-01

## 2021-01-01 RX ORDER — SYRINGE, DISPOSABLE, 3 ML
SYRINGE, EMPTY DISPOSABLE MISCELLANEOUS
Qty: 5 EACH | Refills: 0 | Status: ON HOLD | OUTPATIENT
Start: 2021-01-01 | End: 2021-01-01

## 2021-01-01 RX ORDER — CEFTRIAXONE SODIUM 1 G/50ML
1 INJECTION, SOLUTION INTRAVENOUS EVERY 24 HOURS
Status: COMPLETED | OUTPATIENT
Start: 2021-01-01 | End: 2021-01-01

## 2021-01-01 RX ORDER — ACETAMINOPHEN 160 MG/5ML
650 SOLUTION ORAL EVERY 6 HOURS PRN
Status: DISCONTINUED | OUTPATIENT
Start: 2021-01-01 | End: 2021-01-01

## 2021-01-01 RX ORDER — PHENOBARBITAL 100 MG/1
150 TABLET ORAL DAILY
Qty: 45 TABLET | Refills: 2 | Status: SHIPPED | OUTPATIENT
Start: 2021-01-01

## 2021-01-01 RX ORDER — DOXYCYCLINE 100 MG/1
100 CAPSULE ORAL EVERY 12 HOURS SCHEDULED
Status: DISCONTINUED | OUTPATIENT
Start: 2021-01-01 | End: 2021-01-01 | Stop reason: HOSPADM

## 2021-01-01 RX ORDER — DILTIAZEM HYDROCHLORIDE 240 MG/1
240 CAPSULE, COATED, EXTENDED RELEASE ORAL DAILY
Qty: 90 CAPSULE | Refills: 3 | Status: SHIPPED | OUTPATIENT
Start: 2021-01-01

## 2021-01-01 RX ORDER — HYDROCODONE BITARTRATE AND ACETAMINOPHEN 5; 325 MG/1; MG/1
1 TABLET ORAL ONCE
Status: COMPLETED | OUTPATIENT
Start: 2021-01-01 | End: 2021-01-01

## 2021-01-01 RX ORDER — SODIUM CHLORIDE, SODIUM LACTATE, POTASSIUM CHLORIDE, CALCIUM CHLORIDE 600; 310; 30; 20 MG/100ML; MG/100ML; MG/100ML; MG/100ML
100 INJECTION, SOLUTION INTRAVENOUS CONTINUOUS
Status: ACTIVE | OUTPATIENT
Start: 2021-01-01 | End: 2021-01-01

## 2021-01-01 RX ORDER — LORAZEPAM 0.5 MG/1
0.5 TABLET ORAL NIGHTLY
Status: DISCONTINUED | OUTPATIENT
Start: 2021-01-01 | End: 2021-01-01 | Stop reason: HOSPADM

## 2021-01-01 RX ORDER — ESOMEPRAZOLE MAGNESIUM 40 MG/1
40 CAPSULE, DELAYED RELEASE ORAL
COMMUNITY
Start: 2021-01-01 | End: 2021-01-01 | Stop reason: HOSPADM

## 2021-01-01 RX ORDER — PREDNISONE 20 MG/1
40 TABLET ORAL ONCE
Qty: 2 TABLET | Refills: 0 | Status: SHIPPED | OUTPATIENT
Start: 2021-01-01 | End: 2021-01-01

## 2021-01-01 RX ORDER — DEXTROSE AND SODIUM CHLORIDE 5; .9 G/100ML; G/100ML
100 INJECTION, SOLUTION INTRAVENOUS CONTINUOUS
Status: DISCONTINUED | OUTPATIENT
Start: 2021-01-01 | End: 2021-01-01

## 2021-01-01 RX ORDER — PHENOBARBITAL 64.8 MG/1
100 TABLET ORAL NIGHTLY
Status: DISCONTINUED | OUTPATIENT
Start: 2021-01-01 | End: 2021-01-01

## 2021-01-01 RX ORDER — CHOLECALCIFEROL (VITAMIN D3) 125 MCG
5 CAPSULE ORAL NIGHTLY
Qty: 3 TABLET | Refills: 0 | Status: SHIPPED | OUTPATIENT
Start: 2021-01-01

## 2021-01-01 RX ORDER — FUROSEMIDE 10 MG/ML
20 INJECTION INTRAMUSCULAR; INTRAVENOUS ONCE
Status: DISCONTINUED | OUTPATIENT
Start: 2021-01-01 | End: 2021-01-01

## 2021-01-01 RX ORDER — ALBUTEROL SULFATE 2.5 MG/3ML
2.5 SOLUTION RESPIRATORY (INHALATION) ONCE
Status: COMPLETED | OUTPATIENT
Start: 2021-01-01 | End: 2021-01-01

## 2021-01-01 RX ORDER — ALBUTEROL SULFATE 90 UG/1
AEROSOL, METERED RESPIRATORY (INHALATION)
Qty: 18 G | Refills: 5 | OUTPATIENT
Start: 2021-01-01

## 2021-01-01 RX ORDER — ACETAMINOPHEN 160 MG/5ML
650 SOLUTION ORAL EVERY 8 HOURS
Status: DISCONTINUED | OUTPATIENT
Start: 2021-01-01 | End: 2021-01-01 | Stop reason: HOSPADM

## 2021-01-01 RX ORDER — CYANOCOBALAMIN 1000 UG/ML
1000 INJECTION, SOLUTION INTRAMUSCULAR; SUBCUTANEOUS
Status: DISCONTINUED | OUTPATIENT
Start: 2021-01-01 | End: 2021-01-01 | Stop reason: HOSPADM

## 2021-01-01 RX ORDER — IPRATROPIUM BROMIDE AND ALBUTEROL SULFATE 2.5; .5 MG/3ML; MG/3ML
3 SOLUTION RESPIRATORY (INHALATION)
Refills: 5 | Status: DISCONTINUED | OUTPATIENT
Start: 2021-01-01 | End: 2021-01-01 | Stop reason: HOSPADM

## 2021-01-01 RX ORDER — CITALOPRAM 20 MG/1
20 TABLET ORAL DAILY
Qty: 30 TABLET | Refills: 2 | Status: SHIPPED | OUTPATIENT
Start: 2021-01-01 | End: 2021-01-01

## 2021-01-01 RX ORDER — PHENOBARBITAL 20 MG/5ML
150 SOLUTION ORAL NIGHTLY
Status: DISCONTINUED | OUTPATIENT
Start: 2021-01-01 | End: 2021-01-01

## 2021-01-01 RX ORDER — METHYLPREDNISOLONE SODIUM SUCCINATE 125 MG/2ML
60 INJECTION, POWDER, LYOPHILIZED, FOR SOLUTION INTRAMUSCULAR; INTRAVENOUS EVERY 12 HOURS SCHEDULED
Status: COMPLETED | OUTPATIENT
Start: 2021-01-01 | End: 2021-01-01

## 2021-01-01 RX ORDER — MIDAZOLAM HYDROCHLORIDE 1 MG/ML
0.5 INJECTION INTRAMUSCULAR; INTRAVENOUS
Status: CANCELLED | OUTPATIENT
Start: 2021-01-01

## 2021-01-01 RX ORDER — PROPOFOL 10 MG/ML
VIAL (ML) INTRAVENOUS CONTINUOUS PRN
Status: DISCONTINUED | OUTPATIENT
Start: 2021-01-01 | End: 2021-01-01 | Stop reason: SURG

## 2021-01-01 RX ORDER — ISOSORBIDE MONONITRATE 60 MG/1
60 TABLET, EXTENDED RELEASE ORAL DAILY
Qty: 30 TABLET | Refills: 3 | Status: SHIPPED | OUTPATIENT
Start: 2021-01-01 | End: 2021-01-01 | Stop reason: HOSPADM

## 2021-01-01 RX ORDER — DEXTROSE MONOHYDRATE 25 G/50ML
INJECTION, SOLUTION INTRAVENOUS
Status: COMPLETED
Start: 2021-01-01 | End: 2021-01-01

## 2021-01-01 RX ORDER — PREDNISONE 10 MG/1
10 TABLET ORAL DAILY
Qty: 2 TABLET | Refills: 0 | Status: SHIPPED | OUTPATIENT
Start: 2021-01-01 | End: 2021-01-01

## 2021-01-01 RX ORDER — PROMETHAZINE HYDROCHLORIDE 12.5 MG/1
12.5 TABLET ORAL DAILY PRN
COMMUNITY
Start: 2021-01-01 | End: 2021-01-01 | Stop reason: SDUPTHER

## 2021-01-01 RX ORDER — DOXYCYCLINE 100 MG/1
100 CAPSULE ORAL EVERY 12 HOURS SCHEDULED
Qty: 7 CAPSULE | Refills: 0 | Status: SHIPPED | OUTPATIENT
Start: 2021-01-01 | End: 2021-01-01

## 2021-01-01 RX ORDER — TERAZOSIN 2 MG/1
2 CAPSULE ORAL NIGHTLY
Status: DISCONTINUED | OUTPATIENT
Start: 2021-01-01 | End: 2021-01-01 | Stop reason: HOSPADM

## 2021-01-01 RX ORDER — POTASSIUM CHLORIDE 29.8 MG/ML
20 INJECTION INTRAVENOUS
Status: DISCONTINUED | OUTPATIENT
Start: 2021-01-01 | End: 2021-01-01 | Stop reason: HOSPADM

## 2021-01-01 RX ORDER — FUROSEMIDE 40 MG/1
40 TABLET ORAL DAILY
Status: DISCONTINUED | OUTPATIENT
Start: 2021-01-01 | End: 2021-01-01 | Stop reason: HOSPADM

## 2021-01-01 RX ORDER — PHENOBARBITAL 100 MG/1
150 TABLET ORAL DAILY
Status: DISCONTINUED | OUTPATIENT
Start: 2021-01-01 | End: 2021-01-01 | Stop reason: HOSPADM

## 2021-01-01 RX ORDER — SODIUM CHLORIDE 0.9 % (FLUSH) 0.9 %
1-10 SYRINGE (ML) INJECTION AS NEEDED
Status: DISCONTINUED | OUTPATIENT
Start: 2021-01-01 | End: 2021-01-01 | Stop reason: HOSPADM

## 2021-01-01 RX ORDER — SODIUM CHLORIDE 0.9 % (FLUSH) 0.9 %
20 SYRINGE (ML) INJECTION AS NEEDED
Status: DISCONTINUED | OUTPATIENT
Start: 2021-01-01 | End: 2021-01-01 | Stop reason: HOSPADM

## 2021-01-01 RX ORDER — ACETAMINOPHEN 325 MG/1
650 TABLET ORAL EVERY 4 HOURS PRN
Status: DISCONTINUED | OUTPATIENT
Start: 2021-01-01 | End: 2021-01-01

## 2021-01-01 RX ORDER — LISINOPRIL 40 MG/1
40 TABLET ORAL DAILY
Status: DISCONTINUED | OUTPATIENT
Start: 2021-01-01 | End: 2021-01-01

## 2021-01-01 RX ORDER — ALBUTEROL SULFATE 90 UG/1
2 AEROSOL, METERED RESPIRATORY (INHALATION) EVERY 4 HOURS PRN
Qty: 18 G | Refills: 3 | Status: SHIPPED | OUTPATIENT
Start: 2021-01-01

## 2021-01-01 RX ORDER — CLOPIDOGREL BISULFATE 75 MG/1
75 TABLET ORAL DAILY
Qty: 30 TABLET | Refills: 5 | Status: SHIPPED | OUTPATIENT
Start: 2021-01-01 | End: 2021-01-01 | Stop reason: SDUPTHER

## 2021-01-01 RX ORDER — NALOXONE HYDROCHLORIDE 0.4 MG/ML
INJECTION, SOLUTION INTRAMUSCULAR; INTRAVENOUS; SUBCUTANEOUS
Status: COMPLETED
Start: 2021-01-01 | End: 2021-01-01

## 2021-01-01 RX ORDER — POTASSIUM CHLORIDE 20 MEQ/1
20 TABLET, EXTENDED RELEASE ORAL DAILY
Qty: 30 TABLET | Refills: 2 | Status: SHIPPED | OUTPATIENT
Start: 2021-01-01 | End: 2021-01-01 | Stop reason: HOSPADM

## 2021-01-01 RX ORDER — ACETAMINOPHEN 650 MG/1
650 SUPPOSITORY RECTAL EVERY 8 HOURS
Status: DISCONTINUED | OUTPATIENT
Start: 2021-01-01 | End: 2021-01-01 | Stop reason: HOSPADM

## 2021-01-01 RX ORDER — ACETAMINOPHEN 650 MG/1
650 SUPPOSITORY RECTAL EVERY 4 HOURS PRN
Status: DISCONTINUED | OUTPATIENT
Start: 2021-01-01 | End: 2021-01-01

## 2021-01-01 RX ORDER — HYDROCODONE BITARTRATE AND ACETAMINOPHEN 10; 325 MG/1; MG/1
1 TABLET ORAL EVERY 6 HOURS PRN
Status: DISCONTINUED | OUTPATIENT
Start: 2021-01-01 | End: 2021-01-01

## 2021-01-01 RX ORDER — DILTIAZEM HYDROCHLORIDE 240 MG/1
CAPSULE, EXTENDED RELEASE ORAL
COMMUNITY
Start: 2021-01-01 | End: 2021-01-01 | Stop reason: SDUPTHER

## 2021-01-01 RX ORDER — HYDROCODONE BITARTRATE AND ACETAMINOPHEN 10; 325 MG/1; MG/1
1 TABLET ORAL EVERY 6 HOURS PRN
COMMUNITY
End: 2021-01-01 | Stop reason: SDUPTHER

## 2021-01-01 RX ORDER — SODIUM CHLORIDE, SODIUM LACTATE, POTASSIUM CHLORIDE, CALCIUM CHLORIDE 600; 310; 30; 20 MG/100ML; MG/100ML; MG/100ML; MG/100ML
9 INJECTION, SOLUTION INTRAVENOUS CONTINUOUS
Status: CANCELLED | OUTPATIENT
Start: 2021-01-01

## 2021-01-01 RX ORDER — ETOMIDATE 2 MG/ML
INJECTION INTRAVENOUS
Status: COMPLETED | OUTPATIENT
Start: 2021-01-01 | End: 2021-01-01

## 2021-01-01 RX ORDER — NICOTINE 21 MG/24HR
1 PATCH, TRANSDERMAL 24 HOURS TRANSDERMAL
Status: DISCONTINUED | OUTPATIENT
Start: 2021-01-01 | End: 2021-01-01 | Stop reason: HOSPADM

## 2021-01-01 RX ORDER — LORAZEPAM 0.5 MG/1
0.5 TABLET ORAL EVERY 12 HOURS PRN
Status: DISCONTINUED | OUTPATIENT
Start: 2021-01-01 | End: 2021-01-01 | Stop reason: HOSPADM

## 2021-01-01 RX ORDER — BENZONATATE 100 MG/1
100 CAPSULE ORAL 3 TIMES DAILY PRN
Qty: 30 CAPSULE | Refills: 0 | Status: SHIPPED | OUTPATIENT
Start: 2021-01-01 | End: 2021-01-01 | Stop reason: SDUPTHER

## 2021-01-01 RX ORDER — POLYETHYLENE GLYCOL 3350 17 G/17G
17 POWDER, FOR SOLUTION ORAL DAILY
Status: DISCONTINUED | OUTPATIENT
Start: 2021-01-01 | End: 2021-01-01 | Stop reason: HOSPADM

## 2021-01-01 RX ORDER — SODIUM CHLORIDE 0.9 % (FLUSH) 0.9 %
10 SYRINGE (ML) INJECTION EVERY 12 HOURS SCHEDULED
Status: DISCONTINUED | OUTPATIENT
Start: 2021-01-01 | End: 2021-01-01

## 2021-01-01 RX ORDER — DEXTROSE MONOHYDRATE 25 G/50ML
25 INJECTION, SOLUTION INTRAVENOUS
Status: DISCONTINUED | OUTPATIENT
Start: 2021-01-01 | End: 2021-01-01 | Stop reason: HOSPADM

## 2021-01-01 RX ORDER — ACETAMINOPHEN 325 MG/1
650 TABLET ORAL EVERY 8 HOURS PRN
Status: DISCONTINUED | OUTPATIENT
Start: 2021-01-01 | End: 2021-01-01 | Stop reason: HOSPADM

## 2021-01-01 RX ORDER — PREDNISONE 20 MG/1
TABLET ORAL
Qty: 12 TABLET | Refills: 0 | Status: SHIPPED | OUTPATIENT
Start: 2021-01-01 | End: 2021-01-01

## 2021-01-01 RX ORDER — LORAZEPAM 1 MG/1
1 TABLET ORAL 2 TIMES DAILY
Qty: 60 TABLET | Refills: 0 | Status: CANCELLED | OUTPATIENT
Start: 2021-01-01

## 2021-01-01 RX ORDER — METHYLPREDNISOLONE SODIUM SUCCINATE 125 MG/2ML
60 INJECTION, POWDER, LYOPHILIZED, FOR SOLUTION INTRAMUSCULAR; INTRAVENOUS EVERY 8 HOURS
Status: COMPLETED | OUTPATIENT
Start: 2021-01-01 | End: 2021-01-01

## 2021-01-01 RX ORDER — ROSUVASTATIN CALCIUM 40 MG/1
40 TABLET, COATED ORAL NIGHTLY
Qty: 90 TABLET | Refills: 1 | Status: SHIPPED | OUTPATIENT
Start: 2021-01-01

## 2021-01-01 RX ORDER — PHENOBARBITAL 100 MG/1
150 TABLET ORAL DAILY
Status: DISCONTINUED | OUTPATIENT
Start: 2021-01-01 | End: 2021-01-01 | Stop reason: ALTCHOICE

## 2021-01-01 RX ORDER — AMOXICILLIN 250 MG
2 CAPSULE ORAL 2 TIMES DAILY PRN
Status: DISCONTINUED | OUTPATIENT
Start: 2021-01-01 | End: 2021-01-01 | Stop reason: HOSPADM

## 2021-01-01 RX ORDER — SODIUM CHLORIDE 0.9 % (FLUSH) 0.9 %
10 SYRINGE (ML) INJECTION EVERY 12 HOURS SCHEDULED
Status: CANCELLED | OUTPATIENT
Start: 2021-01-01

## 2021-01-01 RX ORDER — PREDNISONE 20 MG/1
TABLET ORAL
Qty: 20 TABLET | Refills: 0 | Status: SHIPPED | OUTPATIENT
Start: 2021-01-01 | End: 2021-01-01

## 2021-01-01 RX ORDER — ACETAMINOPHEN 500 MG
500 TABLET ORAL 3 TIMES DAILY
Status: DISCONTINUED | OUTPATIENT
Start: 2021-01-01 | End: 2021-01-01

## 2021-01-01 RX ORDER — ALBUTEROL SULFATE 90 UG/1
2 AEROSOL, METERED RESPIRATORY (INHALATION) EVERY 4 HOURS PRN
Qty: 18 G | Refills: 3 | Status: SHIPPED | OUTPATIENT
Start: 2021-01-01 | End: 2021-01-01 | Stop reason: SDUPTHER

## 2021-01-01 RX ORDER — LIDOCAINE HYDROCHLORIDE 10 MG/ML
INJECTION, SOLUTION EPIDURAL; INFILTRATION; INTRACAUDAL; PERINEURAL AS NEEDED
Status: DISCONTINUED | OUTPATIENT
Start: 2021-01-01 | End: 2021-01-01 | Stop reason: SURG

## 2021-01-01 RX ADMIN — IPRATROPIUM BROMIDE AND ALBUTEROL SULFATE 3 ML: 2.5; .5 SOLUTION RESPIRATORY (INHALATION) at 13:07

## 2021-01-01 RX ADMIN — PANTOPRAZOLE SODIUM 40 MG: 40 TABLET, DELAYED RELEASE ORAL at 09:04

## 2021-01-01 RX ADMIN — AZTREONAM 2 G: 2 INJECTION, POWDER, LYOPHILIZED, FOR SOLUTION INTRAMUSCULAR; INTRAVENOUS at 20:33

## 2021-01-01 RX ADMIN — ALBUTEROL SULFATE 2 PUFF: 108 AEROSOL, METERED RESPIRATORY (INHALATION) at 14:34

## 2021-01-01 RX ADMIN — IPRATROPIUM BROMIDE AND ALBUTEROL SULFATE 3 ML: 2.5; .5 SOLUTION RESPIRATORY (INHALATION) at 16:13

## 2021-01-01 RX ADMIN — SODIUM CHLORIDE 250 MG: 9 INJECTION, SOLUTION INTRAVENOUS at 12:04

## 2021-01-01 RX ADMIN — NALXONE HYDROCHLORIDE 0.4 MG: 0.4 INJECTION INTRAMUSCULAR; INTRAVENOUS; SUBCUTANEOUS at 01:50

## 2021-01-01 RX ADMIN — PHENOBARBITAL 145.8 MG: 97.2 TABLET ORAL at 09:40

## 2021-01-01 RX ADMIN — IPRATROPIUM BROMIDE AND ALBUTEROL SULFATE 3 ML: 2.5; .5 SOLUTION RESPIRATORY (INHALATION) at 15:02

## 2021-01-01 RX ADMIN — ROSUVASTATIN CALCIUM 40 MG: 20 TABLET, COATED ORAL at 20:29

## 2021-01-01 RX ADMIN — METHYLPREDNISOLONE SODIUM SUCCINATE 40 MG: 40 INJECTION, POWDER, FOR SOLUTION INTRAMUSCULAR; INTRAVENOUS at 20:26

## 2021-01-01 RX ADMIN — ENOXAPARIN SODIUM 40 MG: 40 INJECTION SUBCUTANEOUS at 11:26

## 2021-01-01 RX ADMIN — IPRATROPIUM BROMIDE AND ALBUTEROL SULFATE 3 ML: 2.5; .5 SOLUTION RESPIRATORY (INHALATION) at 00:51

## 2021-01-01 RX ADMIN — HEPARIN SODIUM 2500 UNITS: 5000 INJECTION INTRAVENOUS; SUBCUTANEOUS at 09:28

## 2021-01-01 RX ADMIN — FERROUS SULFATE TAB 325 MG (65 MG ELEMENTAL FE) 325 MG: 325 (65 FE) TAB at 08:18

## 2021-01-01 RX ADMIN — DOCUSATE SODIUM 100 MG: 100 CAPSULE, LIQUID FILLED ORAL at 09:20

## 2021-01-01 RX ADMIN — DILTIAZEM HYDROCHLORIDE 240 MG: 240 CAPSULE, COATED, EXTENDED RELEASE ORAL at 08:18

## 2021-01-01 RX ADMIN — PHENOBARBITAL 145.8 MG: 97.2 TABLET ORAL at 09:15

## 2021-01-01 RX ADMIN — LORAZEPAM 1 MG: 1 TABLET ORAL at 09:33

## 2021-01-01 RX ADMIN — HEPARIN SODIUM 5000 UNITS: 5000 INJECTION INTRAVENOUS; SUBCUTANEOUS at 20:55

## 2021-01-01 RX ADMIN — GUAIFENESIN 600 MG: 600 TABLET, EXTENDED RELEASE ORAL at 20:16

## 2021-01-01 RX ADMIN — MIDODRINE HYDROCHLORIDE 5 MG: 10 TABLET ORAL at 11:23

## 2021-01-01 RX ADMIN — CHLORHEXIDINE GLUCONATE 15 ML: 1.2 SOLUTION ORAL at 17:46

## 2021-01-01 RX ADMIN — IPRATROPIUM BROMIDE AND ALBUTEROL SULFATE 3 ML: 2.5; .5 SOLUTION RESPIRATORY (INHALATION) at 19:03

## 2021-01-01 RX ADMIN — BUDESONIDE AND FORMOTEROL FUMARATE DIHYDRATE 2 PUFF: 80; 4.5 AEROSOL RESPIRATORY (INHALATION) at 19:20

## 2021-01-01 RX ADMIN — TAZOBACTAM SODIUM AND PIPERACILLIN SODIUM 3.38 G: 375; 3 INJECTION, SOLUTION INTRAVENOUS at 02:05

## 2021-01-01 RX ADMIN — IPRATROPIUM BROMIDE AND ALBUTEROL SULFATE 3 ML: 2.5; .5 SOLUTION RESPIRATORY (INHALATION) at 00:02

## 2021-01-01 RX ADMIN — AZTREONAM 2 G: 2 INJECTION, POWDER, LYOPHILIZED, FOR SOLUTION INTRAMUSCULAR; INTRAVENOUS at 23:00

## 2021-01-01 RX ADMIN — MIDODRINE HYDROCHLORIDE 5 MG: 10 TABLET ORAL at 06:55

## 2021-01-01 RX ADMIN — ASPIRIN 81 MG: 81 TABLET, CHEWABLE ORAL at 08:47

## 2021-01-01 RX ADMIN — ASPIRIN 81 MG: 81 TABLET, CHEWABLE ORAL at 18:41

## 2021-01-01 RX ADMIN — CLOPIDOGREL BISULFATE 75 MG: 75 TABLET ORAL at 09:03

## 2021-01-01 RX ADMIN — BARIUM SULFATE 20 ML: 400 PASTE ORAL at 15:06

## 2021-01-01 RX ADMIN — PHENOBARBITAL 145.8 MG: 97.2 TABLET ORAL at 22:09

## 2021-01-01 RX ADMIN — Medication 5 MG: at 20:24

## 2021-01-01 RX ADMIN — IPRATROPIUM BROMIDE AND ALBUTEROL SULFATE 3 ML: 2.5; .5 SOLUTION RESPIRATORY (INHALATION) at 06:40

## 2021-01-01 RX ADMIN — HEPARIN SODIUM 5000 UNITS: 5000 INJECTION INTRAVENOUS; SUBCUTANEOUS at 18:18

## 2021-01-01 RX ADMIN — PROMETHAZINE HYDROCHLORIDE 25 MG: 25 TABLET ORAL at 12:37

## 2021-01-01 RX ADMIN — BUDESONIDE 0.5 MG: 0.5 INHALANT RESPIRATORY (INHALATION) at 18:44

## 2021-01-01 RX ADMIN — SODIUM CHLORIDE 250 MG: 9 INJECTION, SOLUTION INTRAVENOUS at 14:31

## 2021-01-01 RX ADMIN — NALOXONE HYDROCHLORIDE 0.4 MG: 0.4 INJECTION, SOLUTION INTRAMUSCULAR; INTRAVENOUS; SUBCUTANEOUS at 04:16

## 2021-01-01 RX ADMIN — DILTIAZEM HYDROCHLORIDE 240 MG: 240 CAPSULE, COATED, EXTENDED RELEASE ORAL at 09:39

## 2021-01-01 RX ADMIN — IPRATROPIUM BROMIDE AND ALBUTEROL SULFATE 3 ML: 2.5; .5 SOLUTION RESPIRATORY (INHALATION) at 21:23

## 2021-01-01 RX ADMIN — SODIUM CHLORIDE, PRESERVATIVE FREE 10 ML: 5 INJECTION INTRAVENOUS at 21:03

## 2021-01-01 RX ADMIN — PANTOPRAZOLE SODIUM 40 MG: 40 TABLET, DELAYED RELEASE ORAL at 17:13

## 2021-01-01 RX ADMIN — PANTOPRAZOLE SODIUM 40 MG: 40 TABLET, DELAYED RELEASE ORAL at 06:30

## 2021-01-01 RX ADMIN — PREDNISONE 40 MG: 20 TABLET ORAL at 08:44

## 2021-01-01 RX ADMIN — ROSUVASTATIN CALCIUM 40 MG: 20 TABLET, COATED ORAL at 20:16

## 2021-01-01 RX ADMIN — IPRATROPIUM BROMIDE AND ALBUTEROL SULFATE 3 ML: 2.5; .5 SOLUTION RESPIRATORY (INHALATION) at 07:20

## 2021-01-01 RX ADMIN — TAZOBACTAM SODIUM AND PIPERACILLIN SODIUM 3.38 G: 375; 3 INJECTION, SOLUTION INTRAVENOUS at 20:59

## 2021-01-01 RX ADMIN — PREDNISONE 20 MG: 20 TABLET ORAL at 08:39

## 2021-01-01 RX ADMIN — DILTIAZEM HYDROCHLORIDE 240 MG: 240 CAPSULE, COATED, EXTENDED RELEASE ORAL at 09:51

## 2021-01-01 RX ADMIN — SODIUM CHLORIDE 1000 ML: 9 INJECTION, SOLUTION INTRAVENOUS at 06:31

## 2021-01-01 RX ADMIN — PANTOPRAZOLE SODIUM 40 MG: 40 TABLET, DELAYED RELEASE ORAL at 17:39

## 2021-01-01 RX ADMIN — DILTIAZEM HYDROCHLORIDE 240 MG: 240 CAPSULE, COATED, EXTENDED RELEASE ORAL at 09:16

## 2021-01-01 RX ADMIN — PANTOPRAZOLE SODIUM 40 MG: 40 TABLET, DELAYED RELEASE ORAL at 16:46

## 2021-01-01 RX ADMIN — IPRATROPIUM BROMIDE AND ALBUTEROL SULFATE 3 ML: 2.5; .5 SOLUTION RESPIRATORY (INHALATION) at 02:28

## 2021-01-01 RX ADMIN — HYDROCODONE BITARTRATE AND ACETAMINOPHEN 1 TABLET: 10; 325 TABLET ORAL at 09:33

## 2021-01-01 RX ADMIN — PANTOPRAZOLE SODIUM 40 MG: 40 INJECTION, POWDER, FOR SOLUTION INTRAVENOUS at 08:11

## 2021-01-01 RX ADMIN — HEPARIN SODIUM 5000 UNITS: 5000 INJECTION INTRAVENOUS; SUBCUTANEOUS at 21:09

## 2021-01-01 RX ADMIN — GUAIFENESIN 600 MG: 600 TABLET, EXTENDED RELEASE ORAL at 08:36

## 2021-01-01 RX ADMIN — DOXYCYCLINE 100 MG: 100 CAPSULE ORAL at 12:33

## 2021-01-01 RX ADMIN — GUAIFENESIN 600 MG: 600 TABLET, EXTENDED RELEASE ORAL at 23:02

## 2021-01-01 RX ADMIN — IPRATROPIUM BROMIDE AND ALBUTEROL SULFATE 3 ML: 2.5; .5 SOLUTION RESPIRATORY (INHALATION) at 09:09

## 2021-01-01 RX ADMIN — CEFTRIAXONE SODIUM 1 G: 1 INJECTION, SOLUTION INTRAVENOUS at 13:00

## 2021-01-01 RX ADMIN — BUDESONIDE 0.5 MG: 0.5 INHALANT RESPIRATORY (INHALATION) at 07:15

## 2021-01-01 RX ADMIN — HYDROCORTISONE SODIUM SUCCINATE 100 MG: 100 INJECTION, POWDER, FOR SOLUTION INTRAMUSCULAR; INTRAVENOUS at 16:50

## 2021-01-01 RX ADMIN — SODIUM CHLORIDE 1 G: 900 INJECTION INTRAVENOUS at 15:24

## 2021-01-01 RX ADMIN — PHENOBARBITAL 97.2 MG: 32.4 TABLET ORAL at 21:36

## 2021-01-01 RX ADMIN — HEPARIN SODIUM 2500 UNITS: 5000 INJECTION INTRAVENOUS; SUBCUTANEOUS at 21:40

## 2021-01-01 RX ADMIN — METHYLPREDNISOLONE SODIUM SUCCINATE 60 MG: 125 INJECTION, POWDER, FOR SOLUTION INTRAMUSCULAR; INTRAVENOUS at 14:07

## 2021-01-01 RX ADMIN — FENTANYL CITRATE 25 MCG: 50 INJECTION INTRAMUSCULAR; INTRAVENOUS at 22:23

## 2021-01-01 RX ADMIN — SODIUM CHLORIDE, PRESERVATIVE FREE 10 ML: 5 INJECTION INTRAVENOUS at 09:27

## 2021-01-01 RX ADMIN — ASPIRIN 81 MG: 81 TABLET, COATED ORAL at 11:23

## 2021-01-01 RX ADMIN — FUROSEMIDE 40 MG: 40 TABLET ORAL at 09:16

## 2021-01-01 RX ADMIN — HYDRALAZINE HYDROCHLORIDE 10 MG: 20 INJECTION INTRAMUSCULAR; INTRAVENOUS at 22:21

## 2021-01-01 RX ADMIN — LORAZEPAM 0.5 MG: 0.5 TABLET ORAL at 08:18

## 2021-01-01 RX ADMIN — LORAZEPAM 0.5 MG: 0.5 TABLET ORAL at 09:03

## 2021-01-01 RX ADMIN — CITALOPRAM HYDROBROMIDE 20 MG: 20 TABLET ORAL at 09:27

## 2021-01-01 RX ADMIN — CEFTRIAXONE SODIUM 1 G: 1 INJECTION, SOLUTION INTRAVENOUS at 17:38

## 2021-01-01 RX ADMIN — PANTOPRAZOLE SODIUM 40 MG: 40 TABLET, DELAYED RELEASE ORAL at 08:56

## 2021-01-01 RX ADMIN — ONDANSETRON 4 MG: 2 INJECTION INTRAMUSCULAR; INTRAVENOUS at 18:27

## 2021-01-01 RX ADMIN — ACETAMINOPHEN 500 MG: 500 TABLET, FILM COATED ORAL at 09:23

## 2021-01-01 RX ADMIN — IPRATROPIUM BROMIDE AND ALBUTEROL SULFATE 3 ML: 2.5; .5 SOLUTION RESPIRATORY (INHALATION) at 18:53

## 2021-01-01 RX ADMIN — PANTOPRAZOLE SODIUM 40 MG: 40 TABLET, DELAYED RELEASE ORAL at 08:18

## 2021-01-01 RX ADMIN — PANTOPRAZOLE SODIUM 40 MG: 40 TABLET, DELAYED RELEASE ORAL at 06:55

## 2021-01-01 RX ADMIN — SODIUM CHLORIDE, PRESERVATIVE FREE 10 ML: 5 INJECTION INTRAVENOUS at 20:26

## 2021-01-01 RX ADMIN — VANCOMYCIN HYDROCHLORIDE 750 MG: 750 INJECTION, SOLUTION INTRAVENOUS at 19:02

## 2021-01-01 RX ADMIN — LEVOFLOXACIN 500 MG: 5 INJECTION, SOLUTION INTRAVENOUS at 11:20

## 2021-01-01 RX ADMIN — IPRATROPIUM BROMIDE AND ALBUTEROL SULFATE 3 ML: 2.5; .5 SOLUTION RESPIRATORY (INHALATION) at 15:35

## 2021-01-01 RX ADMIN — HEPARIN SODIUM 5000 UNITS: 5000 INJECTION INTRAVENOUS; SUBCUTANEOUS at 05:56

## 2021-01-01 RX ADMIN — GUAIFENESIN 600 MG: 600 TABLET, EXTENDED RELEASE ORAL at 09:03

## 2021-01-01 RX ADMIN — ROPINIROLE HYDROCHLORIDE 0.25 MG: 0.5 TABLET, FILM COATED ORAL at 20:56

## 2021-01-01 RX ADMIN — ASPIRIN 81 MG: 81 TABLET, CHEWABLE ORAL at 08:02

## 2021-01-01 RX ADMIN — HYDROCODONE BITARTRATE AND ACETAMINOPHEN 1 TABLET: 10; 325 TABLET ORAL at 17:16

## 2021-01-01 RX ADMIN — LORAZEPAM 1 MG: 1 TABLET ORAL at 12:15

## 2021-01-01 RX ADMIN — IPRATROPIUM BROMIDE AND ALBUTEROL SULFATE 3 ML: 2.5; .5 SOLUTION RESPIRATORY (INHALATION) at 22:32

## 2021-01-01 RX ADMIN — PREDNISONE 40 MG: 20 TABLET ORAL at 11:23

## 2021-01-01 RX ADMIN — AZTREONAM 2 G: 2 INJECTION, POWDER, LYOPHILIZED, FOR SOLUTION INTRAMUSCULAR; INTRAVENOUS at 13:28

## 2021-01-01 RX ADMIN — ACETAMINOPHEN 500 MG: 500 TABLET, FILM COATED ORAL at 09:40

## 2021-01-01 RX ADMIN — CITALOPRAM HYDROBROMIDE 20 MG: 20 TABLET ORAL at 08:18

## 2021-01-01 RX ADMIN — BUDESONIDE AND FORMOTEROL FUMARATE DIHYDRATE 2 PUFF: 160; 4.5 AEROSOL RESPIRATORY (INHALATION) at 19:22

## 2021-01-01 RX ADMIN — HYDROCODONE BITARTRATE AND ACETAMINOPHEN 1 TABLET: 5; 325 TABLET ORAL at 18:17

## 2021-01-01 RX ADMIN — BUDESONIDE AND FORMOTEROL FUMARATE DIHYDRATE 2 PUFF: 160; 4.5 AEROSOL RESPIRATORY (INHALATION) at 09:04

## 2021-01-01 RX ADMIN — METHYLPREDNISOLONE SODIUM SUCCINATE 40 MG: 40 INJECTION, POWDER, FOR SOLUTION INTRAMUSCULAR; INTRAVENOUS at 08:03

## 2021-01-01 RX ADMIN — IPRATROPIUM BROMIDE AND ALBUTEROL SULFATE 3 ML: 2.5; .5 SOLUTION RESPIRATORY (INHALATION) at 12:40

## 2021-01-01 RX ADMIN — CITALOPRAM HYDROBROMIDE 20 MG: 20 TABLET ORAL at 08:57

## 2021-01-01 RX ADMIN — SODIUM CHLORIDE, PRESERVATIVE FREE 10 ML: 5 INJECTION INTRAVENOUS at 20:30

## 2021-01-01 RX ADMIN — LORAZEPAM 1 MG: 1 TABLET ORAL at 20:30

## 2021-01-01 RX ADMIN — IPRATROPIUM BROMIDE AND ALBUTEROL SULFATE 3 ML: 2.5; .5 SOLUTION RESPIRATORY (INHALATION) at 19:21

## 2021-01-01 RX ADMIN — HYDROCODONE BITARTRATE AND ACETAMINOPHEN 1 TABLET: 5; 325 TABLET ORAL at 14:51

## 2021-01-01 RX ADMIN — SODIUM CHLORIDE, PRESERVATIVE FREE 10 ML: 5 INJECTION INTRAVENOUS at 09:18

## 2021-01-01 RX ADMIN — IPRATROPIUM BROMIDE AND ALBUTEROL SULFATE 3 ML: 2.5; .5 SOLUTION RESPIRATORY (INHALATION) at 21:02

## 2021-01-01 RX ADMIN — ISOSORBIDE MONONITRATE 60 MG: 60 TABLET, EXTENDED RELEASE ORAL at 09:35

## 2021-01-01 RX ADMIN — TAZOBACTAM SODIUM AND PIPERACILLIN SODIUM 3.38 G: 375; 3 INJECTION, SOLUTION INTRAVENOUS at 09:23

## 2021-01-01 RX ADMIN — HEPARIN SODIUM 5000 UNITS: 5000 INJECTION INTRAVENOUS; SUBCUTANEOUS at 21:48

## 2021-01-01 RX ADMIN — SODIUM CHLORIDE, PRESERVATIVE FREE 10 ML: 5 INJECTION INTRAVENOUS at 20:55

## 2021-01-01 RX ADMIN — CLOPIDOGREL BISULFATE 75 MG: 75 TABLET ORAL at 08:02

## 2021-01-01 RX ADMIN — AMOXICILLIN AND CLAVULANATE POTASSIUM 1 TABLET: 875; 125 TABLET, FILM COATED ORAL at 20:55

## 2021-01-01 RX ADMIN — IPRATROPIUM BROMIDE AND ALBUTEROL SULFATE 3 ML: 2.5; .5 SOLUTION RESPIRATORY (INHALATION) at 10:36

## 2021-01-01 RX ADMIN — Medication: at 17:50

## 2021-01-01 RX ADMIN — BUDESONIDE 0.5 MG: 0.5 INHALANT RESPIRATORY (INHALATION) at 19:24

## 2021-01-01 RX ADMIN — IPRATROPIUM BROMIDE AND ALBUTEROL SULFATE 3 ML: 2.5; .5 SOLUTION RESPIRATORY (INHALATION) at 06:06

## 2021-01-01 RX ADMIN — DOCUSATE SODIUM 100 MG: 100 CAPSULE, LIQUID FILLED ORAL at 20:26

## 2021-01-01 RX ADMIN — BUDESONIDE AND FORMOTEROL FUMARATE DIHYDRATE 2 PUFF: 80; 4.5 AEROSOL RESPIRATORY (INHALATION) at 07:48

## 2021-01-01 RX ADMIN — ACETAMINOPHEN 500 MG: 500 TABLET, FILM COATED ORAL at 20:37

## 2021-01-01 RX ADMIN — FUROSEMIDE 40 MG: 10 INJECTION, SOLUTION INTRAMUSCULAR; INTRAVENOUS at 19:51

## 2021-01-01 RX ADMIN — IPRATROPIUM BROMIDE AND ALBUTEROL SULFATE 3 ML: 2.5; .5 SOLUTION RESPIRATORY (INHALATION) at 16:07

## 2021-01-01 RX ADMIN — IPRATROPIUM BROMIDE AND ALBUTEROL SULFATE 3 ML: 2.5; .5 SOLUTION RESPIRATORY (INHALATION) at 15:53

## 2021-01-01 RX ADMIN — SODIUM CHLORIDE, PRESERVATIVE FREE 10 ML: 5 INJECTION INTRAVENOUS at 08:40

## 2021-01-01 RX ADMIN — LORAZEPAM 0.5 MG: 0.5 TABLET ORAL at 23:01

## 2021-01-01 RX ADMIN — ACETAMINOPHEN 500 MG: 500 TABLET, FILM COATED ORAL at 15:25

## 2021-01-01 RX ADMIN — DOCUSATE SODIUM 100 MG: 100 CAPSULE, LIQUID FILLED ORAL at 08:36

## 2021-01-01 RX ADMIN — IOPAMIDOL 62 ML: 755 INJECTION, SOLUTION INTRAVENOUS at 20:23

## 2021-01-01 RX ADMIN — CLOPIDOGREL BISULFATE 75 MG: 75 TABLET ORAL at 09:25

## 2021-01-01 RX ADMIN — HEPARIN SODIUM 5000 UNITS: 5000 INJECTION INTRAVENOUS; SUBCUTANEOUS at 08:17

## 2021-01-01 RX ADMIN — PANTOPRAZOLE SODIUM 40 MG: 40 INJECTION, POWDER, FOR SOLUTION INTRAVENOUS at 18:33

## 2021-01-01 RX ADMIN — IPRATROPIUM BROMIDE AND ALBUTEROL SULFATE 3 ML: 2.5; .5 SOLUTION RESPIRATORY (INHALATION) at 19:41

## 2021-01-01 RX ADMIN — IPRATROPIUM BROMIDE AND ALBUTEROL SULFATE 3 ML: 2.5; .5 SOLUTION RESPIRATORY (INHALATION) at 22:56

## 2021-01-01 RX ADMIN — GUAIFENESIN 600 MG: 600 TABLET, EXTENDED RELEASE ORAL at 20:37

## 2021-01-01 RX ADMIN — BETHANECHOL CHLORIDE 10 MG: 10 TABLET ORAL at 21:09

## 2021-01-01 RX ADMIN — SODIUM CHLORIDE 2 G: 900 INJECTION INTRAVENOUS at 14:12

## 2021-01-01 RX ADMIN — ENOXAPARIN SODIUM 40 MG: 40 INJECTION SUBCUTANEOUS at 17:45

## 2021-01-01 RX ADMIN — PREDNISONE 20 MG: 20 TABLET ORAL at 09:16

## 2021-01-01 RX ADMIN — CITALOPRAM HYDROBROMIDE 20 MG: 20 TABLET ORAL at 09:40

## 2021-01-01 RX ADMIN — CLOPIDOGREL BISULFATE 75 MG: 75 TABLET ORAL at 08:31

## 2021-01-01 RX ADMIN — IPRATROPIUM BROMIDE AND ALBUTEROL SULFATE 3 ML: 2.5; .5 SOLUTION RESPIRATORY (INHALATION) at 09:04

## 2021-01-01 RX ADMIN — PHENOBARBITAL 145.8 MG: 97.2 TABLET ORAL at 08:53

## 2021-01-01 RX ADMIN — ALBUTEROL SULFATE 2.5 MG: 2.5 SOLUTION RESPIRATORY (INHALATION) at 18:05

## 2021-01-01 RX ADMIN — GUAIFENESIN 600 MG: 600 TABLET, EXTENDED RELEASE ORAL at 08:18

## 2021-01-01 RX ADMIN — CITALOPRAM HYDROBROMIDE 20 MG: 20 TABLET ORAL at 08:31

## 2021-01-01 RX ADMIN — CLOPIDOGREL BISULFATE 75 MG: 75 TABLET ORAL at 09:27

## 2021-01-01 RX ADMIN — SODIUM CHLORIDE, PRESERVATIVE FREE 10 ML: 5 INJECTION INTRAVENOUS at 11:24

## 2021-01-01 RX ADMIN — DOCUSATE SODIUM 100 MG: 100 CAPSULE, LIQUID FILLED ORAL at 23:01

## 2021-01-01 RX ADMIN — BUDESONIDE AND FORMOTEROL FUMARATE DIHYDRATE 2 PUFF: 80; 4.5 AEROSOL RESPIRATORY (INHALATION) at 07:00

## 2021-01-01 RX ADMIN — PANTOPRAZOLE SODIUM 40 MG: 40 TABLET, DELAYED RELEASE ORAL at 09:27

## 2021-01-01 RX ADMIN — FERROUS SULFATE TAB 325 MG (65 MG ELEMENTAL FE) 325 MG: 325 (65 FE) TAB at 08:44

## 2021-01-01 RX ADMIN — POTASSIUM CHLORIDE 20 MEQ: 400 INJECTION, SOLUTION INTRAVENOUS at 01:52

## 2021-01-01 RX ADMIN — LORAZEPAM 0.5 MG: 0.5 TABLET ORAL at 09:27

## 2021-01-01 RX ADMIN — CITALOPRAM HYDROBROMIDE 20 MG: 20 TABLET ORAL at 08:56

## 2021-01-01 RX ADMIN — FERROUS SULFATE TAB 325 MG (65 MG ELEMENTAL FE) 325 MG: 325 (65 FE) TAB at 09:35

## 2021-01-01 RX ADMIN — CITALOPRAM HYDROBROMIDE 20 MG: 20 TABLET ORAL at 18:40

## 2021-01-01 RX ADMIN — IPRATROPIUM BROMIDE AND ALBUTEROL SULFATE 3 ML: 2.5; .5 SOLUTION RESPIRATORY (INHALATION) at 06:45

## 2021-01-01 RX ADMIN — IPRATROPIUM BROMIDE AND ALBUTEROL SULFATE 3 ML: 2.5; .5 SOLUTION RESPIRATORY (INHALATION) at 06:26

## 2021-01-01 RX ADMIN — LORAZEPAM 0.5 MG: 0.5 TABLET ORAL at 09:20

## 2021-01-01 RX ADMIN — CETIRIZINE HYDROCHLORIDE 5 MG: 10 TABLET, FILM COATED ORAL at 08:39

## 2021-01-01 RX ADMIN — NICOTINE 1 PATCH: 7 PATCH, EXTENDED RELEASE TRANSDERMAL at 21:28

## 2021-01-01 RX ADMIN — IPRATROPIUM BROMIDE AND ALBUTEROL SULFATE 3 ML: 2.5; .5 SOLUTION RESPIRATORY (INHALATION) at 16:17

## 2021-01-01 RX ADMIN — Medication 296 ML: at 09:23

## 2021-01-01 RX ADMIN — AZTREONAM 2 G: 2 INJECTION, POWDER, LYOPHILIZED, FOR SOLUTION INTRAMUSCULAR; INTRAVENOUS at 05:52

## 2021-01-01 RX ADMIN — NALOXONE HYDROCHLORIDE 0.4 MG: 0.4 INJECTION, SOLUTION INTRAMUSCULAR; INTRAVENOUS; SUBCUTANEOUS at 06:28

## 2021-01-01 RX ADMIN — ANTACID TABLETS 2 TABLET: 500 TABLET, CHEWABLE ORAL at 18:45

## 2021-01-01 RX ADMIN — FUROSEMIDE 20 MG: 20 TABLET ORAL at 18:40

## 2021-01-01 RX ADMIN — HYDROCODONE BITARTRATE AND ACETAMINOPHEN 1 TABLET: 5; 325 TABLET ORAL at 18:29

## 2021-01-01 RX ADMIN — IOPAMIDOL 50 ML: 755 INJECTION, SOLUTION INTRAVENOUS at 02:21

## 2021-01-01 RX ADMIN — SODIUM CHLORIDE, POTASSIUM CHLORIDE, SODIUM LACTATE AND CALCIUM CHLORIDE: 600; 310; 30; 20 INJECTION, SOLUTION INTRAVENOUS at 10:32

## 2021-01-01 RX ADMIN — METHYLPREDNISOLONE SODIUM SUCCINATE 60 MG: 125 INJECTION, POWDER, FOR SOLUTION INTRAMUSCULAR; INTRAVENOUS at 20:31

## 2021-01-01 RX ADMIN — CITALOPRAM HYDROBROMIDE 20 MG: 20 TABLET ORAL at 09:03

## 2021-01-01 RX ADMIN — LORAZEPAM 0.5 MG: 0.5 TABLET ORAL at 09:25

## 2021-01-01 RX ADMIN — FUROSEMIDE 20 MG: 20 TABLET ORAL at 08:02

## 2021-01-01 RX ADMIN — CITALOPRAM HYDROBROMIDE 20 MG: 20 TABLET ORAL at 08:19

## 2021-01-01 RX ADMIN — IPRATROPIUM BROMIDE AND ALBUTEROL SULFATE 3 ML: 2.5; .5 SOLUTION RESPIRATORY (INHALATION) at 07:15

## 2021-01-01 RX ADMIN — ACETAMINOPHEN 500 MG: 500 TABLET, FILM COATED ORAL at 17:27

## 2021-01-01 RX ADMIN — CLOPIDOGREL BISULFATE 75 MG: 75 TABLET ORAL at 09:26

## 2021-01-01 RX ADMIN — FUROSEMIDE 20 MG: 20 TABLET ORAL at 09:14

## 2021-01-01 RX ADMIN — SODIUM CHLORIDE, PRESERVATIVE FREE 10 ML: 5 INJECTION INTRAVENOUS at 20:34

## 2021-01-01 RX ADMIN — DILTIAZEM HYDROCHLORIDE 60 MG: 60 TABLET, FILM COATED ORAL at 01:15

## 2021-01-01 RX ADMIN — ISOSORBIDE MONONITRATE 60 MG: 60 TABLET, EXTENDED RELEASE ORAL at 09:27

## 2021-01-01 RX ADMIN — PANTOPRAZOLE SODIUM 40 MG: 40 TABLET, DELAYED RELEASE ORAL at 07:27

## 2021-01-01 RX ADMIN — DILTIAZEM HYDROCHLORIDE 240 MG: 240 CAPSULE, COATED, EXTENDED RELEASE ORAL at 09:27

## 2021-01-01 RX ADMIN — HYDROCODONE BITARTRATE AND ACETAMINOPHEN 1 TABLET: 10; 325 TABLET ORAL at 07:23

## 2021-01-01 RX ADMIN — ACETAMINOPHEN 500 MG: 500 TABLET, FILM COATED ORAL at 16:43

## 2021-01-01 RX ADMIN — IOPAMIDOL 90 ML: 755 INJECTION, SOLUTION INTRAVENOUS at 17:51

## 2021-01-01 RX ADMIN — FERROUS SULFATE TAB 325 MG (65 MG ELEMENTAL FE) 325 MG: 325 (65 FE) TAB at 15:25

## 2021-01-01 RX ADMIN — ROPINIROLE HYDROCHLORIDE 0.25 MG: 0.5 TABLET, FILM COATED ORAL at 20:35

## 2021-01-01 RX ADMIN — Medication 5 MG: at 20:21

## 2021-01-01 RX ADMIN — PANTOPRAZOLE SODIUM 40 MG: 40 INJECTION, POWDER, FOR SOLUTION INTRAVENOUS at 09:09

## 2021-01-01 RX ADMIN — LORAZEPAM 1 MG: 1 TABLET ORAL at 09:13

## 2021-01-01 RX ADMIN — IPRATROPIUM BROMIDE AND ALBUTEROL SULFATE 3 ML: 2.5; .5 SOLUTION RESPIRATORY (INHALATION) at 15:15

## 2021-01-01 RX ADMIN — DILTIAZEM HYDROCHLORIDE 60 MG: 60 TABLET, FILM COATED ORAL at 12:42

## 2021-01-01 RX ADMIN — ACETAMINOPHEN 650 MG: 325 TABLET ORAL at 15:08

## 2021-01-01 RX ADMIN — PANTOPRAZOLE SODIUM 40 MG: 40 TABLET, DELAYED RELEASE ORAL at 08:45

## 2021-01-01 RX ADMIN — DEXTROSE MONOHYDRATE 25 ML: 500 INJECTION PARENTERAL at 13:45

## 2021-01-01 RX ADMIN — DOXYCYCLINE 100 MG: 100 CAPSULE ORAL at 09:16

## 2021-01-01 RX ADMIN — BUDESONIDE AND FORMOTEROL FUMARATE DIHYDRATE 2 PUFF: 80; 4.5 AEROSOL RESPIRATORY (INHALATION) at 20:18

## 2021-01-01 RX ADMIN — LORAZEPAM 1 MG: 1 TABLET ORAL at 20:42

## 2021-01-01 RX ADMIN — NICOTINE 1 PATCH: 14 PATCH, EXTENDED RELEASE TRANSDERMAL at 21:03

## 2021-01-01 RX ADMIN — FERROUS SULFATE TAB 325 MG (65 MG ELEMENTAL FE) 325 MG: 325 (65 FE) TAB at 08:56

## 2021-01-01 RX ADMIN — ACETAMINOPHEN 500 MG: 500 TABLET, FILM COATED ORAL at 16:59

## 2021-01-01 RX ADMIN — PHENOBARBITAL 145.8 MG: 97.2 TABLET ORAL at 12:03

## 2021-01-01 RX ADMIN — FUROSEMIDE 20 MG: 20 TABLET ORAL at 08:22

## 2021-01-01 RX ADMIN — LIDOCAINE 1 PATCH: 50 PATCH CUTANEOUS at 11:12

## 2021-01-01 RX ADMIN — ROSUVASTATIN CALCIUM 40 MG: 20 TABLET, COATED ORAL at 21:21

## 2021-01-01 RX ADMIN — LORAZEPAM 0.5 MG: 0.5 TABLET ORAL at 20:16

## 2021-01-01 RX ADMIN — FUROSEMIDE 20 MG: 20 TABLET ORAL at 08:47

## 2021-01-01 RX ADMIN — IPRATROPIUM BROMIDE AND ALBUTEROL SULFATE 3 ML: 2.5; .5 SOLUTION RESPIRATORY (INHALATION) at 12:58

## 2021-01-01 RX ADMIN — PROPOFOL 100 MCG/KG/MIN: 10 INJECTION, EMULSION INTRAVENOUS at 13:29

## 2021-01-01 RX ADMIN — IOPAMIDOL 80 ML: 755 INJECTION, SOLUTION INTRAVENOUS at 01:52

## 2021-01-01 RX ADMIN — HYDROCODONE BITARTRATE AND ACETAMINOPHEN 1 TABLET: 10; 325 TABLET ORAL at 13:59

## 2021-01-01 RX ADMIN — DILTIAZEM HYDROCHLORIDE 60 MG: 60 TABLET, FILM COATED ORAL at 18:24

## 2021-01-01 RX ADMIN — IPRATROPIUM BROMIDE AND ALBUTEROL SULFATE 3 ML: 2.5; .5 SOLUTION RESPIRATORY (INHALATION) at 19:24

## 2021-01-01 RX ADMIN — DILTIAZEM HYDROCHLORIDE 60 MG: 60 TABLET, FILM COATED ORAL at 23:29

## 2021-01-01 RX ADMIN — ISOSORBIDE MONONITRATE 60 MG: 60 TABLET, EXTENDED RELEASE ORAL at 09:05

## 2021-01-01 RX ADMIN — PREDNISONE 40 MG: 20 TABLET ORAL at 12:33

## 2021-01-01 RX ADMIN — PREDNISONE 20 MG: 20 TABLET ORAL at 09:26

## 2021-01-01 RX ADMIN — ALBUTEROL SULFATE 2 PUFF: 108 AEROSOL, METERED RESPIRATORY (INHALATION) at 08:50

## 2021-01-01 RX ADMIN — IPRATROPIUM BROMIDE AND ALBUTEROL SULFATE 3 ML: 2.5; .5 SOLUTION RESPIRATORY (INHALATION) at 18:44

## 2021-01-01 RX ADMIN — PREDNISONE 40 MG: 20 TABLET ORAL at 11:05

## 2021-01-01 RX ADMIN — PREDNISONE 40 MG: 20 TABLET ORAL at 13:01

## 2021-01-01 RX ADMIN — DILTIAZEM HYDROCHLORIDE 60 MG: 60 TABLET, FILM COATED ORAL at 06:43

## 2021-01-01 RX ADMIN — DOXYCYCLINE 100 MG: 100 CAPSULE ORAL at 08:39

## 2021-01-01 RX ADMIN — HYDROCODONE BITARTRATE AND ACETAMINOPHEN 1 TABLET: 10; 325 TABLET ORAL at 15:04

## 2021-01-01 RX ADMIN — CEFUROXIME AXETIL 500 MG: 250 TABLET ORAL at 08:58

## 2021-01-01 RX ADMIN — CEFTRIAXONE SODIUM 1 G: 1 INJECTION, POWDER, FOR SOLUTION INTRAMUSCULAR; INTRAVENOUS at 05:00

## 2021-01-01 RX ADMIN — GUAIFENESIN 600 MG: 600 TABLET, EXTENDED RELEASE ORAL at 09:21

## 2021-01-01 RX ADMIN — CEFUROXIME AXETIL 500 MG: 250 TABLET ORAL at 14:12

## 2021-01-01 RX ADMIN — FUROSEMIDE 40 MG: 40 TABLET ORAL at 09:51

## 2021-01-01 RX ADMIN — IPRATROPIUM BROMIDE AND ALBUTEROL SULFATE 3 ML: 2.5; .5 SOLUTION RESPIRATORY (INHALATION) at 06:25

## 2021-01-01 RX ADMIN — FLUTICASONE PROPIONATE 2 SPRAY: 50 SPRAY, METERED NASAL at 12:15

## 2021-01-01 RX ADMIN — IPRATROPIUM BROMIDE AND ALBUTEROL SULFATE 3 ML: 2.5; .5 SOLUTION RESPIRATORY (INHALATION) at 16:31

## 2021-01-01 RX ADMIN — HEPARIN SODIUM 2500 UNITS: 5000 INJECTION INTRAVENOUS; SUBCUTANEOUS at 08:45

## 2021-01-01 RX ADMIN — PANTOPRAZOLE SODIUM 40 MG: 40 INJECTION, POWDER, FOR SOLUTION INTRAVENOUS at 16:43

## 2021-01-01 RX ADMIN — ALBUTEROL SULFATE 2 PUFF: 108 AEROSOL, METERED RESPIRATORY (INHALATION) at 19:19

## 2021-01-01 RX ADMIN — CLOPIDOGREL BISULFATE 75 MG: 75 TABLET ORAL at 09:14

## 2021-01-01 RX ADMIN — ANTACID TABLETS 2 TABLET: 500 TABLET, CHEWABLE ORAL at 10:32

## 2021-01-01 RX ADMIN — SODIUM CHLORIDE, PRESERVATIVE FREE 10 ML: 5 INJECTION INTRAVENOUS at 21:08

## 2021-01-01 RX ADMIN — HYDROCODONE BITARTRATE AND ACETAMINOPHEN 1 TABLET: 10; 325 TABLET ORAL at 12:25

## 2021-01-01 RX ADMIN — LORAZEPAM 0.5 MG: 0.5 TABLET ORAL at 20:37

## 2021-01-01 RX ADMIN — PHENOBARBITAL 145.8 MG: 97.2 TABLET ORAL at 08:19

## 2021-01-01 RX ADMIN — HYDROCODONE BITARTRATE AND ACETAMINOPHEN 1 TABLET: 10; 325 TABLET ORAL at 12:15

## 2021-01-01 RX ADMIN — ONDANSETRON 4 MG: 2 INJECTION INTRAMUSCULAR; INTRAVENOUS at 08:49

## 2021-01-01 RX ADMIN — CITALOPRAM HYDROBROMIDE 20 MG: 20 TABLET ORAL at 11:23

## 2021-01-01 RX ADMIN — Medication 0.4 MG: at 01:50

## 2021-01-01 RX ADMIN — PANTOPRAZOLE SODIUM 40 MG: 40 TABLET, DELAYED RELEASE ORAL at 16:49

## 2021-01-01 RX ADMIN — METHYLPREDNISOLONE SODIUM SUCCINATE 60 MG: 125 INJECTION, POWDER, FOR SOLUTION INTRAMUSCULAR; INTRAVENOUS at 20:17

## 2021-01-01 RX ADMIN — SODIUM CHLORIDE, POTASSIUM CHLORIDE, SODIUM LACTATE AND CALCIUM CHLORIDE 75 ML/HR: 600; 310; 30; 20 INJECTION, SOLUTION INTRAVENOUS at 14:31

## 2021-01-01 RX ADMIN — PHENOBARBITAL 150 MG: 100 TABLET ORAL at 16:22

## 2021-01-01 RX ADMIN — DIPHENHYDRAMINE HYDROCHLORIDE 25 MG: 25 CAPSULE ORAL at 11:05

## 2021-01-01 RX ADMIN — FENTANYL CITRATE 25 MCG: 50 INJECTION INTRAMUSCULAR; INTRAVENOUS at 01:35

## 2021-01-01 RX ADMIN — CYANOCOBALAMIN 1000 MCG: 1000 INJECTION, SOLUTION INTRAMUSCULAR; SUBCUTANEOUS at 11:41

## 2021-01-01 RX ADMIN — CITALOPRAM HYDROBROMIDE 20 MG: 20 TABLET ORAL at 08:45

## 2021-01-01 RX ADMIN — PANTOPRAZOLE SODIUM 40 MG: 40 TABLET, DELAYED RELEASE ORAL at 06:39

## 2021-01-01 RX ADMIN — IPRATROPIUM BROMIDE AND ALBUTEROL SULFATE 3 ML: 2.5; .5 SOLUTION RESPIRATORY (INHALATION) at 07:49

## 2021-01-01 RX ADMIN — IPRATROPIUM BROMIDE AND ALBUTEROL SULFATE 3 ML: 2.5; .5 SOLUTION RESPIRATORY (INHALATION) at 14:25

## 2021-01-01 RX ADMIN — LORAZEPAM 1 MG: 1 TABLET ORAL at 09:23

## 2021-01-01 RX ADMIN — ALBUTEROL SULFATE 2 PUFF: 108 AEROSOL, METERED RESPIRATORY (INHALATION) at 12:36

## 2021-01-01 RX ADMIN — HYDROCODONE BITARTRATE AND ACETAMINOPHEN 1 TABLET: 10; 325 TABLET ORAL at 18:27

## 2021-01-01 RX ADMIN — CYANOCOBALAMIN 1000 MCG: 1000 INJECTION, SOLUTION INTRAMUSCULAR; SUBCUTANEOUS at 11:50

## 2021-01-01 RX ADMIN — ASPIRIN 81 MG: 81 TABLET, COATED ORAL at 09:03

## 2021-01-01 RX ADMIN — CITALOPRAM HYDROBROMIDE 20 MG: 20 TABLET ORAL at 09:20

## 2021-01-01 RX ADMIN — SODIUM CHLORIDE 1 G: 900 INJECTION INTRAVENOUS at 14:10

## 2021-01-01 RX ADMIN — IPRATROPIUM BROMIDE AND ALBUTEROL SULFATE 3 ML: 2.5; .5 SOLUTION RESPIRATORY (INHALATION) at 11:23

## 2021-01-01 RX ADMIN — ACETAMINOPHEN 500 MG: 500 TABLET, FILM COATED ORAL at 20:58

## 2021-01-01 RX ADMIN — CYANOCOBALAMIN 1000 MCG: 1000 INJECTION, SOLUTION INTRAMUSCULAR; SUBCUTANEOUS at 14:44

## 2021-01-01 RX ADMIN — HYDROCODONE BITARTRATE AND ACETAMINOPHEN 1 TABLET: 10; 325 TABLET ORAL at 19:32

## 2021-01-01 RX ADMIN — PROPOFOL 40 MG: 10 INJECTION, EMULSION INTRAVENOUS at 13:29

## 2021-01-01 RX ADMIN — SODIUM CHLORIDE 50 ML/HR: 9 INJECTION, SOLUTION INTRAVENOUS at 09:39

## 2021-01-01 RX ADMIN — PANTOPRAZOLE SODIUM 40 MG: 40 INJECTION, POWDER, FOR SOLUTION INTRAVENOUS at 08:31

## 2021-01-01 RX ADMIN — ROSUVASTATIN CALCIUM 40 MG: 20 TABLET, COATED ORAL at 09:27

## 2021-01-01 RX ADMIN — CITALOPRAM HYDROBROMIDE 20 MG: 20 TABLET ORAL at 08:11

## 2021-01-01 RX ADMIN — DILTIAZEM HYDROCHLORIDE 60 MG: 60 TABLET, FILM COATED ORAL at 11:26

## 2021-01-01 RX ADMIN — SODIUM CHLORIDE 2 G: 900 INJECTION INTRAVENOUS at 15:48

## 2021-01-01 RX ADMIN — ENOXAPARIN SODIUM 40 MG: 40 INJECTION SUBCUTANEOUS at 11:37

## 2021-01-01 RX ADMIN — CHLORHEXIDINE GLUCONATE 15 ML: 1.2 SOLUTION ORAL at 08:02

## 2021-01-01 RX ADMIN — SODIUM CHLORIDE, POTASSIUM CHLORIDE, SODIUM LACTATE AND CALCIUM CHLORIDE 75 ML/HR: 600; 310; 30; 20 INJECTION, SOLUTION INTRAVENOUS at 02:05

## 2021-01-01 RX ADMIN — MORPHINE SULFATE 2 MG: 2 INJECTION, SOLUTION INTRAMUSCULAR; INTRAVENOUS at 10:42

## 2021-01-01 RX ADMIN — SODIUM CHLORIDE, PRESERVATIVE FREE 10 ML: 5 INJECTION INTRAVENOUS at 08:18

## 2021-01-01 RX ADMIN — DOXYCYCLINE 100 MG: 100 CAPSULE ORAL at 20:24

## 2021-01-01 RX ADMIN — ROSUVASTATIN CALCIUM 40 MG: 20 TABLET, COATED ORAL at 09:25

## 2021-01-01 RX ADMIN — SODIUM CHLORIDE, PRESERVATIVE FREE 10 ML: 5 INJECTION INTRAVENOUS at 08:33

## 2021-01-01 RX ADMIN — HEPARIN SODIUM 5000 UNITS: 5000 INJECTION INTRAVENOUS; SUBCUTANEOUS at 05:14

## 2021-01-01 RX ADMIN — LORAZEPAM 1 MG: 1 TABLET ORAL at 21:45

## 2021-01-01 RX ADMIN — ALBUTEROL SULFATE 2.5 MG: 2.5 SOLUTION RESPIRATORY (INHALATION) at 22:32

## 2021-01-01 RX ADMIN — Medication 10 ML: at 21:09

## 2021-01-01 RX ADMIN — CLOPIDOGREL BISULFATE 75 MG: 75 TABLET ORAL at 09:35

## 2021-01-01 RX ADMIN — IPRATROPIUM BROMIDE AND ALBUTEROL SULFATE 3 ML: 2.5; .5 SOLUTION RESPIRATORY (INHALATION) at 22:46

## 2021-01-01 RX ADMIN — ASPIRIN 81 MG: 81 TABLET, COATED ORAL at 09:20

## 2021-01-01 RX ADMIN — SODIUM CHLORIDE 999 ML/HR: 9 INJECTION, SOLUTION INTRAVENOUS at 18:30

## 2021-01-01 RX ADMIN — ROSUVASTATIN CALCIUM 40 MG: 20 TABLET, COATED ORAL at 20:37

## 2021-01-01 RX ADMIN — HEPARIN SODIUM 5000 UNITS: 5000 INJECTION INTRAVENOUS; SUBCUTANEOUS at 21:17

## 2021-01-01 RX ADMIN — DILTIAZEM HYDROCHLORIDE 240 MG: 240 CAPSULE, COATED, EXTENDED RELEASE ORAL at 08:19

## 2021-01-01 RX ADMIN — POTASSIUM CHLORIDE 20 MEQ: 400 INJECTION, SOLUTION INTRAVENOUS at 01:06

## 2021-01-01 RX ADMIN — POTASSIUM CHLORIDE 40 MEQ: 1.5 POWDER, FOR SOLUTION ORAL at 21:07

## 2021-01-01 RX ADMIN — CEFTRIAXONE SODIUM 1 G: 1 INJECTION, POWDER, FOR SOLUTION INTRAMUSCULAR; INTRAVENOUS at 05:07

## 2021-01-01 RX ADMIN — HYDROCODONE BITARTRATE AND ACETAMINOPHEN 1 TABLET: 10; 325 TABLET ORAL at 13:48

## 2021-01-01 RX ADMIN — BUDESONIDE AND FORMOTEROL FUMARATE DIHYDRATE 2 PUFF: 80; 4.5 AEROSOL RESPIRATORY (INHALATION) at 06:06

## 2021-01-01 RX ADMIN — PHENOBARBITAL 145.8 MG: 97.2 TABLET ORAL at 11:53

## 2021-01-01 RX ADMIN — ACETAMINOPHEN 500 MG: 500 TABLET, FILM COATED ORAL at 20:30

## 2021-01-01 RX ADMIN — ACETAMINOPHEN 500 MG: 500 TABLET, FILM COATED ORAL at 08:11

## 2021-01-01 RX ADMIN — PANTOPRAZOLE SODIUM 40 MG: 40 TABLET, DELAYED RELEASE ORAL at 09:35

## 2021-01-01 RX ADMIN — AZITHROMYCIN 500 MG: 500 INJECTION, POWDER, LYOPHILIZED, FOR SOLUTION INTRAVENOUS at 17:58

## 2021-01-01 RX ADMIN — HYDROCODONE BITARTRATE AND ACETAMINOPHEN 1 TABLET: 10; 325 TABLET ORAL at 04:59

## 2021-01-01 RX ADMIN — CEFTRIAXONE SODIUM 1 G: 1 INJECTION, SOLUTION INTRAVENOUS at 09:14

## 2021-01-01 RX ADMIN — TAZOBACTAM SODIUM AND PIPERACILLIN SODIUM 3.38 G: 375; 3 INJECTION, SOLUTION INTRAVENOUS at 05:16

## 2021-01-01 RX ADMIN — ROSUVASTATIN CALCIUM 40 MG: 20 TABLET, COATED ORAL at 20:59

## 2021-01-01 RX ADMIN — PHENOBARBITAL 145.8 MG: 97.2 TABLET ORAL at 12:42

## 2021-01-01 RX ADMIN — SODIUM CHLORIDE, PRESERVATIVE FREE 10 ML: 5 INJECTION INTRAVENOUS at 21:31

## 2021-01-01 RX ADMIN — PROPOFOL 5 MCG/KG/MIN: 10 INJECTION, EMULSION INTRAVENOUS at 13:11

## 2021-01-01 RX ADMIN — ONDANSETRON 4 MG: 2 INJECTION INTRAMUSCULAR; INTRAVENOUS at 23:37

## 2021-01-01 RX ADMIN — ACETAMINOPHEN 650 MG: 325 TABLET ORAL at 08:56

## 2021-01-01 RX ADMIN — IPRATROPIUM BROMIDE AND ALBUTEROL SULFATE 3 ML: 2.5; .5 SOLUTION RESPIRATORY (INHALATION) at 14:31

## 2021-01-01 RX ADMIN — POLYETHYLENE GLYCOL 3350, SODIUM SULFATE, SODIUM CHLORIDE, POTASSIUM CHLORIDE, ASCORBIC ACID, SODIUM ASCORBATE 1000 ML: KIT at 04:08

## 2021-01-01 RX ADMIN — HYDROCODONE BITARTRATE AND ACETAMINOPHEN 1 TABLET: 5; 325 TABLET ORAL at 20:37

## 2021-01-01 RX ADMIN — HYDROCODONE BITARTRATE AND ACETAMINOPHEN 1 TABLET: 10; 325 TABLET ORAL at 08:30

## 2021-01-01 RX ADMIN — MAGNESIUM SULFATE HEPTAHYDRATE 4 G: 40 INJECTION, SOLUTION INTRAVENOUS at 11:30

## 2021-01-01 RX ADMIN — ROSUVASTATIN CALCIUM 40 MG: 20 TABLET, COATED ORAL at 20:42

## 2021-01-01 RX ADMIN — TAZOBACTAM SODIUM AND PIPERACILLIN SODIUM 3.38 G: 375; 3 INJECTION, SOLUTION INTRAVENOUS at 21:31

## 2021-01-01 RX ADMIN — CETIRIZINE HYDROCHLORIDE 5 MG: 10 TABLET, FILM COATED ORAL at 08:54

## 2021-01-01 RX ADMIN — DILTIAZEM HYDROCHLORIDE 60 MG: 60 TABLET, FILM COATED ORAL at 17:32

## 2021-01-01 RX ADMIN — DILTIAZEM HYDROCHLORIDE 240 MG: 240 CAPSULE, COATED, EXTENDED RELEASE ORAL at 08:31

## 2021-01-01 RX ADMIN — DILTIAZEM HYDROCHLORIDE 240 MG: 240 CAPSULE, COATED, EXTENDED RELEASE ORAL at 08:30

## 2021-01-01 RX ADMIN — IPRATROPIUM BROMIDE AND ALBUTEROL SULFATE 3 ML: 2.5; .5 SOLUTION RESPIRATORY (INHALATION) at 17:03

## 2021-01-01 RX ADMIN — ROSUVASTATIN CALCIUM 40 MG: 20 TABLET, COATED ORAL at 20:32

## 2021-01-01 RX ADMIN — HEPARIN SODIUM 5000 UNITS: 5000 INJECTION INTRAVENOUS; SUBCUTANEOUS at 09:03

## 2021-01-01 RX ADMIN — MAGNESIUM SULFATE HEPTAHYDRATE 4 G: 40 INJECTION, SOLUTION INTRAVENOUS at 21:06

## 2021-01-01 RX ADMIN — LIDOCAINE HYDROCHLORIDE 100 MG: 10 INJECTION, SOLUTION EPIDURAL; INFILTRATION; INTRACAUDAL; PERINEURAL at 10:39

## 2021-01-01 RX ADMIN — SODIUM CHLORIDE, PRESERVATIVE FREE 10 ML: 5 INJECTION INTRAVENOUS at 20:36

## 2021-01-01 RX ADMIN — HYDROCODONE BITARTRATE AND ACETAMINOPHEN 1 TABLET: 10; 325 TABLET ORAL at 16:52

## 2021-01-01 RX ADMIN — ENOXAPARIN SODIUM 40 MG: 40 INJECTION SUBCUTANEOUS at 12:11

## 2021-01-01 RX ADMIN — ASPIRIN 81 MG: 81 TABLET, COATED ORAL at 08:36

## 2021-01-01 RX ADMIN — DEXAMETHASONE SODIUM PHOSPHATE 10 MG: 10 INJECTION INTRAMUSCULAR; INTRAVENOUS at 03:36

## 2021-01-01 RX ADMIN — PHENOBARBITAL 145.8 MG: 97.2 TABLET ORAL at 16:41

## 2021-01-01 RX ADMIN — BUDESONIDE AND FORMOTEROL FUMARATE DIHYDRATE 2 PUFF: 160; 4.5 AEROSOL RESPIRATORY (INHALATION) at 07:57

## 2021-01-01 RX ADMIN — BUDESONIDE 0.5 MG: 0.5 INHALANT RESPIRATORY (INHALATION) at 18:43

## 2021-01-01 RX ADMIN — ROSUVASTATIN CALCIUM 40 MG: 20 TABLET, COATED ORAL at 23:01

## 2021-01-01 RX ADMIN — DILTIAZEM HYDROCHLORIDE 240 MG: 240 CAPSULE, COATED, EXTENDED RELEASE ORAL at 08:44

## 2021-01-01 RX ADMIN — ASPIRIN 81 MG: 81 TABLET, COATED ORAL at 09:21

## 2021-01-01 RX ADMIN — PANTOPRAZOLE SODIUM 40 MG: 40 INJECTION, POWDER, FOR SOLUTION INTRAVENOUS at 09:22

## 2021-01-01 RX ADMIN — PREDNISONE 40 MG: 20 TABLET ORAL at 09:35

## 2021-01-01 RX ADMIN — PROMETHAZINE HYDROCHLORIDE 25 MG: 25 TABLET ORAL at 16:52

## 2021-01-01 RX ADMIN — PANTOPRAZOLE SODIUM 40 MG: 40 TABLET, DELAYED RELEASE ORAL at 09:26

## 2021-01-01 RX ADMIN — GUAIFENESIN 600 MG: 600 TABLET, EXTENDED RELEASE ORAL at 21:09

## 2021-01-01 RX ADMIN — POTASSIUM CHLORIDE 20 MEQ: 400 INJECTION, SOLUTION INTRAVENOUS at 23:22

## 2021-01-01 RX ADMIN — ROSUVASTATIN CALCIUM 40 MG: 20 TABLET, COATED ORAL at 20:58

## 2021-01-01 RX ADMIN — PANTOPRAZOLE SODIUM 40 MG: 40 TABLET, DELAYED RELEASE ORAL at 16:50

## 2021-01-01 RX ADMIN — HEPARIN SODIUM 5000 UNITS: 5000 INJECTION INTRAVENOUS; SUBCUTANEOUS at 05:10

## 2021-01-01 RX ADMIN — CHLORHEXIDINE GLUCONATE 15 ML: 1.2 SOLUTION ORAL at 20:15

## 2021-01-01 RX ADMIN — GUAIFENESIN 600 MG: 600 TABLET, EXTENDED RELEASE ORAL at 08:45

## 2021-01-01 RX ADMIN — NICOTINE 1 PATCH: 7 PATCH, EXTENDED RELEASE TRANSDERMAL at 23:11

## 2021-01-01 RX ADMIN — HEPARIN SODIUM 5000 UNITS: 5000 INJECTION INTRAVENOUS; SUBCUTANEOUS at 11:23

## 2021-01-01 RX ADMIN — DILTIAZEM HYDROCHLORIDE 240 MG: 240 CAPSULE, COATED, EXTENDED RELEASE ORAL at 09:19

## 2021-01-01 RX ADMIN — DILTIAZEM HYDROCHLORIDE 240 MG: 240 CAPSULE, COATED, EXTENDED RELEASE ORAL at 08:58

## 2021-01-01 RX ADMIN — IPRATROPIUM BROMIDE AND ALBUTEROL SULFATE 3 ML: 2.5; .5 SOLUTION RESPIRATORY (INHALATION) at 20:23

## 2021-01-01 RX ADMIN — PANTOPRAZOLE SODIUM 40 MG: 40 TABLET, DELAYED RELEASE ORAL at 05:52

## 2021-01-01 RX ADMIN — ALBUTEROL SULFATE 2 PUFF: 108 AEROSOL, METERED RESPIRATORY (INHALATION) at 02:30

## 2021-01-01 RX ADMIN — FUROSEMIDE 40 MG: 40 TABLET ORAL at 08:22

## 2021-01-01 RX ADMIN — PHENOBARBITAL 145.8 MG: 97.2 TABLET ORAL at 16:54

## 2021-01-01 RX ADMIN — LORAZEPAM 1 MG: 1 TABLET ORAL at 08:11

## 2021-01-01 RX ADMIN — IPRATROPIUM BROMIDE AND ALBUTEROL SULFATE 3 ML: 2.5; .5 SOLUTION RESPIRATORY (INHALATION) at 23:13

## 2021-01-01 RX ADMIN — PHENOBARBITAL 97.2 MG: 64.8 TABLET ORAL at 21:03

## 2021-01-01 RX ADMIN — IPRATROPIUM BROMIDE AND ALBUTEROL SULFATE 3 ML: 2.5; .5 SOLUTION RESPIRATORY (INHALATION) at 17:33

## 2021-01-01 RX ADMIN — CLOPIDOGREL BISULFATE 75 MG: 75 TABLET ORAL at 08:18

## 2021-01-01 RX ADMIN — IPRATROPIUM BROMIDE AND ALBUTEROL SULFATE 3 ML: 2.5; .5 SOLUTION RESPIRATORY (INHALATION) at 15:24

## 2021-01-01 RX ADMIN — DIPHENHYDRAMINE HYDROCHLORIDE 25 MG: 25 CAPSULE ORAL at 17:27

## 2021-01-01 RX ADMIN — SODIUM CHLORIDE 50 ML/HR: 9 INJECTION, SOLUTION INTRAVENOUS at 16:18

## 2021-01-01 RX ADMIN — ROSUVASTATIN CALCIUM 40 MG: 20 TABLET, COATED ORAL at 20:24

## 2021-01-01 RX ADMIN — LORAZEPAM 1 MG: 1 TABLET ORAL at 08:31

## 2021-01-01 RX ADMIN — PREDNISONE 40 MG: 20 TABLET ORAL at 08:55

## 2021-01-01 RX ADMIN — DIPHENHYDRAMINE HYDROCHLORIDE 25 MG: 25 CAPSULE ORAL at 09:51

## 2021-01-01 RX ADMIN — Medication 5 MG: at 21:13

## 2021-01-01 RX ADMIN — LISINOPRIL 40 MG: 40 TABLET ORAL at 10:18

## 2021-01-01 RX ADMIN — IPRATROPIUM BROMIDE AND ALBUTEROL SULFATE 3 ML: 2.5; .5 SOLUTION RESPIRATORY (INHALATION) at 11:04

## 2021-01-01 RX ADMIN — PANTOPRAZOLE SODIUM 40 MG: 40 INJECTION, POWDER, FOR SOLUTION INTRAVENOUS at 21:31

## 2021-01-01 RX ADMIN — CLOPIDOGREL BISULFATE 75 MG: 75 TABLET ORAL at 08:36

## 2021-01-01 RX ADMIN — METHYLPREDNISOLONE SODIUM SUCCINATE 125 MG: 125 INJECTION, POWDER, FOR SOLUTION INTRAMUSCULAR; INTRAVENOUS at 17:24

## 2021-01-01 RX ADMIN — IPRATROPIUM BROMIDE AND ALBUTEROL SULFATE 3 ML: 2.5; .5 SOLUTION RESPIRATORY (INHALATION) at 06:59

## 2021-01-01 RX ADMIN — PHENOBARBITAL 97.2 MG: 32.4 TABLET ORAL at 20:37

## 2021-01-01 RX ADMIN — PHENOBARBITAL 145.8 MG: 97.2 TABLET ORAL at 23:29

## 2021-01-01 RX ADMIN — SODIUM CHLORIDE, PRESERVATIVE FREE 10 ML: 5 INJECTION INTRAVENOUS at 21:20

## 2021-01-01 RX ADMIN — LISINOPRIL 40 MG: 40 TABLET ORAL at 15:25

## 2021-01-01 RX ADMIN — HYDROCODONE BITARTATE AND ACETAMINOPHEN 1 TABLET: 5; 325 TABLET ORAL at 00:30

## 2021-01-01 RX ADMIN — SODIUM CHLORIDE, PRESERVATIVE FREE 10 ML: 5 INJECTION INTRAVENOUS at 20:32

## 2021-01-01 RX ADMIN — POLYETHYLENE GLYCOL 3350, SODIUM SULFATE, SODIUM CHLORIDE, POTASSIUM CHLORIDE, ASCORBIC ACID, SODIUM ASCORBATE 1000 ML: KIT at 20:35

## 2021-01-01 RX ADMIN — DILTIAZEM HYDROCHLORIDE 240 MG: 240 CAPSULE, COATED, EXTENDED RELEASE ORAL at 08:39

## 2021-01-01 RX ADMIN — DOXYCYCLINE 100 MG: 100 CAPSULE ORAL at 21:14

## 2021-01-01 RX ADMIN — METHYLPREDNISOLONE SODIUM SUCCINATE 60 MG: 125 INJECTION, POWDER, FOR SOLUTION INTRAMUSCULAR; INTRAVENOUS at 12:49

## 2021-01-01 RX ADMIN — DILTIAZEM HYDROCHLORIDE 60 MG: 60 TABLET, FILM COATED ORAL at 18:40

## 2021-01-01 RX ADMIN — DILTIAZEM HYDROCHLORIDE 60 MG: 60 TABLET, FILM COATED ORAL at 06:47

## 2021-01-01 RX ADMIN — GUAIFENESIN 600 MG: 600 TABLET, EXTENDED RELEASE ORAL at 09:25

## 2021-01-01 RX ADMIN — METHYLPREDNISOLONE SODIUM SUCCINATE 125 MG: 125 INJECTION, POWDER, FOR SOLUTION INTRAMUSCULAR; INTRAVENOUS at 12:49

## 2021-01-01 RX ADMIN — Medication 5 MG: at 20:56

## 2021-01-01 RX ADMIN — CLOPIDOGREL BISULFATE 75 MG: 75 TABLET ORAL at 08:57

## 2021-01-01 RX ADMIN — TAZOBACTAM SODIUM AND PIPERACILLIN SODIUM 3.38 G: 375; 3 INJECTION, SOLUTION INTRAVENOUS at 14:22

## 2021-01-01 RX ADMIN — IPRATROPIUM BROMIDE AND ALBUTEROL SULFATE 3 ML: 2.5; .5 SOLUTION RESPIRATORY (INHALATION) at 15:54

## 2021-01-01 RX ADMIN — ROSUVASTATIN CALCIUM 40 MG: 20 TABLET, COATED ORAL at 21:48

## 2021-01-01 RX ADMIN — IPRATROPIUM BROMIDE AND ALBUTEROL SULFATE 3 ML: 2.5; .5 SOLUTION RESPIRATORY (INHALATION) at 08:15

## 2021-01-01 RX ADMIN — PANTOPRAZOLE SODIUM 40 MG: 40 INJECTION, POWDER, FOR SOLUTION INTRAVENOUS at 08:39

## 2021-01-01 RX ADMIN — PANTOPRAZOLE SODIUM 40 MG: 40 TABLET, DELAYED RELEASE ORAL at 06:47

## 2021-01-01 RX ADMIN — IPRATROPIUM BROMIDE AND ALBUTEROL SULFATE 3 ML: 2.5; .5 SOLUTION RESPIRATORY (INHALATION) at 22:31

## 2021-01-01 RX ADMIN — CITALOPRAM HYDROBROMIDE 20 MG: 20 TABLET ORAL at 09:13

## 2021-01-01 RX ADMIN — VANCOMYCIN HYDROCHLORIDE 750 MG: 750 INJECTION, SOLUTION INTRAVENOUS at 14:47

## 2021-01-01 RX ADMIN — PHENOBARBITAL 145.8 MG: 97.2 TABLET ORAL at 09:20

## 2021-01-01 RX ADMIN — PANTOPRAZOLE SODIUM 40 MG: 40 INJECTION, POWDER, FOR SOLUTION INTRAVENOUS at 20:58

## 2021-01-01 RX ADMIN — DOXYCYCLINE 100 MG: 100 CAPSULE ORAL at 11:23

## 2021-01-01 RX ADMIN — AZITHROMYCIN 250 MG: 500 INJECTION, POWDER, LYOPHILIZED, FOR SOLUTION INTRAVENOUS at 08:32

## 2021-01-01 RX ADMIN — HYDRALAZINE HYDROCHLORIDE 10 MG: 20 INJECTION INTRAMUSCULAR; INTRAVENOUS at 04:19

## 2021-01-01 RX ADMIN — HYDROCODONE BITARTRATE AND ACETAMINOPHEN 1 TABLET: 10; 325 TABLET ORAL at 13:56

## 2021-01-01 RX ADMIN — ACETAMINOPHEN 500 MG: 500 TABLET, FILM COATED ORAL at 20:42

## 2021-01-01 RX ADMIN — SODIUM CHLORIDE, PRESERVATIVE FREE 10 ML: 5 INJECTION INTRAVENOUS at 08:54

## 2021-01-01 RX ADMIN — METHYLPREDNISOLONE SODIUM SUCCINATE 60 MG: 125 INJECTION, POWDER, FOR SOLUTION INTRAMUSCULAR; INTRAVENOUS at 17:19

## 2021-01-01 RX ADMIN — IPRATROPIUM BROMIDE AND ALBUTEROL SULFATE 3 ML: 2.5; .5 SOLUTION RESPIRATORY (INHALATION) at 03:35

## 2021-01-01 RX ADMIN — PANTOPRAZOLE SODIUM 40 MG: 40 TABLET, DELAYED RELEASE ORAL at 16:12

## 2021-01-01 RX ADMIN — DIPHENHYDRAMINE HYDROCHLORIDE 25 MG: 25 CAPSULE ORAL at 06:57

## 2021-01-01 RX ADMIN — BUDESONIDE AND FORMOTEROL FUMARATE DIHYDRATE 2 PUFF: 160; 4.5 AEROSOL RESPIRATORY (INHALATION) at 19:25

## 2021-01-01 RX ADMIN — IPRATROPIUM BROMIDE AND ALBUTEROL SULFATE 3 ML: 2.5; .5 SOLUTION RESPIRATORY (INHALATION) at 08:08

## 2021-01-01 RX ADMIN — SODIUM CHLORIDE 1 G: 900 INJECTION INTRAVENOUS at 14:08

## 2021-01-01 RX ADMIN — PANTOPRAZOLE SODIUM 40 MG: 40 INJECTION, POWDER, FOR SOLUTION INTRAVENOUS at 08:03

## 2021-01-01 RX ADMIN — LORAZEPAM 1 MG: 1 TABLET ORAL at 09:26

## 2021-01-01 RX ADMIN — LORAZEPAM 0.5 MG: 0.5 TABLET ORAL at 21:48

## 2021-01-01 RX ADMIN — IPRATROPIUM BROMIDE AND ALBUTEROL SULFATE 3 ML: 2.5; .5 SOLUTION RESPIRATORY (INHALATION) at 19:27

## 2021-01-01 RX ADMIN — DOXYCYCLINE 100 MG: 100 INJECTION, POWDER, LYOPHILIZED, FOR SOLUTION INTRAVENOUS at 06:10

## 2021-01-01 RX ADMIN — IPRATROPIUM BROMIDE AND ALBUTEROL SULFATE 3 ML: 2.5; .5 SOLUTION RESPIRATORY (INHALATION) at 19:36

## 2021-01-01 RX ADMIN — HEPARIN SODIUM 2500 UNITS: 5000 INJECTION INTRAVENOUS; SUBCUTANEOUS at 09:24

## 2021-01-01 RX ADMIN — AZITHROMYCIN 250 MG: 500 INJECTION, POWDER, LYOPHILIZED, FOR SOLUTION INTRAVENOUS at 09:15

## 2021-01-01 RX ADMIN — SODIUM CHLORIDE, POTASSIUM CHLORIDE, SODIUM LACTATE AND CALCIUM CHLORIDE 100 ML/HR: 600; 310; 30; 20 INJECTION, SOLUTION INTRAVENOUS at 20:59

## 2021-01-01 RX ADMIN — IPRATROPIUM BROMIDE AND ALBUTEROL SULFATE 3 ML: 2.5; .5 SOLUTION RESPIRATORY (INHALATION) at 06:23

## 2021-01-01 RX ADMIN — HEPARIN SODIUM 5000 UNITS: 5000 INJECTION INTRAVENOUS; SUBCUTANEOUS at 09:05

## 2021-01-01 RX ADMIN — ALBUTEROL SULFATE 2 PUFF: 108 AEROSOL, METERED RESPIRATORY (INHALATION) at 09:43

## 2021-01-01 RX ADMIN — LISINOPRIL 40 MG: 40 TABLET ORAL at 09:27

## 2021-01-01 RX ADMIN — ACETAMINOPHEN 650 MG: 325 TABLET ORAL at 08:35

## 2021-01-01 RX ADMIN — ROSUVASTATIN CALCIUM 40 MG: 20 TABLET, COATED ORAL at 20:22

## 2021-01-01 RX ADMIN — IPRATROPIUM BROMIDE AND ALBUTEROL SULFATE 3 ML: 2.5; .5 SOLUTION RESPIRATORY (INHALATION) at 15:56

## 2021-01-01 RX ADMIN — GUAIFENESIN 600 MG: 600 TABLET, EXTENDED RELEASE ORAL at 20:55

## 2021-01-01 RX ADMIN — GUAIFENESIN 600 MG: 600 TABLET, EXTENDED RELEASE ORAL at 21:35

## 2021-01-01 RX ADMIN — PHENOBARBITAL 97.2 MG: 32.4 TABLET ORAL at 01:54

## 2021-01-01 RX ADMIN — METHYLPREDNISOLONE SODIUM SUCCINATE 40 MG: 40 INJECTION, POWDER, FOR SOLUTION INTRAMUSCULAR; INTRAVENOUS at 08:47

## 2021-01-01 RX ADMIN — MIDODRINE HYDROCHLORIDE 5 MG: 10 TABLET ORAL at 16:50

## 2021-01-01 RX ADMIN — DILTIAZEM HYDROCHLORIDE 60 MG: 60 TABLET, FILM COATED ORAL at 23:15

## 2021-01-01 RX ADMIN — ALBUTEROL SULFATE 2 PUFF: 108 AEROSOL, METERED RESPIRATORY (INHALATION) at 22:39

## 2021-01-01 RX ADMIN — ACETYLCYSTEINE 1.5 ML: 200 SOLUTION ORAL; RESPIRATORY (INHALATION) at 00:51

## 2021-01-01 RX ADMIN — DOXYCYCLINE 100 MG: 100 CAPSULE ORAL at 20:22

## 2021-01-01 RX ADMIN — CLOPIDOGREL BISULFATE 75 MG: 75 TABLET ORAL at 09:16

## 2021-01-01 RX ADMIN — HYDROCODONE BITARTRATE AND ACETAMINOPHEN 1 TABLET: 5; 325 TABLET ORAL at 20:36

## 2021-01-01 RX ADMIN — PHENOBARBITAL 145.8 MG: 97.2 TABLET ORAL at 08:31

## 2021-01-01 RX ADMIN — IPRATROPIUM BROMIDE AND ALBUTEROL SULFATE 3 ML: 2.5; .5 SOLUTION RESPIRATORY (INHALATION) at 04:41

## 2021-01-01 RX ADMIN — LORAZEPAM 1 MG: 1 TABLET ORAL at 08:53

## 2021-01-01 RX ADMIN — FERROUS SULFATE TAB 325 MG (65 MG ELEMENTAL FE) 325 MG: 325 (65 FE) TAB at 09:04

## 2021-01-01 RX ADMIN — METHYLPREDNISOLONE SODIUM SUCCINATE 60 MG: 125 INJECTION, POWDER, FOR SOLUTION INTRAMUSCULAR; INTRAVENOUS at 16:21

## 2021-01-01 RX ADMIN — IPRATROPIUM BROMIDE AND ALBUTEROL SULFATE 3 ML: 2.5; .5 SOLUTION RESPIRATORY (INHALATION) at 13:45

## 2021-01-01 RX ADMIN — IPRATROPIUM BROMIDE AND ALBUTEROL SULFATE 3 ML: 2.5; .5 SOLUTION RESPIRATORY (INHALATION) at 15:29

## 2021-01-01 RX ADMIN — SODIUM CHLORIDE, POTASSIUM CHLORIDE, SODIUM LACTATE AND CALCIUM CHLORIDE: 600; 310; 30; 20 INJECTION, SOLUTION INTRAVENOUS at 13:26

## 2021-01-01 RX ADMIN — BUDESONIDE 0.5 MG: 0.5 INHALANT RESPIRATORY (INHALATION) at 07:20

## 2021-01-01 RX ADMIN — IPRATROPIUM BROMIDE AND ALBUTEROL SULFATE 3 ML: 2.5; .5 SOLUTION RESPIRATORY (INHALATION) at 14:07

## 2021-01-01 RX ADMIN — POTASSIUM CHLORIDE 20 MEQ: 400 INJECTION, SOLUTION INTRAVENOUS at 23:14

## 2021-01-01 RX ADMIN — GUAIFENESIN 600 MG: 600 TABLET, EXTENDED RELEASE ORAL at 09:04

## 2021-01-01 RX ADMIN — DOXYCYCLINE 100 MG: 100 CAPSULE ORAL at 08:22

## 2021-01-01 RX ADMIN — IPRATROPIUM BROMIDE AND ALBUTEROL SULFATE 3 ML: 2.5; .5 SOLUTION RESPIRATORY (INHALATION) at 07:01

## 2021-01-01 RX ADMIN — DILTIAZEM HYDROCHLORIDE 240 MG: 240 CAPSULE, COATED, EXTENDED RELEASE ORAL at 08:55

## 2021-01-01 RX ADMIN — SODIUM CHLORIDE, POTASSIUM CHLORIDE, SODIUM LACTATE AND CALCIUM CHLORIDE 75 ML/HR: 600; 310; 30; 20 INJECTION, SOLUTION INTRAVENOUS at 21:34

## 2021-01-01 RX ADMIN — PANTOPRAZOLE SODIUM 40 MG: 40 INJECTION, POWDER, FOR SOLUTION INTRAVENOUS at 20:37

## 2021-01-01 RX ADMIN — HYDROCORTISONE SODIUM SUCCINATE 100 MG: 100 INJECTION, POWDER, FOR SOLUTION INTRAMUSCULAR; INTRAVENOUS at 21:09

## 2021-01-01 RX ADMIN — POTASSIUM CHLORIDE 40 MEQ: 750 CAPSULE, EXTENDED RELEASE ORAL at 15:14

## 2021-01-01 RX ADMIN — FERROUS SULFATE TAB 325 MG (65 MG ELEMENTAL FE) 325 MG: 325 (65 FE) TAB at 09:27

## 2021-01-01 RX ADMIN — DIPHENHYDRAMINE HYDROCHLORIDE 25 MG: 25 CAPSULE ORAL at 02:51

## 2021-01-01 RX ADMIN — AMOXICILLIN AND CLAVULANATE POTASSIUM 1 TABLET: 875; 125 TABLET, FILM COATED ORAL at 12:33

## 2021-01-01 RX ADMIN — IPRATROPIUM BROMIDE AND ALBUTEROL SULFATE 3 ML: 2.5; .5 SOLUTION RESPIRATORY (INHALATION) at 03:05

## 2021-01-01 RX ADMIN — CITALOPRAM HYDROBROMIDE 20 MG: 20 TABLET ORAL at 08:41

## 2021-01-01 RX ADMIN — PREDNISONE 40 MG: 20 TABLET ORAL at 09:27

## 2021-01-01 RX ADMIN — ROPINIROLE HYDROCHLORIDE 0.25 MG: 0.5 TABLET, FILM COATED ORAL at 21:14

## 2021-01-01 RX ADMIN — SODIUM CHLORIDE, PRESERVATIVE FREE 20 ML: 5 INJECTION INTRAVENOUS at 20:58

## 2021-01-01 RX ADMIN — METHYLPREDNISOLONE SODIUM SUCCINATE 60 MG: 125 INJECTION, POWDER, FOR SOLUTION INTRAMUSCULAR; INTRAVENOUS at 05:16

## 2021-01-01 RX ADMIN — ROSUVASTATIN CALCIUM 40 MG: 20 TABLET, COATED ORAL at 21:09

## 2021-01-01 RX ADMIN — CITALOPRAM HYDROBROMIDE 20 MG: 20 TABLET ORAL at 08:47

## 2021-01-01 RX ADMIN — PHENOBARBITAL 97.2 MG: 32.4 TABLET ORAL at 23:01

## 2021-01-01 RX ADMIN — METHYLPREDNISOLONE SODIUM SUCCINATE 40 MG: 40 INJECTION, POWDER, FOR SOLUTION INTRAMUSCULAR; INTRAVENOUS at 08:22

## 2021-01-01 RX ADMIN — CLOPIDOGREL BISULFATE 75 MG: 75 TABLET ORAL at 14:51

## 2021-01-01 RX ADMIN — DILTIAZEM HYDROCHLORIDE 60 MG: 60 TABLET, FILM COATED ORAL at 06:30

## 2021-01-01 RX ADMIN — BETHANECHOL CHLORIDE 10 MG: 10 TABLET ORAL at 08:46

## 2021-01-01 RX ADMIN — IPRATROPIUM BROMIDE AND ALBUTEROL SULFATE 3 ML: 2.5; .5 SOLUTION RESPIRATORY (INHALATION) at 03:47

## 2021-01-01 RX ADMIN — ACETAMINOPHEN 650 MG: 325 TABLET ORAL at 21:48

## 2021-01-01 RX ADMIN — GUAIFENESIN 600 MG: 600 TABLET, EXTENDED RELEASE ORAL at 20:59

## 2021-01-01 RX ADMIN — GUAIFENESIN 600 MG: 600 TABLET, EXTENDED RELEASE ORAL at 09:27

## 2021-01-01 RX ADMIN — HEPARIN SODIUM 5000 UNITS: 5000 INJECTION INTRAVENOUS; SUBCUTANEOUS at 22:02

## 2021-01-01 RX ADMIN — SODIUM CHLORIDE, PRESERVATIVE FREE 10 ML: 5 INJECTION INTRAVENOUS at 21:19

## 2021-01-01 RX ADMIN — HYDROCODONE BITARTRATE AND ACETAMINOPHEN 1 TABLET: 10; 325 TABLET ORAL at 11:18

## 2021-01-01 RX ADMIN — METHYLPREDNISOLONE SODIUM SUCCINATE 125 MG: 125 INJECTION, POWDER, FOR SOLUTION INTRAMUSCULAR; INTRAVENOUS at 00:15

## 2021-01-01 RX ADMIN — ROSUVASTATIN CALCIUM 40 MG: 20 TABLET, COATED ORAL at 08:44

## 2021-01-01 RX ADMIN — ACETAMINOPHEN 500 MG: 500 TABLET, FILM COATED ORAL at 08:53

## 2021-01-01 RX ADMIN — DILTIAZEM HYDROCHLORIDE 240 MG: 240 CAPSULE, COATED, EXTENDED RELEASE ORAL at 11:22

## 2021-01-01 RX ADMIN — ACETAMINOPHEN 1000 MG: 500 TABLET, FILM COATED ORAL at 18:21

## 2021-01-01 RX ADMIN — CYANOCOBALAMIN 1000 MCG: 1000 INJECTION, SOLUTION INTRAMUSCULAR; SUBCUTANEOUS at 09:48

## 2021-01-01 RX ADMIN — PREDNISONE 40 MG: 20 TABLET ORAL at 09:14

## 2021-01-01 RX ADMIN — HYDROCORTISONE SODIUM SUCCINATE 100 MG: 100 INJECTION, POWDER, FOR SOLUTION INTRAMUSCULAR; INTRAVENOUS at 09:37

## 2021-01-01 RX ADMIN — HYDROCODONE BITARTRATE AND ACETAMINOPHEN 1 TABLET: 10; 325 TABLET ORAL at 13:06

## 2021-01-01 RX ADMIN — HYDRALAZINE HYDROCHLORIDE 10 MG: 20 INJECTION INTRAMUSCULAR; INTRAVENOUS at 12:42

## 2021-01-01 RX ADMIN — ASPIRIN 81 MG: 81 TABLET, COATED ORAL at 08:18

## 2021-01-01 RX ADMIN — DEXTROSE MONOHYDRATE 25 ML/HR: 50 INJECTION, SOLUTION INTRAVENOUS at 09:45

## 2021-01-01 RX ADMIN — SODIUM CHLORIDE, PRESERVATIVE FREE 10 ML: 5 INJECTION INTRAVENOUS at 08:31

## 2021-01-01 RX ADMIN — HYDROCODONE BITARTRATE AND ACETAMINOPHEN 1 TABLET: 10; 325 TABLET ORAL at 10:10

## 2021-01-01 RX ADMIN — CETIRIZINE HYDROCHLORIDE 5 MG: 10 TABLET, FILM COATED ORAL at 09:40

## 2021-01-01 RX ADMIN — SODIUM CHLORIDE, PRESERVATIVE FREE 10 ML: 5 INJECTION INTRAVENOUS at 20:16

## 2021-01-01 RX ADMIN — LISINOPRIL 40 MG: 40 TABLET ORAL at 09:05

## 2021-01-01 RX ADMIN — BUDESONIDE AND FORMOTEROL FUMARATE DIHYDRATE 2 PUFF: 80; 4.5 AEROSOL RESPIRATORY (INHALATION) at 09:45

## 2021-01-01 RX ADMIN — POTASSIUM CHLORIDE 40 MEQ: 750 CAPSULE, EXTENDED RELEASE ORAL at 11:07

## 2021-01-01 RX ADMIN — ROSUVASTATIN CALCIUM 40 MG: 20 TABLET, COATED ORAL at 20:26

## 2021-01-01 RX ADMIN — TAZOBACTAM SODIUM AND PIPERACILLIN SODIUM 3.38 G: 375; 3 INJECTION, SOLUTION INTRAVENOUS at 05:54

## 2021-01-01 RX ADMIN — LORAZEPAM 1 MG: 1 TABLET ORAL at 21:02

## 2021-01-01 RX ADMIN — BUDESONIDE 0.5 MG: 0.5 INHALANT RESPIRATORY (INHALATION) at 19:36

## 2021-01-01 RX ADMIN — ROSUVASTATIN CALCIUM 40 MG: 20 TABLET, COATED ORAL at 21:14

## 2021-01-01 RX ADMIN — IPRATROPIUM BROMIDE AND ALBUTEROL SULFATE 3 ML: 2.5; .5 SOLUTION RESPIRATORY (INHALATION) at 15:22

## 2021-01-01 RX ADMIN — DIPHENHYDRAMINE HYDROCHLORIDE 25 MG: 25 CAPSULE ORAL at 15:28

## 2021-01-01 RX ADMIN — CLOPIDOGREL BISULFATE 75 MG: 75 TABLET ORAL at 18:40

## 2021-01-01 RX ADMIN — CLOPIDOGREL BISULFATE 75 MG: 75 TABLET ORAL at 08:46

## 2021-01-01 RX ADMIN — HEPARIN SODIUM 5000 UNITS: 5000 INJECTION INTRAVENOUS; SUBCUTANEOUS at 08:37

## 2021-01-01 RX ADMIN — HYDROCODONE BITARTRATE AND ACETAMINOPHEN 1 TABLET: 10; 325 TABLET ORAL at 17:01

## 2021-01-01 RX ADMIN — MAGNESIUM SULFATE IN WATER 4 G: 40 INJECTION, SOLUTION INTRAVENOUS at 23:22

## 2021-01-01 RX ADMIN — SODIUM CHLORIDE, PRESERVATIVE FREE 10 ML: 5 INJECTION INTRAVENOUS at 21:58

## 2021-01-01 RX ADMIN — PHENOBARBITAL 145.8 MG: 97.2 TABLET ORAL at 09:27

## 2021-01-01 RX ADMIN — ASPIRIN 81 MG: 81 TABLET, COATED ORAL at 08:59

## 2021-01-01 RX ADMIN — BUDESONIDE AND FORMOTEROL FUMARATE DIHYDRATE 2 PUFF: 160; 4.5 AEROSOL RESPIRATORY (INHALATION) at 08:03

## 2021-01-01 RX ADMIN — PROPOFOL 70 MG: 10 INJECTION, EMULSION INTRAVENOUS at 10:39

## 2021-01-01 RX ADMIN — METHYLPREDNISOLONE SODIUM SUCCINATE 60 MG: 125 INJECTION, POWDER, FOR SOLUTION INTRAMUSCULAR; INTRAVENOUS at 03:29

## 2021-01-01 RX ADMIN — ASPIRIN 81 MG: 81 TABLET, COATED ORAL at 08:44

## 2021-01-01 RX ADMIN — IPRATROPIUM BROMIDE AND ALBUTEROL SULFATE 3 ML: 2.5; .5 SOLUTION RESPIRATORY (INHALATION) at 19:56

## 2021-01-01 RX ADMIN — PANTOPRAZOLE SODIUM 40 MG: 40 TABLET, DELAYED RELEASE ORAL at 05:39

## 2021-01-01 RX ADMIN — PROPOFOL 50 MCG/KG/MIN: 10 INJECTION, EMULSION INTRAVENOUS at 04:20

## 2021-01-01 RX ADMIN — DILTIAZEM HYDROCHLORIDE 60 MG: 60 TABLET, FILM COATED ORAL at 05:28

## 2021-01-01 RX ADMIN — FERROUS SULFATE TAB 325 MG (65 MG ELEMENTAL FE) 325 MG: 325 (65 FE) TAB at 09:25

## 2021-01-01 RX ADMIN — PANTOPRAZOLE SODIUM 40 MG: 40 TABLET, DELAYED RELEASE ORAL at 08:46

## 2021-01-01 RX ADMIN — DILTIAZEM HYDROCHLORIDE 240 MG: 240 CAPSULE, COATED, EXTENDED RELEASE ORAL at 09:21

## 2021-01-01 RX ADMIN — TAZOBACTAM SODIUM AND PIPERACILLIN SODIUM 3.38 G: 375; 3 INJECTION, SOLUTION INTRAVENOUS at 09:09

## 2021-01-01 RX ADMIN — AZTREONAM 1 G: 1 INJECTION, POWDER, LYOPHILIZED, FOR SOLUTION INTRAMUSCULAR; INTRAVENOUS at 18:23

## 2021-01-01 RX ADMIN — BUDESONIDE AND FORMOTEROL FUMARATE DIHYDRATE 2 PUFF: 160; 4.5 AEROSOL RESPIRATORY (INHALATION) at 22:46

## 2021-01-01 RX ADMIN — IPRATROPIUM BROMIDE AND ALBUTEROL SULFATE 3 ML: 2.5; .5 SOLUTION RESPIRATORY (INHALATION) at 12:56

## 2021-01-01 RX ADMIN — FUROSEMIDE 40 MG: 40 TABLET ORAL at 08:31

## 2021-01-01 RX ADMIN — SODIUM CHLORIDE 500 ML: 9 INJECTION, SOLUTION INTRAVENOUS at 23:49

## 2021-01-01 RX ADMIN — CLOPIDOGREL BISULFATE 75 MG: 75 TABLET ORAL at 09:21

## 2021-01-01 RX ADMIN — BUDESONIDE 0.5 MG: 0.5 INHALANT RESPIRATORY (INHALATION) at 07:51

## 2021-01-01 RX ADMIN — AZITHROMYCIN 500 MG: 500 INJECTION, POWDER, LYOPHILIZED, FOR SOLUTION INTRAVENOUS at 08:03

## 2021-01-01 RX ADMIN — HYDROCORTISONE SODIUM SUCCINATE 100 MG: 100 INJECTION, POWDER, FOR SOLUTION INTRAMUSCULAR; INTRAVENOUS at 04:48

## 2021-01-01 RX ADMIN — CEFTRIAXONE SODIUM 1 G: 1 INJECTION, SOLUTION INTRAVENOUS at 17:59

## 2021-01-01 RX ADMIN — CITALOPRAM HYDROBROMIDE 20 MG: 20 TABLET ORAL at 09:23

## 2021-01-01 RX ADMIN — SODIUM CHLORIDE, PRESERVATIVE FREE 10 ML: 5 INJECTION INTRAVENOUS at 08:42

## 2021-01-01 RX ADMIN — ASPIRIN 81 MG: 81 TABLET, CHEWABLE ORAL at 09:13

## 2021-01-01 RX ADMIN — CLOPIDOGREL BISULFATE 75 MG: 75 TABLET ORAL at 15:25

## 2021-01-01 RX ADMIN — DILTIAZEM HYDROCHLORIDE 240 MG: 240 CAPSULE, COATED, EXTENDED RELEASE ORAL at 15:24

## 2021-01-01 RX ADMIN — SODIUM CHLORIDE, PRESERVATIVE FREE 10 ML: 5 INJECTION INTRAVENOUS at 23:28

## 2021-01-01 RX ADMIN — SODIUM CHLORIDE, PRESERVATIVE FREE 10 ML: 5 INJECTION INTRAVENOUS at 08:12

## 2021-01-01 RX ADMIN — ROSUVASTATIN CALCIUM 40 MG: 20 TABLET, COATED ORAL at 20:15

## 2021-01-01 RX ADMIN — SODIUM CHLORIDE 1212 ML: 9 INJECTION, SOLUTION INTRAVENOUS at 15:11

## 2021-01-01 RX ADMIN — PANTOPRAZOLE SODIUM 40 MG: 40 TABLET, DELAYED RELEASE ORAL at 08:30

## 2021-01-01 RX ADMIN — ASPIRIN 81 MG: 81 TABLET, COATED ORAL at 23:32

## 2021-01-01 RX ADMIN — CETIRIZINE HYDROCHLORIDE 5 MG: 10 TABLET, FILM COATED ORAL at 09:19

## 2021-01-01 RX ADMIN — METHYLPREDNISOLONE SODIUM SUCCINATE 60 MG: 125 INJECTION, POWDER, FOR SOLUTION INTRAMUSCULAR; INTRAVENOUS at 05:28

## 2021-01-01 RX ADMIN — CLOPIDOGREL BISULFATE 75 MG: 75 TABLET ORAL at 11:23

## 2021-01-01 RX ADMIN — DILTIAZEM HYDROCHLORIDE 240 MG: 240 CAPSULE, COATED, EXTENDED RELEASE ORAL at 09:35

## 2021-01-01 RX ADMIN — IPRATROPIUM BROMIDE AND ALBUTEROL SULFATE 3 ML: 2.5; .5 SOLUTION RESPIRATORY (INHALATION) at 12:37

## 2021-01-01 RX ADMIN — SODIUM CHLORIDE 1 G: 900 INJECTION INTRAVENOUS at 01:45

## 2021-01-01 RX ADMIN — PANTOPRAZOLE SODIUM 40 MG: 40 TABLET, DELAYED RELEASE ORAL at 09:19

## 2021-01-01 RX ADMIN — DOXYCYCLINE 100 MG: 100 CAPSULE ORAL at 20:56

## 2021-01-01 RX ADMIN — HYDROCODONE BITARTRATE AND ACETAMINOPHEN 1 TABLET: 10; 325 TABLET ORAL at 20:56

## 2021-01-01 RX ADMIN — IPRATROPIUM BROMIDE AND ALBUTEROL SULFATE 3 ML: 2.5; .5 SOLUTION RESPIRATORY (INHALATION) at 10:19

## 2021-01-01 RX ADMIN — ASPIRIN 81 MG: 81 TABLET, COATED ORAL at 09:25

## 2021-01-01 RX ADMIN — SODIUM CHLORIDE, PRESERVATIVE FREE 10 ML: 5 INJECTION INTRAVENOUS at 21:32

## 2021-01-01 RX ADMIN — LORAZEPAM 0.5 MG: 0.5 TABLET ORAL at 21:35

## 2021-01-01 RX ADMIN — ASPIRIN 81 MG: 81 TABLET, CHEWABLE ORAL at 08:23

## 2021-01-01 RX ADMIN — Medication 90 MG: at 12:43

## 2021-01-01 RX ADMIN — CITALOPRAM HYDROBROMIDE 20 MG: 20 TABLET ORAL at 08:55

## 2021-01-01 RX ADMIN — DOXYCYCLINE 100 MG: 100 CAPSULE ORAL at 09:50

## 2021-01-01 RX ADMIN — NICOTINE 1 PATCH: 7 PATCH, EXTENDED RELEASE TRANSDERMAL at 22:14

## 2021-01-01 RX ADMIN — DOXYCYCLINE 100 MG: 100 CAPSULE ORAL at 20:55

## 2021-01-01 RX ADMIN — LORAZEPAM 0.5 MG: 0.5 TABLET ORAL at 08:44

## 2021-01-01 RX ADMIN — GUAIFENESIN 600 MG: 600 TABLET, EXTENDED RELEASE ORAL at 11:22

## 2021-01-01 RX ADMIN — IPRATROPIUM BROMIDE AND ALBUTEROL SULFATE 3 ML: 2.5; .5 SOLUTION RESPIRATORY (INHALATION) at 23:06

## 2021-01-01 RX ADMIN — TAZOBACTAM SODIUM AND PIPERACILLIN SODIUM 3.38 G: 375; 3 INJECTION, SOLUTION INTRAVENOUS at 22:22

## 2021-01-01 RX ADMIN — ISOSORBIDE MONONITRATE 60 MG: 60 TABLET, EXTENDED RELEASE ORAL at 08:56

## 2021-01-01 RX ADMIN — GUAIFENESIN 600 MG: 600 TABLET, EXTENDED RELEASE ORAL at 15:26

## 2021-01-01 RX ADMIN — CEFTRIAXONE SODIUM 1 G: 1 INJECTION, POWDER, FOR SOLUTION INTRAMUSCULAR; INTRAVENOUS at 04:57

## 2021-01-01 RX ADMIN — PANTOPRAZOLE SODIUM 40 MG: 40 TABLET, DELAYED RELEASE ORAL at 17:28

## 2021-01-01 RX ADMIN — LORAZEPAM 1 MG: 1 TABLET ORAL at 13:48

## 2021-01-01 RX ADMIN — SODIUM CHLORIDE, PRESERVATIVE FREE 10 ML: 5 INJECTION INTRAVENOUS at 20:22

## 2021-01-01 RX ADMIN — IPRATROPIUM BROMIDE AND ALBUTEROL SULFATE 3 ML: 2.5; .5 SOLUTION RESPIRATORY (INHALATION) at 22:44

## 2021-01-01 RX ADMIN — TERAZOSIN HYDROCHLORIDE 2 MG: 2 CAPSULE ORAL at 20:34

## 2021-01-01 RX ADMIN — HYDROCODONE BITARTRATE AND ACETAMINOPHEN 1 TABLET: 5; 325 TABLET ORAL at 23:44

## 2021-01-01 RX ADMIN — ACETAMINOPHEN 650 MG: 325 TABLET ORAL at 23:00

## 2021-01-01 RX ADMIN — HYDROCODONE BITARTRATE AND ACETAMINOPHEN 1 TABLET: 10; 325 TABLET ORAL at 23:53

## 2021-01-01 RX ADMIN — HEPARIN SODIUM 5000 UNITS: 5000 INJECTION INTRAVENOUS; SUBCUTANEOUS at 21:03

## 2021-01-01 RX ADMIN — DEXTROSE MONOHYDRATE 50 ML: 500 INJECTION PARENTERAL at 09:39

## 2021-01-01 RX ADMIN — CETIRIZINE HYDROCHLORIDE 5 MG: 10 TABLET, FILM COATED ORAL at 08:19

## 2021-01-01 RX ADMIN — ACETAMINOPHEN 500 MG: 500 TABLET, FILM COATED ORAL at 15:17

## 2021-01-01 RX ADMIN — HYDROCORTISONE SODIUM SUCCINATE 100 MG: 100 INJECTION, POWDER, FOR SOLUTION INTRAMUSCULAR; INTRAVENOUS at 11:23

## 2021-01-01 RX ADMIN — DOXYCYCLINE 100 MG: 100 CAPSULE ORAL at 09:27

## 2021-01-01 RX ADMIN — IPRATROPIUM BROMIDE AND ALBUTEROL SULFATE 3 ML: 2.5; .5 SOLUTION RESPIRATORY (INHALATION) at 10:42

## 2021-01-01 RX ADMIN — SODIUM CHLORIDE 1 G: 900 INJECTION INTRAVENOUS at 13:14

## 2021-01-01 RX ADMIN — ETOMIDATE 20 MG: 2 INJECTION, SOLUTION INTRAVENOUS at 12:43

## 2021-01-01 RX ADMIN — CITALOPRAM HYDROBROMIDE 20 MG: 20 TABLET ORAL at 09:35

## 2021-01-01 RX ADMIN — ROSUVASTATIN CALCIUM 40 MG: 20 TABLET, COATED ORAL at 20:30

## 2021-01-01 RX ADMIN — DILTIAZEM HYDROCHLORIDE 60 MG: 60 TABLET, FILM COATED ORAL at 17:52

## 2021-01-01 RX ADMIN — BUDESONIDE AND FORMOTEROL FUMARATE DIHYDRATE 2 PUFF: 160; 4.5 AEROSOL RESPIRATORY (INHALATION) at 06:23

## 2021-01-01 RX ADMIN — FUROSEMIDE 20 MG: 20 TABLET ORAL at 09:19

## 2021-01-01 RX ADMIN — BARIUM SULFATE 100 ML: 0.81 POWDER, FOR SUSPENSION ORAL at 15:06

## 2021-01-01 RX ADMIN — ROSUVASTATIN CALCIUM 40 MG: 20 TABLET, COATED ORAL at 23:29

## 2021-01-01 RX ADMIN — ALBUTEROL SULFATE 10 MG: 2.5 SOLUTION RESPIRATORY (INHALATION) at 02:05

## 2021-01-01 RX ADMIN — LABETALOL 20 MG/4 ML (5 MG/ML) INTRAVENOUS SYRINGE 10 MG: at 00:01

## 2021-01-01 RX ADMIN — IPRATROPIUM BROMIDE AND ALBUTEROL SULFATE 3 ML: 2.5; .5 SOLUTION RESPIRATORY (INHALATION) at 07:34

## 2021-01-01 RX ADMIN — IPRATROPIUM BROMIDE AND ALBUTEROL SULFATE 3 ML: 2.5; .5 SOLUTION RESPIRATORY (INHALATION) at 15:50

## 2021-01-01 RX ADMIN — HYDROCODONE BITARTRATE AND ACETAMINOPHEN 1 TABLET: 10; 325 TABLET ORAL at 00:02

## 2021-01-01 RX ADMIN — ALBUTEROL SULFATE 2 PUFF: 90 AEROSOL, METERED RESPIRATORY (INHALATION) at 17:44

## 2021-01-01 RX ADMIN — HEPARIN SODIUM 5000 UNITS: 5000 INJECTION INTRAVENOUS; SUBCUTANEOUS at 15:39

## 2021-01-01 RX ADMIN — PREDNISONE 20 MG: 20 TABLET ORAL at 09:50

## 2021-01-01 RX ADMIN — ACETAMINOPHEN 500 MG: 500 TABLET, FILM COATED ORAL at 08:19

## 2021-01-01 RX ADMIN — DIPHENHYDRAMINE HYDROCHLORIDE 25 MG: 25 CAPSULE ORAL at 22:45

## 2021-01-01 RX ADMIN — IPRATROPIUM BROMIDE AND ALBUTEROL SULFATE 3 ML: 2.5; .5 SOLUTION RESPIRATORY (INHALATION) at 07:58

## 2021-01-01 RX ADMIN — CITALOPRAM HYDROBROMIDE 20 MG: 20 TABLET ORAL at 09:24

## 2021-01-01 RX ADMIN — NICOTINE 1 PATCH: 14 PATCH, EXTENDED RELEASE TRANSDERMAL at 20:31

## 2021-01-01 RX ADMIN — IPRATROPIUM BROMIDE AND ALBUTEROL SULFATE 3 ML: .5; 3 SOLUTION RESPIRATORY (INHALATION) at 11:30

## 2021-01-01 RX ADMIN — DIPHENHYDRAMINE HYDROCHLORIDE 25 MG: 25 CAPSULE ORAL at 16:59

## 2021-01-01 RX ADMIN — SODIUM CHLORIDE, PRESERVATIVE FREE 10 ML: 5 INJECTION INTRAVENOUS at 20:59

## 2021-01-01 RX ADMIN — CETIRIZINE HYDROCHLORIDE 5 MG: 10 TABLET, FILM COATED ORAL at 08:30

## 2021-01-01 RX ADMIN — IPRATROPIUM BROMIDE AND ALBUTEROL SULFATE 3 ML: 2.5; .5 SOLUTION RESPIRATORY (INHALATION) at 07:26

## 2021-01-01 RX ADMIN — LORAZEPAM 0.5 MG: 0.5 TABLET ORAL at 11:23

## 2021-01-01 RX ADMIN — LISINOPRIL 40 MG: 40 TABLET ORAL at 08:19

## 2021-01-01 RX ADMIN — PANTOPRAZOLE SODIUM 40 MG: 40 TABLET, DELAYED RELEASE ORAL at 09:25

## 2021-01-01 RX ADMIN — BUDESONIDE 0.5 MG: 0.5 INHALANT RESPIRATORY (INHALATION) at 13:45

## 2021-01-01 RX ADMIN — CITALOPRAM HYDROBROMIDE 20 MG: 20 TABLET ORAL at 09:08

## 2021-01-01 RX ADMIN — DOCUSATE SODIUM 100 MG: 100 CAPSULE, LIQUID FILLED ORAL at 21:09

## 2021-01-01 RX ADMIN — ACETAMINOPHEN 650 MG: 325 TABLET ORAL at 09:20

## 2021-01-01 RX ADMIN — DILTIAZEM HYDROCHLORIDE 240 MG: 240 CAPSULE, COATED, EXTENDED RELEASE ORAL at 08:22

## 2021-01-01 RX ADMIN — AMOXICILLIN AND CLAVULANATE POTASSIUM 1 TABLET: 875; 125 TABLET, FILM COATED ORAL at 11:23

## 2021-01-01 RX ADMIN — LORAZEPAM 0.5 MG: 0.5 TABLET ORAL at 21:09

## 2021-01-01 RX ADMIN — PANTOPRAZOLE SODIUM 40 MG: 40 TABLET, DELAYED RELEASE ORAL at 17:21

## 2021-01-01 RX ADMIN — DILTIAZEM HYDROCHLORIDE 60 MG: 60 TABLET, FILM COATED ORAL at 00:20

## 2021-01-01 RX ADMIN — POTASSIUM CHLORIDE 20 MEQ: 400 INJECTION, SOLUTION INTRAVENOUS at 00:25

## 2021-01-01 RX ADMIN — INSULIN LISPRO 4 UNITS: 100 INJECTION, SOLUTION INTRAVENOUS; SUBCUTANEOUS at 12:49

## 2021-01-01 RX ADMIN — CLOPIDOGREL BISULFATE 75 MG: 75 TABLET ORAL at 09:50

## 2021-01-01 RX ADMIN — ACETAMINOPHEN 500 MG: 500 TABLET, FILM COATED ORAL at 09:08

## 2021-01-01 RX ADMIN — NICOTINE 1 PATCH: 7 PATCH, EXTENDED RELEASE TRANSDERMAL at 23:30

## 2021-01-01 RX ADMIN — PANTOPRAZOLE SODIUM 40 MG: 40 TABLET, DELAYED RELEASE ORAL at 22:22

## 2021-01-01 RX ADMIN — ROSUVASTATIN CALCIUM 40 MG: 20 TABLET, COATED ORAL at 20:34

## 2021-01-01 RX ADMIN — IPRATROPIUM BROMIDE AND ALBUTEROL SULFATE 3 ML: 2.5; .5 SOLUTION RESPIRATORY (INHALATION) at 21:18

## 2021-01-01 RX ADMIN — CEFTRIAXONE SODIUM 1 G: 1 INJECTION, SOLUTION INTRAVENOUS at 15:24

## 2021-01-01 RX ADMIN — LORAZEPAM 1 MG: 1 TABLET ORAL at 11:39

## 2021-01-01 RX ADMIN — POLYETHYLENE GLYCOL 3350 17 G: 17 POWDER, FOR SOLUTION ORAL at 09:20

## 2021-01-01 RX ADMIN — IPRATROPIUM BROMIDE AND ALBUTEROL SULFATE 3 ML: 2.5; .5 SOLUTION RESPIRATORY (INHALATION) at 23:28

## 2021-01-01 RX ADMIN — CITALOPRAM HYDROBROMIDE 20 MG: 20 TABLET ORAL at 08:23

## 2021-01-01 RX ADMIN — PANTOPRAZOLE SODIUM 80 MG: 40 INJECTION, POWDER, FOR SOLUTION INTRAVENOUS at 01:27

## 2021-01-01 RX ADMIN — AZTREONAM 2 G: 2 INJECTION, POWDER, LYOPHILIZED, FOR SOLUTION INTRAMUSCULAR; INTRAVENOUS at 05:39

## 2021-01-01 RX ADMIN — LORAZEPAM 0.5 MG: 0.5 TABLET ORAL at 20:59

## 2021-01-01 RX ADMIN — HYDROCORTISONE SODIUM SUCCINATE 100 MG: 100 INJECTION, POWDER, FOR SOLUTION INTRAMUSCULAR; INTRAVENOUS at 16:07

## 2021-01-01 RX ADMIN — LISINOPRIL 40 MG: 40 TABLET ORAL at 08:31

## 2021-01-01 RX ADMIN — CLOPIDOGREL BISULFATE 75 MG: 75 TABLET ORAL at 08:54

## 2021-01-01 RX ADMIN — PANTOPRAZOLE SODIUM 40 MG: 40 TABLET, DELAYED RELEASE ORAL at 17:16

## 2021-01-01 RX ADMIN — DILTIAZEM HYDROCHLORIDE 60 MG: 60 TABLET, FILM COATED ORAL at 12:11

## 2021-01-01 RX ADMIN — CLOPIDOGREL BISULFATE 75 MG: 75 TABLET ORAL at 08:44

## 2021-01-01 RX ADMIN — LORAZEPAM 1 MG: 1 TABLET ORAL at 21:22

## 2021-01-01 RX ADMIN — PANTOPRAZOLE SODIUM 40 MG: 40 TABLET, DELAYED RELEASE ORAL at 20:26

## 2021-01-01 RX ADMIN — CEFUROXIME AXETIL 500 MG: 250 TABLET ORAL at 23:35

## 2021-01-01 RX ADMIN — DILTIAZEM HYDROCHLORIDE 240 MG: 240 CAPSULE, COATED, EXTENDED RELEASE ORAL at 08:36

## 2021-01-01 RX ADMIN — ASPIRIN 81 MG: 81 TABLET, COATED ORAL at 09:27

## 2021-01-01 RX ADMIN — ASPIRIN 81 MG: 81 TABLET, COATED ORAL at 14:52

## 2021-01-01 RX ADMIN — ALBUTEROL SULFATE 2 PUFF: 90 AEROSOL, METERED RESPIRATORY (INHALATION) at 00:18

## 2021-01-01 RX ADMIN — HYDROCODONE BITARTRATE AND ACETAMINOPHEN 1 TABLET: 10; 325 TABLET ORAL at 08:19

## 2021-01-01 RX ADMIN — IPRATROPIUM BROMIDE AND ALBUTEROL SULFATE 3 ML: 2.5; .5 SOLUTION RESPIRATORY (INHALATION) at 08:30

## 2021-01-01 RX ADMIN — PANTOPRAZOLE SODIUM 40 MG: 40 INJECTION, POWDER, FOR SOLUTION INTRAVENOUS at 20:30

## 2021-01-01 RX ADMIN — PHENOBARBITAL 97.2 MG: 64.8 TABLET ORAL at 20:32

## 2021-01-01 RX ADMIN — HYDROCODONE BITARTRATE AND ACETAMINOPHEN 1 TABLET: 5; 325 TABLET ORAL at 09:51

## 2021-01-01 RX ADMIN — PANTOPRAZOLE SODIUM 40 MG: 40 TABLET, DELAYED RELEASE ORAL at 11:22

## 2021-01-01 RX ADMIN — PHENOBARBITAL 150 MG: 100 TABLET ORAL at 11:31

## 2021-01-01 RX ADMIN — ROPINIROLE HYDROCHLORIDE 0.25 MG: 0.5 TABLET, FILM COATED ORAL at 20:24

## 2021-01-01 RX ADMIN — IPRATROPIUM BROMIDE AND ALBUTEROL SULFATE 3 ML: 2.5; .5 SOLUTION RESPIRATORY (INHALATION) at 15:44

## 2021-01-01 RX ADMIN — DIPHENHYDRAMINE HYDROCHLORIDE 25 MG: 25 CAPSULE ORAL at 03:19

## 2021-01-01 RX ADMIN — FUROSEMIDE 40 MG: 40 TABLET ORAL at 09:27

## 2021-01-01 RX ADMIN — PANTOPRAZOLE SODIUM 40 MG: 40 INJECTION, POWDER, FOR SOLUTION INTRAVENOUS at 08:53

## 2021-01-01 RX ADMIN — PHENOBARBITAL 150 MG: 20 ELIXIR ORAL at 00:20

## 2021-01-01 RX ADMIN — LORAZEPAM 0.5 MG: 0.5 TABLET ORAL at 09:41

## 2021-01-01 RX ADMIN — AZTREONAM 2 G: 2 INJECTION, POWDER, LYOPHILIZED, FOR SOLUTION INTRAMUSCULAR; INTRAVENOUS at 15:14

## 2021-01-01 RX ADMIN — TAZOBACTAM SODIUM AND PIPERACILLIN SODIUM 3.38 G: 375; 3 INJECTION, SOLUTION INTRAVENOUS at 04:20

## 2021-01-01 RX ADMIN — LORAZEPAM 0.5 MG: 0.5 TABLET ORAL at 20:26

## 2021-01-01 RX ADMIN — TERAZOSIN HYDROCHLORIDE 2 MG: 2 CAPSULE ORAL at 20:26

## 2021-01-01 RX ADMIN — DIPHENHYDRAMINE HYDROCHLORIDE 25 MG: 25 CAPSULE ORAL at 04:49

## 2021-01-01 RX ADMIN — AZITHROMYCIN 250 MG: 500 INJECTION, POWDER, LYOPHILIZED, FOR SOLUTION INTRAVENOUS at 08:46

## 2021-01-01 RX ADMIN — ASPIRIN 81 MG: 81 TABLET, COATED ORAL at 15:25

## 2021-01-01 RX ADMIN — ROPINIROLE HYDROCHLORIDE 0.25 MG: 0.5 TABLET, FILM COATED ORAL at 20:21

## 2021-01-01 RX ADMIN — SODIUM CHLORIDE, PRESERVATIVE FREE 10 ML: 5 INJECTION INTRAVENOUS at 08:22

## 2021-01-01 RX ADMIN — BUDESONIDE AND FORMOTEROL FUMARATE DIHYDRATE 2 PUFF: 160; 4.5 AEROSOL RESPIRATORY (INHALATION) at 19:57

## 2021-01-01 RX ADMIN — PROMETHAZINE HYDROCHLORIDE 25 MG: 25 TABLET ORAL at 21:03

## 2021-01-01 RX ADMIN — DOXYCYCLINE 100 MG: 100 CAPSULE ORAL at 20:34

## 2021-01-01 RX ADMIN — DILTIAZEM HYDROCHLORIDE 60 MG: 60 TABLET, FILM COATED ORAL at 11:38

## 2021-01-01 RX ADMIN — HEPARIN SODIUM 5000 UNITS: 5000 INJECTION INTRAVENOUS; SUBCUTANEOUS at 15:24

## 2021-01-01 RX ADMIN — FUROSEMIDE 20 MG: 20 TABLET ORAL at 08:56

## 2021-01-01 RX ADMIN — METHYLPREDNISOLONE SODIUM SUCCINATE 40 MG: 40 INJECTION, POWDER, FOR SOLUTION INTRAMUSCULAR; INTRAVENOUS at 17:45

## 2021-01-01 RX ADMIN — CITALOPRAM HYDROBROMIDE 20 MG: 20 TABLET ORAL at 08:35

## 2021-01-01 RX ADMIN — LORAZEPAM 1 MG: 1 TABLET ORAL at 20:21

## 2021-01-01 RX ADMIN — IPRATROPIUM BROMIDE AND ALBUTEROL SULFATE 3 ML: 2.5; .5 SOLUTION RESPIRATORY (INHALATION) at 00:34

## 2021-01-01 RX ADMIN — TERAZOSIN HYDROCHLORIDE 2 MG: 2 CAPSULE ORAL at 23:01

## 2021-01-01 RX ADMIN — PHENOBARBITAL 97.2 MG: 32.4 TABLET ORAL at 21:09

## 2021-01-01 RX ADMIN — INSULIN LISPRO 4 UNITS: 100 INJECTION, SOLUTION INTRAVENOUS; SUBCUTANEOUS at 18:17

## 2021-01-01 RX ADMIN — MIDODRINE HYDROCHLORIDE 5 MG: 10 TABLET ORAL at 09:37

## 2021-01-01 RX ADMIN — SODIUM CHLORIDE, POTASSIUM CHLORIDE, SODIUM LACTATE AND CALCIUM CHLORIDE 30 ML/HR: 600; 310; 30; 20 INJECTION, SOLUTION INTRAVENOUS at 10:18

## 2021-01-01 RX ADMIN — IPRATROPIUM BROMIDE AND ALBUTEROL SULFATE 3 ML: 2.5; .5 SOLUTION RESPIRATORY (INHALATION) at 23:40

## 2021-01-01 RX ADMIN — SODIUM CHLORIDE, PRESERVATIVE FREE 10 ML: 5 INJECTION INTRAVENOUS at 15:26

## 2021-01-01 RX ADMIN — ACETAMINOPHEN 650 MG: 325 TABLET ORAL at 17:13

## 2021-01-01 RX ADMIN — DILTIAZEM HYDROCHLORIDE 240 MG: 240 CAPSULE, COATED, EXTENDED RELEASE ORAL at 08:56

## 2021-01-01 RX ADMIN — HEPARIN SODIUM 5000 UNITS: 5000 INJECTION INTRAVENOUS; SUBCUTANEOUS at 20:15

## 2021-01-01 RX ADMIN — CITALOPRAM HYDROBROMIDE 20 MG: 20 TABLET ORAL at 15:25

## 2021-01-01 RX ADMIN — SODIUM CHLORIDE, PRESERVATIVE FREE 10 ML: 5 INJECTION INTRAVENOUS at 09:16

## 2021-01-01 RX ADMIN — NICOTINE 1 PATCH: 7 PATCH, EXTENDED RELEASE TRANSDERMAL at 23:27

## 2021-01-01 RX ADMIN — FUROSEMIDE 40 MG: 40 TABLET ORAL at 08:54

## 2021-01-01 RX ADMIN — DOCUSATE SODIUM 100 MG: 100 CAPSULE, LIQUID FILLED ORAL at 09:07

## 2021-01-01 RX ADMIN — ALBUTEROL SULFATE 2 PUFF: 108 AEROSOL, METERED RESPIRATORY (INHALATION) at 17:52

## 2021-01-01 RX ADMIN — FLUTICASONE PROPIONATE 2 SPRAY: 50 SPRAY, METERED NASAL at 09:24

## 2021-01-01 RX ADMIN — ACETAMINOPHEN 650 MG: 325 TABLET ORAL at 16:50

## 2021-01-01 RX ADMIN — CLOPIDOGREL BISULFATE 75 MG: 75 TABLET ORAL at 09:19

## 2021-01-01 RX ADMIN — IPRATROPIUM BROMIDE AND ALBUTEROL SULFATE 3 ML: 2.5; .5 SOLUTION RESPIRATORY (INHALATION) at 12:59

## 2021-01-01 RX ADMIN — CLOPIDOGREL BISULFATE 75 MG: 75 TABLET ORAL at 08:22

## 2021-01-01 RX ADMIN — HYDROCODONE BITARTRATE AND ACETAMINOPHEN 1 TABLET: 10; 325 TABLET ORAL at 07:26

## 2021-01-01 RX ADMIN — PANTOPRAZOLE SODIUM 40 MG: 40 TABLET, DELAYED RELEASE ORAL at 09:03

## 2021-01-01 RX ADMIN — PHENOBARBITAL 150 MG: 100 TABLET ORAL at 09:09

## 2021-01-01 RX ADMIN — HEPARIN SODIUM 2500 UNITS: 5000 INJECTION INTRAVENOUS; SUBCUTANEOUS at 20:34

## 2021-01-01 RX ADMIN — SODIUM CHLORIDE 250 MG: 9 INJECTION, SOLUTION INTRAVENOUS at 15:17

## 2021-01-01 RX ADMIN — METHYLPREDNISOLONE SODIUM SUCCINATE 40 MG: 40 INJECTION, POWDER, FOR SOLUTION INTRAMUSCULAR; INTRAVENOUS at 21:21

## 2021-01-01 RX ADMIN — DILTIAZEM HYDROCHLORIDE 240 MG: 240 CAPSULE, COATED, EXTENDED RELEASE ORAL at 09:03

## 2021-01-01 RX ADMIN — ROSUVASTATIN CALCIUM 40 MG: 20 TABLET, COATED ORAL at 20:56

## 2021-01-01 RX ADMIN — BUDESONIDE AND FORMOTEROL FUMARATE DIHYDRATE 2 PUFF: 160; 4.5 AEROSOL RESPIRATORY (INHALATION) at 21:35

## 2021-01-01 RX ADMIN — CITALOPRAM HYDROBROMIDE 20 MG: 20 TABLET ORAL at 08:02

## 2021-01-01 RX ADMIN — CETIRIZINE HYDROCHLORIDE 5 MG: 10 TABLET, FILM COATED ORAL at 08:11

## 2021-01-01 RX ADMIN — CITALOPRAM HYDROBROMIDE 20 MG: 20 TABLET ORAL at 08:53

## 2021-01-01 RX ADMIN — SODIUM CHLORIDE, PRESERVATIVE FREE 10 ML: 5 INJECTION INTRAVENOUS at 20:37

## 2021-01-01 RX ADMIN — ROSUVASTATIN CALCIUM 40 MG: 20 TABLET, COATED ORAL at 20:55

## 2021-01-01 RX ADMIN — PHENOBARBITAL 97.2 MG: 64.8 TABLET ORAL at 22:25

## 2021-01-01 RX ADMIN — MORPHINE SULFATE 2 MG: 2 INJECTION, SOLUTION INTRAMUSCULAR; INTRAVENOUS at 08:48

## 2021-01-01 RX ADMIN — BUDESONIDE AND FORMOTEROL FUMARATE DIHYDRATE 2 PUFF: 160; 4.5 AEROSOL RESPIRATORY (INHALATION) at 18:41

## 2021-01-01 RX ADMIN — PHENOBARBITAL 145.8 MG: 97.2 TABLET ORAL at 08:22

## 2021-01-01 RX ADMIN — HYDROCODONE BITARTRATE AND ACETAMINOPHEN 1 TABLET: 10; 325 TABLET ORAL at 08:53

## 2021-01-01 RX ADMIN — CETIRIZINE HYDROCHLORIDE 5 MG: 10 TABLET, FILM COATED ORAL at 09:08

## 2021-01-01 RX ADMIN — FERROUS SULFATE TAB 325 MG (65 MG ELEMENTAL FE) 325 MG: 325 (65 FE) TAB at 09:21

## 2021-01-01 RX ADMIN — NICOTINE POLACRILEX 2 MG: 2 GUM, CHEWING ORAL at 21:21

## 2021-01-01 RX ADMIN — NALXONE HYDROCHLORIDE 0.4 MG: 0.4 INJECTION INTRAMUSCULAR; INTRAVENOUS; SUBCUTANEOUS at 12:14

## 2021-01-01 RX ADMIN — IPRATROPIUM BROMIDE AND ALBUTEROL SULFATE 3 ML: 2.5; .5 SOLUTION RESPIRATORY (INHALATION) at 14:42

## 2021-01-01 RX ADMIN — CETIRIZINE HYDROCHLORIDE 5 MG: 10 TABLET, FILM COATED ORAL at 09:23

## 2021-01-01 RX ADMIN — TAZOBACTAM SODIUM AND PIPERACILLIN SODIUM 3.38 G: 375; 3 INJECTION, SOLUTION INTRAVENOUS at 18:29

## 2021-01-01 RX ADMIN — HEPARIN SODIUM 5000 UNITS: 5000 INJECTION INTRAVENOUS; SUBCUTANEOUS at 23:02

## 2021-01-01 RX ADMIN — PREDNISONE 40 MG: 20 TABLET ORAL at 08:56

## 2021-01-01 RX ADMIN — CLOPIDOGREL BISULFATE 75 MG: 75 TABLET ORAL at 08:56

## 2021-01-01 RX ADMIN — NICOTINE 1 PATCH: 7 PATCH, EXTENDED RELEASE TRANSDERMAL at 22:30

## 2021-01-01 RX ADMIN — ENOXAPARIN SODIUM 40 MG: 40 INJECTION SUBCUTANEOUS at 12:42

## 2021-01-01 RX ADMIN — CLOPIDOGREL BISULFATE 75 MG: 75 TABLET ORAL at 08:23

## 2021-01-01 RX ADMIN — DOXYCYCLINE 100 MG: 100 INJECTION, POWDER, LYOPHILIZED, FOR SOLUTION INTRAVENOUS at 19:51

## 2021-01-01 RX ADMIN — SODIUM CHLORIDE 100 ML/HR: 9 INJECTION, SOLUTION INTRAVENOUS at 20:59

## 2021-01-01 RX ADMIN — HYDROCODONE BITARTRATE AND ACETAMINOPHEN 1 TABLET: 10; 325 TABLET ORAL at 12:04

## 2021-01-01 RX ADMIN — HEPARIN SODIUM 5000 UNITS: 5000 INJECTION INTRAVENOUS; SUBCUTANEOUS at 09:20

## 2021-01-01 RX ADMIN — ROSUVASTATIN CALCIUM 40 MG: 20 TABLET, COATED ORAL at 21:03

## 2021-01-01 RX ADMIN — CLOPIDOGREL BISULFATE 75 MG: 75 TABLET ORAL at 08:38

## 2021-01-01 RX ADMIN — LORAZEPAM 0.5 MG: 0.5 TABLET ORAL at 20:55

## 2021-01-01 RX ADMIN — IPRATROPIUM BROMIDE AND ALBUTEROL SULFATE 3 ML: 2.5; .5 SOLUTION RESPIRATORY (INHALATION) at 07:57

## 2021-01-01 RX ADMIN — SODIUM CHLORIDE, PRESERVATIVE FREE 10 ML: 5 INJECTION INTRAVENOUS at 23:00

## 2021-01-01 RX ADMIN — SODIUM CHLORIDE, PRESERVATIVE FREE 10 ML: 5 INJECTION INTRAVENOUS at 20:17

## 2021-01-01 RX ADMIN — ACETAMINOPHEN 500 MG: 500 TABLET, FILM COATED ORAL at 20:29

## 2021-01-01 RX ADMIN — TERAZOSIN HYDROCHLORIDE 2 MG: 2 CAPSULE ORAL at 21:32

## 2021-01-01 RX ADMIN — PROPOFOL 45 MCG/KG/MIN: 10 INJECTION, EMULSION INTRAVENOUS at 18:26

## 2021-01-01 RX ADMIN — ACETAMINOPHEN 650 MG: 325 TABLET ORAL at 08:57

## 2021-01-01 RX ADMIN — PANTOPRAZOLE SODIUM 40 MG: 40 INJECTION, POWDER, FOR SOLUTION INTRAVENOUS at 16:34

## 2021-01-01 RX ADMIN — PANTOPRAZOLE SODIUM 40 MG: 40 TABLET, DELAYED RELEASE ORAL at 08:22

## 2021-01-01 RX ADMIN — DILTIAZEM HYDROCHLORIDE 240 MG: 240 CAPSULE, COATED, EXTENDED RELEASE ORAL at 12:05

## 2021-01-01 RX ADMIN — ISOSORBIDE MONONITRATE 60 MG: 60 TABLET, EXTENDED RELEASE ORAL at 15:25

## 2021-01-01 SDOH — ECONOMIC STABILITY - INCOME SECURITY: PROBLEM RELATED TO HOUSING AND ECONOMIC CIRCUMSTANCES, UNSPECIFIED: Z59.9

## 2021-01-07 NOTE — TELEPHONE ENCOUNTER
Caller: SUZE     Relationship: Nurse at [facility]    Best call back number: [phone]    Medication needed:   Requested Prescriptions     Pending Prescriptions Disp Refills   • albuterol sulfate  (90 Base) MCG/ACT inhaler 18 g 0     Si puffs Every 4 (Four) Hours As Needed for Wheezing or Shortness of Air.       When do you need the refill by: 21    What details did the patient provide when requesting the medication: Patient is also asking for something for nausea.  Patient states that she vomits with everything.  Patient states that she needs to gain weight and is 72 pounds.    Does the patient have less than a 3 day supply:  [x] Yes  [] No    What is the patient's preferred pharmacy: [facility] - [address] - [phone]  - [phone] FX

## 2021-01-15 NOTE — TELEPHONE ENCOUNTER
QUOC FROM [facility] IS WITH PATIENT TODAY.  SHE FOUND A PLACE LIKE A CYST ON THE RIGHT EARLOBE.  SHE WANTED TO KNOW IF YOU WANTED TO THE PATIENT OR IF YOU WANTED TO SEND IN AN ANTIBIOTIC.    SHE WILL BE LEAVING THERE SHORTLY, BUT YOU CAN CALL HER ON HER CELL.    CALLBACK:  [phone]

## 2021-01-20 NOTE — TELEPHONE ENCOUNTER
QUOC WITH [facility] CALLED TO REPORT THE PATIENT'S BLOOD PRESSURE READING.    178/101    QUOC'S CONTACT [phone]

## 2021-01-21 NOTE — TELEPHONE ENCOUNTER
Is she on lisinopril 40mg? Have her keep log of BP and if consistently SBP over >150 then call clinic and I will send in a BP med.

## 2021-01-21 NOTE — TELEPHONE ENCOUNTER
Advised, states she is taking the Lisinopril 40 mg. She did say she isnt consistent with her medications so that could be the reason.

## 2021-01-22 NOTE — TELEPHONE ENCOUNTER
Eleanor with [facility] called and stated that she re-faxed 1/15/21 3 Home health orders for signature.  Please call if not able to sign and return first of next week.    Eleanor callback: [phone]    Please advise

## 2021-01-28 NOTE — TELEPHONE ENCOUNTER
SHEBA IS STATING THAT THEY STILL HAVE NO RECEIVED ANYTHING BACK. SHE IS ASKING TO BE CONTACTED [phone].

## 2021-01-28 NOTE — TELEPHONE ENCOUNTER
Griselda from [facility] called and stated that the patient cancelled her appointment for the week of 01/24/2021 to 01/30/2021. Griselda states that the appointment was for skilled nursing and they wanted to inform the primary care provider of this cancellation. Please advise.     Griselda call back [phone]

## 2021-02-02 NOTE — TELEPHONE ENCOUNTER
Caller: SUZE    Relationship: Home Health    Best call back number: [phone]    What orders are you requesting : SOCIAL WORK VISIT    In what timeframe would the patient need to come in: ASAP    Where will you receive your lab/imaging services: WITH [facility]     Additional notes:

## 2021-02-19 PROBLEM — R82.81 PYURIA: Status: RESOLVED | Noted: 2020-01-01 | Resolved: 2021-01-01
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
## 2021-02-19 PROBLEM — R41.82 ALTERED MENTAL STATUS: Status: ACTIVE | Noted: 2021-01-01

## 2021-02-19 PROBLEM — J96.21 ACUTE ON CHRONIC RESPIRATORY FAILURE WITH HYPOXIA (HCC): Status: ACTIVE | Noted: 2021-01-01

## 2021-02-19 PROBLEM — S42.309A HUMERUS FRACTURE: Status: ACTIVE | Noted: 2021-01-01

## 2021-02-19 PROBLEM — J44.1 COPD WITH ACUTE EXACERBATION (HCC): Status: ACTIVE | Noted: 2021-01-01

## 2021-02-19 PROBLEM — J44.1 COPD EXACERBATION (HCC): Status: ACTIVE | Noted: 2021-01-01

## 2021-02-19 PROBLEM — R33.8 ACUTE URINARY RETENTION: Status: ACTIVE | Noted: 2020-08-29

## 2021-02-19 NOTE — H&P
[facility] Medicine Services  HISTORY AND PHYSICAL    Patient Name: An Abreu  : 1950  MRN: [id number]  Primary Care Physician: Zabrina Lemon MD  Date of admission: 2021    Subjective   Subjective     Chief Complaint:  Shortness of breath     HPI:  An Abreu is a 70 y.o. female w/ a hx of CAD, HTN, HLD, CHF, COPD, chronic supplemental oxygen dependent, GERD, recurrent GIB, PAF, seizure disorder who presented to the ED w/ c/o shortness of breath.  Pt on 3 liters of oxygen PRN at home. Presented to the ED tonight w/ c/o shortness of breath and a recent fall. Pt unable to provide HPI 2/2 respiratory failure/distress.  Pt evaluated in the ED. Exam c/w respiratory failure and COPD exacerbation. Pt admitted to the hospital medicine service for further evaluation.     COVID Details: [] No Symptoms  Symptoms: [] Fever []  Cough [x] Shortness of breath [] Change in taste or smell  Risks:   [] Direct Exposure [] High risk facility   Date of Symptoms:   __  Date of first positive COVID test: __    Review of Systems   Unable to perform ROS: Mental status change      Personal History     Past Medical History:   Diagnosis Date   • Aneurysm (CMS/HCC)    • Angina pectoris (CMS/HCC) 2016   • Anxiety 2016   • Arthritis 2016   • CAD (coronary artery disease)    • Carotid artery stenosis    • CHF (congestive heart failure) (CMS/HCC)    • Chronic coronary artery disease 2016 CABG LIMA to LAD, VG to OM  VG to OM occluded. LIMA patent Cx intervention and RCA 2008 stent for ISR  ISR and stenting of CX and RCA  normal MPS   • Chronic left-sided low back pain with left-sided sciatica 2017   • Chronic obstructive pulmonary disease (CMS/HCC) 2016   • Cobalamin deficiency 2016   • Congestive heart failure (CMS/HCC) 2016   • COPD (chronic obstructive pulmonary disease) (CMS/HCC)    • Depression 7/3/2018   • Diverticulosis     • Diverticulosis of large intestine without hemorrhage 5/5/2017   • GERD (gastroesophageal reflux disease)    • GIB (gastrointestinal bleeding)     recurrent    • HTN (hypertension)    • Hypercholesterolemia 6/20/2016   • Hyperlipidemia    • Mesenteric ischemia (CMS/[facility]) 2006    s/p resection    • Mood disorder (CMS/[facility])    • Oxygen dependent     3 liters @ all times.    • PAF (paroxysmal atrial fibrillation) (CMS/[facility])    • Paroxysmal atrial fibrillation (CMS/[facility]) 8/7/2017   • PVD (peripheral vascular disease) (CMS/[facility])    • Restless legs syndrome 6/20/2016   • Seizure disorder (CMS/[facility]) 6/20/2016   • Seizures (CMS/[facility])    • Stenosis of carotid artery 6/20/2016   • Vertigo 9/28/2016       Past Surgical History:   Procedure Laterality Date   • APPENDECTOMY     • CHOLECYSTECTOMY     • COLOSTOMY  2006    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   • EXPLORATORY LAPAROTOMY      sec to ovarian cysts   • HYSTERECTOMY     • ILIAC ARTERY STENT     • REVISION / TAKEDOWN COLOSTOMY  2008       Family History: family history includes Arthritis in her mother; Cancer in her mother; Heart attack in her father; Heart disease in her brother, child, and child; Hyperlipidemia in her father; Hypertension in her father; Stroke in her father. Otherwise pertinent FHx was reviewed and unremarkable.     Social History:  reports that she has been smoking cigarettes and electronic cigarette. She has a 40.00 pack-year smoking history. She has never used smokeless tobacco. She reports that she does not drink alcohol or use drugs.  Social History     Social History Narrative    Lives w/ her daughter. Ambulates w/ a walker. Independent w/ most ADLs.        Medications:  LORazepam, PHENobarbital, albuterol, albuterol sulfate HFA, aspirin, citalopram, clopidogrel, dilTIAZem CD, doxazosin, ezetimibe, ferrous sulfate, furosemide, guaiFENesin, ipratropium-albuterol, isosorbide mononitrate, lisinopril, pantoprazole, potassium chloride, rosuvastatin, and  umeclidinium-vilanterol    Allergies   Allergen Reactions   • Keflex [Cephalexin] Shortness Of Breath and Rash     Patients power of  states patient had to be taken to ER due to reaction.    Tolerated Rocephin 2/19/21   • Sulfamethoxazole-Trimethoprim Shortness Of Breath and Rash     Patients power of  stated patient had to be taken to ER due to reaction.   • Carbamazepine    • Codeine        Objective   Objective     Vital Signs:   Temp:  [97.9 °F (36.6 °C)] 97.9 °F (36.6 °C)  Heart Rate:  [87-94] 87  Resp:  [17-20] 18  BP: (134-154)/(66-75) 152/66  Flow (L/min):  [2] 2    Physical Exam     Constitutional: Awake, alert; chronically ill appearing   Eyes: PERRLA, sclerae anicteric, no conjunctival injection  HENT: NCAT, mucous membranes dry/pale   Neck: Supple, no thyromegaly, no lymphadenopathy, trachea midline  Respiratory: significant insp/expir wheezing w/ diminished breath sounds throughout; labored respiratory effort; pt lethargic- increased effort @ rest   Cardiovascular: RRR, no murmurs, rubs, or gallops, no peripheral edema   Gastrointestinal: Positive bowel sounds, soft, nontender, nondistended  Musculoskeletal: ROM not assessed 2/2 respiratory distress   Psychiatric: pt lethargic, minimally responsive   Neurologic: Oriented x 3, followed simple repeated commands; lethargic and minimally responsive   Skin: No rashes, lesions or wounds; pale     Results Reviewed:  I have personally reviewed most recent indicated data and agree with findings including:  [x]  Laboratory  [x]  Radiology  []  EKG/Telemetry  []  Pathology  []  Cardiac/Vascular Studies  []  Old records  []  Other:  Most pertinent findings include:    LAB RESULTS:      Lab 02/18/21  2336   WBC 5.28   HEMOGLOBIN 13.6   HEMATOCRIT 44.2   PLATELETS 245   NEUTROS ABS 3.48   IMMATURE GRANS (ABS) 0.01   LYMPHS ABS 1.17   MONOS ABS 0.44   EOS ABS 0.16   .7*   PROTIME 12.9         Lab 02/18/21  2336   SODIUM 139   POTASSIUM 5.0    CHLORIDE 94*   CO2 38.0*   ANION GAP 7.0   BUN 12   CREATININE 0.57   GLUCOSE 104*   CALCIUM 9.3         Lab 02/18/21  2336   TOTAL PROTEIN 7.8   ALBUMIN 4.30   GLOBULIN 3.5   ALT (SGPT) 62*   AST (SGOT) 54*   BILIRUBIN <0.2   ALK PHOS 97         Lab 02/18/21  2336   PROBNP 390.4   TROPONIN T <0.010   PROTIME 12.9   INR 1.00                 Lab 02/19/21  0220 02/19/21  0036   PH, ARTERIAL 7.352 7.356   PCO2, ARTERIAL 69.3* 66.8*   PO2 .0* 72.3*   FIO2 28 28   HCO3 ART 38.4* 37.4*   BASE EXCESS ART 10.3* 9.5*   CARBOXYHEMOGLOBIN 2.7* 3.5*     Brief Urine Lab Results  (Last result in the past 365 days)      Color   Clarity   Blood   Leuk Est   Nitrite   Protein   CREAT   Urine HCG        02/19/21 0043 Yellow Clear Negative Moderate (2+) Positive Negative             Microbiology Results (last 10 days)     Procedure Component Value - Date/Time    COVID-19 and FLU A/B PCR - Swab, Nasopharynx [[id number]]  (Normal) Collected: 02/19/21 0032    Lab Status: Final result Specimen: Swab from Nasopharynx Updated: 02/19/21 0131     COVID19 Not Detected     Influenza A PCR Not Detected     Influenza B PCR Not Detected    Narrative:      Fact sheet for providers: https://www.fda.gov/media/928362/download    Fact sheet for patients: https://www.fda.gov/media/532783/download    Test performed by PCR.          Xr Shoulder 2+ View Left    Result Date: 2/19/2021  CR Shoulder Comp Min 2 Vws LT INDICATION: 70-year-old female with left shoulder pain status post fall tonight. COMPARISON: CT chest 10/25/2020 FINDINGS: 2 views of the left shoulder.  Subacute, partially united fracture deformity of the proximal left humerus, similar to multiple prior examinations dating back to 10/25/2020. No acute appearing osseous injury. Old, healed fracture deformity of the left clavicle. Left shoulder is normally located.   No foreign body.     Impression: 1. No acute appearing osseous injury. 2. Subacute, partially united fracture deformity of  the proximal left humerus, similar to prior examinations dating back to 10/25/2020. 3. Old, healed fracture deformity of the left clavicle. Signer Name: Mk Rodriguez MD  Signed: 2/19/2021 12:00 AM  Workstation Name: [employer]age  [facility]    Xr Humerus Left    Result Date: 2/19/2021  CR Humerus Min 2 Vws LT INDICATION: 70-year-old female with left arm pain status post fall tonight. COMPARISON: CT chest 10/25/2020 FINDINGS: 2 views of the left humerus.  Subacute, partially united fracture deformity of the proximal left humerus, similar to multiple prior examinations dating back to 10/25/2020. No acute appearing osseous injury. Old, healed fracture deformity of the left clavicle. Left shoulder is normally located.   No foreign body.     Impression: 1. No acute appearing osseous injury. 2. Subacute, partially united fracture deformity of the proximal left humerus, similar to prior examinations dating back to 10/25/2020. 3. Old, healed fracture deformity of the left clavicle. Signer Name: Mk Rodriguez MD  Signed: 2/19/2021 12:01 AM  Workstation Name: [employer]  [facility]    Ct Head Without Contrast    Result Date: 2/19/2021  CT Head WO HISTORY: 70-year-old female with head pain status post fall tonight. TECHNIQUE: Routine noncontrast head CT. Coronal reformatted images. Radiation dose reduction techniques included automated exposure control or exposure modulation based on body size. Count of known CT and cardiac nuc med studies performed in previous 12 months: 8. COMPARISON: CT head 10/25/2020 FINDINGS: No hemorrhage, acute infarction, mass lesion, or abnormal extra-axial fluid collection. No midline shift or focal mass effect. Ventricular system is normal in size and configuration. 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 Remote infarct of the right basal ganglia and right frontal periventricular white matter. Mild generalized atrophy and chronic  small vessel disease throughout the supratentorial white matter. No acute osseous abnormality. Visualized paranasal sinuses are clear. Visualized mastoid air cells are clear.     Impression: No acute intracranial abnormality. Stable senescent changes. Signer Name: Mk Rodriguez MD  Signed: 2/19/2021 12:03 AM  Workstation Name: [email or web address]  [facility]    Ct Angiogram Chest With & Without Contrast    Result Date: 2/19/2021  CT ANGIOGRAM CHEST W WO CONTRAST INDICATION: 70-year-old female with chest pain status post fall today. TECHNIQUE: CT angiogram of the chest with IV contrast. 3-D reconstructions were obtained and reviewed.   Radiation dose reduction techniques included automated exposure control or exposure modulation based on body size. Count of known CT and cardiac nuc med studies performed in previous 12 months: 8. COMPARISON: CT chest 12/5/2020 FINDINGS: Adequate opacification of the pulmonary arteries with no filling defects. Prominence of the main pulmonary arteries, suggesting underlying pulmonary arterial hypertension. Thoracic aorta normal in course and caliber without dissection. Heart size is normal. No pericardial effusion. No pleural effusion. No pneumothorax. No focal pulmonary infiltrates. Visualized upper abdomen is unremarkable. Cholecystectomy. Extensive atherosclerotic calcification in the abdominal aorta. No acute osseous abnormality. Chronic compression deformities of the T11 and T12 vertebral bodies. Subacute/chronic, partially healed fracture of the proximal left humerus.     Impression: 1. Negative for pulmonary embolus. 2. Negative for thoracic aortic aneurysm/dissection. 3. No acute pulmonary process. 4. Prominence of the main pulmonary arteries, suggesting underlying pulmonary arterial hypertension. 5. Chronic compression deformities of the T11 and T12 vertebral bodies. Subacute/chronic, partially healed fracture of the proximal left humerus. Signer Name:  Mk Rodriguez MD  Signed: 2/19/2021 2:48 AM  Workstation Name: [facility]  [facility]    Xr Chest 1 View    Result Date: 2/18/2021  CR Chest 1 Vw INDICATION: 70-year-old female with chest pain status post fall tonight. COMPARISON:  Multiple chest x-rays dating back to 10/25/2020 FINDINGS: Single portable AP view(s) of the chest.  The heart and mediastinal contours are normal. Median sternotomy wires. The lungs are clear. No pneumothorax or pleural effusion. Subacute, ununited fracture of the anatomic neck of the left humerus, unchanged from prior exams. Old, healed left clavicular fracture.     Impression: No acute cardiopulmonary findings. Subacute, ununited proximal left humerus fracture. Old, healed left clavicular fracture. Signer Name: Mk Rodriguez MD  Signed: 2/18/2021 11:58 PM  Workstation Name: [email or web address]  [facility]      Results for orders placed during the hospital encounter of 10/25/20   Adult Transthoracic Echo Complete W/ Cont if Necessary Per Protocol    Narrative · Calculated left ventricular EF = 63%  · Septal wall motion is abnormal, consistent with a post-operative state.  · There is moderate calcification of the aortic valve mainly affecting the   right coronary cusp(s).  · Moderate to severe aortic valve regurgitation is present. Moderate by   pressure-half time (270ms)  · There is mild, bileaflet mitral valve thickening present. Mild mitral   valve regurgitation is present  · Large patent foramen ovale present. Saline test results are positive for   left to right atrial level shunt.          Assessment/Plan   Assessment & Plan       COPD with acute exacerbation (CMS/HCC)    Gastroesophageal reflux disease    Congestive heart failure (CMS/HCC)    Essential hypertension    Acute UTI (urinary tract infection)    Dependence on supplemental oxygen    PAF (paroxysmal atrial fibrillation) (CMS/HCC)    Seizure disorder (CMS/HCC)    CAD (coronary  artery disease)    Chronic respiratory failure with hypercapnia (CMS/[facility])    Fall    Humerus fracture    Altered mental status    An Abreu is a 70 y.o. female w/ a hx of CAD, HTN, HLD, CHF, COPD, chronic supplemental oxygen dependent, GERD, recurrent GIB, PAF, seizure disorder who presented to the ED w/ c/o shortness of breath.    **COPD Exacerbation  -CTA Chest without acute findings  -ABG obtained x2 due to somnolence  -on 3L at home; currently on 2-3   -s/p 125mg IV Solumedrol given in ED; continue 60mg q 8 hours  -continuous neb treatment now  -blood cx, sputum cx, lactic, procal- pending   -IV Rocephin given in ED; continue and add Doxycyline  -routine albuterol, duo-neb, mucinex- continued  -consider pulm consult pending treatment response   -COVID, influenza negative     **Acute UTI  -previous urine culture + E.Coli in 11/2020; urine cx + staphylococcus coagulase negative in 11/2020   -blood cx pending  -Urinalysis obtained; urine cx pending  -lactic acid and procal pending  -IV Rocephin given in ED; continue pending cx results  -bladder scan PRN    **Fall  **Left humerus fracture  -CT head negative   -left humerus xray- subacute, partially united fracture deformity- left proximal humerus (similar to prior exams in 10/2020); old healed fx deformity of left clavicle   -sling for left arm ordered  -Ortho consult in am   -fall precautions  -per records- pt fractured elbow back in 10/2020; previously followed w/ [facility]- pt unable to provide hx   -PT/OT  -Case management consult    **Altered mental status  -Phenobarbital level pending  -ABG noted above  -CT head negative  -Low threshold to add MRI brain/EEG/neurology consult    **Seizure disorder   -pt on routine Phenobarbital (previous high level in 12/2020)  -continued routine dose; level currently pending   -Seizure precautions  -Continue scheduled Ativan at reduced dose due to AMS    **HTN  **CHF  **CAD  **PAF; CHADSVASC 5  -routine lasix held for  now   -continue routine ASA, plavix; no longer on anticoagulation 2/2 frequent GIB   -continue routine Imdur, crestor, Lisinopril, Diltiazem; routine zetia held   -EKG  -ECHO 10/2020; EF 63% mod to severe aortic valve regurg     DVT prophylaxis:  Lovenox       CODE STATUS:    Code Status and Medical Interventions:   Ordered at: 02/19/21 0144     Level Of Support Discussed With:    Patient     Code Status:    CPR     Medical Interventions (Level of Support Prior to Arrest):    Full       This note has been completed as part of a split-shared workflow.     Signature: Electronically signed by SIVA Brennan, 02/19/21, 2:26 AM EST.        Attending   Admission Attestation       I have seen and examined the patient, performing an independent face-to-face diagnostic evaluation with plan of care reviewed and developed with the advanced practice clinician (APC).      Brief Summary Statement:   An Abreu is a 70 y.o. female With PMH significant for COPD on 3 L home O2, tobacco abuse, CHF, CAD, HTN, HLD who presents to the ED after a fall at home with residual left arm pain.  Patient is somnolent, noncontributory to HPI.  Information obtained from medical records.  Patient had a fall out of a chair this evening landing on her left arm.  Patient called EMS and was found to have O2 saturations in the mid 70s with audible wheezing.  No reports of loss of consciousness.  Remainder of detailed HPI is as noted by APC and has been reviewed and/or edited by me for completeness.    Attending Physical Exam:  Constitutional: Thin cachectic female patient, asleep, arousable  Eyes: PERRLA, sclerae anicteric, no conjunctival injection  HENT: NCAT, mucous membranes moist  Neck: Supple, no thyromegaly, no lymphadenopathy, trachea midline  Respiratory: Significant wheezing in all lung fields, poor air movement, tachypneic with use of accessory muscles  Cardiovascular: RRR, no murmurs, rubs, or gallops, palpable pedal pulses  bilaterally  Gastrointestinal: Positive bowel sounds, soft, nontender, nondistended  Musculoskeletal: No bilateral ankle edema, no clubbing or cyanosis to extremities  Psychiatric: Sleepy, uncooperative  Neurologic: Unable to assess due to somnolence  Skin: No rashes      Brief Assessment/Plan :  See detailed assessment and plan developed with APC which I have reviewed and/or edited for completeness.        Admission Status: I believe that this patient meets INPATIENT status due to UTI and COPD exacerbation.  I feel patient’s risk for adverse outcomes and need for care warrant INPATIENT evaluation and I predict the patient’s care encounter to likely last beyond 2 midnights.        Maame Barney, DO  02/19/21         Addendum     After arrival on the floor patient had a bladder scan which showed urinary retention.  700 cc urine was drained with catheter insertion.

## 2021-02-19 NOTE — ED PROVIDER NOTES
Subjective   70-year-old female with extensive past medical history presents for evaluation via EMS complaining of left upper extremity pain following a fall this evening.  She reports falling out of a chair just prior to calling EMS tonight.  She landed awkwardly onto her left shoulder and had pain and limited range of motion of her left upper extremity following the fall.  She states that she has a history of a prior proximal humerus fracture.  The patient has a history of COPD for which she wears home oxygen as needed.  On EMS arrival, she was noted to have oxygen saturations in the mid 70s with audible wheezes.  She was given a neb and was brought to our facility to be evaluated.  The patient denies any LOC.  No neck pain.  She was reportedly mildly confused per EMS personnel as well.  She was not hypoglycemic.  She is right-handed.          Review of Systems   Respiratory: Positive for shortness of breath.    Musculoskeletal:        Left shoulder pain   Psychiatric/Behavioral: Positive for confusion.   All other systems reviewed and are negative.      Past Medical History:   Diagnosis Date   • Aneurysm (CMS/[facility])    • Angina pectoris (CMS/HCC) 6/20/2016   • Anxiety 6/20/2016   • Arthritis 6/20/2016   • CAD (coronary artery disease)    • Carotid artery stenosis    • CHF (congestive heart failure) (CMS/HCC)    • Chronic coronary artery disease 6/20/2016 2003 CABG LIMA to LAD, VG to OM 2004 VG to OM occluded. LIMA patent Cx intervention and RCA 2008 stent for ISR 2013 ISR and stenting of CX and RCA 2016 normal MPS   • Chronic left-sided low back pain with left-sided sciatica 2/1/2017   • Chronic obstructive pulmonary disease (CMS/HCC) 6/20/2016   • Cobalamin deficiency 6/20/2016   • Congestive heart failure (CMS/HCC) 6/20/2016   • COPD (chronic obstructive pulmonary disease) (CMS/HCC)    • Depression 7/3/2018   • Diverticulosis    • Diverticulosis of large intestine without hemorrhage 5/5/2017   • GERD  (gastroesophageal reflux disease)    • GIB (gastrointestinal bleeding)     recurrent    • HTN (hypertension)    • Hypercholesterolemia 6/20/2016   • Hyperlipidemia    • Mesenteric ischemia (CMS/[facility]) 2006    s/p resection    • Mood disorder (CMS/[facility])    • Oxygen dependent     3 liters @ all times.    • PAF (paroxysmal atrial fibrillation) (CMS/[facility])    • Paroxysmal atrial fibrillation (CMS/[facility]) 8/7/2017   • PVD (peripheral vascular disease) (CMS/[facility])    • Restless legs syndrome 6/20/2016   • Seizure disorder (CMS/[facility]) 6/20/2016   • Seizures (CMS/[facility])    • Stenosis of carotid artery 6/20/2016   • Vertigo 9/28/2016       Allergies   Allergen Reactions   • Keflex [Cephalexin] Shortness Of Breath and Rash     Patients power of  states patient had to be taken to ER due to reaction.    Tolerated Rocephin 2/19/21   • Sulfamethoxazole-Trimethoprim Shortness Of Breath and Rash     Patients power of  stated patient had to be taken to ER due to reaction.   • Carbamazepine    • Codeine        Past Surgical History:   Procedure Laterality Date   • APPENDECTOMY     • CHOLECYSTECTOMY     • COLOSTOMY  2006    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   • EXPLORATORY LAPAROTOMY      sec to ovarian cysts   • HYSTERECTOMY     • ILIAC ARTERY STENT     • REVISION / TAKEDOWN COLOSTOMY  2008       Family History   Problem Relation Age of Onset   • Arthritis Mother    • Cancer Mother    • Heart attack Father    • Hypertension Father    • Hyperlipidemia Father    • Stroke Father    • Heart disease Brother         CAD   • Heart disease Child         CAD   • Heart disease Child         CAD       Social History     Socioeconomic History   • Marital status:      Spouse name: Not on file   • Number of children: Not on file   • Years of education: Not on file   • Highest education level: Not on file   Tobacco Use   • Smoking status: Current Every Day Smoker     Packs/day: 1.00     Years: 40.00     Pack years: 40.00     Types: Cigarettes,  Electronic Cigarette   • Smokeless tobacco: Never Used   • Tobacco comment: <0.5ppd    Substance and Sexual Activity   • Alcohol use: Never     Frequency: Never   • Drug use: Never   • Sexual activity: Defer   Social History Narrative    Lives w/ her daughter. Ambulates w/ a walker. Independent w/ most ADLs.            Objective   Physical Exam  Vitals signs and nursing note reviewed.   Constitutional:       General: She is not in acute distress.     Appearance: She is well-developed. She is not diaphoretic.      Comments: Chronically ill-appearing, cachectic female   HENT:      Head: Normocephalic and atraumatic.   Eyes:      Pupils: Pupils are equal, round, and reactive to light.   Neck:      Comments: No midline cervical spine tenderness noted, no step-off or deformity present  Cardiovascular:      Rate and Rhythm: Normal rate.      Heart sounds: Normal heart sounds. No murmur. No friction rub. No gallop.    Pulmonary:      Comments: Mildly tachypneic, speaking in full sentences, audible wheezes noted bilaterally  Abdominal:      General: Bowel sounds are normal. There is no distension.      Palpations: Abdomen is soft. There is no mass.      Tenderness: There is no abdominal tenderness. There is no guarding or rebound.   Musculoskeletal:      Comments: Range of motion of left shoulder is limited secondary to pain and held in flexed position of comfort   Skin:     General: Skin is warm and dry.      Findings: No erythema or rash.   Neurological:      General: No focal deficit present.      Mental Status: She is alert and oriented to person, place, and time.      Comments: Awake, alert, and oriented x3, clear and fluent speech, no ataxia or dysmetria, neurovascularly intact distally in all fours with bounding distal pulses and normal sensation, 5 out of 5 strength in all fours, no cranial nerve deficits noted with cranial nerves II through XII grossly intact   Psychiatric:         Mood and Affect: Mood normal.          Thought Content: Thought content normal.         Judgment: Judgment normal.         Procedures           ED Course  ED Course as of Feb 19 0310   Thu Feb 18, 2021   3071 70-year-old female presents for evaluation of left upper extremity pain after a fall this evening.  She reports falling out of a chair just prior to calling EMS and landing awkwardly on her left arm.  No LOC.  On EMS arrival, the patient was hypoxic with audible wheezing and oxygen saturations in the mid 70s.  She was given a breathing treatment and was brought to our facility to be evaluated.  On arrival to the ED, the patient is nontoxic-appearing.  Her left upper extremity is held in a flexed position of comfort with limited range of motion at her left shoulder joint secondary to pain.  Left upper extremity is neurovascularly intact.  She is mentating appropriately.  No neck pain.  NEXUS negative.  Nebs and steroids administered.  The patient wears 2 L of home oxygen at all times.  This was continued here in the emergency department.  We will obtain labs and imaging and will reassess following initial interventions.    [DD]   Fri Feb 19, 2021   0011 Imaging remarkable only for subacute fracture of the patient's left proximal forearm at prior fracture site.  Sling provided for comfort.  Left upper extremity is neurovascularly intact.    [DD]   0113 Urinalysis is suggestive of infection.  Rocephin given.  Urine culture obtained.    [DD]   0113 Upon reevaluation, the patient is still actively wheezing and has an increased oxygen requirement when compared to baseline.  At this point, feel that she warrants admission to the hospital.  Awaiting callback from hospitalist.    [DD]   0129 I discussed the patient's case with Dr. Barney, the patient will be admitted under her care for further evaluation and treatment.  The patient is aware/agreeable with the plan at this time.    [DD]      ED Course User Index  [DD] Yovany Hooper MD                                  Recent Results (from the past 24 hour(s))   Comprehensive Metabolic Panel    Collection Time: 02/18/21 11:36 PM    Specimen: Blood   Result Value Ref Range    Glucose 104 (H) 65 - 99 mg/dL    BUN 12 8 - 23 mg/dL    Creatinine 0.57 0.57 - 1.00 mg/dL    Sodium 139 136 - 145 mmol/L    Potassium 5.0 3.5 - 5.2 mmol/L    Chloride 94 (L) 98 - 107 mmol/L    CO2 38.0 (H) 22.0 - 29.0 mmol/L    Calcium 9.3 8.6 - 10.5 mg/dL    Total Protein 7.8 6.0 - 8.5 g/dL    Albumin 4.30 3.50 - 5.20 g/dL    ALT (SGPT) 62 (H) 1 - 33 U/L    AST (SGOT) 54 (H) 1 - 32 U/L    Alkaline Phosphatase 97 39 - 117 U/L    Total Bilirubin <0.2 0.0 - 1.2 mg/dL    eGFR Non African Amer 104 >60 mL/min/1.73    Globulin 3.5 gm/dL    A/G Ratio 1.2 g/dL    BUN/Creatinine Ratio 21.1 7.0 - 25.0    Anion Gap 7.0 5.0 - 15.0 mmol/L   Protime-INR    Collection Time: 02/18/21 11:36 PM    Specimen: Blood   Result Value Ref Range    Protime 12.9 11.5 - 14.0 Seconds    INR 1.00 0.85 - 1.16   BNP    Collection Time: 02/18/21 11:36 PM    Specimen: Blood   Result Value Ref Range    proBNP 390.4 0.0 - 900.0 pg/mL   Troponin    Collection Time: 02/18/21 11:36 PM    Specimen: Blood   Result Value Ref Range    Troponin T <0.010 0.000 - 0.030 ng/mL   CBC Auto Differential    Collection Time: 02/18/21 11:36 PM    Specimen: Blood   Result Value Ref Range    WBC 5.28 3.40 - 10.80 10*3/mm3    RBC 4.22 3.77 - 5.28 10*6/mm3    Hemoglobin 13.6 12.0 - 15.9 g/dL    Hematocrit 44.2 34.0 - 46.6 %    .7 (H) 79.0 - 97.0 fL    MCH 32.2 26.6 - 33.0 pg    MCHC 30.8 (L) 31.5 - 35.7 g/dL    RDW 14.6 12.3 - 15.4 %    RDW-SD 56.5 (H) 37.0 - 54.0 fl    MPV 9.8 6.0 - 12.0 fL    Platelets 245 140 - 450 10*3/mm3    Neutrophil % 65.9 42.7 - 76.0 %    Lymphocyte % 22.2 19.6 - 45.3 %    Monocyte % 8.3 5.0 - 12.0 %    Eosinophil % 3.0 0.3 - 6.2 %    Basophil % 0.4 0.0 - 1.5 %    Immature Grans % 0.2 0.0 - 0.5 %    Neutrophils, Absolute 3.48 1.70 - 7.00 10*3/mm3    Lymphocytes,  Absolute 1.17 0.70 - 3.10 10*3/mm3    Monocytes, Absolute 0.44 0.10 - 0.90 10*3/mm3    Eosinophils, Absolute 0.16 0.00 - 0.40 10*3/mm3    Basophils, Absolute 0.02 0.00 - 0.20 10*3/mm3    Immature Grans, Absolute 0.01 0.00 - 0.05 10*3/mm3    nRBC 0.0 0.0 - 0.2 /100 WBC   CK    Collection Time: 02/18/21 11:36 PM    Specimen: Blood   Result Value Ref Range    Creatine Kinase 70 20 - 180 U/L   COVID-19 and FLU A/B PCR - Swab, Nasopharynx    Collection Time: 02/19/21 12:32 AM    Specimen: Nasopharynx; Swab   Result Value Ref Range    COVID19 Not Detected Not Detected - Ref. Range    Influenza A PCR Not Detected Not Detected    Influenza B PCR Not Detected Not Detected   Blood Gas, Arterial With Co-Ox    Collection Time: 02/19/21 12:36 AM    Specimen: Arterial Blood   Result Value Ref Range    Site Left Brachial     Adam's Test N/A     pH, Arterial 7.356 7.350 - 7.450 pH units    pCO2, Arterial 66.8 (H) 35.0 - 45.0 mm Hg    pO2, Arterial 72.3 (L) 83.0 - 108.0 mm Hg    HCO3, Arterial 37.4 (H) 20.0 - 26.0 mmol/L    Base Excess, Arterial 9.5 (H) 0.0 - 2.0 mmol/L    Hemoglobin, Blood Gas 12.2 (L) 14 - 18 g/dL    Hematocrit, Blood Gas 37.3 %    Oxyhemoglobin 91.0 (L) 94 - 99 %    Methemoglobin 0.20 0.00 - 1.50 %    Carboxyhemoglobin 3.5 (H) 0 - 2 %    CO2 Content 39.4 (H) 22 - 33 mmol/L    Temperature 37.0 C    Barometric Pressure for Blood Gas      Modality Nasal Cannula     FIO2 28 %    Rate 0 Breaths/minute    PIP 0 cmH2O    IPAP 0     EPAP 0     Note      pH, Temp Corrected 7.356 pH Units    pCO2, Temperature Corrected 66.8 (H) 35 - 45 mm Hg    pO2, Temperature Corrected 72.3 (L) 83 - 108 mm Hg   Urinalysis With Culture If Indicated - Urine, Catheter    Collection Time: 02/19/21 12:43 AM    Specimen: Urine, Catheter   Result Value Ref Range    Color, UA Yellow Yellow, Straw    Appearance, UA Clear Clear    pH, UA 6.0 5.0 - 8.0    Specific Gravity, UA 1.010 1.005 - 1.030    Glucose, UA Negative Negative    Ketones, UA  Negative Negative    Bilirubin, UA Negative Negative    Blood, UA Negative Negative    Protein, UA Negative Negative    Leuk Esterase, UA Moderate (2+) (A) Negative    Nitrite, UA Positive (A) Negative    Urobilinogen, UA 0.2 E.U./dL 0.2 - 1.0 E.U./dL   Urinalysis, Microscopic Only - Urine, Catheter    Collection Time: 02/19/21 12:43 AM    Specimen: Urine, Catheter   Result Value Ref Range    RBC, UA 3-6 (A) None Seen, 0-2 /HPF    WBC, UA 6-12 (A) None Seen, 0-2 /HPF    Bacteria, UA 3+ (A) None Seen, Trace /HPF    Squamous Epithelial Cells, UA 0-2 None Seen, 0-2 /HPF    Hyaline Casts, UA 0-6 0 - 6 /LPF    Methodology Automated Microscopy    Phenobarbital Level    Collection Time: 02/19/21  2:15 AM    Specimen: Blood   Result Value Ref Range    Phenobarbital 25.7 10.0 - 30.0 mcg/mL   Blood Gas, Arterial With Co-Ox    Collection Time: 02/19/21  2:20 AM    Specimen: Arterial Blood   Result Value Ref Range    Site Right Radial     Adam's Test N/A     pH, Arterial 7.352 7.350 - 7.450 pH units    pCO2, Arterial 69.3 (C) 35.0 - 45.0 mm Hg    pO2, Arterial 141.0 (H) 83.0 - 108.0 mm Hg    HCO3, Arterial 38.4 (H) 20.0 - 26.0 mmol/L    Base Excess, Arterial 10.3 (H) 0.0 - 2.0 mmol/L    Hemoglobin, Blood Gas 11.9 (L) 14 - 18 g/dL    Hematocrit, Blood Gas 36.4 %    Oxyhemoglobin 96.4 94 - 99 %    Methemoglobin 0.30 0.00 - 1.50 %    Carboxyhemoglobin 2.7 (H) 0 - 2 %    CO2 Content 40.5 (H) 22 - 33 mmol/L    Temperature 37.0 C    Barometric Pressure for Blood Gas      Modality Nasal Cannula     FIO2 28 %    Rate 0 Breaths/minute    PIP 0 cmH2O    IPAP 0     EPAP 0     Note      Notified Who GURVINDER Barney DO     Notified By [id number]     Notified Time 02/19/2021 02:20     pH, Temp Corrected 7.352 pH Units    pCO2, Temperature Corrected 69.3 (H) 35 - 45 mm Hg    pO2, Temperature Corrected 141 (H) 83 - 108 mm Hg     Note: In addition to lab results from this visit, the labs listed above may include labs taken at another facility or during  a different encounter within the last 24 hours. Please correlate lab times with ED admission and discharge times for further clarification of the services performed during this visit.    CT Angiogram Chest With & Without Contrast   Final Result   1. Negative for pulmonary embolus.   2. Negative for thoracic aortic aneurysm/dissection.   3. No acute pulmonary process.   4. Prominence of the main pulmonary arteries, suggesting underlying pulmonary arterial hypertension.   5. Chronic compression deformities of the T11 and T12 vertebral bodies. Subacute/chronic, partially healed fracture of the proximal left humerus.      Signer Name: Mk Rodriguez MD    Signed: 2/19/2021 2:48 AM    Workstation Name: [facility]     Radiology [facility]      CT Head Without Contrast   Final Result   No acute intracranial abnormality. Stable senescent changes.               Signer Name: Mk Rodriguez MD    Signed: 2/19/2021 12:03 AM    Workstation Name: [facility]      XR Shoulder 2+ View Left   Final Result      1. No acute appearing osseous injury.   2. Subacute, partially united fracture deformity of the proximal left humerus, similar to prior examinations dating back to 10/25/2020.   3. Old, healed fracture deformity of the left clavicle.      Signer Name: Mk Rodriugez MD    Signed: 2/19/2021 12:00 AM    Workstation Name: [facility] [facility]      XR Humerus Left   Final Result   1. No acute appearing osseous injury.   2. Subacute, partially united fracture deformity of the proximal left humerus, similar to prior examinations dating back to 10/25/2020.   3. Old, healed fracture deformity of the left clavicle.      Signer Name: Mk Rodriguez MD    Signed: 2/19/2021 12:01 AM    Workstation Name: [email or web address]     Radiology [facility]      XR Chest 1 View   Final Result   No acute cardiopulmonary findings. Subacute, ununited  proximal left humerus fracture. Old, healed left clavicular fracture.      Signer Name: Mk Rodriguez MD    Signed: 2/18/2021 11:58 PM    Workstation Name: [email or web address]     [facility]        Vitals:    02/19/21 0145 02/19/21 0200 02/19/21 0207 02/19/21 0208   BP: 145/69 152/66     Pulse:  87 87 87   Resp:   18    Temp:       SpO2:  98% 99% 99%   Weight:       Height:         Medications   ipratropium-albuterol (DUO-NEB) nebulizer solution 3 mL (has no administration in time range)   ipratropium-albuterol (DUO-NEB) nebulizer solution 3 mL (has no administration in time range)   doxycycline (VIBRAMYCIN) 100 mg/100 mL 0.9% NS MBP (has no administration in time range)   albuterol (PROVENTIL) nebulizer solution 0.083% 2.5 mg/3mL (10 mg Nebulization New Bag 2/19/21 0205)   LORazepam (ATIVAN) tablet 0.5 mg (has no administration in time range)   methylPREDNISolone sodium succinate (SOLU-Medrol) injection 125 mg (125 mg Intravenous Given 2/19/21 0015)   albuterol sulfate HFA (PROVENTIL HFA;VENTOLIN HFA;PROAIR HFA) inhaler 2 puff (2 puffs Inhalation Given 2/19/21 0018)   HYDROcodone-acetaminophen (NORCO) 5-325 MG per tablet 1 tablet (1 tablet Oral Given 2/19/21 0030)   cefTRIAXone (ROCEPHIN) 1 g/100 mL 0.9% NS (MBP) (0 g Intravenous Stopped 2/19/21 0215)   naloxone (NARCAN) injection 0.4 mg (0.4 mg Intravenous Given 2/19/21 0150)   iopamidol (ISOVUE-370) 76 % injection 100 mL (50 mL Intravenous Given 2/19/21 0221)     ECG/EMG Results (last 24 hours)     Procedure Component Value Units Date/Time    ECG 12 Lead [[id number]] Collected: 02/19/21 0007     Updated: 02/19/21 0007        ECG 12 Lead                       MDM    Final diagnoses:   COPD exacerbation (CMS/HCC)   Hypoxia   Acute UTI   Closed fracture of proximal end of left humerus, unspecified fracture morphology, initial encounter            Yovany Hooper MD  02/19/21 6375

## 2021-02-19 NOTE — THERAPY EVALUATION
Patient Name: An Abreu  : 1950    MRN: [id number]                              Today's Date: 2021       Admit Date: 2021    Visit Dx:     ICD-10-CM ICD-9-CM   1. COPD exacerbation (CMS/[facility])  J44.1 491.21   2. Hypoxia  R09.02 799.02   3. Acute UTI  N39.0 599.0   4. Closed fracture of proximal end of left humerus, unspecified fracture morphology, initial encounter  S4A 812.00     Patient Active Problem List   Diagnosis   • Gastroesophageal reflux disease   • COPD (chronic obstructive pulmonary disease) (CMS/[facility])   • Essential hypertension   • Acute UTI (urinary tract infection)   • Acute urinary retention   • Fecal occult blood test positive   • Dependence on supplemental oxygen   • Tobacco abuse   • PAF (paroxysmal atrial fibrillation) (CMS/[facility])   • Acute respiratory failure with hypercapnia (CMS/[facility])   • Seizure disorder (CMS/[facility])   • CAD (coronary artery disease)   • Severe malnutrition (CMS/[facility])   • Macrocytic anemia   • Fall   • COPD with acute exacerbation (CMS/[facility])   • Humerus fracture   • Altered mental status   • Acute on chronic respiratory failure with hypoxia (CMS/[facility])   • COPD exacerbation (CMS/[facility])     Past Medical History:   Diagnosis Date   • Aneurysm (CMS/[facility])    • Angina pectoris (CMS/[facility]) 2016   • Anxiety 2016   • Arthritis 2016   • CAD (coronary artery disease)    • Carotid artery stenosis    • CHF (congestive heart failure) (CMS/[facility])    • Chronic coronary artery disease 2016 CABG LIMA to LAD, VG to OM  VG to OM occluded. LIMA patent Cx intervention and RCA 2008 stent for ISR  ISR and stenting of CX and RCA  normal MPS   • Chronic left-sided low back pain with left-sided sciatica 2017   • Chronic obstructive pulmonary disease (CMS/HCC) 2016   • Cobalamin deficiency 2016   • Congestive heart failure (CMS/[facility]) 2016   • COPD (chronic obstructive pulmonary disease) (CMS/[facility])    • Depression 7/3/2018   •  Diverticulosis    • Diverticulosis of large intestine without hemorrhage 5/5/2017   • GERD (gastroesophageal reflux disease)    • GIB (gastrointestinal bleeding)     recurrent    • HTN (hypertension)    • Hypercholesterolemia 6/20/2016   • Hyperlipidemia    • Mesenteric ischemia (CMS/[facility]) 2006    s/p resection    • Mood disorder (CMS/[facility])    • Oxygen dependent     3 liters @ all times.    • PAF (paroxysmal atrial fibrillation) (CMS/[facility])    • Paroxysmal atrial fibrillation (CMS/[facility]) 8/7/2017   • PVD (peripheral vascular disease) (CMS/[facility])    • Restless legs syndrome 6/20/2016   • Seizure disorder (CMS/[facility]) 6/20/2016   • Seizures (CMS/[facility])    • Stenosis of carotid artery 6/20/2016   • Vertigo 9/28/2016     Past Surgical History:   Procedure Laterality Date   • APPENDECTOMY     • CHOLECYSTECTOMY     • COLOSTOMY  2006    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   • EXPLORATORY LAPAROTOMY      sec to ovarian cysts   • HYSTERECTOMY     • ILIAC ARTERY STENT     • REVISION / TAKEDOWN COLOSTOMY  2008     General Information     Row Name 02/19/21 1501          Physical Therapy Time and Intention    Document Type  evaluation  -VG     Mode of Treatment  individual therapy;physical therapy  -VG     Row Name 02/19/21 1501          General Information    Patient Profile Reviewed  yes  -VG     Prior Level of Function  independent:;all household mobility;min assist:;ADL's;mod assist:;home management Pt states she ambulated without AD  -VG     Existing Precautions/Restrictions  fall;seizures;oxygen therapy device and L/min;other (see comments) L humerus fx non operative, WBAT, sling for comfort  -VG     Barriers to Rehab  medically complex;previous functional deficit;cognitive status  -VG     Row Name 02/19/21 1501          Living Environment    Lives With  child(nash), adult;grandchild(nash) Daughter, granddaughterx2, grandson. Collectively provide 24/7 spv/assist  -VG     Row Name 02/19/21 1501          Home Main Entrance    Number of Stairs, Main  Entrance  three  -VG     Stair Railings, Main Entrance  railings on both sides of stairs  -VG     Row Name 02/19/21 1501          Stairs Within Home, Primary    Stairs, Within Home, Primary  Basement, pt does not access for ADLs. Primary bed/bath on ground floor.  -VG     Row Name 02/19/21 1501          Cognition    Orientation Status (Cognition)  oriented x 4  -VG     Row Name 02/19/21 1501          Safety Issues, Functional Mobility    Impairments Affecting Function (Mobility)  balance;endurance/activity tolerance;pain;shortness of breath;strength  -VG       User Key  (r) = Recorded By, (t) = Taken By, (c) = Cosigned By    Initials Name Provider Type    VG Sheree Loera, PT Physical Therapist        Mobility     Row Name 02/19/21 1503          Bed Mobility    Supine-Sit-Supine [place] (Bed Mobility)  supervision;verbal cues  -VG     Assistive Device (Bed Mobility)  head of bed elevated  -VG     Comment (Bed Mobility)  Cues for hand placement and sequencing.  -VG     Row Name 02/19/21 1503          Transfers    Comment (Transfers)  Cues for hand placement and sequencing. Impulsive. Pt declined gait d/t 9/10 pain LUE.  -VG     Row Name 02/19/21 1503          Bed-Chair Transfer    Bed-Chair [place] (Transfers)  supervision  -VG     Row Name 02/19/21 1503          Sit-Stand Transfer    Sit-Stand [place] (Transfers)  supervision  -VG     Row Name 02/19/21 1503          Mobility    Extremity Weight-bearing Status  left upper extremity  -VG     Left Upper Extremity (Weight-bearing Status)  weight-bearing as tolerated (WBAT)  -VG       User Key  (r) = Recorded By, (t) = Taken By, (c) = Cosigned By    Initials Name Provider Type    VG Sheree Loera, PT Physical Therapist        Obj/Interventions     Row Name 02/19/21 1504          Range of Motion Comprehensive    General Range of Motion  bilateral lower extremity ROM WNL  -VG     Row Name 02/19/21 1504          Strength Comprehensive (MMT)    Comment,  General Manual Muscle Testing (MMT) Assessment  BLEs 4+/5 grossly  -VG     Row Name 02/19/21 1504          Balance    Dynamic Sitting Balance  WNL  -VG     Static Standing Balance  WNL  -VG     Dynamic Standing Balance  mild impairment  -VG     Row Name 02/19/21 1504          Sensory Assessment (Somatosensory)    Sensory Assessment (Somatosensory)  sensation intact  -VG       User Key  (r) = Recorded By, (t) = Taken By, (c) = Cosigned By    Initials Name Provider Type    VG Sheree Loera, PT Physical Therapist        Goals/Plan     Row Name 02/19/21 1507          Bed Mobility Goal 1 (PT)    Activity/Assistive Device (Bed Mobility Goal 1, PT)  bed mobility activities, all  -VG     [place] Level/Cues Needed (Bed Mobility Goal 1, PT)  independent  -VG     Time Frame (Bed Mobility Goal 1, PT)  long term goal (LTG);2 weeks  -VG     Row Name 02/19/21 1507          Transfer Goal 1 (PT)    Activity/Assistive Device (Transfer Goal 1, PT)  sit-to-stand/stand-to-sit;bed-to-chair/chair-to-bed  -VG     [place] Level/Cues Needed (Transfer Goal 1, PT)  independent  -VG     Time Frame (Transfer Goal 1, PT)  long term goal (LTG);2 weeks  -VG     Row Name 02/19/21 1507          Gait Training Goal 1 (PT)    Activity/Assistive Device (Gait Training Goal 1, PT)  gait (walking locomotion)  -VG     [place] Level (Gait Training Goal 1, PT)  independent  -VG     Distance (Gait Training Goal 1, PT)  300  -VG     Time Frame (Gait Training Goal 1, PT)  long term goal (LTG);2 weeks  -VG     Row Name 02/19/21 1507          Patient Education Goal (PT)    Activity (Patient Education Goal, PT)  HEP  -VG     [place]/Cues/Accuracy (Memory Goal 2, PT)  independent  -VG     Time Frame (Patient Education Goal, PT)  long term goal (LTG);2 weeks  -VG       User Key  (r) = Recorded By, (t) = Taken By, (c) = Cosigned By    Initials Name Provider Type    VG Sheree Loera, PT Physical Therapist        Clinical Impression     Row Name  02/19/21 1505          Pain    Additional Documentation  Pain Scale: Numbers Pre/Post-Treatment (Group)  -VG     Row Name 02/19/21 1505          Pain Scale: Numbers Pre/Post-Treatment    Pretreatment Pain Rating  8/10  -VG     Posttreatment Pain Rating  9/10  -VG     Pain Location - Side  Left  -VG     Pain Location - Orientation  upper  -VG     Pain Location  extremity  -VG     Pain Intervention(s)  Repositioned;Ambulation/increased activity  -VG     Row Name 02/19/21 1505          Plan of Care Review    Plan of Care Reviewed With  patient  -VG     Outcome Summary  IP PT eval completed. Pt completed supine to EOB and stand step transfer bed to recliner with SBA and no AD. Slightly impulsive. Pt declined gait d/t pain 9/10 in LUE. Demonstrates mildly impaired endurance, balance, strength, safety awareness, pain limiting function. Recommend home with 24/7 spv/assist and  PT upon d/c.  -VG     Row Name 02/19/21 1505          Therapy Assessment/Plan (PT)    Rehab Potential (PT)  good, to achieve stated therapy goals  -VG     Criteria for Skilled Interventions Met (PT)  yes;meets criteria  -VG     Row Name 02/19/21 1505          Vital Signs    Pre Patient Position  Supine  -VG     Post Patient Position  Sitting  -VG     Row Name 02/19/21 1505          Positioning and Restraints    Pre-Treatment Position  in bed  -VG     Post Treatment Position  chair  -VG     In Chair  reclined;call light within reach;encouraged to call for assist;exit alarm on;waffle cushion;legs elevated;heels elevated;with nsg;LUE elevated  -VG       User Key  (r) = Recorded By, (t) = Taken By, (c) = Cosigned By    Initials Name Provider Type    VG Sheree Loera, PT Physical Therapist        Outcome Measures     Row Name 02/19/21 150          How much help from another person do you currently need...    Turning from your back to your side while in flat bed without using bedrails?  4  -VG     Moving from lying on back to sitting on the side of a  flat bed without bedrails?  4  -VG     Moving to and from a bed to a chair (including a wheelchair)?  3  -VG     Standing up from a chair using your arms (e.g., wheelchair, bedside chair)?  3  -VG     Climbing 3-5 steps with a railing?  2  -VG     To walk in hospital room?  3  -VG     AM-PAC 6 Clicks Score (PT)  19  -VG       User Key  (r) = Recorded By, (t) = Taken By, (c) = Cosigned By    Initials Name Provider Type    VG Sheree Loera, PT Physical Therapist        Physical Therapy Education                 Title: PT OT SLP Therapies (In Progress)     Topic: Physical Therapy (In Progress)     Point: Mobility training (Done)     Learning Progress Summary           Patient Acceptance, E, VU,NR by  at 2/19/2021 1508                   Point: Home exercise program (Not Started)     Learner Progress:  Not documented in this visit.          Point: Body mechanics (Done)     Learning Progress Summary           Patient Acceptance, E, VU,NR by  at 2/19/2021 1508                   Point: Precautions (Done)     Learning Progress Summary           Patient Acceptance, E, VU,NR by  at 2/19/2021 1508                               User Key     Initials Effective Dates Name Provider Type Discipline    VG 05/29/18 -  Sheree Loera, PT Physical Therapist PT              PT Recommendation and Plan  Planned Therapy Interventions (PT): balance training, bed mobility training, gait training, home exercise program, motor coordination training, postural re-education, stair training, transfer training  Plan of Care Reviewed With: patient  Outcome Summary: IP PT eval completed. Pt completed supine to EOB and stand step transfer bed to recliner with SBA and no AD. Slightly impulsive. Pt declined gait d/t pain 9/10 in LUE. Demonstrates mildly impaired endurance, balance, strength, safety awareness, pain limiting function. Recommend home with 24/7 spv/assist and  PT upon d/c.     Time Calculation:   PT Charges     Row Name 02/19/21  1402             Time Calculation    Start Time  1402  -VG      PT Received On  02/19/21  -VG      PT Goal Re-Cert Due Date  03/01/21  -VG         Time Calculation- PT    Total Timed Code Minutes- PT  28 minute(s)  -VG         Timed Charges    25411 - PT Therapeutic Activity Minutes  28  -VG        User Key  (r) = Recorded By, (t) = Taken By, (c) = Cosigned By    Initials Name Provider Type    VG Sheree Loera, PT Physical Therapist        Therapy Charges for Today     Code Description Service Date Service Provider Modifiers Qty    41213591190  PT THERAPEUTIC ACT EA 15 MIN 2/19/2021 Sheree Loera, PT GP 2    34570292136 HC PT EVAL MOD COMPLEXITY 4 2/19/2021 Sheree Loera, PT GP 1          PT G-Codes  Outcome Measure Options: AM-PAC 6 Clicks Daily Activity (OT)  AM-PAC 6 Clicks Score (PT): 19  AM-PAC 6 Clicks Score (OT): 16    Krystin Loera PT  2/19/2021

## 2021-02-19 NOTE — PROGRESS NOTES
[facility]  ADMISSION FOLLOW-UP NOTE          Patient admitted after midnight, H&P by my partner performed earlier on today's date reviewed.  Interim findings, labs, and charting also reviewed.        [facility] Hospital Problem List has been managed and updated to include any new diagnoses:  Active Hospital Problems    Diagnosis  POA   • **COPD with acute exacerbation (CMS/[facility]) [J44.1]  Yes     Priority: Medium   • Acute on chronic respiratory failure with hypoxia (CMS/[facility]) [J96.21]  Yes     Priority: Medium   • Fall [W19.XXXA]  Yes   • Humerus fracture [S42.309A]  Yes   • Altered mental status [R41.82]  Yes   • CAD (coronary artery disease) [I25.10]  Yes   • Seizure disorder (CMS/[facility]) [G40.909]  Yes   • Acute UTI (urinary tract infection) [N39.0]  Yes   • PAF (paroxysmal atrial fibrillation) (CMS/[facility]) [I48.0]  Yes   • Acute urinary retention [R33.8]  Yes   • Essential hypertension [I10]  Yes      Resolved Hospital Problems   No resolved problems to display.         ADDITIONAL PLAN:  - detailed assessment and plan from admission reviewed  -Patient seen and examined.  Wore CPAP overnight with no issues.  Upon taking it off she was placed back on her chronic 3 L of oxygen and oxygen level stayed up in the high 90s.  -Currently not having significant complaints.  Mental status appears at baseline.  Tells me she just fell out of her chair last night but difficult to get history about surrounding or preceding events.  -Drug screen positive for benzos and opiates which she is prescribed, denies taking any extra.  -Personally reviewed chest CT with no significant acute abnormalities.  Currently lung exam does not have much wheezing.  Will transition to oral prednisone x5 days.  Think can DC doxycycline and continue Rocephin only for possible UTI and any upper respiratory infection.  -Has known left humerus fracture.  Saw Dr. Esquivel in the hospital in October and he recommended  weightbearing as tolerated and follow-up with him which sounds like she did not do.  We will continue weightbearing as tolerated in sling for comfort and will need to arrange follow-up with Dr. Esquivel as an outpatient.  No indication for having  him see her while she is here.  -PT and OT evaluations pending.  In October was recommended to go to rehab however she declined.  Lives with her daughter and grandchildren and that is her discharge plan home.    Electronically signed by Chung Forte MD, 02/19/21, 8:39 AM EST.      Chung Forte MD  02/19/21

## 2021-02-19 NOTE — PLAN OF CARE
Goal Outcome Evaluation:  Plan of Care Reviewed With: patient  Progress: no change  Outcome Summary: Pt has some complaints of pain, PRN medications administered. BM this shift and I&O cath twice. VSS. No concerns at this time. Will continue to monitor.

## 2021-02-19 NOTE — PLAN OF CARE
Goal Outcome Evaluation:  Plan of Care Reviewed With: patient     Outcome Summary: OT evaluation complete.  Pt presents with L shoulder pain but agreeable to perform OT eval.  She deferred sitting up in chair but did perform STS with CGA and bed mob with supervision and vcs.  Pt required mod assist for LBD and setup for grooming.  Recommend IPOT and HHOT at d/c to promote indep in self care tasks.

## 2021-02-19 NOTE — CONSULTS
Referring Provider: MD Reanna  Reason for Consultation: AECOPD    Subjective .   Education:  NN spoke with pt at BS.  Pt alert and able to answer questions appropriately.  Pt O2 sat 100% on 3L currently, home O2 use 3 L PRN.  Pt states use of rescue inhaler 4 times daily, relief of SOB unclear.  Pt unable to report the ability to ambulate any distance at baseline.  Smoking cessation education completed. Cessation support resources discussed with pt.  She reports that she knows she will quit and is currently at about 2 cigarettes per day.  Up to date on flu and PNA vaccines.  Pt reports no issues at this time with medications but DOES have issues with transportation for appointments.  She is not agreeable to rehab but is agreeable to home health services.  Pt reports some previous hx of formal COPD teaching, some understanding of action plan, or TN.  Stop light report, NN contact information, instructions for accessing [facility] and list of educational videos given to pt.  COPD action plan reviewed.  Deep breathing exercises encouraged.  Lung Talk: Key Steps to Staying Well started at BS.  No new concerns or questions voiced at this time.  NN will continue to follow as needed.    Age: 70 y.o.  Sex: female  Smoker Status: current, ~40 pack years  Pulmonologist: KEANU  FEV1 (PFT): NA  Home O2: 3L PRN    Objective     SpO2 SpO2: 100 % (02/19/21 0924)  Device Device (Oxygen Therapy): nasal cannula (02/19/21 1258)  Flow Flow (L/min): 3 (02/19/21 1258)  Incentive Spirometer    IS Predicted Level (mL)     Number of Repetitions     Level Incentive Spirometer (mL)    Patient Tolerance     Inhaler Treatment Status Respiratory Treatment Status (Inhaler): given (02/19/21 0018)  Treatment Route Respiratory Treatment Status (Inhaler): given (02/19/21 0018)      Home Medications:  Medications Prior to Admission   Medication Sig Dispense Refill Last Dose   • albuterol (PROVENTIL) (2.5 MG/3ML) 0.083% nebulizer solution Take 2.5 mg by  nebulization Every 4 (Four) Hours As Needed for Wheezing or Shortness of Air. 90 vial 5    • albuterol sulfate  (90 Base) MCG/ACT inhaler Inhale 2 puffs Every 4 (Four) Hours As Needed for Wheezing or Shortness of Air. 18 g 0    • ANORO ELLIPTA 62.5-25 MCG/INH aerosol powder  INHALE 1 PUFF BY MOUTH EVERY MORNING 1 each 5    • aspirin 81 MG tablet Take 81 mg by mouth Daily.      • CARTIA  MG 24 hr capsule TAKE 1 CAPSULE BY MOUTH DAILY 90 capsule 0    • citalopram (CeleXA) 20 MG tablet Take 1 tablet by mouth Daily. 30 tablet 3    • clopidogrel (PLAVIX) 75 MG tablet Take 1 tablet by mouth Daily. 30 tablet 5    • doxazosin (CARDURA) 2 MG tablet TAKE 1 TABLET BY MOUTH EVERY NIGHT 90 tablet 0    • ezetimibe (ZETIA) 10 MG tablet TAKE 1 TABLET BY MOUTH DAILY 90 tablet 0    • ferrous sulfate 325 (65 FE) MG tablet Take 325 mg by mouth Daily With Breakfast.      • furosemide (LASIX) 40 MG tablet Take 40 mg by mouth Daily.      • guaiFENesin (MUCINEX) 600 MG 12 hr tablet Take 1 tablet by mouth Every 12 (Twelve) Hours. 60 tablet 0    • ipratropium-albuterol (DUO-NEB) 0.5-2.5 mg/3 ml nebulizer Take 3 mL by nebulization Every 4 (Four) Hours As Needed for Wheezing. 360 mL 0    • isosorbide mononitrate (IMDUR) 60 MG 24 hr tablet Take 60 mg by mouth Daily.      • lisinopril (PRINIVIL,ZESTRIL) 40 MG tablet Take 40 mg by mouth Daily.      • LORazepam (ATIVAN) 1 MG tablet TAKE 1 TABLET BY MOUTH TWICE DAILY. MUST LAST 30 DAYS. 60 tablet 2    • pantoprazole (PROTONIX) 40 MG EC tablet Take 1 tablet by mouth 2 (Two) Times a Day Before Meals. 60 tablet 0    • PHENobarbital (LUMINAL) 100 MG tablet TAKE 1 AND 1/2 TABLETS BY MOUTH EVERY DAY 45 tablet 3    • potassium chloride (K-DUR,KLOR-CON) 20 MEQ CR tablet Take 20 mEq by mouth Daily.      • rosuvastatin (CRESTOR) 40 MG tablet TAKE 1 TABLET BY MOUTH DAILY 30 tablet 0        Discussion: Per current G[employer] Standards, please consider: No ICS in place, NRT at [place], Outpatient PFT,  Pulmonary rehab as appropriate, palliative care consult      Discussed with primary RN    Tiffanie Reyes, RN

## 2021-02-19 NOTE — PROGRESS NOTES
Discharge Planning Assessment  [facility]     Patient Name: An Abreu  MRN: [id number]  Today's Date: 2/19/2021    Admit Date: 2/18/2021    Discharge Needs Assessment     Row Name 02/19/21 3840       Living Environment    Lives With  child(nash), adult;grandchild(nash)    Name(s) of Who Lives With Patient  Lory Agee(daughter) and 3 great grandchildren    Current Living Arrangements  home/apartment/condo    Primary Care Provided by  self;child(nash)    Provides Primary Care For  no one, unable/limited ability to care for self    Family Caregiver if Needed  child(nash), adult    Quality of Family Relationships  involved;supportive    Able to Return to Prior Arrangements  other (see comments) Daughter concerned about utilities being turned off. Mireille MUSTAFA aware and working on assistance    Living Arrangement Comments  Spoke with pt in room with permission regarding discharge plan. Pt resides in [facility] with daughter, Lory and Lory's 3 grandchildren.       Resource/Environmental Concerns    Resource/Environmental Concerns  financial    Financial Concerns  other (see comments) Utilities,       Transition Planning    Patient/Family Anticipates Transition to  home with family    Patient/Family Anticipated Services at Transition      Transportation Anticipated   May need transportation at discharge.       Discharge Needs Assessment    Readmission Within the Last 30 Days  no previous admission in last 30 days    Current Outpatient/Agency/Support Group  homecare agency Pt states she is current with [facility]. CM left [facility] message to call regarding services provided.    Equipment Currently Used at Home  walker, rolling;oxygen;respiratory supplies;shower chair;grab bar O2 at 2liters, 24/7 provided by [employer] [employer]    Concerns to be Addressed  discharge planning    Equipment Needed After Discharge  oxygen;respiratory supplies;shower chair;grab bar, tub/shower;grab bar, toilet;walker,  rolling    Outpatient/Agency/Support Group Needs  homecare agency(Pt reports current with A ). CM unable to confirm    Discharge Coordination/Progress  Spoke with Lory(daughter and POA) on cell. Pt resides in [facility] with adult daughter and 3 great grandchildren.  Daughter concerned about hot water tank and utility bills.  Mireille Lujan(RAMSEY) aware and has been in contact with pt and pt's daughter. Pt states she is current with [facility]. CM attempted to contact A  to confirm HH and had to leave a message as their was no answer.  Pt/daughter's plan is for pt to return home and continue HH. Pt/daughter not interested in any inpatient rehab facility as they are concerned about getting Covid.  CM will cont to follow for discharge needs.        Discharge Plan     Row Name 02/19/21 9775       Plan    Plan  discharge plan    Plan Comments  Pt/daughter's plan is for pt to return home and continue HH. Pt/daughter not interested in any inpatient rehab facility as they are concerned about getting Covid.  CM will cont to follow for discharge needs.    Final Discharge Disposition Code  06 - home with home health care    Row Name 02/19/21 3086       Plan    Plan  SW    Plan Comments  SW spoke with the patient about utility assistance. Patient explains her income is low and she needs assistance with her utilities and a water heater. SW contacted patients FAMILIA Henley (daughter) who confirmed that they need help with utilities. Lory reports that she contacted community action to assist however wasn’t able to make her appointment due to lack of transportation. SW called [employer] [phone] to assist patient and daughter, rep requested to speak directly with patient.        Continued Care and Services - Admitted Since 2/18/2021    Coordination has not been started for this encounter.     Selected Continued Care - Prior Encounters Includes selections from prior encounters from 11/20/2020 to 2/19/2021    Discharged on  11/25/2020 Admission date: 11/21/2020 - Discharge disposition: Home-Health Care Svc    Durable Medical Equipment     Service Provider Selected Services Address Phone Fax Patient Preferred    [facility]  Durable Medical Equipment [address], [place] [id number] [phone] --          Home Medical Care     Service Provider Selected Services Address Phone Fax Patient Preferred    [facility] [place]  [facility] [address], [place] [address] [phone] --                    Expected Discharge Date and Time     Expected Discharge Date Expected Discharge Time    Feb 23, 2021         Demographic Summary     Row Name 02/19/21 2715       General Information    General Information Comments  Pt 's PCP is Zabrina Lemon       Contact Information    Permission Granted to Share Info With      Contact Information Obtained for      Contact Information Comments  Lory Agee(daughter and POA):  [phone]        Functional Status    No documentation.       Psychosocial    No documentation.       Abuse/Neglect    No documentation.       Legal    No documentation.       Substance Abuse    No documentation.       Patient Forms    No documentation.           Emilie Espitia, OPAL

## 2021-02-19 NOTE — THERAPY EVALUATION
Patient Name: An Abreu  : 1950    MRN: [id number]                              Today's Date: 2021       Admit Date: 2021    Visit Dx:     ICD-10-CM ICD-9-CM   1. COPD exacerbation (CMS/[facility])  J44.1 491.21   2. Hypoxia  R09.02 799.02   3. Acute UTI  N39.0 599.0   4. Closed fracture of proximal end of left humerus, unspecified fracture morphology, initial encounter  S4A 812.00     Patient Active Problem List   Diagnosis   • Gastroesophageal reflux disease   • COPD (chronic obstructive pulmonary disease) (CMS/[facility])   • Essential hypertension   • Acute UTI (urinary tract infection)   • Acute urinary retention   • Fecal occult blood test positive   • Dependence on supplemental oxygen   • Tobacco abuse   • PAF (paroxysmal atrial fibrillation) (CMS/[facility])   • Acute respiratory failure with hypercapnia (CMS/[facility])   • Seizure disorder (CMS/[facility])   • CAD (coronary artery disease)   • Severe malnutrition (CMS/[facility])   • Macrocytic anemia   • Fall   • COPD with acute exacerbation (CMS/[facility])   • Humerus fracture   • Altered mental status   • Acute on chronic respiratory failure with hypoxia (CMS/[facility])   • COPD exacerbation (CMS/[facility])     Past Medical History:   Diagnosis Date   • Aneurysm (CMS/[facility])    • Angina pectoris (CMS/[facility]) 2016   • Anxiety 2016   • Arthritis 2016   • CAD (coronary artery disease)    • Carotid artery stenosis    • CHF (congestive heart failure) (CMS/[facility])    • Chronic coronary artery disease 2016 CABG LIMA to LAD, VG to OM  VG to OM occluded. LIMA patent Cx intervention and RCA 2008 stent for ISR  ISR and stenting of CX and RCA  normal MPS   • Chronic left-sided low back pain with left-sided sciatica 2017   • Chronic obstructive pulmonary disease (CMS/H[facility]) 2016   • Cobalamin deficiency 2016   • Congestive heart failure (CMS/[facility]) 2016   • COPD (chronic obstructive pulmonary disease) (CMS/[facility])    • Depression 7/3/2018   •  Diverticulosis    • Diverticulosis of large intestine without hemorrhage 5/5/2017   • GERD (gastroesophageal reflux disease)    • GIB (gastrointestinal bleeding)     recurrent    • HTN (hypertension)    • Hypercholesterolemia 6/20/2016   • Hyperlipidemia    • Mesenteric ischemia (CMS/[facility]) 2006    s/p resection    • Mood disorder (CMS/[facility])    • Oxygen dependent     3 liters @ all times.    • PAF (paroxysmal atrial fibrillation) (CMS/[facility])    • Paroxysmal atrial fibrillation (CMS/[facility]) 8/7/2017   • PVD (peripheral vascular disease) (CMS/[facility])    • Restless legs syndrome 6/20/2016   • Seizure disorder (CMS/[facility]) 6/20/2016   • Seizures (CMS/[facility])    • Stenosis of carotid artery 6/20/2016   • Vertigo 9/28/2016     Past Surgical History:   Procedure Laterality Date   • APPENDECTOMY     • CHOLECYSTECTOMY     • COLOSTOMY  2006    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   • EXPLORATORY LAPAROTOMY      sec to ovarian cysts   • HYSTERECTOMY     • ILIAC ARTERY STENT     • REVISION / TAKEDOWN COLOSTOMY  2008     General Information     [facility] Name 02/19/21 1048          OT Time and Intention    Document Type  evaluation  -     Mode of Treatment  occupational therapy  -[facility] Name 02/19/21 1048          General Information    Patient Profile Reviewed  yes  -SW     Prior Level of Function  independent:;all household mobility;ADL's;home management  -     Existing Precautions/Restrictions  fall;seizures;oxygen therapy device and L/min;other (see comments) sling to LUE  -     Barriers to Rehab  medically complex  -[facility] Name 02/19/21 1048          Living Environment    Lives With  child(nash), adult;grandchild(nash)  -[facility] Name 02/19/21 1048          Home Main Entrance    Number of Stairs, Main Entrance  three  -[facility] Name 02/19/21 1048          Stairs Within Home, Primary    Number of Stairs, Within Home, Primary  other (see comments) has basement but never uses it.  -[facility] Name 02/19/21 1048          Cognition    Orientation  Status (Cognition)  oriented x 4  -SW     Row Name 02/19/21 1048          Safety Issues, Functional Mobility    Impairments Affecting Function (Mobility)  balance;endurance/activity tolerance;pain;shortness of breath;strength  -       User Key  (r) = Recorded By, (t) = Taken By, (c) = Cosigned By    Initials Name Provider Type    Brianna Lugo OT Occupational Therapist          Mobility/ADL's     Row Name 02/19/21 1012          Bed Mobility    Bed Mobility  supine-sit-supine  -SW     Supine-Sit-Supine [place] (Bed Mobility)  supervision;verbal cues  -     Assistive Device (Bed Mobility)  bed rails;head of bed elevated  -[facility] Name 02/19/21 1012          Transfers    Transfers  sit-stand transfer  -     Sit-Stand [place] (Transfers)  contact guard  -[facility] Name 02/19/21 1012          Functional Mobility    Functional Mobility- Ind. Level  contact guard assist  -     Functional Mobility-Distance (Feet)  4  -SW     Functional Mobility- Safety Issues  supplemental O2  -[facility] Name 02/19/21 1012          Activities of Daily Living    21352 - OT Self Care/Mgmt Minutes  8  -SW     BADL Assessment/Intervention  grooming;lower body dressing  -[facility] Name 02/19/21 1012          Grooming Assessment/Training    [place] Level (Grooming)  grooming skills;wash face, hands;set up  -SW     Position (Grooming)  edge of bed sitting  -[facility] Name 02/19/21 1012          Lower Body Dressing Assessment/Training    [place] Level (Lower Body Dressing)  lower body dressing skills;doff;shoes/slippers;moderate assist (50% patient effort)  -     Position (Lower Body Dressing)  long sitting  -       User Key  (r) = Recorded By, (t) = Taken By, (c) = Cosigned By    Initials Name Provider Type    Brianna Lugo OT Occupational Therapist        Obj/Interventions     Row Name 02/19/21 1012          Sensory Assessment (Somatosensory)    Sensory Assessment (Somatosensory)  sensation intact;UE  sensation intact  -SW     Row Name 02/19/21 1012          Vision Assessment/Intervention    Visual Impairment/Limitations  WNL  -SW     Row Name 02/19/21 1012          Range of Motion Comprehensive    General Range of Motion  upper extremity range of motion deficits identified  -     Comment, General Range of Motion  LUE in sling due to fx.  -SW     Row Name 02/19/21 1012          Strength Comprehensive (MMT)    General Manual Muscle Testing (MMT) Assessment  upper extremity strength deficits identified  -     Comment, General Manual Muscle Testing (MMT) Assessment  LUE u/a to test and RUE 4+/5 sh fl  -SW     Row Name 02/19/21 1012          Balance    Balance Assessment  sitting static balance;sitting dynamic balance;sit to stand dynamic balance;standing static balance;standing dynamic balance  -     Static Sitting Balance  WNL;unsupported;sitting, edge of bed  -SW     Dynamic Sitting Balance  WFL;unsupported;sitting, edge of bed  -SW     Sit to Stand Dynamic Balance  WFL  -SW     Static Standing Balance  WFL;unsupported;standing  -SW     Dynamic Standing Balance  mild impairment;unsupported;standing  -SW     Balance Interventions  sitting;standing;sit to stand;supported;static;dynamic;minimal challenge  -       User Key  (r) = Recorded By, (t) = Taken By, (c) = Cosigned By    Initials Name Provider Type    Brianna Lugo OT Occupational Therapist        Goals/Plan     Row Name 02/19/21 1012          Bed Mobility Goal 1 (OT)    Activity/Assistive Device (Bed Mobility Goal 1, OT)  bed mobility activities, all  -SW     [place] Level/Cues Needed (Bed Mobility Goal 1, OT)  independent  -     Time Frame (Bed Mobility Goal 1, OT)  long term goal (LTG);by discharge  -     Progress/Outcomes (Bed Mobility Goal 1, OT)  goal ongoing  -SW     Row Name 02/19/21 1012          Transfer Goal 1 (OT)    Activity/Assistive Device (Transfer Goal 1, OT)  transfers, all  -SW     [place] Level/Cues Needed (Transfer  Goal 1, OT)  independent  -SW     Time Frame (Transfer Goal 1, OT)  long term goal (LTG);by discharge  -SW     Progress/Outcome (Transfer Goal 1, OT)  goal ongoing  -SW     Row Name 02/19/21 1012          Dressing Goal 1 (OT)    Activity/Device (Dressing Goal 1, OT)  lower body dressing  -SW     [place]/Cues Needed (Dressing Goal 1, OT)  supervision required  -SW     Time Frame (Dressing Goal 1, OT)  long term goal (LTG);by discharge  -SW     Progress/Outcome (Dressing Goal 1, OT)  goal ongoing  -SW     Row Name 02/19/21 1012          Toileting Goal 1 (OT)    Activity/Device (Toileting Goal 1, OT)  toileting skills, all  -SW     [place] Level/Cues Needed (Toileting Goal 1, OT)  independent  -SW     Time Frame (Toileting Goal 1, OT)  long term goal (LTG);by discharge  -SW     Progress/Outcome (Toileting Goal 1, OT)  goal ongoing  -SW     Row Name 02/19/21 1012          Therapy Assessment/Plan (OT)    Planned Therapy Interventions (OT)  activity tolerance training;adaptive equipment training;BADL retraining;functional balance retraining;patient/caregiver education/training;transfer/mobility retraining;strengthening exercise  -SW       User Key  (r) = Recorded By, (t) = Taken By, (c) = Cosigned By    Initials Name Provider Type    Brianna Lugo OT Occupational Therapist        Clinical Impression     [facility] Name 02/19/21 1012          Pain Assessment    Additional Documentation  Pain Scale: Numbers Pre/Post-Treatment (Group)  -SW     Row Name 02/19/21 1012          Pain Scale: Numbers Pre/Post-Treatment    Pretreatment Pain Rating  7/10  -SW     Posttreatment Pain Rating  6/10  -SW     Pain Location - Side  Left  -SW     Pain Location  shoulder  -SW     Pain Intervention(s)  Repositioned  -SW     Row Name 02/19/21 1012          Plan of Care Review    Plan of Care Reviewed With  patient  -SW     Outcome Summary  OT evaluation complete.  Pt presents with L shoulder pain but agreeable to perform OT eval.  She  deferred sitting up in chair but did perform STS with CGA and bed mob with supervision and vcs.  Pt required mod assist for LBD and setup for grooming.  Recommend IPOT and HHOT at d/c to promote indep in self care tasks.  -     Row Name 02/19/21 1012          Therapy Assessment/Plan (OT)    Rehab Potential (OT)  good, to achieve stated therapy goals  -     Criteria for Skilled Therapeutic Interventions Met (OT)  yes  -     Therapy Frequency (OT)  daily  -[facility] Name 02/19/21 1012          Therapy Plan Review/Discharge Plan (OT)    Anticipated Discharge Disposition (OT)  home with home health  -[facility] Name 02/19/21 1012          Vital Signs    Pre Systolic BP Rehab  91  -SW     Pre Treatment Diastolic BP  49  -SW     Pretreatment Heart Rate (beats/min)  76  -SW     Pre SpO2 (%)  100  -SW     O2 Delivery Pre Treatment  supplemental O2  -SW     Intra SpO2 (%)  99  -SW     O2 Delivery Intra Treatment  supplemental O2  -SW     O2 Delivery Post Treatment  supplemental O2  -SW     Pre Patient Position  Supine  -SW     Intra Patient Position  Sitting  -SW     Post Patient Position  Supine  -[facility] Name 02/19/21 1012          Positioning and Restraints    Pre-Treatment Position  in bed  -SW     Post Treatment Position  bed  -SW     In Bed  notified nsg;supine;fowlers;call light within reach;encouraged to call for assist;exit alarm on;side rails up x3  -SW       User Key  (r) = Recorded By, (t) = Taken By, (c) = Cosigned By    Initials Name Provider Type    Brianna Lugo, OT Occupational Therapist        Outcome Measures     Row Name 02/19/21 1103          How much help from another is currently needed...    Putting on and taking off regular lower body clothing?  2  -SW     Bathing (including washing, rinsing, and drying)  2  -SW     Toileting (which includes using toilet bed pan or urinal)  2  -SW     Putting on and taking off regular upper body clothing  3  -SW     Taking care of personal grooming (such  as brushing teeth)  3  -     Eating meals  4  -     AM-PAC 6 Clicks Score (OT)  16  -     Row Name 02/19/21 1103          Functional Assessment    Outcome Measure Options  AM-PAC 6 Clicks Daily Activity (OT)  -       User Key  (r) = Recorded By, (t) = Taken By, (c) = Cosigned By    Initials Name Provider Type     Brianna Jiménez OT Occupational Therapist        Occupational Therapy Education                 Title: PT OT SLP Therapies (In Progress)     Topic: Occupational Therapy (In Progress)     Point: ADL training (Done)     Description:   Instruct learner(s) on proper safety adaptation and remediation techniques during self care or transfers.   Instruct in proper use of assistive devices.              Learning Progress Summary           Patient Acceptance, E, VU by  at 2/19/2021 1020                   Point: Home exercise program (Not Started)     Description:   Instruct learner(s) on appropriate technique for monitoring, assisting and/or progressing therapeutic exercises/activities.              Learner Progress:  Not documented in this visit.          Point: Precautions (Not Started)     Description:   Instruct learner(s) on prescribed precautions during self-care and functional transfers.              Learner Progress:  Not documented in this visit.          Point: Body mechanics (Not Started)     Description:   Instruct learner(s) on proper positioning and spine alignment during self-care, functional mobility activities and/or exercises.              Learner Progress:  Not documented in this visit.                      User Key     Initials Effective Dates Name Provider Type Discipline     01/29/20 -  Brianna Jiménez OT Occupational Therapist OT              OT Recommendation and Plan  Planned Therapy Interventions (OT): activity tolerance training, adaptive equipment training, BADL retraining, functional balance retraining, patient/caregiver education/training, transfer/mobility retraining,  strengthening exercise  Therapy Frequency (OT): daily  Plan of Care Review  Plan of Care Reviewed With: patient  Outcome Summary: OT evaluation complete.  Pt presents with L shoulder pain but agreeable to perform OT eval.  She deferred sitting up in chair but did perform STS with CGA and bed mob with supervision and vcs.  Pt required mod assist for LBD and setup for grooming.  Recommend IPOT and HHOT at d/c to promote indep in self care tasks.     Time Calculation:   Time Calculation- OT     Row Name 02/19/21 1012             Time Calculation- OT    OT Start Time  1012  -SW      OT Received On  02/19/21  -      OT Goal Re-Cert Due Date  03/01/21  -         Timed Charges    91725 - OT Self Care/Mgmt Minutes  8  -SW        User Key  (r) = Recorded By, (t) = Taken By, (c) = Cosigned By    Initials Name Provider Type     Brianna Jiménez OT Occupational Therapist        Therapy Charges for Today     Code Description Service Date Service Provider Modifiers Qty    81919209365 HC OT SELF CARE/MGMT/TRAIN EA 15 MIN 2/19/2021 Brianna Jiménez OT GO 1    29851427260 HC OT EVAL MOD COMPLEXITY 3 2/19/2021 Brianna Jiménez OT GO 1               Brianna Jiménez OT  2/19/2021

## 2021-02-19 NOTE — PLAN OF CARE
Goal Outcome Evaluation:  Plan of Care Reviewed With: patient     Outcome Summary: IP PT eval completed. Pt completed supine to EOB and stand step transfer bed to recliner with SBA and no AD. Slightly impulsive. Pt declined gait d/t pain 9/10 in LUE. Demonstrates mildly impaired endurance, balance, strength, safety awareness, pain limiting function. Recommend home with 24/7 spv/assist and  PT upon d/c.

## 2021-02-19 NOTE — PROGRESS NOTES
Continued Stay Note  [facility]     Patient Name: An Abreu  MRN: [id number]  Today's Date: 2/19/2021    Admit Date: 2/18/2021    Discharge Plan     Row Name 02/19/21 1428       Plan    Plan  SW    Plan Comments  SW spoke with the patient about utility assistance. Patient explains her income is low and she needs assistance with her utilities and a water heater. SW contacted patients FAMILIA Henley (daughter) who confirmed that they need help with utilities. Lory reports that she contacted community action to assist however wasn’t able to make her appointment due to lack of transportation (discussed resources with daughter). SW called [employer] [phone] to assist patient and daughter, rep requested to speak directly with patient. Patient explains the [employer] Rep was going to follow up with patients daughter Lory.      RAMSEY spoke with Lory at 3:57 to in form that a community action rep would be calling her. Lory explained she was currently on the phone with the rep. RAMSEY provided IP number to Lory to assist.        Discharge Codes    No documentation.       Expected Discharge Date and Time     Expected Discharge Date Expected Discharge Time    Feb 23, 2021             IZZY Moore (Kay)

## 2021-02-20 NOTE — PROGRESS NOTES
[facility] Medicine Services  PROGRESS NOTE    Patient Name: An Abreu  : 1950  MRN: [id number]    Date of Admission: 2021  Primary Care Physician: Zabrina Lemon MD    Subjective   Subjective     CC: Follow-up COPD exacerbation    HPI:[place] cute events overnight,patient states that she is breathing better, still endorses a cough    ROS:  Gen- No fevers, chills  CV- No chest pain, palpitations  Resp- +cough, +dyspnea  GI- No N/V/D, abd pain    All other systems reviewed and currently negative.    Objective   Objective     Vital Signs:   Temp:  [97.1 °F (36.2 °C)-98 °F (36.7 °C)] 97.7 °F (36.5 °C)  Heart Rate:  [68-82] 76  Resp:  [17-18] 18  BP: (125-150)/(54-78) 129/54        Physical Exam:  Constitutional: Chronically ill-appearing elderly lady, in no acute distress, awake, alert  HENT: NCAT, mucous membranes moist  Respiratory: Moderate labored respirations, mild expiratory wheezes, no rhonchi on 3 L NC  Cardiovascular: RRR, no murmurs, rubs, or gallops  Gastrointestinal: Positive bowel sounds, soft, nontender, nondistended  Musculoskeletal: No bilateral ankle edema  Psychiatric: Appropriate affect, cooperative  Neurologic: Oriented x 3, nonfocal  Skin: No rashes    Results Reviewed:  Results from last 7 days   Lab Units 21  0405 21  2336   WBC 10*3/mm3 4.37 5.28   HEMOGLOBIN g/dL 12.7 13.6   HEMATOCRIT % 42.1 44.2   PLATELETS 10*3/mm3 208 245   INR   --  1.00   PROCALCITONIN ng/mL 0.08  --      Results from last 7 days   Lab Units 21  0405 21  2336   SODIUM mmol/L 136 139   POTASSIUM mmol/L 5.1 5.0   CHLORIDE mmol/L 93* 94*   CO2 mmol/L 36.0* 38.0*   BUN mg/dL 17 12   CREATININE mg/dL 0.51* 0.57   GLUCOSE mg/dL 176* 104*   CALCIUM mg/dL 9.3 9.3   ALT (SGPT) U/L  --  62*   AST (SGOT) U/L  --  54*   TROPONIN T ng/mL  --  <0.010   PROBNP pg/mL  --  390.4     Estimated Creatinine Clearance: 36.1 mL/min (A) (by C-G formula based on SCr of 0.51  mg/dL (L)).    Microbiology Results Abnormal     Procedure Component Value - Date/Time    Urine Culture - Urine, Urine, Catheter [[id number]]  (Abnormal) Collected: 02/19/21 0043    Lab Status: Preliminary result Specimen: Urine, Catheter Updated: 02/20/21 1019     Urine Culture >100,000 CFU/mL Gram Negative Bacilli    Blood Culture - Blood, Hand, Right [[id number]] Collected: 02/19/21 0405    Lab Status: Preliminary result Specimen: Blood from Hand, Right Updated: 02/20/21 0430     Blood Culture No growth at 24 hours    Narrative:      Aerobic bottle only      Blood Culture - Blood, Arm, Right [[id number]] Collected: 02/19/21 0405    Lab Status: Preliminary result Specimen: Blood from Arm, Right Updated: 02/20/21 0430     Blood Culture No growth at 24 hours    Narrative:      Aerobic bottle only    COVID-19 and FLU A/B PCR - Swab, Nasopharynx [[id number]]  (Normal) Collected: 02/19/21 0032    Lab Status: Final result Specimen: Swab from Nasopharynx Updated: 02/19/21 0131     COVID19 Not Detected     Influenza A PCR Not Detected     Influenza B PCR Not Detected    Narrative:      Fact sheet for providers: https://www.fda.gov/media/174109/download    Fact sheet for patients: https://www.fda.gov/media/776598/download    Test performed by PCR.          Imaging Results (Last 24 Hours)     ** No results found for the last 24 hours. **          Results for orders placed during the hospital encounter of 10/25/20   Adult Transthoracic Echo Complete W/ Cont if Necessary Per Protocol    Narrative · Calculated left ventricular EF = 63%  · Septal wall motion is abnormal, consistent with a post-operative state.  · There is moderate calcification of the aortic valve mainly affecting the   right coronary cusp(s).  · Moderate to severe aortic valve regurgitation is present. Moderate by   pressure-half time (270ms)  · There is mild, bileaflet mitral valve thickening present. Mild mitral   valve regurgitation is present  · Large patent  foramen ovale present. Saline test results are positive for   left to right atrial level shunt.          I have reviewed the medications:  Scheduled Meds:aspirin, 81 mg, Oral, Daily  cefTRIAXone, 1 g, Intravenous, Q24H  citalopram, 20 mg, Oral, Daily  clopidogrel, 75 mg, Oral, Daily  dilTIAZem CD, 240 mg, Oral, Daily  ferrous sulfate, 325 mg, Oral, Daily With Breakfast  guaiFENesin, 600 mg, Oral, Q12H  heparin (porcine), 2,500 Units, Subcutaneous, Q12H  ipratropium-albuterol, 3 mL, Nebulization, 4x Daily - RT  isosorbide mononitrate, 60 mg, Oral, Daily  lisinopril, 40 mg, Oral, Daily  LORazepam, 0.5 mg, Oral, Q12H  pantoprazole, 40 mg, Oral, BID AC  pharmacy consult - MTM, , Does not apply, Daily  PHENobarbital, 97.2 mg, Oral, Nightly  predniSONE, 40 mg, Oral, Daily With Breakfast  rosuvastatin, 40 mg, Oral, Daily  sodium chloride, 10 mL, Intravenous, Q12H  terazosin, 2 mg, Oral, Nightly      Continuous Infusions:   PRN Meds:.•  acetaminophen **OR** acetaminophen **OR** acetaminophen  •  albuterol sulfate HFA  •  HYDROcodone-acetaminophen  •  nicotine polacrilex  •  sodium chloride    Assessment/Plan   Assessment & Plan     Active Hospital Problems    Diagnosis  POA   • **COPD with acute exacerbation (CMS/HCC) [J44.1]  Yes   • Fall [W19.XXXA]  Yes   • Humerus fracture [S42.309A]  Yes   • Altered mental status [R41.82]  Yes   • Acute on chronic respiratory failure with hypoxia (CMS/HCC) [J96.21]  Yes   • COPD exacerbation (CMS/HCC) [J44.1]  Yes   • CAD (coronary artery disease) [I25.10]  Yes   • Seizure disorder (CMS/[facility]) [G40.909]  Yes   • Acute UTI (urinary tract infection) [N39.0]  Yes   • PAF (paroxysmal atrial fibrillation) (CMS/[facility]) [I48.0]  Yes   • Acute urinary retention [R33.8]  Yes   • Essential hypertension [I10]  Yes      Resolved Hospital Problems   No resolved problems to display.        Brief Hospital Course to date:  An Abreu is a 70 y.o. female with history of seizure disorder, paroxysmal  atrial fibrillation, hypertension, left humerus fracture, CAD, CHF chronic respiratory failure on 3 L O2 as needed.  Patient presents to the ED with complaints of SOA and confusion, admitted with COPD exacerbation.    Plan  Chronic respiratory failure with hypoxia  COPD with exacerbation  -Continue steroids and albuterol MDI  -She is currently on continuous 3L NC,(only on nocturnal at home) we will attempt to wean as able    Acute UTI  -UA with 3+ bacteria, follow-up cultures, continue IV Rocephin for now.    Acute encephalopathy  -Likely secondary to above, mentation seems to be improving    CAD, CHF  -Continue aspirin, statin, Imdur    Hypertension  -BP currently stable, continue lisinopril    Paroxysmal atrial fibrillation  -Continue rate control with Cardizem, no anticoagulation currently secondary to falls.    Seizure disorder-continue phenobarbital    Fall in the setting of recent left humerus fracture  -Previously seen by Dr. Esquivel, who recommended weightbearing as tolerated  -Patient was to follow-up with Dr. Esquivel, she did not do this, will rearrange at discharge  -PT/OT to evaluate    DVT Prophylaxis: SQ H    Disposition: anticipate d/c in the next 1-2 days    CODE STATUS:   Code Status and Medical Interventions:   Ordered at: 02/19/21 0144     Level Of Support Discussed With:    Patient     Code Status:    CPR     Medical Interventions (Level of Support Prior to Arrest):    Full       Tereza Zapien MD  02/20/21

## 2021-02-20 NOTE — NURSING NOTE
ABGs were collected by respiratory therapist, Patria- stated that the arterial sample was mixed with venous blood. Pt's pO2 resulted at 55.9 and SIVA Mcmullen notified and wanted it documented. Patient was on 2L O2 at time of lab draw.

## 2021-02-20 NOTE — PLAN OF CARE
Goal Outcome Evaluation:  Plan of Care Reviewed With: patient  Progress: no change  Outcome Summary: Pt has no complaints of pain. Attempted to wean pt to room air but unsuccessful due to her dropping to 88% and feeling like she needed the oxygen. VSS, I&O cath twice this shift. No concerns at this time. Will continue to monitor.

## 2021-02-20 NOTE — PLAN OF CARE
Goal Outcome Evaluation:  Plan of Care Reviewed With: patient     VSS. Patient currently on 2L NC. She remains in NSR on telemetry monitor. I&O cath once during the shift; we have implemented a consistent bathroom routine to encourage emptying of her bladder. She began to get confused through the night, but has been able to answer all orientation questions. Patient has remained calm and pleasant during the shift. Call light remains in reach and she has demonstrated appropriate use of call light. No apparent problems. Will continue to monitor.

## 2021-02-21 PROBLEM — N39.0 ACUTE UTI (URINARY TRACT INFECTION): Status: RESOLVED | Noted: 2020-08-29 | Resolved: 2021-01-01

## 2021-02-21 PROBLEM — J44.1 COPD EXACERBATION (HCC): Status: RESOLVED | Noted: 2021-01-01 | Resolved: 2021-01-01

## 2021-02-21 PROBLEM — J96.21 ACUTE ON CHRONIC RESPIRATORY FAILURE WITH HYPOXIA (HCC): Status: RESOLVED | Noted: 2021-01-01 | Resolved: 2021-01-01

## 2021-02-21 PROBLEM — R41.82 ALTERED MENTAL STATUS: Status: RESOLVED | Noted: 2021-01-01 | Resolved: 2021-01-01

## 2021-02-21 NOTE — OUTREACH NOTE
Prep Survey      Responses   [facility] patient discharged from?  [facility]   Is LACE score < 7 ?  No   Emergency Room discharge w/ pulse ox?  No   Eligibility  [facility]   Date of Admission  02/18/21   Date of Discharge  02/21/21   Discharge Disposition  Home or Self Care   Discharge diagnosis  COPD with exacerbation,    Acute UTI,    Acute encephalopathy   Does the patient have one of the following disease processes/diagnoses(primary or secondary)?  COPD/Pneumonia   Does the patient have Home health ordered?  No   Is there a DME ordered?  No   Prep survey completed?  Yes          Carly Steen RN

## 2021-02-21 NOTE — PLAN OF CARE
Problem: Fall Injury Risk  Goal: Absence of Fall and Fall-Related Injury  Intervention: Promote Injury-Free Environment  Recent Flowsheet Documentation  Taken 2/21/2021 0800 by Tony Bernstein, RN  Safety Promotion/Fall Prevention:   activity supervised   clutter free environment maintained   assistive device/personal items within [facility]   fall prevention program maintained   safety round/check completed   room organization consistent     Problem: Adult Inpatient Plan of Care  Goal: Plan of Care Review  Outcome: Met     Problem: Adult Inpatient Plan of Care  Goal: Absence of Hospital-Acquired Illness or Injury  Outcome: Met  Intervention: Identify and Manage Fall Risk  Recent Flowsheet Documentation  Taken 2/21/2021 0800 by Tony Bernstein, RN  Safety Promotion/Fall Prevention:   activity supervised   clutter free environment maintained   assistive device/personal items within [facility]   fall prevention program maintained   safety round/check completed   room organization consistent  Intervention: Prevent Skin Injury  Recent Flowsheet Documentation  Taken 2/21/2021 0800 by Tony Bernstein, RN  Body Position: position changed independently  Intervention: Prevent and Manage VTE (venous thromboembolism) Risk  Recent Flowsheet Documentation  Taken 2/21/2021 0800 by Tony Bernstein, RN  VTE Prevention/Management: (heparin)   bleeding risk factor(s) identified   other (see comments)     Problem: Adult Inpatient Plan of Care  Goal: Absence of Hospital-Acquired Illness or Injury  Intervention: Identify and Manage Fall Risk  Recent Flowsheet Documentation  Taken 2/21/2021 0800 by Tony Bernstein, RN  Safety Promotion/Fall Prevention:   activity supervised   clutter free environment maintained   assistive device/personal items within [facility]   fall prevention program maintained   safety round/check completed   room organization consistent   Goal Outcome Evaluation:  Plan of Care Reviewed With: patient  Progress: no  change  Outcome Summary: Pt has no complaints of pain. Attempted to wean pt to room air but unsuccessful due to her dropping to 88% and feeling like she needed the oxygen. VSS, I&O cath twice this shift. No concerns at this time. Will continue to monitor.

## 2021-02-21 NOTE — DISCHARGE SUMMARY
[facility] Medicine Services  DISCHARGE SUMMARY    Patient Name: An Abreu  : 1950  MRN: [id number]    Date of Admission: 2021 11:22 PM  Date of Discharge:  2021  Primary Care Physician: Zabrina Lemon MD    Consults     No orders found from 2021 to 2021.          Hospital Course     Presenting Problem:   UTI (urinary tract infection) [N39.0]  UTI (urinary tract infection) [N39.0]  COPD exacerbation (CMS/Formerly [facility]) [J44.1]    Active Hospital Problems    Diagnosis  POA   • **COPD with acute exacerbation (CMS/Formerly [facility]) [J44.1]  Yes   • Fall [W19.XXXA]  Yes   • Humerus fracture [S42.309A]  Yes   • CAD (coronary artery disease) [I25.10]  Yes   • Seizure disorder (CMS/Formerly [facility]) [G40.909]  Yes   • PAF (paroxysmal atrial fibrillation) (CMS/Formerly [facility]) [I48.0]  Yes   • Acute urinary retention [R33.8]  Yes   • Essential hypertension [I10]  Yes      Resolved Hospital Problems    Diagnosis Date Resolved POA   • Altered mental status [R41.82] 2021 Yes   • Acute on chronic respiratory failure with hypoxia (CMS/Formerly [facility]) [J96.21] 2021 Yes   • COPD exacerbation (CMS/Formerly [facility]) [J44.1] 2021 Yes   • Acute UTI (urinary tract infection) [N39.0] 2021 Yes        Hospital Course:  An Abreu is a 70 y.o. female with history of seizure disorder, paroxysmal atrial fibrillation, hypertension, left humerus fracture, CAD, CHF chronic respiratory failure on 3 L O2 as needed.  Patient presents to the ED with complaints of SOA and confusion, admitted with COPD exacerbation.     Chronic respiratory failure with hypoxia  COPD with exacerbation  -She is back to her baseline O2 requirements of 3 L NC  -We will continue steroids for 5 days.  Continue home albuterol     Acute UTI  -UA with 3+ bacteria, cultures growing GNB, previous cultures have grown E. coli, she is s/p 3 days of IV Rocephin.  No plans to discharge on any antibiotics     Acute encephalopathy, resolved  -Was likely  likely secondary to above.     CAD, CHF  -Continue aspirin, statin, Imdur     Hypertension  -BP currently stable, continue lisinopril     Paroxysmal atrial fibrillation  -Continue Cardizem, not on any anticoagulation currently secondary to falls.     Seizure disorder-continue phenobarbital     Fall with prior left humerus fracture  -Left humerus x-ray showed subacute, partially united fracture deformity of the proximal left humerus, similar to prior examinations  -Previously seen by Dr. Eqsuivel, who recommended weightbearing as tolerated  -Patient was to follow-up with Dr. Esquivel, she did not do this, will rearrange at discharge    Discharge Follow Up Recommendations for outpatient labs/diagnostics:  Follow-up with PCP in 1 week  Follow-up with Dr. Esquivel in 2 weeks      Day of Discharge     HPI: No acute events overnight, patient rested well, breathing is improved    Review of Systems  Gen- No fevers, chills  CV- No chest pain, palpitations  Resp- No cough, dyspnea  GI- No N/V/D, abd pain    All other systems reviewed and are negative.    Vital Signs:   Temp:  [97.7 °F (36.5 °C)] 97.7 °F (36.5 °C)  Heart Rate:  [70-87] 74  Resp:  [16-18] 18  BP: (115-128)/(45-58) 115/45     Physical Exam:  Constitutional: Chronically ill-appearing elderly lady, in no acute distress, awake, alert  HENT: NCAT, mucous membranes moist  Respiratory: Nonlabored respirations, diffuse coarse breath sounds on 2.5 L NC  Cardiovascular: RRR, no murmurs, rubs, or gallops  Gastrointestinal: Positive bowel sounds, soft, nontender, nondistended  Musculoskeletal: No bilateral ankle edema  Psychiatric: Appropriate affect, cooperative  Neurologic: Oriented x 3, nonfocal  Skin: No rashes    Pertinent  and/or Most Recent Results     LAB RESULTS:      Lab 02/19/21  0843 02/19/21  0405 02/18/21  2336   WBC  --  4.37 5.28   HEMOGLOBIN  --  12.7 13.6   HEMATOCRIT  --  42.1 44.2   PLATELETS  --  208 245   NEUTROS ABS  --  3.92 3.48   IMMATURE GRANS (ABS)   --  0.01 0.01   LYMPHS ABS  --  0.34* 1.17   MONOS ABS  --  0.06* 0.44   EOS ABS  --  0.03 0.16   MCV  --  105.5* 104.7*   PROCALCITONIN  --  0.08  --    LACTATE 2.2* 2.1*  --    PROTIME  --   --  12.9         Lab 02/19/21  0405 02/18/21  2336   SODIUM 136 139   POTASSIUM 5.1 5.0   CHLORIDE 93* 94*   CO2 36.0* 38.0*   ANION GAP 7.0 7.0   BUN 17 12   CREATININE 0.51* 0.57   GLUCOSE 176* 104*   CALCIUM 9.3 9.3         Lab 02/18/21  2336   TOTAL PROTEIN 7.8   ALBUMIN 4.30   GLOBULIN 3.5   ALT (SGPT) 62*   AST (SGOT) 54*   BILIRUBIN <0.2   ALK PHOS 97         Lab 02/18/21  2336   PROBNP 390.4   TROPONIN T <0.010   PROTIME 12.9   INR 1.00                 Lab 02/20/21  0027 02/19/21  0220 02/19/21  0036   PH, ARTERIAL 7.379 7.352 7.356   PCO2, ARTERIAL 53.2* 69.3* 66.8*   PO2 ART 55.9* 141.0* 72.3*   FIO2 28 28 28   HCO3 ART 31.4* 38.4* 37.4*   BASE EXCESS ART 5.1* 10.3* 9.5*   CARBOXYHEMOGLOBIN 0.8 2.7* 3.5*     Brief Urine Lab Results  (Last result in the past 365 days)      Color   Clarity   Blood   Leuk Est   Nitrite   Protein   CREAT   Urine HCG        02/19/21 0043 Yellow Clear Negative Moderate (2+) Positive Negative             Microbiology Results (last 10 days)     Procedure Component Value - Date/Time    Blood Culture - Blood, Hand, Right [[id number]] Collected: 02/19/21 0405    Lab Status: Preliminary result Specimen: Blood from Hand, Right Updated: 02/21/21 0430     Blood Culture No growth at 2 days    Narrative:      Aerobic bottle only      Blood Culture - Blood, Arm, Right [[id number]] Collected: 02/19/21 0405    Lab Status: Preliminary result Specimen: Blood from Arm, Right Updated: 02/21/21 0430     Blood Culture No growth at 2 days    Narrative:      Aerobic bottle only    Urine Culture - Urine, Urine, Catheter [[id number]]  (Abnormal) Collected: 02/19/21 0043    Lab Status: Preliminary result Specimen: Urine, Catheter Updated: 02/20/21 1019     Urine Culture >100,000 CFU/mL Gram Negative Bacilli     COVID-19 and FLU A/B PCR - Swab, Nasopharynx [[id number]]  (Normal) Collected: 02/19/21 0032    Lab Status: Final result Specimen: Swab from Nasopharynx Updated: 02/19/21 0131     COVID19 Not Detected     Influenza A PCR Not Detected     Influenza B PCR Not Detected    Narrative:      Fact sheet for providers: https://www.fda.gov/media/297190/download    Fact sheet for patients: https://www.fda.gov/media/799884/download    Test performed by PCR.          Xr Shoulder 2+ View Left    Result Date: 2/19/2021  CR Shoulder Comp Min 2 Vws LT INDICATION: 70-year-old female with left shoulder pain status post fall tonight. COMPARISON: CT chest 10/25/2020 FINDINGS: 2 views of the left shoulder.  Subacute, partially united fracture deformity of the proximal left humerus, similar to multiple prior examinations dating back to 10/25/2020. No acute appearing osseous injury. Old, healed fracture deformity of the left clavicle. Left shoulder is normally located.   No foreign body.     1. No acute appearing osseous injury. 2. Subacute, partially united fracture deformity of the proximal left humerus, similar to prior examinations dating back to 10/25/2020. 3. Old, healed fracture deformity of the left clavicle. Signer Name: Mk Rodriguez MD  Signed: 2/19/2021 12:00 AM  Workstation Name: [email or web address]    Xr Humerus Left    Result Date: 2/19/2021  CR Humerus Min 2 Vws LT INDICATION: 70-year-old female with left arm pain status post fall tonight. COMPARISON: CT chest 10/25/2020 FINDINGS: 2 views of the left humerus.  Subacute, partially united fracture deformity of the proximal left humerus, similar to multiple prior examinations dating back to 10/25/2020. No acute appearing osseous injury. Old, healed fracture deformity of the left clavicle. Left shoulder is normally located.   No foreign body.     1. No acute appearing osseous injury. 2. Subacute, partially united fracture deformity of the proximal  left humerus, similar to prior examinations dating back to 10/25/2020. 3. Old, healed fracture deformity of the left clavicle. Signer Name: Mk Rodriguez MD  Signed: 2/19/2021 12:01 AM  Workstation Name: [facility]    Ct Head Without Contrast    Result Date: 2/19/2021  CT Head WO HISTORY: 70-year-old female with head pain status post fall tonight. TECHNIQUE: Routine noncontrast head CT. Coronal reformatted images. Radiation dose reduction techniques included automated exposure control or exposure modulation based on body size. Count of known CT and cardiac nuc med studies performed in previous 12 months: 8. COMPARISON: CT head 10/25/2020 FINDINGS: No hemorrhage, acute infarction, mass lesion, or abnormal extra-axial fluid collection. No midline shift or focal mass effect. Ventricular system is normal in size and configuration. 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 Remote infarct of the right basal ganglia and right frontal periventricular white matter. Mild generalized atrophy and chronic small vessel disease throughout the supratentorial white matter. No acute osseous abnormality. Visualized paranasal sinuses are clear. Visualized mastoid air cells are clear.     No acute intracranial abnormality. Stable senescent changes. Signer Name: Mk Rodriguez MD  Signed: 2/19/2021 12:03 AM  Workstation Name: [email or web address]    Ct Angiogram Chest With & Without Contrast    Result Date: 2/19/2021  CT ANGIOGRAM CHEST W WO CONTRAST INDICATION: 70-year-old female with chest pain status post fall today. TECHNIQUE: CT angiogram of the chest with IV contrast. 3-D reconstructions were obtained and reviewed.   Radiation dose reduction techniques included automated exposure control or exposure modulation based on body size. Count of known CT and cardiac nuc med studies performed in previous 12 months: 8. COMPARISON: CT chest 12/5/2020 FINDINGS:  Adequate opacification of the pulmonary arteries with no filling defects. Prominence of the main pulmonary arteries, suggesting underlying pulmonary arterial hypertension. Thoracic aorta normal in course and caliber without dissection. Heart size is normal. No pericardial effusion. No pleural effusion. No pneumothorax. No focal pulmonary infiltrates. Visualized upper abdomen is unremarkable. Cholecystectomy. Extensive atherosclerotic calcification in the abdominal aorta. No acute osseous abnormality. Chronic compression deformities of the T11 and T12 vertebral bodies. Subacute/chronic, partially healed fracture of the proximal left humerus.     1. Negative for pulmonary embolus. 2. Negative for thoracic aortic aneurysm/dissection. 3. No acute pulmonary process. 4. Prominence of the main pulmonary arteries, suggesting underlying pulmonary arterial hypertension. 5. Chronic compression deformities of the T11 and T12 vertebral bodies. Subacute/chronic, partially healed fracture of the proximal left humerus. Signer Name: Mk Rodriguez MD  Signed: 2/19/2021 2:48 AM  Workstation Name: [employer]  [facility]    Xr Chest 1 View    Result Date: 2/18/2021  CR Chest 1 Vw INDICATION: 70-year-old female with chest pain status post fall tonight. COMPARISON:  Multiple chest x-rays dating back to 10/25/2020 FINDINGS: Single portable AP view(s) of the chest.  The heart and mediastinal contours are normal. Median sternotomy wires. The lungs are clear. No pneumothorax or pleural effusion. Subacute, ununited fracture of the anatomic neck of the left humerus, unchanged from prior exams. Old, healed left clavicular fracture.     No acute cardiopulmonary findings. Subacute, ununited proximal left humerus fracture. Old, healed left clavicular fracture. Signer Name: Mk Rodriguez MD  Signed: 2/18/2021 11:58 PM  Workstation Name: [email or web address] [facility]      Results for orders placed  during the hospital encounter of 09/28/17   Doppler Ankle Brachial Index Multi Level CAR    Narrative · Right Conclusion: The right CUCA is normal.  · Left Conclusion: The left CUCA is mildly reduced.          Results for orders placed during the hospital encounter of 09/28/17   Doppler Ankle Brachial Index Multi Level CAR    Narrative · Right Conclusion: The right CUCA is normal.  · Left Conclusion: The left CUCA is mildly reduced.          Results for orders placed during the hospital encounter of 10/25/20   Adult Transthoracic Echo Complete W/ Cont if Necessary Per Protocol    Narrative · Calculated left ventricular EF = 63%  · Septal wall motion is abnormal, consistent with a post-operative state.  · There is moderate calcification of the aortic valve mainly affecting the   right coronary cusp(s).  · Moderate to severe aortic valve regurgitation is present. Moderate by   pressure-half time (270ms)  · There is mild, bileaflet mitral valve thickening present. Mild mitral   valve regurgitation is present  · Large patent foramen ovale present. Saline test results are positive for   left to right atrial level shunt.          Plan for Follow-up of Pending Labs/Results: PCP  Pending Labs     Order Current Status    Blood Culture - Blood, Arm, Right Preliminary result    Blood Culture - Blood, Hand, Right Preliminary result    Urine Culture - Urine, Urine, Catheter Preliminary result        Discharge Details        Discharge Medications      New Medications      Instructions Start Date   predniSONE 20 MG tablet  Commonly known as: DELTASONE   40 mg, Oral, Daily With Breakfast         Continue These Medications      Instructions Start Date   albuterol (2.5 MG/3ML) 0.083% nebulizer solution  Commonly known as: PROVENTIL   2.5 mg, Nebulization, Every 4 Hours PRN      albuterol sulfate  (90 Base) MCG/ACT inhaler  Commonly known as: PROVENTIL HFA;VENTOLIN HFA;PROAIR HFA   2 puffs, Inhalation, Every 4 Hours PRN      Anoro  Ellipta 62.5-25 MCG/INH aerosol powder  inhaler  Generic drug: umeclidinium-vilanterol   INHALE 1 PUFF BY MOUTH EVERY MORNING      aspirin 81 MG tablet   81 mg, Oral, Daily      Cartia  MG 24 hr capsule  Generic drug: dilTIAZem CD   240 mg, Oral, Daily      citalopram 20 MG tablet  Commonly known as: CeleXA   20 mg, Oral, Daily      clopidogrel 75 MG tablet  Commonly known as: PLAVIX   75 mg, Oral, Daily      doxazosin 2 MG tablet  Commonly known as: CARDURA   TAKE 1 TABLET BY MOUTH EVERY NIGHT      ezetimibe 10 MG tablet  Commonly known as: ZETIA   TAKE 1 TABLET BY MOUTH DAILY      ferrous sulfate 325 (65 FE) MG tablet   325 mg, Oral, Daily With Breakfast      furosemide 40 MG tablet  Commonly known as: LASIX   40 mg, Oral, Daily      guaiFENesin 600 MG 12 hr tablet  Commonly known as: MUCINEX   600 mg, Oral, Every 12 Hours Scheduled      HYDROcodone-acetaminophen  MG per tablet  Commonly known as: NORCO   1 tablet, Oral, Every 6 Hours PRN      ipratropium-albuterol 0.5-2.5 mg/3 ml nebulizer  Commonly known as: DUO-NEB   3 mL, Nebulization, Every 4 Hours PRN      isosorbide mononitrate 60 MG 24 hr tablet  Commonly known as: IMDUR   60 mg, Oral, Daily      lisinopril 40 MG tablet  Commonly known as: PRINIVIL,ZESTRIL   40 mg, Oral, Daily      LORazepam 1 MG tablet  Commonly known as: ATIVAN   TAKE 1 TABLET BY MOUTH TWICE DAILY. MUST LAST 30 DAYS.      pantoprazole 40 MG EC tablet  Commonly known as: PROTONIX   40 mg, Oral, 2 Times Daily Before Meals      PHENobarbital 100 MG tablet  Commonly known as: LUMINAL   TAKE 1 AND 1/2 TABLETS BY MOUTH EVERY DAY      potassium chloride 20 MEQ CR tablet  Commonly known as: K-DUR,KLOR-CON   20 mEq, Oral, Daily, Patient takes potassium with Lasix      rosuvastatin 40 MG tablet  Commonly known as: CRESTOR   TAKE 1 TABLET BY MOUTH DAILY             Allergies   Allergen Reactions   • Keflex [Cephalexin] Shortness Of Breath and Rash     Patients power of  states  patient had to be taken to ER due to reaction.    Tolerated Rocephin 2/19/21   • Sulfamethoxazole-Trimethoprim Shortness Of Breath and Rash     Patients power of  stated patient had to be taken to ER due to reaction.   • Carbamazepine    • Codeine        Discharge Disposition: Home  Home or Self Care    Diet:  Hospital:  Diet Order   Procedures   • Diet Regular; Cardiac       Activity: As tolerated      Restrictions or Other Recommendations:None         CODE STATUS:    Code Status and Medical Interventions:   Ordered at: 02/19/21 0144     Level Of Support Discussed With:    Patient     Code Status:    CPR     Medical Interventions (Level of Support Prior to Arrest):    Full       Future Appointments   Date Time Provider Department Center   2/22/2021  3:15 PM Zabrina Lemon MD [facility] Wycliffe Opii, MD  02/21/21    Time Spent on Discharge:  I spent 35 minutes on this discharge activity which included: face-to-face encounter with the patient, reviewing the data in the system, coordination of the care with the nursing staff as well as consultants, documentation, and entering orders.

## 2021-02-22 NOTE — OUTREACH NOTE
Call Center TCM Note      Responses   [facility] patient discharged from?  [place]   Does the patient have one of the following disease processes/diagnoses(primary or secondary)?  COPD/Pneumonia   Was the primary reason for admission:  COPD exacerbation   TCM attempt successful?  Yes   Call start time  1620   Call end time  1632   Discharge diagnosis  COPD with exacerbation,    Acute UTI,    Acute encephalopathy   Person spoke with today (if not patient) and relationship  Lory(Daughter)   Meds reviewed with patient/caregiver?  Yes   Does the patient have all medications ordered at discharge?  Yes   Prescription comments  Daughter states will  Prednisone today.   Is the patient taking all medications as directed (includes completed medication regime)?  No   What is preventing the patient from taking all medications as directed?  -- [Daughter states will  from Pharmacy today.]   Nursing Interventions  Nurse provided patient education   Does the patient have a primary care provider?   Yes   Does the patient have an appointment with their PCP or specialist within 7 days of discharge?  Yes   Has the patient kept scheduled appointments due by today?  No   Nursing Interventions  Advised to reschedule appointment   Comments  Daughter states she would reschedule f/u appt. with PCP on her own.   Has home health visited the patient within 72 hours of discharge?  N/A   Psychosocial issues?  No   Did the patient receive a copy of their discharge instructions?  Yes   Nursing interventions  Reviewed instructions with patient   What is the patient's perception of their health status since discharge?  Improving   Nursing Interventions  Nurse provided patient education   Is the patient/caregiver able to teach back the hierarchy of who to call/visit for symptoms/problems? PCP, Specialist, Home health nurse, Urgent Care, ED, 911  Yes   Patient reports what zone on this call?  Green Zone   Green Zone  Reports doing  well, Sleeping well   Green Zone interventions:  Take daily medications   TCM call completed?  Yes   Wrap up additional comments  Daughter states pt. is doing fine, resting at this time. No questions or concerns at this time. Declined offer to schedule f/u appt. with PCP          Erinn Fortune RN    2/22/2021, 16:33 EST

## 2021-02-22 NOTE — OUTREACH NOTE
Call Center TCM Note      Responses   [facility] patient discharged from?  [place]   Does the patient have one of the following disease processes/diagnoses(primary or secondary)?  COPD/Pneumonia   Was the primary reason for admission:  COPD exacerbation   TCM attempt successful?  No   Unsuccessful attempts  Attempt 1          Erinn Fortune RN    2/22/2021, 16:02 EST

## 2021-03-02 NOTE — OUTREACH NOTE
COPD/PN Week 2 Survey      Responses   [facility] patient discharged from?  [place]   Does the patient have one of the following disease processes/diagnoses(primary or secondary)?  COPD/Pneumonia   Was the primary reason for admission:  COPD exacerbation   Week 2 attempt successful?  No   Unsuccessful attempts  Attempt 1          Annel Hatfield RN

## 2021-03-03 NOTE — OUTREACH NOTE
COPD/PN Week 2 Survey      Responses   [facility] patient discharged from?  [place]   Does the patient have one of the following disease processes/diagnoses(primary or secondary)?  COPD/Pneumonia   Was the primary reason for admission:  COPD exacerbation   Week 2 attempt successful?  No   Unsuccessful attempts  Attempt 2   Rescheduled  Revoked   Revoke  Decline to participate          Annel Hatfield RN

## 2021-03-10 NOTE — TELEPHONE ENCOUNTER
Caller: MARLENE ARMENTA    Relationship: Emergency Contact    Best call back number: [phone]    Medication needed:   Requested Prescriptions     Pending Prescriptions Disp Refills   • albuterol sulfate  (90 Base) MCG/ACT inhaler 18 g 0     Si puffs Every 4 (Four) Hours As Needed for Wheezing or Shortness of Air.   • potassium chloride (K-DUR,KLOR-CON) 20 MEQ CR tablet       Sig: Take 1 tablet by mouth Daily. Patient takes potassium with Lasix   • citalopram (CeleXA) 20 MG tablet 30 tablet 3     Sig: Take 1 tablet by mouth Daily.   • rosuvastatin (CRESTOR) 40 MG tablet 30 tablet 0     Sig: Take 1 tablet by mouth Daily.       When do you need the refill by: 03/10/21    What details did the patient provide when requesting the medication: patient also need nitro patches and omeprazole. Patient's daughter doesn't know what her mom takes or what she needs to have refilled.        Does the patient have less than a 3 day supply:  [x] Yes  [] No    What is the patient's [id number] [place], [place] - [address] AT [facility] [phone] [facility] [phone] FX

## 2021-03-19 NOTE — TELEPHONE ENCOUNTER
PT IS REQUESTING ORDERS FOR HER MEDICAL SUPPLIES TO BE SENT TO [facility]    PT IS REQUESTING A NEW HOME UNIT NEBULIZER, TRAVEL BACK PACK, NOSE PLUGS THAT CONNECT TO OXYGEN TANK, POTTY CHAIR WITH DEEP POT UNDERNEATH      PT IS REQUESTING A REFILL ON THE ALBUTEROL SULFATE INHALER. PT STATES THERE ARE 2 INHALER ONE IS ]A DUO. NOT SURE WHAT PT IS TALKING ABOUT AND DIDN'T SEE IT ON THE LIST. PT STATES THAT IT IS THE BIG INHALER. PT STATES THAT SHE DOESN'T HAVE WATER, NO TELEPHONE AND IS STRUGGLING TO TAKE CARE OF HER GRANDCHILDREN AND NEEDS SOME ASSISTANCE.  PT CAN BE REACHED AT THE NUMBER LISTED BELOW    PLEASE ADVISE  [phone]

## 2021-03-23 NOTE — TELEPHONE ENCOUNTER
Caller: Brady, An FLORES    Relationship: Self    Best call back number: [phone]    Medication needed:   LORazepam (ATIVAN) 1 MG tablet   2 ordered         Summary: TAKE 1 TABLET BY MOUTH TWICE DAILY. MUST LAST 30 DAYS., Normal        HYDROcodone-acetaminophen (NORCO)  MG per tablet  1 tablet, Every 6 Hours PRN          Summary: Take 1 tablet by mouth Every 6 (Six) Hours As Needed for Moderate Pain .   Dose, Route, Frequency: 1 tablet, Oral, Every 6 Hours PRN        albuterol sulfate  (90 Base) MCG/ACT inhaler  2 puff, Every 4 Hours PRN 5 ordered         Summary: Inhale 2 puffs Every 4 (Four) Hours As Needed for Wheezing or Shortness of Air., Starting Fri 3/19/2021, Normal   Dose, Route, Frequency: 2 puff, Inhalation, Every 4 Hours PRN          ipratropium-albuterol (DUO-NEB) 0.5-2.5 mg/3 ml nebulizer  3 mL, Every 4 Hours PRN 5 ordered         Summary: Take 3 mL by nebulization Every 4 (Four) Hours As Needed for Wheezing., Starting Fri 3/19/2021, Normal   Dose, Route, Frequency: 3 mL, Nebulization, Every 4 Hours PRN        When do you need the refill by: TODAY     What additional details did the patient provide when requesting the medication: PATIENT STATES THAT SHE IS COMPLETELY OUT. PATIENT STATES THAT SHE WILL NEED THE CHEAPER BRAND INHALER.    Does the patient have less than a 3 day supply:  [x] Yes  [] No    What is the patient's preferred pharmacy:      [facility] #[id number] [place] [address] AT [facility] [phone] [facility] [phone] FX

## 2021-03-24 NOTE — TELEPHONE ENCOUNTER
[facility] called asking if you could add in your last office note that the patient needs a neb machine. I faxed over the office note from 12/2/20 that has documentation of COPD ect but they need more information.

## 2021-03-25 NOTE — TELEPHONE ENCOUNTER
PATIENT'S FRIEND, HEATHER, STATES THAT SHE IS OUT OF THE MEDICINE AND WOULD LIKE TO SEE IF THESE CAN BE CALLED IN TODAY.  SHE CAN BE REACHED -[phone]

## 2021-03-25 NOTE — TELEPHONE ENCOUNTER
CALLER IS REQUESTING A STATEMENT TO HELP WITH THE PATIENT'S UTILITY BILLS. SHE STATES THAT THE PATIENT CAN'T WALK TO HER CAR WITH OUT RUNNING OUT OF BREATH AND SHE IS UNABLE TO GET AROUND AT THIS TIME WITH OUT HER OXYGEN. VERONIQUE WOULD LIKE TO KNOW IF THIS WILL BE POSSIBLE TO .     [phone]

## 2021-03-26 PROBLEM — N39.0 UTI (URINARY TRACT INFECTION): Status: ACTIVE | Noted: 2021-01-01

## 2021-03-26 PROBLEM — N30.00 ACUTE CYSTITIS WITHOUT HEMATURIA: Status: ACTIVE | Noted: 2021-01-01

## 2021-03-26 PROBLEM — A49.9 UTI (URINARY TRACT INFECTION), BACTERIAL: Status: ACTIVE | Noted: 2020-08-29

## 2021-03-26 PROBLEM — S32.10XA CLOSED SACRAL FRACTURE (HCC): Status: ACTIVE | Noted: 2021-01-01

## 2021-03-26 NOTE — TELEPHONE ENCOUNTER
Unable to reach pt. Phone kept ringing and then couldn't be completed as dialed. Tried twice. Will try again.

## 2021-03-26 NOTE — TELEPHONE ENCOUNTER
Caretaker of patient (Anel) states that the only working phone number in the house is [phone].    Patient was admitted today into [facility].    Please call Anel.    Gas is off  Electricity is on but that is next.    [phone] Anel

## 2021-03-26 NOTE — TELEPHONE ENCOUNTER
Talked to Anel (pt's daughter) and relayed to her to reach out to the gas and electric company and they can get a form from them that Dr. Lemon can fill out in the meant time until we hear back from case management.

## 2021-03-26 NOTE — TELEPHONE ENCOUNTER
I have placed a referral to case management to write letter and help pt w/ assistance for this matter. Can you please see if there are any updates on the referral.

## 2021-03-26 NOTE — TELEPHONE ENCOUNTER
PATIENT'S FRIEND, HEATHER, STATES THAT SHE IS OUT OF THIS MEDICATION AND WOULD LIKE TO SEE IF THIS CAN BE CALLED IN AS SOON AS POSSIBLE.  SHE CAN BE REACHED [phone]

## 2021-03-27 PROBLEM — J96.11 CHRONIC RESPIRATORY FAILURE WITH HYPOXIA (HCC): Status: ACTIVE | Noted: 2021-01-01

## 2021-03-27 PROBLEM — A41.9 SEPSIS ASSOCIATED HYPOTENSION (HCC): Status: ACTIVE | Noted: 2021-01-01

## 2021-03-27 PROBLEM — I95.9 SEPSIS ASSOCIATED HYPOTENSION (HCC): Status: ACTIVE | Noted: 2021-01-01

## 2021-03-28 PROBLEM — E44.0 MODERATE MALNUTRITION (HCC): Status: ACTIVE | Noted: 2021-01-01

## 2021-03-29 PROBLEM — A41.9 SEPSIS ASSOCIATED HYPOTENSION (HCC): Status: RESOLVED | Noted: 2021-01-01 | Resolved: 2021-01-01

## 2021-03-29 PROBLEM — S32.10XA CLOSED SACRAL FRACTURE (HCC): Status: RESOLVED | Noted: 2021-01-01 | Resolved: 2021-01-01

## 2021-03-29 PROBLEM — N39.0 UTI (URINARY TRACT INFECTION), BACTERIAL: Status: RESOLVED | Noted: 2020-08-29 | Resolved: 2021-01-01

## 2021-03-29 PROBLEM — I95.9 SEPSIS ASSOCIATED HYPOTENSION (HCC): Status: RESOLVED | Noted: 2021-01-01 | Resolved: 2021-01-01

## 2021-03-29 PROBLEM — A49.9 UTI (URINARY TRACT INFECTION), BACTERIAL: Status: RESOLVED | Noted: 2020-08-29 | Resolved: 2021-01-01

## 2021-03-29 PROBLEM — N30.00 ACUTE CYSTITIS WITHOUT HEMATURIA: Status: RESOLVED | Noted: 2021-01-01 | Resolved: 2021-01-01

## 2021-03-29 NOTE — OUTREACH NOTE
Prep Survey      Responses   [facility] patient discharged from?  [place]   Is LACE score < 7 ?  No   Emergency Room discharge w/ pulse ox?  No   Eligibility  TCM   [facility]   Date of Admission  03/26/21   Date of Discharge  03/29/21   Discharge Disposition  Home or Self Care   Discharge diagnosis  sepsis d/t UTI & acute cystitis, fall with left sacral fracture, recent left humerus fracture   Does the patient have one of the following disease processes/diagnoses(primary or secondary)?  Sepsis   Does the patient have Home health ordered?  Yes   What is the [facility] health agency?   [facility]   Is there a DME ordered?  Yes   What DME was ordered?  Has O2 from [employer].  Pt has issues with utilities. Assess that she has electricity for her O2   Prep survey completed?  Yes          Ninfa Chan RN

## 2021-03-30 NOTE — OUTREACH NOTE
Call Center TCM Note      Responses   [facility] patient discharged from?  [place]   Does the patient have one of the following disease processes/diagnoses(primary or secondary)?  Sepsis   TCM attempt successful?  Yes   Call start time  1548   Call end time  1550   Discharge diagnosis  sepsis d/t UTI & acute cystitis, fall with left sacral fracture, recent left humerus fracture   Is patient permission given to speak with other caregiver?  Yes   List who call center can speak with  daughter- Anel   Person spoke with today (if not patient) and relationship  daughter- Anel   Does the patient have all medications related to this admission filled (includes all antibiotics, inhalers, nebulizers,steroids,etc.)  Yes   Is the patient taking all medications as directed (includes completed medication regime)?  Yes   Does the patient have a primary care provider?   Yes   Comments regarding PCP  f/u with Dr. Lemon on 4/1 at 9:15 am   Does the patient have an appointment with their PCP within 7 days of discharge?  Yes   Has the patient kept scheduled appointments due by today?  N/A   What is the [facility] health agency?   [facility]   Has home health visited the patient within 72 hours of discharge?  Yes   What DME was ordered?  Has O2 from [employer].  Pt has issues with utilities. Assess that she has electricity for her O2   Has all DME been delivered?  Yes   Psychosocial issues?  No   Did the patient receive a copy of their discharge instructions?  Yes   Nursing interventions  Reviewed instructions with patient   What is the patient's perception of their health status since discharge?  Improving   Nursing interventions  Nurse provided patient education   Is the patient/caregiver able to teach back Sepsis?  S - Shivering,fever or very cold, E - Extreme pain or generalized discomfort (worst ever,especially abdomen), P - Pale or discolored skin, S - Sleepy, difficult to arouse,confused, I -   I feel like I might die-a  feeling of hopelessness, S - Short of breath   Is the patient/caregiver able to teach back the hierarchy of who to call/visit for symptoms/problems? PCP, Specialist, Home health nurse, Urgent Care, ED, 911  Yes   TCM call completed?  Yes   Wrap up additional comments  Per daughter, patient is doing well, confirmed f/u for 4/1, no questions or concerns at this time.          Angelica Watson RN    3/30/2021, 15:51 EDT

## 2021-03-30 NOTE — OUTREACH NOTE
Call Center TCM Note      Responses   [facility] patient discharged from?  [place]   Does the patient have one of the following disease processes/diagnoses(primary or secondary)?  Sepsis   TCM attempt successful?  No   Unsuccessful attempts  Attempt 1          Angelica Watson RN    3/30/2021, 14:08 EDT

## 2021-04-01 NOTE — PROGRESS NOTES
Transitional Care Follow Up Visit  Subjective     An Abreu is a 70 y.o. female who presents for a transitional care management visit.    Within 48 business hours after discharge our office contacted her via telephone to coordinate her care and needs.      I reviewed and discussed the details of that call along with the discharge summary, hospital problems, inpatient lab results, inpatient diagnostic studies, and consultation reports with An.     Current outpatient and discharge medications have been reconciled for the patient.  Reviewed by: Zabrina Lemon MD      Date of TCM Phone Call 3/29/2021   [facility]   Date of Admission 3/26/2021   Date of Discharge 3/29/2021   Discharge Disposition Home or Self Care     Risk for Readmission (LACE) Score: 9 (3/29/2021  6:00 AM)      History of Present Illness   Course During Hospital Stay:  Admitted for left sacral fracture s/p fall and UTI. D/c'd home on ceftin. Had low mg that was replaced. Has HH PT for fractures.   Mag 2.3 on 3/28.  Today her BP is elevated. Has not taken any medications this morning b/c she work up late for appointment.   States yesterday had about 3 seizures but did not have it, on further elaboration she states she was having spasms when taking her medications.      Requesting multiple orders for home oxygen supplies.     Still smoking. 1 pack will last her 2 days. She has cut back.    She is using daughters oxygen supplies. She needs a nebulizer, o2 tank, o2 concentrator and back pack for oxygen tank.     The following portions of the patient's history were reviewed and updated as appropriate: allergies, current medications, past family history, past medical history, past social history, past surgical history and problem list.    Review of Systems    Objective   Physical Exam    Assessment/Plan   Diagnoses and all orders for this visit:    1. Acute cystitis without hematuria (Primary)  Improved, still has abx left to  complete    2. Closed fracture of sacrum with routine healing, unspecified portion of sacrum, subsequent encounter  Has palliative medicine following for opioids.     3. Essential hypertension  Elevated today b/c she did not take her BP, close f/u scheduled to repeat BP  4. Hypomagnesemia  -     potassium chloride (K-DUR,KLOR-CON) 20 MEQ CR tablet; Take 1 tablet by mouth Daily. Patient takes potassium with Lasix  Dispense: 30 tablet; Refill: 2    5. Gastroesophageal reflux disease without esophagitis  -     clopidogrel (PLAVIX) 75 MG tablet; Take 1 tablet by mouth Daily.  Dispense: 30 tablet; Refill: 5    6. Coronary artery disease involving native heart without angina pectoris, unspecified vessel or lesion type  -     clopidogrel (PLAVIX) 75 MG tablet; Take 1 tablet by mouth Daily.  Dispense: 30 tablet; Refill: 5  -     isosorbide mononitrate (IMDUR) 60 MG 24 hr tablet; Take 1 tablet by mouth Daily.  Dispense: 30 tablet; Refill: 3    7. Tobacco abuse  Discussed tobacco cessation.   8. COPD mixed type (CMS/HCC)  -     Oxygen Therapy    Other orders  -     furosemide (LASIX) 40 MG tablet; Take 1 tablet by mouth Daily.  Dispense: 30 tablet; Refill: 2  -     Dilt- MG 24 hr capsule; Take 1 capsule by mouth Daily.  Dispense: 30 capsule; Refill: 3

## 2021-04-01 NOTE — TELEPHONE ENCOUNTER
Caller: YANA JOSEPH    Relationship: HOME HEALTH NURSE    Best call back number: [phone]    What is the best time to reach you: ANYTIME    Who are you requesting to speak with (clinical staff, provider,  specific staff member): PCP OR MA    Do you know the name of the person who called:     What was the call regarding: PATIENT APPOINTMENT FROM TODAY ALONG WITH DISCUSSING HER MEDICATIONS    Do you require a callback: YES

## 2021-04-01 NOTE — TELEPHONE ENCOUNTER
Spoke with suzie from home health care. Pt is needing portable oxygen and a concentrator sent to [facility].

## 2021-04-06 NOTE — OUTREACH NOTE
Sepsis Week 2 Survey      Responses   [facility] patient discharged from?  [place]   Does the patient have one of the following disease processes/diagnoses(primary or secondary)?  Sepsis   Week 2 attempt successful?  No   Unsuccessful attempts  Attempt 1          Kayleigh Walker RN

## 2021-04-07 NOTE — OUTREACH NOTE
Sepsis Week 2 Survey      Responses   [facility] patient discharged from?  [place]   Does the patient have one of the following disease processes/diagnoses(primary or secondary)?  Sepsis   Week 2 attempt successful?  No   Unsuccessful attempts  Attempt 2          Sindhu Bethea LPN

## 2021-04-07 NOTE — OUTREACH NOTE
Patient Outreach Note    SW attempted to reach pt. Pt's caregiver answered and stated that now was not a good time for pt to talk. Caregiver asked if SW could call back later.     IZZY Aragon  Ambulatory     4/7/2021, 11:21 EDT

## 2021-04-08 NOTE — OUTREACH NOTE
"Patient Outreach Note    SW reached out to pt to follow up on referral. Pt's caregiver answered the phone. Caregiver stated, \" This is her daughter, how can I help you\". RAMSEY asked pt's caregiver if pt needed any resources. Caregiver stated, \" Yes, she can use help with her utility bills\". SW asked caregiver if she has tried calling [employer]. Caregiver stated, \" Yes we have already tried that and they can only help one time a year and we have used them this year. Do you know of any other places that could help\". RAMSEY told pt's caregiver that she will mail them a Community Resource Guide that has more information on resources for utility assistance. Caregiver stated, \" Oh that would be helpful. Thank you\".     IZZY Aragon  Ambulatory     4/8/2021, 11:35 EDT      "

## 2021-04-12 NOTE — TELEPHONE ENCOUNTER
Patient caregiver called stating that the patient is having chest congestion w/ a lot of wheezing and  feeling weakness.  Patient bp was 88/48 w/ pulse of 85. PT advised  To go to ER to get evaluated.

## 2021-04-15 NOTE — PROGRESS NOTES
Office Note      Date: 04/15/2021  Patient Name: An Abreu  MRN: [id number]  : 1950    Chief Complaint   Patient presents with   • Hypertension       History of Present Illness: An Abreu is a 70 y.o. female who presents for Hypertension. Only took ativan this morning. Eating more. Feels well. Needs refill of albuterol neb.     Subjective      Review of Systems:   Pertinent review of systems per HPI.    Review of Systems   Constitutional: Negative for activity change, appetite change, chills, diaphoresis, fatigue, fever and unexpected weight change.   HENT: Negative for congestion, dental problem, drooling, ear discharge, ear pain, facial swelling, hearing loss and mouth sores.    Eyes: Negative for pain, discharge and itching.   Respiratory: Negative for apnea, cough, choking, chest tightness and shortness of breath.    Cardiovascular: Negative for chest pain, palpitations and leg swelling.   Gastrointestinal: Negative for abdominal distention, abdominal pain, blood in stool, constipation and diarrhea.   Endocrine: Negative for cold intolerance, heat intolerance, polydipsia and polyuria.   Genitourinary: Negative for difficulty urinating, dysuria, frequency and hematuria.   Skin: Negative for color change, pallor, rash and wound.   Allergic/Immunologic: Negative for environmental allergies, food allergies and immunocompromised state.   Neurological: Negative for dizziness, weakness and light-headedness.   Psychiatric/Behavioral: Negative for agitation, behavioral problems, confusion, decreased concentration and self-injury. The patient is not nervous/anxious.    All other systems reviewed and are negative.    Allergies   Allergen Reactions   • Keflex [Cephalexin] Shortness Of Breath and Rash     Patients power of  states patient had to be taken to ER due to reaction.    Tolerated Rocephin 21   • Sulfamethoxazole-Trimethoprim Shortness Of Breath and Rash     Patients power  "of  stated patient had to be taken to ER due to reaction.   • Carbamazepine    • Codeine        Objective     Physical Exam:  Vital Signs:   Vitals:    04/15/21 0828   BP: 140/92   Pulse: 94   Resp: 18   Temp: 92 °F (33.3 °C)   TempSrc: Temporal   SpO2: 99%   Weight: 37.2 kg (82 lb)   Height: 144.8 cm (57\")      Body mass index is 17.74 kg/m².    Physical Exam  Vitals and nursing note reviewed.   Constitutional:       General: She is not in acute distress.     Appearance: She is well-developed.   HENT:      Head: Normocephalic and atraumatic.      Right Ear: External ear normal.      Left Ear: External ear normal.   Eyes:      General: No scleral icterus.        Right eye: No discharge.         Left eye: No discharge.      Conjunctiva/sclera: Conjunctivae normal.   Cardiovascular:      Rate and Rhythm: Normal rate and regular rhythm.      Heart sounds: Normal heart sounds. No murmur heard.   No friction rub. No gallop.    Pulmonary:      Effort: Pulmonary effort is normal. No respiratory distress.      Breath sounds: Normal breath sounds. No wheezing or rales.   Skin:     General: Skin is warm and dry.      Coloration: Skin is not pale.         Assessment / Plan      Assessment & Plan:    1. Essential hypertension  BP improved from last visit. Advised to take BP meds before OV. F/u in 3 months.     2. Pulmonary emphysema, unspecified emphysema type (CMS/[facility])  Refilled albuterol neb.       Zabrina Lemon MD  04/15/2021     Please note that portions of this note may have been completed with a voice recognition program. Efforts were made to edit the dictations, but occasionally words are mistranscribed.  "

## 2021-04-15 NOTE — TELEPHONE ENCOUNTER
I spoke with [facility], was advised they attempted to call the PT 3x to  neb machine with no answer or return call. After 3 attempt calls they close out the order. The number on file is the number we have in her chart. She also stated if her Oxygen isn't below 88% she isn't eligible for portal oxygen. I will try to contact her.

## 2021-04-15 NOTE — OUTREACH NOTE
Sepsis Week 3 Survey      Responses   [facility] patient discharged from?  [place]   Does the patient have one of the following disease processes/diagnoses(primary or secondary)?  Sepsis   Week 3 attempt successful?  Yes   Call start time  1710   Call end time  1711   Discharge diagnosis  sepsis d/t UTI & acute cystitis, fall with left sacral fracture, recent left humerus fracture   Is patient permission given to speak with other caregiver?  Yes   List who call center can speak with  daughter- Anel   Person spoke with today (if not patient) and relationship  daughter- Anel   Meds reviewed with patient/caregiver?  Yes   Is the patient having any side effects they believe may be caused by any medication additions or changes?  No   Does the patient have all medications related to this admission filled (includes all antibiotics, inhalers, nebulizers,steroids,etc.)  Yes   Is the patient taking all medications as directed (includes completed medication regime)?  Yes   Does the patient have a primary care provider?   Yes   Does the patient have an appointment with their PCP within 7 days of discharge?  Yes   Has the patient kept scheduled appointments due by today?  Yes   What is the [facility] health agency?   [facility]   Has home health visited the patient within 72 hours of discharge?  Yes   Psychosocial issues?  No   Did the patient receive a copy of their discharge instructions?  Yes   Nursing interventions  Reviewed instructions with patient   What is the patient's perception of their health status since discharge?  Improving   Nursing interventions  Nurse provided patient education   Is the patient/caregiver able to teach back Sepsis?  S - Shivering,fever or very cold, E - Extreme pain or generalized discomfort (worst ever,especially abdomen), P - Pale or discolored skin, S - Sleepy, difficult to arouse,confused, I -   I feel like I might die-a feeling of hopelessness, S - Short of breath   Nursing  interventions  Nurse provided reassurance to patient   Is patient/caregiver able to teach back steps to recovery at home?  Set small, achievable goals for return to baseline health, Rest and regain strength   Is the patient/caregiver able to teach back signs and symptoms of worsening condition:  Fever, Hyperthermia   If the patient is a current smoker, are they able to teach back resources for cessation?  Smoking cessation medications   Is the patient/caregiver able to teach back the hierarchy of who to call/visit for symptoms/problems? PCP, Specialist, Home health nurse, Urgent Care, ED, 911  Yes   Week 3 call completed?  Yes          Jose Elias Cates RN

## 2021-04-16 NOTE — TELEPHONE ENCOUNTER
Caller: RYAN    Relationship: PHYSICAL THERAPIST WITH [facility]    Best call back number: [phone]    Do you require a callback: NO. RYAN WANTED TO LET DR. FERRELL KNOW THE PATIENT HAS NOT BEEN SEEN THIS WEEK FOR PHYSICAL THERAPY AND IS VERY DIFFICULT TO GET IN TOUCH WITH. RYAN STATED HE IS GOING TO ATTEMPT TO SEE THE PATIENT ON 4/17/21 BUT IF SHE IS UNABLE TO BE REACHED THIS WILL RESULT IN A SECOND MISSED VISIT.

## 2021-04-23 NOTE — OUTREACH NOTE
Sepsis Week 4 Survey      Responses   [facility] patient discharged from?  [place]   Does the patient have one of the following disease processes/diagnoses(primary or secondary)?  Sepsis   Week 4 attempt successful?  No          Gricelda Fernandez RN

## 2021-05-03 PROBLEM — R77.8 ELEVATED TROPONIN: Status: ACTIVE | Noted: 2021-01-01

## 2021-05-03 PROBLEM — R79.89 ELEVATED TROPONIN: Status: ACTIVE | Noted: 2021-01-01

## 2021-05-03 PROBLEM — E16.2 HYPOGLYCEMIA: Status: ACTIVE | Noted: 2021-01-01

## 2021-05-04 PROBLEM — G93.41 METABOLIC ENCEPHALOPATHY: Status: ACTIVE | Noted: 2021-01-01

## 2021-05-07 NOTE — OUTREACH NOTE
Prep Survey      Responses   [facility] patient discharged from?  [place]   Is LACE score < 7 ?  No   Emergency Room discharge w/ pulse ox?  No   Eligibility  [facility]   Date of Admission  05/03/21   Date of Discharge  05/06/21   Discharge Disposition  Home or Self Care   Discharge diagnosis  Hypoglycemia Metabolic encephalopathy Chronic obstructive pulmonary disease with acute exacerbation    Does the patient have one of the following disease processes/diagnoses(primary or secondary)?  COPD/Pneumonia   Does the patient have Home health ordered?  Yes   [facility] agency?   [facility]     Is there a DME ordered?  No   Prep survey completed?  Yes          Kayleigh Smith RN

## 2021-05-07 NOTE — OUTREACH NOTE
Call Center TCM Note      Responses   [facility] patient discharged from?  [place]   Does the patient have one of the following disease processes/diagnoses(primary or secondary)?  COPD/Pneumonia   Was the primary reason for admission:  COPD exacerbation   TCM attempt successful?  No   Unsuccessful attempts  Attempt 1 [Stacie Ward on release but no phone number listed]          Jose Elias Cates RN    5/7/2021, 12:27 EDT

## 2021-05-07 NOTE — OUTREACH NOTE
Call Center TCM Note      Responses   [facility] patient discharged from?  [place]   Does the patient have one of the following disease processes/diagnoses(primary or secondary)?  COPD/Pneumonia   Was the primary reason for admission:  COPD exacerbation   TCM attempt successful?  No   Unsuccessful attempts  Attempt 2          Jose Elias Cates RN    5/7/2021, 13:25 EDT

## 2021-05-14 NOTE — OUTREACH NOTE
COPD/PN Week 2 Survey      Responses   [facility] patient discharged from?  [place]   Does the patient have one of the following disease processes/diagnoses(primary or secondary)?  COPD/Pneumonia   Was the primary reason for admission:  COPD exacerbation   Week 2 attempt successful?  No   Unsuccessful attempts  Attempt 1          Sujatha Louie RN

## 2021-05-18 NOTE — OUTREACH NOTE
COPD/PN Week 2 Survey      Responses   [facility] patient discharged from?  [place]   Does the patient have one of the following disease processes/diagnoses(primary or secondary)?  COPD/Pneumonia   Was the primary reason for admission:  COPD exacerbation [hypoglycemia]   Week 2 attempt successful?  No   Unsuccessful attempts  Attempt 2          Kayleigh Walker RN

## 2021-05-20 NOTE — TELEPHONE ENCOUNTER
Caller: STEFANIA CONTI    Relationship: DAUGHTER    Best call back number: [phone]    What medication are you requesting: PROMETHAZINE, COUGH MEDICATION    What are your current symptoms: PERSISTENT COUGH AND VOMITING    How long have you been experiencing symptoms: 9 DAYS    Have you had these symptoms before:    [x] Yes  [] No    Have you been treated for these symptoms before:   [x] Yes  [] No    If a prescription is needed, what is your preferred pharmacy and phone number:      [facility] [address] [facility] [phone] [facility] [phone]     Additional notes: PATIENT HAD HER 2ND COVID SHOT ON 5/11/21 PATIENT HAS BEEN ILL SINCE THEN. DAUGHTER IS REQUESTING THAT HER MOTHER BE PRESCRIBED SOMETHING TO AIDE HER WITH THE PERSISTENT COUGH AND VOMITING. ADDITIONALLY, PATIENT HAS BEEN TAKING THE ZOFRAN AND IT DOES NOT SEEM TO BE WORKING.       PATIENT HAS BEEN ADVISED TO ALLOW 48 HOURS FOR THE CLINICAL TEAM TO FOLLOW UP ON THIS REQUEST,  IF SYMPTOMS WORSENS TO SEEK OUT EMERGENT CARE. PATIENT  FULLY UNDERSTANDS.

## 2021-05-22 PROBLEM — A41.9 SEVERE SEPSIS (HCC): Status: ACTIVE | Noted: 2021-01-01

## 2021-05-22 PROBLEM — R65.20 SEVERE SEPSIS (HCC): Status: ACTIVE | Noted: 2021-01-01

## 2021-05-22 NOTE — Clinical Note
Level of Care: Telemetry [5]   Diagnosis: Severe sepsis (CMS/[facility]) [[id number]]   Admitting Physician: NACHO RODRIGUEZ [[id number]]   Attending Physician: NACHO RODRIGUEZ [[id number]]

## 2021-05-22 NOTE — ED PROVIDER NOTES
Subjective   Patient presents the ER with a fever of 102 with nausea vomiting diarrhea abdominal pain cough and shortness of breath.  Onset yesterday.  She does have a history of COPD and is typically on home oxygen.  She denies any bloody vomit or bloody stools.  She was admitted to the hospital several months ago for hip fracture. She had her 2nd Covid shot 5/11.      Fever  Max temp prior to arrival:  102  Temp source:  Oral  Severity:  Moderate  Onset quality:  Gradual  Duration:  2 days  Timing:  Constant  Progression:  Unchanged  Chronicity:  New  Relieved by:  Nothing  Worsened by:  Nothing  Associated symptoms: congestion, cough, diarrhea, nausea and vomiting    Associated symptoms: no chest pain, no chills, no dysuria and no sore throat        Review of Systems   Constitutional: Positive for fever. Negative for chills and diaphoresis.   HENT: Positive for congestion. Negative for sore throat.    Respiratory: Positive for cough. Negative for choking, chest tightness, shortness of breath and wheezing.    Cardiovascular: Negative for chest pain and leg swelling.   Gastrointestinal: Positive for diarrhea, nausea and vomiting. Negative for abdominal distention, abdominal pain, anal bleeding, blood in stool and constipation.   Genitourinary: Negative for difficulty urinating, dysuria, flank pain, frequency, hematuria and urgency.   All other systems reviewed and are negative.      Past Medical History:   Diagnosis Date   • Aneurysm (CMS/[facility])    • Angina pectoris (CMS/[facility]) 6/20/2016   • Anxiety 6/20/2016   • Arthritis 6/20/2016   • CAD (coronary artery disease)    • Carotid artery stenosis    • CHF (congestive heart failure) (CMS/[facility])    • Chronic coronary artery disease 6/20/2016 2003 CABG LIMA to LAD, VG to OM 2004 VG to OM occluded. LIMA patent Cx intervention and RCA 2008 stent for ISR 2013 ISR and stenting of CX and RCA 2016 normal MPS   • Chronic left-sided low back pain with left-sided sciatica 2/1/2017    • Chronic obstructive pulmonary disease (CMS/[facility]) 6/20/2016   • Chronic respiratory failure with hypoxia (CMS/[facility]) 3/27/2021   • Cobalamin deficiency 6/20/2016   • Congestive heart failure (CMS/[facility]) 6/20/2016   • COPD (chronic obstructive pulmonary disease) (CMS/[facility])    • Depression 7/3/2018   • Diverticulosis    • Diverticulosis of large intestine without hemorrhage 5/5/2017   • GERD (gastroesophageal reflux disease)    • GIB (gastrointestinal bleeding)     recurrent    • HTN (hypertension)    • Hypercholesterolemia 6/20/2016   • Hyperlipidemia    • Mesenteric ischemia (CMS/[facility]) 2006    s/p resection    • Mood disorder (CMS/[facility])    • Oxygen dependent     3 liters @ all times.    • PAF (paroxysmal atrial fibrillation) (CMS/[facility])    • Paroxysmal atrial fibrillation (CMS/[facility]) 8/7/2017   • PFO (patent foramen ovale)    • PVD (peripheral vascular disease) (CMS/[facility])    • Restless legs syndrome 6/20/2016   • Seizure disorder (CMS/[facility]) 6/20/2016   • Seizures (CMS/[facility])    • Stenosis of carotid artery 6/20/2016   • Vertigo 9/28/2016       Allergies   Allergen Reactions   • Keflex [Cephalexin] Shortness Of Breath and Rash     Patients power of  states patient had to be taken to ER due to reaction.    Tolerated Rocephin 2/19/21   • Sulfamethoxazole-Trimethoprim Shortness Of Breath and Rash     Patients power of  stated patient had to be taken to ER due to reaction.   • Carbamazepine    • Codeine        Past Surgical History:   Procedure Laterality Date   • APPENDECTOMY     • CHOLECYSTECTOMY     • COLOSTOMY  2006    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   • EXPLORATORY LAPAROTOMY      sec to ovarian cysts   • HYSTERECTOMY     • ILIAC ARTERY STENT     • REVISION / TAKEDOWN COLOSTOMY  2008       Family History   Problem Relation Age of Onset   • Arthritis Mother    • Cancer Mother    • Heart attack Father    • Hypertension Father    • Hyperlipidemia Father    • Stroke Father    • Heart disease Brother         CAD   • Heart  disease Child         CAD   • Heart disease Child         CAD       Social History     Socioeconomic History   • Marital status:      Spouse name: Not on file   • Number of children: Not on file   • Years of education: Not on file   • Highest education level: Not on file   Tobacco Use   • Smoking status: Current Every Day Smoker     Packs/day: 1.00     Years: 40.00     Pack years: 40.00     Types: Cigarettes, Electronic Cigarette   • Smokeless tobacco: Never Used   • Tobacco comment: <0.5ppd    Substance and Sexual Activity   • Alcohol use: Never   • Drug use: Never   • Sexual activity: Defer           Objective   Physical Exam  Constitutional:       General: She is in acute distress.      Appearance: She is well-developed. She is ill-appearing.   HENT:      Head: Normocephalic and atraumatic.      Right Ear: External ear normal.      Left Ear: External ear normal.      Nose: Nose normal.   Eyes:      Conjunctiva/sclera: Conjunctivae normal.      Pupils: Pupils are equal, round, and reactive to light.   Cardiovascular:      Rate and Rhythm: Normal rate and regular rhythm.      Heart sounds: Normal heart sounds.   Pulmonary:      Effort: Pulmonary effort is normal.      Breath sounds: Normal breath sounds.   Abdominal:      General: Bowel sounds are normal.      Palpations: Abdomen is soft.      Tenderness: There is abdominal tenderness.   Musculoskeletal:         General: Normal range of motion.      Cervical back: Normal range of motion and neck supple.   Skin:     General: Skin is warm and dry.   Neurological:      Mental Status: She is alert and oriented to person, place, and time.   Psychiatric:         Behavior: Behavior normal.         Thought Content: Thought content normal.         Judgment: Judgment normal.         Procedures           ED Course  ED Course as of May 22 1833   Sat May 22, 2021   1755 Awaiting CT readings    [MARVIN]   [id number] Hospitalist paged    [MARVIN]   [id number] Ua pending. Nurse to cath.      [JM]      ED Course User Index  [JM] Santosh Queen, SIVA                                           [facility]    Final diagnoses:   SIRS (systemic inflammatory response syndrome) (CMS/[facility])   Pneumonia due to infectious organism, unspecified laterality, unspecified part of lung   Leukocytosis, unspecified type   Acute abdominal pain   Fever in adult       ED Disposition  ED Disposition     ED Disposition Condition Comment    Decision to Admit  Level of Care: Telemetry [5]   Diagnosis: Severe sepsis (CMS/[facility]) [[id number]]   Admitting Physician: NACHO RODRIGUEZ [[id number]]   Attending Physician: NACHO RODRIGUEZ [[id number]]   Isolate for COVID?: No [0]   Certification: I Certify That Inpatient Hospital Services Are Medically Necessary For Greater Than 2 Midnights            No follow-up provider specified.       Medication List      No changes were made to your prescriptions during this visit.          Santosh Queen, SIVA  05/22/21 1835

## 2021-05-22 NOTE — H&P
[facility]  HISTORY AND PHYSICAL    Patient Name: An Abreu  : 1950  MRN: [id number]  Primary Care Physician: Zabrina Lemon MD  Date of admission: 2021    Subjective   Subjective     Chief Complaint:  SOB    HPI:  An Abreu is a 70 y.o. female with history of COPD on chronic 4 L of oxygen, paroxysmal atrial fibrillation, malnutrition, who presented to the ED with fever, N/V/D and shortness of breath.  Patient was recently admitted from 5/3/2021 to 2021 for lethargy, hypoglycemia, hypercapnic respiratory failure.  Patient reports that her symptoms started after getting her second Covid vaccine on May 11.  On that day she started having nausea vomiting.  In the second and third days she started having shortness of breath that has been worsening over the past couple weeks and worse with exertion..  She also started coughing and her abdomen is sore from all of her coughing.  Is productive.  She states that on the fourth day she started having some fevers.  She denies any dysphagia.  She also has diarrhea that started about 6 days ago and happens a couple times a day.  Denies any bleeding.  She also reports vision changes that started about the fourth day after getting the vaccine and she just cannot see.  She also reports that she has been extremely weak and has been unable to ambulate secondary to this.  Reports that she has been eating okay.  She denies any chest pain. She believes that her covid vaccine is the source of her symptoms.    In the ED she was noted to have a fever of one 1.8 with some tachycardia.  She is on 4 L of oxygen which is her baseline.  Troponins were elevated, BNP was elevated.  CT chest concerning for left lower lobe pneumonia.  EKG showed T wave inversions.  Hospital medicine was called for admission.    History above was written and obtained by Dr. Mclain.      COVID Details:    Symptoms:    [] NONE [x] Fever  [x]  Cough [x] Shortness of breath [] Change in taste/smell      The patient qualifies to receive the vaccine, but they have not yet received it.  She received 2nd COVID 5/11/21    Review of Systems   Constitutional: Positive for chills, fatigue and fever. Negative for appetite change.   HENT: Negative for trouble swallowing.    Eyes: Negative for visual disturbance.   Respiratory: Positive for cough and shortness of breath.    Cardiovascular: Negative for chest pain and leg swelling.   Gastrointestinal: Positive for diarrhea, nausea and vomiting. Negative for abdominal pain.   Genitourinary: Negative for dysuria.   Musculoskeletal: Negative for back pain and myalgias.   Skin: Negative for rash.   Neurological: Positive for weakness.        All other systems reviewed and are negative.     Personal History     Past Medical History:   Diagnosis Date   • Aneurysm (CMS/HCC)    • Angina pectoris (CMS/HCC) 6/20/2016   • Anxiety 6/20/2016   • Arthritis 6/20/2016   • CAD (coronary artery disease)    • Carotid artery stenosis    • CHF (congestive heart failure) (CMS/[facility])    • Chronic coronary artery disease 6/20/2016 2003 CABG LIMA to LAD, VG to OM 2004 VG to OM occluded. LIMA patent Cx intervention and RCA 2008 stent for ISR 2013 ISR and stenting of CX and RCA 2016 normal MPS   • Chronic left-sided low back pain with left-sided sciatica 2/1/2017   • Chronic obstructive pulmonary disease (CMS/HCC) 6/20/2016   • Chronic respiratory failure with hypoxia (CMS/HCC) 3/27/2021   • Cobalamin deficiency 6/20/2016   • Congestive heart failure (CMS/HCC) 6/20/2016   • COPD (chronic obstructive pulmonary disease) (CMS/HCC)    • Depression 7/3/2018   • Diverticulosis    • Diverticulosis of large intestine without hemorrhage 5/5/2017   • GERD (gastroesophageal reflux disease)    • GIB (gastrointestinal bleeding)     recurrent    • HTN (hypertension)    • Hypercholesterolemia 6/20/2016   • Hyperlipidemia    • Mesenteric ischemia  (CMS/[facility])     s/p resection    • Mood disorder (CMS/[facility])    • Oxygen dependent     3 liters @ all times.    • PAF (paroxysmal atrial fibrillation) (CMS/[facility])    • Paroxysmal atrial fibrillation (CMS/HCC) 2017   • PFO (patent foramen ovale)    • PVD (peripheral vascular disease) (CMS/[facility])    • Restless legs syndrome 2016   • Seizure disorder (CMS/[facility]) 2016   • Seizures (CMS/[facility])    • Stenosis of carotid artery 2016   • Vertigo 2016       Past Surgical History:   Procedure Laterality Date   • APPENDECTOMY     • CHOLECYSTECTOMY     • COLOSTOMY      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   • EXPLORATORY LAPAROTOMY      sec to ovarian cysts   • HYSTERECTOMY     • ILIAC ARTERY STENT     • REVISION / TAKEDOWN COLOSTOMY         Family History: family history includes Arthritis in her mother; Cancer in her mother; Heart attack in her father; Heart disease in her brother, child, and child; Hyperlipidemia in her father; Hypertension in her father; Stroke in her father. Otherwise pertinent FHx was reviewed and unremarkable.  She reports that her sister was just diagnosed with colon cancer at age 43.  Her brother  approximately 1 month ago of cancer but she cannot say what kind.    Social History:  reports that she has been smoking cigarettes and electronic cigarette. She has a 40.00 pack-year smoking history. She has never used smokeless tobacco. She reports that she does not drink alcohol and does not use drugs.  Social History     Social History Narrative    Lives w/ her daughter. Ambulates w/ a walker. Independent w/ most ADLs.        Medications:  HYDROcodone-acetaminophen, LORazepam, PHENobarbital, albuterol, albuterol sulfate HFA, aspirin, citalopram, clopidogrel, dilTIAZem CD, esomeprazole, ferrous sulfate, guaiFENesin, ipratropium-albuterol, ondansetron, rosuvastatin, and umeclidinium-vilanterol    Allergies   Allergen Reactions   • Keflex [Cephalexin] Shortness Of Breath and Rash     Patients  power of  states patient had to be taken to ER due to reaction.    Tolerated Rocephin 2/19/21   • Sulfamethoxazole-Trimethoprim Shortness Of Breath and Rash     Patients power of  stated patient had to be taken to ER due to reaction.   • Carbamazepine    • Codeine        Objective   Objective     Vital Signs:   Temp:  [101.8 °F (38.8 °C)] 101.8 °F (38.8 °C)  Heart Rate:  [80-95] 80  Resp:  [24] 24  BP: (128-150)/(65-68) 150/67  Flow (L/min):  [4] 4    Physical Exam   Please see H&P attestation below by Dr. Mclain and she performed the complete physical exam at time of admission.      Results Reviewed:  I have personally reviewed most recent indicated data and agree with findings including:  [x]  Laboratory  [x]  Radiology  [x]  EKG/Telemetry  []  Pathology  []  Cardiac/Vascular Studies  [x]  Old records  []  Other:  Most pertinent findings include:    LAB RESULTS:      Lab 05/22/21  1500   WBC 15.05*   HEMOGLOBIN 12.2   HEMATOCRIT 38.7   PLATELETS 303   NEUTROS ABS 12.41*   IMMATURE GRANS (ABS) 0.07*   LYMPHS ABS 1.61   MONOS ABS 0.94*   EOS ABS 0.00   MCV 95.6   PROCALCITONIN 0.11   LACTATE 1.4         Lab 05/22/21  1500   SODIUM 131*   POTASSIUM 4.1   CHLORIDE 95*   CO2 25.0   ANION GAP 11.0   BUN 10   CREATININE 0.55*   GLUCOSE 72   CALCIUM 9.2   MAGNESIUM 1.8         Lab 05/22/21  1500   TOTAL PROTEIN 7.9   ALBUMIN 4.00   GLOBULIN 3.9   ALT (SGPT) 38*   AST (SGOT) 35*   BILIRUBIN 0.4   ALK PHOS 126*         Lab 05/22/21  1500   PROBNP 2,774.0*   TROPONIN T 0.050*       Brief Urine Lab Results  (Last result in the past 365 days)      Color   Clarity   Blood   Leuk Est   Nitrite   Protein   CREAT   Urine HCG        05/22/21 1823 Yellow Clear Negative Negative Negative 100 mg/dL (2+)         05/22/21 1823 Yellow Clear Negative Negative Negative 100 mg/dL (2+)             Microbiology Results (last 10 days)     Procedure Component Value - Date/Time    COVID PRE-OP / PRE-PROCEDURE SCREENING ORDER (NO  ISOLATION) - Swab, Nasopharynx [[id number]]  (Normal) Collected: 05/22/21 1511    Lab Status: Final result Specimen: Swab from Nasopharynx Updated: 05/22/21 1542    Narrative:      The following orders were created for panel order COVID PRE-OP / PRE-PROCEDURE SCREENING ORDER (NO ISOLATION) - Swab, Nasopharynx.  Procedure                               Abnormality         Status                     ---------                               -----------         ------                     COVID-19 and FLU A/B PCR...[[id number]]  Normal              Final result                 Please view results for these tests on the individual orders.    COVID-19 and FLU A/B PCR - Swab, Nasopharynx [[id number]]  (Normal) Collected: 05/22/21 1511    Lab Status: Final result Specimen: Swab from Nasopharynx Updated: 05/22/21 1542     COVID19 Not Detected     Influenza A PCR Not Detected     Influenza B PCR Not Detected          CT Abdomen Pelvis With Contrast    Result Date: 5/22/2021  EXAMINATION: CT ANGIOGRAM CHEST, CT ABDOMEN/PELVIS W CONTRAST - 05/22/2021  INDICATION: Pulmonary embolus. Shortness of air. Fever.  TECHNIQUE: CT angiogram chest with intravenous contrast administration with 2D reconstructions performed, CT abdomen and pelvis with intravenous contrast.  The radiation dose reduction device was turned on for each scan per the ALARA (As Low as Reasonably Achievable) protocol.  COMPARISON: CT chest dated 03/28/2021  FINDINGS:  CTA CHEST: Thyroid is homogeneous in attenuation. No bulky mediastinal adenopathy. Central airways are patent. Esophagus in normal course and caliber. Atherosclerotic nonaneurysmal thoracic aorta. Satisfactory opacification and contrast bolus timing of the pulmonary arterial tree for evaluation without filling defect to suggest pulmonary embolus. Pulmonary arterial enlargement measuring 4 cm at the level of the main pulmonary artery of pulmonary arterial hypertension involvement and pruning of the central  pulmonary arterial tree. Cardiac size borderline enlarged status post median sternotomy and CABG without pericardial effusion. Extended lung windows reveal biapical pleural-parenchymal scarring with mild centrilobular emphysema in the upper lungs as well as mild central bronchiectasis. Compared to 03/20/2021, there has been interval development of confluent opacifications in the left lower lung of acute airspace disease or bronchopneumonia approaching soft tissue density although likely dense airspace disease given interval progression timing with resolution of pleural effusions from prior. Degenerative changes of the thoracic spine without aggressive osseous findings demonstrating stable compression deformities of the T11 and T12 levels. No soft tissue body wall findings of acuity.  CT ABDOMEN AND PELVIS: Liver without focal lesion. Gallbladder surgically absent. No biliary dilatation. Pancreas and spleen unremarkable. Adrenals demonstrate a left adrenal nodule measuring 14 mm with low-attenuation of lipid rich adenoma likely. Kidneys without hydronephrosis or hydroureter demonstrating simple appearing bilateral renal cortical cysts. No bulky retroperitoneal adenopathy. Atherosclerotic nonaneurysmal patent abdominal aorta with patent celiac and SMA origins. Portal veins and IVC patent. GI tract evaluation without focal thickening or disproportionate dilatation of bowel demonstrating postsurgical changes in the lower mid abdomen stable from prior. No free fluid or intra-abdominal free air with artifact from metallic density overlying the left suprapubic region. Severely distended urinary bladder with mild circumferential wall thickening of the bladder, however, no acute inflammatory process. Distal colonic segments unremarkable. Postsurgical changes in the pelvis without free fluid or bulky pelvic adenopathy. Degenerative changes of the spine and pelvis without aggressive osseous lesion. No soft tissue body wall  findings of acuity.      Impression: 1. No pulmonary embolism.  2. Pulmonary arterial hypertension.  3. Compared to 03/20/2021 there has been interval development of confluent opacifications in the left lower lung of acute airspace disease or bronchopneumonia approaching soft tissue density although likely dense airspace disease given interval progression timing with resolution of pleural effusions from prior. Findings favor bronchopneumonia with attention to follow-up given overall appearance approaching soft tissue density.  4. No acute findings within the abdomen or pelvis, however, severely distended urinary bladder may represent components of urinary outlet obstruction with artifact from overlying metallic density in the suprapubic region.  DICTATED:   05/22/2021 EDITED/ls :   05/22/2021       XR Chest 1 View    Result Date: 5/22/2021   EXAMINATION: XR CHEST 1 VW - 05/22/2021  INDICATION: Weakness, dizziness, altered mental status.  COMPARISON: Chest x-ray 05/03/2021  FINDINGS: Cardiac size within normal limits status post median sternotomy and CABG. No consolidation or overt edema. No pneumothorax.      Impression: Chronic changes without acute cardiopulmonary process.  DICTATED:   05/22/2021 EDITED/ls :   05/22/2021       CT Angiogram Chest    Result Date: 5/22/2021  EXAMINATION: CT ANGIOGRAM CHEST, CT ABDOMEN/PELVIS W CONTRAST - 05/22/2021  INDICATION: Pulmonary embolus. Shortness of air. Fever.  TECHNIQUE: CT angiogram chest with intravenous contrast administration with 2D reconstructions performed, CT abdomen and pelvis with intravenous contrast.  The radiation dose reduction device was turned on for each scan per the ALARA (As Low as Reasonably Achievable) protocol.  COMPARISON: CT chest dated 03/28/2021  FINDINGS:  CTA CHEST: Thyroid is homogeneous in attenuation. No bulky mediastinal adenopathy. Central airways are patent. Esophagus in normal course and caliber. Atherosclerotic nonaneurysmal thoracic  aorta. Satisfactory opacification and contrast bolus timing of the pulmonary arterial tree for evaluation without filling defect to suggest pulmonary embolus. Pulmonary arterial enlargement measuring 4 cm at the level of the main pulmonary artery of pulmonary arterial hypertension involvement and pruning of the central pulmonary arterial tree. Cardiac size borderline enlarged status post median sternotomy and CABG without pericardial effusion. Extended lung windows reveal biapical pleural-parenchymal scarring with mild centrilobular emphysema in the upper lungs as well as mild central bronchiectasis. Compared to 03/20/2021, there has been interval development of confluent opacifications in the left lower lung of acute airspace disease or bronchopneumonia approaching soft tissue density although likely dense airspace disease given interval progression timing with resolution of pleural effusions from prior. Degenerative changes of the thoracic spine without aggressive osseous findings demonstrating stable compression deformities of the T11 and T12 levels. No soft tissue body wall findings of acuity.  CT ABDOMEN AND PELVIS: Liver without focal lesion. Gallbladder surgically absent. No biliary dilatation. Pancreas and spleen unremarkable. Adrenals demonstrate a left adrenal nodule measuring 14 mm with low-attenuation of lipid rich adenoma likely. Kidneys without hydronephrosis or hydroureter demonstrating simple appearing bilateral renal cortical cysts. No bulky retroperitoneal adenopathy. Atherosclerotic nonaneurysmal patent abdominal aorta with patent celiac and SMA origins. Portal veins and IVC patent. GI tract evaluation without focal thickening or disproportionate dilatation of bowel demonstrating postsurgical changes in the lower mid abdomen stable from prior. No free fluid or intra-abdominal free air with artifact from metallic density overlying the left suprapubic region. Severely distended urinary bladder with  mild circumferential wall thickening of the bladder, however, no acute inflammatory process. Distal colonic segments unremarkable. Postsurgical changes in the pelvis without free fluid or bulky pelvic adenopathy. Degenerative changes of the spine and pelvis without aggressive osseous lesion. No soft tissue body wall findings of acuity.      Impression: 1. No pulmonary embolism.  2. Pulmonary arterial hypertension.  3. Compared to 03/20/2021 there has been interval development of confluent opacifications in the left lower lung of acute airspace disease or bronchopneumonia approaching soft tissue density although likely dense airspace disease given interval progression timing with resolution of pleural effusions from prior. Findings favor bronchopneumonia with attention to follow-up given overall appearance approaching soft tissue density.  4. No acute findings within the abdomen or pelvis, however, severely distended urinary bladder may represent components of urinary outlet obstruction with artifact from overlying metallic density in the suprapubic region.  DICTATED:   05/22/2021 EDITED/ls :   05/22/2021         Results for orders placed during the hospital encounter of 05/03/21    Adult Transthoracic Echo Complete W/ Cont if Necessary Per Protocol    Interpretation Summary  · Estimated left ventricular EF = 70%  · Moderate aortic valve regurgitation is present.      Assessment/Plan   Assessment & Plan       Pneumonia    Seizure disorder (CMS/HCC)    Paroxysmal atrial fibrillation (CMS/HCC)    Chronic respiratory failure with hypoxia (CMS/HCC)    Severe sepsis (CMS/HCC)    LLL Pneumonia in the setting of chronic COPD with chronic respiratory failure on 4 L at baseline  Severe sepsis  --Presents with fevers of 101.8, tachycardia, leukocytosis, elevated troponins. Currently on baseline oxygen  -Personally reviewed CTA of chest, negative for PE, suspected pneumonia of the left lower lung.  - received aztreonam/vanc in  ED, change to Zosyn and vancomycin  --pharmacy to dose vanc  --blood cultures pending  -Sputum culture pending  -Fluids 100 cc an hour of LR for 10 hours given the presence of severe sepsis. Echo reviewed has EF of 75%. Watch respiratory status BNP elevated.    Elevated troponins  -Suspect is demand ischemia in the setting of severe sepsis  -Trend troponins  -Personally reviewed EKG, has T wave inversions. Denies any chest pain.   -Repeat EKG in a.m.    Diarrhea  -Check C. difficile and stool PCR as patient has had multiple hospitalizations.  -CTA was negative but does have    COPD  Chronic Resp Failure  --wears O2 at home at 4 L at baseline    PAF  --continue diltiazem     Seizure disorder  --continue phenobarbital      DVT prophylaxis:  Lovenox    CODE STATUS: Patient wishes to be done in the event of cardiopulmonary arrest  Code Status and Medical Interventions:   Ordered at: 05/22/21 1848     Code Status:    CPR     Medical Interventions (Level of Support Prior to Arrest):    Full       This note has been completed as part of a split-shared workflow.     Signature: Electronically signed by SIVA Edwards, 05/22/21, 6:57 PM EDT        Attending   Admission Attestation       I have seen and examined the patient, performing an independent face-to-face diagnostic evaluation with plan of care reviewed and developed with the advanced practice clinician (APC).      Brief Summary Statement:   An Abreu is a 70 y.o. female with history of COPD on chronic 4 L of oxygen, paroxysmal atrial fibrillation, malnutrition, who presented to the ED with fever, N/V/D and shortness of breath.  Patient was recently admitted from 5/3/2021 to 5/6/2021 for lethargy, hypoglycemia, hypercapnic respiratory failure.  Patient reports that her symptoms started after getting her second Covid vaccine on May 11.  On that day she started having nausea vomiting.  In the second and third days she started having shortness of breath that  has been worsening over the past couple weeks and worse with exertion..  She also started coughing and her abdomen is sore from all of her coughing.  Is productive.  She states that on the fourth day she started having some fevers.  She denies any dysphagia.  She also has diarrhea that started about 6 days ago and happens a couple times a day.  Denies any bleeding.  She also reports vision changes that started about the fourth day after getting the vaccine and she just cannot see.  She also reports that she has been extremely weak and has been unable to ambulate secondary to this.  Reports that she has been eating okay.  She denies any chest pain. She believes that her covid vaccine is the source of her symptoms.    In the ED she was noted to have a fever of one 101.8 with some tachycardia.  She is on 4 L of oxygen which is her baseline.  Troponins were elevated, BNP was elevated.  CT chest concerning for left lower lobe pneumonia.  EKG showed T wave inversions.  Hospital medicine was called for admission.    Patient presents with severe sepsis secondary to pneumonia with elevated troponin. Presents with fevers, tachycardia, elevated troponin, elevated leukocytosis of 15. CT chest concerning for left lower lobe pneumonia which is consistent with her productive cough and fevers. COVID-19 negative, patient received two doses of a Covid vaccine. She has chronic respiratory failure on 4 L. Received IV Azactam in the ED due to concern for allergy to Keflex. However on further review she previously tolerated Rocephin on 2/19/2021 will go ahead and start IV zosyn. Start duo nebs. Blood cultures and sputum cultures pending. She also reports diarrhea so we'll go ahead and do stool sample and check for PCR, C. difficile.  Remainder of detailed HPI is as noted by APC and has been reviewed and/or edited by me for completeness.    Attending Physical Exam:  Constitutional: Awake, alert, mild respiratory distress, thin frail,  older than stated age  Eyes: PERRL, sclerae anicteric, no conjunctival injection  HENT: NCAT, mucous membranes moist  Neck: Supple, no thyromegaly, no lymphadenopathy, trachea midline  Respiratory: Wheeze noted in left lower lobe, mild respiratory distress on 4 L of oxygen  Cardiovascular: Sinus tachycardia, no murmurs, rubs, or gallops, palpable pedal pulses bilaterally  Gastrointestinal: Positive bowel sounds, soft, nontender, nondistended  Musculoskeletal: No bilateral ankle edema, no clubbing or cyanosis to extremities  Psychiatric: Appropriate affect, cooperative  Neurologic: Oriented x 3, no focal neurological deficits  Skin: No rashes      Brief Assessment/Plan :  See detailed assessment and plan developed with APC which I have reviewed and/or edited for completeness.        Admission Status: I believe that this patient meets INPATIENT status due to severe sepsis.  I feel patient’s risk for adverse outcomes and need for care warrant INPATIENT evaluation and I predict the patient’s care encounter to likely last beyond 2 midnights.        Aurelia Mclain MD  05/22/21

## 2021-05-23 PROBLEM — J44.9 PULMONARY CACHEXIA DUE TO CHRONIC OBSTRUCTIVE PULMONARY DISEASE (HCC): Status: ACTIVE | Noted: 2021-01-01

## 2021-05-23 PROBLEM — R65.20 SEVERE SEPSIS (HCC): Status: RESOLVED | Noted: 2021-01-01 | Resolved: 2021-01-01

## 2021-05-23 PROBLEM — A41.9 SEVERE SEPSIS (HCC): Status: RESOLVED | Noted: 2021-01-01 | Resolved: 2021-01-01

## 2021-05-23 PROBLEM — R64 PULMONARY CACHEXIA DUE TO CHRONIC OBSTRUCTIVE PULMONARY DISEASE (HCC): Status: ACTIVE | Noted: 2021-01-01

## 2021-05-23 NOTE — CONSULTS
Pharmacy Consult-Vancomycin Dosing  An Abreu is a  70 y.o. female receiving vancomycin therapy.     Indication: pneumonia  Consulting Provider: Eleanor PANIAGUA  ID Consult:  no    Goal AUC: 400 - 600 mg/L*hr    Current Antimicrobial Therapy  Anti-Infectives (From admission, onward)      Ordered     Dose/Rate Route Frequency Start Stop    05/22/21 2037  piperacillin-tazobactam (ZOSYN) 3.375 g in iso-osmotic dextrose 50 ml (premix)     Ordering Provider: Aurelia Mclain MD    3.375 g  over 4 Hours Intravenous Every 8 Hours 05/23/21 0600 05/30/21 0559    05/22/21 2037  piperacillin-tazobactam (ZOSYN) 3.375 g in iso-osmotic dextrose 50 ml (premix)     Ordering Provider: Aurelia Mclain MD    3.375 g  over 30 Minutes Intravenous Once 05/22/21 2130 05/22/21 2037  Pharmacy to dose vancomycin     Note to Pharmacy: An Brady  , [id number]  By Eleanor PANIAGUA   Ordering Provider: Eleanor Nguyen APRN     Does not apply Continuous PRN 05/22/21 2037 05/22/21 1617  aztreonam (AZACTAM) 1 g/100 mL 0.9% NS IVPB (mbp)     Ordering Provider: Santosh Queen APRN    1 g  over 30 Minutes Intravenous Once 05/22/21 1619 05/22/21 1902    05/22/21 1617  vancomycin in dextrose 5% 150 mL (VANCOCIN) IVPB 750 mg     Ordering Provider: Santosh Queen APRN    20 mg/kg × 40.4 kg Intravenous Once 05/22/21 1619 05/22/21 1950            Allergies  Allergies as of 05/22/2021 - Reviewed 05/22/2021   Allergen Reaction Noted    Keflex [cephalexin] Shortness Of Breath and Rash 05/18/2016    Sulfamethoxazole-trimethoprim Shortness Of Breath and Rash 05/18/2016    Carbamazepine  05/18/2016    Codeine  05/18/2016       Labs    Results from last 7 days   Lab Units 05/22/21  1500   BUN mg/dL 10   CREATININE mg/dL 0.55*       Results from last 7 days   Lab Units 05/22/21  1500   WBC 10*3/mm3 15.05*       Evaluation of Dosing     Last Dose Received in the ED/Outside Facility: 20 mg/kg (750 mg) at 19:02  Is Patient on Dialysis or Renal  "Replacement: no    Ht - 149.9 cm (59\")  Wt - 40.4 kg (89 lb)    Estimated Creatinine Clearance: 40.5 mL/min (A) (by C-G formula based on SCr of 0.55 mg/dL (L)).    Intake & Output (last 3 days)       None            Microbiology and Radiology  Microbiology Results (last 10 days)       Procedure Component Value - Date/Time    COVID PRE-OP / PRE-PROCEDURE SCREENING ORDER (NO ISOLATION) - Swab, Nasopharynx [[id number]]  (Normal) Collected: 05/22/21 1511    Lab Status: Final result Specimen: Swab from Nasopharynx Updated: 05/22/21 1542    Narrative:      The following orders were created for panel order COVID PRE-OP / PRE-PROCEDURE SCREENING ORDER (NO ISOLATION) - Swab, Nasopharynx.  Procedure                               Abnormality         Status                     ---------                               -----------         ------                     COVID-19 and FLU A/B PCR...[[id number]]  Normal              Final result                 Please view results for these tests on the individual orders.    COVID-19 and FLU A/B PCR - Swab, Nasopharynx [[id number]]  (Normal) Collected: 05/22/21 1511    Lab Status: Final result Specimen: Swab from Nasopharynx Updated: 05/22/21 1542     COVID19 Not Detected     Influenza A PCR Not Detected     Influenza B PCR Not Detected            Reported Vancomycin Levels  N/A     [employer] AUC Calculation:    Current AUC:   n/a    Predicted Steady State AUC on Current Dose: n/a  _________________________________    Predicted Steady State AUC on New Dose:   n/a    Assessment/Plan:  Vanco 20mg/kg x 1 (given).  Random vanc level 5-23 1200  Further doses to be determined    An Cruz [facility]  5/22/2021  21:04 EDT    "

## 2021-05-23 NOTE — PAYOR COMM NOTE
"Pamela Downing RN   Phone [phone]  Fax [phone]      An Abreu (70 y.o. Female)     Date of Birth Social Security Number Address Home Phone MRN    1950  [address] [facility] [address]  [id number]    [employer] Marital Status          None        Admission Date Admission Type Admitting Provider Attending Provider Department, Room/Bed    21 Emergency Siri Cruz MD Hall, [place], MD [address], [address]/1    Discharge Date Discharge Disposition Discharge Destination                       Attending Provider: Siri Cruz MD    Allergies: Keflex [Cephalexin], Sulfamethoxazole-trimethoprim, Carbamazepine, Codeine    Isolation: Spore   Infection: C.difficile (rule out) (21)   Code Status: CPR    Ht: 149.9 cm (59\")   Wt: 36.7 kg (80 lb 14.5 oz)    Admission Cmt: None   Principal Problem: Pneumonia [J18.9]                 Active Insurance as of 2021     Primary Coverage     Payor Plan Insurance Group Employer/Plan Group    [facility] MEDICARE REPLACEMENT [employer] MEDICARE REPLACEMENT      Payor Plan Address Payor Plan Phone Number Payor Plan Fax Number Effective Dates    [address] [address] [phone]  2018 - None Entered    [facility] [id number]       Subscriber Name Subscriber Birth Date Member ID       AN ABREU [id number]                 Emergency Contacts      (Rel.) Home Phone Work Phone Mobile Phone    alexi elizabeth (Friend) -- -- [phone]    peter sherwood (Friend) -- -- [phone]    Lory Abreu (POA) (Daughter) [phone] -- [phone]    Stacie Agee (Daughter) -- -- --               History & Physical      Aurelia Mclain MD at 21 1830              [facility] Medicine Services  HISTORY AND PHYSICAL    Patient Name: An Abreu  : 1950  MRN: [id number]  Primary Care Physician: Zabrina Lemon MD  Date of admission: " 5/22/2021    Subjective   Subjective     Chief Complaint:  SOB    HPI:  An Abreu is a 70 y.o. female with history of COPD on chronic 4 L of oxygen, paroxysmal atrial fibrillation, malnutrition, who presented to the ED with fever, N/V/D and shortness of breath.  Patient was recently admitted from 5/3/2021 to 5/6/2021 for lethargy, hypoglycemia, hypercapnic respiratory failure.  Patient reports that her symptoms started after getting her second Covid vaccine on May 11.  On that day she started having nausea vomiting.  In the second and third days she started having shortness of breath that has been worsening over the past couple weeks and worse with exertion..  She also started coughing and her abdomen is sore from all of her coughing.  Is productive.  She states that on the fourth day she started having some fevers.  She denies any dysphagia.  She also has diarrhea that started about 6 days ago and happens a couple times a day.  Denies any bleeding.  She also reports vision changes that started about the fourth day after getting the vaccine and she just cannot see.  She also reports that she has been extremely weak and has been unable to ambulate secondary to this.  Reports that she has been eating okay.  She denies any chest pain. She believes that her covid vaccine is the source of her symptoms.    In the ED she was noted to have a fever of one 1.8 with some tachycardia.  She is on 4 L of oxygen which is her baseline.  Troponins were elevated, BNP was elevated.  CT chest concerning for left lower lobe pneumonia.  EKG showed T wave inversions.  Hospital medicine was called for admission.    History above was written and obtained by Dr. Mclain.      COVID Details:    Symptoms:    [] NONE [x] Fever [x]  Cough [x] Shortness of breath [] Change in taste/smell      The patient qualifies to receive the vaccine, but they have not yet received it.  She received 2nd COVID 5/11/21    Review of Systems    Constitutional: Positive for chills, fatigue and fever. Negative for appetite change.   HENT: Negative for trouble swallowing.    Eyes: Negative for visual disturbance.   Respiratory: Positive for cough and shortness of breath.    Cardiovascular: Negative for chest pain and leg swelling.   Gastrointestinal: Positive for diarrhea, nausea and vomiting. Negative for abdominal pain.   Genitourinary: Negative for dysuria.   Musculoskeletal: Negative for back pain and myalgias.   Skin: Negative for rash.   Neurological: Positive for weakness.        All other systems reviewed and are negative.     Personal History     Past Medical History:   Diagnosis Date   • Aneurysm (CMS/HCC)    • Angina pectoris (CMS/HCC) 6/20/2016   • Anxiety 6/20/2016   • Arthritis 6/20/2016   • CAD (coronary artery disease)    • Carotid artery stenosis    • CHF (congestive heart failure) (CMS/HCC)    • Chronic coronary artery disease 6/20/2016 2003 CABG LIMA to LAD, VG to OM 2004 VG to OM occluded. LIMA patent Cx intervention and RCA 2008 stent for ISR 2013 ISR and stenting of CX and RCA 2016 normal MPS   • Chronic left-sided low back pain with left-sided sciatica 2/1/2017   • Chronic obstructive pulmonary disease (CMS/HCC) 6/20/2016   • Chronic respiratory failure with hypoxia (CMS/HCC) 3/27/2021   • Cobalamin deficiency 6/20/2016   • Congestive heart failure (CMS/HCC) 6/20/2016   • COPD (chronic obstructive pulmonary disease) (CMS/HCC)    • Depression 7/3/2018   • Diverticulosis    • Diverticulosis of large intestine without hemorrhage 5/5/2017   • GERD (gastroesophageal reflux disease)    • GIB (gastrointestinal bleeding)     recurrent    • HTN (hypertension)    • Hypercholesterolemia 6/20/2016   • Hyperlipidemia    • Mesenteric ischemia (CMS/HCC) 2006    s/p resection    • Mood disorder (CMS/HCC)    • Oxygen dependent     3 liters @ all times.    • PAF (paroxysmal atrial fibrillation) (CMS/HCC)    • Paroxysmal atrial fibrillation (CMS/HCC)  2017   • PFO (patent foramen ovale)    • PVD (peripheral vascular disease) (CMS/[facility])    • Restless legs syndrome 2016   • Seizure disorder (CMS/[facility]) 2016   • Seizures (CMS/[facility])    • Stenosis of carotid artery 2016   • Vertigo 2016       Past Surgical History:   Procedure Laterality Date   • APPENDECTOMY     • CHOLECYSTECTOMY     • COLOSTOMY      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   • EXPLORATORY LAPAROTOMY      sec to ovarian cysts   • HYSTERECTOMY     • ILIAC ARTERY STENT     • REVISION / TAKEDOWN COLOSTOMY         Family History: family history includes Arthritis in her mother; Cancer in her mother; Heart attack in her father; Heart disease in her brother, child, and child; Hyperlipidemia in her father; Hypertension in her father; Stroke in her father. Otherwise pertinent FHx was reviewed and unremarkable.  She reports that her sister was just diagnosed with colon cancer at age 43.  Her brother  approximately 1 month ago of cancer but she cannot say what kind.    Social History:  reports that she has been smoking cigarettes and electronic cigarette. She has a 40.00 pack-year smoking history. She has never used smokeless tobacco. She reports that she does not drink alcohol and does not use drugs.  Social History     Social History Narrative    Lives w/ her daughter. Ambulates w/ a walker. Independent w/ most ADLs.        Medications:  HYDROcodone-acetaminophen, LORazepam, PHENobarbital, albuterol, albuterol sulfate HFA, aspirin, citalopram, clopidogrel, dilTIAZem CD, esomeprazole, ferrous sulfate, guaiFENesin, ipratropium-albuterol, ondansetron, rosuvastatin, and umeclidinium-vilanterol    Allergies   Allergen Reactions   • Keflex [Cephalexin] Shortness Of Breath and Rash     Patients power of  states patient had to be taken to ER due to reaction.    Tolerated Rocephin 21   • Sulfamethoxazole-Trimethoprim Shortness Of Breath and Rash     Patients power of  stated  patient had to be taken to ER due to reaction.   • Carbamazepine    • Codeine        Objective   Objective     Vital Signs:   Temp:  [101.8 °F (38.8 °C)] 101.8 °F (38.8 °C)  Heart Rate:  [80-95] 80  Resp:  [24] 24  BP: (128-150)/(65-68) 150/67  Flow (L/min):  [4] 4    Physical Exam   Please see H&P attestation below by Dr. Mclain and she performed the complete physical exam at time of admission.      Results Reviewed:  I have personally reviewed most recent indicated data and agree with findings including:  [x]  Laboratory  [x]  Radiology  [x]  EKG/Telemetry  []  Pathology  []  Cardiac/Vascular Studies  [x]  Old records  []  Other:  Most pertinent findings include:    LAB RESULTS:      Lab 05/22/21  1500   WBC 15.05*   HEMOGLOBIN 12.2   HEMATOCRIT 38.7   PLATELETS 303   NEUTROS ABS 12.41*   IMMATURE GRANS (ABS) 0.07*   LYMPHS ABS 1.61   MONOS ABS 0.94*   EOS ABS 0.00   MCV 95.6   PROCALCITONIN 0.11   LACTATE 1.4         Lab 05/22/21  1500   SODIUM 131*   POTASSIUM 4.1   CHLORIDE 95*   CO2 25.0   ANION GAP 11.0   BUN 10   CREATININE 0.55*   GLUCOSE 72   CALCIUM 9.2   MAGNESIUM 1.8         Lab 05/22/21  1500   TOTAL PROTEIN 7.9   ALBUMIN 4.00   GLOBULIN 3.9   ALT (SGPT) 38*   AST (SGOT) 35*   BILIRUBIN 0.4   ALK PHOS 126*         Lab 05/22/21  1500   PROBNP 2,774.0*   TROPONIN T 0.050*       Brief Urine Lab Results  (Last result in the past 365 days)      Color   Clarity   Blood   Leuk Est   Nitrite   Protein   CREAT   Urine HCG        05/22/21 1823 Yellow Clear Negative Negative Negative 100 mg/dL (2+)         05/22/21 1823 Yellow Clear Negative Negative Negative 100 mg/dL (2+)             Microbiology Results (last 10 days)     Procedure Component Value - Date/Time    COVID PRE-OP / PRE-PROCEDURE SCREENING ORDER (NO ISOLATION) - Swab, Nasopharynx [[id number]]  (Normal) Collected: 05/22/21 1511    Lab Status: Final result Specimen: Swab from Nasopharynx Updated: 05/22/21 9250    Narrative:      The following orders  were created for panel order COVID PRE-OP / PRE-PROCEDURE SCREENING ORDER (NO ISOLATION) - Swab, Nasopharynx.  Procedure                               Abnormality         Status                     ---------                               -----------         ------                     COVID-19 and FLU A/B PCR...[[id number]]  Normal              Final result                 Please view results for these tests on the individual orders.    COVID-19 and FLU A/B PCR - Swab, Nasopharynx [[id number]]  (Normal) Collected: 05/22/21 1511    Lab Status: Final result Specimen: Swab from Nasopharynx Updated: 05/22/21 1542     COVID19 Not Detected     Influenza A PCR Not Detected     Influenza B PCR Not Detected          CT Abdomen Pelvis With Contrast    Result Date: 5/22/2021  EXAMINATION: CT ANGIOGRAM CHEST, CT ABDOMEN/PELVIS W CONTRAST - 05/22/2021  INDICATION: Pulmonary embolus. Shortness of air. Fever.  TECHNIQUE: CT angiogram chest with intravenous contrast administration with 2D reconstructions performed, CT abdomen and pelvis with intravenous contrast.  The radiation dose reduction device was turned on for each scan per the ALARA (As Low as Reasonably Achievable) protocol.  COMPARISON: CT chest dated 03/28/2021  FINDINGS:  CTA CHEST: Thyroid is homogeneous in attenuation. No bulky mediastinal adenopathy. Central airways are patent. Esophagus in normal course and caliber. Atherosclerotic nonaneurysmal thoracic aorta. Satisfactory opacification and contrast bolus timing of the pulmonary arterial tree for evaluation without filling defect to suggest pulmonary embolus. Pulmonary arterial enlargement measuring 4 cm at the level of the main pulmonary artery of pulmonary arterial hypertension involvement and pruning of the central pulmonary arterial tree. Cardiac size borderline enlarged status post median sternotomy and CABG without pericardial effusion. Extended lung windows reveal biapical pleural-parenchymal scarring with mild  centrilobular emphysema in the upper lungs as well as mild central bronchiectasis. Compared to 03/20/2021, there has been interval development of confluent opacifications in the left lower lung of acute airspace disease or bronchopneumonia approaching soft tissue density although likely dense airspace disease given interval progression timing with resolution of pleural effusions from prior. Degenerative changes of the thoracic spine without aggressive osseous findings demonstrating stable compression deformities of the T11 and T12 levels. No soft tissue body wall findings of acuity.  CT ABDOMEN AND PELVIS: Liver without focal lesion. Gallbladder surgically absent. No biliary dilatation. Pancreas and spleen unremarkable. Adrenals demonstrate a left adrenal nodule measuring 14 mm with low-attenuation of lipid rich adenoma likely. Kidneys without hydronephrosis or hydroureter demonstrating simple appearing bilateral renal cortical cysts. No bulky retroperitoneal adenopathy. Atherosclerotic nonaneurysmal patent abdominal aorta with patent celiac and SMA origins. Portal veins and IVC patent. GI tract evaluation without focal thickening or disproportionate dilatation of bowel demonstrating postsurgical changes in the lower mid abdomen stable from prior. No free fluid or intra-abdominal free air with artifact from metallic density overlying the left suprapubic region. Severely distended urinary bladder with mild circumferential wall thickening of the bladder, however, no acute inflammatory process. Distal colonic segments unremarkable. Postsurgical changes in the pelvis without free fluid or bulky pelvic adenopathy. Degenerative changes of the spine and pelvis without aggressive osseous lesion. No soft tissue body wall findings of acuity.      Impression: 1. No pulmonary embolism.  2. Pulmonary arterial hypertension.  3. Compared to 03/20/2021 there has been interval development of confluent opacifications in the left  lower lung of acute airspace disease or bronchopneumonia approaching soft tissue density although likely dense airspace disease given interval progression timing with resolution of pleural effusions from prior. Findings favor bronchopneumonia with attention to follow-up given overall appearance approaching soft tissue density.  4. No acute findings within the abdomen or pelvis, however, severely distended urinary bladder may represent components of urinary outlet obstruction with artifact from overlying metallic density in the suprapubic region.  DICTATED:   05/22/2021 EDITED/ls :   05/22/2021       XR Chest 1 View    Result Date: 5/22/2021   EXAMINATION: XR CHEST 1 VW - 05/22/2021  INDICATION: Weakness, dizziness, altered mental status.  COMPARISON: Chest x-ray 05/03/2021  FINDINGS: Cardiac size within normal limits status post median sternotomy and CABG. No consolidation or overt edema. No pneumothorax.      Impression: Chronic changes without acute cardiopulmonary process.  DICTATED:   05/22/2021 EDITED/ls :   05/22/2021       CT Angiogram Chest    Result Date: 5/22/2021  EXAMINATION: CT ANGIOGRAM CHEST, CT ABDOMEN/PELVIS W CONTRAST - 05/22/2021  INDICATION: Pulmonary embolus. Shortness of air. Fever.  TECHNIQUE: CT angiogram chest with intravenous contrast administration with 2D reconstructions performed, CT abdomen and pelvis with intravenous contrast.  The radiation dose reduction device was turned on for each scan per the ALARA (As Low as Reasonably Achievable) protocol.  COMPARISON: CT chest dated 03/28/2021  FINDINGS:  CTA CHEST: Thyroid is homogeneous in attenuation. No bulky mediastinal adenopathy. Central airways are patent. Esophagus in normal course and caliber. Atherosclerotic nonaneurysmal thoracic aorta. Satisfactory opacification and contrast bolus timing of the pulmonary arterial tree for evaluation without filling defect to suggest pulmonary embolus. Pulmonary arterial enlargement measuring 4 cm  at the level of the main pulmonary artery of pulmonary arterial hypertension involvement and pruning of the central pulmonary arterial tree. Cardiac size borderline enlarged status post median sternotomy and CABG without pericardial effusion. Extended lung windows reveal biapical pleural-parenchymal scarring with mild centrilobular emphysema in the upper lungs as well as mild central bronchiectasis. Compared to 03/20/2021, there has been interval development of confluent opacifications in the left lower lung of acute airspace disease or bronchopneumonia approaching soft tissue density although likely dense airspace disease given interval progression timing with resolution of pleural effusions from prior. Degenerative changes of the thoracic spine without aggressive osseous findings demonstrating stable compression deformities of the T11 and T12 levels. No soft tissue body wall findings of acuity.  CT ABDOMEN AND PELVIS: Liver without focal lesion. Gallbladder surgically absent. No biliary dilatation. Pancreas and spleen unremarkable. Adrenals demonstrate a left adrenal nodule measuring 14 mm with low-attenuation of lipid rich adenoma likely. Kidneys without hydronephrosis or hydroureter demonstrating simple appearing bilateral renal cortical cysts. No bulky retroperitoneal adenopathy. Atherosclerotic nonaneurysmal patent abdominal aorta with patent celiac and SMA origins. Portal veins and IVC patent. GI tract evaluation without focal thickening or disproportionate dilatation of bowel demonstrating postsurgical changes in the lower mid abdomen stable from prior. No free fluid or intra-abdominal free air with artifact from metallic density overlying the left suprapubic region. Severely distended urinary bladder with mild circumferential wall thickening of the bladder, however, no acute inflammatory process. Distal colonic segments unremarkable. Postsurgical changes in the pelvis without free fluid or bulky pelvic  adenopathy. Degenerative changes of the spine and pelvis without aggressive osseous lesion. No soft tissue body wall findings of acuity.      Impression: 1. No pulmonary embolism.  2. Pulmonary arterial hypertension.  3. Compared to 03/20/2021 there has been interval development of confluent opacifications in the left lower lung of acute airspace disease or bronchopneumonia approaching soft tissue density although likely dense airspace disease given interval progression timing with resolution of pleural effusions from prior. Findings favor bronchopneumonia with attention to follow-up given overall appearance approaching soft tissue density.  4. No acute findings within the abdomen or pelvis, however, severely distended urinary bladder may represent components of urinary outlet obstruction with artifact from overlying metallic density in the suprapubic region.  DICTATED:   05/22/2021 EDITED/ls :   05/22/2021         Results for orders placed during the hospital encounter of 05/03/21    Adult Transthoracic Echo Complete W/ Cont if Necessary Per Protocol    Interpretation Summary  · Estimated left ventricular EF = 70%  · Moderate aortic valve regurgitation is present.      Assessment/Plan   Assessment & Plan       Pneumonia    Seizure disorder (CMS/HCC)    Paroxysmal atrial fibrillation (CMS/HCC)    Chronic respiratory failure with hypoxia (CMS/HCC)    Severe sepsis (CMS/HCC)    LLL Pneumonia in the setting of chronic COPD with chronic respiratory failure on 4 L at baseline  Severe sepsis  --Presents with fevers of 101.8, tachycardia, leukocytosis, elevated troponins. Currently on baseline oxygen  -Personally reviewed CTA of chest, negative for PE, suspected pneumonia of the left lower lung.  - received aztreonam/vanc in ED, change to Zosyn and vancomycin  --pharmacy to dose vanc  --blood cultures pending  -Sputum culture pending  -Fluids 100 cc an hour of LR for 10 hours given the presence of severe sepsis. Echo  reviewed has EF of 75%. Watch respiratory status BNP elevated.    Elevated troponins  -Suspect is demand ischemia in the setting of severe sepsis  -Trend troponins  -Personally reviewed EKG, has T wave inversions. Denies any chest pain.   -Repeat EKG in a.m.    Diarrhea  -Check C. difficile and stool PCR as patient has had multiple hospitalizations.  -CTA was negative but does have    COPD  Chronic Resp Failure  --wears O2 at home at 4 L at baseline    PAF  --continue diltiazem     Seizure disorder  --continue phenobarbital      DVT prophylaxis:  Lovenox    CODE STATUS: Patient wishes to be done in the event of cardiopulmonary arrest  Code Status and Medical Interventions:   Ordered at: 05/22/21 1848     Code Status:    CPR     Medical Interventions (Level of Support Prior to Arrest):    Full       This note has been completed as part of a split-shared workflow.     Signature: Electronically signed by SIVA Edwards, 05/22/21, 6:57 PM EDT        Attending   Admission Attestation       I have seen and examined the patient, performing an independent face-to-face diagnostic evaluation with plan of care reviewed and developed with the advanced practice clinician (APC).      Brief Summary Statement:   An Abreu is a 70 y.o. female with history of COPD on chronic 4 L of oxygen, paroxysmal atrial fibrillation, malnutrition, who presented to the ED with fever, N/V/D and shortness of breath.  Patient was recently admitted from 5/3/2021 to 5/6/2021 for lethargy, hypoglycemia, hypercapnic respiratory failure.  Patient reports that her symptoms started after getting her second Covid vaccine on May 11.  On that day she started having nausea vomiting.  In the second and third days she started having shortness of breath that has been worsening over the past couple weeks and worse with exertion..  She also started coughing and her abdomen is sore from all of her coughing.  Is productive.  She states that on the fourth  day she started having some fevers.  She denies any dysphagia.  She also has diarrhea that started about 6 days ago and happens a couple times a day.  Denies any bleeding.  She also reports vision changes that started about the fourth day after getting the vaccine and she just cannot see.  She also reports that she has been extremely weak and has been unable to ambulate secondary to this.  Reports that she has been eating okay.  She denies any chest pain. She believes that her covid vaccine is the source of her symptoms.    In the ED she was noted to have a fever of one 101.8 with some tachycardia.  She is on 4 L of oxygen which is her baseline.  Troponins were elevated, BNP was elevated.  CT chest concerning for left lower lobe pneumonia.  EKG showed T wave inversions.  Hospital medicine was called for admission.    Patient presents with severe sepsis secondary to pneumonia with elevated troponin. Presents with fevers, tachycardia, elevated troponin, elevated leukocytosis of 15. CT chest concerning for left lower lobe pneumonia which is consistent with her productive cough and fevers. COVID-19 negative, patient received two doses of a Covid vaccine. She has chronic respiratory failure on 4 L. Received IV Azactam in the ED due to concern for allergy to Keflex. However on further review she previously tolerated Rocephin on 2/19/2021 will go ahead and start IV zosyn. Start duo nebs. Blood cultures and sputum cultures pending. She also reports diarrhea so we'll go ahead and do stool sample and check for PCR, C. difficile.  Remainder of detailed HPI is as noted by APC and has been reviewed and/or edited by me for completeness.    Attending Physical Exam:  Constitutional: Awake, alert, mild respiratory distress, thin frail, older than stated age  Eyes: PERRL, sclerae anicteric, no conjunctival injection  HENT: NCAT, mucous membranes moist  Neck: Supple, no thyromegaly, no lymphadenopathy, trachea midline  Respiratory:  Wheeze noted in left lower lobe, mild respiratory distress on 4 L of oxygen  Cardiovascular: Sinus tachycardia, no murmurs, rubs, or gallops, palpable pedal pulses bilaterally  Gastrointestinal: Positive bowel sounds, soft, nontender, nondistended  Musculoskeletal: No bilateral ankle edema, no clubbing or cyanosis to extremities  Psychiatric: Appropriate affect, cooperative  Neurologic: Oriented x 3, no focal neurological deficits  Skin: No rashes      Brief Assessment/Plan :  See detailed assessment and plan developed with APC which I have reviewed and/or edited for completeness.        Admission Status: I believe that this patient meets INPATIENT status due to severe sepsis.  I feel patient’s risk for adverse outcomes and need for care warrant INPATIENT evaluation and I predict the patient’s care encounter to likely last beyond 2 midnights.        Aurelia Mclain MD  05/22/21                        Electronically signed by Aurelia Mclain MD at 05/22/21 1958          Emergency Department Notes      Santosh Queen APRN at 05/22/21 1632     Attestation signed by Azael Noland MD at 05/23/21 0128          For this patient encounter, I reviewed the NP or PA documentation, treatment plan, and medical decision making. Azael Noland MD 5/23/2021 01:28 EDT                  Subjective   Patient presents the ER with a fever of 102 with nausea vomiting diarrhea abdominal pain cough and shortness of breath.  Onset yesterday.  She does have a history of COPD and is typically on home oxygen.  She denies any bloody vomit or bloody stools.  She was admitted to the hospital several months ago for hip fracture. She had her 2nd Covid shot 5/11.      Fever  Max temp prior to arrival:  102  Temp source:  Oral  Severity:  Moderate  Onset quality:  Gradual  Duration:  2 days  Timing:  Constant  Progression:  Unchanged  Chronicity:  New  Relieved by:  Nothing  Worsened by:  Nothing  Associated symptoms: congestion,  cough, diarrhea, nausea and vomiting    Associated symptoms: no chest pain, no chills, no dysuria and no sore throat        Review of Systems   Constitutional: Positive for fever. Negative for chills and diaphoresis.   HENT: Positive for congestion. Negative for sore throat.    Respiratory: Positive for cough. Negative for choking, chest tightness, shortness of breath and wheezing.    Cardiovascular: Negative for chest pain and leg swelling.   Gastrointestinal: Positive for diarrhea, nausea and vomiting. Negative for abdominal distention, abdominal pain, anal bleeding, blood in stool and constipation.   Genitourinary: Negative for difficulty urinating, dysuria, flank pain, frequency, hematuria and urgency.   All other systems reviewed and are negative.      Past Medical History:   Diagnosis Date   • Aneurysm (CMS/HCC)    • Angina pectoris (CMS/HCC) 6/20/2016   • Anxiety 6/20/2016   • Arthritis 6/20/2016   • CAD (coronary artery disease)    • Carotid artery stenosis    • CHF (congestive heart failure) (CMS/[facility])    • Chronic coronary artery disease 6/20/2016 2003 CABG LIMA to LAD, VG to OM 2004 VG to OM occluded. LIMA patent Cx intervention and RCA 2008 stent for ISR 2013 ISR and stenting of CX and RCA 2016 normal MPS   • Chronic left-sided low back pain with left-sided sciatica 2/1/2017   • Chronic obstructive pulmonary disease (CMS/HCC) 6/20/2016   • Chronic respiratory failure with hypoxia (CMS/HCC) 3/27/2021   • Cobalamin deficiency 6/20/2016   • Congestive heart failure (CMS/HCC) 6/20/2016   • COPD (chronic obstructive pulmonary disease) (CMS/HCC)    • Depression 7/3/2018   • Diverticulosis    • Diverticulosis of large intestine without hemorrhage 5/5/2017   • GERD (gastroesophageal reflux disease)    • GIB (gastrointestinal bleeding)     recurrent    • HTN (hypertension)    • Hypercholesterolemia 6/20/2016   • Hyperlipidemia    • Mesenteric ischemia (CMS/HCC) 2006    s/p resection    • Mood disorder (CMS/HCC)     • Oxygen dependent     3 liters @ all times.    • PAF (paroxysmal atrial fibrillation) (CMS/[facility])    • Paroxysmal atrial fibrillation (CMS/[facility]) 8/7/2017   • PFO (patent foramen ovale)    • PVD (peripheral vascular disease) (CMS/[facility])    • Restless legs syndrome 6/20/2016   • Seizure disorder (CMS/[facility]) 6/20/2016   • Seizures (CMS/[facility])    • Stenosis of carotid artery 6/20/2016   • Vertigo 9/28/2016       Allergies   Allergen Reactions   • Keflex [Cephalexin] Shortness Of Breath and Rash     Patients power of  states patient had to be taken to ER due to reaction.    Tolerated Rocephin 2/19/21   • Sulfamethoxazole-Trimethoprim Shortness Of Breath and Rash     Patients power of  stated patient had to be taken to ER due to reaction.   • Carbamazepine    • Codeine        Past Surgical History:   Procedure Laterality Date   • APPENDECTOMY     • CHOLECYSTECTOMY     • COLOSTOMY  2006    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   • EXPLORATORY LAPAROTOMY      sec to ovarian cysts   • HYSTERECTOMY     • ILIAC ARTERY STENT     • REVISION / TAKEDOWN COLOSTOMY  2008       Family History   Problem Relation Age of Onset   • Arthritis Mother    • Cancer Mother    • Heart attack Father    • Hypertension Father    • Hyperlipidemia Father    • Stroke Father    • Heart disease Brother         CAD   • Heart disease Child         CAD   • Heart disease Child         CAD       Social History     Socioeconomic History   • Marital status:      Spouse name: Not on file   • Number of children: Not on file   • Years of education: Not on file   • Highest education level: Not on file   Tobacco Use   • Smoking status: Current Every Day Smoker     Packs/day: 1.00     Years: 40.00     Pack years: 40.00     Types: Cigarettes, Electronic Cigarette   • Smokeless tobacco: Never Used   • Tobacco comment: <0.5ppd    Substance and Sexual Activity   • Alcohol use: Never   • Drug use: Never   • Sexual activity: Defer           Objective   Physical  Exam  Constitutional:       General: She is in acute distress.      Appearance: She is well-developed. She is ill-appearing.   HENT:      Head: Normocephalic and atraumatic.      Right Ear: External ear normal.      Left Ear: External ear normal.      Nose: Nose normal.   Eyes:      Conjunctiva/sclera: Conjunctivae normal.      Pupils: Pupils are equal, round, and reactive to light.   Cardiovascular:      Rate and Rhythm: Normal rate and regular rhythm.      Heart sounds: Normal heart sounds.   Pulmonary:      Effort: Pulmonary effort is normal.      Breath sounds: Normal breath sounds.   Abdominal:      General: Bowel sounds are normal.      Palpations: Abdomen is soft.      Tenderness: There is abdominal tenderness.   Musculoskeletal:         General: Normal range of motion.      Cervical back: Normal range of motion and neck supple.   Skin:     General: Skin is warm and dry.   Neurological:      Mental Status: She is alert and oriented to person, place, and time.   Psychiatric:         Behavior: Behavior normal.         Thought Content: Thought content normal.         Judgment: Judgment normal.         Procedures          ED Course  ED Course as of May 22 1833   Sat May 22, 2021   1755 Awaiting CT readings    []   1817 Hospitalist paged    []   1819 Ua pending. Nurse to cath.     []      ED Course User Index  [] Santosh Queen, SIVA                                           [facility]    Final diagnoses:   SIRS (systemic inflammatory response syndrome) (CMS/[facility])   Pneumonia due to infectious organism, unspecified laterality, unspecified part of lung   Leukocytosis, unspecified type   Acute abdominal pain   Fever in adult       ED Disposition  ED Disposition     ED Disposition Condition Comment    Decision to Admit  Level of Care: Telemetry [5]   Diagnosis: Severe sepsis (CMS/HCC) [[id number]]   Admitting Physician: NACHO RODRIGUEZ [[id number]]   Attending Physician: NACHO RODRIGUEZ [[id number]]   Isolate for COVID?: No  [0]   Certification: I Certify That Inpatient Hospital Services Are Medically Necessary For Greater Than 2 Midnights            No follow-up provider specified.       Medication List      No changes were made to your prescriptions during this visit.          Santosh Queen, APRN  05/22/21 1833      Electronically signed by Azael Noland MD at 05/23/21 0128       Vital Signs (last day)     Date/Time   Temp   Temp src   Pulse   Resp   BP   Patient Position   SpO2    05/23/21 1036   --   --   72   --   --   --   100    05/23/21 0711   98.1 (36.7)   Oral   68   20   129/59   Lying   100    05/23/21 0600   --   --   69   --   --   --   100    05/23/21 0500   --   --   69   --   --   --   100    05/23/21 0449   97.9 (36.6)   Oral   --   18   141/73   Lying   --    05/23/21 0400   --   --   61   --   --   --   94    05/23/21 0300   --   --   66   --   --   --   100    05/23/21 0200   --   --   66   --   --   --   97    05/23/21 0100   --   --   74   --   --   --   98    05/23/21 0019   98.2 (36.8)   Oral   --   20   135/56   Lying   --    05/23/21 0000   --   --   83   --   --   --   92    05/22/21 2300   --   --   68   --   --   --   96    05/22/21 2200   --   --   75   --   --   --   95    05/22/21 2100   --   --   80   --   --   --   93    05/22/21 2003   98.1 (36.7)   Oral   74   20   143/56   Lying   96    05/22/21 1930   --   --   --   --   122/73   --   --    05/22/21 19:24:11   --   --   70   24   --   --   96    05/22/21 1900   --   --   78   --   148/72   --   99    05/22/21 1830   --   --   --   --   151/83   --   --    05/22/21 18:26:26   --   --   80   24   --   --   99    05/22/21 1800   --   --   76   --   159/63   --   100    05/22/21 1630   --   --   84   --   150/67   --   99    05/22/21 1600   --   --   --   --   142/66   --   --    05/22/21 15:41:29   --   --   84   24   --   --   96    05/22/21 1530   --   --   81   --   134/67   --   95    05/22/21 1500   --   --   86   --   128/65   --   97     21 1435   (!) 101.8 (38.8)   Oral   95   24   140/68   Sitting   93                Facility-Administered Medications as of 2021   Medication Dose Route Frequency Provider Last Rate Last Admin   • [COMPLETED] acetaminophen (TYLENOL) tablet 1,000 mg  1,000 mg Oral Once Santosh Queen APRN   1,000 mg at 21 1821   • acetaminophen (TYLENOL) tablet 650 mg  650 mg Oral Q4H PRN Eleanor Nguyen APRN       • albuterol (PROVENTIL) nebulizer solution 0.083% 2.5 mg/3mL  2.5 mg Nebulization Q4H PRN Eleanor Nguyen APRN       • aspirin EC tablet 81 mg  81 mg Oral Daily Eleanor Nguyen APRN   81 mg at 21 0818   • [COMPLETED] aztreonam (AZACTAM) 1 g/100 mL 0.9% NS IVPB (mbp)  1 g Intravenous Once Santosh Queen APRN   Stopped at 21 1902   • citalopram (CeleXA) tablet 20 mg  20 mg Oral Daily Eleanor Nguyen APRN   20 mg at 21 0818   • clopidogrel (PLAVIX) tablet 75 mg  75 mg Oral Daily Eleanor Nguyen APRN   75 mg at 21 0818   • dilTIAZem CD (CARDIZEM CD) 24 hr capsule 240 mg  240 mg Oral Daily Eleanor Nguyen APRN   240 mg at 21 0818   • ferrous sulfate tablet 325 mg  325 mg Oral Daily With Breakfast Eleanor Nguyen APRN   325 mg at 21 0818   • guaiFENesin (MUCINEX) 12 hr tablet 600 mg  600 mg Oral Q12H Eleanor Nguyen APRN   600 mg at 21 0818   • heparin (porcine) 5000 UNIT/ML injection 5,000 Units  5,000 Units Subcutaneous Q12H Aurelia Mclain MD   5,000 Units at 21 0817   • HYDROcodone-acetaminophen (NORCO)  MG per tablet 1 tablet  1 tablet Oral Q6H PRN Eleanor Nguyen APRN   1 tablet at 21 1010   • [COMPLETED] iopamidol (ISOVUE-370) 76 % injection 100 mL  100 mL Intravenous Once in imaging Azael Noland MD   90 mL at 21 1751   • ipratropium-albuterol (DUO-NEB) nebulizer solution 3 mL  3 mL Nebulization Q8H - RT Eleanor Nguyen APRN   3 mL at 21 1036   • [] lactated ringers infusion  100 mL/hr Intravenous Continuous Aurelia Mclain MD    Stopped at 05/23/21 0753   • LORazepam (ATIVAN) tablet 0.5 mg  0.5 mg Oral Q12H Eleanor Nguyen APRN   0.5 mg at 05/23/21 0818   • ondansetron (ZOFRAN) tablet 4 mg  4 mg Oral Q6H PRN Eleanor Nguyen APRN       • pantoprazole (PROTONIX) EC tablet 40 mg  40 mg Oral Daily Eleanor Nguyen APRN   40 mg at 05/23/21 0818   • PHENobarbital (LUMINAL) tablet 150 mg- patient supplied med  150 mg Oral Daily Eleanor Nguyen APRN       • [COMPLETED] piperacillin-tazobactam (ZOSYN) 3.375 g in iso-osmotic dextrose 50 ml (premix)  3.375 g Intravenous Once Aurelia Mclain MD   3.375 g at 05/22/21 2059   • piperacillin-tazobactam (ZOSYN) 3.375 g in iso-osmotic dextrose 50 ml (premix)  3.375 g Intravenous Q8H Aurelia Mclain MD   3.375 g at 05/23/21 0554   • rosuvastatin (CRESTOR) tablet 40 mg  40 mg Oral Nightly Eleanor Nguyen APRN   40 mg at 05/22/21 2059   • [COMPLETED] sodium chloride 0.9 % bolus 1,212 mL  30 mL/kg Intravenous Once Santosh Queen APRN   Stopped at 05/22/21 1821   • sodium chloride 0.9 % flush 10 mL  10 mL Intravenous PRN Eleanor Nguyen APRN       • sodium chloride 0.9 % flush 10 mL  10 mL Intravenous Q12H Eleanor Nguyen APRN   10 mL at 05/23/21 0818   • sodium chloride 0.9 % flush 10 mL  10 mL Intravenous PRN Eleanor Nguyen APRN       • [COMPLETED] vancomycin in dextrose 5% 150 mL (VANCOCIN) IVPB 750 mg  20 mg/kg Intravenous Once Santosh Queen APRN   Stopped at 05/22/21 1950       Lab Results (last 24 hours)     Procedure Component Value Units Date/Time    MRSA Screen, PCR (Inpatient) - Swab, Nares [[id number]]  (Normal) Collected: 05/23/21 0816    Specimen: Swab from Nares Updated: 05/23/21 0943     MRSA PCR Negative    Narrative:      MRSA Negative    Respiratory Culture - Sputum, Cough [[id number]] Collected: 05/23/21 0921    Specimen: Sputum from Cough Updated: 05/23/21 0929    Basic Metabolic Panel [[id number]]  (Abnormal) Collected: 05/23/21 0453    Specimen: Blood Updated: 05/23/21 0553     Glucose 88 mg/dL      BUN  11 mg/dL      Creatinine 0.57 mg/dL      Sodium 136 mmol/L      Potassium 4.0 mmol/L      Comment: Slight hemolysis detected by analyzer. Results may be affected.        Chloride 105 mmol/L      CO2 25.0 mmol/L      Calcium 8.4 mg/dL      eGFR Non African Amer 104 mL/min/1.73      BUN/Creatinine Ratio 19.3     Anion Gap 6.0 mmol/L     Narrative:      GFR Normal >60  Chronic Kidney Disease <60  Kidney Failure <15      CBC & Differential [[id number]]  (Abnormal) Collected: 05/23/21 0453    Specimen: Blood Updated: 05/23/21 0540    Narrative:      The following orders were created for panel order CBC & Differential.  Procedure                               Abnormality         Status                     ---------                               -----------         ------                     CBC Auto Differential[[id number]]        Abnormal            Final result                 Please view results for these tests on the individual orders.    CBC Auto Differential [[id number]]  (Abnormal) Collected: 05/23/21 0453    Specimen: Blood Updated: 05/23/21 0540     WBC 8.20 10*3/mm3      RBC 3.33 10*6/mm3      Hemoglobin 9.9 g/dL      Hematocrit 33.6 %      .9 fL      MCH 29.7 pg      MCHC 29.5 g/dL      RDW 17.2 %      RDW-SD 63.5 fl      MPV 10.0 fL      Platelets 201 10*3/mm3      Neutrophil % 65.8 %      Lymphocyte % 25.0 %      Monocyte % 7.6 %      Eosinophil % 1.1 %      Basophil % 0.1 %      Immature Grans % 0.4 %      Neutrophils, Absolute 5.40 10*3/mm3      Lymphocytes, Absolute 2.05 10*3/mm3      Monocytes, Absolute 0.62 10*3/mm3      Eosinophils, Absolute 0.09 10*3/mm3      Basophils, Absolute 0.01 10*3/mm3      Immature Grans, Absolute 0.03 10*3/mm3      nRBC 0.0 /100 WBC     Troponin [[id number]]  (Abnormal) Collected: 05/22/21 2232    Specimen: Blood Updated: 05/23/21 0100     Troponin T 0.051 ng/mL     Narrative:      Troponin T Reference Range:  <= 0.03 ng/mL-   Negative for AMI  >0.03 ng/mL-     Abnormal for  myocardial necrosis.  Clinicians would have to utilize clinical acumen, EKG, Troponin and serial changes to determine if it is an Acute Myocardial Infarction or myocardial injury due to an underlying chronic condition.       Results may be falsely decreased if patient taking Biotin.      Troponin [[id number]]  (Abnormal) Collected: 05/22/21 2106    Specimen: Blood Updated: 05/22/21 2205     Troponin T 0.039 ng/mL     Narrative:      Troponin T Reference Range:  <= 0.03 ng/mL-   Negative for AMI  >0.03 ng/mL-     Abnormal for myocardial necrosis.  Clinicians would have to utilize clinical acumen, EKG, Troponin and serial changes to determine if it is an Acute Myocardial Infarction or myocardial injury due to an underlying chronic condition.       Results may be falsely decreased if patient taking Biotin.      [place] Draw [[id number]] Collected: 05/22/21 1500    Specimen: Blood Updated: 05/22/21 1915    Narrative:      The following orders were created for panel order [place] Draw.  Procedure                               Abnormality         Status                     ---------                               -----------         ------                     Light Blue Top[[id number]]                                   Final result               Green Top (Gel)[[id number]]                                  Final result               Lavender Top[[id number]]                                     Final result               Gold Top - SST[[id number]]                                   Final result               Cast Top[[id number]]                                         Final result                 Please view results for these tests on the individual orders.    Gray Top [[id number]] Collected: 05/22/21 1500    Specimen: Blood Updated: 05/22/21 1915     Extra Tube Hold for add-ons.     Comment: Auto resulted.       Urinalysis With Microscopic If Indicated (No Culture) - Urine, Catheter [[id number]]  (Abnormal) Collected: 05/22/21 0873     Specimen: Urine, Catheter Updated: 05/22/21 1841     Color, UA Yellow     Appearance, UA Clear     pH, UA 8.5     Specific Gravity, UA 1.013     Glucose, UA Negative     Ketones, UA Negative     Bilirubin, UA Negative     Blood, UA Negative     Protein,  mg/dL (2+)     Leuk Esterase, UA Negative     Nitrite, UA Negative     Urobilinogen, UA 0.2 E.U./dL    Urinalysis, Microscopic Only - Urine, Catheter [[id number]] Collected: 05/22/21 1823    Specimen: Urine, Catheter Updated: 05/22/21 1841     RBC, UA 0-2 /HPF      WBC, UA None Seen /HPF      Bacteria, UA None Seen /HPF      Squamous Epithelial Cells, UA None Seen /HPF      Hyaline Casts, UA None Seen /LPF      Methodology Automated Microscopy    Urinalysis With Culture If Indicated - Urine, Catheter [[id number]]  (Abnormal) Collected: 05/22/21 1823    Specimen: Urine, Catheter Updated: 05/22/21 1841     Color, UA Yellow     Appearance, UA Clear     pH, UA 8.5     Specific Gravity, UA 1.013     Glucose, UA Negative     Ketones, UA Negative     Bilirubin, UA Negative     Blood, UA Negative     Protein,  mg/dL (2+)     Leuk Esterase, UA Negative     Nitrite, UA Negative     Urobilinogen, UA 0.2 E.U./dL    BNP [[id number]]  (Abnormal) Collected: 05/22/21 1500    Specimen: Blood Updated: 05/22/21 1702     proBNP 2,774.0 pg/mL     Narrative:      Among patients with dyspnea, NT-proBNP is highly sensitive for the detection of acute congestive heart failure. In addition NT-proBNP of <300 pg/ml effectively rules out acute congestive heart failure with 99% negative predictive value.    Results may be falsely decreased if patient taking Biotin.      Lavender Top [[id number]] Collected: 05/22/21 1500    Specimen: Blood Updated: 05/22/21 1615     Extra Tube hold for add-on     Comment: Auto resulted       Light Blue Top [[id number]] Collected: 05/22/21 1500    Specimen: Blood Updated: 05/22/21 1615     Extra Tube hold for add-on     Comment: Auto resulted       Gold Top  - SST [[id number]] Collected: 05/22/21 1500    Specimen: Blood Updated: 05/22/21 1615     Extra Tube Hold for add-ons.     Comment: Auto resulted.       Green Top (Gel) [[id number]] Collected: 05/22/21 1500    Specimen: Blood Updated: 05/22/21 1615     Extra Tube Hold for add-ons.     Comment: Auto resulted.       CBC & Differential [[id number]]  (Abnormal) Collected: 05/22/21 1500    Specimen: Blood Updated: 05/22/21 1604    Narrative:      The following orders were created for panel order CBC & Differential.  Procedure                               Abnormality         Status                     ---------                               -----------         ------                     Scan Slide[[id number]]                   Normal              Final result               CBC Auto Differential[[id number]]        Abnormal            Final result                 Please view results for these tests on the individual orders.    Scan Slide [[id number]]  (Normal) Collected: 05/22/21 1500    Specimen: Blood Updated: 05/22/21 1604     RBC Morphology Normal     WBC Morphology Normal     Platelet Morphology Normal    CBC Auto Differential [[id number]]  (Abnormal) Collected: 05/22/21 1500    Specimen: Blood Updated: 05/22/21 1604     WBC 15.05 10*3/mm3      RBC 4.05 10*6/mm3      Hemoglobin 12.2 g/dL      Hematocrit 38.7 %      MCV 95.6 fL      MCH 30.1 pg      MCHC 31.5 g/dL      RDW 17.2 %      RDW-SD 59.9 fl      MPV 10.1 fL      Platelets 303 10*3/mm3      Neutrophil % 82.5 %      Lymphocyte % 10.7 %      Monocyte % 6.2 %      Eosinophil % 0.0 %      Basophil % 0.1 %      Immature Grans % 0.5 %      Neutrophils, Absolute 12.41 10*3/mm3      Lymphocytes, Absolute 1.61 10*3/mm3      Monocytes, Absolute 0.94 10*3/mm3      Eosinophils, Absolute 0.00 10*3/mm3      Basophils, Absolute 0.02 10*3/mm3      Immature Grans, Absolute 0.07 10*3/mm3      nRBC 0.0 /100 WBC     Narrative:      Appended report. These results have been appended to a  "previously verified report.    Procalcitonin [[id number]]  (Normal) Collected: 05/22/21 1500    Specimen: Blood Updated: 05/22/21 1601     Procalcitonin 0.11 ng/mL     Narrative:      As a Marker for Sepsis (Non-Neonates):     1. <0.5 ng/mL represents a low risk of severe sepsis and/or septic shock.  2. >2 ng/mL represents a high risk of severe sepsis and/or septic shock.    As a Marker for Lower Respiratory Tract Infections that require antibiotic therapy:  PCT on Admission     Antibiotic Therapy             6-12 Hrs later  >0.5                          Strongly Recommended            >0.25 - <0.5             Recommended  0.1 - 0.25                  Discouraged                       Remeasure/reassess PCT  <0.1                         Strongly Discouraged         Remeasure/reassess PCT      As 28 day mortality risk marker: \"Change in Procalcitonin Result\" (>80% or <=80%) if Day 0 (or Day 1) and Day 4 values are available. Refer to [email or web address]://[email or web address] [email or web address]-pct-calculator.[email or web address]/    Change in PCT <=80 %   A decrease of PCT levels below or equal to 80% defines a positive change in PCT test result representing a higher risk for 28-day all-cause mortality of patients diagnosed with severe sepsis or septic shock.    Change in PCT >80 %   A decrease of PCT levels of more than 80% defines a negative change in PCT result representing a lower risk for 28-day all-cause mortality of patients diagnosed with severe sepsis or septic shock.              Results may be falsely decreased if patient taking Biotin.     Phenobarbital Level [[id number]]  (Normal) Collected: 05/22/21 1500    Specimen: Blood Updated: 05/22/21 1556     Phenobarbital 11.1 mcg/mL     Troponin [[id number]]  (Abnormal) Collected: 05/22/21 1500    Specimen: Blood Updated: 05/22/21 1555     Troponin T 0.050 ng/mL     Narrative:      Troponin T Reference Range:  <= 0.03 ng/mL-   Negative for AMI  >0.03 ng/mL-     Abnormal for myocardial necrosis.  Clinicians would have to " utilize clinical acumen, EKG, Troponin and serial changes to determine if it is an Acute Myocardial Infarction or myocardial injury due to an underlying chronic condition.       Results may be falsely decreased if patient taking Biotin.      Comprehensive Metabolic Panel [[id number]]  (Abnormal) Collected: 05/22/21 1500    Specimen: Blood Updated: 05/22/21 1554     Glucose 72 mg/dL      BUN 10 mg/dL      Creatinine 0.55 mg/dL      Sodium 131 mmol/L      Potassium 4.1 mmol/L      Chloride 95 mmol/L      CO2 25.0 mmol/L      Calcium 9.2 mg/dL      Total Protein 7.9 g/dL      Albumin 4.00 g/dL      ALT (SGPT) 38 U/L      AST (SGOT) 35 U/L      Alkaline Phosphatase 126 U/L      Total Bilirubin 0.4 mg/dL      eGFR Non African Amer 109 mL/min/1.73      Globulin 3.9 gm/dL      A/G Ratio 1.0 g/dL      BUN/Creatinine Ratio 18.2     Anion Gap 11.0 mmol/L     Narrative:      GFR Normal >60  Chronic Kidney Disease <60  Kidney Failure <15      Magnesium [[id number]]  (Normal) Collected: 05/22/21 1500    Specimen: Blood Updated: 05/22/21 1554     Magnesium 1.8 mg/dL     Lactic Acid, Plasma [[id number]]  (Normal) Collected: 05/22/21 1500    Specimen: Blood Updated: 05/22/21 1552     Lactate 1.4 mmol/L      Comment: Falsely depressed results may occur on samples drawn from patients receiving N-Acetylcysteine (NAC) or Metamizole.       COVID PRE-OP / PRE-PROCEDURE SCREENING ORDER (NO ISOLATION) - Swab, Nasopharynx [[id number]]  (Normal) Collected: 05/22/21 1511    Specimen: Swab from Nasopharynx Updated: 05/22/21 1542    Narrative:      The following orders were created for panel order COVID PRE-OP / PRE-PROCEDURE SCREENING ORDER (NO ISOLATION) - Swab, Nasopharynx.  Procedure                               Abnormality         Status                     ---------                               -----------         ------                     COVID-19 and FLU A/B PCR...[[id number]]  Normal              Final result                 Please view  results for these tests on the individual orders.    COVID-19 and FLU A/B PCR - Swab, Nasopharynx [[id number]]  (Normal) Collected: 05/22/21 1511    Specimen: Swab from Nasopharynx Updated: 05/22/21 1542     COVID19 Not Detected     Influenza A PCR Not Detected     Influenza B PCR Not Detected    Blood Culture - Blood, Wrist, Right [[id number]] Collected: 05/22/21 1450    Specimen: Blood from Wrist, Right Updated: 05/22/21 1538    Blood Culture - Blood, Arm, Left [[id number]] Collected: 05/22/21 1455    Specimen: Blood from Arm, Left Updated: 05/22/21 1537        Imaging Results (Last 24 Hours)     Procedure Component Value Units Date/Time    CT Angiogram Chest [[id number]] Collected: 05/22/21 1754     Updated: 05/22/21 1854    Narrative:      EXAMINATION: CT ANGIOGRAM CHEST, CT ABDOMEN/PELVIS W CONTRAST -  05/22/2021      INDICATION: Pulmonary embolus. Shortness of air. Fever.     TECHNIQUE: CT angiogram chest with intravenous contrast administration  with 2D reconstructions performed, CT abdomen and pelvis with  intravenous contrast.     The radiation dose reduction device was turned on for each scan per the  ALARA (As Low as Reasonably Achievable) protocol.     COMPARISON: CT chest dated 03/28/2021     FINDINGS:      CTA CHEST: Thyroid is homogeneous in attenuation. No bulky mediastinal  adenopathy. Central airways are patent. Esophagus in normal course and  caliber. Atherosclerotic nonaneurysmal thoracic aorta. Satisfactory  opacification and contrast bolus timing of the pulmonary arterial tree  for evaluation without filling defect to suggest pulmonary embolus.  Pulmonary arterial enlargement measuring 4 cm at the level of the main  pulmonary artery of pulmonary arterial hypertension involvement and  pruning of the central pulmonary arterial tree. Cardiac size borderline  enlarged status post median sternotomy and CABG without pericardial  effusion. Extended lung windows reveal biapical  pleural-parenchymal  scarring with mild centrilobular emphysema in the upper lungs as well as  mild central bronchiectasis. Compared to 03/20/2021, there has been  interval development of confluent opacifications in the left lower lung  of acute airspace disease or bronchopneumonia approaching soft tissue  density although likely dense airspace disease given interval  progression timing with resolution of pleural effusions from prior.  Degenerative changes of the thoracic spine without aggressive osseous  findings demonstrating stable compression deformities of the T11 and T12  levels. No soft tissue body wall findings of acuity.     CT ABDOMEN AND PELVIS: Liver without focal lesion. Gallbladder  surgically absent. No biliary dilatation. Pancreas and spleen  unremarkable. Adrenals demonstrate a left adrenal nodule measuring 14 mm  with low-attenuation of lipid rich adenoma likely. Kidneys without  hydronephrosis or hydroureter demonstrating simple appearing bilateral  renal cortical cysts. No bulky retroperitoneal adenopathy.  Atherosclerotic nonaneurysmal patent abdominal aorta with patent celiac  and SMA origins. Portal veins and IVC patent. GI tract evaluation  without focal thickening or disproportionate dilatation of bowel  demonstrating postsurgical changes in the lower mid abdomen stable from  prior. No free fluid or intra-abdominal free air with artifact from  metallic density overlying the left suprapubic region. Severely  distended urinary bladder with mild circumferential wall thickening of  the bladder, however, no acute inflammatory process. Distal colonic  segments unremarkable. Postsurgical changes in the pelvis without free  fluid or bulky pelvic adenopathy. Degenerative changes of the spine and  pelvis without aggressive osseous lesion. No soft tissue body wall  findings of acuity.       Impression:      1. No pulmonary embolism.     2. Pulmonary arterial hypertension.     3. Compared to 03/20/2021  there has been interval development of  confluent opacifications in the left lower lung of acute airspace  disease or bronchopneumonia approaching soft tissue density although  likely dense airspace disease given interval progression timing with  resolution of pleural effusions from prior. Findings favor  bronchopneumonia with attention to follow-up given overall appearance  approaching soft tissue density.     4. No acute findings within the abdomen or pelvis, however, severely  distended urinary bladder may represent components of urinary outlet  obstruction with artifact from overlying metallic density in the  suprapubic region.     DICTATED:   05/22/2021  EDITED/ls :   05/22/2021        This report was finalized on 5/22/2021 6:51 PM by Dr. Josh Shepard.       CT Abdomen Pelvis With Contrast [[id number]] Collected: 05/22/21 1754     Updated: 05/22/21 1854    Narrative:      EXAMINATION: CT ANGIOGRAM CHEST, CT ABDOMEN/PELVIS W CONTRAST -  05/22/2021      INDICATION: Pulmonary embolus. Shortness of air. Fever.     TECHNIQUE: CT angiogram chest with intravenous contrast administration  with 2D reconstructions performed, CT abdomen and pelvis with  intravenous contrast.     The radiation dose reduction device was turned on for each scan per the  ALARA (As Low as Reasonably Achievable) protocol.     COMPARISON: CT chest dated 03/28/2021     FINDINGS:      CTA CHEST: Thyroid is homogeneous in attenuation. No bulky mediastinal  adenopathy. Central airways are patent. Esophagus in normal course and  caliber. Atherosclerotic nonaneurysmal thoracic aorta. Satisfactory  opacification and contrast bolus timing of the pulmonary arterial tree  for evaluation without filling defect to suggest pulmonary embolus.  Pulmonary arterial enlargement measuring 4 cm at the level of the main  pulmonary artery of pulmonary arterial hypertension involvement and  pruning of the central pulmonary arterial tree. Cardiac size  borderline  enlarged status post median sternotomy and CABG without pericardial  effusion. Extended lung windows reveal biapical pleural-parenchymal  scarring with mild centrilobular emphysema in the upper lungs as well as  mild central bronchiectasis. Compared to 03/20/2021, there has been  interval development of confluent opacifications in the left lower lung  of acute airspace disease or bronchopneumonia approaching soft tissue  density although likely dense airspace disease given interval  progression timing with resolution of pleural effusions from prior.  Degenerative changes of the thoracic spine without aggressive osseous  findings demonstrating stable compression deformities of the T11 and T12  levels. No soft tissue body wall findings of acuity.     CT ABDOMEN AND PELVIS: Liver without focal lesion. Gallbladder  surgically absent. No biliary dilatation. Pancreas and spleen  unremarkable. Adrenals demonstrate a left adrenal nodule measuring 14 mm  with low-attenuation of lipid rich adenoma likely. Kidneys without  hydronephrosis or hydroureter demonstrating simple appearing bilateral  renal cortical cysts. No bulky retroperitoneal adenopathy.  Atherosclerotic nonaneurysmal patent abdominal aorta with patent celiac  and SMA origins. Portal veins and IVC patent. GI tract evaluation  without focal thickening or disproportionate dilatation of bowel  demonstrating postsurgical changes in the lower mid abdomen stable from  prior. No free fluid or intra-abdominal free air with artifact from  metallic density overlying the left suprapubic region. Severely  distended urinary bladder with mild circumferential wall thickening of  the bladder, however, no acute inflammatory process. Distal colonic  segments unremarkable. Postsurgical changes in the pelvis without free  fluid or bulky pelvic adenopathy. Degenerative changes of the spine and  pelvis without aggressive osseous lesion. No soft tissue body wall  findings  of acuity.       Impression:      1. No pulmonary embolism.     2. Pulmonary arterial hypertension.     3. Compared to 03/20/2021 there has been interval development of  confluent opacifications in the left lower lung of acute airspace  disease or bronchopneumonia approaching soft tissue density although  likely dense airspace disease given interval progression timing with  resolution of pleural effusions from prior. Findings favor  bronchopneumonia with attention to follow-up given overall appearance  approaching soft tissue density.     4. No acute findings within the abdomen or pelvis, however, severely  distended urinary bladder may represent components of urinary outlet  obstruction with artifact from overlying metallic density in the  suprapubic region.     DICTATED:   05/22/2021  EDITED/ls :   05/22/2021        This report was finalized on 5/22/2021 6:51 PM by Dr. Josh Shepard.       XR Chest 1 View [[id number]] Collected: 05/22/21 1514     Updated: 05/22/21 1854    Narrative:         EXAMINATION: XR CHEST 1 VW - 05/22/2021     INDICATION: Weakness, dizziness, altered mental status.     COMPARISON: Chest x-ray 05/03/2021     FINDINGS: Cardiac size within normal limits status post median  sternotomy and CABG. No consolidation or overt edema. No pneumothorax.       Impression:      Chronic changes without acute cardiopulmonary process.     DICTATED:   05/22/2021  EDITED/ls :   05/22/2021     This report was finalized on 5/22/2021 6:51 PM by Dr. Josh Shepard.           Physician Progress Notes (last 24 hours) (Notes from 05/22/21 1108 through 05/23/21 1108)    No notes of this type exist for this encounter.            Consult Notes (last 24 hours) (Notes from 05/22/21 1108 through 05/23/21 1108)      An Cruz MATHIEU at 05/22/21 2104          Pharmacy Consult-Vancomycin Dosing  An Abreu is a  70 y.o. female receiving vancomycin therapy.     Indication: pneumonia  Consulting Provider: Eleanor Nguyen  "APRN  ID Consult:  no    Goal AUC: 400 - 600 mg/L*hr    Current Antimicrobial Therapy  Anti-Infectives (From admission, onward)      Ordered     Dose/Rate Route Frequency Start Stop    05/22/21 2037  piperacillin-tazobactam (ZOSYN) 3.375 g in iso-osmotic dextrose 50 ml (premix)     Ordering Provider: Aurelia Mclain MD    3.375 g  over 4 Hours Intravenous Every 8 Hours 05/23/21 0600 05/30/21 0559    05/22/21 2037  piperacillin-tazobactam (ZOSYN) 3.375 g in iso-osmotic dextrose 50 ml (premix)     Ordering Provider: Aurelia Mclain MD    3.375 g  over 30 Minutes Intravenous Once 05/22/21 2130 05/22/21 2037  Pharmacy to dose vancomycin     Note to Pharmacy: An Abreu  4H, S-477  By Eleanor PANIAGUA   Ordering Provider: Eleanor Nguyen APRN     Does not apply Continuous PRN 05/22/21 2037 05/22/21 1617  aztreonam (AZACTAM) 1 g/100 mL 0.9% NS IVPB (mbp)     Ordering Provider: Santosh Queen APRN    1 g  over 30 Minutes Intravenous Once 05/22/21 1619 05/22/21 1902 05/22/21 1617  vancomycin in dextrose 5% 150 mL (VANCOCIN) IVPB 750 mg     Ordering Provider: Santosh Queen APRN    20 mg/kg × 40.4 kg Intravenous Once 05/22/21 1619 05/22/21 1950            Allergies  Allergies as of 05/22/2021 - Reviewed 05/22/2021   Allergen Reaction Noted   • Keflex [cephalexin] Shortness Of Breath and Rash 05/18/2016   • Sulfamethoxazole-trimethoprim Shortness Of Breath and Rash 05/18/2016   • Carbamazepine  05/18/2016   • Codeine  05/18/2016       Labs    Results from last 7 days   Lab Units 05/22/21  1500   BUN mg/dL 10   CREATININE mg/dL 0.55*       Results from last 7 days   Lab Units 05/22/21  1500   WBC 10*3/mm3 15.05*       Evaluation of Dosing     Last Dose Received in the ED/Outside Facility: 20 mg/kg (750 mg) at 19:02  Is Patient on Dialysis or Renal Replacement: no    Ht - 149.9 cm (59\")  Wt - 40.4 kg (89 lb)    Estimated Creatinine Clearance: 40.5 mL/min (A) (by C-G formula based on SCr of 0.55 mg/dL " (L)).    Intake & Output (last 3 days)       None            Microbiology and Radiology  Microbiology Results (last 10 days)       Procedure Component Value - Date/Time    COVID PRE-OP / PRE-PROCEDURE SCREENING ORDER (NO ISOLATION) - Swab, Nasopharynx [[id number]]  (Normal) Collected: 05/22/21 1511    Lab Status: Final result Specimen: Swab from Nasopharynx Updated: 05/22/21 1542    Narrative:      The following orders were created for panel order COVID PRE-OP / PRE-PROCEDURE SCREENING ORDER (NO ISOLATION) - Swab, Nasopharynx.  Procedure                               Abnormality         Status                     ---------                               -----------         ------                     COVID-19 and FLU A/B PCR...[[id number]]  Normal              Final result                 Please view results for these tests on the individual orders.    COVID-19 and FLU A/B PCR - Swab, Nasopharynx [[id number]]  (Normal) Collected: 05/22/21 1511    Lab Status: Final result Specimen: Swab from Nasopharynx Updated: 05/22/21 1542     COVID19 Not Detected     Influenza A PCR Not Detected     Influenza B PCR Not Detected            Reported Vancomycin Levels  N/A     [employer] AUC Calculation:    Current AUC:   n/a    Predicted Steady State AUC on Current Dose: n/a  _________________________________    Predicted Steady State AUC on New Dose:   n/a    Assessment/Plan:  Vanco 20mg/kg x 1 (given).  Random vanc level 5-23 1200  Further doses to be determined    An Cruz RPH  5/22/2021  21:04 EDT      Electronically signed by An Cruz RPH at 05/22/21 9489

## 2021-05-23 NOTE — PROGRESS NOTES
[facility]  PROGRESS NOTE    Patient Name: An Abreu  : 1950  MRN: [id number]    Date of Admission: 2021  Primary Care Physician: Zabrina Lemon MD    Subjective   Subjective     CC:  Pneumonia    HPI:  No fever since presentation.  Still dyspneic above her baseline.     ROS:  Gen- No fevers, chills, HA, uncontrolled pain  CV- No chest pain, palpitations, new edema  Resp- see above  GI- No N/V/D, abd pain, constipation  Skin - No rash      Objective   Objective     Vital Signs:   Temp:  [97.9 °F (36.6 °C)-101.8 °F (38.8 °C)] 98.1 °F (36.7 °C)  Heart Rate:  [61-95] 68  Resp:  [18-24] 20  BP: (122-159)/(56-83) 129/59        Physical Exam:  Constitutional: No acute distress, awake, alert, nontoxic, cachetic body habitus  Respiratory: harsh wheezes bilaterally, dyspneic effort, 4L O2  Cardiovascular: RRR, no murmur  Musculoskeletal: No peripheral edema, normal muscle tone for age  Psychiatric: Appropriate affect, good insight and judgement, cooperative      Results Reviewed:  Results from last 7 days   Lab Units 21  0453 05/22/21  1500   WBC 10*3/mm3 8.20 15.05*   HEMOGLOBIN g/dL 9.9* 12.2   HEMATOCRIT % 33.6* 38.7   PLATELETS 10*3/mm3 201 303   PROCALCITONIN ng/mL  --  0.11     Results from last 7 days   Lab Units 21  0453 05/22/21  2232 21  21021  1500   SODIUM mmol/L 136  --   --  131*   POTASSIUM mmol/L 4.0  --   --  4.1   CHLORIDE mmol/L 105  --   --  95*   CO2 mmol/L 25.0  --   --  25.0   BUN mg/dL 11  --   --  10   CREATININE mg/dL 0.57  --   --  0.55*   GLUCOSE mg/dL 88  --   --  72   CALCIUM mg/dL 8.4*  --   --  9.2   ALT (SGPT) U/L  --   --   --  38*   AST (SGOT) U/L  --   --   --  35*   TROPONIN T ng/mL  --  0.051* 0.039* 0.050*   PROBNP pg/mL  --   --   --  2,774.0*     Estimated Creatinine Clearance: 36.8 mL/min (by C-G formula based on SCr of 0.57 mg/dL).    Microbiology Results Abnormal     Procedure  Component Value - Date/Time    MRSA Screen, PCR (Inpatient) - Swab, Nares [[id number]]  (Normal) Collected: 05/23/21 0816    Lab Status: Final result Specimen: Swab from Nares Updated: 05/23/21 0943     MRSA PCR Negative    Narrative:      MRSA Negative    COVID PRE-OP / PRE-PROCEDURE SCREENING ORDER (NO ISOLATION) - Swab, Nasopharynx [[id number]]  (Normal) Collected: 05/22/21 1511    Lab Status: Final result Specimen: Swab from Nasopharynx Updated: 05/22/21 1542    Narrative:      The following orders were created for panel order COVID PRE-OP / PRE-PROCEDURE SCREENING ORDER (NO ISOLATION) - Swab, Nasopharynx.  Procedure                               Abnormality         Status                     ---------                               -----------         ------                     COVID-19 and FLU A/B PCR...[[id number]]  Normal              Final result                 Please view results for these tests on the individual orders.    COVID-19 and FLU A/B PCR - Swab, Nasopharynx [[id number]]  (Normal) Collected: 05/22/21 1511    Lab Status: Final result Specimen: Swab from Nasopharynx Updated: 05/22/21 1542     COVID19 Not Detected     Influenza A PCR Not Detected     Influenza B PCR Not Detected          Imaging Results (Last 24 Hours)     Procedure Component Value Units Date/Time    CT Angiogram Chest [[id number]] Collected: 05/22/21 1754     Updated: 05/22/21 1854    Narrative:      EXAMINATION: CT ANGIOGRAM CHEST, CT ABDOMEN/PELVIS W CONTRAST -  05/22/2021      INDICATION: Pulmonary embolus. Shortness of air. Fever.     TECHNIQUE: CT angiogram chest with intravenous contrast administration  with 2D reconstructions performed, CT abdomen and pelvis with  intravenous contrast.     The radiation dose reduction device was turned on for each scan per the  ALARA (As Low as Reasonably Achievable) protocol.     COMPARISON: CT chest dated 03/28/2021     FINDINGS:      CTA CHEST: Thyroid is homogeneous in attenuation. No bulky  mediastinal  adenopathy. Central airways are patent. Esophagus in normal course and  caliber. Atherosclerotic nonaneurysmal thoracic aorta. Satisfactory  opacification and contrast bolus timing of the pulmonary arterial tree  for evaluation without filling defect to suggest pulmonary embolus.  Pulmonary arterial enlargement measuring 4 cm at the level of the main  pulmonary artery of pulmonary arterial hypertension involvement and  pruning of the central pulmonary arterial tree. Cardiac size borderline  enlarged status post median sternotomy and CABG without pericardial  effusion. Extended lung windows reveal biapical pleural-parenchymal  scarring with mild centrilobular emphysema in the upper lungs as well as  mild central bronchiectasis. Compared to 03/20/2021, there has been  interval development of confluent opacifications in the left lower lung  of acute airspace disease or bronchopneumonia approaching soft tissue  density although likely dense airspace disease given interval  progression timing with resolution of pleural effusions from prior.  Degenerative changes of the thoracic spine without aggressive osseous  findings demonstrating stable compression deformities of the T11 and T12  levels. No soft tissue body wall findings of acuity.     CT ABDOMEN AND PELVIS: Liver without focal lesion. Gallbladder  surgically absent. No biliary dilatation. Pancreas and spleen  unremarkable. Adrenals demonstrate a left adrenal nodule measuring 14 mm  with low-attenuation of lipid rich adenoma likely. Kidneys without  hydronephrosis or hydroureter demonstrating simple appearing bilateral  renal cortical cysts. No bulky retroperitoneal adenopathy.  Atherosclerotic nonaneurysmal patent abdominal aorta with patent celiac  and SMA origins. Portal veins and IVC patent. GI tract evaluation  without focal thickening or disproportionate dilatation of bowel  demonstrating postsurgical changes in the lower mid abdomen stable  from  prior. No free fluid or intra-abdominal free air with artifact from  metallic density overlying the left suprapubic region. Severely  distended urinary bladder with mild circumferential wall thickening of  the bladder, however, no acute inflammatory process. Distal colonic  segments unremarkable. Postsurgical changes in the pelvis without free  fluid or bulky pelvic adenopathy. Degenerative changes of the spine and  pelvis without aggressive osseous lesion. No soft tissue body wall  findings of acuity.       Impression:      1. No pulmonary embolism.     2. Pulmonary arterial hypertension.     3. Compared to 03/20/2021 there has been interval development of  confluent opacifications in the left lower lung of acute airspace  disease or bronchopneumonia approaching soft tissue density although  likely dense airspace disease given interval progression timing with  resolution of pleural effusions from prior. Findings favor  bronchopneumonia with attention to follow-up given overall appearance  approaching soft tissue density.     4. No acute findings within the abdomen or pelvis, however, severely  distended urinary bladder may represent components of urinary outlet  obstruction with artifact from overlying metallic density in the  suprapubic region.     DICTATED:   05/22/2021  EDITED/ls :   05/22/2021        This report was finalized on 5/22/2021 6:51 PM by Dr. Josh Shepard.       CT Abdomen Pelvis With Contrast [[id number]] Collected: 05/22/21 1754     Updated: 05/22/21 1854    Narrative:      EXAMINATION: CT ANGIOGRAM CHEST, CT ABDOMEN/PELVIS W CONTRAST -  05/22/2021      INDICATION: Pulmonary embolus. Shortness of air. Fever.     TECHNIQUE: CT angiogram chest with intravenous contrast administration  with 2D reconstructions performed, CT abdomen and pelvis with  intravenous contrast.     The radiation dose reduction device was turned on for each scan per the  ALARA (As Low as Reasonably Achievable) protocol.      COMPARISON: CT chest dated 03/28/2021     FINDINGS:      CTA CHEST: Thyroid is homogeneous in attenuation. No bulky mediastinal  adenopathy. Central airways are patent. Esophagus in normal course and  caliber. Atherosclerotic nonaneurysmal thoracic aorta. Satisfactory  opacification and contrast bolus timing of the pulmonary arterial tree  for evaluation without filling defect to suggest pulmonary embolus.  Pulmonary arterial enlargement measuring 4 cm at the level of the main  pulmonary artery of pulmonary arterial hypertension involvement and  pruning of the central pulmonary arterial tree. Cardiac size borderline  enlarged status post median sternotomy and CABG without pericardial  effusion. Extended lung windows reveal biapical pleural-parenchymal  scarring with mild centrilobular emphysema in the upper lungs as well as  mild central bronchiectasis. Compared to 03/20/2021, there has been  interval development of confluent opacifications in the left lower lung  of acute airspace disease or bronchopneumonia approaching soft tissue  density although likely dense airspace disease given interval  progression timing with resolution of pleural effusions from prior.  Degenerative changes of the thoracic spine without aggressive osseous  findings demonstrating stable compression deformities of the T11 and T12  levels. No soft tissue body wall findings of acuity.     CT ABDOMEN AND PELVIS: Liver without focal lesion. Gallbladder  surgically absent. No biliary dilatation. Pancreas and spleen  unremarkable. Adrenals demonstrate a left adrenal nodule measuring 14 mm  with low-attenuation of lipid rich adenoma likely. Kidneys without  hydronephrosis or hydroureter demonstrating simple appearing bilateral  renal cortical cysts. No bulky retroperitoneal adenopathy.  Atherosclerotic nonaneurysmal patent abdominal aorta with patent celiac  and SMA origins. Portal veins and IVC patent. GI tract evaluation  without focal  thickening or disproportionate dilatation of bowel  demonstrating postsurgical changes in the lower mid abdomen stable from  prior. No free fluid or intra-abdominal free air with artifact from  metallic density overlying the left suprapubic region. Severely  distended urinary bladder with mild circumferential wall thickening of  the bladder, however, no acute inflammatory process. Distal colonic  segments unremarkable. Postsurgical changes in the pelvis without free  fluid or bulky pelvic adenopathy. Degenerative changes of the spine and  pelvis without aggressive osseous lesion. No soft tissue body wall  findings of acuity.       Impression:      1. No pulmonary embolism.     2. Pulmonary arterial hypertension.     3. Compared to 03/20/2021 there has been interval development of  confluent opacifications in the left lower lung of acute airspace  disease or bronchopneumonia approaching soft tissue density although  likely dense airspace disease given interval progression timing with  resolution of pleural effusions from prior. Findings favor  bronchopneumonia with attention to follow-up given overall appearance  approaching soft tissue density.     4. No acute findings within the abdomen or pelvis, however, severely  distended urinary bladder may represent components of urinary outlet  obstruction with artifact from overlying metallic density in the  suprapubic region.     DICTATED:   05/22/2021  EDITED/ls :   05/22/2021        This report was finalized on 5/22/2021 6:51 PM by Dr. Josh Shepard.       XR Chest 1 View [[id number]] Collected: 05/22/21 1514     Updated: 05/22/21 1854    Narrative:         EXAMINATION: XR CHEST 1 VW - 05/22/2021     INDICATION: Weakness, dizziness, altered mental status.     COMPARISON: Chest x-ray 05/03/2021     FINDINGS: Cardiac size within normal limits status post median  sternotomy and CABG. No consolidation or overt edema. No pneumothorax.       Impression:      Chronic changes  without acute cardiopulmonary process.     DICTATED:   05/22/2021  EDITED/ls :   05/22/2021     This report was finalized on 5/22/2021 6:51 PM by Dr. Josh Shepard.             Results for orders placed during the hospital encounter of 05/03/21    Adult Transthoracic Echo Complete W/ Cont if Necessary Per Protocol    Interpretation Summary  · Estimated left ventricular EF = 70%  · Moderate aortic valve regurgitation is present.      I have reviewed the medications:  Scheduled Meds:aspirin, 81 mg, Oral, Daily  citalopram, 20 mg, Oral, Daily  clopidogrel, 75 mg, Oral, Daily  dilTIAZem CD, 240 mg, Oral, Daily  ferrous sulfate, 325 mg, Oral, Daily With Breakfast  guaiFENesin, 600 mg, Oral, Q12H  heparin (porcine), 5,000 Units, Subcutaneous, Q12H  ipratropium-albuterol, 3 mL, Nebulization, Q8H - RT  LORazepam, 0.5 mg, Oral, Q12H  pantoprazole, 40 mg, Oral, Daily  PHENobarbital, 150 mg, Oral, Daily  piperacillin-tazobactam, 3.375 g, Intravenous, Q8H  rosuvastatin, 40 mg, Oral, Nightly  sodium chloride, 10 mL, Intravenous, Q12H      Continuous Infusions:   PRN Meds:.•  acetaminophen  •  albuterol  •  HYDROcodone-acetaminophen  •  ondansetron  •  sodium chloride  •  sodium chloride    Assessment/Plan   Assessment & Plan     Active Hospital Problems    Diagnosis  POA   • **Pneumonia [J18.9]  Yes   • Chronic respiratory failure with hypoxia (CMS/HCC) [J96.11]  Yes   • Severe malnutrition (CMS/HCC) [E43]  Yes   • Paroxysmal atrial fibrillation (CMS/HCC) [I48.0]  Yes   • Seizure disorder (CMS/HCC) [G40.909]  Yes   • Chronic obstructive pulmonary disease with acute exacerbation (CMS/HCC) [J44.1]  Yes      Resolved Hospital Problems    Diagnosis Date Resolved POA   • Severe sepsis (CMS/HCC) [A41.9, R65.20] 05/23/2021 Yes        Brief Hospital Course to date:  An Abreu is a 70 y.o. female with history of COPD on chronic 4 L of oxygen, paroxysmal atrial fibrillation, malnutrition, who presented to the ED with fever, N/V/D  and shortness of breath.  Patient was recently admitted from 5/3/2021 to 5/6/2021 for lethargy, hypoglycemia, hypercapnic respiratory failure.      This patient's problems and plans were partially entered by my partner and updated as appropriate by me 05/23/21.        LLL Bronchopneumonia in the setting of chronic COPD with chronic respiratory failure on 4 L at baseline  Severe sepsis  - MRSA PCR neg -- d/c Vanc  - continue IV Zosyn   - add Pulmicort nebs, increase frequency of duo-nebs  - add BID IV solumedrol     Elevated troponins, type II  -Suspect is demand ischemia in the setting of severe sepsis     Diarrhea  -C. difficile and stool PCR ordered but not collected yet, if no diarrhea noted today then will cancel    COPD  Chronic Resp Failure  --wears O2 at home at 4 L at baseline     DVT Prophylaxis:  SC hep      Disposition: I expect the patient to be discharged ~2 day pending course    CODE STATUS:   Code Status and Medical Interventions:   Ordered at: 05/22/21 1848     Code Status:    CPR     Medical Interventions (Level of Support Prior to Arrest):    Full       Siri Cruz MD  05/23/21

## 2021-05-23 NOTE — CONSULTS
Nutrition Services    Patient Name:  An Abreu  YOB: 1950  MRN: [id number]  Admit Date:  5/22/2021                      Clinical Nutrition     Nutrition Assessment  Reason for Visit:   BMI, Malnutrition Severity Assessment, Physician consult      Patient Name: An Abreu  YOB: 1950  MRN: [id number]  Date of Encounter: 05/23/21 19:34 EDT  Admission date: 5/22/2021      Comments: Pt meets criteria for non severe chronic malnutrition based on fat wasting and some muscle wasting. Pt rpts she eats extra food to maintain wt w COPD.           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    Applicable PMH/PSxH:     PMH: She  has a past medical history of Aneurysm (CMS/[facility]), Angina pectoris (CMS/HCC) (6/20/2016), Anxiety (6/20/2016), Arthritis (6/20/2016), CAD (coronary artery disease), Carotid artery stenosis, CHF (congestive heart failure) (CMS/[facility]), Chronic coronary artery disease (6/20/2016), Chronic left-sided low back pain with left-sided sciatica (2/1/2017), Chronic obstructive pulmonary disease (CMS/HCC) (6/20/2016), Chronic respiratory failure with hypoxia (CMS/[facility]) (3/27/2021), Cobalamin deficiency (6/20/2016), Congestive heart failure (CMS/HCC) (6/20/2016), COPD (chronic obstructive pulmonary disease) (CMS/[facility]), Depression (7/3/2018), Diverticulosis, Diverticulosis of large intestine without hemorrhage (5/5/2017), GERD (gastroesophageal reflux disease), GIB (gastrointestinal bleeding), HTN (hypertension), Hypercholesterolemia (6/20/2016), Hyperlipidemia, Mesenteric ischemia (CMS/HCC) (2006), Mood disorder (CMS/[facility]), Oxygen dependent, PAF (paroxysmal atrial  "fibrillation) (CMS/[facility]), Paroxysmal atrial fibrillation (CMS/HCC) (8/7/2017), PFO (patent foramen ovale), 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   PSxH: She  has a past surgical history that includes Appendectomy; Cholecystectomy; Colostomy (2006); Hysterectomy; Iliac artery stent; Exploratory laparotomy; and Revision / takedown colostomy (2008).         Diet/Nutrition Related History:     Pt rpts she eats extra food to maintain wt w COPD.  Has used suppl         Anthropometrics     Admission Height  149.9 cm (59\") Documented at 05/22/2021 1435   Admission Weight  40.4 kg (89 lb) Documented at 05/22/2021 1435        Height: 149.9 cm (59\")    Last filed wt: Weight: 36.7 kg (80 lb 14.5 oz) (05/23/21 0526)  Weight Method: Bed scale    BMI: BMI (Calculated): 16.3  Underweight:<18.5kg/m2    Ideal Body Weight (IBW) (kg): 43.57    Weight Change   UBW: Per EMR lowest wt stated 74 lb lowest measured 80 lb, highest measured (bed wt) 89 lb - role of fluid unclear  Weight change:   % wt change:   Time frame of weight loss:     Labs reviewed   Yes  Pertinent: pBNP 5/22: 2,774 on adm: 3,774    Results from last 7 days   Lab Units 05/23/21  0453 05/22/21  1500   GLUCOSE mg/dL 88 72   BUN mg/dL 11 10   CREATININE mg/dL 0.57 0.55*   SODIUM mmol/L 136 131*   CHLORIDE mmol/L 105 95*   POTASSIUM mmol/L 4.0 4.1   MAGNESIUM mg/dL  --  1.8   ALT (SGPT) U/L  --  38*     Results from last 7 days   Lab Units 05/22/21  1500   ALBUMIN g/dL 4.00             Lab Results   Lab Value Date/Time    HGBA1C 5.50 05/04/2021 0716    HGBA1C 5.10 11/22/2020 0549         Medications reviewed   Yes  Pertinent:  Abc, Insulin, Protonix, Pericolace, received MgSO4      Intake/Ouptut 24 hrs reviewed "   Yes      Nutrition Focused Physical Exam     Pt meets criteria for non severe chronic malnutrition based on fat wasting and some muscle wasting.     Current Nutrition Prescription     PO: Diet Regular  Orders Placed This Encounter      Dietary Nutrition Supplements Boost Plus; chocolate  2x/da added per RD    Intake: 100% x 3 meals        Nutrition Diagnosis     5/23  Problem Malnutrition  chronic non severe   Etiology Multiple dx including COPD   Signs/Symptoms Minimal fat store and some muscle wasting  (note low body wt appears reasonably stable and eats well)   Status:  5/23  Problem Underweight   Etiology Multiple dx including COPD   Signs/Symptoms BMI 16.34 82% IBW   Status:     Goal:   General: Nutrition support treatment  PO: Maintain intake  Additional goals: no wt loss      Nutrition Intervention     Follow treatment progress, Care plan reviewed, Advise alternate selection, Menu provided, Menu adjusted, Supplement provided      Monitoring/Evaluation:   Per protocol, PO intake, Supplement intake, Pertinent labs, Weight, Symptoms        Taryn Khan RD,   Time Spent: 30 min        Electronically signed by:  Taryn Khan RD  05/23/21 19:33 EDT

## 2021-05-23 NOTE — THERAPY EVALUATION
Patient Name: An Abreu  : 1950    MRN: [id number]                              Today's Date: 2021       Admit Date: 2021    Visit Dx:     ICD-10-CM ICD-9-CM   1. SIRS (systemic inflammatory response syndrome) (CMS/[facility])  R65.10 995.90   2. Pneumonia due to infectious organism, unspecified laterality, unspecified part of lung  J18.9 486   3. Leukocytosis, unspecified type  D72.829 288.60   4. Acute abdominal pain  R10.9 789.00     338.19   5. Fever in adult  R50.9 780.60     Patient Active Problem List   Diagnosis   • Gastroesophageal reflux disease   • Chronic obstructive pulmonary disease with acute exacerbation (CMS/[facility])   • Essential hypertension   • Seizure disorder (CMS/[facility])   • Paroxysmal atrial fibrillation (CMS/[facility])   • Acute urinary retention   • Fecal occult blood test positive   • Dependence on supplemental oxygen   • Tobacco abuse   • PAF (paroxysmal atrial fibrillation) (CMS/[facility])   • Acute respiratory failure with hypercapnia (CMS/[facility])   • Seizure disorder (CMS/[facility])   • CAD (coronary artery disease)   • Severe malnutrition (CMS/[facility])   • Macrocytic anemia   • Pneumonia   • Fall   • COPD with acute exacerbation (CMS/[facility])   • Humerus fracture   • Chronic respiratory failure with hypoxia (CMS/[facility])   • Moderate malnutrition (CMS/[facility])   • Hypoglycemia   • Elevated troponin   • Metabolic encephalopathy     Past Medical History:   Diagnosis Date   • Aneurysm (CMS/[facility])    • Angina pectoris (CMS/[facility]) 2016   • Anxiety 2016   • Arthritis 2016   • CAD (coronary artery disease)    • Carotid artery stenosis    • CHF (congestive heart failure) (CMS/[facility])    • Chronic coronary artery disease 2016    2003 CABG LIMA to LAD, VG to OM  VG to OM occluded. LIMA patent Cx intervention and RCA  stent for ISR  ISR and stenting of CX and RCA  normal MPS   • Chronic left-sided low back pain with left-sided sciatica 2017   • Chronic obstructive pulmonary disease  "(CMS/[facility]) 6/20/2016   • Chronic respiratory failure with hypoxia (CMS/[facility]) 3/27/2021   • Cobalamin deficiency 6/20/2016   • Congestive heart failure (CMS/[facility]) 6/20/2016   • COPD (chronic obstructive pulmonary disease) (CMS/H[facility])    • Depression 7/3/2018   • Diverticulosis    • Diverticulosis of large intestine without hemorrhage 5/5/2017   • GERD (gastroesophageal reflux disease)    • GIB (gastrointestinal bleeding)     recurrent    • HTN (hypertension)    • Hypercholesterolemia 6/20/2016   • Hyperlipidemia    • Mesenteric ischemia (CMS/[facility]) 2006    s/p resection    • Mood disorder (CMS/HCC)    • Oxygen dependent     3 liters @ all times.    • PAF (paroxysmal atrial fibrillation) (CMS/HCC)    • Paroxysmal atrial fibrillation (CMS/HCC) 8/7/2017   • PFO (patent foramen ovale)    • PVD (peripheral vascular disease) (CMS/HCC)    • Restless legs syndrome 6/20/2016   • Seizure disorder (CMS/HCC) 6/20/2016   • Seizures (CMS/HCC)    • Stenosis of carotid artery 6/20/2016   • Vertigo 9/28/2016     Past Surgical History:   Procedure Laterality Date   • APPENDECTOMY     • CHOLECYSTECTOMY     • COLOSTOMY  2006    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   • EXPLORATORY LAPAROTOMY      sec to ovarian cysts   • HYSTERECTOMY     • ILIAC ARTERY STENT     • REVISION / TAKEDOWN COLOSTOMY  2008     General Information     Row Name 05/23/21 0838          Physical Therapy Time and Intention    Document Type  evaluation  -DM     Mode of Treatment  physical therapy  -DM     Row Name 05/23/21 0838          General Information    Patient Profile Reviewed  yes  -DM     Prior Level of Function  independent:;feeding;min assist:;all household mobility;gait;transfer;bed mobility;max assist:;dressing;bathing;dependent:;home management;cooking;cleaning;driving;shopping min A for gt w/ R wx(short dist.,on 4L o2);dtr assists w/bathing,dsg, & does all HM tasks, driv,shop,laund;HHPT d/c'd week ago (\"too rough on me\"),but HHSN was to see again 5-24  -DM     Existing " "Precautions/Restrictions  fall;oxygen therapy device and L/min;seizures;other (see comments) contact/spore; recent L arm fx s/p fall 2-19-21,& hosp [facility] 5-3 to 5-6 w/ hypogly.,met cathy swanson.;2 prev. back fxs/sx;s/p 2nd Covid vacc 5-11,onset N/V/D,fever,cough,decr.vision  -DM     Barriers to Rehab  medically complex;previous functional deficit;visual deficit;hearing deficit recent vis.changes;hearing def.(VC's repeated 2-3 x's)  -DM     Row Name 05/23/21 0838          Living Environment    Lives With  child(nash), adult;other (see comments) dtr & gr.grandkids (states grdtr in [place],leaving children behind)  -DM     Row Name 05/23/21 0838          Home Main Entrance    Number of Stairs, Main Entrance  four  -DM     Stair Railings, Main Entrance  railings on both sides of stairs  -DM     Row Name 05/23/21 0838          Stairs Within Home, Primary    Stairs, Within Home, Primary  1-st. w/ tub/show,show.ch.  -DM     Number of Stairs, Within Home, Primary  none  -DM     Row Name 05/23/21 0838          Cognition    Orientation Status (Cognition)  oriented x 3 init stating \"HHSN coming today(Monday)\", then re-oriented by PT  -DM     Row Name 05/23/21 0838          Safety Issues, Functional Mobility    Safety Issues Affecting Function (Mobility)  awareness of need for assistance;insight into deficits/self-awareness;positioning of assistive device;safety precaution awareness;safety precautions follow-through/compliance;sequencing abilities  -DM     Impairments Affecting Function (Mobility)  balance;endurance/activity tolerance;pain;postural/trunk control;shortness of breath;strength;visual/perceptual  -DM       User Key  (r) = Recorded By, (t) = Taken By, (c) = Cosigned By    Initials Name Provider Type    DM Mehnaz Reddy, PT Physical Therapist        Mobility     Row Name 05/23/21 0838          Bed Mobility    Bed Mobility  rolling left;scooting/bridging;supine-sit  -DM     Rolling Left [place] (Bed Mobility)  " "modified independence cues to reach RUE to L bedrail  -DM     Scooting/Bridging [place] (Bed Mobility)  verbal cues;minimum assist (75% patient effort) W/ Draw sheet  -DM     Supine-Sit [place] (Bed Mobility)  verbal cues;moderate assist (50% patient effort)  -DM     Assistive Device (Bed Mobility)  bed rails;draw sheet;head of bed elevated  -DM     Comment (Bed Mobility)  issued ch.Al (no waff cush avail),BSC pan & stool speci pan, gt belt, spore wipes,loaner R wx; pt init ref. Mob.,stating \"too weak;not up to it\";nsg in & helped PT coax pt;Slow transit. movements (cued for logroll d/t c/o incr.back pain & 2 prev fxs); once EOB, 5 min.rest d/t mod SOB, & PLB exer;pulling mask down & o2 off x 2 to blow nose,then impulsively placing cannula incorrectly & asking for inhaler (nsg notified)  -DM     Row Name 05/23/21 0838          Transfers    Comment (Transfers)  cues for HP;Flexed trunk d/t incr LBP  -DM     Row Name 05/23/21 0838          Sit-Stand Transfer    Sit-Stand [place] (Transfers)  verbal cues;minimum assist (75% patient effort)  -DM     Assistive Device (Sit-Stand Transfers)  walker, front-wheeled  -DM     Row Name 05/23/21 0838          Gait/Stairs (Locomotion)    [place] Level (Gait)  verbal cues;minimum assist (75% patient effort)  -DM     Assistive Device (Gait)  walker, front-wheeled  -DM     Distance in Feet (Gait)  5  -DM     Deviations/Abnormal Patterns (Gait)  bilateral deviations;antalgic;base of support, narrow;[place] decreased;stride length decreased Decr step length; incr L hip/LE pain (sciatica); dec WB through LUE d/t fx 2/'21  -DM     Bilateral Gait Deviations  forward flexed posture;heel strike decreased;weight shift ability decreased attempts offloading LLE d/t sciatica  -DM     Comment (Gait/Stairs)  cues to incr step length & GERRI, ext trunk/focus ahead, PLB;mod SOB; Desat to 91% on 4L; HR 80; decl. 2nd gt; Pure wick replaced;asking for Lortab  -DM       User Key  (r) " = Recorded By, (t) = Taken By, (c) = Cosigned By    Initials Name Provider Type    DM Mehnaz Reddy, PT Physical Therapist        Obj/Interventions     Row Name 05/23/21 0838          Range of Motion Comprehensive    General Range of Motion  lower extremity range of motion deficits identified  -DM     Comment, General Range of Motion  B hips gillespie. d/t incr LBP/L sciatica: L by 25-30%, R by 20-25%  -DM     Row Name 05/23/21 0838          Strength Comprehensive (MMT)    General Manual Muscle Testing (MMT) Assessment  lower extremity strength deficits identified  -DM     Comment, General Manual Muscle Testing (MMT) Assessment  L hip & quad 2+ to 3+/5, R hip & quad 3- to 4-/5  -DM     Row Name 05/23/21 0838          Motor Skills    Therapeutic Exercise  hip;knee;ankle  -DM     Row Name 05/23/21 0838          Hip (Therapeutic Exercise)    Hip (Therapeutic Exercise)  AROM (active range of motion);AAROM (active assistive range of motion);isometric exercises  -DM     Hip AROM (Therapeutic Exercise)  bilateral;extension;aDduction;external rotation;internal rotation;sitting;10 repetitions  -DM     Hip AAROM (Therapeutic Exercise)  bilateral;aBduction;flexion;sitting;10 repetitions sitting marches  -DM     Hip Isometrics (Therapeutic Exercise)  bilateral;gluteal sets;sitting;5 repetitions;other (see comments) & abdom sets; c/o exhaustion;only ebony 5 reps  -DM     Row Name 05/23/21 0838          Knee (Therapeutic Exercise)    Knee (Therapeutic Exercise)  AROM (active range of motion);AAROM (active assistive range of motion);isometric exercises  -DM     Knee AROM (Therapeutic Exercise)  bilateral;flexion;extension;SAQ (short arc quad);LAQ (long arc quad);heel slides;sitting;supine;10 repetitions  -DM     Knee AAROM (Therapeutic Exercise)  bilateral;flexion;sitting;10 repetitions min A to init.h.slides on leg rest  -DM     Knee Isometrics (Therapeutic Exercise)  bilateral;hamstring sets;quad sets;sitting;10 repetitions  -DM      Row Name 05/23/21 0838          Ankle (Therapeutic Exercise)    Ankle (Therapeutic Exercise)  AROM (active range of motion);AAROM (active assistive range of motion)  -DM     Ankle AROM (Therapeutic Exercise)  bilateral;dorsiflexion;plantarflexion;sitting;10 repetitions  -DM     Ankle AAROM (Therapeutic Exercise)  bilateral;other (see comments);sitting;10 repetitions AC  -DM     Row Name 05/23/21 0838          Balance    Balance Assessment  sitting static balance;sitting dynamic balance;standing static balance;standing dynamic balance  -DM     Static Sitting Balance  WNL;unsupported;sitting, edge of bed PLB, LE exer  -DM     Dynamic Sitting Balance  unsupported;sitting, edge of bed;sitting in chair;mild impairment recip scooting  -DM     Static Standing Balance  mild impairment;supported;standing trunk ext  -DM     Dynamic Standing Balance  mild impairment;supported;standing WS to init sidesteps  -DM     Balance Interventions  sitting;standing;static;dynamic;weight shifting activity  -DM       User Key  (r) = Recorded By, (t) = Taken By, (c) = Cosigned By    Initials Name Provider Type    DM Mehnaz Reddy, PT Physical Therapist        Goals/Plan     Row Name 05/23/21 0838          Bed Mobility Goal 1 (PT)    Activity/Assistive Device (Bed Mobility Goal 1, PT)  bed mobility activities, all  -DM     [place] Level/Cues Needed (Bed Mobility Goal 1, PT)  minimum assist (75% or more patient effort)  -DM     Time Frame (Bed Mobility Goal 1, PT)  long term goal (LTG);1 week  -DM     Row Name 05/23/21 0838          Transfer Goal 1 (PT)    Activity/Assistive Device (Transfer Goal 1, PT)  sit-to-stand/stand-to-sit;bed-to-chair/chair-to-bed;walker, rolling  -DM     [place] Level/Cues Needed (Transfer Goal 1, PT)  contact guard assist  -DM     Time Frame (Transfer Goal 1, PT)  long term goal (LTG);1 week  -DM     Row Name 05/23/21 0838          Gait Training Goal 1 (PT)    Activity/Assistive Device (Gait Training  Goal 1, PT)  gait (walking locomotion);assistive device use;walker, rolling stable VS; O2 sat > 92%  -DM     [place] Level (Gait Training Goal 1, PT)  contact guard assist  -DM     Distance (Gait Training Goal 1, PT)  25  -DM     Time Frame (Gait Training Goal 1, PT)  long term goal (LTG);1 week  -DM     Row Name 05/23/21 0838          Stairs Goal 1 (PT)    Activity/Assistive Device (Stairs Goal 1, PT)  ascending stairs;descending stairs;using handrail, right  -DM     [place] Level/Cues Needed (Stairs Goal 1, PT)  minimum assist (75% or more patient effort)  -DM     Number of Stairs (Stairs Goal 1, PT)  4  -DM     Time Frame (Stairs Goal 1, PT)  long term goal (LTG);1 week  -DM     Row Name 05/23/21 0838          Patient Education Goal (PT)    Activity (Patient Education Goal, PT)  HEP exer  -DM     [place]/Cues/Accuracy (Memory Goal 2, PT)  demonstrates adequately;verbalizes understanding  -DM     Time Frame (Patient Education Goal, PT)  long term goal (LTG);1 week  -DM       User Key  (r) = Recorded By, (t) = Taken By, (c) = Cosigned By    Initials Name Provider Type    DM Mehnaz Reddy, PT Physical Therapist        Clinical Impression     Row Name 05/23/21 0838          Pain    Additional Documentation  Pain Scale: Numbers Pre/Post-Treatment (Group)  -DM     Row Name 05/23/21 0838          Pain Scale: Numbers Pre/Post-Treatment    Pretreatment Pain Rating  6/10  -DM     Posttreatment Pain Rating  7/10  -DM     Pain Location - Side  Left  -DM     Pain Location  hip;back L hip rad. to Leg,& LBP  -DM     Pain Intervention(s)  Medication (See MAR);Repositioned;Elevated;Rest  -DM     Row Name 05/23/21 0838          Plan of Care Review    Plan of Care Reviewed With  patient  -DM     Progress  improving  -DM     Outcome Summary  PT eval completed. Presents w/ PNAlung infil., r/o ? Cdiff, recent fall w/ fx LUE, Chr LBP (2 prev fxs/sx.), hosp.2 wks ago w/ met enceph & hypokal,decr. LE strength/endurance  & impaired funct mobil. Transf to sitting EOB w/ mod A, STS w/ R wx & min A, amb 5 ft on 4 L o2 w/ R wx & min A (mod SOB & desat to 91%), + performed LE ther exer sup & UIC w/ mult rests d/t exhaustion.Noted HR to 80 (PAF), pale appearance (low HGB 9.9), signif incr SBP, & fatigue. Recommend home w/ dtr's assist & resuming HHPT at d/c.  -DM     Row Name 05/23/21 0838          Therapy Assessment/Plan (PT)    Patient/Family Therapy Goals Statement (PT)  improved funct mobil  -DM     Rehab Potential (PT)  good, to achieve stated therapy goals  -DM     Criteria for Skilled Interventions Met (PT)  yes;meets criteria;skilled treatment is necessary  -DM     Row Name 05/23/21 0838          Vital Signs    Pre Systolic BP Rehab  129  -DM     Pre Treatment Diastolic BP  59  -DM     Post Systolic BP Rehab  145  -DM     Post Treatment Diastolic BP  62  -DM     Pretreatment Heart Rate (beats/min)  68  -DM     Intratreatment Heart Rate (beats/min)  80  -DM     Posttreatment Heart Rate (beats/min)  71  -DM     Pre SpO2 (%)  100  -DM     O2 Delivery Pre Treatment  supplemental O2  -DM     Intra SpO2 (%)  91  -DM     O2 Delivery Intra Treatment  supplemental O2  -DM     Post SpO2 (%)  92  -DM     O2 Delivery Post Treatment  supplemental O2  -DM     Pre Patient Position  Supine  -DM     Intra Patient Position  Standing  -DM     Post Patient Position  Sitting  -DM     Rest Breaks   2  -DM     Row Name 05/23/21 0838          Positioning and Restraints    Pre-Treatment Position  in bed  -DM     Post Treatment Position  chair  -DM     In Chair  notified nsg;reclined;call light within reach;encouraged to call for assist;exit alarm on;legs elevated nsg to place waff cushion when CPD restocks  -DM       User Key  (r) = Recorded By, (t) = Taken By, (c) = Cosigned By    Initials Name Provider Type    Mehnaz Diego, PT Physical Therapist        Outcome Measures     Row Name 05/23/21 0838          How much help from another person do you  currently need...    Turning from your back to your side while in flat bed without using bedrails?  3  -DM     Moving from lying on back to sitting on the side of a flat bed without bedrails?  2  -DM     Moving to and from a bed to a chair (including a wheelchair)?  3  -DM     Standing up from a chair using your arms (e.g., wheelchair, bedside chair)?  3  -DM     Climbing 3-5 steps with a railing?  2  -DM     To walk in hospital room?  3  -DM     AM-PAC 6 Clicks Score (PT)  16  -DM     Row Name 05/23/21 0838          Functional Assessment    Outcome Measure Options  AM-PAC 6 Clicks Basic Mobility (PT)  -DM       User Key  (r) = Recorded By, (t) = Taken By, (c) = Cosigned By    Initials Name Provider Type    DM Mehnaz Reddy, PT Physical Therapist        Physical Therapy Education                 Title: PT OT SLP Therapies (In Progress)     Topic: Physical Therapy (In Progress)     Point: Mobility training (In Progress)     Learning Progress Summary           Patient Acceptance, E,D, NR by DM at 5/23/2021 1101                   Point: Home exercise program (In Progress)     Learning Progress Summary           Patient Acceptance, E,D, NR by DM at 5/23/2021 1101                   Point: Body mechanics (In Progress)     Learning Progress Summary           Patient Acceptance, E,D, NR by DM at 5/23/2021 1101                   Point: Precautions (In Progress)     Learning Progress Summary           Patient Acceptance, E,D, NR by DM at 5/23/2021 1101                               User Key     Initials Effective Dates Name Provider Type Discipline    DM 06/19/15 -  Mehnaz Reddy, PT Physical Therapist PT              PT Recommendation and Plan  Planned Therapy Interventions (PT): balance training, bed mobility training, gait training, home exercise program, patient/family education, postural re-education, stair training, strengthening, transfer training  Plan of Care Reviewed With: patient  Progress: improving  Outcome  Summary: PT eval completed. Presents w/ PNAlung infil., r/o ? Cdiff, recent fall w/ fx LUE, Chr LBP (2 prev fxs/sx.), hosp.2 wks ago w/ met enceph & hypokal,decr. LE strength/endurance & impaired funct mobil. Transf to sitting EOB w/ mod A, STS w/ R wx & min A, amb 5 ft on 4 L o2 w/ R wx & min A (mod SOB & desat to 91%), + performed LE ther exer sup & UIC w/ mult rests d/t exhaustion.Noted HR to 80 (PAF), pale appearance (low HGB 9.9), signif incr SBP, & fatigue. Recommend home w/ dtr's assist & resuming HHPT at d/c.     Time Calculation:   PT Charges     Row Name 05/23/21 1102             Time Calculation    Start Time  0838  -DM      PT Received On  05/23/21  -DM      PT Goal Re-Cert Due Date  06/02/21  -DM         Time Calculation- PT    Total Timed Code Minutes- PT  111 minute(s)  -DM         Timed Charges    49587 - PT Therapeutic Exercise Minutes  21  -DM      39848 - Gait Training Minutes   10  -DM      80384 - PT Therapeutic Activity Minutes  20  -DM         Untimed Charges    PT Eval/Re-eval Minutes  60  -DM         Total Minutes    Timed Charges Total Minutes  51  -DM      Untimed Charges Total Minutes  60  -DM       Total Minutes  111  -DM        User Key  (r) = Recorded By, (t) = Taken By, (c) = Cosigned By    Initials Name Provider Type    DM Mehnaz Reddy, PT Physical Therapist        Therapy Charges for Today     Code Description Service Date Service Provider Modifiers Qty    18146891572 HC PT THER PROC EA 15 MIN 5/23/2021 Mehnaz Reddy, PT GP 1    40760080056 HC GAIT TRAINING EA 15 MIN 5/23/2021 Mehnaz Reddy, PT GP 1    15281499784 HC PT THERAPEUTIC ACT EA 15 MIN 5/23/2021 Mehnaz Reddy, PT GP 1    52618995673 HC PT EVAL MOD COMPLEXITY 4 5/23/2021 Mehnaz Reddy, PT GP 1          PT G-Codes  Outcome Measure Options: AM-PAC 6 Clicks Basic Mobility (PT)  AM-PAC 6 Clicks Score (PT): 16    Mehnaz Reddy, PT  5/23/2021

## 2021-05-23 NOTE — PLAN OF CARE
Problem: Adult Inpatient Plan of Care  Goal: Plan of Care Review  5/23/2021 1756 by Sola Crane, RN  Outcome: Ongoing, Progressing  Flowsheets (Taken 5/23/2021 1756)  Progress: no change  Plan of Care Reviewed With: patient  Outcome Summary: pt up in chair today. o2 at 4l, nebs sched. one incident where pt needed prn neb treatment.  5/23/2021 1755 by Sola Crane, OPAL  Outcome: Ongoing, Progressing  Flowsheets (Taken 5/23/2021 1754)  Plan of Care Reviewed With: patient  Outcome Summary: pt in chair most of day. o2 at 4l. back pain and nausea today, meds given.Daughter at bedside later in the afternoon.  5/23/2021 1753 by Sola Crane RN  Outcome: Ongoing, Progressing   Goal Outcome Evaluation:  Plan of Care Reviewed With: patient  Progress: no change  Outcome Summary: pt up in chair today. o2 at 4l, nebs sched. one incident where pt needed prn neb treatment.

## 2021-05-23 NOTE — PLAN OF CARE
Problem: Adult Inpatient Plan of Care  Goal: Plan of Care Review  Recent Flowsheet Documentation  Taken 5/23/2021 0838 by Mehnaz Reddy, PT  Progress: improving  Plan of Care Reviewed With: patient  Outcome Summary: PT eval completed. Presents w/ PNAlung infil., r/o ? Cdiff, recent fall w/ fx LUE, Chr LBP (2 prev fxs/sx.), hosp.2 wks ago w/ met enceph & hypokal,decr. LE strength/endurance & impaired funct mobil. Transf to sitting EOB w/ mod A, STS w/ R wx & min A, amb 5 ft on 4 L o2 w/ R wx & min A (mod SOB & desat to 91%), + performed LE ther exer sup & UIC w/ mult rests d/t exhaustion.Noted HR to 80 (PAF), pale appearance (low HGB 9.9), signif incr SBP, & fatigue. Recommend home w/ dtr's assist & resuming HHPT at d/c.   Goal Outcome Evaluation:  Plan of Care Reviewed With: patient  Progress: improving  Outcome Summary: PT eval completed. Presents w/ PNAlung infil., r/o ? Cdiff, recent fall w/ fx LUE, Chr LBP (2 prev fxs/sx.), hosp.2 wks ago w/ met enceph & hypokal,decr. LE strength/endurance & impaired funct mobil. Transf to sitting EOB w/ mod A, STS w/ R wx & min A, amb 5 ft on 4 L o2 w/ R wx & min A (mod SOB & desat to 91%), + performed LE ther exer sup & UIC w/ mult rests d/t exhaustion.Noted HR to 80 (PAF), pale appearance (low HGB 9.9), signif incr SBP, & fatigue. Recommend home w/ dtr's assist & resuming HHPT at d/c.

## 2021-05-24 NOTE — PLAN OF CARE
Goal Outcome Evaluation:  Plan of Care Reviewed With: patient  Progress: no change  Outcome Summary: VSS, pt feeling better, ambulated in segal with OT, up to chair, ABX changed to PO, possible d/c tomorrow

## 2021-05-24 NOTE — PLAN OF CARE
Goal Outcome Evaluation:  Plan of Care Reviewed With: patient  Progress: improving  Outcome Summary: OT eval completed Pt presents with deficits in activity tolerance, balance, and strength for ADL completion at baseline, completed 80+80ft functional mob with Shell at FWW with 1 seated rest break required, would benefit from BSC for elevation at home, recom IPOT d/c home with 24/7 assist and HHOT
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
## 2021-05-24 NOTE — PROGRESS NOTES
Malnutrition Severity Assessment    Patient Name:  An Abreu  YOB: 1950  MRN: [id number]  Admit Date:  5/22/2021    Patient meets criteria for : Moderate (non-severe) Malnutrition (Pt meets criteria for non severe chronic malnutrition based on lack of fat store and some muscle wasting. Low body wt chronic with extra food intake to maintain wt.)    Comments:      Malnutrition Severity Assessment  Malnutrition Type: Chronic Disease - Related Malnutrition     Malnutrition Type (last 8 hours)      Malnutrition Severity Assessment     Row Name 05/23/21 2000       Malnutrition Severity Assessment    Malnutrition Type  Chronic Disease - Related Malnutrition    Row Name 05/23/21 2000       Muscle Loss    Loss of Muscle Mass Findings  Moderate    Confucianist Region  Moderate - slight depression    Clavicle Bone Region  Moderate - some protrusion in females, visible in males    Acromion Bone Region  Moderate - acromion may slightly protrude    Scapular Bone Region  Moderate - mild depression, bones may show slightly    Dorsal Hand Region  Moderate - slight depression    Patellar Region  Moderate - patella more prominent, less muscle definition around patella    Anterior Thigh Region  -- mild for condition    Posterior Calf Region  -- mild for condition    Row Name 05/23/21 2000       Fat Loss    Subcutaneous Fat Loss Findings  Severe    Orbital Region   Severe - pronounced hollowness/depression, dark circles, loose saggy skin    Upper Arm Region  Severe - mostly skin, very little space between folds, fingers touch    Thoracic & Lumbar Region  Severe - ribs visible with prominent depressions, iliac crest very prominent    Row Name 05/23/21 2000       Criteria Met (Must meet criteria for severity in at least 2 of these categories: M Wasting, Fat Loss, Fluid, Secondary Signs, Wt. Status, Intake)    Patient meets criteria for   Moderate (non-severe) Malnutrition Pt meets criteria for non severe chronic  malnutrition based on lack of fat store and some muscle wasting. Low body wt chronic with extra food intake to maintain wt.          Electronically signed by:  Taryn Khan RD  05/23/21 20:04 EDT
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
## 2021-05-24 NOTE — CASE MANAGEMENT/SOCIAL WORK
Discharge Planning Assessment  [facility]     Patient Name: An Abreu  MRN: [id number]  Today's Date: 5/24/2021    Admit Date: 5/22/2021    Discharge Needs Assessment     Row Name 05/24/21 1655       Living Environment    Lives With  other (see comments) Lives with various people, her daughter, daughter's best friend, and her own best friend    Current Living Arrangements  home/apartment/condo    Primary Care Provided by  self    Provides Primary Care For  grandchild(nash)    Family Caregiver if Needed  child(nash), adult    Family Caregiver Names  Lory Agee (daughter) [phone]    Quality of Family Relationships  unable to assess    Able to Return to Prior Arrangements  other (see comments)       Resource/Environmental Concerns    Resource/Environmental Concerns  financial    Financial Concerns  medicine, unable to afford    Transportation Concerns  car, none       Transition Planning    Patient/Family Anticipates Transition to  inpatient rehabilitation facility    Patient/Family Anticipated Services at Transition      Transportation Anticipated  family or friend will provide       Discharge Needs Assessment    Equipment Currently Used at Home  commode;nebulizer;oxygen;walker, rolling Oxygen 4L supplied by [facility]    Concerns to be Addressed  discharge planning    Anticipated Changes Related to Illness  inability to care for someone else    Discharge Facility/Level of Care Needs  acute rehab    Current Discharge Risk  chronically ill;financial support inadequate;lack of support system/caregiver    Discharge Coordination/Progress  [facility]P [facility]        Discharge Plan     Row Name 05/24/21 0832       Plan    Plan  IPR vs TBD    Patient/Family in Agreement with Plan  yes    Plan Comments  Spoke with patient at bedside. Reports she lives with multiple people, her daugher, her daughter's friend and her own friend. Patient is frequently hospitalized. Reports she is  independent, has lots of DME.  Current with [facility]. COPD nurse navigator to discuss social needs with IZZY Kendrick.  Discharge plan pending hospital course. Encouraged IPR at discharge.    Final Discharge Disposition Code  30 - still a patient        Continued Care and Services - Admitted Since 5/22/2021    Coordination has not been started for this encounter.     Selected Continued Care - Prior Encounters Includes selections from prior encounters from 2/21/2021 to 5/24/2021    Discharged on 5/6/2021 Admission date: 5/3/2021 - Discharge disposition: Home or Self Care    Home Medical Care     Service Provider Selected Services Address Phone Fax Patient Preferred    [facility] [address] [place] [address] [address] [phone] --                Discharged on 3/29/2021 Admission date: 3/26/2021 - Discharge disposition: Home or Self Care    Home Medical Care     Service Provider Selected Services Address Phone Fax Patient Preferred    [facility] [address] [place] [address] [address] [phone] --                Discharged on 2/21/2021 Admission date: 2/18/2021 - Discharge disposition: Home or Self Care    Home Medical Care     Service Provider Selected Services Address Phone Fax Patient Preferred    [facility] [address], [facility] [address] [phone] --                    Expected Discharge Date and Time     Expected Discharge Date Expected Discharge Time    May 28, 2021         Demographic Summary     Row Name 05/24/21 1652       General Information    Admission Type  inpatient    Arrived From  emergency department    Referral Source  admission list    Preferred Language  English     Used During This Interaction  no        Functional Status     Row Name 05/24/21 1653       Functional Status    Usual Activity Tolerance  good     Current Activity Tolerance  good       Functional Status, IADL    Medications  independent    Meal Preparation  independent    Housekeeping  independent    Laundry  independent    Shopping  independent       Mental Status    General Appearance WDL  WDL       Mental Status Summary    Recent Changes in Mental Status/Cognitive Functioning  no changes       Employment/    Employment Status  retired    Employment/ Comments  [employer]        Psychosocial    No documentation.       Abuse/Neglect    No documentation.       Legal    No documentation.       Substance Abuse    No documentation.       Patient Forms    No documentation.           Dayna Herr RN

## 2021-05-24 NOTE — THERAPY EVALUATION
Patient Name: An Abreu  : 1950    MRN: [id number]                              Today's Date: 2021       Admit Date: 2021    Visit Dx:     ICD-10-CM ICD-9-CM   1. SIRS (systemic inflammatory response syndrome) (CMS/[facility])  R65.10 995.90   2. Pneumonia due to infectious organism, unspecified laterality, unspecified part of lung  J18.9 486   3. Leukocytosis, unspecified type  D72.829 288.60   4. Acute abdominal pain  R10.9 789.00     338.19   5. Fever in adult  R50.9 780.60     Patient Active Problem List   Diagnosis   • Gastroesophageal reflux disease   • Chronic obstructive pulmonary disease with acute exacerbation (CMS/[facility])   • Essential hypertension   • Seizure disorder (CMS/[facility])   • Paroxysmal atrial fibrillation (CMS/[facility])   • Acute urinary retention   • Fecal occult blood test positive   • Dependence on supplemental oxygen   • Tobacco abuse   • PAF (paroxysmal atrial fibrillation) (CMS/[facility])   • Acute respiratory failure with hypercapnia (CMS/[facility])   • Seizure disorder (CMS/[facility])   • CAD (coronary artery disease)   • Severe malnutrition (CMS/[facility])   • Macrocytic anemia   • Pneumonia   • Fall   • COPD with acute exacerbation (CMS/[facility])   • Humerus fracture   • Chronic respiratory failure with hypoxia (CMS/[facility])   • Moderate malnutrition (CMS/[facility])   • Hypoglycemia   • Elevated troponin   • Metabolic encephalopathy   • 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 LIMA patent Cx intervention and RCA  stent for ISR  ISR and stenting of CX and RCA  normal MPS   • Chronic left-sided low back pain  with left-sided sciatica 2/1/2017   • Chronic obstructive pulmonary disease (CMS/[facility]) 6/20/2016   • Chronic respiratory failure with hypoxia (CMS/[facility]) 3/27/2021   • Cobalamin deficiency 6/20/2016   • Congestive heart failure (CMS/[facility]) 6/20/2016   • COPD (chronic obstructive pulmonary disease) (CMS/[facility])    • Depression 7/3/2018   • Diverticulosis    • Diverticulosis of large intestine without hemorrhage 5/5/2017   • GERD (gastroesophageal reflux disease)    • GIB (gastrointestinal bleeding)     recurrent    • HTN (hypertension)    • Hypercholesterolemia 6/20/2016   • Hyperlipidemia    • Mesenteric ischemia (CMS/[facility]) 2006    s/p resection    • Mood disorder (CMS/[facility])    • Oxygen dependent     3 liters @ all times.    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 (CMS/[facility]) 5/23/2021   • PVD (peripheral vascular disease) (CMS/[facility])    • Restless legs syndrome 6/20/2016   • Seizure disorder (CMS/[facility]) 6/20/2016   • Seizures (CMS/[facility])    • Stenosis of carotid artery 6/20/2016   • Vertigo 9/28/2016     Past Surgical History:   Procedure Laterality Date   • APPENDECTOMY     • CHOLECYSTECTOMY     • COLOSTOMY  2006    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   • EXPLORATORY LAPAROTOMY      sec to ovarian cysts   • HYSTERECTOMY     • ILIAC ARTERY STENT     • REVISION / TAKEDOWN COLOSTOMY  2008     General Information     Row Name 05/24/21 1435          OT Time and Intention    Document Type  evaluation  -KF     Mode of Treatment  occupational therapy  -KF     Row Name 05/24/21 1439          General Information    Patient Profile Reviewed  yes  -KF     Prior Level of Function  independent:;feeding;grooming;transfer;bed mobility;all household mobility;mod assist:;bathing;ADL's  -KF     Barriers to Rehab  previous functional deficit  -KF     Row Name 05/24/21 2524          Living Environment     Lives With  child(nash), adult;grandchild(nash)  -[facility] Name 05/24/21 1435          Home Main Entrance    Number of Stairs, Main Entrance  four  -KF     Stair Railings, Main Entrance  railings on both sides of stairs  -     Row Name 05/24/21 1435          Stairs Within Home, Primary    Stairs, Within Home, Primary  tub shower with shower chair, regular commode  -     Number of Stairs, Within Home, Primary  none  -     Row Name 05/24/21 1435          Cognition    Orientation Status (Cognition)  oriented x 4  -KF     Row Name 05/24/21 1435          Safety Issues, Functional Mobility    Safety Issues Affecting Function (Mobility)  insight into deficits/self-awareness;safety precaution awareness;problem-solving;judgment;positioning of assistive device  -     Impairments Affecting Function (Mobility)  balance;endurance/activity tolerance;pain;postural/trunk control;shortness of breath;strength;visual/perceptual  -       User Key  (r) = Recorded By, (t) = Taken By, (c) = Cosigned By    Initials Name Provider Type    KF Cristina Yoder OT Occupational Therapist          Mobility/ADL's     Row Name 05/24/21 1439          Bed Mobility    Comment (Bed Mobility)  UIC upon arrival  -[facility] Name 05/24/21 1439          Transfers    Transfers  sit-stand transfer;toilet transfer  -KF     Comment (Transfers)  cues for HP and seq  -KF     Sit-Stand [place] (Transfers)  contact guard;verbal cues  -KF     [place] Level (Toilet Transfer)  contact guard;verbal cues  -     Assistive Device (Toilet Transfer)  commode, bedside without drop arms;walker, front-wheeled  -[facility] Name 05/24/21 1439          Sit-Stand Transfer    Assistive Device (Sit-Stand Transfers)  walker, front-wheeled  -KF     Row Name 05/24/21 1439          Toilet Transfer    Type (Toilet Transfer)  stand-sit;sit-stand  -[facility] Name 05/24/21 1439          Functional Mobility    Functional Mobility- Ind. Level  minimum assist (75%  patient effort)  -     Functional Mobility- Device  rolling walker  -     Functional Mobility-Distance (Feet)  160 80+80  -KF     Functional Mobility- Safety Issues  supplemental O2;step length decreased;balance decreased during turns  -     Functional Mobility- Comment  progressing towards CGA moments Shell for FWW management, required sitting rest break  -     Row Name 05/24/21 1439          Activities of Daily Living    BADL Assessment/Intervention  upper body dressing;grooming;toileting;lower body dressing  -[facility] Name 05/24/21 1439          Upper Body Dressing Assessment/Training    [place] Level (Upper Body Dressing)  don;doff;front opening garment;moderate assist (50% patient effort)  -     Position (Upper Body Dressing)  unsupported sitting  -     Row Name 05/24/21 1439          Grooming Assessment/Training    [place] Level (Grooming)  wash face, hands;contact guard assist  -     Position (Grooming)  sink side;supported standing  -[facility] Name 05/24/21 1439          Toileting Assessment/Training    [place] Level (Toileting)  minimum assist (75% patient effort);adjust/manage clothing;set up;perform perineal hygiene  -     Assistive Devices (Toileting)  commode, bedside without drop arms  -KF     Position (Toileting)  supported standing;supported sitting  -     Comment (Toileting)  setup hygiene in sitting, Shell for clothing management  -[facility] Name 05/24/21 1439          Lower Body Dressing Assessment/Training    [place] Level (Lower Body Dressing)  don;socks;maximum assist (25% patient effort)  -     Position (Lower Body Dressing)  supported sitting  -       User Key  (r) = Recorded By, (t) = Taken By, (c) = Cosigned By    Initials Name Provider Type    KF Cristina Yoder, OT Occupational Therapist        Obj/Interventions     Row Name 05/24/21 1449          Sensory Assessment (Somatosensory)    Sensory Assessment (Somatosensory)  bilateral UE  -      Bilateral UE Sensory Assessment  impaired states intermittent decreased sensation  -[facility] Name 05/24/21 1445          Vision Assessment/Intervention    Visual Impairment/Limitations  corrective lenses full-time  -[facility] Name 05/24/21 1445          Range of Motion Comprehensive    General Range of Motion  bilateral upper extremity ROM WFL  -KF     Row Name 05/24/21 1445          Strength Comprehensive (MMT)    General Manual Muscle Testing (MMT) Assessment  upper extremity strength deficits identified  -KF     Comment, General Manual Muscle Testing (MMT) Assessment  BUE  3+/5, B elbow flexion/ext 4-/5; B shoulder 4-/5  -KF     [facility] Name 05/24/21 1445          Balance    Balance Assessment  sitting static balance;sitting dynamic balance;standing static balance;standing dynamic balance  -KF     Static Sitting Balance  WNL;unsupported;sitting in chair  -KF     Dynamic Sitting Balance  WFL;mild impairment;unsupported;sitting in chair  -KF     Static Standing Balance  mild impairment;supported;standing  -KF     Dynamic Standing Balance  mild impairment;supported;standing  -KF     Balance Interventions  weight shifting activity;occupation based/functional task;standing  -KF       User Key  (r) = Recorded By, (t) = Taken By, (c) = Cosigned By    Initials Name Provider Type    KF Cristina Yoder, OT Occupational Therapist        Goals/Plan     Row Name 05/24/21 1450          Transfer Goal 1 (OT)    Activity/Assistive Device (Transfer Goal 1, OT)  bed-to-chair/chair-to-bed;toilet;walker, rolling  -KF     [place] Level/Cues Needed (Transfer Goal 1, OT)  standby assist  -KF     Time Frame (Transfer Goal 1, OT)  long term goal (LTG);10 days  -KF     Progress/Outcome (Transfer Goal 1, OT)  goal ongoing  -[facility] Name 05/24/21 1455          Dressing Goal 1 (OT)    Activity/Device (Dressing Goal 1, OT)  lower body dressing undergarment/socks AE PRN  -KF     [place]/Cues Needed (Dressing Goal 1, OT)   minimum assist (75% or more patient effort);verbal cues required  -KF     Time Frame (Dressing Goal 1, OT)  long term goal (LTG);10 days  -KF     Progress/Outcome (Dressing Goal 1, OT)  goal ongoing  -KF     Row Name 05/24/21 0549          Grooming Goal 1 (OT)    Activity/Device (Grooming Goal 1, OT)  hair care;oral care;wash face, hands standing sink side supported  -KF     [place] (Grooming Goal 1, OT)  standby assist  -KF     Time Frame (Grooming Goal 1, OT)  long term goal (LTG);10 days  -KF     Progress/Outcome (Grooming Goal 1, OT)  goal ongoing  -     Row Name 05/24/21 6941          Therapy Assessment/Plan (OT)    Planned Therapy Interventions (OT)  activity tolerance training;patient/caregiver education/training;adaptive equipment training;BADL retraining;ROM/therapeutic exercise;neuromuscular control/coordination retraining;occupation/activity based interventions;strengthening exercise;cognitive/visual perception retraining;transfer/mobility retraining;functional balance retraining  -KF       User Key  (r) = Recorded By, (t) = Taken By, (c) = Cosigned By    Initials Name Provider Type    KF Cristina Yoder, OT Occupational Therapist        Clinical Impression     Row Name 05/24/21 3192          Pain Assessment    Additional Documentation  Pain Scale: Numbers Pre/Post-Treatment (Group)  -KF     Row Name 05/24/21 1173          Pain Scale: Numbers Pre/Post-Treatment    Pretreatment Pain Rating  0/10 - no pain  -KF     Posttreatment Pain Rating  0/10 - no pain  -KF     Pre/Posttreatment Pain Comment  tolerated  -KF     Pain Intervention(s)  Ambulation/increased activity;Repositioned  -     Row Name 05/24/21 4735          Plan of Care Review    Plan of Care Reviewed With  patient  -KF     Progress  improving  -KF     Outcome Summary  OT eval completed Pt presents with deficits in activity tolerance, balance, and strength for ADL completion at baseline, completed 80+80ft functional mob with Shell at  FWW with 1 seated rest break required, would benefit from BSC for elevation at home, recom IPOT d/c home with 24/7 assist and HHOT  -KF     Row Name 05/24/21 1449          Therapy Assessment/Plan (OT)    Rehab Potential (OT)  good, to achieve stated therapy goals  -KF     Criteria for Skilled Therapeutic Interventions Met (OT)  yes;meets criteria;skilled treatment is necessary  -KF     Therapy Frequency (OT)  daily  -KF     Row Name 05/24/21 1447          Therapy Plan Review/Discharge Plan (OT)    Equipment Needs Upon Discharge (OT)  commode chair  -KF     Anticipated Discharge Disposition (OT)  home with 24/7 care;home with home health  -KF     Row Name 05/24/21 1447          Vital Signs    Pre Systolic BP Rehab  -- RN cleared VSS  -KF     Pre SpO2 (%)  95  -KF     O2 Delivery Pre Treatment  supplemental O2 4L at baseline  -KF     O2 Delivery Intra Treatment  supplemental O2  -KF     Post SpO2 (%)  97  -KF     O2 Delivery Post Treatment  supplemental O2  -KF     Pre Patient Position  Sitting  -KF     Intra Patient Position  Standing  -KF     Post Patient Position  Sitting  -KF     Rest Breaks   2  -KF     Row Name 05/24/21 0181          Positioning and Restraints    Pre-Treatment Position  sitting in chair/recliner  -KF     Post Treatment Position  chair  -KF     In Chair  notified nsg;reclined;sitting;call light within reach;encouraged to call for assist;exit alarm on;waffle cushion;legs elevated  -KF       User Key  (r) = Recorded By, (t) = Taken By, (c) = Cosigned By    Initials Name Provider Type    KF Cristina Yoder, OT Occupational Therapist        Outcome Measures     Row Name 05/24/21 5137          How much help from another is currently needed...    Putting on and taking off regular lower body clothing?  2  -KF     Bathing (including washing, rinsing, and drying)  2  -KF     Toileting (which includes using toilet bed pan or urinal)  2  -KF     Putting on and taking off regular upper body clothing  3   -KF     Taking care of personal grooming (such as brushing teeth)  3  -KF     Eating meals  4  -KF     AM-PAC 6 Clicks Score (OT)  16  -KF     Row Name 05/24/21 1457          Functional Assessment    Outcome Measure Options  AM-PAC 6 Clicks Daily Activity (OT)  -       User Key  (r) = Recorded By, (t) = Taken By, (c) = Cosigned By    Initials Name Provider Type     Cristina Yoder OT Occupational Therapist        Occupational Therapy Education                 Title: PT OT SLP Therapies (In Progress)     Topic: Occupational Therapy (Done)     Point: ADL training (Done)     Description:   Instruct learner(s) on proper safety adaptation and remediation techniques during self care or transfers.   Instruct in proper use of assistive devices.              Learning Progress Summary           Patient Acceptance, E,D,TB, VU,DU,NR by  at 5/24/2021 1458                   Point: Home exercise program (Done)     Description:   Instruct learner(s) on appropriate technique for monitoring, assisting and/or progressing therapeutic exercises/activities.              Learning Progress Summary           Patient Acceptance, E,D,TB, VU,DU,NR by  at 5/24/2021 1458                   Point: Precautions (Done)     Description:   Instruct learner(s) on prescribed precautions during self-care and functional transfers.              Learning Progress Summary           Patient Acceptance, E,D,TB, VU,DU,NR by  at 5/24/2021 1458                   Point: Body mechanics (Done)     Description:   Instruct learner(s) on proper positioning and spine alignment during self-care, functional mobility activities and/or exercises.              Learning Progress Summary           Patient Acceptance, E,D,TB, VU,DU,NR by  at 5/24/2021 1458                               User Key     Initials Effective Dates Name Provider Type [facility] 04/03/18 -  Cristina Yoder OT Occupational Therapist OT              OT Recommendation and  Plan  Planned Therapy Interventions (OT): activity tolerance training, patient/caregiver education/training, adaptive equipment training, BADL retraining, ROM/therapeutic exercise, neuromuscular control/coordination retraining, occupation/activity based interventions, strengthening exercise, cognitive/visual perception retraining, transfer/mobility retraining, functional balance retraining  Therapy Frequency (OT): daily  Plan of Care Review  Plan of Care Reviewed With: patient  Progress: improving  Outcome Summary: OT eval completed Pt presents with deficits in activity tolerance, balance, and strength for ADL completion at baseline, completed 80+80ft functional mob with Shell at FWW with 1 seated rest break required, would benefit from BSC for elevation at home, recom IPOT d/c home with 24/7 assist and HHOT     Time Calculation:   Time Calculation- OT     Row Name 05/24/21 1349             Time Calculation- OT    OT Start Time  1338  -KF      OT Received On  05/24/21  -KF      OT Goal Re-Cert Due Date  06/03/21  -KF         Timed Charges    26620 - OT Therapeutic Activity Minutes  21  -KF         Untimed Charges    OT Eval/Re-eval Minutes  61  -KF         Total Minutes    Timed Charges Total Minutes  21  -KF      Untimed Charges Total Minutes  61  -KF       Total Minutes  82  -KF        User Key  (r) = Recorded By, (t) = Taken By, (c) = Cosigned By    Initials Name Provider Type    KF Cristina Yoder OT Occupational Therapist        Therapy Charges for Today     Code Description Service Date Service Provider Modifiers Qty    32119356819  OT THERAPEUTIC ACT EA 15 MIN 5/24/2021 Cristina Yoder OT GO 1    31732199939 HC OT EVAL MOD COMPLEXITY 4 5/24/2021 Cristina Yoder OT GO 1               Cristina Yoder OT  5/24/2021
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
## 2021-05-25 NOTE — PROGRESS NOTES
NN spoke with pt at BS.  Pt alert and able to answer questions appropriately.  Pt O2 sat 96 % on  2 L currently, home O2 use 4L.  Deep breathing exercises encouraged.  COPD action plan reviewed.  No new concerns or questions voiced at this time.  NN will continue to follow as needed.      Per current GOLD Standards, please consider: Refill for rescue at DC, No ICS in place, Outpatient PFT, Pulmonary rehab as appropriate, NRT at DC

## 2021-05-25 NOTE — OUTREACH NOTE
COPD/PN Week 3 Survey      Responses   [facility] patient discharged from?  [facility]   Does the patient have one of the following disease processes/diagnoses(primary or secondary)?  COPD/Pneumonia   Was the primary reason for admission:  COPD exacerbation   Week 3 attempt successful?  No   Unsuccessful attempts  Attempt 1   Revoke  Readmitted          Soo Murphy RN

## 2021-05-25 NOTE — DISCHARGE PLACEMENT REQUEST
"An Abreu (70 y.o. Female) info and order for home 02 at 4/nc  Call Veronica with questions at [phone]    Date of Birth Social Security Number Address Home Phone MRN    1950  [address] [id number]  [id number]    Jew Marital Status          None        Admission Date Admission Type Admitting Provider Attending Provider Department, Room/Bed    5/22/21 Emergency Tereza Zapien MD Opii, [place], MD [address], [address]    Discharge Date Discharge Disposition Discharge Destination         Home or Self Care              Attending Provider: Tereza Zapien MD    Allergies: Keflex [Cephalexin], Sulfamethoxazole-trimethoprim, Carbamazepine, Codeine    Isolation: None   Infection: None   Code Status: CPR    Ht: 149.9 cm (59\")   Wt: 39.1 kg (86 lb 3.2 oz)    Admission Cmt: None   Principal Problem: Pneumonia [J18.9]                 Active Insurance as of 5/22/2021     Primary Coverage     Payor Plan Insurance Group Employer/Plan Group    [facility] MEDICARE REPLACEMENT [employer] MEDICARE REPLACEMENT      Payor Plan Address Payor Plan Phone Number Payor Plan Fax Number Effective Dates    [address] [address] [phone]  1/31/2018 - None Entered    [facility] [address]       Subscriber Name Subscriber Birth Date Member ID       AN ABREU [id number]                 Emergency Contacts      (Rel.) Home Phone Work Phone Mobile Phone    alexi elizabeth (Friend) -- -- [phone]    peter sherwood (Friend) -- -- [phone]    Lory Abreu (POA) (Daughter) [phone] -- [phone]    Stacie Agee (Daughter) -- -- --        05/25/21 1500 -- 90 -- -- 86 %(Abnormal) on Room air at rest  -- -- [address]  [address] [facility] [address]  Dept. Phone:  [phone]  Dept. Fax:  [phone] Date Ordered: May 25, 2021         Patient:  An Abreu MRN:  [id number]   [address] [facility]" "[facility] [address] :  1950  SSN:    Phone: [phone] Sex:  F     Weight: 39.1 kg (86 lb 3.2 oz)         Ht Readings from Last 1 Encounters:   21 149.9 cm (59\")         Oxygen Therapy         (Order ID: [id number])    Diagnosis:  Chronic obstructive pulmonary disease, unspecified COPD type (CMS/HCC) (J44.9 [ICD-10-CM] 496 [ICD-9-CM])  Pneumonia due to infectious organism, unspecified laterality, unspecified part of lung (J18.9 [ICD-10-CM] 486 [ICD-9-CM])   Quantity:  1     Delivery Modality: Nasal Cannula  Liters Per Minute: 4  Duration: Continuous  Equipment:  Oxygen Concentrator &  &  Stationary Gaseous Oxygen System & Contents &  Conserving Regulator  Length of Need (99 Months = Lifetime): 99 Months = Lifetime        Authorizing Provider's Phone: [phone]   Verbal Order Mode: Telephone with readback   Authorizing Provider: Tereza Zapien MD  Authorizing Provider's NPI: [id number]     Order Entered By: Veronica Herman RN 2021  3:16 PM     Electronically signed by: Tereza Zapien MD 2021  3:19 PM               History & Physical      Aurelia Mclain MD at 21 1830              [facility]  HISTORY AND PHYSICAL    Patient Name: An Abreu  : 1950  MRN: [id number]  Primary Care Physician: Zabrina Lemon MD  Date of admission: 2021    Subjective   Subjective     Chief Complaint:  SOB    HPI:  An Abreu is a 70 y.o. female with history of COPD on chronic 4 L of oxygen, paroxysmal atrial fibrillation, malnutrition, who presented to the ED with fever, N/V/D and shortness of breath.  Patient was recently admitted from 5/3/2021 to 2021 for lethargy, hypoglycemia, hypercapnic respiratory failure.  Patient reports that her symptoms started after getting her second Covid vaccine on May 11.  On that day she started having nausea vomiting.  In the second and third days she " started having shortness of breath that has been worsening over the past couple weeks and worse with exertion..  She also started coughing and her abdomen is sore from all of her coughing.  Is productive.  She states that on the fourth day she started having some fevers.  She denies any dysphagia.  She also has diarrhea that started about 6 days ago and happens a couple times a day.  Denies any bleeding.  She also reports vision changes that started about the fourth day after getting the vaccine and she just cannot see.  She also reports that she has been extremely weak and has been unable to ambulate secondary to this.  Reports that she has been eating okay.  She denies any chest pain. She believes that her covid vaccine is the source of her symptoms.    In the ED she was noted to have a fever of one 1.8 with some tachycardia.  She is on 4 L of oxygen which is her baseline.  Troponins were elevated, BNP was elevated.  CT chest concerning for left lower lobe pneumonia.  EKG showed T wave inversions.  Hospital medicine was called for admission.    History above was written and obtained by Dr. Mclain.      COVID Details:    Symptoms:    [] NONE [x] Fever [x]  Cough [x] Shortness of breath [] Change in taste/smell      The patient qualifies to receive the vaccine, but they have not yet received it.  She received 2nd COVID 5/11/21    Review of Systems   Constitutional: Positive for chills, fatigue and fever. Negative for appetite change.   HENT: Negative for trouble swallowing.    Eyes: Negative for visual disturbance.   Respiratory: Positive for cough and shortness of breath.    Cardiovascular: Negative for chest pain and leg swelling.   Gastrointestinal: Positive for diarrhea, nausea and vomiting. Negative for abdominal pain.   Genitourinary: Negative for dysuria.   Musculoskeletal: Negative for back pain and myalgias.   Skin: Negative for rash.   Neurological: Positive for weakness.        All other systems reviewed  and are negative.     Personal History     Past Medical History:   Diagnosis Date   • Aneurysm (CMS/HCC)    • Angina pectoris (CMS/HCC) 6/20/2016   • Anxiety 6/20/2016   • Arthritis 6/20/2016   • CAD (coronary artery disease)    • Carotid artery stenosis    • CHF (congestive heart failure) (CMS/HCC)    • Chronic coronary artery disease 6/20/2016 2003 CABG LIMA to LAD, VG to OM 2004 VG to OM occluded. LIMA patent Cx intervention and RCA 2008 stent for ISR 2013 ISR and stenting of CX and RCA 2016 normal MPS   • Chronic left-sided low back pain with left-sided sciatica 2/1/2017   • Chronic obstructive pulmonary disease (CMS/HCC) 6/20/2016   • Chronic respiratory failure with hypoxia (CMS/[facility]) 3/27/2021   • Cobalamin deficiency 6/20/2016   • Congestive heart failure (CMS/HCC) 6/20/2016   • COPD (chronic obstructive pulmonary disease) (CMS/[facility])    • Depression 7/3/2018   • Diverticulosis    • Diverticulosis of large intestine without hemorrhage 5/5/2017   • GERD (gastroesophageal reflux disease)    • GIB (gastrointestinal bleeding)     recurrent    • HTN (hypertension)    • Hypercholesterolemia 6/20/2016   • Hyperlipidemia    • Mesenteric ischemia (CMS/HCC) 2006    s/p resection    • Mood disorder (CMS/[facility])    • Oxygen dependent     3 liters @ all times.    • PAF (paroxysmal atrial fibrillation) (CMS/HCC)    • Paroxysmal atrial fibrillation (CMS/HCC) 8/7/2017   • PFO (patent foramen ovale)    • PVD (peripheral vascular disease) (CMS/[facility])    • Restless legs syndrome 6/20/2016   • Seizure disorder (CMS/[facility]) 6/20/2016   • Seizures (CMS/[facility])    • Stenosis of carotid artery 6/20/2016   • Vertigo 9/28/2016       Past Surgical History:   Procedure Laterality Date   • APPENDECTOMY     • CHOLECYSTECTOMY     • COLOSTOMY  2006    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   • EXPLORATORY LAPAROTOMY      sec to ovarian cysts   • HYSTERECTOMY     • ILIAC ARTERY STENT     • REVISION / TAKEDOWN COLOSTOMY  2008       Family History: family history  includes Arthritis in her mother; Cancer in her mother; Heart attack in her father; Heart disease in her brother, child, and child; Hyperlipidemia in her father; Hypertension in her father; Stroke in her father. Otherwise pertinent FHx was reviewed and unremarkable.  She reports that her sister was just diagnosed with colon cancer at age 43.  Her brother  approximately 1 month ago of cancer but she cannot say what kind.    Social History:  reports that she has been smoking cigarettes and electronic cigarette. She has a 40.00 pack-year smoking history. She has never used smokeless tobacco. She reports that she does not drink alcohol and does not use drugs.  Social History     Social History Narrative    Lives w/ her daughter. Ambulates w/ a walker. Independent w/ most ADLs.        Medications:  HYDROcodone-acetaminophen, LORazepam, PHENobarbital, albuterol, albuterol sulfate HFA, aspirin, citalopram, clopidogrel, dilTIAZem CD, esomeprazole, ferrous sulfate, guaiFENesin, ipratropium-albuterol, ondansetron, rosuvastatin, and umeclidinium-vilanterol    Allergies   Allergen Reactions   • Keflex [Cephalexin] Shortness Of Breath and Rash     Patients power of  states patient had to be taken to ER due to reaction.    Tolerated Rocephin 21   • Sulfamethoxazole-Trimethoprim Shortness Of Breath and Rash     Patients power of  stated patient had to be taken to ER due to reaction.   • Carbamazepine    • Codeine        Objective   Objective     Vital Signs:   Temp:  [101.8 °F (38.8 °C)] 101.8 °F (38.8 °C)  Heart Rate:  [80-95] 80  Resp:  [24] 24  BP: (128-150)/(65-68) 150/67  Flow (L/min):  [4] 4    Physical Exam   Please see H&P attestation below by Dr. Mclain and she performed the complete physical exam at time of admission.      Results Reviewed:  I have personally reviewed most recent indicated data and agree with findings including:  [x]  Laboratory  [x]  Radiology  [x]  EKG/Telemetry  []   Pathology  []  Cardiac/Vascular Studies  [x]  Old records  []  Other:  Most pertinent findings include:    LAB RESULTS:      Lab 05/22/21  1500   WBC 15.05*   HEMOGLOBIN 12.2   HEMATOCRIT 38.7   PLATELETS 303   NEUTROS ABS 12.41*   IMMATURE GRANS (ABS) 0.07*   LYMPHS ABS 1.61   MONOS ABS 0.94*   EOS ABS 0.00   MCV 95.6   PROCALCITONIN 0.11   LACTATE 1.4         Lab 05/22/21  1500   SODIUM 131*   POTASSIUM 4.1   CHLORIDE 95*   CO2 25.0   ANION GAP 11.0   BUN 10   CREATININE 0.55*   GLUCOSE 72   CALCIUM 9.2   MAGNESIUM 1.8         Lab 05/22/21  1500   TOTAL PROTEIN 7.9   ALBUMIN 4.00   GLOBULIN 3.9   ALT (SGPT) 38*   AST (SGOT) 35*   BILIRUBIN 0.4   ALK PHOS 126*         Lab 05/22/21  1500   PROBNP 2,774.0*   TROPONIN T 0.050*       Brief Urine Lab Results  (Last result in the past 365 days)      Color   Clarity   Blood   Leuk Est   Nitrite   Protein   CREAT   Urine HCG        05/22/21 1823 Yellow Clear Negative Negative Negative 100 mg/dL (2+)         05/22/21 1823 Yellow Clear Negative Negative Negative 100 mg/dL (2+)             Microbiology Results (last 10 days)     Procedure Component Value - Date/Time    COVID PRE-OP / PRE-PROCEDURE SCREENING ORDER (NO ISOLATION) - Swab, Nasopharynx [[id number]]  (Normal) Collected: 05/22/21 1511    Lab Status: Final result Specimen: Swab from Nasopharynx Updated: 05/22/21 1542    Narrative:      The following orders were created for panel order COVID PRE-OP / PRE-PROCEDURE SCREENING ORDER (NO ISOLATION) - Swab, Nasopharynx.  Procedure                               Abnormality         Status                     ---------                               -----------         ------                     COVID-19 and FLU A/B PCR...[[id number]]  Normal              Final result                 Please view results for these tests on the individual orders.    COVID-19 and FLU A/B PCR - Swab, Nasopharynx [[id number]]  (Normal) Collected: 05/22/21 1511    Lab Status: Final result Specimen:  Swab from Nasopharynx Updated: 05/22/21 1542     COVID19 Not Detected     Influenza A PCR Not Detected     Influenza B PCR Not Detected          CT Abdomen Pelvis With Contrast    Result Date: 5/22/2021  EXAMINATION: CT ANGIOGRAM CHEST, CT ABDOMEN/PELVIS W CONTRAST - 05/22/2021  INDICATION: Pulmonary embolus. Shortness of air. Fever.  TECHNIQUE: CT angiogram chest with intravenous contrast administration with 2D reconstructions performed, CT abdomen and pelvis with intravenous contrast.  The radiation dose reduction device was turned on for each scan per the ALARA (As Low as Reasonably Achievable) protocol.  COMPARISON: CT chest dated 03/28/2021  FINDINGS:  CTA CHEST: Thyroid is homogeneous in attenuation. No bulky mediastinal adenopathy. Central airways are patent. Esophagus in normal course and caliber. Atherosclerotic nonaneurysmal thoracic aorta. Satisfactory opacification and contrast bolus timing of the pulmonary arterial tree for evaluation without filling defect to suggest pulmonary embolus. Pulmonary arterial enlargement measuring 4 cm at the level of the main pulmonary artery of pulmonary arterial hypertension involvement and pruning of the central pulmonary arterial tree. Cardiac size borderline enlarged status post median sternotomy and CABG without pericardial effusion. Extended lung windows reveal biapical pleural-parenchymal scarring with mild centrilobular emphysema in the upper lungs as well as mild central bronchiectasis. Compared to 03/20/2021, there has been interval development of confluent opacifications in the left lower lung of acute airspace disease or bronchopneumonia approaching soft tissue density although likely dense airspace disease given interval progression timing with resolution of pleural effusions from prior. Degenerative changes of the thoracic spine without aggressive osseous findings demonstrating stable compression deformities of the T11 and T12 levels. No soft tissue body  wall findings of acuity.  CT ABDOMEN AND PELVIS: Liver without focal lesion. Gallbladder surgically absent. No biliary dilatation. Pancreas and spleen unremarkable. Adrenals demonstrate a left adrenal nodule measuring 14 mm with low-attenuation of lipid rich adenoma likely. Kidneys without hydronephrosis or hydroureter demonstrating simple appearing bilateral renal cortical cysts. No bulky retroperitoneal adenopathy. Atherosclerotic nonaneurysmal patent abdominal aorta with patent celiac and SMA origins. Portal veins and IVC patent. GI tract evaluation without focal thickening or disproportionate dilatation of bowel demonstrating postsurgical changes in the lower mid abdomen stable from prior. No free fluid or intra-abdominal free air with artifact from metallic density overlying the left suprapubic region. Severely distended urinary bladder with mild circumferential wall thickening of the bladder, however, no acute inflammatory process. Distal colonic segments unremarkable. Postsurgical changes in the pelvis without free fluid or bulky pelvic adenopathy. Degenerative changes of the spine and pelvis without aggressive osseous lesion. No soft tissue body wall findings of acuity.      Impression: 1. No pulmonary embolism.  2. Pulmonary arterial hypertension.  3. Compared to 03/20/2021 there has been interval development of confluent opacifications in the left lower lung of acute airspace disease or bronchopneumonia approaching soft tissue density although likely dense airspace disease given interval progression timing with resolution of pleural effusions from prior. Findings favor bronchopneumonia with attention to follow-up given overall appearance approaching soft tissue density.  4. No acute findings within the abdomen or pelvis, however, severely distended urinary bladder may represent components of urinary outlet obstruction with artifact from overlying metallic density in the suprapubic region.  DICTATED:    05/22/2021 EDITED/ls :   05/22/2021       XR Chest 1 View    Result Date: 5/22/2021   EXAMINATION: XR CHEST 1 VW - 05/22/2021  INDICATION: Weakness, dizziness, altered mental status.  COMPARISON: Chest x-ray 05/03/2021  FINDINGS: Cardiac size within normal limits status post median sternotomy and CABG. No consolidation or overt edema. No pneumothorax.      Impression: Chronic changes without acute cardiopulmonary process.  DICTATED:   05/22/2021 EDITED/ls :   05/22/2021       CT Angiogram Chest    Result Date: 5/22/2021  EXAMINATION: CT ANGIOGRAM CHEST, CT ABDOMEN/PELVIS W CONTRAST - 05/22/2021  INDICATION: Pulmonary embolus. Shortness of air. Fever.  TECHNIQUE: CT angiogram chest with intravenous contrast administration with 2D reconstructions performed, CT abdomen and pelvis with intravenous contrast.  The radiation dose reduction device was turned on for each scan per the ALARA (As Low as Reasonably Achievable) protocol.  COMPARISON: CT chest dated 03/28/2021  FINDINGS:  CTA CHEST: Thyroid is homogeneous in attenuation. No bulky mediastinal adenopathy. Central airways are patent. Esophagus in normal course and caliber. Atherosclerotic nonaneurysmal thoracic aorta. Satisfactory opacification and contrast bolus timing of the pulmonary arterial tree for evaluation without filling defect to suggest pulmonary embolus. Pulmonary arterial enlargement measuring 4 cm at the level of the main pulmonary artery of pulmonary arterial hypertension involvement and pruning of the central pulmonary arterial tree. Cardiac size borderline enlarged status post median sternotomy and CABG without pericardial effusion. Extended lung windows reveal biapical pleural-parenchymal scarring with mild centrilobular emphysema in the upper lungs as well as mild central bronchiectasis. Compared to 03/20/2021, there has been interval development of confluent opacifications in the left lower lung of acute airspace disease or bronchopneumonia  approaching soft tissue density although likely dense airspace disease given interval progression timing with resolution of pleural effusions from prior. Degenerative changes of the thoracic spine without aggressive osseous findings demonstrating stable compression deformities of the T11 and T12 levels. No soft tissue body wall findings of acuity.  CT ABDOMEN AND PELVIS: Liver without focal lesion. Gallbladder surgically absent. No biliary dilatation. Pancreas and spleen unremarkable. Adrenals demonstrate a left adrenal nodule measuring 14 mm with low-attenuation of lipid rich adenoma likely. Kidneys without hydronephrosis or hydroureter demonstrating simple appearing bilateral renal cortical cysts. No bulky retroperitoneal adenopathy. Atherosclerotic nonaneurysmal patent abdominal aorta with patent celiac and SMA origins. Portal veins and IVC patent. GI tract evaluation without focal thickening or disproportionate dilatation of bowel demonstrating postsurgical changes in the lower mid abdomen stable from prior. No free fluid or intra-abdominal free air with artifact from metallic density overlying the left suprapubic region. Severely distended urinary bladder with mild circumferential wall thickening of the bladder, however, no acute inflammatory process. Distal colonic segments unremarkable. Postsurgical changes in the pelvis without free fluid or bulky pelvic adenopathy. Degenerative changes of the spine and pelvis without aggressive osseous lesion. No soft tissue body wall findings of acuity.      Impression: 1. No pulmonary embolism.  2. Pulmonary arterial hypertension.  3. Compared to 03/20/2021 there has been interval development of confluent opacifications in the left lower lung of acute airspace disease or bronchopneumonia approaching soft tissue density although likely dense airspace disease given interval progression timing with resolution of pleural effusions from prior. Findings favor bronchopneumonia  with attention to follow-up given overall appearance approaching soft tissue density.  4. No acute findings within the abdomen or pelvis, however, severely distended urinary bladder may represent components of urinary outlet obstruction with artifact from overlying metallic density in the suprapubic region.  DICTATED:   05/22/2021 EDITED/ls :   05/22/2021         Results for orders placed during the hospital encounter of 05/03/21    Adult Transthoracic Echo Complete W/ Cont if Necessary Per Protocol    Interpretation Summary  · Estimated left ventricular EF = 70%  · Moderate aortic valve regurgitation is present.      Assessment/Plan   Assessment & Plan       Pneumonia    Seizure disorder (CMS/HCC)    Paroxysmal atrial fibrillation (CMS/HCC)    Chronic respiratory failure with hypoxia (CMS/HCC)    Severe sepsis (CMS/HCC)    LLL Pneumonia in the setting of chronic COPD with chronic respiratory failure on 4 L at baseline  Severe sepsis  --Presents with fevers of 101.8, tachycardia, leukocytosis, elevated troponins. Currently on baseline oxygen  -Personally reviewed CTA of chest, negative for PE, suspected pneumonia of the left lower lung.  - received aztreonam/vanc in ED, change to Zosyn and vancomycin  --pharmacy to dose vanc  --blood cultures pending  -Sputum culture pending  -Fluids 100 cc an hour of LR for 10 hours given the presence of severe sepsis. Echo reviewed has EF of 75%. Watch respiratory status BNP elevated.    Elevated troponins  -Suspect is demand ischemia in the setting of severe sepsis  -Trend troponins  -Personally reviewed EKG, has T wave inversions. Denies any chest pain.   -Repeat EKG in a.m.    Diarrhea  -Check C. difficile and stool PCR as patient has had multiple hospitalizations.  -CTA was negative but does have    COPD  Chronic Resp Failure  --wears O2 at home at 4 L at baseline    PAF  --continue diltiazem     Seizure disorder  --continue phenobarbital      DVT prophylaxis:  Lovenox    CODE  STATUS: Patient wishes to be done in the event of cardiopulmonary arrest  Code Status and Medical Interventions:   Ordered at: 05/22/21 1848     Code Status:    CPR     Medical Interventions (Level of Support Prior to Arrest):    Full       This note has been completed as part of a split-shared workflow.     Signature: Electronically signed by SIVA Edwards, 05/22/21, 6:57 PM EDT        Attending   Admission Attestation       I have seen and examined the patient, performing an independent face-to-face diagnostic evaluation with plan of care reviewed and developed with the advanced practice clinician (APC).      Brief Summary Statement:   An Abreu is a 70 y.o. female with history of COPD on chronic 4 L of oxygen, paroxysmal atrial fibrillation, malnutrition, who presented to the ED with fever, N/V/D and shortness of breath.  Patient was recently admitted from 5/3/2021 to 5/6/2021 for lethargy, hypoglycemia, hypercapnic respiratory failure.  Patient reports that her symptoms started after getting her second Covid vaccine on May 11.  On that day she started having nausea vomiting.  In the second and third days she started having shortness of breath that has been worsening over the past couple weeks and worse with exertion..  She also started coughing and her abdomen is sore from all of her coughing.  Is productive.  She states that on the fourth day she started having some fevers.  She denies any dysphagia.  She also has diarrhea that started about 6 days ago and happens a couple times a day.  Denies any bleeding.  She also reports vision changes that started about the fourth day after getting the vaccine and she just cannot see.  She also reports that she has been extremely weak and has been unable to ambulate secondary to this.  Reports that she has been eating okay.  She denies any chest pain. She believes that her covid vaccine is the source of her symptoms.    In the ED she was noted to have a fever  of one 101.8 with some tachycardia.  She is on 4 L of oxygen which is her baseline.  Troponins were elevated, BNP was elevated.  CT chest concerning for left lower lobe pneumonia.  EKG showed T wave inversions.  Hospital medicine was called for admission.    Patient presents with severe sepsis secondary to pneumonia with elevated troponin. Presents with fevers, tachycardia, elevated troponin, elevated leukocytosis of 15. CT chest concerning for left lower lobe pneumonia which is consistent with her productive cough and fevers. COVID-19 negative, patient received two doses of a Covid vaccine. She has chronic respiratory failure on 4 L. Received IV Azactam in the ED due to concern for allergy to Keflex. However on further review she previously tolerated Rocephin on 2/19/2021 will go ahead and start IV zosyn. Start duo nebs. Blood cultures and sputum cultures pending. She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

## 2021-05-25 NOTE — CASE MANAGEMENT/SOCIAL WORK
Case Management Discharge Note      Final Note: Home today with change of home 02 DME to [facility]. I called and faxed all clinical information, Order and RA Sats to Marily. Fax# [phone], [facility] #[phone]. All paperwork rec'd and in order per Marily and portable tank should be here in one hour. Resume HH orders faxed and called to [facility]. Soraya @ [phone]         Selected Continued Care - Admitted Since 5/22/2021     Destination    No services have been selected for the patient.              Durable Medical Equipment Coordination complete    Service Provider Selected Services Address Phone Fax Patient Preferred    PATIENT AIDS - [place]  Durable Medical Equipment [address], [facility] [address] [phone] --          Dialysis/Infusion    No services have been selected for the patient.              Home Medical Care Coordination complete    Service Provider Selected Services Address Phone Fax Patient Preferred    [facility] - [facility] [address], [facility] [address] [phone] --          Therapy    No services have been selected for the patient.              Community Resources    No services have been selected for the patient.                Selected Continued Care - Prior Encounters Includes selections from prior encounters from 2/21/2021 to 5/25/2021    Discharged on 5/6/2021 Admission date: 5/3/2021 - Discharge disposition: Home or Self Care    Home Medical Care     Service Provider Selected Services Address Phone Fax Patient Preferred    [facility] - [facility] [address], [facility] [address] [phone] --                Discharged on 3/29/2021 Admission date: 3/26/2021 - Discharge disposition: Home or Self Care    Home Medical Care     Service Provider Selected Services Address Phone Fax Patient Preferred    [facility] - [facility] [address], [facility] [address] [phone] --                Discharged on 2/21/2021 Admission date: 2/18/2021 - Discharge disposition: Home or Self Care    Home Medical Care     Service Provider Selected Services Address Phone Fax Patient Preferred    [facility]  [facility] [address] [address] [phone] --                         Final Discharge Disposition Code: 06 - home with home health care

## 2021-05-26 NOTE — OUTREACH NOTE
Prep Survey      Responses   [facility] patient discharged from?  [place]   Is LACE score < 7 ?  No   Emergency Room discharge w/ pulse ox?  No   Eligibility  [facility]   Date of Admission  05/22/21   Date of Discharge  05/25/21   Discharge Disposition  Home or Self Care   Discharge diagnosis  Severe sepsis,    Pneumonia   Does the patient have one of the following disease processes/diagnoses(primary or secondary)?  Sepsis   Does the patient have Home health ordered?  Yes   What is the Home health agency?   [facility] (resume)   Is there a DME ordered?  Yes   What DME was ordered?  [facility] - O2   Prep survey completed?  Yes          Carly Steen RN

## 2021-05-26 NOTE — CASE MANAGEMENT/SOCIAL WORK
Continued Stay Note  [facility]     Patient Name: An Abreu  MRN: [id number]  Today's Date: 5/26/2021    Admit Date: 5/22/2021    Discharge Plan     Row Name 05/26/21 0906       Plan    Plan  SW    Plan Comments  APS report did not meet certieria        Discharge Codes    No documentation.       Expected Discharge Date and Time     Expected Discharge Date Expected Discharge Time    May 25, 2021             IZZY Moore (Kay)

## 2021-05-26 NOTE — OUTREACH NOTE
Call Center TCM Note      Responses   [facility] patient discharged from?  [place]   Does the patient have one of the following disease processes/diagnoses(primary or secondary)?  Sepsis   TCM attempt successful?  No   Unsuccessful attempts  Attempt 2          Diane Call RN    5/26/2021, 13:48 EDT

## 2021-05-26 NOTE — OUTREACH NOTE
Call Center TCM Note      Responses   [facility] patient discharged from?  [place]   Does the patient have one of the following disease processes/diagnoses(primary or secondary)?  Sepsis   TCM attempt successful?  No [Stacie on verbal release]   Unsuccessful attempts  Attempt 1          Diane Call RN    5/26/2021, 13:43 EDT

## 2021-05-27 NOTE — OUTREACH NOTE
Call Center TCM Note      Responses   [facility] [place] patient discharged from?  [place]   Does the patient have one of the following disease processes/diagnoses(primary or secondary)?  Sepsis   TCM attempt successful?  No   Unsuccessful attempts  Attempt 3          Angelica Watson RN    5/27/2021, 10:51 EDT

## 2021-06-03 NOTE — PROGRESS NOTES
Transitional Care Follow Up Visit  Subjective     An Abreu is a 71 y.o. female who presents for a transitional care management visit.    Within 48 business hours after discharge our office contacted her via telephone to coordinate her care and needs.      I reviewed and discussed the details of that call along with the discharge summary, hospital problems, inpatient lab results, inpatient diagnostic studies, and consultation reports with An.     Current outpatient and discharge medications have been reconciled for the patient.  Reviewed by: Zabrina Lemon MD      Date of TCM Phone Call 5/25/2021   [facility]   Date of Admission 5/22/2021   Date of Discharge 5/25/2021   Discharge Disposition Home or Self Care     Risk for Readmission (LACE) Score: 12 (5/25/2021  6:01 AM)      History of Present Illness Pt was more than 15 minutes late to her appointment today. Daughter was angry and adamant that she be seen in clinic today due SOB. She did not wear her oxygen b/c she does not have a portable oxygen tank despite multiple orders being place for o2 supplies.     Course During Hospital Stay:  Admitted to [facility] 5/22-5/25 for sepsis 2/2 PNA. Started on IV abx and transitioned to augmentin and doxy as well as prednisone taper. Reports completing course of abx.     Has dysuria.      The following portions of the patient's history were reviewed and updated as appropriate: allergies, current medications, past family history, past medical history, past social history, past surgical history and problem list.    Review of Systems   Constitutional: Negative for activity change, appetite change, chills, diaphoresis, fatigue, fever and unexpected weight change.   HENT: Negative for congestion, dental problem, drooling, ear discharge, ear pain, facial swelling, hearing loss and mouth sores.    Eyes: Negative for pain, discharge and itching.   Respiratory: Negative for apnea, cough,  choking, chest tightness and shortness of breath.    Cardiovascular: Negative for chest pain, palpitations and leg swelling.   Gastrointestinal: Negative for abdominal distention, abdominal pain, blood in stool, constipation and diarrhea.   Endocrine: Negative for cold intolerance, heat intolerance, polydipsia and polyuria.   Genitourinary: Positive for dysuria. Negative for difficulty urinating, frequency and hematuria.   Skin: Negative for color change, pallor, rash and wound.   Allergic/Immunologic: Negative for environmental allergies, food allergies and immunocompromised state.   Neurological: Negative for dizziness, weakness and light-headedness.   Psychiatric/Behavioral: Negative for agitation, behavioral problems, confusion, decreased concentration and self-injury. The patient is not nervous/anxious.    All other systems reviewed and are negative.      Objective   Physical Exam  Vitals and nursing note reviewed.   Constitutional:       General: She is not in acute distress.     Appearance: She is well-developed.   HENT:      Head: Normocephalic and atraumatic.      Right Ear: External ear normal.      Left Ear: External ear normal.   Cardiovascular:      Rate and Rhythm: Normal rate and regular rhythm.      Heart sounds: Normal heart sounds. No murmur heard.   No friction rub. No gallop.    Pulmonary:      Effort: Pulmonary effort is normal. No respiratory distress.      Breath sounds: Normal breath sounds. No wheezing or rales.   Skin:     General: Skin is warm and dry.      Coloration: Skin is not pale.         Assessment/Plan   Diagnoses and all orders for this visit:    1. Pneumonia due to infectious organism, unspecified laterality, unspecified part of lung (Primary)  -     XR Chest PA & Lateral; Future    2. Dysuria  -     nitrofurantoin, macrocrystal-monohydrate, (Macrobid) 100 MG capsule; Take 1 capsule by mouth 2 (Two) Times a Day.  Dispense: 10 capsule; Refill: 0    3. SOB (shortness of  breath)  SOB likely from non compliance w/ supplemental oxygen. She was provided oxygen from clinic tank and was in no acute distress with o2 sat of 98%. Instructed daughter that pt needs to wear her o2 tank at all times. Low o2 on arrival ntoed.     Other orders  -     cetirizine (zyrTEC) 10 MG tablet; Take 0.5 tablets by mouth Daily.  Dispense: 30 tablet; Refill: 10

## 2021-06-04 NOTE — OUTREACH NOTE
Sepsis Week 2 Survey      Responses   [facility] patient discharged from?  [place]   Does the patient have one of the following disease processes/diagnoses(primary or secondary)?  Sepsis   Week 2 attempt successful?  No   Unsuccessful attempts  Attempt 1          Brianna Sánchez RN
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
## 2021-06-15 PROBLEM — E83.42 HYPOMAGNESEMIA: Status: ACTIVE | Noted: 2021-01-01

## 2021-06-15 PROBLEM — R63.6 UNDERWEIGHT: Status: ACTIVE | Noted: 2021-01-01

## 2021-06-15 PROBLEM — J96.20 ACUTE-ON-CHRONIC RESPIRATORY FAILURE (HCC): Status: ACTIVE | Noted: 2021-01-01

## 2021-06-15 PROBLEM — K92.2 GI BLEED: Status: ACTIVE | Noted: 2021-01-01

## 2021-06-15 PROBLEM — K92.2 GASTROINTESTINAL HEMORRHAGE: Status: ACTIVE | Noted: 2021-01-01

## 2021-06-15 PROBLEM — J96.20 ACUTE ON CHRONIC RESPIRATORY FAILURE (HCC): Status: ACTIVE | Noted: 2021-01-01

## 2021-06-15 PROBLEM — D64.9 ANEMIA: Status: ACTIVE | Noted: 2020-01-01

## 2021-06-15 PROBLEM — J96.22 ACUTE ON CHRONIC RESPIRATORY FAILURE WITH HYPERCAPNIA (HCC): Status: ACTIVE | Noted: 2021-01-01

## 2021-06-15 PROBLEM — K92.2 ACUTE UPPER GI BLEED: Status: ACTIVE | Noted: 2021-01-01

## 2021-06-15 NOTE — ED NOTES
Pt has had multiple RN's attempt to obtain IV access without success, U/S to be attempted.     Oneida Boyer, OPAL  06/15/21 0054

## 2021-06-15 NOTE — PLAN OF CARE
Problem: Adult Inpatient Plan of Care  Goal: Plan of Care Review  Outcome: Ongoing, Progressing  Flowsheets (Taken 6/15/2021 0655)  Progress: no change  Plan of Care Reviewed With: patient  Goal: Patient-Specific Goal (Individualized)  Outcome: Ongoing, Progressing  Goal: Absence of Hospital-Acquired Illness or Injury  Outcome: Ongoing, Progressing  Intervention: Identify and Manage Fall Risk  Recent Flowsheet Documentation  Taken 6/15/2021 0558 by Celine Johnson RN  Safety Promotion/Fall Prevention:   activity supervised   assistive device/personal items within reach   clutter free environment maintained   fall prevention program maintained   lighting adjusted   nonskid shoes/slippers when out of bed   room organization consistent   safety round/check completed  Intervention: Prevent Skin Injury  Recent Flowsheet Documentation  Taken 6/15/2021 0558 by Celine Johnson RN  Body Position: supine  Intervention: Prevent and Manage VTE (venous thromboembolism) Risk  Recent Flowsheet Documentation  Taken 6/15/2021 0558 by Celine Johnson RN  VTE Prevention/Management:   bilateral   sequential compression devices on  Intervention: Prevent Infection  Recent Flowsheet Documentation  Taken 6/15/2021 0558 by Celine Johnson RN  Infection Prevention:   environmental surveillance performed   equipment surfaces disinfected   hand hygiene promoted   personal protective equipment utilized   rest/sleep promoted   single patient room provided   visitors restricted/screened  Goal: Optimal Comfort and Wellbeing  Outcome: Ongoing, Progressing  Intervention: Provide Person-Centered Care  Recent Flowsheet Documentation  Taken 6/15/2021 0558 by Celine Johnson RN  Trust Relationship/Rapport:   care explained   choices provided   emotional support provided   thoughts/feelings acknowledged   reassurance provided   questions encouraged  Goal: Readiness for Transition of Care  Outcome: Ongoing, Progressing  Intervention: Mutually  Develop Transition Plan  Recent Flowsheet Documentation  Taken 6/15/2021 0553 by Celine Johnson RN  Transportation Anticipated: family or friend will provide  Patient/Family Anticipated Services at Transition:   Patient/Family Anticipates Transition to: home with family  Taken 6/15/2021 0551 by Celine Johnson RN  Equipment Currently Used at Home:   walker, rolling   oxygen     Problem: Fall Injury Risk  Goal: Absence of Fall and Fall-Related Injury  Outcome: Ongoing, Progressing  Intervention: Identify and Manage Contributors to Fall Injury Risk  Recent Flowsheet Documentation  Taken 6/15/2021 0558 by Celine Johnson RN  Medication Review/Management: medications reviewed  Intervention: Promote Injury-Free Environment  Recent Flowsheet Documentation  Taken 6/15/2021 0558 by Celine Johnson RN  Safety Promotion/Fall Prevention:   activity supervised   assistive device/personal items within reach   clutter free environment maintained   fall prevention program maintained   lighting adjusted   nonskid shoes/slippers when out of bed   room organization consistent   safety round/check completed     Problem: COPD Comorbidity  Goal: Maintenance of COPD Symptom Control  Outcome: Ongoing, Progressing  Intervention: Maintain COPD-Symptom Control  Recent Flowsheet Documentation  Taken 6/15/2021 0558 by Celine Johnson RN  Medication Review/Management: medications reviewed     Problem: Skin Injury Risk Increased  Goal: Skin Health and Integrity  Outcome: Ongoing, Progressing  Intervention: Optimize Skin Protection  Recent Flowsheet Documentation  Taken 6/15/2021 0558 by Celine Johnson RN  Pressure Reduction Techniques:   frequent weight shift encouraged   weight shift assistance provided   pressure points protected   positioned off wounds   heels elevated off bed  Head of Bed (HOB): HOB at 30 degrees  Pressure Reduction Devices:   specialty bed utilized   pressure-redistributing mattress utilized    positioning supports utilized   Goal Outcome Evaluation:  Plan of Care Reviewed With: patient        Progress: no change

## 2021-06-15 NOTE — SIGNIFICANT NOTE
Patient here with symptomatic anemia with mental status changes. Unable to get in touch with the patient's family. Will administer 1 unit of pRBCs despite lack of patient consent due to emergent need.
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
## 2021-06-15 NOTE — PROGRESS NOTES
[facility] Medicine Services  ADMISSION FOLLOW-UP NOTE          Patient admitted after midnight, H&P by my partner performed earlier on today's date reviewed.  Interim findings, labs, and charting also reviewed.        [facility] Hospital Problem List has been managed and updated to include any new diagnoses:  Active Hospital Problems    Diagnosis  POA   • **Acute respiratory failure with hypercapnia (CMS/HCC) [J96.02]  Yes   • Hypomagnesemia [E83.42]  Yes   • GI bleed [K92.2]  Yes   • Acute upper GI bleed [K92.2]  Yes   • Metabolic encephalopathy [G93.41]  Yes   • Elevated troponin [R77.8]  Yes   • Anemia [D64.9]  Yes   • CAD (coronary artery disease) [I25.10]  Yes   • Tobacco abuse [Z72.0]  Yes   • Paroxysmal atrial fibrillation (CMS/HCC) [I48.0]  Yes   • Chronic obstructive pulmonary disease with acute exacerbation (CMS/HCC) [J44.1]  Yes   • Seizure disorder (CMS/HCC) [G40.909]  Yes   • Essential hypertension [I10]  Yes      Resolved Hospital Problems   No resolved problems to display.         ADDITIONAL PLAN:  - detailed assessment and plan from admission reviewed  Seven 1-year-old female with history of proximal defibrillation noted anticoagulation, CAD, hypertension, hyperlipidemia, seizure, COPD with ongoing tobacco abuse who presented to the ED with confusion s/p fall admitted with acute metabolic encephalopathy secondary to hypercapnic respiratory failure,    Acute hypercapnic respiratory failure in the setting of COPD tobacco abuse  -Patient with PCO2 of 69.6 on presentation  -she is s/p BiPAP, position back to  NC, on 4 L NC, continue to wean as able, duo nebs  -Continue NRT    Anemia  -H&H is lower from previous, suspected to be secondary to blood loss  -GI consulted, continue to trend H&H,  -Continue IV Protonix, currently holding Plavix and aspirin  -Plan for EGD in AM    Acute metabolic encephalopathy  -Etiology likely sec to above, UDS positive for benzos, opiates and  perpetuates    Hypertension  -BP low on presentation, remains low, but stable, continue to hold antihypertensives    Seizure disorder  -Patient on phenobarbital, will resume  -held due to encephalopathy, however levels are low    Otherwise continue plan of care as per admission H&P    Tereza Zapien MD  06/15/21

## 2021-06-15 NOTE — H&P
[facility] Medicine Services  HISTORY AND PHYSICAL    Patient Name: An Abreu  : 1950  MRN: [id number]  Primary Care Physician: Zabrina Lemon MD  Date of admission: 2021    Subjective   Subjective     Chief Complaint:  Fall    HPI:  An Abreu is a 71 y.o. female with a medical history significant for PAF not on AC, CAD, HTN, HLD, Seizure d/o, COPD on supplemental oxygen and Tobacco use who was brought to [facility] ED after a fall and found to be confused. History is limited secondary to the patient's AMS. Per EMS report, the patient was found on the ground by her daughter. The daughter, Lory reported the patient has been more confused for several days. Was unable to get in touch with the daughter over the phone. Recent admission -21 for sepsis due to bronchopneumonia. While in the ED, the patient was requiring 4L NC and subsequently transitioned to BiPAP. ABG with pH of 7.321, pCO2 69.6, pO2 127, HCO3 35.9. Labs revealed troponin 0.434, hemoglobin 7.3, magnesium 1.8. CTA of the abdomen pelvis demonstrates marked distention of the urinary bladder concerning for obstruction and moderate colonic stool burden.  Chest x-ray clear. CT head shows no new abnormalities.  CT cervical spine negative for fracture.  Patient received 10 mg of dexamethasone and 80 mg of Protonix in the ED.  She will be admitted to the hospitalist service for further medical management.    COVID Details:        Symptoms: [] NONE [] Fever []  Cough [] Shortness of breath [] Change in taste or smell  The patient qualifies to receive the vaccine, but they have not yet received it.    Review of Systems   Unable to perform ROS: Mental status change      All other systems reviewed and are negative.     Personal History     Past Medical History:   Diagnosis Date   • Aneurysm (CMS/HCC)    • Angina pectoris (CMS/HCC) 2016   • Anxiety 2016   • Arthritis 2016   • CAD (coronary  artery disease)    • Carotid artery stenosis    • CHF (congestive heart failure) (CMS/[facility])    • Chronic coronary artery disease 6/20/2016    2003 CABG LIMA to LAD, VG to OM 2004 VG to OM occluded. LIMA patent Cx intervention and RCA 2008 stent for ISR 2013 ISR and stenting of CX and RCA 2016 normal MPS   • Chronic left-sided low back pain with left-sided sciatica 2/1/2017   • Chronic obstructive pulmonary disease (CMS/HCC) 6/20/2016   • Chronic respiratory failure with hypoxia (CMS/[facility]) 3/27/2021   • Cobalamin deficiency 6/20/2016   • Congestive heart failure (CMS/HCC) 6/20/2016   • COPD (chronic obstructive pulmonary disease) (CMS/[facility])    • Depression 7/3/2018   • Diverticulosis    • Diverticulosis of large intestine without hemorrhage 5/5/2017   • GERD (gastroesophageal reflux disease)    • GIB (gastrointestinal bleeding)     recurrent    • HTN (hypertension)    • Hypercholesterolemia 6/20/2016   • Hyperlipidemia    • Mesenteric ischemia (CMS/[facility]) 2006    s/p resection    • Mood disorder (CMS/[facility])    • Oxygen dependent     3 liters @ all times.    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 (CMS/[facility]) 5/23/2021   • PVD (peripheral vascular disease) (CMS/[facility])    • Restless legs syndrome 6/20/2016   • Seizure disorder (CMS/HCC) 6/20/2016   • Seizures (CMS/[facility])    • Stenosis of carotid artery 6/20/2016   • Vertigo 9/28/2016       Past Surgical History:   Procedure Laterality Date   • APPENDECTOMY     • CHOLECYSTECTOMY     • COLOSTOMY  2006    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   • EXPLORATORY LAPAROTOMY      sec to ovarian cysts   • HYSTERECTOMY     • ILIAC ARTERY STENT     • REVISION / TAKEDOWN COLOSTOMY  2008       Family History: family history includes Arthritis in her mother; Cancer in her mother; Heart attack in her father; Heart disease in her brother, child, and  child; Hyperlipidemia in her father; Hypertension in her father; Stroke in her father. Otherwise pertinent FHx was reviewed and unremarkable.     Social History:  reports that she has been smoking cigarettes and electronic cigarette. She has a 40.00 pack-year smoking history. She has never used smokeless tobacco. She reports that she does not drink alcohol and does not use drugs.  Social History     Social History Narrative    Lives w/ her daughter. Ambulates w/ a walker. Independent w/ most ADLs.        Medications:  HYDROcodone-acetaminophen, LORazepam, PHENobarbital, albuterol, albuterol sulfate HFA, aspirin, benzonatate, cetirizine, citalopram, clopidogrel, dilTIAZem CD, esomeprazole, ferrous sulfate, furosemide, guaiFENesin, ipratropium-albuterol, lisinopril, nitrofurantoin (macrocrystal-monohydrate), ondansetron, promethazine, rosuvastatin, and umeclidinium-vilanterol    Allergies   Allergen Reactions   • Keflex [Cephalexin] Shortness Of Breath and Rash     Patients power of  states patient had to be taken to ER due to reaction.    Tolerated Rocephin 2/19/21   • Sulfamethoxazole-Trimethoprim Shortness Of Breath and Rash     Patients power of  stated patient had to be taken to ER due to reaction.   • Carbamazepine    • Codeine        Objective   Objective     Vital Signs:   Temp:  [98.4 °F (36.9 °C)] 98.4 °F (36.9 °C)  Heart Rate:  [72-91] 80  Resp:  [20-24] 24  BP: (103-137)/(46-68) 125/58  Flow (L/min):  [4] 4    Physical Exam   Constitutional: Arouses to her name but immediately falls back asleep, ill-appearing, lying in bed   Eyes: PERRLA, sclerae anicteric, no conjunctival injection  HENT: NCAT, mucous membranes moist, face symmetric, dentition normal   Neck: Supple, no thyromegaly, no lymphadenopathy, trachea midline, no meningeal signs   Respiratory: Expiratory wheeze appreciated, currently on BiPAP  Cardiovascular: RRR, no murmurs appreciated, palpable PT pulses  bilaterally  Gastrointestinal: Positive bowel sounds, soft, nontender, no distention, no guarding, no peritoneal signs    Musculoskeletal: No bilateral ankle edema, no clubbing or cyanosis to extremities  Psychiatric: Unable to access   Neurologic: Disoriented, moves all extremities, normal tone, strength and sensation symmetric in all extremities, Cranial Nerves grossly intact to confrontation  Skin: Pale, skin tear on right upper extremity     Result Review:  I have personally reviewed the results from the time of this admission to 06/15/21 3:02 AM EDT and agree with these findings:  [x]  Laboratory  [x]  Microbiology  [x]  Radiology  [x]  EKG/Telemetry   []  Cardiology/Vascular   []  Pathology  []  Old records  []  Other:  Most notable findings include: SEE HPI      LAB RESULTS:      Lab 06/14/21 2146   WBC 5.29   HEMOGLOBIN 7.3*   HEMATOCRIT 24.8*   PLATELETS 281   NEUTROS ABS 3.03   IMMATURE GRANS (ABS) 0.02   LYMPHS ABS 1.65   MONOS ABS 0.43   EOS ABS 0.15   MCV 93.9         Lab 06/14/21  2146   SODIUM 136   POTASSIUM 4.6   CHLORIDE 98   CO2 34.0*   ANION GAP 4.0*   BUN 8   CREATININE 0.79   GLUCOSE 110*   CALCIUM 8.7   MAGNESIUM 1.8   TSH 3.220         Lab 06/14/21  2146   TOTAL PROTEIN 6.3   ALBUMIN 3.50   GLOBULIN 2.8   ALT (SGPT) 19   AST (SGOT) 22   BILIRUBIN <0.2   ALK PHOS 103         Lab 06/14/21  2146   TROPONIN T 0.434*             Lab 06/15/21  0059   ABO TYPING A   RH TYPING Positive   ANTIBODY SCREEN Positive         Lab 06/14/21  2229   PH, ARTERIAL 7.321*   PCO2, ARTERIAL 69.6*   PO2 .0*   FIO2 36   HCO3 ART 35.9*   BASE EXCESS ART 8.8*   CARBOXYHEMOGLOBIN 3.6*     UA    Urinalysis 5/3/21 5/3/21 5/5/21 5/5/21 5/22/21 5/22/21 5/22/21    1246 1246 0354 0354 1823 1823 1823   Squamous Epithelial Cells, UA  0-2  0-2   None Seen   Specific [place], UA 1.012  1.009  1.013 1.013    Ketones, UA Trace (A)  Negative  Negative Negative    Blood, UA Negative  Negative  Negative Negative     Leukocytes, UA Negative  Small (1+) (A)  Negative Negative    Nitrite, UA Positive (A)  Negative  Negative Negative    RBC, UA  None Seen  0-2   0-2   WBC, UA  3-5 (A)  6-12 (A)   None Seen   Bacteria, UA  4+ (A)  Trace   None Seen   (A) Abnormal value            Microbiology Results (last 10 days)     ** No results found for the last 240 hours. **          CT Head Without Contrast    Result Date: 6/14/2021  CT Head WO HISTORY: Fall today with head trauma. Patient on blood thinners TECHNIQUE: Axial unenhanced head CT. Radiation dose reduction techniques included automated exposure control or exposure modulation based on body size. Count of known CT and cardiac nuc med studies performed in previous 12 months: 14. Time of scan: 9:29 PM COMPARISON: February 18, 2021 FINDINGS: The ventricles are mildly generous in size. Cortical sulci are correspondingly prominent. No extraaxial fluid collections are seen. There is evidence of a remote infarct in the inferior left cerebellar hemisphere. No parenchymal or subarachnoid hemorrhage is present. Periventricular hypodensities are demonstrated which are nonspecific but likely represent white matter microvascular change in a patient of this age. No CT evidence of mass or acute infarct. The mastoid air cells are clear. The visualized paranasal sinuses are clear. Orbital structures demonstrate no acute findings.     Impression: Impression: 1. No clearly acute intracranial pathology demonstrated. 2. Mild cerebral atrophy and nonspecific periventricular hypodensities which are thought to represent white matter microvascular change. 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 3. No significant interval change from prior study of 2/18/2021. Signer Name: Jimy Rodriguez MD  Signed: 6/14/2021 9:43 PM  Workstation Name: [id number]  [facility]    CT Cervical Spine Without Contrast    Result Date: 6/14/2021  CT Spine Cervical WO INDICATION: Fall  today. Patient on blood thinners. TECHNIQUE: CT of the cervical spine without IV contrast. Coronal and sagittal reconstructions were obtained.  Radiation dose reduction techniques included automated exposure control or exposure modulation based on body size. Count of known CT and cardiac nuc med studies performed in previous 12 months: 14. COMPARISON: No pertinent prior study FINDINGS: Exam is mildly compromised by motion artifact. No acute fracture is seen. Vertebral body heights are maintained. Bone mineralization is mildly demineralized. Cervical intervertebral disc spaces are maintained. The sagittal alignment of the cervical spine is anatomic.The facets are intact. The posterior elements show no acute findings. There is no prevertebral soft tissue swelling present. Cervical spinal canal is patent. The neural foramina are patent. Soft tissue windows show no evidence of cord compression. The odontoid is intact. The ring of C1 is intact. The cervicomedullary junction is normal. The visualized portions of the lung apices are clear.     Impression: Impression: 1. No acute fracture is seen. 2. No acute radiographic abnormalities are identified. Report called to Dr. Chan at 9:47 PM Signer Name: Jimy Rodriguez MD  Signed: 6/14/2021 9:49 PM  Workstation Name: [id number]  [facility] [place]    CT Angiogram Abdomen Pelvis    Result Date: 6/15/2021  CTA Abdomen Pelvis INDICATION: Abdominal pain and GI bleed. TECHNIQUE: CT angiogram of the abdomen and pelvis with and without IV contrast. 2 sets of delayed images were obtained. 3-D reconstructions were obtained and reviewed. Radiation dose reduction techniques included automated exposure control or exposure modulation based on body size. Count of known CT and cardiac nuc med studies performed in previous 12 months: 16. COMPARISON: 5/22/2021 FINDINGS: There is streak artifact from the patient's arms. Extensive atherosclerotic disease is again identified.  There is no aortic aneurysm or aortic dissection. There is a high-grade stenosis or short segment occlusion in the left common iliac artery with reconstitution distally. The internal and external iliac arteries on both sides are diffusely markedly diseased and narrowed. There is narrowing of both common femoral arteries and both proximal superficial femoral arteries. There are single bilateral renal arteries with with diffuse atherosclerotic disease and proximal renal artery stenoses on both sides at least moderate in degree. There is a proximal stenosis in the celiac trunk measuring greater than 50%. There is only mild stenosis in the proximal SFA although there is diffuse atherosclerotic disease within the vessel. The EDDA is patent. No large vessel occlusion is seen. No contrast extravasation or definite contrast staining is identified to suggest an area of acute GI bleeding at this time. COPD noted in the lung bases. Gallbladder is surgically absent. Solid abdominal organs are grossly normal allowing for artifact. Urinary bladder is distended, extending out of the pelvis. Please correlate for bladder all obstruction. Uterus is surgically absent. Postoperative changes are noted in the bowel. Moderate stool burden is noted in the colon which may indicate constipation. The appendix is surgically absent by history. No definite small bowel obstruction. There is an old T12 compression deformity.     Impression: 1. Extensive atherosclerotic disease as described above. There is no aortic aneurysm or dissection. No large vessel occlusion is seen. 2. No clear evidence of acute GI bleeding. 3. Marked distention of the urinary bladder concerning for bladder all obstruction. 4. Moderate colonic stool burden concerning for constipation. No definite evidence of a small bowel obstruction. 5. Additional findings as above. Signer Name: Nabil Shearer MD  Signed: 6/15/2021 2:34 AM  Workstation Name: [facility] [place]    XR Chest 1 View    Result Date: 6/14/2021  CR Chest 1 Vw INDICATION: Weakness and dizziness. COPD. Shortness of air. COMPARISON:  5/22/2021 FINDINGS: Single portable AP view(s) of the chest.  Heart size is normal status post CABG. There is atherosclerotic disease in the aorta. COPD is noted. The lungs are clear except for granulomatous calcification. No pneumothorax is seen. Old fractures are present involving the left clavicle and left proximal humerus.     Impression: No acute cardiopulmonary findings. Signer Name: Nabil Shearer MD  Signed: 6/14/2021 10:16 PM  Workstation Name: [email or web address]  [facility]      Results for orders placed during the hospital encounter of 05/03/21    Adult Transthoracic Echo Complete W/ Cont if Necessary Per Protocol    Interpretation Summary  · Estimated left ventricular EF = 70%  · Moderate aortic valve regurgitation is present.      Assessment/Plan   Assessment & Plan       Acute respiratory failure with hypercapnia (CMS/HCC)    Chronic obstructive pulmonary disease with acute exacerbation (CMS/HCC)    Essential hypertension    Seizure disorder (CMS/HCC)    Paroxysmal atrial fibrillation (CMS/HCC)    Tobacco abuse    CAD (coronary artery disease)    Anemia    Elevated troponin    Metabolic encephalopathy    Hypomagnesemia    GI bleed    An Abreu is a 71 y.o. female with a medical history significant for PAF not on AC, CAD, HTN, HLD, Seizure d/o, COPD and Tobacco use who was brought to [facility] ED after a fall and was found to beconfused with altered mental status..     Acute on chronic respiratory failure with hypercapnia  COPD exacerbation  Metabolic encephalopathy   -Noncompliant with supplemental oxygen  (4L) at home.  -Currently on BiPAP, continue. Repeat ABG unchanged.  -Check lactic acid, ammonia, phenobarbital, urinalysis and UDS.  -Administer Narcan  -Obtain sputum culture  -Continue home inhalers and duonebs  prn  -Serial neuro checks. Hold all sedating medication.   -Monitor on telemetry with continuous pulse ox.     GI bleed   Anemia   -Hgb 7.3. Last 9.9 in 5/23/2.  -Obtain iron studies  -Consult GI   -Administer IV Protonix BID   -Transfuse 2 units of pRBCs   -Hold Plavix and aspirin  -Keep npo   -Serial H&H q6h   -Strict I&Os   -Fall precautions     Urinary retention   -Worley placed in ED, continue.   -Obtain urinalysis.     Elevated troponin  -Likely type 2 NSTEMI d/t GI bleed   -Trend x3.   -No ischemic changes on ECG.  -Denied chest pain when more alert at arrival to ER.     Hypomagnesemia  -Replace per protocol    Paroxysmal atrial fibrillation  -[id number]  -Continue Diltiazem      Hypertension  -Blood pressure borderline.   -Hold Lisinopril for now.     Seizure disorder  -Hold Phenobarbital d/t encephalopathy , check level  -Seizure precautions      Tobacco use   -Smoking cessation education  -Nicotine patch     DVT prophylaxis: SCDs    CODE STATUS: FULL CODE     This note has been completed as part of a split-shared workflow.   Signature: Electronically signed by Kyra Salinas PA-C, 06/15/21, 3:36 AM EDT      Attending   Admission Attestation       I have seen and examined the patient, performing an independent face-to-face diagnostic evaluation with plan of care reviewed and developed with the advanced practice clinician (APC).      Brief Summary Statement:   An Abreu is a 71 y.o. female with past medical history of paroxysmal atrial fibrillation, coronary artery disease, hyperlipidemia, hypertension, seizure disorder, COPD, chronic tobacco use, not on anticoagulation but is on Plavix who presents to the ER after fall found to be confused with altered mental status.    Patient with recent admission from 5/22 through 5/25 due to sepsis with bronchopneumonia.  She also had acute hypercapnic respiratory failure treated with BiPAP during the admission.    History is limited secondary to patient's  sedation, however patient's work-up in the ER noted to show decreasing hemoglobin with concern for GI bleeding, urinary retention with bladder distention and ABG showing hypercapnia.  Narcan has been ordered as UDS did show opiates, benzos, barbiturates.        Remainder of detailed HPI is as noted by APC and has been reviewed and/or edited by me for completeness.    Attending Physical Exam:  Constitutional: Lethargic, will wake to tactile stimuli and immediately falls back asleep, can answer in one to three word sentences.   Eyes: PERRLA, sclerae anicteric, no conjunctival injection  HENT: NCAT, mucous membranes dry  Neck: Supple, no thyromegaly, no lymphadenopathy, trachea midline  Respiratory: Clear to auscultation bilaterally, nonlabored respirations   Cardiovascular: RRR, no murmurs, rubs, or gallops, palpable pedal pulses bilaterally  Gastrointestinal: Positive bowel sounds, soft, nontender, nondistended  Musculoskeletal: No bilateral ankle edema, no clubbing or cyanosis to extremities  Psychiatric: Appropriate affect, cooperative  Neurologic: Confused, strength symmetric in all extremities, Cranial Nerves grossly intact to confrontation, speech slightly slurred  Skin: No rashes      Brief Assessment/Plan :  See detailed assessment and plan developed with APC which I have reviewed and/or edited for completeness.        Admission Status: I believe that this patient meets INPATIENT status due to altered mental status, acute on chronic hypoxic and hypercapnic respiratory failure, suspected GI bleed, and urinary retention, symptomatic anemia.  I feel patient’s risk for adverse outcomes and need for care warrant INPATIENT evaluation and I predict the patient’s care encounter to likely last beyond 2 midnights.        Antwan Alvarez,   06/15/21

## 2021-06-15 NOTE — PLAN OF CARE
"  Problem: Palliative Care  Goal: Enhanced Quality of Life  Outcome: Ongoing, Progressing   Goal Outcome Evaluation:      Pt resting in bed at time of visit. C/o back, right arm, and head pain, and generalized arthritis. Denies n/v, sleep issues, or reduced appetite. States her breathing is the main issue that keeps bringing her back to the hospital. On 4l nc at home, but \"just vera\" with any activity or exertion. Has living will documents on chart naming Anel Fall (friend) as decision maker, but pt states she would like Anel Fall (friend) and Lory Abreu (daughter) to make decisions together when possible. Plan is to get EGD to assess blood in stool and return home with daughter tomorrow. Pt states that she was once a pt of [facility] with [facility] and feels this reduced trips to the hospital. Pt was unable to say why she is no longer being seen at home by [facility] but would like to be. Feels home health does not provide enough care to keep her out of the hospital. Nurse (OPAL Selby) reports complicated social issues at home and a pending APS referral. Palliative pamphlet and blanket left at bedside. Palliative will follow for continued symptom assessment and ongoing GOC discussion.           "

## 2021-06-15 NOTE — PAYOR COMM NOTE
"Pamela Downing RN CM  Phone [phone]  Fax [phone]      An Abreu (71 y.o. Female)     Date of Birth Social Security Number Address Home Phone MRN    1950  [address] [place] [address] [phone] [id number]    Zoroastrianism Marital Status          None        Admission Date Admission Type Admitting Provider Attending Provider Department, Room/Bed    21 Emergency Tereza Zapien MD Opii, [place], MD [facility] 5G, S557/1    Discharge Date Discharge Disposition Discharge Destination                       Attending Provider: Tereza Zapien MD    Allergies: Keflex [Cephalexin], Sulfamethoxazole-trimethoprim, Carbamazepine, Codeine    Isolation: None   Infection: None   Code Status: CPR    Ht: 149.9 cm (59\")   Wt: 37.6 kg (83 lb)    Admission Cmt: None   Principal Problem: Acute respiratory failure with hypercapnia (CMS/[facility]) [J96.02]                 Active Insurance as of 2021     Primary Coverage     Payor Plan Insurance Group Employer/Plan Group    [facility] MEDICARE REPLACEMENT [employer] MEDICARE REPLACEMENT      Payor Plan Address Payor Plan Phone Number Payor Plan Fax Number Effective Dates    [address] [address] [phone]  2018 - None Entered    [facility] [id number]       Subscriber Name Subscriber Birth Date Member ID       AN ABREU 1950 [id number]                 Emergency Contacts      (Rel.) Home Phone Work Phone Mobile Phone    MARLENE ABREU (Power of ) [phone] -- [phone]    HEATHER STRINGER (Friend) [phone] -- [phone]    DEANNA ROTH (Friend) [phone] -- [phone]    GEOVANYSTEFANIA RAO (Daughter) [phone] -- --               History & Physical      Antwan Alvarez DO at 06/15/21 0301              [facility] Medicine Services  HISTORY AND PHYSICAL    Patient Name: An Abreu  : 1950  MRN: [id number]  Primary Care Physician: " Zabrina Lemon MD  Date of admission: 6/14/2021    Subjective   Subjective     Chief Complaint:  Fall    HPI:  An Abreu is a 71 y.o. female with a medical history significant for PAF not on AC, CAD, HTN, HLD, Seizure d/o, COPD on supplemental oxygen and Tobacco use who was brought to [facility] ED after a fall and found to be confused. History is limited secondary to the patient's AMS. Per EMS report, the patient was found on the ground by her daughter. The daughter, Lory reported the patient has been more confused for several days. Was unable to get in touch with the daughter over the phone. Recent admission 5/22-5/25/21 for sepsis due to bronchopneumonia. While in the ED, the patient was requiring 4L NC and subsequently transitioned to BiPAP. ABG with pH of 7.321, pCO2 69.6, pO2 127, HCO3 35.9. Labs revealed troponin 0.434, hemoglobin 7.3, magnesium 1.8. CTA of the abdomen pelvis demonstrates marked distention of the urinary bladder concerning for obstruction and moderate colonic stool burden.  Chest x-ray clear. CT head shows no new abnormalities.  CT cervical spine negative for fracture.  Patient received 10 mg of dexamethasone and 80 mg of Protonix in the ED.  She will be admitted to the hospitalist service for further medical management.    COVID Details:        Symptoms: [] NONE [] Fever []  Cough [] Shortness of breath [] Change in taste or smell  The patient qualifies to receive the vaccine, but they have not yet received it.    Review of Systems   Unable to perform ROS: Mental status change      All other systems reviewed and are negative.     Personal History     Past Medical History:   Diagnosis Date   • Aneurysm (CMS/HCC)    • Angina pectoris (CMS/HCC) 6/20/2016   • Anxiety 6/20/2016   • Arthritis 6/20/2016   • CAD (coronary artery disease)    • Carotid artery stenosis    • CHF (congestive heart failure) (CMS/HCC)    • Chronic coronary artery disease 6/20/2016 2003 CABG LIMA to LAD, VG to OM  2004 VG to OM occluded. LIMA patent Cx intervention and RCA 2008 stent for ISR 2013 ISR and stenting of CX and RCA 2016 normal MPS   • Chronic left-sided low back pain with left-sided sciatica 2/1/2017   • Chronic obstructive pulmonary disease (CMS/HCC) 6/20/2016   • Chronic respiratory failure with hypoxia (CMS/[facility]) 3/27/2021   • Cobalamin deficiency 6/20/2016   • Congestive heart failure (CMS/HCC) 6/20/2016   • COPD (chronic obstructive pulmonary disease) (CMS/[facility])    • Depression 7/3/2018   • Diverticulosis    • Diverticulosis of large intestine without hemorrhage 5/5/2017   • GERD (gastroesophageal reflux disease)    • GIB (gastrointestinal bleeding)     recurrent    • HTN (hypertension)    • Hypercholesterolemia 6/20/2016   • Hyperlipidemia    • Mesenteric ischemia (CMS/[facility]) 2006    s/p resection    • Mood disorder (CMS/[facility])    • Oxygen dependent     3 liters @ all times.    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 (CMS/[facility]) 5/23/2021   • PVD (peripheral vascular disease) (CMS/[facility])    • Restless legs syndrome 6/20/2016   • Seizure disorder (CMS/[facility]) 6/20/2016   • Seizures (CMS/[facility])    • Stenosis of carotid artery 6/20/2016   • Vertigo 9/28/2016       Past Surgical History:   Procedure Laterality Date   • APPENDECTOMY     • CHOLECYSTECTOMY     • COLOSTOMY  2006    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   • EXPLORATORY LAPAROTOMY      sec to ovarian cysts   • HYSTERECTOMY     • ILIAC ARTERY STENT     • REVISION / TAKEDOWN COLOSTOMY  2008       Family History: family history includes Arthritis in her mother; Cancer in her mother; Heart attack in her father; Heart disease in her brother, child, and child; Hyperlipidemia in her father; Hypertension in her father; Stroke in her father. Otherwise pertinent FHx was reviewed and unremarkable.     Social History:  reports  that she has been smoking cigarettes and electronic cigarette. She has a 40.00 pack-year smoking history. She has never used smokeless tobacco. She reports that she does not drink alcohol and does not use drugs.  Social History     Social History Narrative    Lives w/ her daughter. Ambulates w/ a walker. Independent w/ most ADLs.        Medications:  HYDROcodone-acetaminophen, LORazepam, PHENobarbital, albuterol, albuterol sulfate HFA, aspirin, benzonatate, cetirizine, citalopram, clopidogrel, dilTIAZem CD, esomeprazole, ferrous sulfate, furosemide, guaiFENesin, ipratropium-albuterol, lisinopril, nitrofurantoin (macrocrystal-monohydrate), ondansetron, promethazine, rosuvastatin, and umeclidinium-vilanterol    Allergies   Allergen Reactions   • Keflex [Cephalexin] Shortness Of Breath and Rash     Patients power of  states patient had to be taken to ER due to reaction.    Tolerated Rocephin 2/19/21   • Sulfamethoxazole-Trimethoprim Shortness Of Breath and Rash     Patients power of  stated patient had to be taken to ER due to reaction.   • Carbamazepine    • Codeine        Objective   Objective     Vital Signs:   Temp:  [98.4 °F (36.9 °C)] 98.4 °F (36.9 °C)  Heart Rate:  [72-91] 80  Resp:  [20-24] 24  BP: (103-137)/(46-68) 125/58  Flow (L/min):  [4] 4    Physical Exam   Constitutional: Arouses to her name but immediately falls back asleep, ill-appearing, lying in bed   Eyes: PERRLA, sclerae anicteric, no conjunctival injection  HENT: NCAT, mucous membranes moist, face symmetric, dentition normal   Neck: Supple, no thyromegaly, no lymphadenopathy, trachea midline, no meningeal signs   Respiratory: Expiratory wheeze appreciated, currently on BiPAP  Cardiovascular: RRR, no murmurs appreciated, palpable PT pulses bilaterally  Gastrointestinal: Positive bowel sounds, soft, nontender, no distention, no guarding, no peritoneal signs    Musculoskeletal: No bilateral ankle edema, no clubbing or cyanosis to  extremities  Psychiatric: Unable to access   Neurologic: Disoriented, moves all extremities, normal tone, strength and sensation symmetric in all extremities, Cranial Nerves grossly intact to confrontation  Skin: Pale, skin tear on right upper extremity     Result Review:  I have personally reviewed the results from the time of this admission to 06/15/21 3:02 AM EDT and agree with these findings:  [x]  Laboratory  [x]  Microbiology  [x]  Radiology  [x]  EKG/Telemetry   []  Cardiology/Vascular   []  Pathology  []  Old records  []  Other:  Most notable findings include: SEE HPI      LAB RESULTS:      Lab 06/14/21 2146   WBC 5.29   HEMOGLOBIN 7.3*   HEMATOCRIT 24.8*   PLATELETS 281   NEUTROS ABS 3.03   IMMATURE GRANS (ABS) 0.02   LYMPHS ABS 1.65   MONOS ABS 0.43   EOS ABS 0.15   MCV 93.9         Lab 06/14/21 2146   SODIUM 136   POTASSIUM 4.6   CHLORIDE 98   CO2 34.0*   ANION GAP 4.0*   BUN 8   CREATININE 0.79   GLUCOSE 110*   CALCIUM 8.7   MAGNESIUM 1.8   TSH 3.220         Lab 06/14/21 2146   TOTAL PROTEIN 6.3   ALBUMIN 3.50   GLOBULIN 2.8   ALT (SGPT) 19   AST (SGOT) 22   BILIRUBIN <0.2   ALK PHOS 103         Lab 06/14/21 2146   TROPONIN T 0.434*             Lab 06/15/21  0059   ABO TYPING A   RH TYPING Positive   ANTIBODY SCREEN Positive         Lab 06/14/21  2229   PH, ARTERIAL 7.321*   PCO2, ARTERIAL 69.6*   PO2 .0*   FIO2 36   HCO3 ART 35.9*   BASE EXCESS ART 8.8*   CARBOXYHEMOGLOBIN 3.6*     UA    Urinalysis 5/3/21 5/3/21 5/5/21 5/5/21 5/22/21 5/22/21 5/22/21    1246 1246 0354 0354 1823 1823 1823   Squamous Epithelial Cells, UA  0-2  0-2   None Seen   Specific [place], UA 1.012  1.009  1.013 1.013    Ketones, UA Trace (A)  Negative  Negative Negative    Blood, UA Negative  Negative  Negative Negative    Leukocytes, UA Negative  Small (1+) (A)  Negative Negative    Nitrite, UA Positive (A)  Negative  Negative Negative    RBC, UA  None Seen  0-2   0-2   WBC, UA  3-5 (A)  6-12 (A)   None Seen   Bacteria,  UA  4+ (A)  Trace   None Seen   (A) Abnormal value            Microbiology Results (last 10 days)     ** No results found for the last 240 hours. **          CT Head Without Contrast    Result Date: 6/14/2021  CT Head WO HISTORY: Fall today with head trauma. Patient on blood thinners TECHNIQUE: Axial unenhanced head CT. Radiation dose reduction techniques included automated exposure control or exposure modulation based on body size. Count of known CT and cardiac nuc med studies performed in previous 12 months: 14. Time of scan: 9:29 PM COMPARISON: February 18, 2021 FINDINGS: The ventricles are mildly generous in size. Cortical sulci are correspondingly prominent. No extraaxial fluid collections are seen. There is evidence of a remote infarct in the inferior left cerebellar hemisphere. No parenchymal or subarachnoid hemorrhage is present. Periventricular hypodensities are demonstrated which are nonspecific but likely represent white matter microvascular change in a patient of this age. No CT evidence of mass or acute infarct. The mastoid air cells are clear. The visualized paranasal sinuses are clear. Orbital structures demonstrate no acute findings.     Impression: Impression: 1. No clearly acute intracranial pathology demonstrated. 2. Mild cerebral atrophy and nonspecific periventricular hypodensities which are thought to represent white matter microvascular change. 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 3. No significant interval change from prior study of 2/18/2021. Signer Name: Jimy Rodriguez MD  Signed: 6/14/2021 9:43 PM  Workstation Name: [id number]  [facility]    CT Cervical Spine Without Contrast    Result Date: 6/14/2021  CT Spine Cervical WO INDICATION: Fall today. Patient on blood thinners. TECHNIQUE: CT of the cervical spine without IV contrast. Coronal and sagittal reconstructions were obtained.  Radiation dose reduction techniques included automated  exposure control or exposure modulation based on body size. Count of known CT and cardiac nuc med studies performed in previous 12 months: 14. COMPARISON: No pertinent prior study FINDINGS: Exam is mildly compromised by motion artifact. No acute fracture is seen. Vertebral body heights are maintained. Bone mineralization is mildly demineralized. Cervical intervertebral disc spaces are maintained. The sagittal alignment of the cervical spine is anatomic.The facets are intact. The posterior elements show no acute findings. There is no prevertebral soft tissue swelling present. Cervical spinal canal is patent. The neural foramina are patent. Soft tissue windows show no evidence of cord compression. The odontoid is intact. The ring of C1 is intact. The cervicomedullary junction is normal. The visualized portions of the lung apices are clear.     Impression: Impression: 1. No acute fracture is seen. 2. No acute radiographic abnormalities are identified. Report called to Dr. Chan at 9:47 PM Signer Name: Jimy Rodriguez MD  Signed: 6/14/2021 9:49 PM  Workstation Name: [email or web address]-  [facility]    CT Angiogram Abdomen Pelvis    Result Date: 6/15/2021  CTA Abdomen Pelvis INDICATION: Abdominal pain and GI bleed. TECHNIQUE: CT angiogram of the abdomen and pelvis with and without IV contrast. 2 sets of delayed images were obtained. 3-D reconstructions were obtained and reviewed. Radiation dose reduction techniques included automated exposure control or exposure modulation based on body size. Count of known CT and cardiac nuc med studies performed in previous 12 months: 16. COMPARISON: 5/22/2021 FINDINGS: There is streak artifact from the patient's arms. Extensive atherosclerotic disease is again identified. There is no aortic aneurysm or aortic dissection. There is a high-grade stenosis or short segment occlusion in the left common iliac artery with reconstitution distally. The internal and external iliac  arteries on both sides are diffusely markedly diseased and narrowed. There is narrowing of both common femoral arteries and both proximal superficial femoral arteries. There are single bilateral renal arteries with with diffuse atherosclerotic disease and proximal renal artery stenoses on both sides at least moderate in degree. There is a proximal stenosis in the celiac trunk measuring greater than 50%. There is only mild stenosis in the proximal SFA although there is diffuse atherosclerotic disease within the vessel. The EDDA is patent. No large vessel occlusion is seen. No contrast extravasation or definite contrast staining is identified to suggest an area of acute GI bleeding at this time. COPD noted in the lung bases. Gallbladder is surgically absent. Solid abdominal organs are grossly normal allowing for artifact. Urinary bladder is distended, extending out of the pelvis. Please correlate for bladder all obstruction. Uterus is surgically absent. Postoperative changes are noted in the bowel. Moderate stool burden is noted in the colon which may indicate constipation. The appendix is surgically absent by history. No definite small bowel obstruction. There is an old T12 compression deformity.     Impression: 1. Extensive atherosclerotic disease as described above. There is no aortic aneurysm or dissection. No large vessel occlusion is seen. 2. No clear evidence of acute GI bleeding. 3. Marked distention of the urinary bladder concerning for bladder all obstruction. 4. Moderate colonic stool burden concerning for constipation. No definite evidence of a small bowel obstruction. 5. Additional findings as above. Signer Name: Nabil Shearer MD  Signed: 6/15/2021 2:34 AM  Workstation Name: [facility]  [facility]    XR Chest 1 View    Result Date: 6/14/2021  CR Chest 1 Vw INDICATION: Weakness and dizziness. COPD. Shortness of air. COMPARISON:  5/22/2021 FINDINGS: Single portable AP view(s) of  the chest.  Heart size is normal status post CABG. There is atherosclerotic disease in the aorta. COPD is noted. The lungs are clear except for granulomatous calcification. No pneumothorax is seen. Old fractures are present involving the left clavicle and left proximal humerus.     Impression: No acute cardiopulmonary findings. Signer Name: Nabil Shearer MD  Signed: 6/14/2021 10:16 PM  Workstation Name: [facility]  [facility]      Results for orders placed during the hospital encounter of 05/03/21    Adult Transthoracic Echo Complete W/ Cont if Necessary Per Protocol    Interpretation Summary  · Estimated left ventricular EF = 70%  · Moderate aortic valve regurgitation is present.      Assessment/Plan   Assessment & Plan       Acute respiratory failure with hypercapnia (CMS/HCC)    Chronic obstructive pulmonary disease with acute exacerbation (CMS/HCC)    Essential hypertension    Seizure disorder (CMS/HCC)    Paroxysmal atrial fibrillation (CMS/HCC)    Tobacco abuse    CAD (coronary artery disease)    Anemia    Elevated troponin    Metabolic encephalopathy    Hypomagnesemia    GI bleed    An Abreu is a 71 y.o. female with a medical history significant for PAF not on AC, CAD, HTN, HLD, Seizure d/o, COPD and Tobacco use who was brought to [facility] ED after a fall and was found to beconfused with altered mental status..     Acute on chronic respiratory failure with hypercapnia  COPD exacerbation  Metabolic encephalopathy   -Noncompliant with supplemental oxygen  (4L) at home.  -Currently on BiPAP, continue. Repeat ABG unchanged.  -Check lactic acid, ammonia, phenobarbital, urinalysis and UDS.  -Administer Narcan  -Obtain sputum culture  -Continue home inhalers and duonebs prn  -Serial neuro checks. Hold all sedating medication.   -Monitor on telemetry with continuous pulse ox.     GI bleed   Anemia   -Hgb 7.3. Last 9.9 in 5/23/2.  -Obtain iron studies  -Consult GI   -Administer IV  Protonix BID   -Transfuse 2 units of pRBCs   -Hold Plavix and aspirin  -Keep npo   -Serial H&H q6h   -Strict I&Os   -Fall precautions     Urinary retention   -Worley placed in ED, continue.   -Obtain urinalysis.     Elevated troponin  -Likely type 2 NSTEMI d/t GI bleed   -Trend x3.   -No ischemic changes on ECG.  -Denied chest pain when more alert at arrival to ER.     Hypomagnesemia  -Replace per protocol    Paroxysmal atrial fibrillation  -[id number]  -Continue Diltiazem      Hypertension  -Blood pressure borderline.   -Hold Lisinopril for now.     Seizure disorder  -Hold Phenobarbital d/t encephalopathy , check level  -Seizure precautions      Tobacco use   -Smoking cessation education  -Nicotine patch     DVT prophylaxis: SCDs    CODE STATUS: FULL CODE     This note has been completed as part of a split-shared workflow.   Signature: Electronically signed by Kyra Salinas PA-C, 06/15/21, 3:36 AM EDT      Attending   Admission Attestation       I have seen and examined the patient, performing an independent face-to-face diagnostic evaluation with plan of care reviewed and developed with the advanced practice clinician (APC).      Brief Summary Statement:   An Abreu is a 71 y.o. female with past medical history of paroxysmal atrial fibrillation, coronary artery disease, hyperlipidemia, hypertension, seizure disorder, COPD, chronic tobacco use, not on anticoagulation but is on Plavix who presents to the ER after fall found to be confused with altered mental status.    Patient with recent admission from 5/22 through 5/25 due to sepsis with bronchopneumonia.  She also had acute hypercapnic respiratory failure treated with BiPAP during the admission.    History is limited secondary to patient's sedation, however patient's work-up in the ER noted to show decreasing hemoglobin with concern for GI bleeding, urinary retention with bladder distention and ABG showing hypercapnia.  Narcan has been ordered as UDS  did show opiates, benzos, barbiturates.        Remainder of detailed HPI is as noted by APC and has been reviewed and/or edited by me for completeness.    Attending Physical Exam:  Constitutional: Lethargic, will wake to tactile stimuli and immediately falls back asleep, can answer in one to three word sentences.   Eyes: PERRLA, sclerae anicteric, no conjunctival injection  HENT: NCAT, mucous membranes dry  Neck: Supple, no thyromegaly, no lymphadenopathy, trachea midline  Respiratory: Clear to auscultation bilaterally, nonlabored respirations   Cardiovascular: RRR, no murmurs, rubs, or gallops, palpable pedal pulses bilaterally  Gastrointestinal: Positive bowel sounds, soft, nontender, nondistended  Musculoskeletal: No bilateral ankle edema, no clubbing or cyanosis to extremities  Psychiatric: Appropriate affect, cooperative  Neurologic: Confused, strength symmetric in all extremities, Cranial Nerves grossly intact to confrontation, speech slightly slurred  Skin: No rashes      Brief Assessment/Plan :  See detailed assessment and plan developed with APC which I have reviewed and/or edited for completeness.        Admission Status: I believe that this patient meets INPATIENT status due to altered mental status, acute on chronic hypoxic and hypercapnic respiratory failure, suspected GI bleed, and urinary retention, symptomatic anemia.  I feel patient’s risk for adverse outcomes and need for care warrant INPATIENT evaluation and I predict the patient’s care encounter to likely last beyond 2 midnights.        Antwan Alvarez DO  06/15/21                          Electronically signed by Antwan Alvarez DO at 06/15/21 0433          Emergency Department Notes      James Chan MD at 06/14/21 5461            [place]    EMERGENCY DEPARTMENT ENCOUNTER      Pt Name: An Abreu  MRN: [id number]  YOB: 1950  Date of evaluation: 6/14/2021  Provider: James Chan MD    CHIEF COMPLAINT       Chief  Complaint   Patient presents with   • Fall   • Altered Mental Status         HISTORY OF PRESENT ILLNESS  (Location/Symptom, Timing/Onset, Context/Setting, Quality, Duration, Modifying Factors, Severity.)   An Abreu is a 71 y.o. female who presents to the emergency department after fall that occurred earlier.  She did sustain a skin tear to her right elbow but denies any other injury or head injury associate with the fall.  She notes that she has been progressively weak over the past couple of days and ultimately admitted that she has been having some black-colored bowel movements.  She does have a history of mesenteric ischemia but denies any history of peptic ulcer disease, cirrhosis, or use of any anticoagulant medications.  She does take Plavix.  She also notes that she has been increasingly short of breath and has had worsening wheezing over the past couple days.  Her symptoms have been moderate to severe and worse with exertion.      Nursing notes were reviewed.    REVIEW OF SYSTEMS    (2-9 systems for level 4, 10 or more for level 5)   ROS:  General: Weakness  Cardiovascular:  No chest pain, no palpitations  Respiratory: Shortness of breath, wheezing  Gastrointestinal: Black stool  Musculoskeletal:  No muscle pain, no joint pain  Skin: Skin tear  Neurologic:  No speech problems, no headache, no extremity numbness, no extremity tingling, no extremity weakness  Psychiatric:  No anxiety  Genitourinary:  No dysuria, no hematuria    Except as noted above the remainder of the review of systems was reviewed and negative.       PAST MEDICAL HISTORY     Past Medical History:   Diagnosis Date   • Aneurysm (CMS/[facility])    • Angina pectoris (CMS/[facility]) 6/20/2016   • Anxiety 6/20/2016   • Arthritis 6/20/2016   • CAD (coronary artery disease)    • Carotid artery stenosis    • CHF (congestive heart failure) (CMS/[facility])    • Chronic coronary artery disease 6/20/2016 2003 CABG LIMA to LAD, VG to OM 2004 VG to OM occluded.  LIMA patent Cx intervention and RCA 2008 stent for ISR 2013 ISR and stenting of CX and RCA 2016 normal MPS   • Chronic left-sided low back pain with left-sided sciatica 2/1/2017   • Chronic obstructive pulmonary disease (CMS/HCC) 6/20/2016   • Chronic respiratory failure with hypoxia (CMS/[facility]) 3/27/2021   • Cobalamin deficiency 6/20/2016   • Congestive heart failure (CMS/[facility]) 6/20/2016   • COPD (chronic obstructive pulmonary disease) (CMS/[facility])    • Depression 7/3/2018   • Diverticulosis    • Diverticulosis of large intestine without hemorrhage 5/5/2017   • GERD (gastroesophageal reflux disease)    • GIB (gastrointestinal bleeding)     recurrent    • HTN (hypertension)    • Hypercholesterolemia 6/20/2016   • Hyperlipidemia    • Mesenteric ischemia (CMS/[facility]) 2006    s/p resection    • Mood disorder (CMS/[facility])    • Oxygen dependent     3 liters @ all times.    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 (CMS/[facility]) 5/23/2021   • PVD (peripheral vascular disease) (CMS/[facility])    • Restless legs syndrome 6/20/2016   • Seizure disorder (CMS/[facility]) 6/20/2016   • Seizures (CMS/[facility])    • Stenosis of carotid artery 6/20/2016   • Vertigo 9/28/2016         SURGICAL HISTORY       Past Surgical History:   Procedure Laterality Date   • APPENDECTOMY     • CHOLECYSTECTOMY     • COLOSTOMY  2006    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   • EXPLORATORY LAPAROTOMY      sec to ovarian cysts   • HYSTERECTOMY     • ILIAC ARTERY STENT     • REVISION / TAKEDOWN COLOSTOMY  2008         CURRENT MEDICATIONS       Current Facility-Administered Medications:   •  albuterol (PROVENTIL) nebulizer solution 0.083% 2.5 mg/3mL, 2.5 mg, Nebulization, Q4H PRN, Kyra Salinas PA-C  •  cetirizine (zyrTEC) tablet 5 mg, 5 mg, Oral, Daily, Kyra Salinas PA-C  •  citalopram (CeleXA) tablet 20 mg, 20 mg, Oral, Daily,  Kyra Salinas PA-C  •  dilTIAZem CD (CARDIZEM CD) 24 hr capsule 240 mg, 240 mg, Oral, Daily, Kyra Salinas PA-C  •  ipratropium-albuterol (DUO-NEB) nebulizer solution 3 mL, 3 mL, Nebulization, Q4H PRN, Kyra Salinas PA-C  •  ipratropium-albuterol (DUO-NEB) nebulizer solution 3 mL, 3 mL, Nebulization, Q4H - RT, Antwan Alvarez, DO, 3 mL at 06/15/21 0701  •  methylPREDNISolone sodium succinate (SOLU-Medrol) injection 60 mg, 60 mg, Intravenous, Q8H, Kyra Salinsa PA-C  •  pantoprazole (PROTONIX) injection 40 mg, 40 mg, Intravenous, BID AC, Kyra Salinas PA-C  •  rosuvastatin (CRESTOR) tablet 40 mg, 40 mg, Oral, Nightly, Kyra Salinas PA-C  •  sodium chloride 0.9 % flush 10 mL, 10 mL, Intravenous, PRN, James Chan MD  •  sodium chloride 0.9 % flush 10 mL, 10 mL, Intravenous, Q12H, Kyra Salinas PA-C  •  sodium chloride 0.9 % flush 10 mL, 10 mL, Intravenous, PRN, Kyra Salinas PA-C    ALLERGIES     Keflex [cephalexin], Sulfamethoxazole-trimethoprim, Carbamazepine, and Codeine    FAMILY HISTORY       Family History   Problem Relation Age of Onset   • Arthritis Mother    • Cancer Mother    • Heart attack Father    • Hypertension Father    • Hyperlipidemia Father    • Stroke Father    • Heart disease Brother         CAD   • Heart disease Child         CAD   • Heart disease Child         CAD          SOCIAL HISTORY       Social History     Socioeconomic History   • Marital status:      Spouse name: Not on file   • Number of children: Not on file   • Years of education: Not on file   • Highest education level: Not on file   Tobacco Use   • Smoking status: Current Every Day Smoker     Packs/day: 1.00     Years: 40.00     Pack years: 40.00     Types: Cigarettes, Electronic Cigarette   • Smokeless tobacco: Never Used   • Tobacco comment: <0.5ppd    Substance and Sexual Activity   • Alcohol use: Never   • Drug use: Never   • Sexual activity: Defer         PHYSICAL EXAM    (up to 7 for level 4,  8 or more for level 5)     Vitals:    06/15/21 0635 06/15/21 0640 06/15/21 0645 06/15/21 0701   BP: 136/62 128/62 108/72    BP Location:       Patient Position:       Pulse: 83 81 79 83   Resp: 20 20 20 20   Temp: 97.6 °F (36.4 °C) 97.7 °F (36.5 °C) 97.7 °F (36.5 °C)    TempSrc: Axillary Axillary Axillary    SpO2: 100% 100% 99% 92%   Weight:       Height:           Physical Exam  General: Ill-appearing, moderate distress  HEENT: Conjunctiva normal.  Neck: Trachea midline.  Cardiac: Heart regular rate, rhythm, no murmurs, rubs, or gallops  Lungs: Tachypneic, moderate diffuse expiratory wheezes  Chest wall: There is no tenderness to palpation over the chest wall or over ribs  Abdomen: Abdomen is soft, nontender, nondistended. There are no firm or pulsatile masses, no rebound rigidity or guarding.   Musculoskeletal: No deformity.  Neuro: Alert and oriented x 4.  Dermatology: Skin is warm and dry  Psych: Mentation is grossly normal, cognition is grossly normal. Affect is appropriate.      DIAGNOSTIC RESULTS     EKG: All EKG's are interpreted by the Emergency Department Physician who either signs or Co-signs this chart in the absence of a cardiologist.    Sinus rhythm rate of 91, no acute ST segment or T wave change, normal intervals, no ectopy    RADIOLOGY:   Non-plain film images such as CT, Ultrasound and MRI are read by the radiologist. Plain radiographic images are visualized and preliminarily interpreted by the emergency physician with the below findings:      [x] Radiologist's Report Reviewed:  CT Angiogram Abdomen Pelvis   Final Result      1. Extensive atherosclerotic disease as described above. There is no aortic aneurysm or dissection. No large vessel occlusion is seen.   2. No clear evidence of acute GI bleeding.   3. Marked distention of the urinary bladder concerning for bladder all obstruction.   4. Moderate colonic stool burden concerning for constipation. No definite evidence of a small bowel  obstruction.   5. Additional findings as above.      Signer Name: Nabil Shearer MD    Signed: 6/15/2021 2:34 AM    Workstation Name: [facility]      XR Chest 1 View   Final Result   No acute cardiopulmonary findings.      Signer Name: Nabil Shearer MD    Signed: 6/14/2021 10:16 PM    Workstation Name: [facility]      CT Head Without Contrast   Final Result   Impression:   1. No clearly acute intracranial pathology demonstrated.   2. Mild cerebral atrophy and nonspecific periventricular hypodensities which are thought to represent white matter microvascular change. 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   3. No significant interval change from prior study of 2/18/2021.      Signer Name: Jimy Rodriguez MD    Signed: 6/14/2021 9:43 PM    Workstation Name: [facility]      CT Cervical Spine Without Contrast   Final Result   Impression:      1. No acute fracture is seen.   2. No acute radiographic abnormalities are identified.      Report called to Dr. Chan at 9:47 PM            Signer Name: Jimy Rodriguez MD    Signed: 6/14/2021 9:49 PM    Workstation Name: [facility]            LABS:    I have reviewed and interpreted all of the currently available lab results from this visit (if applicable):  Results for orders placed or performed during the hospital encounter of 06/14/21   Comprehensive Metabolic Panel    Specimen: Blood   Result Value Ref Range    Glucose 110 (H) 65 - 99 mg/dL    BUN 8 8 - 23 mg/dL    Creatinine 0.79 0.57 - 1.00 mg/dL    Sodium 136 136 - 145 mmol/L    Potassium 4.6 3.5 - 5.2 mmol/L    Chloride 98 98 - 107 mmol/L    CO2 34.0 (H) 22.0 - 29.0 mmol/L    Calcium 8.7 8.6 - 10.5 mg/dL    Total Protein 6.3 6.0 - 8.5 g/dL    Albumin 3.50 3.50 - 5.20 g/dL    ALT (SGPT) 19 1 - 33 U/L    AST (SGOT) 22 1 - 32 U/L    Alkaline  Phosphatase 103 39 - 117 U/L    Total Bilirubin <0.2 0.0 - 1.2 mg/dL    eGFR Non African Amer 71 >60 mL/min/1.73    Globulin 2.8 gm/dL    A/G Ratio 1.3 g/dL    BUN/Creatinine Ratio 10.1 7.0 - 25.0    Anion Gap 4.0 (L) 5.0 - 15.0 mmol/L   Troponin    Specimen: Blood   Result Value Ref Range    Troponin T 0.434 (C) 0.000 - 0.030 ng/mL   Magnesium    Specimen: Blood   Result Value Ref Range    Magnesium 1.8 1.6 - 2.4 mg/dL   CBC Auto Differential    Specimen: Blood   Result Value Ref Range    WBC 5.29 3.40 - 10.80 10*3/mm3    RBC 2.64 (L) 3.77 - 5.28 10*6/mm3    Hemoglobin 7.3 (L) 12.0 - 15.9 g/dL    Hematocrit 24.8 (L) 34.0 - 46.6 %    MCV 93.9 79.0 - 97.0 fL    MCH 27.7 26.6 - 33.0 pg    MCHC 29.4 (L) 31.5 - 35.7 g/dL    RDW 16.2 (H) 12.3 - 15.4 %    RDW-SD 56.0 (H) 37.0 - 54.0 fl    MPV 9.3 6.0 - 12.0 fL    Platelets 281 140 - 450 10*3/mm3    Neutrophil % 57.3 42.7 - 76.0 %    Lymphocyte % 31.2 19.6 - 45.3 %    Monocyte % 8.1 5.0 - 12.0 %    Eosinophil % 2.8 0.3 - 6.2 %    Basophil % 0.2 0.0 - 1.5 %    Immature Grans % 0.4 0.0 - 0.5 %    Neutrophils, Absolute 3.03 1.70 - 7.00 10*3/mm3    Lymphocytes, Absolute 1.65 0.70 - 3.10 10*3/mm3    Monocytes, Absolute 0.43 0.10 - 0.90 10*3/mm3    Eosinophils, Absolute 0.15 0.00 - 0.40 10*3/mm3    Basophils, Absolute 0.01 0.00 - 0.20 10*3/mm3    Immature Grans, Absolute 0.02 0.00 - 0.05 10*3/mm3    nRBC 0.0 0.0 - 0.2 /100 WBC   TSH    Specimen: Blood   Result Value Ref Range    TSH 3.220 0.270 - 4.200 uIU/mL   T4, Free    Specimen: Blood   Result Value Ref Range    Free T4 1.16 0.93 - 1.70 ng/dL   Blood Gas, Arterial With Co-Ox    Specimen: Arterial Blood   Result Value Ref Range    Site Right Radial     Adam's Test N/A     pH, Arterial 7.321 (L) 7.350 - 7.450 pH units    pCO2, Arterial 69.6 (C) 35.0 - 45.0 mm Hg    pO2, Arterial 127.0 (H) 83.0 - 108.0 mm Hg    HCO3, Arterial 35.9 (H) 20.0 - 26.0 mmol/L    Base Excess, Arterial 8.8 (H) 0.0 - 2.0 mmol/L    Hemoglobin, Blood Gas  7.0 (L) 14 - 18 g/dL    Hematocrit, Blood Gas 21.5 %    Oxyhemoglobin 95.2 94 - 99 %    Methemoglobin 0.80 0.00 - 1.50 %    Carboxyhemoglobin 3.6 (H) 0 - 2 %    CO2 Content 38.1 (H) 22 - 33 mmol/L    Temperature 37.0 C    Barometric Pressure for Blood Gas      Modality Nasal Cannula     FIO2 36 %    Rate 0 Breaths/minute    PIP 0 cmH2O    IPAP 0     EPAP 0     Note      Notified Who DR. REGGIE SANDS MD     Notified By [id number]     Notified Time 06/14/2021 22:28     pH, Temp Corrected 7.321 pH Units    pCO2, Temperature Corrected 69.6 (H) 35 - 45 mm Hg    pO2, Temperature Corrected 127 (H) 83 - 108 mm Hg   Urinalysis With Culture If Indicated - Urine, Catheter In/Out    Specimen: Urine, Catheter In/Out   Result Value Ref Range    Color, UA Yellow Yellow, Straw    Appearance, UA Clear Clear    pH, UA 7.0 5.0 - 8.0    Specific Gravity, UA >=1.030 1.001 - 1.030    Glucose, UA Negative Negative    Ketones, UA Negative Negative    Bilirubin, UA Negative Negative    Blood, UA Negative Negative    Protein, UA Negative Negative    Leuk Esterase, UA Negative Negative    Nitrite, UA Negative Negative    Urobilinogen, UA 0.2 E.U./dL 0.2 - 1.0 E.U./dL   Lactic Acid, Plasma    Specimen: Blood   Result Value Ref Range    Lactate 0.8 0.5 - 2.0 mmol/L   Urine Drug Screen - Urine, Clean Catch    Specimen: Urine, Clean Catch   Result Value Ref Range    THC, Screen, Urine Negative Negative    Phencyclidine (PCP), Urine Negative Negative    Cocaine Screen, Urine Negative Negative    Methamphetamine, Ur Negative Negative    Opiate Screen Positive (A) Negative    Amphetamine Screen, Urine Negative Negative    Benzodiazepine Screen, Urine Positive (A) Negative    Tricyclic Antidepressants Screen Negative Negative    Methadone Screen, Urine Negative Negative    Barbiturates Screen, Urine Positive (A) Negative    Oxycodone Screen, Urine Negative Negative    Propoxyphene Screen Negative Negative    Buprenorphine, Screen, Urine Negative Negative    Blood Gas, Arterial With Co-Ox    Specimen: Arterial Blood   Result Value Ref Range    Site Right Radial     Adam's Test N/A     pH, Arterial 7.335 (L) 7.350 - 7.450 pH units    pCO2, Arterial 63.8 (H) 35.0 - 45.0 mm Hg    pO2, Arterial 73.7 (L) 83.0 - 108.0 mm Hg    HCO3, Arterial 34.0 (H) 20.0 - 26.0 mmol/L    Base Excess, Arterial 7.3 (H) 0.0 - 2.0 mmol/L    Hemoglobin, Blood Gas 6.9 (L) 14 - 18 g/dL    Hematocrit, Blood Gas 21.1 %    Oxyhemoglobin 91.9 (L) 94 - 99 %    Methemoglobin 0.70 0.00 - 1.50 %    Carboxyhemoglobin 2.3 (H) 0 - 2 %    CO2 Content 35.9 (H) 22 - 33 mmol/L    Temperature 37.0 C    Barometric Pressure for Blood Gas      Modality BiPap     FIO2 30 %    Rate 0 Breaths/minute    PIP 0 cmH2O    IPAP 16     EPAP 6     Note      pH, Temp Corrected 7.335 pH Units    pCO2, Temperature Corrected 63.8 (H) 35 - 45 mm Hg    pO2, Temperature Corrected 73.7 (L) 83 - 108 mm Hg   Phenobarbital Level    Specimen: Blood   Result Value Ref Range    Phenobarbital 7.9 (L) 10.0 - 30.0 mcg/mL   POC Glucose Once    Specimen: Blood   Result Value Ref Range    Glucose 99 70 - 130 mg/dL   ECG 12 Lead   Result Value Ref Range    QT Interval 354 ms    QTC Interval 435 ms   Type & Screen    Specimen: Blood   Result Value Ref Range    ABO Type A     RH type Positive     Antibody Screen Positive     T&S Expiration Date 6/18/2021 11:59:59 PM    ABO RH Specimen Verification    Specimen: Blood   Result Value Ref Range    ABO Type A     RH type Positive    Prepare RBC, 2 Units   Result Value Ref Range    Product Code M2684I05     Unit Number [id number]     UNIT  ABO A     UNIT  RH POS     Crossmatch Interpretation Compatible     Dispense Status XM     Blood Expiration Date 202107202359     Blood Type Barcode 6200     Product Code M1023P91     Unit Number [id number]     UNIT  ABO A     UNIT  RH POS     Crossmatch Interpretation Compatible     Dispense Status IS     Blood Expiration Date 202107202359     Blood Type  Barcode 6200    Antibody Identification    Specimen: Blood   Result Value Ref Range    Anti-S ANTI-S    Green Top (Gel)   Result Value Ref Range    Extra Tube Hold for add-ons.    Lavender Top   Result Value Ref Range    Extra Tube hold for add-on    Gold Top - SST   Result Value Ref Range    Extra Tube Hold for add-ons.    Gray Top   Result Value Ref Range    Extra Tube Hold for add-ons.         All other labs were within normal range or not returned as of this dictation.      EMERGENCY DEPARTMENT COURSE and DIFFERENTIAL DIAGNOSIS/MDM:   Vitals:    Vitals:    06/15/21 0635 06/15/21 0640 06/15/21 0645 06/15/21 0701   BP: 136/62 128/62 108/72    BP Location:       Patient Position:       Pulse: 83 81 79 83   Resp: 20 20 20 20   Temp: 97.6 °F (36.4 °C) 97.7 °F (36.5 °C) 97.7 °F (36.5 °C)    TempSrc: Axillary Axillary Axillary    SpO2: 100% 100% 99% 92%   Weight:       Height:           ED Course as of Pelon 15 0807   Mon Jun 14, 2021   2147 NCHCT negative per radiologist    [NS]   Tue Pelon 15, 2021   0308 Pt stable. Spoke w/ Dr. Alvarez who accepts the pt for admission.    [NS]      ED Course User Index  [NS] James Chan MD       Patient initially hypoxic and in significant respiratory distress with improvement with BiPAP and nebulizer therapy.  This is likely secondary to COPD exacerbation which I believe to be triggered by acute blood loss anemia.  Her CTA demonstrates no evidence of active hemorrhage and she has no risk factors for cirrhosis, however she is significantly anemic with associated symptoms and myocardial injury/COPD exacerbation and so I decided to transfuse 1 unit red blood cells in the ED.  Her troponin is also elevated, however suspect that this is due to myocardial strain and not acute coronary thrombosis and her ECG demonstrates no evidence of significant abnormality.    I had a discussion with the patient/family regarding diagnosis, diagnostic results, treatment plan, and medications.  The  patient/family indicated understanding of these instructions.  I spent adequate time at the bedside preceding discharge necessary to personally discuss the aftercare instructions, giving patient education, providing explanations of the results of our evaluations/findings, and my decision making to assure that the patient/family understand the plan of care.  Time was allotted to answer questions at that time and throughout the ED course.  Emphasis was placed on timely follow-up after discharge.  I also discussed the potential for the development of an acute emergent condition requiring further evaluation, admission, or even surgical intervention. I discussed that we found nothing during the visit today indicating the need for further workup, admission, or the presence of an unstable medical condition.  I encouraged the patient to return to the emergency department immediately for ANY concerns, worsening, new complaints, or if symptoms persist and unable to seek follow-up in a timely fashion.  The patient/family expressed understanding and agreement with this plan.  The patient will follow-up with their PCP in 1-2 days for reevaluation.       MEDICATIONS ADMINISTERED IN ED:  Medications   sodium chloride 0.9 % flush 10 mL (has no administration in time range)   albuterol (PROVENTIL) nebulizer solution 0.083% 2.5 mg/3mL (has no administration in time range)   ipratropium-albuterol (DUO-NEB) nebulizer solution 3 mL (has no administration in time range)   cetirizine (zyrTEC) tablet 5 mg (has no administration in time range)   citalopram (CeleXA) tablet 20 mg (has no administration in time range)   dilTIAZem CD (CARDIZEM CD) 24 hr capsule 240 mg (has no administration in time range)   rosuvastatin (CRESTOR) tablet 40 mg (has no administration in time range)   sodium chloride 0.9 % flush 10 mL (has no administration in time range)   sodium chloride 0.9 % flush 10 mL (has no administration in time range)   pantoprazole  (PROTONIX) injection 40 mg (has no administration in time range)   ipratropium-albuterol (DUO-NEB) nebulizer solution 3 mL (3 mL Nebulization Given 6/15/21 0701)   methylPREDNISolone sodium succinate (SOLU-Medrol) injection 60 mg (has no administration in time range)   ipratropium-albuterol (DUO-NEB) nebulizer solution 3 mL (3 mL Nebulization Given 6/14/21 2232)   albuterol (PROVENTIL) nebulizer solution 0.083% 2.5 mg/3mL (2.5 mg Nebulization Given 6/14/21 2232)   pantoprazole (PROTONIX) injection 80 mg (80 mg Intravenous Given 6/15/21 0127)   iopamidol (ISOVUE-370) 76 % injection 100 mL (80 mL Intravenous Given 6/15/21 0152)   dexamethasone (DECADRON) IVPB 10 mg (0 mg Intravenous Stopped 6/15/21 0415)   naloxone (NARCAN) injection 0.4 mg (0.4 mg Intravenous Given 6/15/21 0416)   naloxone (NARCAN) injection 0.4 mg (0.4 mg Intravenous Given 6/15/21 0628)         CRITICAL CARE TIME    Approximately 75 minutes of discontinuous critical care time was provided to this patient by myself absent of any time spent performing procedures.  Patient presents critically ill with acute GI bleed with associated acute hypoxic respiratory failure secondary to COPD exacerbation along with type II myocardial infarction due to strain patient in cardiovascular, respiratory, and neurologic systems at risk requiring the following interventions: Emergent blood transfusion, initiation of BiPAP therapy for acute hypoxic/hypercapnic respiratory failure, interpretation of lab/ECG/imaging, frequent reassessment, coordination of admission with the following response: Improved hypoxemia and symptoms.  Patient at high risk of deterioration and possibly death without these interventions.        FINAL IMPRESSION      1. Acute upper GI bleed    2. Type 2 MI (myocardial infarction) (CMS/HCC)    3. COPD exacerbation (CMS/HCC)    4. Acute blood loss anemia    5. Hypoxia    6. Acute urinary retention          DISPOSITION/PLAN     ED Disposition     ED  Disposition Condition Comment    Decision to Admit  Level of Care: Telemetry [5]   Diagnosis: Acute upper GI bleed [[id number]]   Admitting Physician: YANELI LAND [[id number]]   Attending Physician: YANELI LAND [[id number]]   Bed Request Comments: ORACIO Chan MD  Attending Emergency Physician               James Chan MD  06/15/21 0807      Electronically signed by James Chan MD at 06/15/21 0807     Oneida Boyer RN at 06/14/21 2238        Dr. Chan notified of elevated troponin     Boyer, Oneida, RN  06/15/21 0358      Electronically signed by Oneida Boyer RN at 06/15/21 0358     Oneida Boyer RN at 06/15/21 0053        Pt has had multiple RN's attempt to obtain IV access without success, U/S to be attempted.     Oneida Boyer RN  06/15/21 0054      Electronically signed by Oneida Boyer RN at 06/15/21 0054     Oneida Boyer RN at 06/15/21 0100        Lab at bedside for type & screen     Oneida Boyer RN  06/15/21 0103      Electronically signed by Oneida Boyer RN at 06/15/21 0103     Oneida Boyer RN at 06/15/21 0522        Report to FLOOR Oneida Madden RN  06/15/21 0522      Electronically signed by Oneida Boyer RN at 06/15/21 0522       Vital Signs (last day)     Date/Time   Temp   Temp src   Pulse   Resp   BP   Patient Position   SpO2    06/15/21 0900   96.9 (36.1)   Axillary   81   22   124/65   --   100    06/15/21 0800   --   --   74   --   118/60   --   --    06/15/21 0701   --   --   83   20   --   --   92    06/15/21 0645   97.7 (36.5)   Axillary   79   20   108/72   --   99    06/15/21 0640   97.7 (36.5)   Axillary   81   20   128/62   --   100    06/15/21 0635   97.6 (36.4)   Axillary   83   20   136/62   --   100    06/15/21 0630   97.6 (36.4)   Axillary   80   20   112/50   --   --    06/15/21 0620   97.6 (36.4)   Axillary   80   20   112/50   Lying   98    06/15/21 05:46:47   97.7 (36.5)   Axillary   78   20   123/61   Lying   --    06/15/21 0444    --   --   --   19   --   --   --    06/15/21 04:17:52   97.4 (36.3)   Axillary   82   26   125/61   Lying   99    06/15/21 0330   --   --   80   --   118/59   --   98    06/15/21 0300   --   --   80   --   113/63   --   96    06/15/21 0230   --   --   80   --   125/58   --   100    06/15/21 0222   --   --   73   --   --   --   100    06/15/21 0207   --   --   72   --   --   --   100    06/15/21 0206   --   --   --   --   113/56   --   --    06/15/21 0100   --   --   76   --   103/46   --   100    06/14/21 2232   --   --   --   24   --   --   --    06/14/21 2230   --   --   89   --   126/62   --   98    06/14/21 2152   --   --   91   --   --   --   96    06/14/21 2148   --   --   --   --   137/68   --   --    06/14/21 2123   98.4 (36.9)   Oral   91   20   136/62   Lying   100                Facility-Administered Medications as of 6/15/2021   Medication Dose Route Frequency Provider Last Rate Last Admin   • [COMPLETED] albuterol (PROVENTIL) nebulizer solution 0.083% 2.5 mg/3mL  2.5 mg Nebulization Once James Chan MD   2.5 mg at 06/14/21 2232   • albuterol (PROVENTIL) nebulizer solution 0.083% 2.5 mg/3mL  2.5 mg Nebulization Q4H PRN Kyra Salinas PA-C       • cetirizine (zyrTEC) tablet 5 mg  5 mg Oral Daily Kyra Salinas PA-C   5 mg at 06/15/21 0839   • citalopram (CeleXA) tablet 20 mg  20 mg Oral Daily Kyra Salinas PA-C   20 mg at 06/15/21 0841   • [COMPLETED] dexamethasone (DECADRON) IVPB 10 mg  10 mg Intravenous Once Antwan Alvarez DO   Stopped at 06/15/21 0415   • dilTIAZem CD (CARDIZEM CD) 24 hr capsule 240 mg  240 mg Oral Daily Kyra Salinas PA-C   240 mg at 06/15/21 0839   • [COMPLETED] iopamidol (ISOVUE-370) 76 % injection 100 mL  100 mL Intravenous Once in imaging James Chan MD   80 mL at 06/15/21 0152   • [COMPLETED] ipratropium-albuterol (DUO-NEB) nebulizer solution 3 mL  3 mL Nebulization Once James Chan MD   3 mL at 06/14/21 2232   • ipratropium-albuterol (DUO-NEB)  nebulizer solution 3 mL  3 mL Nebulization Q4H PRN Kyra Salinas PA-C       • ipratropium-albuterol (DUO-NEB) nebulizer solution 3 mL  3 mL Nebulization Q4H - RT Antwan Alvarez, DO   3 mL at 06/15/21 0701   • methylPREDNISolone sodium succinate (SOLU-Medrol) injection 60 mg  60 mg Intravenous Q8H Kyra Salinas PA-C       • [COMPLETED] naloxone (NARCAN) injection 0.4 mg  0.4 mg Intravenous Once Antwan Alvarez, DO   0.4 mg at 06/15/21 0416   • [COMPLETED] naloxone (NARCAN) injection 0.4 mg  0.4 mg Intravenous Once Antwan Alvarez, DO   0.4 mg at 06/15/21 0628   • pantoprazole (PROTONIX) injection 40 mg  40 mg Intravenous BID AC Kyra Salinas PA-C   40 mg at 06/15/21 0839   • [COMPLETED] pantoprazole (PROTONIX) injection 80 mg  80 mg Intravenous Once James Chan MD   80 mg at 06/15/21 0127   • rosuvastatin (CRESTOR) tablet 40 mg  40 mg Oral Nightly Kyra Salinas PA-C       • sodium chloride 0.9 % flush 10 mL  10 mL Intravenous PRN James Chan MD       • sodium chloride 0.9 % flush 10 mL  10 mL Intravenous Q12H Kyra Salinas PA-C   10 mL at 06/15/21 0840   • sodium chloride 0.9 % flush 10 mL  10 mL Intravenous PRN Kyra Salinas PA-C           Lab Results (last 24 hours)     Procedure Component Value Units Date/Time    Phenobarbital Level [[id number]]  (Abnormal) Collected: 06/14/21 2146    Specimen: Blood Updated: 06/15/21 0429     Phenobarbital 7.9 mcg/mL     Lactic Acid, Plasma [[id number]]  (Normal) Collected: 06/15/21 0325    Specimen: Blood Updated: 06/15/21 0423     Lactate 0.8 mmol/L      Comment: Falsely depressed results may occur on samples drawn from patients receiving N-Acetylcysteine (NAC) or Metamizole.       Urine Drug Screen - Urine, Clean Catch [[id number]]  (Abnormal) Collected: 06/15/21 0332    Specimen: Urine, Clean Catch Updated: 06/15/21 0405     THC, Screen, Urine Negative     Phencyclidine (PCP), Urine Negative     Cocaine Screen, Urine Negative     Methamphetamine, Ur  Negative     Opiate Screen Positive     Amphetamine Screen, Urine Negative     Benzodiazepine Screen, Urine Positive     Tricyclic Antidepressants Screen Negative     Methadone Screen, Urine Negative     Barbiturates Screen, Urine Positive     Oxycodone Screen, Urine Negative     Propoxyphene Screen Negative     Buprenorphine, Screen, Urine Negative    Narrative:      Cutoff For Drugs Screened:    Amphetamines               500 ng/ml  Barbiturates               200 ng/ml  Benzodiazepines            150 ng/ml  Cocaine                    150 ng/ml  Methadone                  200 ng/ml  Opiates                    100 ng/ml  Phencyclidine               25 ng/ml  THC                            50 ng/ml  Methamphetamine            500 ng/ml  Tricyclic Antidepressants  300 ng/ml  Oxycodone                  100 ng/ml  Propoxyphene               300 ng/ml  Buprenorphine               10 ng/ml    The normal value for all drugs tested is negative. This report includes unconfirmed screening results, with the cutoff values listed, to be used for medical treatment purposes only.  Unconfirmed results must not be used for non-medical purposes such as employment or legal testing.  Clinical consideration should be applied to any drug of abuse test, particularly when unconfirmed results are used.      POC Glucose Once [[id number]]  (Normal) Collected: 06/15/21 0358    Specimen: Blood Updated: 06/15/21 0400     Glucose 99 mg/dL     Urinalysis With Culture If Indicated - Urine, Catheter In/Out [[id number]]  (Normal) Collected: 06/15/21 0314    Specimen: Urine, Catheter In/Out Updated: 06/15/21 0358     Color, UA Yellow     Appearance, UA Clear     pH, UA 7.0     Specific Gravity, UA >=1.030     Glucose, UA Negative     Ketones, UA Negative     Bilirubin, UA Negative     Blood, UA Negative     Protein, UA Negative     Leuk Esterase, UA Negative     Nitrite, UA Negative     Urobilinogen, UA 0.2 E.U./dL    Narrative:      Urine microscopic  not indicated.    Blood Gas, Arterial With Co-Ox [[id number]]  (Abnormal) Collected: 06/15/21 0345    Specimen: Arterial Blood Updated: 06/15/21 0345     Site Right Radial     Adam's Test N/A     pH, Arterial 7.335 pH units      Comment: 84 Value below reference range        pCO2, Arterial 63.8 mm Hg      Comment: 83 Value above reference range        pO2, Arterial 73.7 mm Hg      Comment: 84 Value below reference range        HCO3, Arterial 34.0 mmol/L      Base Excess, Arterial 7.3 mmol/L      Hemoglobin, Blood Gas 6.9 g/dL      Comment: 84 Value below reference range        Hematocrit, Blood Gas 21.1 %      Oxyhemoglobin 91.9 %      Comment: 84 Value below reference range        Methemoglobin 0.70 %      Carboxyhemoglobin 2.3 %      Comment: 83 Value above reference range        CO2 Content 35.9 mmol/L      Temperature 37.0 C      Barometric Pressure for Blood Gas --     Comment: N/A        Modality BiPap     FIO2 30 %      Rate 0 Breaths/minute      PIP 0 cmH2O      Comment: Meter: [id number]     :  [id number]        IPAP 16     EPAP 6     Note --     pH, Temp Corrected 7.335 pH Units      pCO2, Temperature Corrected 63.8 mm Hg      pO2, Temperature Corrected 73.7 mm Hg     [place] Draw [[id number]] Collected: 06/14/21 2146    Specimen: Blood Updated: 06/15/21 0201    Narrative:      The following orders were created for panel order [place] Draw.  Procedure                               Abnormality         Status                     ---------                               -----------         ------                     Green Top (Gel)[[id number]]                                  Final result               Lavender Top[[id number]]                                     Final result               Gold Top - SST[[id number]]                                   Final result               Cast Top[[id number]]                                         Final result                 Please view results for these tests on  the individual orders.    Cast Top [[id number]] Collected: 06/14/21 2146    Specimen: Blood Updated: 06/15/21 0201     Extra Tube Hold for add-ons.     Comment: Auto resulted.       Lavender Top [[id number]] Collected: 06/14/21 2146    Specimen: Blood Updated: 06/14/21 2301     Extra Tube hold for add-on     Comment: Auto resulted       Gold Top - SST [[id number]] Collected: 06/14/21 2146    Specimen: Blood Updated: 06/14/21 2301     Extra Tube Hold for add-ons.     Comment: Auto resulted.       Green Top (Gel) [[id number]] Collected: 06/14/21 2146    Specimen: Blood Updated: 06/14/21 2301     Extra Tube Hold for add-ons.     Comment: Auto resulted.       T4, Free [[id number]]  (Normal) Collected: 06/14/21 2146    Specimen: Blood Updated: 06/14/21 2253     Free T4 1.16 ng/dL     Narrative:      Results may be falsely increased if patient taking Biotin.      TSH [[id number]]  (Normal) Collected: 06/14/21 2146    Specimen: Blood Updated: 06/14/21 2253     TSH 3.220 uIU/mL     Troponin [[id number]]  (Abnormal) Collected: 06/14/21 2146    Specimen: Blood Updated: 06/14/21 2238     Troponin T 0.434 ng/mL     Narrative:      Troponin T Reference Range:  <= 0.03 ng/mL-   Negative for AMI  >0.03 ng/mL-     Abnormal for myocardial necrosis.  Clinicians would have to utilize clinical acumen, EKG, Troponin and serial changes to determine if it is an Acute Myocardial Infarction or myocardial injury due to an underlying chronic condition.       Results may be falsely decreased if patient taking Biotin.      Blood Gas, Arterial With Co-Ox [[id number]]  (Abnormal) Collected: 06/14/21 2229    Specimen: Arterial Blood Updated: 06/14/21 2229     Site Right Radial     Adam's Test N/A     pH, Arterial 7.321 pH units      Comment: 84 Value below reference range        pCO2, Arterial 69.6 mm Hg      Comment: 86 Value above critical limit        pO2, Arterial 127.0 mm Hg      Comment: 83 Value above reference range        HCO3, Arterial 35.9  mmol/L      Base Excess, Arterial 8.8 mmol/L      Hemoglobin, Blood Gas 7.0 g/dL      Comment: 84 Value below reference range        Hematocrit, Blood Gas 21.5 %      Oxyhemoglobin 95.2 %      Methemoglobin 0.80 %      Carboxyhemoglobin 3.6 %      Comment: 83 Value above reference range        CO2 Content 38.1 mmol/L      Temperature 37.0 C      Barometric Pressure for Blood Gas --     Comment: N/A        Modality Nasal Cannula     FIO2 36 %      Rate 0 Breaths/minute      PIP 0 cmH2O      Comment: Meter: [id number]     :  [id number]        IPAP 0     EPAP 0     Note --     Notified Who DR. REGGIE SANDS MD     Notified By [id number]     Notified Time 06/14/2021 22:28     pH, Temp Corrected 7.321 pH Units      pCO2, Temperature Corrected 69.6 mm Hg      pO2, Temperature Corrected 127 mm Hg     Comprehensive Metabolic Panel [[id number]]  (Abnormal) Collected: 06/14/21 2146    Specimen: Blood Updated: 06/14/21 2225     Glucose 110 mg/dL      BUN 8 mg/dL      Creatinine 0.79 mg/dL      Sodium 136 mmol/L      Potassium 4.6 mmol/L      Comment: Slight hemolysis detected by analyzer. Results may be affected.        Chloride 98 mmol/L      CO2 34.0 mmol/L      Calcium 8.7 mg/dL      Total Protein 6.3 g/dL      Albumin 3.50 g/dL      ALT (SGPT) 19 U/L      AST (SGOT) 22 U/L      Alkaline Phosphatase 103 U/L      Total Bilirubin <0.2 mg/dL      eGFR Non African Amer 71 mL/min/1.73      Globulin 2.8 gm/dL      A/G Ratio 1.3 g/dL      BUN/Creatinine Ratio 10.1     Anion Gap 4.0 mmol/L     Narrative:      GFR Normal >60  Chronic Kidney Disease <60  Kidney Failure <15      Magnesium [[id number]]  (Normal) Collected: 06/14/21 2146    Specimen: Blood Updated: 06/14/21 2220     Magnesium 1.8 mg/dL     CBC & Differential [[id number]]  (Abnormal) Collected: 06/14/21 2146    Specimen: Blood Updated: 06/14/21 2217    Narrative:      The following orders were created for panel order CBC & Differential.  Procedure                                Abnormality         Status                     ---------                               -----------         ------                     CBC Auto Differential[[id number]]        Abnormal            Final result                 Please view results for these tests on the individual orders.    CBC Auto Differential [[id number]]  (Abnormal) Collected: 06/14/21 2146    Specimen: Blood Updated: 06/14/21 2217     WBC 5.29 10*3/mm3      RBC 2.64 10*6/mm3      Hemoglobin 7.3 g/dL      Hematocrit 24.8 %      MCV 93.9 fL      MCH 27.7 pg      MCHC 29.4 g/dL      RDW 16.2 %      RDW-SD 56.0 fl      MPV 9.3 fL      Platelets 281 10*3/mm3      Neutrophil % 57.3 %      Lymphocyte % 31.2 %      Monocyte % 8.1 %      Eosinophil % 2.8 %      Basophil % 0.2 %      Immature Grans % 0.4 %      Neutrophils, Absolute 3.03 10*3/mm3      Lymphocytes, Absolute 1.65 10*3/mm3      Monocytes, Absolute 0.43 10*3/mm3      Eosinophils, Absolute 0.15 10*3/mm3      Basophils, Absolute 0.01 10*3/mm3      Immature Grans, Absolute 0.02 10*3/mm3      nRBC 0.0 /100 WBC         Imaging Results (Last 24 Hours)     Procedure Component Value Units Date/Time    CT Angiogram Abdomen Pelvis [[id number]] Collected: 06/15/21 0234     Updated: 06/15/21 0236    Narrative:      CTA Abdomen Pelvis    INDICATION:   Abdominal pain and GI bleed.    TECHNIQUE:   CT angiogram of the abdomen and pelvis with and without IV contrast. 2 sets of delayed images were obtained. 3-D reconstructions were obtained and reviewed. Radiation dose reduction techniques included automated exposure control or exposure modulation  based on body size. Count of known CT and cardiac nuc med studies performed in previous 12 months: 16.     COMPARISON:   5/22/2021    FINDINGS:   There is streak artifact from the patient's arms. Extensive atherosclerotic disease is again identified. There is no aortic aneurysm or aortic dissection. There is a high-grade stenosis or short segment occlusion in the  left common iliac artery with  reconstitution distally. The internal and external iliac arteries on both sides are diffusely markedly diseased and narrowed. There is narrowing of both common femoral arteries and both proximal superficial femoral arteries. There are single bilateral  renal arteries with with diffuse atherosclerotic disease and proximal renal artery stenoses on both sides at least moderate in degree. There is a proximal stenosis in the celiac trunk measuring greater than 50%. There is only mild stenosis in the  proximal SFA although there is diffuse atherosclerotic disease within the vessel. The EDDA is patent. No large vessel occlusion is seen. No contrast extravasation or definite contrast staining is identified to suggest an area of acute GI bleeding at this  time.    COPD noted in the lung bases. Gallbladder is surgically absent. Solid abdominal organs are grossly normal allowing for artifact. Urinary bladder is distended, extending out of the pelvis. Please correlate for bladder all obstruction. Uterus is surgically  absent. Postoperative changes are noted in the bowel. Moderate stool burden is noted in the colon which may indicate constipation. The appendix is surgically absent by history. No definite small bowel obstruction. There is an old T12 compression  deformity.      Impression:        1. Extensive atherosclerotic disease as described above. There is no aortic aneurysm or dissection. No large vessel occlusion is seen.  2. No clear evidence of acute GI bleeding.  3. Marked distention of the urinary bladder concerning for bladder all obstruction.  4. Moderate colonic stool burden concerning for constipation. No definite evidence of a small bowel obstruction.  5. Additional findings as above.    Signer Name: Nabil Shearer MD   Signed: 6/15/2021 2:34 AM   Workstation Name: [email or web address]    [facility]    XR Chest 1 View [[id number]] Collected: 06/14/21 1158     Updated:  06/14/21 2218    Narrative:      CR Chest 1 Vw    INDICATION:   Weakness and dizziness. COPD. Shortness of air.     COMPARISON:    5/22/2021    FINDINGS:  Single portable AP view(s) of the chest.  Heart size is normal status post CABG. There is atherosclerotic disease in the aorta. COPD is noted. The lungs are clear except for granulomatous calcification. No pneumothorax is seen. Old fractures are present  involving the left clavicle and left proximal humerus.      Impression:      No acute cardiopulmonary findings.    Signer Name: Nabil Shearer MD   Signed: 6/14/2021 10:16 PM   Workstation Name: [id number]    [facility]    CT Cervical Spine Without Contrast [[id number]] Collected: 06/14/21 2149     Updated: 06/14/21 2151    Narrative:      CT Spine Cervical WO    INDICATION:   Fall today. Patient on blood thinners.    TECHNIQUE:   CT of the cervical spine without IV contrast. Coronal and sagittal reconstructions were obtained.  Radiation dose reduction techniques included automated exposure control or exposure modulation based on body size. Count of known CT and cardiac nuc med  studies performed in previous 12 months: 14.     COMPARISON:  No pertinent prior study    FINDINGS:  Exam is mildly compromised by motion artifact.    No acute fracture is seen. Vertebral body heights are maintained. Bone mineralization is mildly demineralized. Cervical intervertebral disc spaces are maintained.    The sagittal alignment of the cervical spine is anatomic.The facets are intact. The posterior elements show no acute findings. There is no prevertebral soft tissue swelling present.    Cervical spinal canal is patent. The neural foramina are patent. Soft tissue windows show no evidence of cord compression.    The odontoid is intact. The ring of C1 is intact. The cervicomedullary junction is normal. The visualized portions of the lung apices are clear.      Impression:      Impression:    1. No acute  fracture is seen.  2. No acute radiographic abnormalities are identified.    Report called to Dr. Chan at 9:47 PM        Signer Name: Jimy Rodriguez MD   Signed: 6/14/2021 9:49 PM   Workstation Name: [facility]    [facility]    CT Head Without Contrast [[id number]] Collected: 06/14/21 2143     Updated: 06/14/21 2145    Narrative:      CT Head WO    HISTORY:   Fall today with head trauma. Patient on blood thinners    TECHNIQUE:   Axial unenhanced head CT. Radiation dose reduction techniques included automated exposure control or exposure modulation based on body size. Count of known CT and cardiac nuc med studies performed in previous 12 months: 14.     Time of scan: 9:29 PM    COMPARISON:   February 18, 2021    FINDINGS:     The ventricles are mildly generous in size. Cortical sulci are correspondingly prominent. No extraaxial fluid collections are seen.    There is evidence of a remote infarct in the inferior left cerebellar hemisphere.    No parenchymal or subarachnoid hemorrhage is present. Periventricular hypodensities are demonstrated which are nonspecific but likely represent white matter microvascular change in a patient of this age. No CT evidence of mass or acute infarct.    The mastoid air cells are clear. The visualized paranasal sinuses are clear.  Orbital structures demonstrate no acute findings.      Impression:      Impression:  1. No clearly acute intracranial pathology demonstrated.  2. Mild cerebral atrophy and nonspecific periventricular hypodensities which are thought to represent white matter microvascular change. 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  3. No significant interval change from prior study of 2/18/2021.    Signer Name: Jimy Rodriguez MD   Signed: 6/14/2021 9:43 PM   Workstation Name: [facility]    [facility]

## 2021-06-15 NOTE — CONSULTS
Zabrina Lemon MD  Consulting physician: Rupali    Chief Complaint   Patient presents with   • Fall   • Altered Mental Status         HPI: Patient is a 71-year-old female brought in hospital due to altered mental status and being found down by her daughter.  Patient has a history of COPD, but is reportedly noncompliant with her oxygen use.  Patient does admit that she is homebound and has been homebound for the last 2 years, stating that she lives with her daughter and grandchildren who help take care of her.    Today the patient denies any significant complaints, feeling that her breathing has improved.  She has been found to have a GI bleed with plans for an EGD to be performed tomorrow.      Past Medical History:   Diagnosis Date   • Aneurysm (CMS/HCC)    • Angina pectoris (CMS/HCC) 6/20/2016   • Anxiety 6/20/2016   • Arthritis 6/20/2016   • CAD (coronary artery disease)    • Carotid artery stenosis    • CHF (congestive heart failure) (CMS/HCC)    • Chronic coronary artery disease 6/20/2016 2003 CABG LIMA to LAD, VG to OM 2004 VG to OM occluded. LIMA patent Cx intervention and RCA 2008 stent for ISR 2013 ISR and stenting of CX and RCA 2016 normal MPS   • Chronic left-sided low back pain with left-sided sciatica 2/1/2017   • Chronic obstructive pulmonary disease (CMS/HCC) 6/20/2016   • Chronic respiratory failure with hypoxia (CMS/HCC) 3/27/2021   • Cobalamin deficiency 6/20/2016   • Congestive heart failure (CMS/HCC) 6/20/2016   • COPD (chronic obstructive pulmonary disease) (CMS/HCC)    • Depression 7/3/2018   • Diverticulosis    • Diverticulosis of large intestine without hemorrhage 5/5/2017   • GERD (gastroesophageal reflux disease)    • GIB (gastrointestinal bleeding)     recurrent    • HTN (hypertension)    • Hypercholesterolemia 6/20/2016   • Hyperlipidemia    • Mesenteric ischemia (CMS/HCC) 2006    s/p resection    • Mood disorder (CMS/HCC)    • Oxygen dependent     3 liters @ all times.    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/[facility]) 5/23/2021   • PVD (peripheral vascular disease) (CMS/[facility])    • Restless legs syndrome 6/20/2016   • Seizure disorder (CMS/[facility]) 6/20/2016   • Seizures (CMS/[facility])    • Stenosis of carotid artery 6/20/2016   • Vertigo 9/28/2016     Past Surgical History:   Procedure Laterality Date   • APPENDECTOMY     • CHOLECYSTECTOMY     • COLOSTOMY  2006    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   • EXPLORATORY LAPAROTOMY      sec to ovarian cysts   • HYSTERECTOMY     • ILIAC ARTERY STENT     • REVISION / TAKEDOWN COLOSTOMY  2008     Current Code Status     Date Active Code Status Order ID Comments User Context       6/15/2021 0403 CPR [id number]  Kyra Salinas PA-C ED     Advance Care Planning Activity      Questions for Current Code Status     Question Answer Comment    Code Status CPR     Medical Interventions (Level of Support Prior to Arrest) Full         Current Facility-Administered Medications   Medication Dose Route Frequency Provider Last Rate Last Admin   • albuterol (PROVENTIL) nebulizer solution 0.083% 2.5 mg/3mL  2.5 mg Nebulization Q4H PRN Kyra Salinas PA-C       • cetirizine (zyrTEC) tablet 5 mg  5 mg Oral Daily Kyra Salinas PA-C   5 mg at 06/15/21 0839   • citalopram (CeleXA) tablet 20 mg  20 mg Oral Daily Kyra Salinas PA-C   20 mg at 06/15/21 0841   • dilTIAZem CD (CARDIZEM CD) 24 hr capsule 240 mg  240 mg Oral Daily Kyra Salinas PA-C   240 mg at 06/15/21 0839   • ipratropium-albuterol (DUO-NEB) nebulizer solution 3 mL  3 mL Nebulization Q4H PRN Kyra Salinas PA-C       • ipratropium-albuterol (DUO-NEB) nebulizer solution 3 mL  3 mL Nebulization Q4H - RT Antwan Alvarez DO   3 mL at 06/15/21 1256   • methylPREDNISolone sodium succinate (SOLU-Medrol) injection 60 mg  60 mg Intravenous Q8H Rita  ANNE Rae   60 mg at 06/15/21 1407   • pantoprazole (PROTONIX) injection 40 mg  40 mg Intravenous BID AC Kyra Salinas PA-C   40 mg at 06/15/21 0839   • rosuvastatin (CRESTOR) tablet 40 mg  40 mg Oral Nightly Kyra Salinas PA-C       • sodium chloride 0.9 % flush 10 mL  10 mL Intravenous PRN James Chan MD       • sodium chloride 0.9 % flush 10 mL  10 mL Intravenous Q12H Kyra Salinas PA-C   10 mL at 06/15/21 0840   • sodium chloride 0.9 % flush 10 mL  10 mL Intravenous PRN Kyra Salinas PA-C            •  albuterol  •  ipratropium-albuterol  •  sodium chloride  •  sodium chloride  Allergies   Allergen Reactions   • Keflex [Cephalexin] Shortness Of Breath and Rash     Patients power of  states patient had to be taken to ER due to reaction.    Tolerated Rocephin 2/19/21   • Sulfamethoxazole-Trimethoprim Shortness Of Breath and Rash     Patients power of  stated patient had to be taken to ER due to reaction.   • Carbamazepine    • Codeine      Family History   Problem Relation Age of Onset   • Arthritis Mother    • Cancer Mother    • Heart attack Father    • Hypertension Father    • Hyperlipidemia Father    • Stroke Father    • Heart disease Brother         CAD   • Heart disease Child         CAD   • Heart disease Child         CAD     Social History     Socioeconomic History   • Marital status:      Spouse name: Not on file   • Number of children: Not on file   • Years of education: Not on file   • Highest education level: Not on file   Tobacco Use   • Smoking status: Current Every Day Smoker     Packs/day: 1.00     Years: 40.00     Pack years: 40.00     Types: Cigarettes, Electronic Cigarette   • Smokeless tobacco: Never Used   • Tobacco comment: <0.5ppd    Substance and Sexual Activity   • Alcohol use: Never   • Drug use: Never   • Sexual activity: Defer     Review of Systems -all others reviewed and found negative that mentioned in the HPI      /66   Pulse 75    "Temp 98.4 °F (36.9 °C) (Temporal)   Resp 18   Ht 149.9 cm (59\")   Wt 37.6 kg (83 lb)   SpO2 100%   BMI 16.76 kg/m²     Intake/Output Summary (Last 24 hours) at 6/15/2021 1434  Last data filed at 6/15/2021 1300  Gross per 24 hour   Intake 712.92 ml   Output 1550 ml   Net -837.08 ml     Physical Exam:      General Appearance:    Alert, cooperative, frail appearing   Head:    Normocephalic, without obvious abnormality, atraumatic   Eyes:            Lids and lashes normal, conjunctivae and sclerae normal, no   icterus, no pallor, corneas clear, PERRLA   Ears:    Ears appear intact with no abnormalities noted   Throat:   No oral lesions, no thrush, oral mucosa moist   Neck:   No adenopathy, supple, trachea midline, no thyromegaly, no     carotid bruit, no JVD   Back:     No kyphosis present, no scoliosis present, no skin lesions,       erythema or scars, no tenderness to percussion or                   palpation,   range of motion normal   Lungs:     Clear to auscultation,respirations regular, even and                   unlabored    Heart:    Regular rhythm and normal rate, normal S1 and S2, no            murmur, no gallop, no rub, no click   Breast Exam:    Deferred   Abdomen:     Normal bowel sounds, no masses, no organomegaly, soft        non-tender, non-distended, no guarding, no rebound                 tenderness   Genitalia:    Deferred   Extremities:   Moves all extremities well, no edema, no cyanosis, no              redness   Pulses:   Pulses palpable and equal bilaterally   Skin:   No bleeding, bruising or rash   Lymph nodes:   No palpable adenopathy   Neurologic:   Cranial nerves 2 - 12 grossly intact, sensation intact, DTR        present and equal bilaterally       Results from last 7 days   Lab Units 06/14/21  2146   WBC 10*3/mm3 5.29   HEMOGLOBIN g/dL 7.3*   HEMATOCRIT % 24.8*   PLATELETS 10*3/mm3 281     Results from last 7 days   Lab Units 06/14/21  2146   SODIUM mmol/L 136   POTASSIUM mmol/L 4.6 "   CHLORIDE mmol/L 98   CO2 mmol/L 34.0*   BUN mg/dL 8   CREATININE mg/dL 0.79   CALCIUM mg/dL 8.7   BILIRUBIN mg/dL <0.2   ALK PHOS U/L 103   ALT (SGPT) U/L 19   AST (SGOT) U/L 22   GLUCOSE mg/dL 110*     Results from last 7 days   Lab Units 06/14/21  2146   SODIUM mmol/L 136   POTASSIUM mmol/L 4.6   CHLORIDE mmol/L 98   CO2 mmol/L 34.0*   BUN mg/dL 8   CREATININE mg/dL 0.79   GLUCOSE mg/dL 110*   CALCIUM mg/dL 8.7     Imaging Results (Last 72 Hours)     Procedure Component Value Units Date/Time    CT Angiogram Abdomen Pelvis [[id number]] Collected: 06/15/21 0234     Updated: 06/15/21 0236    Narrative:      CTA Abdomen Pelvis    INDICATION:   Abdominal pain and GI bleed.    TECHNIQUE:   CT angiogram of the abdomen and pelvis with and without IV contrast. 2 sets of delayed images were obtained. 3-D reconstructions were obtained and reviewed. Radiation dose reduction techniques included automated exposure control or exposure modulation  based on body size. Count of known CT and cardiac nuc med studies performed in previous 12 months: 16.     COMPARISON:   5/22/2021    FINDINGS:   There is streak artifact from the patient's arms. Extensive atherosclerotic disease is again identified. There is no aortic aneurysm or aortic dissection. There is a high-grade stenosis or short segment occlusion in the left common iliac artery with  reconstitution distally. The internal and external iliac arteries on both sides are diffusely markedly diseased and narrowed. There is narrowing of both common femoral arteries and both proximal superficial femoral arteries. There are single bilateral  renal arteries with with diffuse atherosclerotic disease and proximal renal artery stenoses on both sides at least moderate in degree. There is a proximal stenosis in the celiac trunk measuring greater than 50%. There is only mild stenosis in the  proximal SFA although there is diffuse atherosclerotic disease within the vessel. The EDDA is patent.  No large vessel occlusion is seen. No contrast extravasation or definite contrast staining is identified to suggest an area of acute GI bleeding at this  time.    COPD noted in the lung bases. Gallbladder is surgically absent. Solid abdominal organs are grossly normal allowing for artifact. Urinary bladder is distended, extending out of the pelvis. Please correlate for bladder all obstruction. Uterus is surgically  absent. Postoperative changes are noted in the bowel. Moderate stool burden is noted in the colon which may indicate constipation. The appendix is surgically absent by history. No definite small bowel obstruction. There is an old T12 compression  deformity.      Impression:        1. Extensive atherosclerotic disease as described above. There is no aortic aneurysm or dissection. No large vessel occlusion is seen.  2. No clear evidence of acute GI bleeding.  3. Marked distention of the urinary bladder concerning for bladder all obstruction.  4. Moderate colonic stool burden concerning for constipation. No definite evidence of a small bowel obstruction.  5. Additional findings as above.    Signer Name: Nabil Shearer MD   Signed: 6/15/2021 2:34 AM   Workstation Name: [facility]    Radiology [facility]    XR Chest 1 View [[id number]] Collected: 06/14/21 2216     Updated: 06/14/21 2218    Narrative:      CR Chest 1 Vw    INDICATION:   Weakness and dizziness. COPD. Shortness of air.     COMPARISON:    5/22/2021    FINDINGS:  Single portable AP view(s) of the chest.  Heart size is normal status post CABG. There is atherosclerotic disease in the aorta. COPD is noted. The lungs are clear except for granulomatous calcification. No pneumothorax is seen. Old fractures are present  involving the left clavicle and left proximal humerus.      Impression:      No acute cardiopulmonary findings.    Signer Name: Nabil Shearer MD   Signed: 6/14/2021 10:16 PM   Workstation Name: [facility]    Radiology  [facility]    CT Cervical Spine Without Contrast [[id number]] Collected: 06/14/21 2149     Updated: 06/14/21 2151    Narrative:      CT Spine Cervical WO    INDICATION:   Fall today. Patient on blood thinners.    TECHNIQUE:   CT of the cervical spine without IV contrast. Coronal and sagittal reconstructions were obtained.  Radiation dose reduction techniques included automated exposure control or exposure modulation based on body size. Count of known CT and cardiac nuc med  studies performed in previous 12 months: 14.     COMPARISON:  No pertinent prior study    FINDINGS:  Exam is mildly compromised by motion artifact.    No acute fracture is seen. Vertebral body heights are maintained. Bone mineralization is mildly demineralized. Cervical intervertebral disc spaces are maintained.    The sagittal alignment of the cervical spine is anatomic.The facets are intact. The posterior elements show no acute findings. There is no prevertebral soft tissue swelling present.    Cervical spinal canal is patent. The neural foramina are patent. Soft tissue windows show no evidence of cord compression.    The odontoid is intact. The ring of C1 is intact. The cervicomedullary junction is normal. The visualized portions of the lung apices are clear.      Impression:      Impression:    1. No acute fracture is seen.  2. No acute radiographic abnormalities are identified.    Report called to Dr. Chan at 9:47 PM        Signer Name: Jimy Rodriguez MD   Signed: 6/14/2021 9:49 PM   Workstation Name: [id number]    [facility]    CT Head Without Contrast [[id number]] Collected: 06/14/21 2143     Updated: 06/14/21 2145    Narrative:      CT Head WO    HISTORY:   Fall today with head trauma. Patient on blood thinners    TECHNIQUE:   Axial unenhanced head CT. Radiation dose reduction techniques included automated exposure control or exposure modulation based on body size. Count of known CT and cardiac nuc  med studies performed in previous 12 months: 14.     Time of scan: 9:29 PM    COMPARISON:   February 18, 2021    FINDINGS:     The ventricles are mildly generous in size. Cortical sulci are correspondingly prominent. No extraaxial fluid collections are seen.    There is evidence of a remote infarct in the inferior left cerebellar hemisphere.    No parenchymal or subarachnoid hemorrhage is present. Periventricular hypodensities are demonstrated which are nonspecific but likely represent white matter microvascular change in a patient of this age. No CT evidence of mass or acute infarct.    The mastoid air cells are clear. The visualized paranasal sinuses are clear.  Orbital structures demonstrate no acute findings.      Impression:      Impression:  1. No clearly acute intracranial pathology demonstrated.  2. Mild cerebral atrophy and nonspecific periventricular hypodensities which are thought to represent white matter microvascular change. 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  3. No significant interval change from prior study of 2/18/2021.    Signer Name: Jimy Rodriguez MD   Signed: 6/14/2021 9:43 PM   [facility]    [facility]        Impression: GI bleed  COPD  Dyspnea  Debility   [facility]    Plan: As mentioned above the plan is for the patient have an EGD performed tomorrow.  We will see how she does with the EGD and then have further discussion with the patient as well as the patient's family afterwards.  Did not discuss CODE STATUS at this point time but will need to address this.        Rajan Pena DO  06/15/21  14:34 EDT

## 2021-06-15 NOTE — CONSULTS
NN has received consult and is familiar with patient from her previous admissions.  Per notes, patient will be receiving blood products today and is on bipap currently.  Discussion with primary RN, no family present at BS.  NN has requested to be notified if some family arrives.  NN will plan to see patient at BS to update interview and education tomorrow.    Patient would be very appropriate for palliative services or long term care facility given her previous admissions.

## 2021-06-15 NOTE — ED PROVIDER NOTES
[place]    EMERGENCY DEPARTMENT ENCOUNTER      Pt Name: An Abreu  MRN: [id number]  YOB: 1950  Date of evaluation: 6/14/2021  Provider: James Chan MD    CHIEF COMPLAINT       Chief Complaint   Patient presents with   • Fall   • Altered Mental Status         HISTORY OF PRESENT ILLNESS  (Location/Symptom, Timing/Onset, Context/Setting, Quality, Duration, Modifying Factors, Severity.)   An Abreu is a 71 y.o. female who presents to the emergency department after fall that occurred earlier.  She did sustain a skin tear to her right elbow but denies any other injury or head injury associate with the fall.  She notes that she has been progressively weak over the past couple of days and ultimately admitted that she has been having some black-colored bowel movements.  She does have a history of mesenteric ischemia but denies any history of peptic ulcer disease, cirrhosis, or use of any anticoagulant medications.  She does take Plavix.  She also notes that she has been increasingly short of breath and has had worsening wheezing over the past couple days.  Her symptoms have been moderate to severe and worse with exertion.      Nursing notes were reviewed.    REVIEW OF SYSTEMS    (2-9 systems for level 4, 10 or more for level 5)   ROS:  General: Weakness  Cardiovascular:  No chest pain, no palpitations  Respiratory: Shortness of breath, wheezing  Gastrointestinal: Black stool  Musculoskeletal:  No muscle pain, no joint pain  Skin: Skin tear  Neurologic:  No speech problems, no headache, no extremity numbness, no extremity tingling, no extremity weakness  Psychiatric:  No anxiety  Genitourinary:  No dysuria, no hematuria    Except as noted above the remainder of the review of systems was reviewed and negative.       PAST MEDICAL HISTORY     Past Medical History:   Diagnosis Date   • Aneurysm (CMS/[facility])    • Angina pectoris (CMS/HCC) 6/20/2016   • Anxiety 6/20/2016   • Arthritis 6/20/2016   •  CAD (coronary artery disease)    • Carotid artery stenosis    • CHF (congestive heart failure) (CMS/[facility])    • Chronic coronary artery disease 6/20/2016 2003 CABG LIMA to LAD, VG to OM 2004 VG to OM occluded. LIMA patent Cx intervention and RCA 2008 stent for ISR 2013 ISR and stenting of CX and RCA 2016 normal MPS   • Chronic left-sided low back pain with left-sided sciatica 2/1/2017   • Chronic obstructive pulmonary disease (CMS/HCC) 6/20/2016   • Chronic respiratory failure with hypoxia (CMS/[facility]) 3/27/2021   • Cobalamin deficiency 6/20/2016   • Congestive heart failure (CMS/HCC) 6/20/2016   • COPD (chronic obstructive pulmonary disease) (CMS/[facility])    • Depression 7/3/2018   • Diverticulosis    • Diverticulosis of large intestine without hemorrhage 5/5/2017   • GERD (gastroesophageal reflux disease)    • GIB (gastrointestinal bleeding)     recurrent    • HTN (hypertension)    • Hypercholesterolemia 6/20/2016   • Hyperlipidemia    • Mesenteric ischemia (CMS/[facility]) 2006    s/p resection    • Mood disorder (CMS/[facility])    • Oxygen dependent     3 liters @ all times.    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 (CMS/[facility]) 5/23/2021   • PVD (peripheral vascular disease) (CMS/[facility])    • Restless legs syndrome 6/20/2016   • Seizure disorder (CMS/[facility]) 6/20/2016   • Seizures (CMS/[facility])    • Stenosis of carotid artery 6/20/2016   • Vertigo 9/28/2016         SURGICAL HISTORY       Past Surgical History:   Procedure Laterality Date   • APPENDECTOMY     • CHOLECYSTECTOMY     • COLOSTOMY  2006    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   • EXPLORATORY LAPAROTOMY      sec to ovarian cysts   • HYSTERECTOMY     • ILIAC ARTERY STENT     • REVISION / TAKEDOWN COLOSTOMY  2008         CURRENT MEDICATIONS       Current Facility-Administered Medications:   •  albuterol (PROVENTIL) nebulizer solution  0.083% 2.5 mg/3mL, 2.5 mg, Nebulization, Q4H PRN, Kyra Salinas PA-C  •  cetirizine (zyrTEC) tablet 5 mg, 5 mg, Oral, Daily, Kyra Salinas PA-C  •  citalopram (CeleXA) tablet 20 mg, 20 mg, Oral, Daily, Kyra Salinas PA-C  •  dilTIAZem CD (CARDIZEM CD) 24 hr capsule 240 mg, 240 mg, Oral, Daily, Kyra Salinas PA-C  •  ipratropium-albuterol (DUO-NEB) nebulizer solution 3 mL, 3 mL, Nebulization, Q4H PRN, Kyra Salinas PA-C  •  ipratropium-albuterol (DUO-NEB) nebulizer solution 3 mL, 3 mL, Nebulization, Q4H - RT, Kim, Antwan, DO, 3 mL at 06/15/21 0701  •  methylPREDNISolone sodium succinate (SOLU-Medrol) injection 60 mg, 60 mg, Intravenous, Q8H, Kyra Salinas PA-C  •  pantoprazole (PROTONIX) injection 40 mg, 40 mg, Intravenous, BID AC, Kyra Salinas PA-C  •  rosuvastatin (CRESTOR) tablet 40 mg, 40 mg, Oral, Nightly, Kyra Salinas PA-C  •  sodium chloride 0.9 % flush 10 mL, 10 mL, Intravenous, PRN, James Chan MD  •  sodium chloride 0.9 % flush 10 mL, 10 mL, Intravenous, Q12H, Kyra Salinas PA-C  •  sodium chloride 0.9 % flush 10 mL, 10 mL, Intravenous, PRN, Kyra Salinas PA-C    ALLERGIES     Keflex [cephalexin], Sulfamethoxazole-trimethoprim, Carbamazepine, and Codeine    FAMILY HISTORY       Family History   Problem Relation Age of Onset   • Arthritis Mother    • Cancer Mother    • Heart attack Father    • Hypertension Father    • Hyperlipidemia Father    • Stroke Father    • Heart disease Brother         CAD   • Heart disease Child         CAD   • Heart disease Child         CAD          SOCIAL HISTORY       Social History     Socioeconomic History   • Marital status:      Spouse name: Not on file   • Number of children: Not on file   • Years of education: Not on file   • Highest education level: Not on file   Tobacco Use   • Smoking status: Current Every Day Smoker     Packs/day: 1.00     Years: 40.00     Pack years: 40.00     Types: Cigarettes, Electronic Cigarette   •  Smokeless tobacco: Never Used   • Tobacco comment: <0.5ppd    Substance and Sexual Activity   • Alcohol use: Never   • Drug use: Never   • Sexual activity: Defer         PHYSICAL EXAM    (up to 7 for level 4, 8 or more for level 5)     Vitals:    06/15/21 0635 06/15/21 0640 06/15/21 0645 06/15/21 0701   BP: 136/62 128/62 108/72    BP Location:       Patient Position:       Pulse: 83 81 79 83   Resp: 20 20 20 20   Temp: 97.6 °F (36.4 °C) 97.7 °F (36.5 °C) 97.7 °F (36.5 °C)    TempSrc: Axillary Axillary Axillary    SpO2: 100% 100% 99% 92%   Weight:       Height:           Physical Exam  General: Ill-appearing, moderate distress  HEENT: Conjunctiva normal.  Neck: Trachea midline.  Cardiac: Heart regular rate, rhythm, no murmurs, rubs, or gallops  Lungs: Tachypneic, moderate diffuse expiratory wheezes  Chest wall: There is no tenderness to palpation over the chest wall or over ribs  Abdomen: Abdomen is soft, nontender, nondistended. There are no firm or pulsatile masses, no rebound rigidity or guarding.   Musculoskeletal: No deformity.  Neuro: Alert and oriented x 4.  Dermatology: Skin is warm and dry  Psych: Mentation is grossly normal, cognition is grossly normal. Affect is appropriate.      DIAGNOSTIC RESULTS     EKG: All EKG's are interpreted by the Emergency Department Physician who either signs or Co-signs this chart in the absence of a cardiologist.    Sinus rhythm rate of 91, no acute ST segment or T wave change, normal intervals, no ectopy    RADIOLOGY:   Non-plain film images such as CT, Ultrasound and MRI are read by the radiologist. Plain radiographic images are visualized and preliminarily interpreted by the emergency physician with the below findings:      [x] Radiologist's Report Reviewed:  CT Angiogram Abdomen Pelvis   Final Result      1. Extensive atherosclerotic disease as described above. There is no aortic aneurysm or dissection. No large vessel occlusion is seen.   2. No clear evidence of acute GI  bleeding.   3. Marked distention of the urinary bladder concerning for bladder all obstruction.   4. Moderate colonic stool burden concerning for constipation. No definite evidence of a small bowel obstruction.   5. Additional findings as above.      Signer Name: Nabil Shearer MD    Signed: 6/15/2021 2:34 AM    Workstation Name: [facility]      XR Chest 1 View   Final Result   No acute cardiopulmonary findings.      Signer Name: Nabil Shearer MD    Signed: 6/14/2021 10:16 PM    Workstation Name: [facility]      CT Head Without Contrast   Final Result   Impression:   1. No clearly acute intracranial pathology demonstrated.   2. Mild cerebral atrophy and nonspecific periventricular hypodensities which are thought to represent white matter microvascular change. 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   3. No significant interval change from prior study of 2/18/2021.      Signer Name: Jimy Rodriguez MD    Signed: 6/14/2021 9:43 PM    Workstation Name: [facility]      CT Cervical Spine Without Contrast   Final Result   Impression:      1. No acute fracture is seen.   2. No acute radiographic abnormalities are identified.      Report called to Dr. Chan at 9:47 PM            Signer Name: Jimy Rodriguez MD    Signed: 6/14/2021 9:49 PM    Workstation Name: [facility]            LABS:    I have reviewed and interpreted all of the currently available lab results from this visit (if applicable):  Results for orders placed or performed during the hospital encounter of 06/14/21   Comprehensive Metabolic Panel    Specimen: Blood   Result Value Ref Range    Glucose 110 (H) 65 - 99 mg/dL    BUN 8 8 - 23 mg/dL    Creatinine 0.79 0.57 - 1.00 mg/dL    Sodium 136 136 - 145 mmol/L    Potassium 4.6 3.5 - 5.2 mmol/L    Chloride 98 98 - 107 mmol/L    CO2  34.0 (H) 22.0 - 29.0 mmol/L    Calcium 8.7 8.6 - 10.5 mg/dL    Total Protein 6.3 6.0 - 8.5 g/dL    Albumin 3.50 3.50 - 5.20 g/dL    ALT (SGPT) 19 1 - 33 U/L    AST (SGOT) 22 1 - 32 U/L    Alkaline Phosphatase 103 39 - 117 U/L    Total Bilirubin <0.2 0.0 - 1.2 mg/dL    eGFR Non African Amer 71 >60 mL/min/1.73    Globulin 2.8 gm/dL    A/G Ratio 1.3 g/dL    BUN/Creatinine Ratio 10.1 7.0 - 25.0    Anion Gap 4.0 (L) 5.0 - 15.0 mmol/L   Troponin    Specimen: Blood   Result Value Ref Range    Troponin T 0.434 (C) 0.000 - 0.030 ng/mL   Magnesium    Specimen: Blood   Result Value Ref Range    Magnesium 1.8 1.6 - 2.4 mg/dL   CBC Auto Differential    Specimen: Blood   Result Value Ref Range    WBC 5.29 3.40 - 10.80 10*3/mm3    RBC 2.64 (L) 3.77 - 5.28 10*6/mm3    Hemoglobin 7.3 (L) 12.0 - 15.9 g/dL    Hematocrit 24.8 (L) 34.0 - 46.6 %    MCV 93.9 79.0 - 97.0 fL    MCH 27.7 26.6 - 33.0 pg    MCHC 29.4 (L) 31.5 - 35.7 g/dL    RDW 16.2 (H) 12.3 - 15.4 %    RDW-SD 56.0 (H) 37.0 - 54.0 fl    MPV 9.3 6.0 - 12.0 fL    Platelets 281 140 - 450 10*3/mm3    Neutrophil % 57.3 42.7 - 76.0 %    Lymphocyte % 31.2 19.6 - 45.3 %    Monocyte % 8.1 5.0 - 12.0 %    Eosinophil % 2.8 0.3 - 6.2 %    Basophil % 0.2 0.0 - 1.5 %    Immature Grans % 0.4 0.0 - 0.5 %    Neutrophils, Absolute 3.03 1.70 - 7.00 10*3/mm3    Lymphocytes, Absolute 1.65 0.70 - 3.10 10*3/mm3    Monocytes, Absolute 0.43 0.10 - 0.90 10*3/mm3    Eosinophils, Absolute 0.15 0.00 - 0.40 10*3/mm3    Basophils, Absolute 0.01 0.00 - 0.20 10*3/mm3    Immature Grans, Absolute 0.02 0.00 - 0.05 10*3/mm3    nRBC 0.0 0.0 - 0.2 /100 WBC   TSH    Specimen: Blood   Result Value Ref Range    TSH 3.220 0.270 - 4.200 uIU/mL   T4, Free    Specimen: Blood   Result Value Ref Range    Free T4 1.16 0.93 - 1.70 ng/dL   Blood Gas, Arterial With Co-Ox    Specimen: Arterial Blood   Result Value Ref Range    Site Right Radial     Adam's Test N/A     pH, Arterial 7.321 (L) 7.350 - 7.450 pH units    pCO2,  Arterial 69.6 (C) 35.0 - 45.0 mm Hg    pO2, Arterial 127.0 (H) 83.0 - 108.0 mm Hg    HCO3, Arterial 35.9 (H) 20.0 - 26.0 mmol/L    Base Excess, Arterial 8.8 (H) 0.0 - 2.0 mmol/L    Hemoglobin, Blood Gas 7.0 (L) 14 - 18 g/dL    Hematocrit, Blood Gas 21.5 %    Oxyhemoglobin 95.2 94 - 99 %    Methemoglobin 0.80 0.00 - 1.50 %    Carboxyhemoglobin 3.6 (H) 0 - 2 %    CO2 Content 38.1 (H) 22 - 33 mmol/L    Temperature 37.0 C    Barometric Pressure for Blood Gas      Modality Nasal Cannula     FIO2 36 %    Rate 0 Breaths/minute    PIP 0 cmH2O    IPAP 0     EPAP 0     Note      Notified Who DR. REGGIE SANDS MD     Notified By [id number]     Notified Time 06/14/2021 22:28     pH, Temp Corrected 7.321 pH Units    pCO2, Temperature Corrected 69.6 (H) 35 - 45 mm Hg    pO2, Temperature Corrected 127 (H) 83 - 108 mm Hg   Urinalysis With Culture If Indicated - Urine, Catheter In/Out    Specimen: Urine, Catheter In/Out   Result Value Ref Range    Color, UA Yellow Yellow, Straw    Appearance, UA Clear Clear    pH, UA 7.0 5.0 - 8.0    Specific Gravity, UA >=1.030 1.001 - 1.030    Glucose, UA Negative Negative    Ketones, UA Negative Negative    Bilirubin, UA Negative Negative    Blood, UA Negative Negative    Protein, UA Negative Negative    Leuk Esterase, UA Negative Negative    Nitrite, UA Negative Negative    Urobilinogen, UA 0.2 E.U./dL 0.2 - 1.0 E.U./dL   Lactic Acid, Plasma    Specimen: Blood   Result Value Ref Range    Lactate 0.8 0.5 - 2.0 mmol/L   Urine Drug Screen - Urine, Clean Catch    Specimen: Urine, Clean Catch   Result Value Ref Range    THC, Screen, Urine Negative Negative    Phencyclidine (PCP), Urine Negative Negative    Cocaine Screen, Urine Negative Negative    Methamphetamine, Ur Negative Negative    Opiate Screen Positive (A) Negative    Amphetamine Screen, Urine Negative Negative    Benzodiazepine Screen, Urine Positive (A) Negative    Tricyclic Antidepressants Screen Negative Negative    Methadone Screen, Urine  Negative Negative    Barbiturates Screen, Urine Positive (A) Negative    Oxycodone Screen, Urine Negative Negative    Propoxyphene Screen Negative Negative    Buprenorphine, Screen, Urine Negative Negative   Blood Gas, Arterial With Co-Ox    Specimen: Arterial Blood   Result Value Ref Range    Site Right Radial     Adam's Test N/A     pH, Arterial 7.335 (L) 7.350 - 7.450 pH units    pCO2, Arterial 63.8 (H) 35.0 - 45.0 mm Hg    pO2, Arterial 73.7 (L) 83.0 - 108.0 mm Hg    HCO3, Arterial 34.0 (H) 20.0 - 26.0 mmol/L    Base Excess, Arterial 7.3 (H) 0.0 - 2.0 mmol/L    Hemoglobin, Blood Gas 6.9 (L) 14 - 18 g/dL    Hematocrit, Blood Gas 21.1 %    Oxyhemoglobin 91.9 (L) 94 - 99 %    Methemoglobin 0.70 0.00 - 1.50 %    Carboxyhemoglobin 2.3 (H) 0 - 2 %    CO2 Content 35.9 (H) 22 - 33 mmol/L    Temperature 37.0 C    Barometric Pressure for Blood Gas      Modality BiPap     FIO2 30 %    Rate 0 Breaths/minute    PIP 0 cmH2O    IPAP 16     EPAP 6     Note      pH, Temp Corrected 7.335 pH Units    pCO2, Temperature Corrected 63.8 (H) 35 - 45 mm Hg    pO2, Temperature Corrected 73.7 (L) 83 - 108 mm Hg   Phenobarbital Level    Specimen: Blood   Result Value Ref Range    Phenobarbital 7.9 (L) 10.0 - 30.0 mcg/mL   POC Glucose Once    Specimen: Blood   Result Value Ref Range    Glucose 99 70 - 130 mg/dL   ECG 12 Lead   Result Value Ref Range    QT Interval 354 ms    QTC Interval 435 ms   Type & Screen    Specimen: Blood   Result Value Ref Range    ABO Type A     RH type Positive     Antibody Screen Positive     T&S Expiration Date 6/18/2021 11:59:59 PM    ABO RH Specimen Verification    Specimen: Blood   Result Value Ref Range    ABO Type A     RH type Positive    Prepare RBC, 2 Units   Result Value Ref Range    Product Code G7430R92     Unit Number [id number]     UNIT  ABO A     UNIT  RH POS     Crossmatch Interpretation Compatible     Dispense Status XM     Blood Expiration Date 202107202359     Blood Type Barcode 6200      Product Code L7421F14     Unit Number [id number]     UNIT  ABO A     UNIT  RH POS     Crossmatch Interpretation Compatible     Dispense Status IS     Blood Expiration Date 202107202359     Blood Type Barcode 6200    Antibody Identification    Specimen: Blood   Result Value Ref Range    Anti-S ANTI-S    Green Top (Gel)   Result Value Ref Range    Extra Tube Hold for add-ons.    Lavender Top   Result Value Ref Range    Extra Tube hold for add-on    Gold Top - SST   Result Value Ref Range    Extra Tube Hold for add-ons.    Gray Top   Result Value Ref Range    Extra Tube Hold for add-ons.         All other labs were within normal range or not returned as of this dictation.      EMERGENCY DEPARTMENT COURSE and DIFFERENTIAL DIAGNOSIS/MDM:   Vitals:    Vitals:    06/15/21 0635 06/15/21 0640 06/15/21 0645 06/15/21 0701   BP: 136/62 128/62 108/72    BP Location:       Patient Position:       Pulse: 83 81 79 83   Resp: 20 20 20 20   Temp: 97.6 °F (36.4 °C) 97.7 °F (36.5 °C) 97.7 °F (36.5 °C)    TempSrc: Axillary Axillary Axillary    SpO2: 100% 100% 99% 92%   Weight:       Height:           ED Course as of Pel[place] 15 0807   Mon Jun 14, 2021   2147 NCHCT negative per radiologist    [NS]   Tue Pelon 15, 2021   0308 Pt stable. Spoke w/ Dr. Alvarez who accepts the pt for admission.    [NS]      ED Course User Index  [NS] James Chan MD       Patient initially hypoxic and in significant respiratory distress with improvement with BiPAP and nebulizer therapy.  This is likely secondary to COPD exacerbation which I believe to be triggered by acute blood loss anemia.  Her CTA demonstrates no evidence of active hemorrhage and she has no risk factors for cirrhosis, however she is significantly anemic with associated symptoms and myocardial injury/COPD exacerbation and so I decided to transfuse 1 unit red blood cells in the ED.  Her troponin is also elevated, however suspect that this is due to myocardial strain and not acute  coronary thrombosis and her ECG demonstrates no evidence of significant abnormality.    I had a discussion with the patient/family regarding diagnosis, diagnostic results, treatment plan, and medications.  The patient/family indicated understanding of these instructions.  I spent adequate time at the bedside preceding discharge necessary to personally discuss the aftercare instructions, giving patient education, providing explanations of the results of our evaluations/findings, and my decision making to assure that the patient/family understand the plan of care.  Time was allotted to answer questions at that time and throughout the ED course.  Emphasis was placed on timely follow-up after discharge.  I also discussed the potential for the development of an acute emergent condition requiring further evaluation, admission, or even surgical intervention. I discussed that we found nothing during the visit today indicating the need for further workup, admission, or the presence of an unstable medical condition.  I encouraged the patient to return to the emergency department immediately for ANY concerns, worsening, new complaints, or if symptoms persist and unable to seek follow-up in a timely fashion.  The patient/family expressed understanding and agreement with this plan.  The patient will follow-up with their PCP in 1-2 days for reevaluation.       MEDICATIONS ADMINISTERED IN ED:  Medications   sodium chloride 0.9 % flush 10 mL (has no administration in time range)   albuterol (PROVENTIL) nebulizer solution 0.083% 2.5 mg/3mL (has no administration in time range)   ipratropium-albuterol (DUO-NEB) nebulizer solution 3 mL (has no administration in time range)   cetirizine (zyrTEC) tablet 5 mg (has no administration in time range)   citalopram (CeleXA) tablet 20 mg (has no administration in time range)   dilTIAZem CD (CARDIZEM CD) 24 hr capsule 240 mg (has no administration in time range)   rosuvastatin (CRESTOR) tablet  40 mg (has no administration in time range)   sodium chloride 0.9 % flush 10 mL (has no administration in time range)   sodium chloride 0.9 % flush 10 mL (has no administration in time range)   pantoprazole (PROTONIX) injection 40 mg (has no administration in time range)   ipratropium-albuterol (DUO-NEB) nebulizer solution 3 mL (3 mL Nebulization Given 6/15/21 0701)   methylPREDNISolone sodium succinate (SOLU-Medrol) injection 60 mg (has no administration in time range)   ipratropium-albuterol (DUO-NEB) nebulizer solution 3 mL (3 mL Nebulization Given 6/14/21 2232)   albuterol (PROVENTIL) nebulizer solution 0.083% 2.5 mg/3mL (2.5 mg Nebulization Given 6/14/21 2232)   pantoprazole (PROTONIX) injection 80 mg (80 mg Intravenous Given 6/15/21 0127)   iopamidol (ISOVUE-370) 76 % injection 100 mL (80 mL Intravenous Given 6/15/21 0152)   dexamethasone (DECADRON) IVPB 10 mg (0 mg Intravenous Stopped 6/15/21 0415)   naloxone (NARCAN) injection 0.4 mg (0.4 mg Intravenous Given 6/15/21 0416)   naloxone (NARCAN) injection 0.4 mg (0.4 mg Intravenous Given 6/15/21 0628)         CRITICAL CARE TIME    Approximately 75 minutes of discontinuous critical care time was provided to this patient by myself absent of any time spent performing procedures.  Patient presents critically ill with acute GI bleed with associated acute hypoxic respiratory failure secondary to COPD exacerbation along with type II myocardial infarction due to strain patient in cardiovascular, respiratory, and neurologic systems at risk requiring the following interventions: Emergent blood transfusion, initiation of BiPAP therapy for acute hypoxic/hypercapnic respiratory failure, interpretation of lab/ECG/imaging, frequent reassessment, coordination of admission with the following response: Improved hypoxemia and symptoms.  Patient at high risk of deterioration and possibly death without these interventions.        FINAL IMPRESSION      1. Acute upper GI bleed    2.  Type 2 MI (myocardial infarction) (CMS/[facility])    3. COPD exacerbation (CMS/[facility])    4. Acute blood loss anemia    5. Hypoxia    6. Acute urinary retention          DISPOSITION/PLAN     ED Disposition     ED Disposition Condition Comment    Decision to Admit  Level of Care: Telemetry [5]   Diagnosis: Acute upper GI bleed [[id number]]   Admitting Physician: YANELI LAND [[id number]]   Attending Physician: YANELI LAND [[id number]]   Bed Request Comments: ORACIO Chan MD  Attending Emergency Physician               James Chan MD  06/15/21 0807

## 2021-06-15 NOTE — CONSULTS
"[facility] Consult    Referring Provider: No ref. provider found    PCP: Zabrina Lemon MD    Reason for Consultation: Suspected GI bleed    Chief complaint: Anemia    History of present illness:    An Abreu is a 71 y.o. female who is admitted with following a fall at home.  Please note, patient with altered mental status and confusion so HPI largely incomplete with portions obtained with input from patient as well as chart review.  Upon admission to hospital patient was found to be anemic with a hemoglobin trended down to 7.3 from her baseline of 10/11.  Patient does not endorse any evidence of gastrointestinal blood loss recalling normal stools for the past few days.  Does endorse worsening weakness, fatigue, exertional dyspnea, and a fall.  Patient also notes that approximately 3 weeks ago she was having a similar episode but with more marked nausea and vomiting; at present, does not endorse any nausea and vomiting.  Patient has a complex GI history: Recalls that her \"stomach ruptured from infection\" in the early 2000's requiring emergency surgery; also reports having mesenteric ischemia requiring bowel resection, temporary ileostomy, and takedown.  Patient has a complex ventral hernia as result of numerous open abdominal procedures.  Past medical, surgical, social, and family history is reviewed for accuracy.  No documented alleviating or exacerbating factors.  Denies use of NSAIDs and is not anticoagulated, though chart review finds patient is on Plavix; also recently completed a course of doxycycline.  Patient reports general abdominal pain is poorly localized at time of exam.    Last Colonoscopy: Notes she had a colonoscopy in the past but is unsure of when or the results thereof.  Last EGD: Does not recall    Allergies:  Keflex [cephalexin], Sulfamethoxazole-trimethoprim, Carbamazepine, and Codeine    Scheduled Meds:  cetirizine, 5 mg, Oral, Daily  citalopram, 20 mg, Oral, " Daily  dilTIAZem CD, 240 mg, Oral, Daily  ipratropium-albuterol, 3 mL, Nebulization, Q4H - RT  methylPREDNISolone sodium succinate, 60 mg, Intravenous, Q8H  pantoprazole, 40 mg, Intravenous, BID AC  rosuvastatin, 40 mg, Oral, Nightly  sodium chloride, 10 mL, Intravenous, Q12H         Infusions:       PRN Meds:  •  albuterol  •  ipratropium-albuterol  •  sodium chloride  •  sodium chloride    Home Meds:  Medications Prior to Admission   Medication Sig Dispense Refill Last Dose   • albuterol (PROVENTIL) (2.5 MG/3ML) 0.083% nebulizer solution Take 2.5 mg by nebulization Every 4 (Four) Hours As Needed for Wheezing. 3 mL 12    • albuterol sulfate  (90 Base) MCG/ACT inhaler Inhale 2 puffs Every 4 (Four) Hours As Needed for Wheezing or Shortness of Air. 18 g 2    • Anoro Ellipta 62.5-25 MCG/INH aerosol powder  inhaler Inhale 1 puff Every Morning.      • aspirin 81 MG tablet Take 81 mg by mouth Daily.      • benzonatate (Tessalon Perles) 100 MG capsule Take 1 capsule by mouth 3 (Three) Times a Day As Needed for Cough. 30 capsule 0    • cetirizine (zyrTEC) 10 MG tablet Take 0.5 tablets by mouth Daily. 30 tablet 10    • citalopram (CeleXA) 20 MG tablet Take 1 tablet by mouth Daily. Needs appointment for next refill. 30 tablet 2    • clopidogrel (PLAVIX) 75 MG tablet Take 1 tablet by mouth Daily. 30 tablet 5    • dilTIAZem CD (CARDIZEM CD) 240 MG 24 hr capsule Take 240 mg by mouth Daily.      • esomeprazole (nexIUM) 40 MG capsule Take 40 mg by mouth Every Morning Before Breakfast.      • furosemide (LASIX) 20 MG tablet Take 20 mg by mouth Daily.      • HYDROcodone-acetaminophen (NORCO)  MG per tablet Take 1 tablet by mouth Every 6 (Six) Hours As Needed for Moderate Pain .      • ipratropium-albuterol (DUO-NEB) 0.5-2.5 mg/3 ml nebulizer Take 3 mL by nebulization Every 4 (Four) Hours As Needed for Wheezing. 360 mL 5    • lisinopril (PRINIVIL,ZESTRIL) 40 MG tablet Take 40 mg by mouth Daily.      • LORazepam (ATIVAN)  1 MG tablet TAKE 1 TABLET BY MOUTH TWICE DAILY. MUST LAST 30 DAYS. 60 tablet 2    • ondansetron (ZOFRAN) 4 MG tablet Take 1 tablet by mouth Every 6 (Six) Hours As Needed for Nausea or Vomiting. 20 tablet 2    • PHENobarbital (LUMINAL) 100 MG tablet TAKE 1 AND 1/2 TABLETS BY MOUTH EVERY DAY 45 tablet 3    • promethazine (PHENERGAN) 12.5 MG tablet       • rosuvastatin (CRESTOR) 40 MG tablet Take 1 tablet by mouth Daily. (Patient taking differently: Take 40 mg by mouth Every Night.) 30 tablet 2    • ferrous sulfate 325 (65 FE) MG tablet Take 325 mg by mouth Daily With Breakfast.      • guaiFENesin (MUCINEX) 600 MG 12 hr tablet Take 1 tablet by mouth Every 12 (Twelve) Hours. (Patient taking differently: Take 600 mg by mouth Every 12 (Twelve) Hours As Needed for Cough or Congestion.) 60 tablet 0    • nitrofurantoin, macrocrystal-monohydrate, (Macrobid) 100 MG capsule Take 1 capsule by mouth 2 (Two) Times a Day. 10 capsule 0        ROS: Review of Systems   Unable to perform ROS: Mental status change   Constitutional: Positive for activity change, appetite change and fatigue. Negative for chills, diaphoresis, fever and unexpected weight change.   HENT: Negative for sore throat, trouble swallowing and voice change.    Eyes: Negative.    Respiratory: Positive for shortness of breath (Exertional dyspnea). Negative for apnea, cough, choking, chest tightness, wheezing and stridor.    Cardiovascular: Negative for chest pain, palpitations and leg swelling.   Gastrointestinal: Positive for abdominal pain and nausea. Negative for abdominal distention, anal bleeding, blood in stool, constipation, diarrhea, rectal pain and vomiting.   Endocrine: Negative.    Genitourinary: Negative.    Musculoskeletal: Negative.    Skin: Positive for pallor.   Allergic/Immunologic: Negative.    Neurological: Positive for dizziness and weakness.   Hematological: Negative for adenopathy. Bruises/bleeds easily.   Psychiatric/Behavioral: Positive for  confusion.   All other systems reviewed and are negative.      PAST MED HX:  Past Medical History:   Diagnosis Date   • Aneurysm (CMS/HCC)    • Angina pectoris (CMS/HCC) 6/20/2016   • Anxiety 6/20/2016   • Arthritis 6/20/2016   • CAD (coronary artery disease)    • Carotid artery stenosis    • CHF (congestive heart failure) (CMS/HCC)    • Chronic coronary artery disease 6/20/2016 2003 CABG LIMA to LAD, VG to OM 2004 VG to OM occluded. LIMA patent Cx intervention and RCA 2008 stent for ISR 2013 ISR and stenting of CX and RCA 2016 normal MPS   • Chronic left-sided low back pain with left-sided sciatica 2/1/2017   • Chronic obstructive pulmonary disease (CMS/HCC) 6/20/2016   • Chronic respiratory failure with hypoxia (CMS/HCC) 3/27/2021   • Cobalamin deficiency 6/20/2016   • Congestive heart failure (CMS/HCC) 6/20/2016   • COPD (chronic obstructive pulmonary disease) (CMS/HCC)    • Depression 7/3/2018   • Diverticulosis    • Diverticulosis of large intestine without hemorrhage 5/5/2017   • GERD (gastroesophageal reflux disease)    • GIB (gastrointestinal bleeding)     recurrent    • HTN (hypertension)    • Hypercholesterolemia 6/20/2016   • Hyperlipidemia    • Mesenteric ischemia (CMS/HCC) 2006    s/p resection    • Mood disorder (CMS/HCC)    • Oxygen dependent     3 liters @ all times.    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/2021   • PVD (peripheral vascular disease) (CMS/HCC)    • Restless legs syndrome 6/20/2016   • Seizure disorder (CMS/HCC) 6/20/2016   • Seizures (CMS/HCC)    • Stenosis of carotid artery 6/20/2016   • Vertigo 9/28/2016       PAST SURG HX:  Past Surgical History:   Procedure Laterality Date   • APPENDECTOMY     • CHOLECYSTECTOMY     • COLOSTOMY  2006    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   • EXPLORATORY LAPAROTOMY      sec to  "ovarian cysts   • HYSTERECTOMY     • ILIAC ARTERY STENT     • REVISION / TAKEDOWN COLOSTOMY  2008       FAM HX:  Family History   Problem Relation Age of Onset   • Arthritis Mother    • Cancer Mother    • Heart attack Father    • Hypertension Father    • Hyperlipidemia Father    • Stroke Father    • Heart disease Brother         CAD   • Heart disease Child         CAD   • Heart disease Child         CAD       SOC HX:  Social History     Socioeconomic History   • Marital status:      Spouse name: Not on file   • Number of children: Not on file   • Years of education: Not on file   • Highest education level: Not on file   Tobacco Use   • Smoking status: Current Every Day Smoker     Packs/day: 1.00     Years: 40.00     Pack years: 40.00     Types: Cigarettes, Electronic Cigarette   • Smokeless tobacco: Never Used   • Tobacco comment: <0.5ppd    Substance and Sexual Activity   • Alcohol use: Never   • Drug use: Never   • Sexual activity: Defer       PHYSICAL EXAM  /48   Pulse 83   Temp 97.9 °F (36.6 °C) (Oral)   Resp 20   Ht 149.9 cm (59\")   Wt 37.6 kg (83 lb)   SpO2 100%   BMI 16.76 kg/m²   Wt Readings from Last 3 Encounters:   06/14/21 37.6 kg (83 lb)   06/03/21 37.6 kg (83 lb)   05/25/21 39.1 kg (86 lb 3.2 oz)   ,body mass index is 16.76 kg/m².  Physical Exam  Vitals and nursing note reviewed.   Constitutional:       General: She is not in acute distress.     Appearance: Normal appearance. She is normal weight. She is not ill-appearing or toxic-appearing.      Comments: Pleasantly conversant, no acute distress, currently receiving PRBC transfusion.   HENT:      Head: Normocephalic and atraumatic.      Mouth/Throat:      Mouth: Mucous membranes are moist.      Pharynx: Oropharynx is clear. No oropharyngeal exudate.   Eyes:      General: No scleral icterus.     Extraocular Movements: Extraocular movements intact.      Conjunctiva/sclera: Conjunctivae normal.      Pupils: Pupils are equal, round, and " reactive to light.   Cardiovascular:      Rate and Rhythm: Normal rate and regular rhythm.      Pulses: Normal pulses.      Heart sounds: Normal heart sounds. No murmur heard.   No friction rub. No gallop.    Pulmonary:      Effort: Pulmonary effort is normal. No respiratory distress.      Breath sounds: Normal breath sounds.   Abdominal:      General: A surgical scar is present. Bowel sounds are normal.      Palpations: Abdomen is soft.      Tenderness: There is generalized abdominal tenderness.      Hernia: A hernia is present. Hernia is present in the ventral area.          Comments: Midline laparotomy scar appreciated to midline abdomen; right-sided scar from prior McBurney's incision from appendectomy appreciated.   Genitourinary:     Comments: Defer  Musculoskeletal:         General: Normal range of motion.      Cervical back: Normal range of motion and neck supple. No rigidity or tenderness.      Right lower leg: No edema.      Left lower leg: No edema.   Lymphadenopathy:      Cervical: No cervical adenopathy.   Skin:     General: Skin is warm and dry.      Capillary Refill: Capillary refill takes less than 2 seconds.      Coloration: Skin is pale. Skin is not jaundiced.   Neurological:      General: No focal deficit present.      Mental Status: She is alert and oriented to person, place, and time.   Psychiatric:         Mood and Affect: Mood normal.         Behavior: Behavior normal.         Thought Content: Thought content normal.         Judgment: Judgment normal.         Results Review:   I reviewed the patient's new clinical results.  I reviewed the patient's new imaging results and agree with the interpretation.  I personally viewed and interpreted the patient's EKG/Telemetry data    Lab Results   Component Value Date    WBC 5.29 06/14/2021    HGB 7.3 (L) 06/14/2021    HGB 9.9 (L) 05/23/2021    HGB 12.2 05/22/2021    HCT 24.8 (L) 06/14/2021    MCV 93.9 06/14/2021     06/14/2021       Lab Results    Component Value Date    INR 1.00 02/18/2021       Lab Results   Component Value Date    GLUCOSE 110 (H) 06/14/2021    BUN 8 06/14/2021    CREATININE 0.79 06/14/2021    EGFRIFNONA 71 06/14/2021    BCR 10.1 06/14/2021     06/14/2021    K 4.6 06/14/2021    CO2 34.0 (H) 06/14/2021    CALCIUM 8.7 06/14/2021    ALBUMIN 3.50 06/14/2021    ALKPHOS 103 06/14/2021    BILITOT <0.2 06/14/2021    ALT 19 06/14/2021    AST 22 06/14/2021     CT Angiogram Abdomen Pelvis  Result Date: 6/15/2021  CTA Abdomen Pelvis INDICATION: Abdominal pain and GI bleed. TECHNIQUE: CT angiogram of the abdomen and pelvis with and without IV contrast. 2 sets of delayed images were obtained. 3-D reconstructions were obtained and reviewed. Radiation dose reduction techniques included automated exposure control or exposure modulation based on body size. Count of known CT and cardiac nuc med studies performed in previous 12 months: 16. COMPARISON: 5/22/2021 FINDINGS: There is streak artifact from the patient's arms. Extensive atherosclerotic disease is again identified. There is no aortic aneurysm or aortic dissection. There is a high-grade stenosis or short segment occlusion in the left common iliac artery with reconstitution distally. The internal and external iliac arteries on both sides are diffusely markedly diseased and narrowed. There is narrowing of both common femoral arteries and both proximal superficial femoral arteries. There are single bilateral renal arteries with with diffuse atherosclerotic disease and proximal renal artery stenoses on both sides at least moderate in degree. There is a proximal stenosis in the celiac trunk measuring greater than 50%. There is only mild stenosis in the proximal SFA although there is diffuse atherosclerotic disease within the vessel. The EDDA is patent. No large vessel occlusion is seen. No contrast extravasation or definite contrast staining is identified to suggest an area of acute GI bleeding at  this time. COPD noted in the lung bases. Gallbladder is surgically absent. Solid abdominal organs are grossly normal allowing for artifact. Urinary bladder is distended, extending out of the pelvis. Please correlate for bladder all obstruction. Uterus is surgically absent. Postoperative changes are noted in the bowel. Moderate stool burden is noted in the colon which may indicate constipation. The appendix is surgically absent by history. No definite small bowel obstruction. There is an old T12 compression deformity.     1. Extensive atherosclerotic disease as described above. There is no aortic aneurysm or dissection. No large vessel occlusion is seen. 2. No clear evidence of acute GI bleeding. 3. Marked distention of the urinary bladder concerning for bladder all obstruction. 4. Moderate colonic stool burden concerning for constipation. No definite evidence of a small bowel obstruction. 5. Additional findings as above. Signer Name: Nabil Shearer MD  Signed: 6/15/2021 2:34 AM  Workstation Name: [facility]  [facility]    CT Abdomen Pelvis With Contrast  Result Date: 5/22/2021  1. No pulmonary embolism.  2. Pulmonary arterial hypertension.  3. Compared to 03/20/2021 there has been interval development of confluent opacifications in the left lower lung of acute airspace disease or bronchopneumonia approaching soft tissue density although likely dense airspace disease given interval progression timing with resolution of pleural effusions from prior. Findings favor bronchopneumonia with attention to follow-up given overall appearance approaching soft tissue density.  4. No acute findings within the abdomen or pelvis, however, severely distended urinary bladder may represent components of urinary outlet obstruction with artifact from overlying metallic density in the suprapubic region.  DICTATED:   05/22/2021 EDITED/ls :   05/22/2021   This report was finalized on 5/22/2021 6:51 PM by   "Josh Shepard.      CT Angiogram Chest  Result Date: 5/22/2021  EXAMINATION: CT ANGIOGRAM CHEST, CT ABDOMEN/PELVIS W CONTRAST  1. No pulmonary embolism.  2. Pulmonary arterial hypertension.  3. Compared to 03/20/2021 there has been interval development of confluent opacifications in the left lower lung of acute airspace disease or bronchopneumonia approaching soft tissue density although likely dense airspace disease given interval progression timing with resolution of pleural effusions from prior. Findings favor bronchopneumonia with attention to follow-up given overall appearance approaching soft tissue density.  4. No acute findings within the abdomen or pelvis, however, severely distended urinary bladder may represent components of urinary outlet obstruction with artifact from overlying metallic density in the suprapubic region.  DICTATED:   05/22/2021 EDITED/ls :   05/22/2021   This report was finalized on 5/22/2021 6:51 PM by Dr. Josh Shepard.      COVID19   Date Value Ref Range Status   05/22/2021 Not Detected Not Detected - Ref. Range Final     ASSESSMENTS/PLANS  1.  Suspected gastrointestinal bleeding  2.  Acute blood loss anemia  3.  History of suspected perforated gastric ulcer  4.  History of mesenteric ischemia  5.  PAF, not anticoagulated  6.  Acute on chronic respiratory failure with hypercapnia, COPD exacerbation    An Abreu is a 71 y.o. female admitted to hospital following a fall at home.  Gastroenterology consulted for anemia as hemoglobin is trended down from baseline.  Patient does not endorse any evidence of gastrointestinal blood loss but is a generally poor historian.  Given her history of \"ruptured\" stomach and mesenteric ischemia, recommend EGD tomorrow to evaluate for an upper gastrointestinal bleeding source.    >>> Given history of gastric perforation for suspected gastric ulcer, recommend EGD tomorrow to evaluate for upper GI bleeding source.  >>> Okay for clear liquids today; " n.p.o. at midnight.  >>> Obtain informed consent for esophagogastroduodenoscopy  >>> IV PPI twice daily    I discussed the patient's findings and my recommendations with patient, family and nursing staff    Latrell Josue, SIVA  06/15/21  10:33 EDT

## 2021-06-16 PROBLEM — D50.0 IRON DEFICIENCY ANEMIA DUE TO CHRONIC BLOOD LOSS: Status: ACTIVE | Noted: 2021-01-01

## 2021-06-16 NOTE — PROGRESS NOTES
[facility]  PROGRESS NOTE    Patient Name: An Abreu  : 1950  MRN: [id number]    Date of Admission: 2021  Primary Care Physician: Zabrina Lemon MD    Subjective   Subjective     CC: Follow-up SOA, anemia    HPI: No acute events overnight, patient states she is not feeling well, breathing is much improved    ROS:  Gen- No fevers, chills  CV- No chest pain, palpitations  Resp- No cough, dyspnea  GI- No N/V/D, abd pain    All other systems reviewed and negative    Objective   Objective     Vital Signs:   Temp:  [96.9 °F (36.1 °C)-98.4 °F (36.9 °C)] 98.3 °F (36.8 °C)  Heart Rate:  [70-84] 75  Resp:  [18-24] 20  BP: (118-146)/(48-66) 144/60        Physical Exam:  Constitutional: Chronically ill-appearing elderly lady, no acute distress, awake, alert  HENT: NCAT, mucous membranes moist  Respiratory: Nonlabored respirations, diffuse coarse breath sounds, expiratory wheezes on 4 L NC.  Cardiovascular: RRR, no murmurs, rubs, or gallops  Gastrointestinal: Positive bowel sounds, soft, nontender, nondistended  Musculoskeletal: No bilateral ankle edema  Psychiatric: Appropriate affect, cooperative  Neurologic: Oriented x 3 nonfocal  Skin: No rashes    Results Reviewed:  Results from last 7 days   Lab Units 21  0501 21  0001 06/15/21  2104 06/15/21  14321  2146   WBC 10*3/mm3 3.27*  --   --  3.43 5.29   HEMOGLOBIN g/dL 9.4*  9.4* 9.1* 9.6* 8.9* 7.3*   HEMATOCRIT % 30.9*  --   --  30.1* 24.8*   PLATELETS 10*3/mm3 234  --   --  233 281   INR   --   --   --  1.02  --      Results from last 7 days   Lab Units 21  0501 06/15/21  1439 21  2146   SODIUM mmol/L 133* 132* 136   POTASSIUM mmol/L 5.2 4.4 4.6   CHLORIDE mmol/L 97* 97* 98   CO2 mmol/L 30.0* 28.0 34.0*   BUN mg/dL 8 9 8   CREATININE mg/dL 0.46* 0.50* 0.79   GLUCOSE mg/dL 144* 251* 110*   CALCIUM mg/dL 9.1 8.3* 8.7   ALT (SGPT) U/L  --   --  19   AST (SGOT) U/L  --   --  22    TROPONIN T ng/mL  --  0.149* 0.434*     Estimated Creatinine Clearance: 38.2 mL/min (A) (by C-G formula based on SCr of 0.46 mg/dL (L)).    Microbiology Results Abnormal     None          Imaging Results (Last 24 Hours)     ** No results found for the last 24 hours. **          Results for orders placed during the hospital encounter of 05/03/21    Adult Transthoracic Echo Complete W/ Cont if Necessary Per Protocol    Interpretation Summary  · Estimated left ventricular EF = 70%  · Moderate aortic valve regurgitation is present.      I have reviewed the medications:  Scheduled Meds:cetirizine, 5 mg, Oral, Daily  citalopram, 20 mg, Oral, Daily  dilTIAZem CD, 240 mg, Oral, Daily  ipratropium-albuterol, 3 mL, Nebulization, Q4H - RT  pantoprazole, 40 mg, Intravenous, BID AC  rosuvastatin, 40 mg, Oral, Nightly  sodium chloride, 10 mL, Intravenous, Q12H      Continuous Infusions:   PRN Meds:.•  albuterol  •  ipratropium-albuterol  •  sodium chloride  •  sodium chloride    Assessment/Plan   Assessment & Plan     Active Hospital Problems    Diagnosis  POA   • **Acute respiratory failure with hypercapnia (CMS/HCC) [J96.02]  Yes   • Hypomagnesemia [E83.42]  Yes   • GI bleed [K92.2]  Yes   • Acute upper GI bleed [K92.2]  Yes   • Acute-on-chronic respiratory failure (CMS/HCC) [J96.20]  Yes   • Underweight [R63.6]  Yes   • Acute on chronic respiratory failure with hypercapnia (CMS/HCC) [J96.22]  Yes   • Acute on chronic respiratory failure (CMS/HCC) [J96.20]  Unknown   • Gastrointestinal hemorrhage [K92.2]  Unknown   • Metabolic encephalopathy [G93.41]  Yes   • Elevated troponin [R77.8]  Yes   • Anemia [D64.9]  Yes   • CAD (coronary artery disease) [I25.10]  Yes   • Tobacco abuse [Z72.0]  Yes   • Fecal occult blood test positive [R19.5]  Unknown   • Paroxysmal atrial fibrillation (CMS/HCC) [I48.0]  Yes   • Chronic obstructive pulmonary disease with acute exacerbation (CMS/HCC) [J44.1]  Yes   • Seizure disorder (CMS/HCC)  [G40.909]  Yes   • Essential hypertension [I10]  Yes      Resolved Hospital Problems   No resolved problems to display.        Brief Hospital Course to date:  An Abreu is a 71 y.o. female with history of paroxysmal defibrillation not o  anticoagulation, CAD, hypertension, hyperlipidemia, seizure, COPD with ongoing tobacco abuse who presented to the ED with confusion s/p fall admitted with acute metabolic encephalopathy secondary to hypercapnic respiratory failure,     Acute hypercapnic respiratory failure in the setting of COPD tobacco abuse  -Patient with PCO2 of 69.6 on presentation  -she is s/p BiPAP, position back to  NC, on 2 L NC, continue to wean as able, duo nebs  -Continue NRT     Suspected GI bleed  Acute blood loss anemia  -Patient has prior history of perforated gastric ulcers  -H&H is lower from previous, suspected to be secondary to blood loss  -GI consulted and following, plan for EGD today  -Continue IV Protonix, currently holding Plavix and aspirin     Acute metabolic encephalopathy, improved  -Etiology likely sec to above, UDS positive for benzos, opiates and perpetuates     Hypertension  -BP much improved, resume home lisinopril     Seizure disorder  -Patient on phenobarbital, will resume  -held due to encephalopathy, however levels are low    Paroxysmal atrial fibrillation  -Is currently controlled, continue diltiazem, not on anticoagulation    Anxiety and depression  -Continue Celexa, resume home benzo    Severe chronic malnutrition-nutrition following    DVT prophylaxis:  Mechanical DVT prophylaxis orders are present.     Disposition: TBD    CODE STATUS:   Code Status and Medical Interventions:   Ordered at: 06/15/21 0403     Code Status:    CPR     Medical Interventions (Level of Support Prior to Arrest):    Full       Tereza Zapien MD  06/16/21

## 2021-06-16 NOTE — SIGNIFICANT NOTE
"Patient found on ground with daughter standing over her saying \"she fell, I was taking her to the bathroom\". Both side rails were up. Full head to toe assessment completed without any injury noted to the patient. Patient assisted to standing position and denies any pain or injury. MD notified and CN aware. Patient states she wanted to go to the bathroom. Patient A&O x 4 at this time. VSS, will continue to monitor.   "

## 2021-06-16 NOTE — SIGNIFICANT NOTE
Called per nursing secondary to increased anxiety. Pt noted to be back at baseline oxygen use. Was sedated upon admission requiring Narcan. Has chronic use of Lorazepam that was held on admission due to metabolic encephalopathy, sedation. En noted, will give one time dose Lorazepam 0.5mg x 1 now

## 2021-06-16 NOTE — CASE MANAGEMENT/SOCIAL WORK
Continued Stay Note  [facility]     Patient Name: An Abreu  MRN: [id number]  Today's Date: 6/16/2021    Admit Date: 6/14/2021    Discharge Plan     Row Name 06/16/21 1229       Plan    Plan  SW    Plan Comments  SW filed APS report. Report number [id number]. RAMSEY spoke with APS intake [phone] to inquire about APS status. APS report did NOT meet the criteria.        Discharge Codes    No documentation.             IZZY Moore (Kay)

## 2021-06-16 NOTE — ANESTHESIA POSTPROCEDURE EVALUATION
Patient: An Abreu    Procedure Summary     Date: 06/16/21 Room / Location:  [facility] /  [facility]    Anesthesia Start: 1032 Anesthesia Stop:     Procedure: ESOPHAGOGASTRODUODENOSCOPY (N/A ) Diagnosis:       Acute on chronic respiratory failure, unspecified whether with hypoxia or hypercapnia (CMS/HCC)      Gastrointestinal hemorrhage, unspecified gastrointestinal hemorrhage type      Fecal occult blood test positive      (Acute on chronic respiratory failure, unspecified whether with hypoxia or hypercapnia (CMS/HCC) [J96.20])      (Gastrointestinal hemorrhage, unspecified gastrointestinal hemorrhage type [K92.2])      (Fecal occult blood test positive [R19.5])    Surgeons: Jean Fuentes MD Provider: Jose Alfredo Pena Jr., MD    Anesthesia Type: general ASA Status: 3          Anesthesia Type: general    Vitals  No vitals data found for the desired time range.          Post Anesthesia Care and Evaluation    Patient location during evaluation: PACU  Patient participation: complete - patient participated  Level of consciousness: awake and alert  Pain management: adequate  Airway patency: patent  Anesthetic complications: No anesthetic complications  PONV Status: none  Cardiovascular status: hemodynamically stable and acceptable  Respiratory status: nonlabored ventilation, acceptable and nasal cannula  Hydration status: acceptable    Comments: 126/65/ rr12 95% 85hr  97.6

## 2021-06-16 NOTE — PLAN OF CARE
Goal Outcome Evaluation:  Plan of Care Reviewed With: patient, daughter        Progress: improving  Outcome Summary: Pt is alert and oriented. Dtr at the bedside(Lory). Pt has a good appetite. breathing slightly labored but denies dyspnea. Pt complaining of pain in pubic area. Rn to scan bladder to see if she is emptying. Pt had a bm today. Pt has chronic low back pain. Pt received lortab and ativan this afternoon.Team Meeting 1300 Rajan Pena DO, Shawnee Cruz LCSW, Miranda Soto APRN, Briana Phan RN CHPN, Elvia Jimenez RN CHPN, Brianna Medina MDIV, Mehnaz Anderson RN CHPN

## 2021-06-16 NOTE — PROGRESS NOTES
"Malnutrition Severity Assessment    Patient Name:  An Abreu  YOB: 1950  MRN: [id number]  Admit Date:  6/14/2021    Patient meets criteria for : Severe Malnutrition (Pt qualifies for severe chronic malnutrition based on muscle/fat wasting. Minimal reserve.Needs plan to build reserve pending GI dx.)    Comments:      Malnutrition Severity Assessment  Malnutrition Type: Chronic Disease - Related Malnutrition     Malnutrition Type (last 8 hours)      Malnutrition Severity Assessment     Row Name 06/15/21 2137       Malnutrition Severity Assessment    Malnutrition Type  Chronic Disease - Related Malnutrition    Row Name 06/15/21 2137       Insufficient Energy Intake     Insufficient Energy Intake Findings  -- pt allows intake has not been depressed until very recently - cannot quantify    Row Name 06/15/21 2137       Unintentional Weight Loss     Unintentional Weight Loss Findings  -- Wt is stated per that taken at MD office 6/3/2021 83 lbs apparently consistent w recent hx within \"80's\"    Row Name 06/15/21 2137       Muscle Loss    Loss of Muscle Mass Findings  Severe    [place] Region  Severe - deep hollowing/scooping, lack of muscle to touch, facial bones well defined    Clavicle Bone Region  Severe - protruding prominent bone    Acromion Bone Region  Severe - squared shoulders, bones, and acromion process protrusion prominent    Scapular Bone Region  Severe - prominent bones, depressions easily visible between ribs, scapula, spine, shoulders    Dorsal Hand Region  Severe - prominent depression    Patellar Region  Moderate - patella more prominent, less muscle definition around patella    Anterior Thigh Region  Moderate - mild depression on inner thigh    Posterior Calf Region  Moderate - some roundness, slight firmness    Row Name 06/15/21 2137       Fat Loss    Subcutaneous Fat Loss Findings  Severe    Orbital Region   Severe - pronounced hollowness/depression, dark circles, loose saggy skin "    Upper Arm Region  Severe - mostly skin, very little space between folds, fingers touch    Thoracic & Lumbar Region  Severe - ribs visible with prominent depressions, iliac crest very prominent    Row Name 06/15/21 9355       Criteria Met (Must meet criteria for severity in at least 2 of these categories: M Wasting, Fat Loss, Fluid, Secondary Signs, Wt. Status, Intake)    Patient meets criteria for   Severe Malnutrition Pt qualifies for severe chronic malnutrition based on muscle/fat wasting. Minimal reserve.Needs plan to build reserve pending GI dx.          Electronically signed by:  Taryn Khan RD  06/15/21 22:06 EDT

## 2021-06-16 NOTE — PLAN OF CARE
Goal Outcome Evaluation:  Plan of Care Reviewed With: patient           Outcome Summary: Pt presents with deficits in mobility. Pt reported that she walks independently without an assistive device; question accuracy. Case managment notes report a rolling walker. Currently, she needs min A sit to stand and amb 90' with RW, min A for turns and frequent vcs and tcs to maintain hold of RW with her R hand. Pt would benefit from skilled PT services to improve functional independence.

## 2021-06-16 NOTE — POST-PROCEDURE NOTE
EGD showed small hiatal hernia with mild gastritis.  No gross lesions seen.  Recommendations follow-up path.  Colonoscopy tomorrow.

## 2021-06-16 NOTE — PLAN OF CARE
Problem: Adult Inpatient Plan of Care  Goal: Plan of Care Review  Outcome: Ongoing, Progressing  Flowsheets (Taken 6/16/2021 0350)  Progress: improving  Plan of Care Reviewed With: patient  Goal: Patient-Specific Goal (Individualized)  Outcome: Ongoing, Progressing  Goal: Absence of Hospital-Acquired Illness or Injury  Outcome: Ongoing, Progressing  Intervention: Identify and Manage Fall Risk  Recent Flowsheet Documentation  Taken 6/16/2021 0000 by Celine Johnson RN  Safety Promotion/Fall Prevention:   activity supervised   assistive device/personal items within reach   clutter free environment maintained   fall prevention program maintained   lighting adjusted   nonskid shoes/slippers when out of bed   room organization consistent   safety round/check completed  Taken 6/15/2021 2200 by Celine Johnson RN  Safety Promotion/Fall Prevention:   activity supervised   assistive device/personal items within reach   clutter free environment maintained   fall prevention program maintained   lighting adjusted   nonskid shoes/slippers when out of bed   room organization consistent   safety round/check completed  Taken 6/15/2021 2000 by Celine Johnson RN  Safety Promotion/Fall Prevention:   activity supervised   assistive device/personal items within reach   clutter free environment maintained   fall prevention program maintained   lighting adjusted   nonskid shoes/slippers when out of bed   room organization consistent   safety round/check completed  Intervention: Prevent Skin Injury  Recent Flowsheet Documentation  Taken 6/16/2021 0000 by Ceilne Johnson RN  Body Position: position changed independently  Skin Protection:   adhesive use limited   tubing/devices free from skin contact   skin-to-skin areas padded   skin-to-device areas padded   incontinence pads utilized  Taken 6/15/2021 2200 by Celine Johnson RN  Body Position:   position changed independently   supine  Skin Protection:   adhesive use limited    tubing/devices free from skin contact   skin-to-skin areas padded   skin-to-device areas padded  Taken 6/15/2021 2000 by Celine Johnson RN  Body Position: position changed independently  Skin Protection:   adhesive use limited   skin-to-skin areas padded   skin-to-device areas padded   incontinence pads utilized  Intervention: Prevent and Manage VTE (venous thromboembolism) Risk  Recent Flowsheet Documentation  Taken 6/15/2021 2000 by Celine Johnson RN  VTE Prevention/Management:   bilateral   dorsiflexion/plantar flexion performed   sequential compression devices off   patient refused intervention  Intervention: Prevent Infection  Recent Flowsheet Documentation  Taken 6/16/2021 0000 by Celine Johnson RN  Infection Prevention:   environmental surveillance performed   equipment surfaces disinfected   hand hygiene promoted   personal protective equipment utilized   single patient room provided   rest/sleep promoted   visitors restricted/screened  Taken 6/15/2021 2200 by Celine Johnson RN  Infection Prevention:   environmental surveillance performed   equipment surfaces disinfected   hand hygiene promoted   personal protective equipment utilized   rest/sleep promoted   single patient room provided   visitors restricted/screened  Taken 6/15/2021 2000 by Celine Johnson RN  Infection Prevention:   environmental surveillance performed   equipment surfaces disinfected   hand hygiene promoted   personal protective equipment utilized   rest/sleep promoted   single patient room provided   visitors restricted/screened  Goal: Optimal Comfort and Wellbeing  Outcome: Ongoing, Progressing  Intervention: Provide Person-Centered Care  Recent Flowsheet Documentation  Taken 6/15/2021 2000 by Celine Johnson RN  Trust Relationship/Rapport:   care explained   choices provided   emotional support provided   thoughts/feelings acknowledged   reassurance provided   questions encouraged  Goal: Readiness for Transition of  Care  Outcome: Ongoing, Progressing     Problem: Fall Injury Risk  Goal: Absence of Fall and Fall-Related Injury  Outcome: Ongoing, Progressing  Intervention: Identify and Manage Contributors to Fall Injury Risk  Recent Flowsheet Documentation  Taken 6/15/2021 2000 by Celine Johnson RN  Medication Review/Management: medications reviewed  Intervention: Promote Injury-Free Environment  Recent Flowsheet Documentation  Taken 6/16/2021 0000 by Celine Johnson RN  Safety Promotion/Fall Prevention:   activity supervised   assistive device/personal items within reach   clutter free environment maintained   fall prevention program maintained   lighting adjusted   nonskid shoes/slippers when out of bed   room organization consistent   safety round/check completed  Taken 6/15/2021 2200 by Celine Johnson RN  Safety Promotion/Fall Prevention:   activity supervised   assistive device/personal items within reach   clutter free environment maintained   fall prevention program maintained   lighting adjusted   nonskid shoes/slippers when out of bed   room organization consistent   safety round/check completed  Taken 6/15/2021 2000 by Celine Johnson RN  Safety Promotion/Fall Prevention:   activity supervised   assistive device/personal items within reach   clutter free environment maintained   fall prevention program maintained   lighting adjusted   nonskid shoes/slippers when out of bed   room organization consistent   safety round/check completed     Problem: COPD Comorbidity  Goal: Maintenance of COPD Symptom Control  Outcome: Ongoing, Progressing  Intervention: Maintain COPD-Symptom Control  Recent Flowsheet Documentation  Taken 6/15/2021 2000 by Celine Johnson RN  Medication Review/Management: medications reviewed     Problem: Skin Injury Risk Increased  Goal: Skin Health and Integrity  Outcome: Ongoing, Progressing  Intervention: Optimize Skin Protection  Recent Flowsheet Documentation  Taken 6/16/2021 0000 by  Celine Johnson RN  Pressure Reduction Techniques:   frequent weight shift encouraged   weight shift assistance provided   pressure points protected   positioned off wounds   heels elevated off bed  Head of Bed (HOB): HOB at 30 degrees  Pressure Reduction Devices:   specialty bed utilized   pressure-redistributing mattress utilized   positioning supports utilized   heel offloading device utilized  Skin Protection:   adhesive use limited   tubing/devices free from skin contact   skin-to-skin areas padded   skin-to-device areas padded   incontinence pads utilized  Taken 6/15/2021 2200 by Celine Johnson RN  Pressure Reduction Techniques:   frequent weight shift encouraged   weight shift assistance provided   pressure points protected   positioned off wounds   heels elevated off bed  Head of Bed (HOB): HOB at 20-30 degrees  Pressure Reduction Devices:   specialty bed utilized   pressure-redistributing mattress utilized   positioning supports utilized   heel offloading device utilized  Skin Protection:   adhesive use limited   tubing/devices free from skin contact   skin-to-skin areas padded   skin-to-device areas padded  Taken 6/15/2021 2000 by Celine Johnson RN  Pressure Reduction Techniques:   frequent weight shift encouraged   weight shift assistance provided   pressure points protected   positioned off wounds   heels elevated off bed  Head of Bed (HOB): HOB at 30-45 degrees  Pressure Reduction Devices:   pressure-redistributing mattress utilized   positioning supports utilized   heel offloading device utilized  Skin Protection:   adhesive use limited   skin-to-skin areas padded   skin-to-device areas padded   incontinence pads utilized     Problem: Palliative Care  Goal: Enhanced Quality of Life  Outcome: Ongoing, Progressing  Intervention: Optimize Function  Recent Flowsheet Documentation  Taken 6/15/2021 2000 by Celine Johnson RN  Sensory Stimulation Regulation:   care clustered   lighting decreased    quiet environment promoted   television on  Sleep/Rest Enhancement:   awakenings minimized   noise level reduced   regular sleep/rest pattern promoted   relaxation techniques promoted  Intervention: Optimize Psychosocial Wellbeing  Recent Flowsheet Documentation  Taken 6/15/2021 2000 by eCline Johnson, RN  Supportive Measures: active listening utilized  Family/Support System Care:   support provided   self-care encouraged   presence promoted   Goal Outcome Evaluation:  Plan of Care Reviewed With: patient        Progress: improving

## 2021-06-16 NOTE — THERAPY EVALUATION
Patient Name: An Abreu  : 1950    MRN: [id number]                              Today's Date: 2021       Admit Date: 2021    Visit Dx:     ICD-10-CM ICD-9-CM   1. Acute upper GI bleed  K92.2 578.9   2. Type 2 MI (myocardial infarction) (CMS/[facility])  I21.A1 410.90   3. COPD exacerbation (CMS/[facility])  J44.1 491.21   4. Acute blood loss anemia  D62 285.1   5. Hypoxia  R09.02 799.02   6. Acute urinary retention  R33.8 788.29   7. Acute on chronic respiratory failure, unspecified whether with hypoxia or hypercapnia (CMS/[facility])  J96.20 518.84   8. Gastrointestinal hemorrhage, unspecified gastrointestinal hemorrhage type  K92.2 578.9   9. Fecal occult blood test positive  R19.5 792.1   10. Impaired functional mobility, balance, gait, and endurance  Z74.09 V49.89     Patient Active Problem List   Diagnosis   • Gastroesophageal reflux disease   • Chronic obstructive pulmonary disease with acute exacerbation (CMS/[facility])   • Essential hypertension   • Seizure disorder (CMS/[facility])   • Paroxysmal atrial fibrillation (CMS/[facility])   • Acute urinary retention   • Fecal occult blood test positive   • Dependence on supplemental oxygen   • Tobacco abuse   • PAF (paroxysmal atrial fibrillation) (CMS/[facility])   • Acute respiratory failure with hypercapnia (CMS/[facility])   • Seizure disorder (CMS/[facility])   • CAD (coronary artery disease)   • Severe malnutrition (CMS/[facility])   • Anemia   • Pneumonia   • Fall   • COPD with acute exacerbation (CMS/[facility])   • Humerus fracture   • Chronic respiratory failure with hypoxia (CMS/[facility])   • Moderate malnutrition (CMS/[facility])   • Hypoglycemia   • Elevated troponin   • Metabolic encephalopathy   • 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 (CMS/[facility])   • Hypomagnesemia   • GI bleed   • Acute upper GI bleed   • Acute-on-chronic respiratory failure (CMS/[facility])   • Underweight   • Acute on chronic respiratory failure with hypercapnia (CMS/HCC)   • Acute on chronic respiratory failure (CMS/HCC)    • Gastrointestinal hemorrhage     Past Medical History:   Diagnosis Date   • Aneurysm (CMS/HCC)    • Angina pectoris (CMS/HCC) 6/20/2016   • Anxiety 6/20/2016   • Arthritis 6/20/2016   • CAD (coronary artery disease)    • Carotid artery stenosis    • CHF (congestive heart failure) (CMS/HCC)    • Chronic coronary artery disease 6/20/2016 2003 CABG LIMA to LAD, VG to OM 2004 VG to OM occluded. LIMA patent Cx intervention and RCA 2008 stent for ISR 2013 ISR and stenting of CX and RCA 2016 normal MPS   • Chronic left-sided low back pain with left-sided sciatica 2/1/2017   • Chronic obstructive pulmonary disease (CMS/HCC) 6/20/2016   • Chronic respiratory failure with hypoxia (CMS/[facility]) 3/27/2021   • Cobalamin deficiency 6/20/2016   • Congestive heart failure (CMS/HCC) 6/20/2016   • COPD (chronic obstructive pulmonary disease) (CMS/[facility])    • Depression 7/3/2018   • Diverticulosis    • Diverticulosis of large intestine without hemorrhage 5/5/2017   • GERD (gastroesophageal reflux disease)    • GIB (gastrointestinal bleeding)     recurrent    • HTN (hypertension)    • Hypercholesterolemia 6/20/2016   • Hyperlipidemia    • Mesenteric ischemia (CMS/HCC) 2006    s/p resection    • Mood disorder (CMS/[facility])    • Oxygen dependent     3 liters @ all times.    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 (CMS/[facility]) 5/23/2021   • PVD (peripheral vascular disease) (CMS/[facility])    • Restless legs syndrome 6/20/2016   • Seizure disorder (CMS/HCC) 6/20/2016   • Seizures (CMS/[facility])    • Stenosis of carotid artery 6/20/2016   • Vertigo 9/28/2016     Past Surgical History:   Procedure Laterality Date   • APPENDECTOMY     • CHOLECYSTECTOMY     • COLOSTOMY  2006    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   • EXPLORATORY LAPAROTOMY      sec to ovarian cysts   • HYSTERECTOMY     • ILIAC ARTERY STENT      • REVISION / TAKEDOWN COLOSTOMY  2008     General Information     Row Name 06/16/21 0743          Physical Therapy Time and Intention    Document Type  evaluation  -     Mode of Treatment  physical therapy  -     Row Name 06/16/21 0743          General Information    Patient Profile Reviewed  yes  -LS     Prior Level of Function  -- unsure of accuracy of PLOF. Pt said walks indep without a.d. and that  dtr assists with dressing and bathing  -     Existing Precautions/Restrictions  fall;seizures;oxygen therapy device and L/min pressure ulcer on bottom per nsg. 4L O2/NC  -     Barriers to Rehab  medically complex;cognitive status  -     Row Name 06/16/21 0743          Living Environment    Lives With  child(nash), adult;grandchild(nash)  -     Row Name 06/16/21 0743          Home Main Entrance    Number of Stairs, Main Entrance  -- pt said she has stairs with a HR to enter home but does not know how many  -     Row Name 06/16/21 0743          Stairs Within Home, Primary    Stairs, Within Home, Primary  pt said there are stairs inside the home but that her bedroom is on the main level.  -     Row Name 06/16/21 0743          Cognition    Orientation Status (Cognition)  oriented to;person;place;disoriented to;time;situation  -     Row Name 06/16/21 0743          Safety Issues, Functional Mobility    Safety Issues Affecting Function (Mobility)  awareness of need for assistance;impulsivity;safety precaution awareness;problem-solving;positioning of assistive device;judgment;insight into deficits/self-awareness;safety precautions follow-through/compliance  -     Impairments Affecting Function (Mobility)  balance;cognition;endurance/activity tolerance;strength  -     Cognitive Impairments, Mobility Safety/Performance  awareness, need for assistance;impulsivity;insight into deficits/self-awareness;judgment;problem-solving/reasoning;safety precaution follow-through;safety precaution awareness  -        User Key  (r) = Recorded By, (t) = Taken By, (c) = Cosigned By    Initials Name Provider Type    Mary Ann Hooks, KEVIN Physical Therapist        Mobility     Row Name 06/16/21 0743          Bed Mobility    Comment (Bed Mobility)  deferred-sitting EOB when therapist entered room  -     Row Name 06/16/21 0743          Sit-Stand Transfer    Sit-Stand [place] (Transfers)  verbal cues;minimum assist (75% patient effort) impulsively sat down on chair when she got close to it. did not reach back for safety  -     Row Name 06/16/21 0743          Gait/Stairs (Locomotion)    [place] Level (Gait)  verbal cues;minimum assist (75% patient effort)  -LS     Assistive Device (Gait)  walker, front-wheeled  -LS     Distance in Feet (Gait)  90  -LS     Deviations/Abnormal Patterns (Gait)  gait speed decreased;bilateral deviations;stride length decreased  -LS     Bilateral Gait Deviations  weight shift ability decreased;forward flexed posture  -LS     Comment (Gait/Stairs)  min A to turn walker. vcs to avoid pushing walker too far ahead. frequent vcs and tcs to maintain hold of RW with R hand; she often let go of walker with her R hand to grab for the wall rail or furniture.  -       User Key  (r) = Recorded By, (t) = Taken By, (c) = Cosigned By    Initials Name Provider Type    Mary Ann Hooks, KEVIN Physical Therapist        Obj/Interventions     Row Name 06/16/21 0743          Range of Motion Comprehensive    General Range of Motion  bilateral lower extremity ROM WFL  -     Row Name 06/16/21 0743          Strength Comprehensive (MMT)    Comment, General Manual Muscle Testing (MMT) Assessment  B hip flexors 3+. B knee extensors 4. B ankle dorsiflexors 4+/5.  -     Row Name 06/16/21 0743          Balance    Balance Assessment  sitting static balance;standing static balance;standing dynamic balance  -LS     Static Sitting Balance  WFL  -LS     Static Standing Balance  mild impairment;supported;standing   -LS     Dynamic Standing Balance  mild impairment;supported;standing  -LS     Balance Interventions  standing;weight shifting activity  -LS       User Key  (r) = Recorded By, (t) = Taken By, (c) = Cosigned By    Initials Name Provider Type    Mary Ann Hooks, PT Physical Therapist        Goals/Plan     Row Name 06/16/21 0743          Bed Mobility Goal 1 (PT)    Activity/Assistive Device (Bed Mobility Goal 1, PT)  sit to supine/supine to sit  -LS     [place] Level/Cues Needed (Bed Mobility Goal 1, PT)  independent  -LS     Time Frame (Bed Mobility Goal 1, PT)  10 days  -LS     Progress/Outcomes (Bed Mobility Goal 1, PT)  goal ongoing  -LS     Row Name 06/16/21 0743          Transfer Goal 1 (PT)    Activity/Assistive Device (Transfer Goal 1, PT)  sit-to-stand/stand-to-sit;walker, rolling  -LS     [place] Level/Cues Needed (Transfer Goal 1, PT)  modified independence  -LS     Time Frame (Transfer Goal 1, PT)  10 days  -LS     Progress/Outcome (Transfer Goal 1, PT)  goal ongoing  -LS     Row Name 06/16/21 0743          Gait Training Goal 1 (PT)    Activity/Assistive Device (Gait Training Goal 1, PT)  gait (walking locomotion);assistive device use;walker, rolling  -LS     [place] Level (Gait Training Goal 1, PT)  modified independence  -LS     Distance (Gait Training Goal 1, PT)  200  -LS     Time Frame (Gait Training Goal 1, PT)  10 days  -LS     Progress/Outcome (Gait Training Goal 1, PT)  goal ongoing  -LS       User Key  (r) = Recorded By, (t) = Taken By, (c) = Cosigned By    Initials Name Provider Type    Mary Ann Hooks, PT Physical Therapist        Clinical Impression     Row Name 06/16/21 0743          Pain    Additional Documentation  Pain Scale: FACES Pre/Post-Treatment (Group)  -     Row Name 06/16/21 0743          Pain Scale: Numbers Pre/Post-Treatment    Pain Intervention(s)  Repositioned;Ambulation/increased activity  -     Row Name 06/16/21 0743          Pain Scale: FACES  Pre/Post-Treatment    Pain: FACES Scale, Pretreatment  4-->hurts little more  -LS     Posttreatment Pain Rating  4-->hurts little more  -LS     Pain Location  back back and BLE pain  -LS     Row Name 06/16/21 0743          Plan of Care Review    Plan of Care Reviewed With  patient  -LS     Outcome Summary  Pt presents with deficits in mobility. Pt reported that she walks independently without an assistive device; question accuracy. Case managment notes report a rolling walker. Currently, she needs min A sit to stand and amb 90' with RW, min A for turns and frequent vcs and tcs to maintain hold of RW with her R hand. Pt would benefit from skilled PT services to improve functional independence.  -     Row Name 06/16/21 0743          Therapy Assessment/Plan (PT)    Patient/Family Therapy Goals Statement (PT)  Pt did not state.  -LS     Rehab Potential (PT)  good, to achieve stated therapy goals  -LS     Criteria for Skilled Interventions Met (PT)  yes;meets criteria  -     Row Name 06/16/21 0743          Vital Signs    Pretreatment Heart Rate (beats/min)  71  -LS     Posttreatment Heart Rate (beats/min)  68  -LS     Pre SpO2 (%)  100  -LS     O2 Delivery Pre Treatment  nasal cannula  -LS     Post SpO2 (%)  100  -LS     O2 Delivery Post Treatment  nasal cannula  -LS     Row Name 06/16/21 0743          Positioning and Restraints    Pre-Treatment Position  in bed  -LS     Post Treatment Position  chair  -LS     In Chair  notified nsg;sitting;call light within reach;encouraged to call for assist;exit alarm on;with family/caregiver;waffle cushion;legs elevated  -LS       User Key  (r) = Recorded By, (t) = Taken By, (c) = Cosigned By    Initials Name Provider Type    Mary Ann Hooks, PT Physical Therapist        Outcome Measures     Row Name 06/16/21 0743          How much help from another person do you currently need...    Turning from your back to your side while in flat bed without using bedrails?  3  -LS      Moving from lying on back to sitting on the side of a flat bed without bedrails?  2  -LS     Moving to and from a bed to a chair (including a wheelchair)?  3  -LS     Standing up from a chair using your arms (e.g., wheelchair, bedside chair)?  3  -LS     Climbing 3-5 steps with a railing?  3  -LS     To walk in hospital room?  3  -LS     AM-PAC 6 Clicks Score (PT)  17  -     Row Name 06/16/21 0743          Functional Assessment    Outcome Measure Options  AM-PAC 6 Clicks Basic Mobility (PT)  -       User Key  (r) = Recorded By, (t) = Taken By, (c) = Cosigned By    Initials Name Provider Type    Mary Ann Hooks, PT Physical Therapist        Physical Therapy Education                 Title: PT OT SLP Therapies (In Progress)     Topic: Physical Therapy (In Progress)     Point: Mobility training (Done)     Learning Progress Summary           Patient Acceptance, E, VU,NR by  at 6/16/2021 0743                   Point: Home exercise program (Not Started)     Learner Progress:  Not documented in this visit.          Point: Body mechanics (Not Started)     Learner Progress:  Not documented in this visit.          Point: Precautions (Not Started)     Learner Progress:  Not documented in this visit.                      User Key     Initials Effective Dates Name Provider Type Discipline     07/17/19 -  Mary Ann Muniz, PT Physical Therapist PT              PT Recommendation and Plan  Planned Therapy Interventions (PT): balance training, bed mobility training, gait training, home exercise program, postural re-education, patient/family education, stair training, strengthening, transfer training  Plan of Care Reviewed With: patient  Outcome Summary: Pt presents with deficits in mobility. Pt reported that she walks independently without an assistive device; question accuracy. Case managment notes report a rolling walker. Currently, she needs min A sit to stand and amb 90' with RW, min A for turns and frequent  vcs and tcs to maintain hold of RW with her R hand. Pt would benefit from skilled PT services to improve functional independence.     Time Calculation:   PT Charges     Row Name 06/16/21 0743             Time Calculation    Start Time  0743  -LS      PT Received On  06/16/21  -      PT Goal Re-Cert Due Date  06/26/21  -         Time Calculation- PT    Total Timed Code Minutes- PT  10 minute(s)  -LS         Timed Charges    10777 - Gait Training Minutes   10  -LS         Untimed Charges    PT Eval/Re-eval Minutes  53  -LS         Total Minutes    Timed Charges Total Minutes  10  -LS      Untimed Charges Total Minutes  53  -LS       Total Minutes  63  -LS        User Key  (r) = Recorded By, (t) = Taken By, (c) = Cosigned By    Initials Name Provider Type    Mary Ann Hooks, PT Physical Therapist        Therapy Charges for Today     Code Description Service Date Service Provider Modifiers Qty    31048476939 HC PT EVAL LOW COMPLEXITY 4 6/16/2021 Mary Ann Muniz, PT GP 1    72550488619 HC GAIT TRAINING EA 15 MIN 6/16/2021 Mary Ann Muniz, PT GP 1          PT G-Codes  Outcome Measure Options: AM-PAC 6 Clicks Basic Mobility (PT)  AM-PAC 6 Clicks Score (PT): 17    Mary Ann Muniz PT  6/16/2021

## 2021-06-16 NOTE — ANESTHESIA PREPROCEDURE EVALUATION
Anesthesia Evaluation     Patient summary reviewed and Nursing notes reviewed   NPO Solid Status: > 8 hours  NPO Liquid Status: > 2 hours           Airway   Mallampati: II  TM distance: >3 FB  Neck ROM: full  No difficulty expected  Dental    (+) edentulous    Pulmonary - normal exam   (+) a smoker, COPD,     ROS comment: Acute hypercapnic respiratory failure in the setting of COPD tobacco abuse  Cardiovascular - normal exam    (+) hypertension, dysrhythmias Atrial Fib, PVD, hyperlipidemia,     ROS comment: TTE from 5/2021:  · Estimated left ventricular EF = 70%  · Moderate aortic valve regurgitation is present.    Neuro/Psych  (+) seizures,       ROS Comment: Seizure disorder  -Patient on phenobarbital, will resume  -held due to encephalopathy, however levels are low  GI/Hepatic/Renal/Endo    (+)  GERD, GI bleeding ,     ROS Comment: Suspected GI bleed  Acute blood loss anemia    Musculoskeletal     Abdominal    Substance History      OB/GYN          Other   arthritis,                      Anesthesia Plan    ASA 3     general   (PROPOFOL)  intravenous induction     Anesthetic plan, all risks, benefits, and alternatives have been provided, discussed and informed consent has been obtained with: patient.    Plan discussed with CRNA.

## 2021-06-16 NOTE — CONSULTS
Clinical Nutrition     Nutrition Assessment  Reason for Visit:   BMI, Malnutrition Severity Assessment, Physician consult      Patient Name: An Abreu  YOB: 1950  MRN: [id number]  Date of Encounter: 06/15/21 21:47 EDT  Admission date: 6/14/2021      Comments:      Pt qualifies for severe chronic malnutrition based on muscle/fat wasting. Minimal reserve. See Flowsheet note.     Pt will need plan to build reserve of lean mass/fat when GI dx determined.       Admission Diagnosis    Acute upper GI bleed [K92.2]     Hospital Problem List    Acute respiratory failure with hypercapnia (CMS/HCC)    Chronic obstructive pulmonary disease with acute exacerbation (CMS/[facility])    Essential hypertension    Seizure disorder (CMS/[facility])    Paroxysmal atrial fibrillation (CMS/[facility])    Fecal occult blood test positive    Tobacco abuse    CAD (coronary artery disease)    Anemia    Elevated troponin    Metabolic encephalopathy    Hypomagnesemia    Underweight     Other Applicable (incl skin, oral, GI):     Applicable Interval History:  Anticipate EGD 6/16    Applicable PMH/PSxH:     PMH: She  has a past medical history of Aneurysm (CMS/[facility]), Angina pectoris (CMS/HCC) (6/20/2016), Anxiety (6/20/2016), Arthritis (6/20/2016), CAD (coronary artery disease), Carotid artery stenosis, CHF (congestive heart failure) (CMS/[facility]), Chronic coronary artery disease (6/20/2016), Chronic left-sided low back pain with left-sided sciatica (2/1/2017), Chronic obstructive pulmonary disease (CMS/HCC) (6/20/2016), Chronic respiratory failure with hypoxia (CMS/[facility]) (3/27/2021), Cobalamin deficiency (6/20/2016), Congestive heart failure (CMS/HCC) (6/20/2016), COPD (chronic obstructive pulmonary disease) (CMS/[facility]), Depression (7/3/2018), Diverticulosis, Diverticulosis of large intestine without hemorrhage (5/5/2017), GERD (gastroesophageal reflux disease), GIB (gastrointestinal bleeding), HTN (hypertension),  "Hypercholesterolemia (6/20/2016), Hyperlipidemia, Mesenteric ischemia (CMS/HCC) (2006), Mood disorder (CMS/[facility]), Oxygen dependent, PAF (paroxysmal atrial fibrillation) (CMS/[facility]), Paroxysmal atrial fibrillation (CMS/[facility]) (8/7/2017), PFO (patent foramen ovale), 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   PSxH: She  has a past surgical history that includes Appendectomy; Cholecystectomy; Colostomy (2006); Hysterectomy; Iliac artery stent; Exploratory laparotomy; and Revision / takedown colostomy (2008).         Diet/Nutrition Related History:     Pt allows was eating ok until this recent illness. Allows wts have been in the 80's but inconsistent.       Anthropometrics     Admission Height  149.9 cm (59\") Documented at 06/14/2021 2127   Admission Weight  37.6 kg (83 lb) Documented at 06/14/2021 2127        Height: 149.9 cm (59\")    Last filed wt: Weight: 37.6 kg (83 lb) (06/14/21 2127)  Weight Method: Stated    BMI: BMI (Calculated): 16.8  Underweight:<18.5kg/m2    Ideal Body Weight (IBW) (kg): 43.57    Weight Change   UBW: unk at this time. Apparent chronic cachexia.  Weight change:   % wt change:   Time frame of weight loss:     Labs reviewed   Yes      Medications reviewed   Yes  Pertinent: prednisone injection, protonix        Intake/Ouptut 24 hrs reviewed   Yes      Nutrition Focused Physical Exam     Pt qualifies for severe chronic malnutrition based on muscle/fat wasting. Minimal reserve. See Flowsheet note.     Current Nutrition Prescription     PO: NPO Diet  Diet Clear Liquid; Cardiac    Intake: 50% x 2 clr liq meals.       Nutrition Diagnosis     6/15  Problem Malnutrition    Etiology Multiple demands on reserve including COPD hx GI dz    Signs/Symptoms Severe " muscle and fat wasting - minimal reserve   Status:      Goal:   General: Nutrition support treatment  PO: Establish PO  Additional goals: no wt loss      Nutrition Intervention     Follow treatment progress, Care plan reviewed, Encourage intake      Monitoring/Evaluation:   Per protocol, PO intake, Pertinent labs, Weight, GI status, Symptoms        Taryn Khan RD,   Time Spent: 30 min

## 2021-06-16 NOTE — PROGRESS NOTES
NN spoke with patient at .  Patient is confused today which is not her usual baseline.  She was appropriate for interview or education at this time.  NN will continue to follow.

## 2021-06-17 NOTE — THERAPY EVALUATION
Patient Name: An Abreu  : 1950    MRN: [id number]                              Today's Date: 2021       Admit Date: 2021    Visit Dx:     ICD-10-CM ICD-9-CM   1. Acute upper GI bleed  K92.2 578.9   2. Type 2 MI (myocardial infarction) (CMS/[facility])  I21.A1 410.90   3. COPD exacerbation (CMS/[facility])  J44.1 491.21   4. Acute blood loss anemia  D62 285.1   5. Hypoxia  R09.02 799.02   6. Acute urinary retention  R33.8 788.29   7. Acute on chronic respiratory failure, unspecified whether with hypoxia or hypercapnia (CMS/[facility])  J96.20 518.84   8. Gastrointestinal hemorrhage, unspecified gastrointestinal hemorrhage type  K92.2 578.9   9. Fecal occult blood test positive  R19.5 792.1   10. Impaired functional mobility, balance, gait, and endurance  Z74.09 V49.89   11. Iron deficiency anemia due to chronic blood loss  D50.0 280.0     Patient Active Problem List   Diagnosis   • Gastroesophageal reflux disease   • Chronic obstructive pulmonary disease with acute exacerbation (CMS/[facility])   • Essential hypertension   • Seizure disorder (CMS/[facility])   • Paroxysmal atrial fibrillation (CMS/[facility])   • Acute urinary retention   • Fecal occult blood test positive   • Dependence on supplemental oxygen   • Tobacco abuse   • PAF (paroxysmal atrial fibrillation) (CMS/[facility])   • Acute respiratory failure with hypercapnia (CMS/[facility])   • Seizure disorder (CMS/[facility])   • CAD (coronary artery disease)   • Severe malnutrition (CMS/[facility])   • Anemia   • Pneumonia   • Fall   • COPD with acute exacerbation (CMS/[facility])   • Humerus fracture   • Chronic respiratory failure with hypoxia (CMS/[facility])   • Moderate malnutrition (CMS/[facility])   • Hypoglycemia   • Elevated troponin   • Metabolic encephalopathy   • 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 (CMS/[facility])   • Hypomagnesemia   • GI bleed   • Acute upper GI bleed   • Acute-on-chronic respiratory failure (CMS/[facility])   • Underweight   • Acute on chronic respiratory failure with  hypercapnia (CMS/[facility])   • Acute on chronic respiratory failure (CMS/[facility])   • Gastrointestinal hemorrhage   • Iron deficiency anemia due to chronic blood loss     Past Medical History:   Diagnosis Date   • Aneurysm (CMS/HCC)    • Angina pectoris (CMS/HCC) 6/20/2016   • Anxiety 6/20/2016   • Arthritis 6/20/2016   • CAD (coronary artery disease)    • Carotid artery stenosis    • CHF (congestive heart failure) (CMS/[facility])    • Chronic coronary artery disease 6/20/2016 2003 CABG LIMA to LAD, VG to OM 2004 VG to OM occluded. LIMA patent Cx intervention and RCA 2008 stent for ISR 2013 ISR and stenting of CX and RCA 2016 normal MPS   • Chronic left-sided low back pain with left-sided sciatica 2/1/2017   • Chronic obstructive pulmonary disease (CMS/HCC) 6/20/2016   • Chronic respiratory failure with hypoxia (CMS/[facility]) 3/27/2021   • Cobalamin deficiency 6/20/2016   • Congestive heart failure (CMS/[facility]) 6/20/2016   • COPD (chronic obstructive pulmonary disease) (CMS/[facility])    • Depression 7/3/2018   • Diverticulosis    • Diverticulosis of large intestine without hemorrhage 5/5/2017   • GERD (gastroesophageal reflux disease)    • GIB (gastrointestinal bleeding)     recurrent    • HTN (hypertension)    • Hypercholesterolemia 6/20/2016   • Hyperlipidemia    • Mesenteric ischemia (CMS/[facility]) 2006    s/p resection    • Mood disorder (CMS/[facility])    • Oxygen dependent     3 liters @ all times.    • PAF (paroxysmal atrial fibrillation) (CMS/[facility])    • Paroxysmal atrial fibrillation (CMS/[facility]) 8/7/2017   • PFO (patent foramen ovale)    • 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 (CMS/[facility]) 5/23/2021   • PVD (peripheral vascular disease) (CMS/[facility])    • Restless legs syndrome 6/20/2016   • Seizure disorder (CMS/HCC) 6/20/2016   • Seizures (CMS/[facility])    • Stenosis of carotid artery 6/20/2016   • Vertigo 9/28/2016     Past Surgical History:   Procedure Laterality Date   • APPENDECTOMY     • CHOLECYSTECTOMY     • COLOSTOMY  2006     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   • ENDOSCOPY N/A 6/16/2021    Procedure: ESOPHAGOGASTRODUODENOSCOPY;  Surgeon: Jean Fuentes MD;  Location: [facility] ENDOSCOPY;  Service: Gastroenterology;  Laterality: N/A;   • EXPLORATORY LAPAROTOMY      sec to ovarian cysts   • HYSTERECTOMY     • ILIAC ARTERY STENT     • REVISION / TAKEDOWN COLOSTOMY  2008     General Information     Row Name 06/17/21 1013          OT Time and Intention    Document Type  evaluation  -     Mode of Treatment  occupational therapy  -[facility] Name 06/17/21 1013          General Information    Patient Profile Reviewed  yes  -SW     Prior Level of Function  all household mobility;dependent:;driving;independent:;feeding;grooming;min assist:;dressing;bathing  -     Existing Precautions/Restrictions  fall;seizures;oxygen therapy device and L/min  -     Barriers to Rehab  medically complex  -[facility] Name 06/17/21 1013          Occupational Profile    Environmental Supports and Barriers (Occupational Profile)  Pt has supportive family whom assists with care as needed.  -[facility] Name 06/17/21 1013          Living Environment    Lives With  child(nash), adult;grandchild(nash)  -[facility] Name 06/17/21 1013          Home Main Entrance    Number of Stairs, Main Entrance  four  -SW     Stair Railings, Main Entrance  railings safe and in good condition  -[facility] Name 06/17/21 1013          Stairs Within Home, Primary    Number of Stairs, Within Home, Primary  other (see comments) Pt lives on first level and doesn't use stairs in home.  -     Row Name 06/17/21 1013          Cognition    Orientation Status (Cognition)  oriented x 4  -[facility] Name 06/17/21 1013          Safety Issues, Functional Mobility    Safety Issues Affecting Function (Mobility)  impulsivity;safety precautions follow-through/compliance;safety precaution awareness  -     Impairments Affecting Function (Mobility)  balance;endurance/activity tolerance;strength  -       User  Key  (r) = Recorded By, (t) = Taken By, (c) = Cosigned By    Initials Name Provider Type    Brianna Lugo OT Occupational Therapist          Mobility/ADL's     [facility] Name 06/17/21 1013          Bed Mobility    Bed Mobility  supine-sit;scooting/bridging  -SW     Scooting/Bridging [place] (Bed Mobility)  modified independence  -SW     Supine-Sit [place] (Bed Mobility)  modified independence  -     Assistive Device (Bed Mobility)  bed rails  -SW     Row Name 06/17/21 1013          Transfers    Transfers  bed-chair transfer;sit-stand transfer;toilet transfer  -     Bed-Chair [place] (Transfers)  set up  -     Assistive Device (Bed-Chair Transfers)  other (see comments) no device  -     Sit-Stand [place] (Transfers)  set up  -     [place] Level (Toilet Transfer)  set up  -     Assistive Device (Toilet Transfer)  commode, bedside without drop arms  -SW     Row Name 06/17/21 1013          Toilet Transfer    Type (Toilet Transfer)  sit-stand;stand pivot/stand step  -SW     Row Name 06/17/21 1013          Functional Mobility    Functional Mobility- Ind. Level  contact guard assist  -     Functional Mobility- Device  other (see comments) hand held assist  -     Functional Mobility-Distance (Feet)  10  -SW     Functional Mobility- Safety Issues  supplemental O2  -SW     Row Name 06/17/21 1013          Activities of Daily Living    BADL Assessment/Intervention  lower body dressing;grooming;toileting  -SW     Row Name 06/17/21 1013          Lower Body Dressing Assessment/Training    [place] Level (Lower Body Dressing)  lower body dressing skills;socks;set up  -SW     Position (Lower Body Dressing)  edge of bed sitting  -SW     Row Name 06/17/21 1013          Grooming Assessment/Training    [place] Level (Grooming)  grooming skills;hair care, combing/brushing;wash face, hands;set up  -SW     Position (Grooming)  edge of bed sitting  -[facility] Name 06/17/21 1013           Toileting Assessment/Training    [place] Level (Toileting)  toileting skills;perform perineal hygiene;set up  -     Assistive Devices (Toileting)  commode, bedside without drop arms  -     Position (Toileting)  unsupported sitting  -       User Key  (r) = Recorded By, (t) = Taken By, (c) = Cosigned By    Initials Name Provider Type     Brianna Jiménez OT Occupational Therapist        Obj/Interventions     [facility] Name 06/17/21 1013          Sensory Assessment (Somatosensory)    Sensory Assessment (Somatosensory)  UE sensation intact  -[facility] Name 06/17/21 1013          Vision Assessment/Intervention    Visual Impairment/Limitations  WFL;corrective lenses full-time  -[facility] Name 06/17/21 1013          Range of Motion Comprehensive    General Range of Motion  upper extremity range of motion deficits identified  -     Comment, General Range of Motion  LUE limited d/t previous injury.  RUE wnl.  -[facility] Name 06/17/21 1013          Strength Comprehensive (MMT)    General Manual Muscle Testing (MMT) Assessment  upper extremity strength deficits identified  -     Comment, General Manual Muscle Testing (MMT) Assessment  RUE 5/5, LUE u/a to test d/t discomfort.  -[facility] Name 06/17/21 1013          Balance    Balance Assessment  sitting static balance;sitting dynamic balance;sit to stand dynamic balance;standing static balance;standing dynamic balance  -     Static Sitting Balance  WNL;unsupported;sitting, edge of bed  -SW     Dynamic Sitting Balance  WNL;unsupported;sitting, edge of bed  -SW     Sit to Stand Dynamic Balance  WFL  -     Static Standing Balance  WFL;unsupported;standing;supported  -SW     Dynamic Standing Balance  mild impairment;supported;standing  -SW     Balance Interventions  sitting;standing;sit to stand;supported;static;dynamic;minimal challenge;occupation based/functional task  -       User Key  (r) = Recorded By, (t) = Taken By, (c) = Cosigned By    Initials Name  Provider Type    Brianna Lugo OT Occupational Therapist        Goals/Plan     Row Name 06/17/21 1013          Transfer Goal 1 (OT)    Activity/Assistive Device (Transfer Goal 1, OT)  transfers, all  -SW     [place] Level/Cues Needed (Transfer Goal 1, OT)  independent  -SW     Time Frame (Transfer Goal 1, OT)  long term goal (LTG);by discharge  -SW     Progress/Outcome (Transfer Goal 1, OT)  goal ongoing  -SW     Row Name 06/17/21 1013          Dressing Goal 1 (OT)    Activity/Device (Dressing Goal 1, OT)  dressing skills, all  -SW     [place]/Cues Needed (Dressing Goal 1, OT)  independent  -SW     Time Frame (Dressing Goal 1, OT)  long term goal (LTG);by discharge  -SW     Progress/Outcome (Dressing Goal 1, OT)  goal ongoing  -SW     Row Name 06/17/21 1013          Toileting Goal 1 (OT)    Activity/Device (Toileting Goal 1, OT)  toileting skills, all  -SW     [place] Level/Cues Needed (Toileting Goal 1, OT)  independent  -SW     Time Frame (Toileting Goal 1, OT)  long term goal (LTG);by discharge  -SW     Progress/Outcome (Toileting Goal 1, OT)  goal ongoing  -SW     Row Name 06/17/21 1013          Therapy Assessment/Plan (OT)    Planned Therapy Interventions (OT)  activity tolerance training;adaptive equipment training;BADL retraining;functional balance retraining;transfer/mobility retraining;strengthening exercise;patient/caregiver education/training  -       User Key  (r) = Recorded By, (t) = Taken By, (c) = Cosigned By    Initials Name Provider Type    Brianna Lugo OT Occupational Therapist        Clinical Impression     Row Name 06/17/21 1013          Pain Assessment    Additional Documentation  Pain Scale: Numbers Pre/Post-Treatment (Group)  -SW     Row Name 06/17/21 1013          Pain Scale: Numbers Pre/Post-Treatment    Pretreatment Pain Rating  7/10  -SW     Posttreatment Pain Rating  6/10  -SW     Pain Location - Orientation  generalized  -SW     Pain Location  back  -SW     Pain  Intervention(s)  Ambulation/increased activity  -     Row Name 06/17/21 1013          Plan of Care Review    Plan of Care Reviewed With  patient  -SW     Outcome Summary  OT evaluation complete.  Pt reports pain 7/10 for back and states its r/t previous injury months ago.  Pt able to perform bed mob with mod indep, setup for lbd, toileting, and grooming.  Toilet t/f with cga and she had one lob.  Fxl mob with cga and no AE.  Recommend IPOT and HHOT at d/c to promote safety and decrease fall risk.  -     Row Name 06/17/21 1013          Therapy Assessment/Plan (OT)    Rehab Potential (OT)  good, to achieve stated therapy goals  -     Criteria for Skilled Therapeutic Interventions Met (OT)  yes;meets criteria;skilled treatment is necessary  -     Therapy Frequency (OT)  daily  -[facility] Name 06/17/21 1013          Therapy Plan Review/Discharge Plan (OT)    Anticipated Discharge Disposition (OT)  home with assist;home with home health  -[facility] Name 06/17/21 1013          Vital Signs    Pre Systolic BP Rehab  140  -SW     Pre Treatment Diastolic BP  62  -SW     Pretreatment Heart Rate (beats/min)  61  -SW     Pre SpO2 (%)  100  -SW     O2 Delivery Pre Treatment  supplemental O2  -SW     Pre Patient Position  Supine  -SW     Intra Patient Position  Sitting  -SW     Post Patient Position  Sitting  -     Row Name 06/17/21 1013          Positioning and Restraints    Pre-Treatment Position  in bed  -SW     Post Treatment Position  chair  -SW     In Chair  notified nsg;reclined;sitting;call light within reach;encouraged to call for assist;exit alarm on;waffle cushion;legs elevated  -       User Key  (r) = Recorded By, (t) = Taken By, (c) = Cosigned By    Initials Name Provider Type    Brianna Lugo, OT Occupational Therapist        Outcome Measures     Row Name 06/17/21 1113          How much help from another is currently needed...    Putting on and taking off regular lower body clothing?  4  -SW      Bathing (including washing, rinsing, and drying)  3  -SW     Toileting (which includes using toilet bed pan or urinal)  4  -SW     Putting on and taking off regular upper body clothing  4  -SW     Taking care of personal grooming (such as brushing teeth)  4  -SW     Eating meals  4  -SW     AM-PAC 6 Clicks Score (OT)  23  -SW     Row Name 06/17/21 0819          How much help from another person do you currently need...    Turning from your back to your side while in flat bed without using bedrails?  4  -PS     Moving from lying on back to sitting on the side of a flat bed without bedrails?  3  -PS     Moving to and from a bed to a chair (including a wheelchair)?  2  -PS     Standing up from a chair using your arms (e.g., wheelchair, bedside chair)?  2  -PS     Climbing 3-5 steps with a railing?  2  -PS     To walk in hospital room?  2  -PS     AM-PAC 6 Clicks Score (PT)  15  -PS     Row Name 06/17/21 1113          Functional Assessment    Outcome Measure Options  AM-PAC 6 Clicks Daily Activity (OT)  -       User Key  (r) = Recorded By, (t) = Taken By, (c) = Cosigned By    Initials Name Provider Type    Patria Tejeda, RN Registered Nurse    Brianna Lugo OT Occupational Therapist        Occupational Therapy Education                 Title: PT OT SLP Therapies (In Progress)     Topic: Occupational Therapy (In Progress)     Point: ADL training (Done)     Description:   Instruct learner(s) on proper safety adaptation and remediation techniques during self care or transfers.   Instruct in proper use of assistive devices.              Learning Progress Summary           Patient Acceptance, E, VU by RAMSEY at 6/17/2021 1013                   Point: Home exercise program (Not Started)     Description:   Instruct learner(s) on appropriate technique for monitoring, assisting and/or progressing therapeutic exercises/activities.              Learner Progress:  Not documented in this visit.          Point: Precautions  (Done)     Description:   Instruct learner(s) on prescribed precautions during self-care and functional transfers.              Learning Progress Summary           Patient Acceptance, E, VU by  at 6/17/2021 1013                   Point: Body mechanics (Not Started)     Description:   Instruct learner(s) on proper positioning and spine alignment during self-care, functional mobility activities and/or exercises.              Learner Progress:  Not documented in this visit.                      User Key     Initials Effective Dates Name Provider Type Discipline     06/16/21 -  Brianna Jiménez OT Occupational Therapist OT              OT Recommendation and Plan  Planned Therapy Interventions (OT): activity tolerance training, adaptive equipment training, BADL retraining, functional balance retraining, transfer/mobility retraining, strengthening exercise, patient/caregiver education/training  Therapy Frequency (OT): daily  Plan of Care Review  Plan of Care Reviewed With: patient  Outcome Summary: OT evaluation complete.  Pt reports pain 7/10 for back and states its r/t previous injury months ago.  Pt able to perform bed mob with mod indep, setup for lbd, toileting, and grooming.  Toilet t/f with cga and she had one lob.  Fxl mob with cga and no AE.  Recommend IPOT and HHOT at d/c to promote safety and decrease fall risk.     Time Calculation:   Time Calculation- OT     Row Name 06/17/21 1013             Time Calculation- OT    OT Start Time  1013  -SW      OT Received On  06/17/21  -      OT Goal Re-Cert Due Date  06/27/21  -         Timed Charges    21412 - OT Self Care/Mgmt Minutes  23  -SW         Untimed Charges    OT Eval/Re-eval Minutes  40  -SW         Total Minutes    Timed Charges Total Minutes  23  -SW      Untimed Charges Total Minutes  40  -SW       Total Minutes  63  -SW        User Key  (r) = Recorded By, (t) = Taken By, (c) = Cosigned By    Initials Name Provider Type     Brianna Jiménez OT  Occupational Therapist        Therapy Charges for Today     Code Description Service Date Service Provider Modifiers Qty    [id number]  OT SELF CARE/MGMT/TRAIN EA 15 MIN 6/17/2021 Brianna Jiménez OT GO 2    [id number]  OT EVAL LOW COMPLEXITY 3 6/17/2021 Brianna Jiménez OT GO 1               Brianna Jiménez OT  6/17/2021

## 2021-06-17 NOTE — PLAN OF CARE
Goal Outcome Evaluation:               Patient completed prep for colonoscopy but has no IV.  Charge and Pradip informed, requested US placement for IV.  Attempted twice with no success.  Daughter has her jewelry.  Charge will call again for IV.   VSS will continue to  monitor.

## 2021-06-17 NOTE — PLAN OF CARE
Goal Outcome Evaluation:  Plan of Care Reviewed With: patient           Outcome Summary: OT evaluation complete.  Pt reports pain 7/10 for back and states its r/t previous injury months ago.  Pt able to perform bed mob with mod indep, setup for lbd, toileting, and grooming.  Toilet t/f with cga and she had one lob.  Fxl mob with cga and no AE.  Recommend IPOT and HHOT at d/c to promote safety and decrease fall risk.

## 2021-06-17 NOTE — PLAN OF CARE
Goal Outcome Evaluation:  Plan of Care Reviewed With: patient        Progress: no change  Outcome Summary: Pt is more alert and doesn't remember falling on ground yesterday. pt complains of dyspnea, anxiety, chronic arthritis pain in Left arm and back. Pt is taking lortab prn and tylenol scheduled. Pt prefers inhalers over nebs. spoke with RT and Rn regarding this. Pt planning for colonoscopy. Discussed pt's code status and goals. Pt states she is okay to be intubated for a short time but would not want a trach. Pt confirms that [facility] is her hcs.  continue symptom control and palliative was following at home.MILDRED  Problem: Adjustment to Illness COPD (Chronic Obstructive Pulmonary Disease)  Goal: Optimal Chronic Illness Coping  Intervention: Support and Optimize Psychosocial Response  Recent Flowsheet Documentation  Taken 6/17/2021 1409 by Mildred Phan, RN  Supportive Measures:   active listening utilized   verbalization of feelings encouraged   decision-making supported   goal setting facilitated

## 2021-06-17 NOTE — PROGRESS NOTES
[facility] Medicine Services  PROGRESS NOTE    Patient Name: An Abreu  : 1950  MRN: [id number]    Date of Admission: 2021  Primary Care Physician: Zabrina Lemon MD    Subjective   Subjective     CC: Follow-up SOA    HPI:No acute events overnight,patient states that's he is still not feeling well, she had a fall yesterday, upset about losing her jewelry.    ROS:  Gen- No fevers, chills  CV- No chest pain, palpitations  Resp- No cough, +dyspnea  GI- No N/V/D, abd pain    All other systems reviewed and are negative    Objective   Objective     Vital Signs:   Temp:  [97.6 °F (36.4 °C)-98.4 °F (36.9 °C)] 98.4 °F (36.9 °C)  Heart Rate:  [63-93] 64  Resp:  [16-20] 16  BP: (126-156)/(51-70) 140/62        Physical Exam:  Constitutional: No acute distress, awake, alert  HENT: NCAT, mucous membranes moist  Respiratory: Clear to auscultation bilaterally, respiratory effort normal on 2 L NC  Cardiovascular: RRR, no murmurs, rubs, or gallops  Gastrointestinal: Positive bowel sounds, soft, nontender, nondistended  Musculoskeletal: No bilateral ankle edema  Psychiatric: Appropriate affect, cooperative  Neurologic: Oriented x 3, nonfocal  Skin: No rashes    Results Reviewed:  Results from last 7 days   Lab Units 21  0501 21  0001 06/15/21  2104 06/15/21  14321  2146   WBC 10*3/mm3 3.27*  --   --  3.43 5.29   HEMOGLOBIN g/dL 9.4*  9.4* 9.1* 9.6* 8.9* 7.3*   HEMATOCRIT % 30.9*  --   --  30.1* 24.8*   PLATELETS 10*3/mm3 234  --   --  233 281   INR   --   --   --  1.02  --      Results from last 7 days   Lab Units 21  0501 06/15/21  1439 21  2146   SODIUM mmol/L 133* 132* 136   POTASSIUM mmol/L 5.2 4.4 4.6   CHLORIDE mmol/L 97* 97* 98   CO2 mmol/L 30.0* 28.0 34.0*   BUN mg/dL 8 9 8   CREATININE mg/dL 0.46* 0.50* 0.79   GLUCOSE mg/dL 144* 251* 110*   CALCIUM mg/dL 9.1 8.3* 8.7   ALT (SGPT) U/L  --   --  19   AST (SGOT) U/L  --   --  22   TROPONIN T  ng/mL  --  0.149* 0.434*     Estimated Creatinine Clearance: 37 mL/min (A) (by C-G formula based on SCr of 0.46 mg/dL (L)).    Microbiology Results Abnormal     Procedure Component Value - Date/Time    COVID PRE-OP / PRE-PROCEDURE SCREENING ORDER (NO ISOLATION) - Swab, Nasopharynx [[id number]]  (Normal) Collected: 06/16/21 0838    Lab Status: Final result Specimen: Swab from Nasopharynx Updated: 06/16/21 0909    Narrative:      The following orders were created for panel order COVID PRE-OP / PRE-PROCEDURE SCREENING ORDER (NO ISOLATION) - Swab, Nasopharynx.  Procedure                               Abnormality         Status                     ---------                               -----------         ------                     COVID-19, ABBOTT IN-HOUS...[[id number]]  Normal              Final result                 Please view results for these tests on the individual orders.    COVID-19, ABBOTT IN-HOUSE,NASAL Swab (NO TRANSPORT MEDIA) 2 HR TAT - Swab, Nasopharynx [[id number]]  (Normal) Collected: 06/16/21 0838    Lab Status: Final result Specimen: Swab from Nasopharynx Updated: 06/16/21 0909     COVID19 Presumptive Negative    Narrative:      Fact sheet for providers: https://www.fda.gov/media/678957/download     Fact sheet for patients: https://www.fda.gov/media/573971/download    Test performed by PCR.  If inconsistent with clinical signs and symptoms patient should be tested with different authorized molecular test.          Imaging Results (Last 24 Hours)     ** No results found for the last 24 hours. **          Results for orders placed during the hospital encounter of 05/03/21    Adult Transthoracic Echo Complete W/ Cont if Necessary Per Protocol    Interpretation Summary  · Estimated left ventricular EF = 70%  · Moderate aortic valve regurgitation is present.      I have reviewed the medications:  Scheduled Meds:acetaminophen, 500 mg, Oral, TID  cetirizine, 5 mg, Oral, Daily  citalopram, 20 mg, Oral,  Daily  dilTIAZem CD, 240 mg, Oral, Daily  ipratropium-albuterol, 3 mL, Nebulization, Q4H - RT  lisinopril, 40 mg, Oral, Daily  pantoprazole, 40 mg, Intravenous, BID AC  PHENobarbital, 145.8 mg, Oral, Daily  rosuvastatin, 40 mg, Oral, Nightly  sodium chloride, 10 mL, Intravenous, Q12H      Continuous Infusions:lactated ringers, 30 mL/hr, Last Rate: 30 mL/hr (06/16/21 1018)      PRN Meds:.albuterol  •  HYDROcodone-acetaminophen  •  ipratropium-albuterol  •  lactated ringers  •  LORazepam  •  sodium chloride  •  sodium chloride    Assessment/Plan   Assessment & Plan     Active Hospital Problems    Diagnosis  POA   • **Acute respiratory failure with hypercapnia (CMS/HCC) [J96.02]  Yes   • Hypomagnesemia [E83.42]  Yes   • GI bleed [K92.2]  Yes   • Acute upper GI bleed [K92.2]  Yes   • Acute-on-chronic respiratory failure (CMS/HCC) [J96.20]  Yes   • Underweight [R63.6]  Yes   • Acute on chronic respiratory failure with hypercapnia (CMS/HCC) [J96.22]  Yes   • Acute on chronic respiratory failure (CMS/HCC) [J96.20]  Unknown   • Gastrointestinal hemorrhage [K92.2]  Unknown   • Iron deficiency anemia due to chronic blood loss [D50.0]  Unknown   • Metabolic encephalopathy [G93.41]  Yes   • Elevated troponin [R77.8]  Yes   • Anemia [D64.9]  Yes   • CAD (coronary artery disease) [I25.10]  Yes   • Tobacco abuse [Z72.0]  Yes   • Fecal occult blood test positive [R19.5]  Unknown   • Paroxysmal atrial fibrillation (CMS/HCC) [I48.0]  Yes   • Chronic obstructive pulmonary disease with acute exacerbation (CMS/HCC) [J44.1]  Yes   • Seizure disorder (CMS/HCC) [G40.909]  Yes   • Essential hypertension [I10]  Yes      Resolved Hospital Problems   No resolved problems to display.        Brief Hospital Course to date:  An Abreu is a 71 y.o. female with history of paroxysmal defibrillation not o  anticoagulation, CAD, hypertension, hyperlipidemia, seizure, COPD with ongoing tobacco abuse who presented to the ED with  confusion s/p fall admitted with acute metabolic encephalopathy secondary to hypercapnic respiratory failure,     Suspected GI bleed  Acute blood loss anemia  -Patient has prior history of perforated gastric ulcers  -H&H is lower from previous, suspected to be secondary to blood loss  -GI following, s/p EGD 6/17/2021 with findings of small hiatal hernia with mild gastritis, plan for colonoscopy today  -Continue IV Protonix, currently holding Plavix and aspirin     Acute hypercapnic respiratory failure in the setting of COPD tobacco abuse  -Patient with PCO2 of 69.6 on presentation  -she is s/p BiPAP, now on  2 L NC, continue duo nebs, NRT    Acute metabolic encephalopathy, resolved  -Etiology likely sec to above, UDS positive for benzos, opiates and barbiturates     Hypertension  -BP much improved, continue home lisinopril     Seizure disorder  -Patient on phenobarbital, will resume  -held due to encephalopathy, however levels are low     Paroxysmal atrial fibrillation  -Is currently controlled, continue diltiazem, not on anticoagulation     Anxiety and depression  -Continue Celexa, resume home benzo     Severe chronic malnutrition-nutrition following     DVT prophylaxis:  Mechanical DVT prophylaxis orders are present.      Disposition: TBD overall prognosis is guarded, patient has had multiple (5) hospitalizations this year, palliative care consulted and are following.  CM following.    CODE STATUS:   Code Status and Medical Interventions:   Ordered at: 06/15/21 0403     Code Status:    CPR     Medical Interventions (Level of Support Prior to Arrest):    Full       Tereza Zapien MD  06/17/21

## 2021-06-17 NOTE — CONSULTS
Referring Provider: MD Rupali  Reason for Consultation: AoCRF    Subjective .   Education:  NN spoke with pt at BS.  Pt alert and able to answer questions appropriately.  Pt O2 sat  99  % on 3  L currently, home O2 use 3 L.  Pt reports the limited ability to ambulate at baseline before experiencing SOB.  Pt unable to state use of rescue inhaler any  times weekly.  Patient is up to date on COVID, flu and PNA vaccines.  She is a current smoker, reporting down to a few cigarettes per day.  Cessation encouraged. Pt reports no issues at this time with medications or transportation for appointments.  She does report issues with paying bills (utilities).   Ongoing social issues noted.   She is preoccupied with missing jewelry, RN from yesterday states that it is likely the daughter took the jewelry home.  Patient states that she has told her daughter that no wild behavior will be tolerated, but them states that she does not know what all that might be.  Pt reports has previous hx of formal COPD teaching, no understanding of action plan, but refuses DE.  No new concerns or questions voiced at this time.  NN will continue to follow as needed.     Age: 71 y.o.  Sex: female  Smoker Status: current, 40 pack years  Pulmonologist: KEANU  FEV1 (PFT): KEANU  Home O2: 3L    Objective     SpO2 SpO2: 93 % (06/17/21 0726)  Device Device (Oxygen Therapy): nasal cannula (06/17/21 1000)  Flow Flow (L/min): 3 (06/17/21 1000)  Incentive Spirometer    IS Predicted Level (mL)     Number of Repetitions     Level Incentive Spirometer (mL)    Patient Tolerance     Inhaler Treatment Status    Treatment Route        Home Medications:  Medications Prior to Admission   Medication Sig Dispense Refill Last Dose   • albuterol (PROVENTIL) (2.5 MG/3ML) 0.083% nebulizer solution Take 2.5 mg by nebulization Every 4 (Four) Hours As Needed for Wheezing. 3 mL 12    • albuterol sulfate  (90 Base) MCG/ACT inhaler Inhale 2 puffs Every 4 (Four) Hours As Needed  for Wheezing or Shortness of Air. 18 g 2    • Anoro Ellipta 62.5-25 MCG/INH aerosol powder  inhaler Inhale 1 puff Every Morning.      • aspirin 81 MG tablet Take 81 mg by mouth Daily.      • benzonatate (Tessalon Perles) 100 MG capsule Take 1 capsule by mouth 3 (Three) Times a Day As Needed for Cough. 30 capsule 0    • cetirizine (zyrTEC) 10 MG tablet Take 0.5 tablets by mouth Daily. 30 tablet 10    • citalopram (CeleXA) 20 MG tablet Take 1 tablet by mouth Daily. Needs appointment for next refill. 30 tablet 2    • clopidogrel (PLAVIX) 75 MG tablet Take 1 tablet by mouth Daily. 30 tablet 5    • dilTIAZem CD (CARDIZEM CD) 240 MG 24 hr capsule Take 240 mg by mouth Daily.      • esomeprazole (nexIUM) 40 MG capsule Take 40 mg by mouth Every Morning Before Breakfast.      • furosemide (LASIX) 20 MG tablet Take 20 mg by mouth Daily.      • HYDROcodone-acetaminophen (NORCO)  MG per tablet Take 1 tablet by mouth Every 6 (Six) Hours As Needed for Moderate Pain .      • ipratropium-albuterol (DUO-NEB) 0.5-2.5 mg/3 ml nebulizer Take 3 mL by nebulization Every 4 (Four) Hours As Needed for Wheezing. 360 mL 5    • lisinopril (PRINIVIL,ZESTRIL) 40 MG tablet Take 40 mg by mouth Daily.      • LORazepam (ATIVAN) 1 MG tablet TAKE 1 TABLET BY MOUTH TWICE DAILY. MUST LAST 30 DAYS. 60 tablet 2    • ondansetron (ZOFRAN) 4 MG tablet Take 1 tablet by mouth Every 6 (Six) Hours As Needed for Nausea or Vomiting. 20 tablet 2    • PHENobarbital (LUMINAL) 100 MG tablet TAKE 1 AND 1/2 TABLETS BY MOUTH EVERY DAY 45 tablet 3    • promethazine (PHENERGAN) 12.5 MG tablet       • rosuvastatin (CRESTOR) 40 MG tablet Take 1 tablet by mouth Daily. (Patient taking differently: Take 40 mg by mouth Every Night.) 30 tablet 2    • ferrous sulfate 325 (65 FE) MG tablet Take 325 mg by mouth Daily With Breakfast.      • guaiFENesin (MUCINEX) 600 MG 12 hr tablet Take 1 tablet by mouth Every 12 (Twelve) Hours. (Patient taking differently: Take 600 mg by mouth  Every 12 (Twelve) Hours As Needed for Cough or Congestion.) 60 tablet 0    • nitrofurantoin, macrocrystal-monohydrate, (Macrobid) 100 MG capsule Take 1 capsule by mouth 2 (Two) Times a Day. 10 capsule 0        Discussion: Per current GOLD Standards, please consider: No ICS in place, NRT at DC, Patient may be out of rescue medication, Outpatient PFT, Pulmonary rehab as appropriate      Discussed with Attending hospitalist, primary RN, Unit Director, Palliative MD    Tiffanie Reyes RN

## 2021-06-17 NOTE — PROGRESS NOTES
Palliative Care Progress Note    Date of Admission: 6/14/2021    Subjective: Patient with no complaints at present.  Current Code Status     Date Active Code Status Order ID Comments User Context       6/15/2021 0403 CPR [id number]  Kyra Salinas PA-C ED     Advance Care Planning Activity      Questions for Current Code Status     Question Answer Comment    Code Status CPR     Medical Interventions (Level of Support Prior to Arrest) Full         No current facility-administered medications on file prior to encounter.     Current Outpatient Medications on File Prior to Encounter   Medication Sig Dispense Refill   • albuterol (PROVENTIL) (2.5 MG/3ML) 0.083% nebulizer solution Take 2.5 mg by nebulization Every 4 (Four) Hours As Needed for Wheezing. 3 mL 12   • albuterol sulfate  (90 Base) MCG/ACT inhaler Inhale 2 puffs Every 4 (Four) Hours As Needed for Wheezing or Shortness of Air. 18 g 2   • Anoro Ellipta 62.5-25 MCG/INH aerosol powder  inhaler Inhale 1 puff Every Morning.     • aspirin 81 MG tablet Take 81 mg by mouth Daily.     • benzonatate (Tessalon Perles) 100 MG capsule Take 1 capsule by mouth 3 (Three) Times a Day As Needed for Cough. 30 capsule 0   • cetirizine (zyrTEC) 10 MG tablet Take 0.5 tablets by mouth Daily. 30 tablet 10   • citalopram (CeleXA) 20 MG tablet Take 1 tablet by mouth Daily. Needs appointment for next refill. 30 tablet 2   • clopidogrel (PLAVIX) 75 MG tablet Take 1 tablet by mouth Daily. 30 tablet 5   • dilTIAZem CD (CARDIZEM CD) 240 MG 24 hr capsule Take 240 mg by mouth Daily.     • esomeprazole (nexIUM) 40 MG capsule Take 40 mg by mouth Every Morning Before Breakfast.     • furosemide (LASIX) 20 MG tablet Take 20 mg by mouth Daily.     • HYDROcodone-acetaminophen (NORCO)  MG per tablet Take 1 tablet by mouth Every 6 (Six) Hours As Needed for Moderate Pain .     • ipratropium-albuterol (DUO-NEB) 0.5-2.5 mg/3 ml nebulizer Take 3 mL by nebulization Every 4 (Four) Hours As  "Needed for Wheezing. 360 mL 5   • lisinopril (PRINIVIL,ZESTRIL) 40 MG tablet Take 40 mg by mouth Daily.     • LORazepam (ATIVAN) 1 MG tablet TAKE 1 TABLET BY MOUTH TWICE DAILY. MUST LAST 30 DAYS. 60 tablet 2   • ondansetron (ZOFRAN) 4 MG tablet Take 1 tablet by mouth Every 6 (Six) Hours As Needed for Nausea or Vomiting. 20 tablet 2   • PHENobarbital (LUMINAL) 100 MG tablet TAKE 1 AND 1/2 TABLETS BY MOUTH EVERY DAY 45 tablet 3   • promethazine (PHENERGAN) 12.5 MG tablet      • rosuvastatin (CRESTOR) 40 MG tablet Take 1 tablet by mouth Daily. (Patient taking differently: Take 40 mg by mouth Every Night.) 30 tablet 2   • ferrous sulfate 325 (65 FE) MG tablet Take 325 mg by mouth Daily With Breakfast.     • guaiFENesin (MUCINEX) 600 MG 12 hr tablet Take 1 tablet by mouth Every 12 (Twelve) Hours. (Patient taking differently: Take 600 mg by mouth Every 12 (Twelve) Hours As Needed for Cough or Congestion.) 60 tablet 0   • nitrofurantoin, macrocrystal-monohydrate, (Macrobid) 100 MG capsule Take 1 capsule by mouth 2 (Two) Times a Day. 10 capsule 0     lactated ringers, 30 mL/hr, Last Rate: 30 mL/hr (06/16/21 1018)      albuterol sulfate HFA  •  HYDROcodone-acetaminophen  •  ipratropium-albuterol  •  lactated ringers  •  LORazepam  •  sodium chloride  •  sodium chloride    Objective: /62 (BP Location: Right arm, Patient Position: Sitting)   Pulse 69   Temp 98.4 °F (36.9 °C) (Oral)   Resp 16   Ht 149.9 cm (59\")   Wt 37.4 kg (82 lb 8 oz)   SpO2 93%   BMI 16.66 kg/m²      Intake/Output Summary (Last 24 hours) at 6/17/2021 1414  Last data filed at 6/17/2021 0503  Gross per 24 hour   Intake 360 ml   Output 1200 ml   Net -840 ml     Physical Exam:      General Appearance:    Alert, cooperative, in no acute distress   Head:    Normocephalic, without obvious abnormality, atraumatic   Eyes:            Lids and lashes normal, conjunctivae and sclerae normal, no   icterus, no pallor, corneas clear, PERRLA   Ears:    Ears " appear intact with no abnormalities noted   Throat:   No oral lesions, no thrush, oral mucosa moist   Neck:   No adenopathy, supple, trachea midline, no thyromegaly, no     carotid bruit, no JVD   Back:     No kyphosis present, no scoliosis present, no skin lesions,       erythema or scars, no tenderness to percussion or                   palpation,   range of motion normal   Lungs:     Clear to auscultation,respirations regular, even and                   unlabored    Heart:    Regular rhythm and normal rate, normal S1 and S2, no            murmur, no gallop, no rub, no click   Breast Exam:    Deferred   Abdomen:     Normal bowel sounds, no masses, no organomegaly, soft        non-tender, non-distended, no guarding, no rebound                 tenderness   Genitalia:    Deferred   Extremities:   Moves all extremities well, no edema, no cyanosis, no              redness   Pulses:   Pulses palpable and equal bilaterally   Skin:   No bleeding, bruising or rash   Lymph nodes:   No palpable adenopathy   Neurologic:   Cranial nerves 2 - 12 grossly intact, sensation intact, DTR        present and equal bilaterally     Results from last 7 days   Lab Units 06/16/21  0501   WBC 10*3/mm3 3.27*   HEMOGLOBIN g/dL 9.4*  9.4*   HEMATOCRIT % 30.9*   PLATELETS 10*3/mm3 234     Results from last 7 days   Lab Units 06/16/21  0501 06/14/21  2146   SODIUM mmol/L 133* 136   POTASSIUM mmol/L 5.2 4.6   CHLORIDE mmol/L 97* 98   CO2 mmol/L 30.0* 34.0*   BUN mg/dL 8 8   CREATININE mg/dL 0.46* 0.79   CALCIUM mg/dL 9.1 8.7   BILIRUBIN mg/dL  --  <0.2   ALK PHOS U/L  --  103   ALT (SGPT) U/L  --  19   AST (SGOT) U/L  --  22   GLUCOSE mg/dL 144* 110*       Impression: GI bleed  COPD  Dyspnea  Debility   GOC  Plan: We will continue patient on current symptom management regimen.        Rajan Pena DO  06/17/21  14:14 EDT

## 2021-06-18 PROBLEM — J96.21 ACUTE ON CHRONIC RESPIRATORY FAILURE WITH HYPOXIA (HCC): Status: ACTIVE | Noted: 2021-01-01

## 2021-06-18 PROBLEM — E43 SEVERE MALNUTRITION (HCC): Status: ACTIVE | Noted: 2021-01-01

## 2021-06-18 PROBLEM — I95.0 IDIOPATHIC HYPOTENSION: Status: ACTIVE | Noted: 2021-01-01

## 2021-06-18 NOTE — PLAN OF CARE
Goal Outcome Evaluation:  Plan of Care Reviewed With: patient           Outcome Summary: Patient transferred with standby assist and ambul 60 ft with r wx and c.g.assist. Distance limited by fatigue and dyspnea, O2 sats on supplemental O2 >90% during rx.Recommend pulmonary rehab/HHPT

## 2021-06-18 NOTE — NURSING NOTE
Patient was on BSC having a BM when she had a syncope episode.  I was at bedside held her up and called charge for assistance.  Pt was cold and clammy and was unable to hold her head up she could respond but kept going back out.  We got her back to bed call rapid response.  Did an ECG and BS and cycling BP.  BP was very low 38/22.  Dr Zapien informed and pt moved to ICU.   Gave RN report at bedside.

## 2021-06-18 NOTE — PROGRESS NOTES
Intensive Care Admission Note     Chief Complaint:  Idiopathic hypotension    History of Present Illness     An Abreu is a 71 y.o. female with PMH Chronic Hypoxic Respiratory Failure on 2-3L Home O2, COPD, Ongoing Tobacco Use, Pulmonary Cachexia, Anemia due to LORI/Chronic blood loss, HTN, Seizure DO, PAF (not on anticoagulation due to frequent falls, anemia and GIB), CAD and GERD who was admitted to the Hospitalist service 6/14 after a fall with confusion.  She had c/o progressive weakness with black colored stools.  She also had c/o worsening shorntess of air, CARMONA and worsening wheezing.  In the ED, she was placed on bipap for respiratory distress.  CXR was unremarkable.  She was treated for COPD Exacerbation with nebs and steroids.  CTA abd/pelvis revealed no evidence of hemorrhage, but moderate colonic stool burden and marked distention of her urinary bladder concerning for obstruction.  CTH and CT Cspine were unremarkable.  Initial Hgb was 7.3 with last being 9.9 on 5/23.  She received 1 unit PRBC.  H/H was been stable since then.  She underwent EGD by Dr. Fuentes that revealed small hiatal hernia, gastritis and no gross lesions in the entire examined duodenum. She lost IV access has been unable to have colonoscopy by Dr. Simon.  She had a PICC placed today with colonoscopy planned for tomorrow.  She had an RRT on the floor today due to vasovagal episode while on BSC having BM.  Her BP after returning to bed was 38/22.  She was moved to the ICU for closer monitoring.  Serial H/H has been stable at 9/30.      She has had multiple admissions over the last year for UTIs, COPD Exacerbations and A/C Respiratory failure.  Her most recent hospitalization was 5/22 - 5/25/21 for sepsis due to pneumonia.      On arrival to the ICU the patient is on BiPAP. She is lethargic and hypotensive. Received 1 L of IV fluids on the floor. Currently receiving second liter. Currently on norepinephrine at 0.04 mcg/kg/min. She  will wake up and answer questions appropriately though, difficulty obtaining pulse in the left lower extremity although foot is warm to the touch.    Problem List, Surgical History, Family, Social History, and ROS     Patient Active Problem List    Diagnosis    • *Idiopathic hypotension [I95.0]    • COPD (chronic obstructive pulmonary disease) (CMS/HCC) [J44.9]    • Hypomagnesemia [E83.42]    • Acute on chronic respiratory failure with hypoxia (CMS/[facility]) [J96.21]    • Possible Gastrointestinal hemorrhage [K92.2]    • Iron deficiency anemia due to chronic blood loss [D50.0]    • 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 (CMS/HCC) [J44.9, R64]    • Metabolic encephalopathy [G93.41]    • Hypoglycemia [E16.2]    • Elevated troponin [R77.8]    • Fall [W19.XXXA]    • Humerus fracture [S42.309A]    • Anemia [D64.9]    • Pneumonia [J18.9]    • CAD (coronary artery disease) [I25.10]    • Acute urinary retention [R33.8]    • Fecal occult blood test positive [R19.5]    • Dependence on supplemental oxygen [Z99.81]    • Tobacco abuse [Z72.0]    • Paroxysmal atrial fibrillation (CMS/[facility]) [I48.0]    • Gastroesophageal reflux disease [K21.9]    • Essential hypertension [I10]    • Seizure disorder (CMS/[facility]) [G40.909]      Past Surgical History:   Procedure Laterality Date   • APPENDECTOMY     • CHOLECYSTECTOMY     • COLOSTOMY  2006    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    Tolerated Rocephin 2/19/21, zosyn 5/2021   •  Sulfamethoxazole-Trimethoprim Shortness Of Breath and Rash     Patients power of  stated patient had to be taken to ER due to reaction.   • Carbamazepine    • Codeine      No current facility-administered medications on file prior to encounter.     Current Outpatient Medications on File Prior to Encounter   Medication Sig   • albuterol (PROVENTIL) (2.5 MG/3ML) 0.083% nebulizer solution Take 2.5 mg by nebulization Every 4 (Four) Hours As Needed for Wheezing.   • albuterol sulfate  (90 Base) MCG/ACT inhaler Inhale 2 puffs Every 4 (Four) Hours As Needed for Wheezing or Shortness of Air.   • Anoro Ellipta 62.5-25 MCG/INH aerosol powder  inhaler Inhale 1 puff Every Morning.   • aspirin 81 MG tablet Take 81 mg by mouth Daily.   • benzonatate (Tessalon Perles) 100 MG capsule Take 1 capsule by mouth 3 (Three) Times a Day As Needed for Cough.   • cetirizine (zyrTEC) 10 MG tablet Take 0.5 tablets by mouth Daily.   • citalopram (CeleXA) 20 MG tablet Take 1 tablet by mouth Daily. Needs appointment for next refill.   • clopidogrel (PLAVIX) 75 MG tablet Take 1 tablet by mouth Daily.   • dilTIAZem CD (CARDIZEM CD) 240 MG 24 hr capsule Take 240 mg by mouth Daily.   • esomeprazole (nexIUM) 40 MG capsule Take 40 mg by mouth Every Morning Before Breakfast.   • furosemide (LASIX) 20 MG tablet Take 20 mg by mouth Daily.   • HYDROcodone-acetaminophen (NORCO)  MG per tablet Take 1 tablet by mouth Every 6 (Six) Hours As Needed for Moderate Pain .   • ipratropium-albuterol (DUO-NEB) 0.5-2.5 mg/3 ml nebulizer Take 3 mL by nebulization Every 4 (Four) Hours As Needed for Wheezing.   • lisinopril (PRINIVIL,ZESTRIL) 40 MG tablet Take 40 mg by mouth Daily.   • LORazepam (ATIVAN) 1 MG tablet TAKE 1 TABLET BY MOUTH TWICE DAILY. MUST LAST 30 DAYS.   • ondansetron (ZOFRAN) 4 MG tablet Take 1 tablet by mouth Every 6 (Six) Hours As Needed for Nausea or Vomiting.   • PHENobarbital (LUMINAL) 100 MG tablet TAKE 1 AND 1/2 TABLETS BY  "MOUTH EVERY DAY   • promethazine (PHENERGAN) 12.5 MG tablet    • rosuvastatin (CRESTOR) 40 MG tablet Take 1 tablet by mouth Daily. (Patient taking differently: Take 40 mg by mouth Every Night.)   • ferrous sulfate 325 (65 FE) MG tablet Take 325 mg by mouth Daily With Breakfast.   • guaiFENesin (MUCINEX) 600 MG 12 hr tablet Take 1 tablet by mouth Every 12 (Twelve) Hours. (Patient taking differently: Take 600 mg by mouth Every 12 (Twelve) Hours As Needed for Cough or Congestion.)   • nitrofurantoin, macrocrystal-monohydrate, (Macrobid) 100 MG capsule Take 1 capsule by mouth 2 (Two) Times a Day.     MEDICATION LIST AND ALLERGIES REVIEWED.    Family History   Problem Relation Age of Onset   • Arthritis Mother    • Cancer Mother    • Heart attack Father    • Hypertension Father    • Hyperlipidemia Father    • Stroke Father    • Heart disease Brother         CAD   • Heart disease Child         CAD   • Heart disease Child         CAD     Social History     Tobacco Use   • Smoking status: Current Every Day Smoker     Packs/day: 1.00     Years: 40.00     Pack years: 40.00     Types: Cigarettes, Electronic Cigarette   • Smokeless tobacco: Never Used   • Tobacco comment: <0.5ppd    Substance Use Topics   • Alcohol use: Never   • Drug use: Never     Social History     Social History Narrative    Lives w/ her daughter. Ambulates w/ a walker. Independent w/ most ADLs.      FAMILY AND SOCIAL HISTORY REVIEWED.    Review of Systems   Unable to perform ROS: Mental status change     ALL OTHER SYSTEMS REVIEWED AND ARE NEGATIVE.    Physical Exam and Clinical Information   BP 96/62   Pulse 57   Temp 92.4 °F (33.6 °C) (Axillary)   Resp 20   Ht 149.9 cm (59\")   Wt 37.6 kg (82 lb 12.8 oz)   SpO2 100%   BMI 16.72 kg/m²   Physical Exam  Vitals and nursing note reviewed.   Constitutional:       General: She is in acute distress.      Appearance: She is well-developed. She is ill-appearing. She is not toxic-appearing.      Comments: " Cachectic frail-appearing female   HENT:      Head: Normocephalic and atraumatic.      Right Ear: External ear normal.      Left Ear: External ear normal.      Nose: Nose normal.      Mouth/Throat:      Mouth: Mucous membranes are moist.      Pharynx: Oropharynx is clear. No oropharyngeal exudate or posterior oropharyngeal erythema.   Eyes:      Conjunctiva/sclera: Conjunctivae normal.      Pupils: Pupils are equal, round, and reactive to light.   Cardiovascular:      Rate and Rhythm: Normal rate and regular rhythm.      Pulses: Normal pulses.      Heart sounds: Normal heart sounds. No murmur heard.   No friction rub. No gallop.    Pulmonary:      Effort: Respiratory distress present.      Breath sounds: Normal breath sounds. No wheezing, rhonchi or rales.   Abdominal:      General: Bowel sounds are normal. There is no distension.      Palpations: Abdomen is soft.      Tenderness: There is no abdominal tenderness. There is no rebound.   Musculoskeletal:         General: Normal range of motion.      Cervical back: Normal range of motion and neck supple. No rigidity.      Right lower leg: No edema.      Left lower leg: No edema.   Skin:     General: Skin is warm and dry.      Capillary Refill: Capillary refill takes less than 2 seconds.   Neurological:      General: No focal deficit present.      Mental Status: She is alert. She is disoriented.         Results from last 7 days   Lab Units 06/18/21  0944 06/16/21  0501 06/16/21  0001 06/15/21  1439   WBC 10*3/mm3 7.04 3.27*  --  3.43   HEMOGLOBIN g/dL 9.1* 9.4*  9.4* 9.1* 8.9*   PLATELETS 10*3/mm3 208 234  --  233     Results from last 7 days   Lab Units 06/18/21  0944 06/16/21  0501 06/15/21  1439 06/14/21  2146   SODIUM mmol/L 138 133* 132* 136   POTASSIUM mmol/L 3.7 5.2 4.4 4.6   CO2 mmol/L 29.0 30.0* 28.0 34.0*   BUN mg/dL 7* 8 9 8   CREATININE mg/dL 0.56* 0.46* 0.50* 0.79   MAGNESIUM mg/dL 1.5*  --   --  1.8   GLUCOSE mg/dL 141* 144* 251* 110*     Estimated  Creatinine Clearance: 37.2 mL/min (A) (by C-G formula based on SCr of 0.56 mg/dL (L)).      Results from last 7 days   Lab Units 06/15/21  0345   PH, ARTERIAL pH units 7.335*   PCO2, ARTERIAL mm Hg 63.8*   PO2 ART mm Hg 73.7*     Lab Results   Component Value Date    LACTATE 0.8 06/15/2021          I reviewed the patient's results/ images and I agree with the reports.     Impression     Idiopathic hypotension    Gastroesophageal reflux disease    Essential hypertension    Seizure disorder (CMS/HCC)    Paroxysmal atrial fibrillation (CMS/HCC)    Tobacco abuse    CAD (coronary artery disease)    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 (CMS/HCC)    Hypomagnesemia    Acute on chronic respiratory failure with hypoxia (CMS/HCC)    Possible Gastrointestinal hemorrhage    Iron deficiency anemia due to chronic blood loss    COPD (chronic obstructive pulmonary disease) (CMS/[facility])      Plan/Recommendations     71 y.o. female with PMH Chronic Hypoxic Respiratory Failure on 2-3L Home O2, COPD, Ongoing Tobacco Use, Pulmonary Cachexia, Anemia due to LORI/Chronic blood loss, HTN, Seizure DO, PAF (not on anticoagulation due to frequent falls, anemia and GIB), CAD and GERD. Who presents to the ICU on 6/18/2020 with acute hypotension and delirium. Per report no signs of bleeding on the initial bowel movement when the hypotension occurred. No new medication changes per chart review.    · Admit patient to the ICU  · Labs currently pending, follow-up  · Will obtain blood cultures  · Start Zosyn  · Continue with fluid resuscitation  · Continue pressor support to maintain MAP greater than 65  · Chest x-ray with no acute cardiopulmonary findings. If patient does not make improvement with volume resuscitation we will plan for a CT PE.  · Holding anticoagulation for possible GI bleed, repeat hemoglobin currently pending transfuse for hemoglobin less than 7.  · Holding home antihypertensive medications  · Continue  Protonix twice daily for possible acute GI bleed  · Timing for colonoscopy per GI  · Continue home phenobarbital for history of seizures  · Nebs for COPD  · Place electrolytes  · N.p.o.    Critical Care time spent in direct patient care: 35 minutes (excluding procedure time, if applicable) including high complexity decision making to assess, manipulate, and support vital organ system failure in this individual who has impairment of one or more vital organ systems such that there is a high probability of imminent or life threatening deterioration in the patient's condition.      AUDELIA Leblanc DO  Pulmonary and Critical Care Medicine  06/18/21 19:01 EDT     CC: Zabrina Lemon MD

## 2021-06-18 NOTE — CASE MANAGEMENT/SOCIAL WORK
Continued Stay Note  Psychiatric     Patient Name: An Abreu  MRN: [id number]  Today's Date: 6/18/2021    Admit Date: 6/14/2021    Discharge Plan     Row Name 06/18/21 1354       Plan    Plan  Update    Plan Comments  Patient actually has home O2 through [employer].  Portable tank to be delivered to bedside prior to discharge.  Catrachita Mejia RN x.[phone]    Row Name 06/18/21 5368       Plan    Plan  Home w/Home Health    Patient/Family in Agreement with Plan  yes    Plan Comments  Spoke with patient in room.  Her plan is to return home with [facility] at discharge.  Orders resume orders faxed for skilled nursing, added PT/OT.  Will have [employer] deliver portable tank for transport.  No other needs noted.  CM will continue to follow and notify home health when patient discharges.  Patria Mejia RN [phone]    Final Discharge Disposition Code  06 - home with home health care        Discharge Codes    No documentation.       Expected Discharge Date and Time     Expected Discharge Date Expected Discharge Time    Jun 21, 2021             Patria Mejia RN

## 2021-06-18 NOTE — PLAN OF CARE
Problem: Palliative Care  Goal: Enhanced Quality of Life  Intervention: Optimize Psychosocial Wellbeing  Recent Flowsheet Documentation  Taken 6/18/2021 1505 by Whit Cruz, MSW  Supportive Measures: (symptom assessment) other (see comments)  Patient sleeping, resting calmly and quietly.  Per RN and review of chart, pt wishes to return home with HH, she is also followed by home palliative care through [facility].      1300 Palliative Care Interdisciplinary Rounds - Palliative Care Team members present:  FRANNY Pena DO; GURVINDER Soto APRN; ERIC Jimenez RN, CHPN; CARLOS Cruz LCSW, [facility]-; ALDA Maria MDiv

## 2021-06-18 NOTE — PROGRESS NOTES
[facility] Medicine Services  PROGRESS NOTE    Patient Name: An Abreu  : 1950  MRN: [id number]    Date of Admission: 2021  Primary Care Physician: Zabrina Lemon MD    Subjective     CC: f/u dyspnea / GI bleeding     HPI:  No new issues. Lost IV access. 4 failed attempts overnight. RUE PICC now in place, patient reports her arm is a little sore. Breathing is better. Awaiting colonoscopy tomorrow     ROS:  Gen- No fevers, chills  CV- No chest pain, palpitations  Resp- No cough, dyspnea is improving   GI- No N/V/D, abd pain    Objective     Vital Signs:   Temp:  [97.4 °F (36.3 °C)-97.7 °F (36.5 °C)] 97.4 °F (36.3 °C)  Heart Rate:  [51-78] 74  Resp:  [16-18] 18  BP: (103-140)/(49-58) 123/53        Physical Exam:  Constitutional: No acute distress, awake, alert and conversant. Laying in bed. Chronically ill appearing   HENT: NCAT, mucous membranes moist  Respiratory: Clear to auscultation bilaterally, respiratory effort normal on 2L NC   Cardiovascular: RRR, no murmurs, rubs, or gallops  Gastrointestinal: Positive bowel sounds, soft, nontender, nondistended  Musculoskeletal: No bilateral ankle edema  Psychiatric: Appropriate affect, cooperative  Neurologic: Oriented x 3, moves all extremities spontaneously with no focal deficits, speech clear  Skin: No rashes to exposed surfaces    Results Reviewed:  Results from last 7 days   Lab Units 21  0944 21  0501 21  0001 06/15/21  1439   WBC 10*3/mm3 7.04 3.27*  --  3.43   HEMOGLOBIN g/dL 9.1* 9.4*  9.4* 9.1* 8.9*   HEMATOCRIT % 30.8* 30.9*  --  30.1*   PLATELETS 10*3/mm3 208 234  --  233   INR   --   --   --  1.02     Results from last 7 days   Lab Units 21  0944 21  0501 06/15/21  1439 21  2146   SODIUM mmol/L 138 133* 132* 136   POTASSIUM mmol/L 3.7 5.2 4.4 4.6   CHLORIDE mmol/L 102 97* 97* 98   CO2 mmol/L 29.0 30.0* 28.0 34.0*   BUN mg/dL 7* 8 9 8   CREATININE mg/dL 0.56* 0.46* 0.50*  0.79   GLUCOSE mg/dL 141* 144* 251* 110*   CALCIUM mg/dL 8.6 9.1 8.3* 8.7   ALT (SGPT) U/L  --   --   --  19   AST (SGOT) U/L  --   --   --  22   TROPONIN T ng/mL  --   --  0.149* 0.434*     Estimated Creatinine Clearance: 37.2 mL/min (A) (by C-G formula based on SCr of 0.56 mg/dL (L)).    Microbiology Results Abnormal     Procedure Component Value - Date/Time    COVID PRE-OP / PRE-PROCEDURE SCREENING ORDER (NO ISOLATION) - Swab, Nasopharynx [[id number]]  (Normal) Collected: 06/16/21 0838    Lab Status: Final result Specimen: Swab from Nasopharynx Updated: 06/16/21 0909    Narrative:      The following orders were created for panel order COVID PRE-OP / PRE-PROCEDURE SCREENING ORDER (NO ISOLATION) - Swab, Nasopharynx.  Procedure                               Abnormality         Status                     ---------                               -----------         ------                     COVID-19, ABBOTT IN-HOUS...[[id number]]  Normal              Final result                 Please view results for these tests on the individual orders.    COVID-19, ABBOTT IN-HOUSE,NASAL Swab (NO TRANSPORT MEDIA) 2 HR TAT - Swab, Nasopharynx [[id number]]  (Normal) Collected: 06/16/21 0838    Lab Status: Final result Specimen: Swab from Nasopharynx Updated: 06/16/21 0909     COVID19 Presumptive Negative    Narrative:      Fact sheet for providers: https://www.fda.gov/media/245573/download     Fact sheet for patients: https://www.fda.gov/media/857462/download    Test performed by PCR.  If inconsistent with clinical signs and symptoms patient should be tested with different authorized molecular test.        Imaging Results (Last 24 Hours)     ** No results found for the last 24 hours. **        Results for orders placed during the hospital encounter of 05/03/21    Adult Transthoracic Echo Complete W/ Cont if Necessary Per Protocol    Interpretation Summary  · Estimated left ventricular EF = 70%  · Moderate aortic valve regurgitation is  present.    I have reviewed the medications:  Scheduled Meds:acetaminophen, 500 mg, Oral, TID  cetirizine, 5 mg, Oral, Daily  citalopram, 20 mg, Oral, Daily  [START ON 6/19/2021] cyanocobalamin, 1,000 mcg, Intramuscular, Q28 Days  dilTIAZem CD, 240 mg, Oral, Daily  ferric gluconate, 250 mg, Intravenous, Daily  lisinopril, 40 mg, Oral, Daily  magnesium citrate, 296 mL, Oral, Once  pantoprazole, 40 mg, Oral, BID AC  PHENobarbital, 145.8 mg, Oral, Daily  rosuvastatin, 40 mg, Oral, Nightly  sodium chloride, 10 mL, Intravenous, Q12H  sodium chloride, 10 mL, Intravenous, Q12H      Continuous Infusions:lactated ringers, 30 mL/hr, Last Rate: 30 mL/hr (06/16/21 1018)      PRN Meds:.albuterol sulfate HFA  •  HYDROcodone-acetaminophen  •  lactated ringers  •  LORazepam  •  sodium chloride  •  sodium chloride  •  sodium chloride  •  sodium chloride    Assessment & Plan     Active Hospital Problems    Diagnosis  POA   • **Acute respiratory failure with hypercapnia (CMS/HCC) [J96.02]  Yes   • Hypomagnesemia [E83.42]  Yes   • GI bleed [K92.2]  Yes   • Acute upper GI bleed [K92.2]  Yes   • Acute-on-chronic respiratory failure (CMS/HCC) [J96.20]  Yes   • Underweight [R63.6]  Yes   • Acute on chronic respiratory failure with hypercapnia (CMS/HCC) [J96.22]  Yes   • Acute on chronic respiratory failure (CMS/HCC) [J96.20]  Unknown   • Gastrointestinal hemorrhage [K92.2]  Unknown   • Iron deficiency anemia due to chronic blood loss [D50.0]  Unknown   • Metabolic encephalopathy [G93.41]  Yes   • Elevated troponin [R77.8]  Yes   • Anemia [D64.9]  Yes   • CAD (coronary artery disease) [I25.10]  Yes   • Tobacco abuse [Z72.0]  Yes   • Fecal occult blood test positive [R19.5]  Unknown   • Paroxysmal atrial fibrillation (CMS/HCC) [I48.0]  Yes   • Chronic obstructive pulmonary disease with acute exacerbation (CMS/HCC) [J44.1]  Yes   • Seizure disorder (CMS/HCC) [G40.909]  Yes   • Essential hypertension [I10]  Yes      Resolved Hospital Problems    No resolved problems to display.     Brief Hospital Course to date:  An Abreu is a 71 y.o. female with PMH significant for HTN, CAD, chronic diastolic CHF, paroxysmal atrial fibrillation, chronic respiratory failure secondary to COPD (baseline 2-3L NC), ongoing tobacco abuse, recurrent GI bleeding, iron-deficiency anemia and seizure disorder. She has been hospitalized at least 8 times since January 2020. She has been followed by palliative care at home.     Admitted to [facility]/14/2021 for acute on chronic respiratory failure. She initially required BiPAP. Hgb acutely decreased at 7.3 - CTA abdomen/pelvis revealed demonstrated marked distention of urinary bladder, concerning for obstruction and moderate colonic burden. GI following.     Acute on chronic respiratory failure with hypoxia / hypercarbia  Acute exacerbation of COPD   Ongoing tobacco abuse  - pCO2 69.6 on admission, s/p BiPAP  - Has since weaned to her baseline O2 requirements   - Continue PRN inhaler. Not receiving steroids or antibiotics at this time   - Palliative care following. Remains full code   - Unclear if she has a pulmonologist - may benefit from NIPPV at home?  - COPD NN following    Acute metabolic encephalopathy, resolved   - Suspect secondary to hypercarbia    Suspected GI bleed  Acute blood loss anemia  - Hgb 7.3 on admission  - s/p 1 unit PRBCs  - Receiving IV iron   - Hgb currently stable in the 9's   - Continue BID PPI   - Lost IV access, unable to complete colonoscopy today. Planning for colonoscopy tomorrow now that she has PICC in place   - GI has ordered IM B12 injection    Hypertension, good control   Paroxysmal atrial fibrillation, continue Diltiazem. Not anticoagulated due to risk of falls and recurrent GI bleeding  Seizure disorder, continue phenobarbital   Anxiety / depression, continue home Celexa / Lorazepam   Severe, chronic malnutrition, RD following     DVT prophylaxis:  Mechanical DVT prophylaxis orders are  present.     Disposition: I expect the patient to be discharged 1-2 days, pending colonoscopy results     CODE STATUS:   Code Status and Medical Interventions:   Ordered at: 06/15/21 0403     Code Status:    CPR     Medical Interventions (Level of Support Prior to Arrest):    Full     Anu Rizvi PA-C  06/18/21

## 2021-06-18 NOTE — PROGRESS NOTES
"NN spoke with patient at .  Patient alert and able to answer questions appropriately.  O2 sat 95% on 2.5L, home baseline is 3-4L.  Patient adamant today that she is ready to go home.  She states that she can come back and do her procedure another day.  She has albuterol inhaler at  and states she has been coughing up \"gobs of stuff\".  Discussion regarding rehab.  She states that she feels like she is not well enough to be able to handle rehab.  No new questions or concerns voiced at this time.  NN continues to follow.      Per current G[employer] Standards, please consider: No ICS in place, NRT at DC, Patient may be out of rescue medication, Outpatient PFT, Pulmonary rehab as appropriate     "

## 2021-06-18 NOTE — CASE MANAGEMENT/SOCIAL WORK
Continued Stay Note  [facility]     Patient Name: An Abreu  MRN: [id number]  Today's Date: 6/18/2021    Admit Date: 6/14/2021    Discharge Plan     Row Name 06/18/21 1158       Plan    Plan  Home w/Home Health    Patient/Family in Agreement with Plan  yes    Plan Comments  Spoke with patient in room.  Her plan is to return home with [facility] at discharge.  Orders resume orders faxed for skilled nursing, added PT/OT.  Will have [employer] deliver portable tank for transport.  No other needs noted.  CM will continue to follow and notify home health when patient discharges.  Patria Mejia RN [phone]    Final Discharge Disposition Code  06 - home with home health care        Discharge Codes    No documentation.       Expected Discharge Date and Time     Expected Discharge Date Expected Discharge Time    Jun 21, 2021             Patria Mejia RN

## 2021-06-18 NOTE — CODE DOCUMENTATION
Dr Zapien returned page, spoke with RN, Ordered transfer to ICU.  Patient continues to be hypotensive after fluid bolus. Patient on Bipap, and is responsive

## 2021-06-18 NOTE — DISCHARGE PLACEMENT REQUEST
"An Abreu (71 y.o. Female)     Case Management  [phone]      Date of Birth Social Security Number Address Home Phone MRN    1950  [address] [address] [phone] [id number]    Religious Marital Status          None        Admission Date Admission Type Admitting Provider Attending Provider Department, Room/Bed    21 Emergency Tereza Zapien MD Opii, [place], MD [facility] 5G, S557/1    Discharge Date Discharge Disposition Discharge Destination                       Attending Provider: Tereza Zapien MD    Allergies: Keflex [Cephalexin], Sulfamethoxazole-trimethoprim, Carbamazepine, Codeine    Isolation: None   Infection: None   Code Status: CPR    Ht: 149.9 cm (59\")   Wt: 37.6 kg (82 lb 12.8 oz)    Admission Cmt: None   Principal Problem: Acute respiratory failure with hypercapnia (CMS/HCC) [J96.02]                 Active Insurance as of 2021     Primary Coverage     Payor Plan Insurance Group Employer/Plan Group    [facility] MEDICARE REPLACEMENT [employer] MEDICARE REPLACEMENT      Payor Plan Address Payor Plan Phone Number Payor Plan Fax Number Effective Dates    [address] [address] [phone]  2018 - None Entered    [facility] [address]       Subscriber Name Subscriber Birth Date Member ID       AN ABREU [id number]                 Emergency Contacts      (Rel.) Home Phone Work Phone Mobile Phone    HEATHER STRINGER (Surrogate) [phone] -- [phone]    MARLENE ABREU (Daughter) [phone] -- [phone]    DEANNA ROTH (Friend) [phone] -- [phone]    STEFANIA ARMENTA (Daughter) [phone] -- --          [address]  [address] [facility] [address]  Phone:  [phone]  Fax:  [phone] Date: 2021      Ambulatory Referral to Home Health     Patient:  An Abreu MRN:  [id number]   [address] [facility] [address] :  " 1950  N:    Phone: [phone] Sex:  F      INSURANCE PAYOR PLAN GROUP # SUBSCRIBER ID   Primary:    [facility] MEDICARE REPLACEMENT [id number]   [id number]      Referring Provider Information:  CELINE HUITRON Phone: [phone] Fax: [phone]      Referral Information:   # Visits:  999 Referral Type: Home Health [42]   Urgency:  Routine Referral Reason: Specialty Services Required   Start Date: Jun 18, 2021 End Date:  To be determined by Insurer   Diagnosis: Acute upper GI bleed (K92.2 [ICD-10-CM] 578.9 [ICD-9-CM])      Refer to Dept:   Refer to Provider:   Refer to Facility:  [facility]     Face to Face Visit Date: 6/18/2021  Follow-up provider for Plan of Care? I treated the patient in an acute care facility and will not continue treatment after discharge.  Follow-up provider: KENISHA FERRELL [[id number]]  Reason/Clinical Findings: respiratory failure  Describe mobility limitations that make leaving home difficult: impaired functional mobility, balance, gait and endurance  Nursing/Therapeutic Services Requested: Skilled Nursing  Nursing/Therapeutic Services Requested: Physical Therapy  Nursing/Therapeutic Services Requested: Occupational Therapy  Skilled nursing orders: Neurovascular assessments  Skilled nursing orders: Cardiopulmonary assessments  PT orders: Therapeutic exercise  PT orders: Gait Training  PT orders: Transfer training  PT orders: Strengthening  PT orders: Home safety assessment  Weight Bearing Status: As Tolerated  Occupational orders: Activities of daily living  Occupational orders: Energy conservation  Occupational orders: Strengthening  Occupational orders: Home safety assessment  Frequency: 1 Week 1     This document serves as a request of services and does not constitute Insurance authorization or approval of services.  To determine eligibility, please contact the members Insurance carrier to verify and review coverage.     If you have medical  questions regarding this request for services. Please contact [address] at [phone] during normal business hours.       Verbal Order Mode: Telephone with readback   Authorizing Provider: Tereza Zapien MD  Authorizing Provider's NPI: [id number]     Order Entered By: Patria Mejia RN 2021 11:52 AM     Electronically signed by:  Tereza Zapien MD             History & Physical      Antwan Alvarez DO at 06/15/21 0301              [facility] Medicine Services  HISTORY AND PHYSICAL    Patient Name: An Abreu  : 1950  MRN: [id number]  Primary Care Physician: Zabrina Lemon MD  Date of admission: 2021    Subjective   Subjective     Chief Complaint:  Fall    HPI:  An Abreu is a 71 y.o. female with a medical history significant for PAF not on AC, CAD, HTN, HLD, Seizure d/o, COPD on supplemental oxygen and Tobacco use who was brought to [facility] ED after a fall and found to be confused. History is limited secondary to the patient's AMS. Per EMS report, the patient was found on the ground by her daughter. The daughter, Lory reported the patient has been more confused for several days. Was unable to get in touch with the daughter over the phone. Recent admission -21 for sepsis due to bronchopneumonia. While in the ED, the patient was requiring 4L NC and subsequently transitioned to BiPAP. ABG with pH of 7.321, pCO2 69.6, pO2 127, HCO3 35.9. Labs revealed troponin 0.434, hemoglobin 7.3, magnesium 1.8. CTA of the abdomen pelvis demonstrates marked distention of the urinary bladder concerning for obstruction and moderate colonic stool burden.  Chest x-ray clear. CT head shows no new abnormalities.  CT cervical spine negative for fracture.  Patient received 10 mg of dexamethasone and 80 mg of Protonix in the ED.  She will be admitted to the hospitalist service for further medical management.    COVID Details:        Symptoms: [] NONE  [] Fever []  Cough [] Shortness of breath [] Change in taste or smell  The patient qualifies to receive the vaccine, but they have not yet received it.    Review of Systems   Unable to perform ROS: Mental status change      All other systems reviewed and are negative.     Personal History     Past Medical History:   Diagnosis Date   • Aneurysm (CMS/HCC)    • Angina pectoris (CMS/HCC) 6/20/2016   • Anxiety 6/20/2016   • Arthritis 6/20/2016   • CAD (coronary artery disease)    • Carotid artery stenosis    • CHF (congestive heart failure) (CMS/[facility])    • Chronic coronary artery disease 6/20/2016 2003 CABG LIMA to LAD, VG to OM 2004 VG to OM occluded. LIMA patent Cx intervention and RCA 2008 stent for ISR 2013 ISR and stenting of CX and RCA 2016 normal MPS   • Chronic left-sided low back pain with left-sided sciatica 2/1/2017   • Chronic obstructive pulmonary disease (CMS/HCC) 6/20/2016   • Chronic respiratory failure with hypoxia (CMS/[facility]) 3/27/2021   • Cobalamin deficiency 6/20/2016   • Congestive heart failure (CMS/HCC) 6/20/2016   • COPD (chronic obstructive pulmonary disease) (CMS/[facility])    • Depression 7/3/2018   • Diverticulosis    • Diverticulosis of large intestine without hemorrhage 5/5/2017   • GERD (gastroesophageal reflux disease)    • GIB (gastrointestinal bleeding)     recurrent    • HTN (hypertension)    • Hypercholesterolemia 6/20/2016   • Hyperlipidemia    • Mesenteric ischemia (CMS/[facility]) 2006    s/p resection    • Mood disorder (CMS/[facility])    • Oxygen dependent     3 liters @ all times.    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 (CMS/[facility]) 5/23/2021   • PVD (peripheral vascular disease) (CMS/[facility])    • Restless legs syndrome 6/20/2016   • Seizure disorder (CMS/HCC) 6/20/2016   • Seizures (CMS/[facility])    • Stenosis of carotid artery 6/20/2016   •  Vertigo 9/28/2016       Past Surgical History:   Procedure Laterality Date   • APPENDECTOMY     • CHOLECYSTECTOMY     • COLOSTOMY  2006    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   • EXPLORATORY LAPAROTOMY      sec to ovarian cysts   • HYSTERECTOMY     • ILIAC ARTERY STENT     • REVISION / TAKEDOWN COLOSTOMY  2008       Family History: family history includes Arthritis in her mother; Cancer in her mother; Heart attack in her father; Heart disease in her brother, child, and child; Hyperlipidemia in her father; Hypertension in her father; Stroke in her father. Otherwise pertinent FHx was reviewed and unremarkable.     Social History:  reports that she has been smoking cigarettes and electronic cigarette. She has a 40.00 pack-year smoking history. She has never used smokeless tobacco. She reports that she does not drink alcohol and does not use drugs.  Social History     Social History Narrative    Lives w/ her daughter. Ambulates w/ a walker. Independent w/ most ADLs.        Medications:  HYDROcodone-acetaminophen, LORazepam, PHENobarbital, albuterol, albuterol sulfate HFA, aspirin, benzonatate, cetirizine, citalopram, clopidogrel, dilTIAZem CD, esomeprazole, ferrous sulfate, furosemide, guaiFENesin, ipratropium-albuterol, lisinopril, nitrofurantoin (macrocrystal-monohydrate), ondansetron, promethazine, rosuvastatin, and umeclidinium-vilanterol    Allergies   Allergen Reactions   • Keflex [Cephalexin] Shortness Of Breath and Rash     Patients power of  states patient had to be taken to ER due to reaction.    Tolerated Rocephin 2/19/21   • Sulfamethoxazole-Trimethoprim Shortness Of Breath and Rash     Patients power of  stated patient had to be taken to ER due to reaction.   • Carbamazepine    • Codeine        Objective   Objective     Vital Signs:   Temp:  [98.4 °F (36.9 °C)] 98.4 °F (36.9 °C)  Heart Rate:  [72-91] 80  Resp:  [20-24] 24  BP: (103-137)/(46-68) 125/58  Flow (L/min):  [4] 4    Physical Exam    Constitutional: Arouses to her name but immediately falls back asleep, ill-appearing, lying in bed   Eyes: PERRLA, sclerae anicteric, no conjunctival injection  HENT: NCAT, mucous membranes moist, face symmetric, dentition normal   Neck: Supple, no thyromegaly, no lymphadenopathy, trachea midline, no meningeal signs   Respiratory: Expiratory wheeze appreciated, currently on BiPAP  Cardiovascular: RRR, no murmurs appreciated, palpable PT pulses bilaterally  Gastrointestinal: Positive bowel sounds, soft, nontender, no distention, no guarding, no peritoneal signs    Musculoskeletal: No bilateral ankle edema, no clubbing or cyanosis to extremities  Psychiatric: Unable to access   Neurologic: Disoriented, moves all extremities, normal tone, strength and sensation symmetric in all extremities, Cranial Nerves grossly intact to confrontation  Skin: Pale, skin tear on right upper extremity     Result Review:  I have personally reviewed the results from the time of this admission to 06/15/21 3:02 AM EDT and agree with these findings:  [x]  Laboratory  [x]  Microbiology  [x]  Radiology  [x]  EKG/Telemetry   []  Cardiology/Vascular   []  Pathology  []  Old records  []  Other:  Most notable findings include: SEE HPI      LAB RESULTS:      Lab 06/14/21 2146   WBC 5.29   HEMOGLOBIN 7.3*   HEMATOCRIT 24.8*   PLATELETS 281   NEUTROS ABS 3.03   IMMATURE GRANS (ABS) 0.02   LYMPHS ABS 1.65   MONOS ABS 0.43   EOS ABS 0.15   MCV 93.9         Lab 06/14/21 2146   SODIUM 136   POTASSIUM 4.6   CHLORIDE 98   CO2 34.0*   ANION GAP 4.0*   BUN 8   CREATININE 0.79   GLUCOSE 110*   CALCIUM 8.7   MAGNESIUM 1.8   TSH 3.220         Lab 06/14/21 2146   TOTAL PROTEIN 6.3   ALBUMIN 3.50   GLOBULIN 2.8   ALT (SGPT) 19   AST (SGOT) 22   BILIRUBIN <0.2   ALK PHOS 103         Lab 06/14/21 2146   TROPONIN T 0.434*             Lab 06/15/21  0059   ABO TYPING A   RH TYPING Positive   ANTIBODY SCREEN Positive         Lab 06/14/21 2229   PH, ARTERIAL  7.321*   PCO2, ARTERIAL 69.6*   PO2 .0*   FIO2 36   HCO3 ART 35.9*   BASE EXCESS ART 8.8*   CARBOXYHEMOGLOBIN 3.6*     UA    Urinalysis 5/3/21 5/3/21 5/5/21 5/5/21 5/22/21 5/22/21 5/22/21    1246 1246 0354 0354 1823 1823 1823   Squamous Epithelial Cells, UA  0-2  0-2   None Seen   Specific Hubbell, UA 1.012  1.009  1.013 1.013    Ketones, UA Trace (A)  Negative  Negative Negative    Blood, UA Negative  Negative  Negative Negative    Leukocytes, UA Negative  Small (1+) (A)  Negative Negative    Nitrite, UA Positive (A)  Negative  Negative Negative    RBC, UA  None Seen  0-2   0-2   WBC, UA  3-5 (A)  6-12 (A)   None Seen   Bacteria, UA  4+ (A)  Trace   None Seen   (A) Abnormal value            Microbiology Results (last 10 days)     ** No results found for the last 240 hours. **          CT Head Without Contrast    Result Date: 6/14/2021  CT Head WO HISTORY: Fall today with head trauma. Patient on blood thinners TECHNIQUE: Axial unenhanced head CT. Radiation dose reduction techniques included automated exposure control or exposure modulation based on body size. Count of known CT and cardiac nuc med studies performed in previous 12 months: 14. Time of scan: 9:29 PM COMPARISON: February 18, 2021 FINDINGS: The ventricles are mildly generous in size. Cortical sulci are correspondingly prominent. No extraaxial fluid collections are seen. There is evidence of a remote infarct in the inferior left cerebellar hemisphere. No parenchymal or subarachnoid hemorrhage is present. Periventricular hypodensities are demonstrated which are nonspecific but likely represent white matter microvascular change in a patient of this age. No CT evidence of mass or acute infarct. The mastoid air cells are clear. The visualized paranasal sinuses are clear. Orbital structures demonstrate no acute findings.     Impression: Impression: 1. No clearly acute intracranial pathology demonstrated. 2. Mild cerebral atrophy and nonspecific  periventricular hypodensities which are thought to represent white matter microvascular change. 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 3. No significant interval change from prior study of 2/18/2021. Signer Name: Jimy Rodriguez MD  Signed: 6/14/2021 9:43 PM  Workstation Name: [facility]    CT Cervical Spine Without Contrast    Result Date: 6/14/2021  CT Spine Cervical WO INDICATION: Fall today. Patient on blood thinners. TECHNIQUE: CT of the cervical spine without IV contrast. Coronal and sagittal reconstructions were obtained.  Radiation dose reduction techniques included automated exposure control or exposure modulation based on body size. Count of known CT and cardiac nuc med studies performed in previous 12 months: 14. COMPARISON: No pertinent prior study FINDINGS: Exam is mildly compromised by motion artifact. No acute fracture is seen. Vertebral body heights are maintained. Bone mineralization is mildly demineralized. Cervical intervertebral disc spaces are maintained. The sagittal alignment of the cervical spine is anatomic.The facets are intact. The posterior elements show no acute findings. There is no prevertebral soft tissue swelling present. Cervical spinal canal is patent. The neural foramina are patent. Soft tissue windows show no evidence of cord compression. The odontoid is intact. The ring of C1 is intact. The cervicomedullary junction is normal. The visualized portions of the lung apices are clear.     Impression: Impression: 1. No acute fracture is seen. 2. No acute radiographic abnormalities are identified. Report called to Dr. Chan at 9:47 PM Signer Name: Jimy Rodriguez MD  Signed: 6/14/2021 9:49 PM  Workstation Name: [facility]    CT Angiogram Abdomen Pelvis    Result Date: 6/15/2021  CTA Abdomen Pelvis INDICATION: Abdominal pain and GI bleed. TECHNIQUE: CT angiogram of the abdomen and pelvis with  and without IV contrast. 2 sets of delayed images were obtained. 3-D reconstructions were obtained and reviewed. Radiation dose reduction techniques included automated exposure control or exposure modulation based on body size. Count of known CT and cardiac nuc med studies performed in previous 12 months: 16. COMPARISON: 5/22/2021 FINDINGS: There is streak artifact from the patient's arms. Extensive atherosclerotic disease is again identified. There is no aortic aneurysm or aortic dissection. There is a high-grade stenosis or short segment occlusion in the left common iliac artery with reconstitution distally. The internal and external iliac arteries on both sides are diffusely markedly diseased and narrowed. There is narrowing of both common femoral arteries and both proximal superficial femoral arteries. There are single bilateral renal arteries with with diffuse atherosclerotic disease and proximal renal artery stenoses on both sides at least moderate in degree. There is a proximal stenosis in the celiac trunk measuring greater than 50%. There is only mild stenosis in the proximal SFA although there is diffuse atherosclerotic disease within the vessel. The EDDA is patent. No large vessel occlusion is seen. No contrast extravasation or definite contrast staining is identified to suggest an area of acute GI bleeding at this time. COPD noted in the lung bases. Gallbladder is surgically absent. Solid abdominal organs are grossly normal allowing for artifact. Urinary bladder is distended, extending out of the pelvis. Please correlate for bladder all obstruction. Uterus is surgically absent. Postoperative changes are noted in the bowel. Moderate stool burden is noted in the colon which may indicate constipation. The appendix is surgically absent by history. No definite small bowel obstruction. There is an old T12 compression deformity.     Impression: 1. Extensive atherosclerotic disease as described above. There is no  aortic aneurysm or dissection. No large vessel occlusion is seen. 2. No clear evidence of acute GI bleeding. 3. Marked distention of the urinary bladder concerning for bladder all obstruction. 4. Moderate colonic stool burden concerning for constipation. No definite evidence of a small bowel obstruction. 5. Additional findings as above. Signer Name: Nabil Shearer MD  Signed: 6/15/2021 2:34 AM  Workstation Name: [facility]  Radiology [facility]    XR Chest 1 View    Result Date: 6/14/2021  CR Chest 1 Vw INDICATION: Weakness and dizziness. COPD. Shortness of air. COMPARISON:  5/22/2021 FINDINGS: Single portable AP view(s) of the chest.  Heart size is normal status post CABG. There is atherosclerotic disease in the aorta. COPD is noted. The lungs are clear except for granulomatous calcification. No pneumothorax is seen. Old fractures are present involving the left clavicle and left proximal humerus.     Impression: No acute cardiopulmonary findings. Signer Name: Nabil Shearer MD  Signed: 6/14/2021 10:16 PM  Workstation Name: [facility]  Radiology [facility]      Results for orders placed during the hospital encounter of 05/03/21    Adult Transthoracic Echo Complete W/ Cont if Necessary Per Protocol    Interpretation Summary  · Estimated left ventricular EF = 70%  · Moderate aortic valve regurgitation is present.      Assessment/Plan   Assessment & Plan       Acute respiratory failure with hypercapnia (CMS/HCC)    Chronic obstructive pulmonary disease with acute exacerbation (CMS/HCC)    Essential hypertension    Seizure disorder (CMS/HCC)    Paroxysmal atrial fibrillation (CMS/HCC)    Tobacco abuse    CAD (coronary artery disease)    Anemia    Elevated troponin    Metabolic encephalopathy    Hypomagnesemia    GI bleed    An Abreu is a 71 y.o. female with a medical history significant for PAF not on AC, CAD, HTN, HLD, Seizure d/o, COPD and Tobacco use who was brought to [facility] ED  after a fall and was found to beconfused with altered mental status..     Acute on chronic respiratory failure with hypercapnia  COPD exacerbation  Metabolic encephalopathy   -Noncompliant with supplemental oxygen  (4L) at home.  -Currently on BiPAP, continue. Repeat ABG unchanged.  -Check lactic acid, ammonia, phenobarbital, urinalysis and UDS.  -Administer Narcan  -Obtain sputum culture  -Continue home inhalers and duonebs prn  -Serial neuro checks. Hold all sedating medication.   -Monitor on telemetry with continuous pulse ox.     GI bleed   Anemia   -Hgb 7.3. Last 9.9 in 5/23/2.  -Obtain iron studies  -Consult GI   -Administer IV Protonix BID   -Transfuse 2 units of pRBCs   -Hold Plavix and aspirin  -Keep npo   -Serial H&H q6h   -Strict I&Os   -Fall precautions     Urinary retention   -Worley placed in ED, continue.   -Obtain urinalysis.     Elevated troponin  -Likely type 2 NSTEMI d/t GI bleed   -Trend x3.   -No ischemic changes on ECG.  -Denied chest pain when more alert at arrival to ER.     Hypomagnesemia  -Replace per protocol    Paroxysmal atrial fibrillation  -[id number] 6  -Continue Diltiazem      Hypertension  -Blood pressure borderline.   -Hold Lisinopril for now.     Seizure disorder  -Hold Phenobarbital d/t encephalopathy , check level  -Seizure precautions      Tobacco use   -Smoking cessation education  -Nicotine patch     DVT prophylaxis: SCDs    CODE STATUS: FULL CODE     This note has been completed as part of a split-shared workflow.   Signature: Electronically signed by Kyra Salinas PA-C, 06/15/21, 3:36 AM EDT      Attending   Admission Attestation       I have seen and examined the patient, performing an independent face-to-face diagnostic evaluation with plan of care reviewed and developed with the advanced practice clinician (APC).      Brief Summary Statement:   An Abreu is a 71 y.o. female with past medical history of paroxysmal atrial fibrillation, coronary artery disease,  hyperlipidemia, hypertension, seizure disorder, COPD, chronic tobacco use, not on anticoagulation but is on Plavix who presents to the ER after fall found to be confused with altered mental status.    Patient with recent admission from 5/22 through 5/25 due to sepsis with bronchopneumonia.  She also had acute hypercapnic respiratory failure treated with BiPAP during the admission.    History is limited secondary to patient's sedation, however patient's work-up in the ER noted to show decreasing hemoglobin with concern for GI bleeding, urinary retention with bladder distention and ABG showing hypercapnia.  Narcan has been ordered as UDS did show opiates, benzos, barbiturates.        Remainder of detailed HPI is as noted by APC and has been reviewed and/or edited by me for completeness.    Attending Physical Exam:  Constitutional: Lethargic, will wake to tactile stimuli and immediately falls back asleep, can answer in one to three word sentences.   Eyes: PERRLA, sclerae anicteric, no conjunctival injection  HENT: NCAT, mucous membranes dry  Neck: Supple, no thyromegaly, no lymphadenopathy, trachea midline  Respiratory: Clear to auscultation bilaterally, nonlabored respirations   Cardiovascular: RRR, no murmurs, rubs, or gallops, palpable pedal pulses bilaterally  Gastrointestinal: Positive bowel sounds, soft, nontender, nondistended  Musculoskeletal: No bilateral ankle edema, no clubbing or cyanosis to extremities  Psychiatric: Appropriate affect, cooperative  Neurologic: Confused, strength symmetric in all extremities, Cranial Nerves grossly intact to confrontation, speech slightly slurred  Skin: No rashes      Brief Assessment/Plan :  See detailed assessment and plan developed with APC which I have reviewed and/or edited for completeness.        Admission Status: I believe that this patient meets INPATIENT status due to altered mental status, acute on chronic hypoxic and hypercapnic respiratory failure, suspected GI  bleed, and urinary retention, symptomatic anemia.  I feel patient’s risk for adverse outcomes and need for care warrant INPATIENT evaluation and I predict the patient’s care encounter to likely last beyond 2 midnights.        Antwan Alvarez DO  06/15/21                          Electronically signed by Antwan Alvarez DO at 06/15/21 0436

## 2021-06-18 NOTE — TELEPHONE ENCOUNTER
I SPOKE WITH STEFANIA VU PATIENT'S DAUGHTER. BIOPSY RESULTS GIVEN. MS VU IS CURRENTLY IN THE HOSPITAL.

## 2021-06-18 NOTE — NURSING NOTE
Attempted to call all numbers for her daughter surrogate and friend  No answer at most numbers.  [phone] did get an answering machine and left a message for her to call [employer] with my number.

## 2021-06-18 NOTE — PLAN OF CARE
Goal Outcome Evaluation:               Patient had a picc line placed today.  She will be NPO at midnight and start prep this evening for colonoscopy in the AM.

## 2021-06-18 NOTE — PLAN OF CARE
Goal Outcome Evaluation:         Patient arrived to Unit at 1710 from 5G post RRT on 100% NRB. Patient hypotensive and minimally responsive with NS bolus infusing. Placed on BIPAP at 50%. Temp 93 degrees axillary-Avelina hugger initiated. Levophed pulled from omnicell and initiated emergently. Additional 1LNS bolus given Per MD order. Labs pulled from PICC and sent STAT. Sepsis screen positive and NP aware. Orders for lawrence placed. Multiple loose/Watery BM's and C-Diff sent. Levophed titrated to .06 for BP support. No pulse noted in Left foot. Popliteal palpatable-MD/NP aware.Right LE pulses found with doppler. GI updated on status change and Colonoscopy canceled for 6/19. Colon Prep also cancelled. Attempted to call [facility] surrogate and was unable to reach. Patient requested daughter be called.

## 2021-06-18 NOTE — THERAPY TREATMENT NOTE
Patient Name: An Abreu  : 1950    MRN: [id number]                              Today's Date: 2021       Admit Date: 2021    Visit Dx:     ICD-10-CM ICD-9-CM   1. Acute upper GI bleed  K92.2 578.9   2. Type 2 MI (myocardial infarction) (CMS/[facility])  I21.A1 410.90   3. COPD exacerbation (CMS/[facility])  J44.1 491.21   4. Acute blood loss anemia  D62 285.1   5. Hypoxia  R09.02 799.02   6. Acute urinary retention  R33.8 788.29   7. Acute on chronic respiratory failure, unspecified whether with hypoxia or hypercapnia (CMS/[facility])  J96.20 518.84   8. Gastrointestinal hemorrhage, unspecified gastrointestinal hemorrhage type  K92.2 578.9   9. Fecal occult blood test positive  R19.5 792.1   10. Impaired functional mobility, balance, gait, and endurance  Z74.09 V49.89   11. Iron deficiency anemia due to chronic blood loss  D50.0 280.0     Patient Active Problem List   Diagnosis   • Gastroesophageal reflux disease   • Chronic obstructive pulmonary disease with acute exacerbation (CMS/[facility])   • Essential hypertension   • Seizure disorder (CMS/[facility])   • Paroxysmal atrial fibrillation (CMS/[facility])   • Acute urinary retention   • Fecal occult blood test positive   • Dependence on supplemental oxygen   • Tobacco abuse   • PAF (paroxysmal atrial fibrillation) (CMS/[facility])   • Acute respiratory failure with hypercapnia (CMS/[facility])   • Seizure disorder (CMS/[facility])   • CAD (coronary artery disease)   • Severe malnutrition (CMS/[facility])   • Anemia   • Pneumonia   • Fall   • COPD with acute exacerbation (CMS/[facility])   • Humerus fracture   • Chronic respiratory failure with hypoxia (CMS/[facility])   • Moderate malnutrition (CMS/[facility])   • Hypoglycemia   • Elevated troponin   • Metabolic encephalopathy   • 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 (CMS/[facility])   • Hypomagnesemia   • GI bleed   • Acute upper GI bleed   • Acute-on-chronic respiratory failure (CMS/[facility])   • Underweight   • Acute on chronic respiratory failure with  hypercapnia (CMS/[facility])   • Acute on chronic respiratory failure (CMS/[facility])   • Gastrointestinal hemorrhage   • Iron deficiency anemia due to chronic blood loss     Past Medical History:   Diagnosis Date   • Aneurysm (CMS/HCC)    • Angina pectoris (CMS/HCC) 6/20/2016   • Anxiety 6/20/2016   • Arthritis 6/20/2016   • CAD (coronary artery disease)    • Carotid artery stenosis    • CHF (congestive heart failure) (CMS/[facility])    • Chronic coronary artery disease 6/20/2016 2003 CABG LIMA to LAD, VG to OM 2004 VG to OM occluded. LIMA patent Cx intervention and RCA 2008 stent for ISR 2013 ISR and stenting of CX and RCA 2016 normal MPS   • Chronic left-sided low back pain with left-sided sciatica 2/1/2017   • Chronic obstructive pulmonary disease (CMS/HCC) 6/20/2016   • Chronic respiratory failure with hypoxia (CMS/[facility]) 3/27/2021   • Cobalamin deficiency 6/20/2016   • Congestive heart failure (CMS/[facility]) 6/20/2016   • COPD (chronic obstructive pulmonary disease) (CMS/[facility])    • Depression 7/3/2018   • Diverticulosis    • Diverticulosis of large intestine without hemorrhage 5/5/2017   • GERD (gastroesophageal reflux disease)    • GIB (gastrointestinal bleeding)     recurrent    • HTN (hypertension)    • Hypercholesterolemia 6/20/2016   • Hyperlipidemia    • Mesenteric ischemia (CMS/[facility]) 2006    s/p resection    • Mood disorder (CMS/[facility])    • Oxygen dependent     3 liters @ all times.    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 (CMS/[facility]) 5/23/2021   • PVD (peripheral vascular disease) (CMS/[facility])    • Restless legs syndrome 6/20/2016   • Seizure disorder (CMS/HCC) 6/20/2016   • Seizures (CMS/[facility])    • Stenosis of carotid artery 6/20/2016   • Vertigo 9/28/2016     Past Surgical History:   Procedure Laterality Date   • APPENDECTOMY     • CHOLECYSTECTOMY     • COLOSTOMY  2006     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   • ENDOSCOPY N/A 6/16/2021    Procedure: ESOPHAGOGASTRODUODENOSCOPY;  Surgeon: Jean Fuentes MD;  Location: [facility] ENDOSCOPY;  Service: Gastroenterology;  Laterality: N/A;   • EXPLORATORY LAPAROTOMY      sec to ovarian cysts   • HYSTERECTOMY     • ILIAC ARTERY STENT     • REVISION / TAKEDOWN COLOSTOMY  2008     General Information     Row Name 06/18/21 1141          Physical Therapy Time and Intention    Document Type  therapy note (daily note)  -KM     Mode of Treatment  physical therapy  -KM     Row Name 06/18/21 1141          General Information    Patient Profile Reviewed  yes  -KM     Existing Precautions/Restrictions  fall;seizures;oxygen therapy device and L/min  -KM     Row Name 06/18/21 1141          Cognition    Orientation Status (Cognition)  oriented x 4  -KM     Row Name 06/18/21 1141          Safety Issues, Functional Mobility    Safety Issues Affecting Function (Mobility)  impulsivity;safety precautions follow-through/compliance;safety precaution awareness  -KM     Impairments Affecting Function (Mobility)  endurance/activity tolerance;strength;balance  -KM       User Key  (r) = Recorded By, (t) = Taken By, (c) = Cosigned By    Initials Name Provider Type    KM Sonali Bronson D, PT Physical Therapist        Mobility     Row Name 06/18/21 1142          Bed Mobility    Comment (Bed Mobility)  UIC  -KM     Row Name 06/18/21 1142          Sit-Stand Transfer    Sit-Stand [place] (Transfers)  standby assist;set up  -KM     Row Name 06/18/21 1142          Gait/Stairs (Locomotion)    [place] Level (Gait)  contact guard assist for O2  -KM     Assistive Device (Gait)  walker, front-wheeled  -KM     Distance in Feet (Gait)  60  -KM     Deviations/Abnormal Patterns (Gait)  gait speed decreased;bilateral deviations;stride length decreased  -KM     Comment (Gait/Stairs)  pt c/o dyspnea and wished to return to room to use inhaler during gait activity  -KM       User  Key  (r) = Recorded By, (t) = Taken By, (c) = Cosigned By    Initials Name Provider Type    Sonali Garcia PT Physical Therapist        Obj/Interventions     Row Name 06/18/21 1144          Motor Skills    Therapeutic Exercise  knee;ankle  -KM     Row Name 06/18/21 1144          Knee (Therapeutic Exercise)    Knee (Therapeutic Exercise)  AROM (active range of motion)  -KM     Knee AROM (Therapeutic Exercise)  bilateral;LAQ (long arc quad);10 repetitions  -KM     Row Name 06/18/21 1144          Ankle (Therapeutic Exercise)    Ankle (Therapeutic Exercise)  AROM (active range of motion)  -KM     Ankle AROM (Therapeutic Exercise)  bilateral;dorsiflexion;plantarflexion;10 repetitions  -KM     Row Name 06/18/21 1144          Balance    Static Sitting Balance  WNL;sitting in chair  -KM     Dynamic Sitting Balance  WNL;sitting in chair  -KM     Static Standing Balance  WFL;unsupported  -KM     Dynamic Standing Balance  mild impairment;supported  -KM     Balance Interventions  sit to stand;occupation based/functional task;weight shifting activity  -KM       User Key  (r) = Recorded By, (t) = Taken By, (c) = Cosigned By    Initials Name Provider Type    Sonali Garcia, PT Physical Therapist        Goals/Plan    No documentation.       Clinical Impression     Row Name 06/18/21 1145          Pain Scale: Numbers Pre/Post-Treatment    Pretreatment Pain Rating  0/10 - no pain  -KM     Posttreatment Pain Rating  0/10 - no pain  -KM     Row Name 06/18/21 1145          Plan of Care Review    Plan of Care Reviewed With  patient  -KM     Outcome Summary  Patient transferred with standby assist and ambul 60 ft with r wx and c.g.assist. Distance limited by fatigue and dyspnea, O2 sats on supplemental O2 >90% during rx.Recommend pulmonary rehab/HHPT  -KM     Row Name 06/18/21 1145          Vital Signs    Pre SpO2 (%)  97  -KM     O2 Delivery Pre Treatment  supplemental O2  -KM     Intra SpO2 (%)  95  -KM     O2  Delivery Intra Treatment  supplemental O2  -KM     Post SpO2 (%)  98  -KM     O2 Delivery Post Treatment  supplemental O2  -KM     Row Name 06/18/21 1145          Positioning and Restraints    Pre-Treatment Position  sitting in chair/recliner  -KM     Post Treatment Position  chair  -KM     In Chair  reclined;call light within reach;encouraged to call for assist;exit alarm on  -KM       User Key  (r) = Recorded By, (t) = Taken By, (c) = Cosigned By    Initials Name Provider Type    Sonali Garcia, PT Physical Therapist        Outcome Measures     Row Name 06/18/21 1149 06/18/21 0756       How much help from another person do you currently need...    Turning from your back to your side while in flat bed without using bedrails?  4  -KM  4  -PS    Moving from lying on back to sitting on the side of a flat bed without bedrails?  4  -KM  4  -PS    Moving to and from a bed to a chair (including a wheelchair)?  3  -KM  3  -PS    Standing up from a chair using your arms (e.g., wheelchair, bedside chair)?  4  -KM  3  -PS    Climbing 3-5 steps with a railing?  3  -KM  3  -PS    To walk in hospital room?  3  -KM  3  -PS    AM-PAC 6 Clicks Score (PT)  21  -KM  20  -PS    Row Name 06/18/21 1149          Functional Assessment    Outcome Measure Options  AM-PAC 6 Clicks Basic Mobility (PT)  -KM       User Key  (r) = Recorded By, (t) = Taken By, (c) = Cosigned By    Initials Name Provider Type    Sonali Garcia, PT Physical Therapist    PS Patria Mccoy, RN Registered Nurse        Physical Therapy Education                 Title: PT OT SLP Therapies (In Progress)     Topic: Physical Therapy (Done)     Point: Mobility training (Done)     Learning Progress Summary           Patient Eager, E, VU by MARY JANE at 6/18/2021 1149    Acceptance, TB, NR by PS at 6/17/2021 1304    Acceptance, E, VU,NR by MARBIN at 6/16/2021 0743                   Point: Home exercise program (Done)     Learning Progress Summary            Patient Eager, E, VU by KM at 6/18/2021 1149    Acceptance, TB, NR by PS at 6/17/2021 1304                   Point: Body mechanics (Done)     Learning Progress Summary           Patient Eager, E, VU by KM at 6/18/2021 1149    Acceptance, TB, NR by PS at 6/17/2021 1304                   Point: Precautions (Done)     Learning Progress Summary           Patient Eager, E, VU by  at 6/18/2021 1149    Acceptance, TB, NR by PS at 6/17/2021 1304                               User Key     Initials Effective Dates Name Provider Type Discipline     06/16/21 -  Sonali Bronson, PT Physical Therapist PT    LS 06/16/21 -  Mary Ann Muniz, KEVIN Physical Therapist PT    PS 06/16/21 -  Patria Mccoy, RN Registered Nurse Nurse              PT Recommendation and Plan     Plan of Care Reviewed With: patient  Outcome Summary: Patient transferred with standby assist and ambul 60 ft with r wx and c.g.assist. Distance limited by fatigue and dyspnea, O2 sats on supplemental O2 >90% during rx.Recommend pulmonary rehab/HHPT     Time Calculation:   PT Charges     Row Name 06/18/21 1150             Time Calculation    Start Time  1115  -KM      PT Received On  06/18/21  -KM      PT Goal Re-Cert Due Date  06/26/21  -KM         Time Calculation- PT    Total Timed Code Minutes- PT  23 minute(s)  -KM         Timed Charges    04524 - PT Therapeutic Exercise Minutes  10  -KM      09305 - Gait Training Minutes   13  -KM         Total Minutes    Timed Charges Total Minutes  23  -KM       Total Minutes  23  -KM        User Key  (r) = Recorded By, (t) = Taken By, (c) = Cosigned By    Initials Name Provider Type     Sonali Bronson, PT Physical Therapist        Therapy Charges for Today     Code Description Service Date Service Provider Modifiers Qty    20441419192 HC PT THER PROC EA 15 MIN 6/18/2021 Sonali Bronson, PT GP 1    96478603073 HC GAIT TRAINING EA 15 MIN 6/18/2021 Sonali Bronson, PT GP 1          PT  G-Codes  Outcome Measure Options: AM-PAC 6 Clicks Basic Mobility (PT)  AM-PAC 6 Clicks Score (PT): 21  AM-PAC 6 Clicks Score (OT): 23    Sonali Bronson, PT  6/18/2021

## 2021-06-18 NOTE — TELEPHONE ENCOUNTER
----- Message from Jean Fuentes MD sent at 6/17/2021  5:51 PM EDT -----  Please let An know she does not have h. Pylori nor celiac disease

## 2021-06-19 NOTE — PLAN OF CARE
Goal Outcome Evaluation:  Plan of Care Reviewed With: patient, daughter           Outcome Summary: VSS. Patient able to have colonoscopy with no acute findings. Regualr diet ordered. Worley removed. Daughter at bedside during shift.

## 2021-06-19 NOTE — PLAN OF CARE
Goal Outcome Evaluation:  Plan of Care Reviewed With: patient           Outcome Summary: Patient much more arousable early in shift. Oriented and pleasant. Levophed weaned off early in night. BiPAP throughout the night at 40%. Worley anchored, UOP 400ml cloudy yellow with sediment. Afebrile. 3 BMs. Mg and K replaced per protocol. Daughter updated via phone.

## 2021-06-19 NOTE — POST-PROCEDURE NOTE
Colonoscopy to ileocolonic anastomosis at 20 cm was normal.    If recurrent anemia consider PillCam

## 2021-06-19 NOTE — CONSULTS
"GI Daily Progress Note  Subjective:    Chief Complaint:  Symptomatic anemia    Plans for colonoscopy this a.m. were canceled due to hypotension and rapid response called yesterday.  She was transferred to the unit after a syncopal episode.  Responded to fluid bolus.  Currently sitting up in bed, alert.  Feeling much better.  Appetite is good but n.p.o.  Wants to proceed with colonoscopy today or \" will not do it at all\".  Denies nausea, abdominal pain.  Having liquid stools after partial prep.    Objective:    /63 (BP Location: Left arm, Patient Position: Lying)   Pulse 69   Temp 98.2 °F (36.8 °C) (Bladder)   Resp 16   Ht 149.9 cm (59\")   Wt 37.6 kg (82 lb 12.8 oz)   SpO2 100%   BMI 16.72 kg/m²     Physical Exam  Vitals reviewed.   Constitutional:       General: She is not in acute distress.     Appearance: She is well-developed.   Pulmonary:      Effort: Pulmonary effort is normal. No accessory muscle usage or respiratory distress.   Abdominal:      General: A surgical scar is present. Bowel sounds are normal. There is no distension.      Palpations: Abdomen is soft.      Tenderness: There is no abdominal tenderness.   Skin:     Coloration: Skin is not pale.      Findings: No erythema.   Neurological:      Mental Status: She is alert and oriented to person, place, and time.   Psychiatric:         Speech: Speech normal.         Behavior: Behavior normal.         Thought Content: Thought content normal.         Judgment: Judgment normal.         Lab  Lab Results   Component Value Date    WBC 9.10 06/19/2021    HGB 9.5 (L) 06/19/2021    HGB 11.3 (L) 06/18/2021    HGB 9.1 (L) 06/18/2021    MCV 95.6 06/19/2021     06/19/2021    INR 1.02 06/15/2021    INR 1.00 02/18/2021       Lab Results   Component Value Date    GLUCOSE 111 (H) 06/19/2021    BUN 6 (L) 06/19/2021    CREATININE 0.61 06/19/2021    EGFRIFNONA 96 06/19/2021    BCR 9.8 06/19/2021     06/19/2021    K 5.4 (H) 06/19/2021    CO2 26.0 " 06/19/2021    CALCIUM 7.8 (L) 06/19/2021    ALBUMIN 3.50 06/14/2021    ALKPHOS 103 06/14/2021    BILITOT <0.2 06/14/2021    ALT 19 06/14/2021    AST 22 06/14/2021       Assessment:      Idiopathic hypotension    Gastroesophageal reflux disease    Essential hypertension    Seizure disorder (CMS/HCC)    Paroxysmal atrial fibrillation (CMS/HCC)    Tobacco abuse    CAD (coronary artery disease)    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

## 2021-06-19 NOTE — PROGRESS NOTES
Intensive Care Follow-up      LOS: 4 days     Ms. An Abreu, 71 y.o. female is followed for: Idiopathic hypotension     Subjective - Interval History     An Abreu is a 71 y.o. female with PMH Chronic Hypoxic Respiratory Failure on 2-3L Home O2, COPD, Ongoing Tobacco Use, Pulmonary Cachexia, Anemia due to LORI/Chronic blood loss, HTN, Seizure DO, PAF (not on anticoagulation due to frequent falls, anemia and GIB), CAD and GERD who was admitted to the Hospitalist service 6/14 after a fall with confusion.  She had c/o progressive weakness with black colored stools.  She also had c/o worsening shorntess of air, CARMONA and worsening wheezing.  In the ED, she was placed on bipap for respiratory distress.  CXR was unremarkable.  She was treated for COPD Exacerbation with nebs and steroids.  CTA abd/pelvis revealed no evidence of hemorrhage, but moderate colonic stool burden and marked distention of her urinary bladder concerning for obstruction.  CTH and CT Cspine were unremarkable.  Initial Hgb was 7.3 with last being 9.9 on 5/23.  She received 1 unit PRBC.  H/H was been stable since then.  She underwent EGD by Dr. Fuentes that revealed small hiatal hernia, gastritis and no gross lesions in the entire examined duodenum. She lost IV access has been unable to have colonoscopy by Dr. Simon.  She had a PICC placed today with colonoscopy planned for tomorrow.  She had an RRT on the floor today due to vasovagal episode while on BSC having BM.  Her BP after returning to bed was 38/22.  She was moved to the ICU for closer monitoring.  Serial H/H has been stable at 9/30.       She has had multiple admissions over the last year for UTIs, COPD Exacerbations and A/C Respiratory failure.  Her most recent hospitalization was 5/22 - 5/25/21 for sepsis due to pneumonia.       On arrival to the ICU the patient is on BiPAP. She is lethargic and hypotensive. Received 1 L of IV fluids on the floor. Currently receiving second  liter. Currently on norepinephrine at 0.04 mcg/kg/min. She will wake up and answer questions appropriately though, difficulty obtaining pulse in the left lower extremity although foot is warm to the touch.    Interval history    Patient used BiPAP last night, currently oxygenating adequately on nasal cannula oxygen  Has just been weaned off vasopressor support  Undergoing colonoscopy prep and n.p.o.  Lethargic but more awake this morning    The patient's relevant past medical, surgical and social history were reviewed and updated in Epic as appropriate.     Objective     Infusions:  lactated ringers, 75 mL/hr, Last Rate: 75 mL/hr (06/18/21 2134)  norepinephrine, 0.02-0.3 mcg/kg/min, Last Rate: Stopped (06/18/21 2150)      Medications:  acetaminophen, 500 mg, Oral, TID  cetirizine, 5 mg, Oral, Daily  citalopram, 20 mg, Oral, Daily  cyanocobalamin, 1,000 mcg, Intramuscular, Q28 Days  ferric gluconate, 250 mg, Intravenous, Daily  pantoprazole, 40 mg, Intravenous, Q12H  PHENobarbital, 145.8 mg, Oral, Daily  piperacillin-tazobactam, 3.375 g, Intravenous, Q8H  rosuvastatin, 40 mg, Oral, Nightly  sodium chloride, 10 mL, Intravenous, Q12H      Intake/Output       06/18/21 0700 - 06/19/21 0659    Intake (ml) 2442.9    Output (ml) 2650    Net (ml) -207.1        Vital Sign Min/Max for last 24 hours  Temp  Min: 92.4 °F (33.6 °C)  Max: 99.1 °F (37.3 °C)   BP  Min: 43/22  Max: 186/78   Pulse  Min: 57  Max: 84   Resp  Min: 14  Max: 25   SpO2  Min: 51 %  Max: 100 %   Flow (L/min)  Min: 2  Max: 6        Physical Exam:   GENERAL: Awake, no distress   HEENT: No adenopathy or thyromegaly   LUNGS: Decreased breath sounds with scattered crackles and wheezes   HEART: Regular rate and rhythm without murmurs   GI: Soft, distended.  Bowel sounds present.  Nontender   EXTREMITIES: No clubbing, cyanosis, or edema   NEURO/PSYCH: Lethargic but arousable.  Follows commands.  Moves all 4 symmetrically.  Diffusely weak, however    Results from last  7 days   Lab Units 06/19/21  0603 06/18/21  1746 06/18/21  0944   WBC 10*3/mm3 9.10 5.67 7.04   HEMOGLOBIN g/dL 9.5* 11.3* 9.1*   PLATELETS 10*3/mm3 180 225 208     Results from last 7 days   Lab Units 06/19/21  0603 06/18/21  1746 06/18/21  0944   SODIUM mmol/L 138 140 138   POTASSIUM mmol/L 5.4* 3.1* 3.7   CO2 mmol/L 26.0 26.0 29.0   BUN mg/dL 6* 8 7*   CREATININE mg/dL 0.61 0.52* 0.56*   MAGNESIUM mg/dL 2.7* 1.6 1.5*   PHOSPHORUS mg/dL 2.9  --   --    GLUCOSE mg/dL 111* 76 141*     Estimated Creatinine Clearance: 37.2 mL/min (by C-G formula based on SCr of 0.61 mg/dL).          Results from last 7 days   Lab Units 06/15/21  0345   PH, ARTERIAL pH units 7.335*   PCO2, ARTERIAL mm Hg 63.8*   PO2 ART mm Hg 73.7*     Lab Results   Component Value Date    LACTATE 1.2 06/18/2021          Images: Chest x-ray revealed no effusions, infiltrates, or consolidation    I reviewed the patient's results and images.     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  Plans for colonoscopy today  Follow-up cultures, and if remain negative, discontinue antibiotics as currently no evidence of sepsis  Mobilize after colonoscopy   Plan of care and goals reviewed with Multidisciplinary Team  and Antibiotic Stewardship rounds   I discussed the patient's findings and my recommendations with patient and nursing staff      High level of risk due to:  illness with threat to life or bodily function and abrupt change in neurological status.     GURVINDER Saleh MD  Pulmonary and Critical Care Medicine

## 2021-06-20 NOTE — PLAN OF CARE
Goal Outcome Evaluation:  Plan of Care Reviewed With: patient  Outcome Summary: No acute events overnight. On home O2 4LNC. Up to bedside commode with x1 assist. Afebrile. Vital signs stable.

## 2021-06-20 NOTE — PLAN OF CARE
Pt transferred to  late this afternoon. A&O, NSR, 3LNC. Up to chair, ambulating in room and to bathroom. Shortness of breath with exertion. Poor appetite. Daughter at bedside. No s/sx distress, will monitor.

## 2021-06-20 NOTE — PROGRESS NOTES
"GI Daily Progress Note  Subjective     An Abreu is a 71 y.o. female who was admitted with Idiopathic hypotension.     Feels better.  Sitting up in chair.  No abdominal pain.  She had her third iron infusion today    Chief Complaint: Anemia    Objective     /55   Pulse 80   Temp 97.8 °F (36.6 °C) (Axillary)   Resp 18   Ht 149.9 cm (59\")   Wt 37.6 kg (82 lb 12.8 oz)   SpO2 98%   BMI 16.72 kg/m²     Intake/Output last 3 shifts:  I/O last 3 completed shifts:  In: 4893.5 [P.O.:600; I.V.:4017.2; IV Piggyback:276.3]  Out: 2100 [Urine:2100]  Intake/Output this shift:  I/O this shift:  In: 600 [P.O.:600]  Out: -       Physical Exam  Wt Readings from Last 3 Encounters:   06/18/21 37.6 kg (82 lb 12.8 oz)   06/03/21 37.6 kg (83 lb)   05/25/21 39.1 kg (86 lb 3.2 oz)   ,body mass index is 16.72 kg/m².,@FLOWAMB(6)@,@FLOWAMB(5)@,@FLOWAMB(8)@   CONSTITUTIONAL: Sitting in chair   resp CTA; no rhonchi, rales, or wheezes.  Respiration effort normal  CV RRR; no M/R/G. No lower extremity edema  GI Abd soft, NT, ND, normal active bowel sounds.    Psych:     DATA:Results for AN ABREU (MRN [id number]) as of 6/20/2021 14:42   Ref. Range 6/16/2021 05:01 6/16/2021 05:01 6/18/2021 09:44 6/18/2021 17:46 6/19/2021 06:03   Hemoglobin Latest Ref Range: 12.0 - 15.9 g/dL 9.4 (L) 9.4 (L) 9.1 (L) 11.3 (L) 9.5 (L)       Assessment/Plan     Iron deficiency anemia  Suspect  B12 deficiency awaiting methylmalonic acid  Status post subtotal colectomy for mesenteric ischemia   Severe malnutrition  COPD      No sign of active GI bleeding.  Suspect her anemia is a combination of iron deficiency and B12 deficiency.  She currently is getting her last dose of iron infusion today.  She should have received her first B12 shot today.    Will await results of MMA and if negative B12 deficiency will need to institute her on B12 replacement therapy.    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

## 2021-06-20 NOTE — PROGRESS NOTES
Intensive Care Follow-up      LOS: 5 days     Ms. An Abreu, 71 y.o. female is followed for: Idiopathic hypotension     Subjective - Interval History     An Abreu is a 71 y.o. female with PMH Chronic Hypoxic Respiratory Failure on 2-3L Home O2, COPD, Ongoing Tobacco Use, Pulmonary Cachexia, Anemia due to LORI/Chronic blood loss, HTN, Seizure DO, PAF (not on anticoagulation due to frequent falls, anemia and GIB), CAD and GERD who was admitted to the Hospitalist service 6/14 after a fall with confusion.  She had c/o progressive weakness with black colored stools.  She also had c/o worsening shorntess of air, CARMONA and worsening wheezing.  In the ED, she was placed on bipap for respiratory distress.  CXR was unremarkable.  She was treated for COPD Exacerbation with nebs and steroids.  CTA abd/pelvis revealed no evidence of hemorrhage, but moderate colonic stool burden and marked distention of her urinary bladder concerning for obstruction.  CTH and CT Cspine were unremarkable.  Initial Hgb was 7.3 with last being 9.9 on 5/23.  She received 1 unit PRBC.  H/H was been stable since then.  She underwent EGD by Dr. Fuentes that revealed small hiatal hernia, gastritis and no gross lesions in the entire examined duodenum. She lost IV access has been unable to have colonoscopy by Dr. Simon.  She had a PICC placed today with colonoscopy planned for tomorrow.  She had an RRT on the floor today due to vasovagal episode while on BSC having BM.  Her BP after returning to bed was 38/22.  She was moved to the ICU for closer monitoring.  Serial H/H has been stable at 9/30.       She has had multiple admissions over the last year for UTIs, COPD Exacerbations and A/C Respiratory failure.  Her most recent hospitalization was 5/22 - 5/25/21 for sepsis due to pneumonia.       On arrival to the ICU the patient is on BiPAP. She is lethargic and hypotensive. Received 1 L of IV fluids on the floor. Currently receiving second  liter. Currently on norepinephrine at 0.04 mcg/kg/min. She will wake up and answer questions appropriately though, difficulty obtaining pulse in the left lower extremity although foot is warm to the touch.     Interval history    She has been off of vasopressor support for over 24 hours  On no continuous infusions  Oxygenating adequately on 2 L/min via nasal cannula  Awake and alert    The patient's relevant past medical, surgical and social history were reviewed and updated in Epic as appropriate.     Objective     Infusions:     Medications:  acetaminophen, 500 mg, Oral, TID  cetirizine, 5 mg, Oral, Daily  citalopram, 20 mg, Oral, Daily  cyanocobalamin, 1,000 mcg, Intramuscular, Q28 Days  ferric gluconate, 250 mg, Intravenous, Daily  pantoprazole, 40 mg, Intravenous, Q12H  PHENobarbital, 145.8 mg, Oral, Daily  rosuvastatin, 40 mg, Oral, Nightly  sodium chloride, 10 mL, Intravenous, Q12H      Intake/Output       06/19/21 0700 - 06/20/21 0659 06/20/21 0700 - 06/21/21 0659    Intake (ml) 2580.6 240    Output (ml) 1700 --    Net (ml) 880.6 240        Vital Sign Min/Max for last 24 hours  Temp  Min: 97 °F (36.1 °C)  Max: 98.1 °F (36.7 °C)   BP  Min: 104/79  Max: 186/78   Pulse  Min: 62  Max: 95   Resp  Min: 15  Max: 20   SpO2  Min: 74 %  Max: 100 %   Flow (L/min)  Min: 2  Max: 6        Physical Exam:   GENERAL: Awake, no distress   HEENT: No adenopathy or thyromegaly   LUNGS: Decreased breath sounds without wheezes or rhonchi   HEART: Regular rate and rhythm without murmurs   GI: Soft, nontender   EXTREMITIES: No clubbing, cyanosis, or edema   NEURO/PSYCH: Awake.  Alert.  Follows commands.  No deficits    Results from last 7 days   Lab Units 06/19/21  0603 06/18/21  1746 06/18/21  0944   WBC 10*3/mm3 9.10 5.67 7.04   HEMOGLOBIN g/dL 9.5* 11.3* 9.1*   PLATELETS 10*3/mm3 180 225 208     Results from last 7 days   Lab Units 06/19/21  0603 06/18/21  1746 06/18/21  0944   SODIUM mmol/L 138 140 138   POTASSIUM mmol/L 5.4*  3.1* 3.7   CO2 mmol/L 26.0 26.0 29.0   BUN mg/dL 6* 8 7*   CREATININE mg/dL 0.61 0.52* 0.56*   MAGNESIUM mg/dL 2.7* 1.6 1.5*   PHOSPHORUS mg/dL 2.9  --   --    GLUCOSE mg/dL 111* 76 141*     Estimated Creatinine Clearance: 37.2 mL/min (by C-G formula based on SCr of 0.61 mg/dL).          Results from last 7 days   Lab Units 06/15/21  0345   PH, ARTERIAL pH units 7.335*   PCO2, ARTERIAL mm Hg 63.8*   PO2 ART mm Hg 73.7*     Lab Results   Component Value Date    LACTATE 1.2 06/18/2021          Images: Previous chest x-ray revealed no consolidation or effusions    I reviewed the patient's results and images.     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  Currently stable for transfer back to the floor.  We will continue to monitor on telemetry  Continue current medications but discontinue antibiotics as no evidence of a systemic infection   Plan of care and goals reviewed with Multidisciplinary Team and Antibiotic Stewardship rounds   I discussed the patient's findings and my recommendations with patient and nursing staff      .     GURVINDER Saleh MD  Pulmonary and Critical Care Medicine

## 2021-06-21 NOTE — PLAN OF CARE
Pt more short of breath and dyspneic today, duonebs ordered with some relief. A&O, NSR tachy at times, 3LNC. Up to chair, very short of breath and tripoding with exertion. Good UOP and BM today. Little PO intake. C/o allergy congestion, PRN benadryl given with some relief. BP elevated, received PRN hydralazine with improvement. Will continue to monitor.

## 2021-06-21 NOTE — PLAN OF CARE
Goal Outcome Evaluation:  Plan of Care Reviewed With: patient     Progress: improving  Outcome Summary: Pt denied pain when asked, stated she always has some pain, but not uncomfortable at this time. Observabe increase in work of breathing while talking; pt endorsed feeling SOA at rest, worse with any exertion at all. Discussed w/ pt use of opioid medication for air hunger; willing to try taking her pain medication for dyspnea also. Due to continued full code status and labile BP, Dr. Pena hesitant to schedule opioid at this time; added dyspnea as indication for HCD/APAP currently on MAR. Monitor effect of opioid on pt's feeling of air hunger.    1300 Palliative Team Conference: ERIC Jimenez RN, PN; GURVINDER Soto, APRN; ALDA Cruz, [facility], [facility]; FRANNY Pena, ; JJ Anderson, RN, PN; CARLOS Medina MDiv, [facility]    Problem: Palliative Care  Goal: Enhanced Quality of Life  Outcome: Ongoing, Progressing  Intervention: Maximize Comfort  Flowsheets (Taken 6/21/2021 1433)  Pain Management Interventions: (add dyspnea as indication for prn opioid) other (see comments)  Intervention: Optimize Function  Flowsheets (Taken 6/21/2021 1433)  Fatigue Management:   paced activity encouraged   frequent rest breaks encouraged  Sleep/Rest Enhancement: natural light exposure provided

## 2021-06-21 NOTE — PROGRESS NOTES
NN spoke with pt at BS.  Pt alert and able to answer questions appropriately.  Pt O2 sat  98% on  3 L currently, home O2 use 3-4 L.  Deep breathing exercises encouraged.  COPD action plan reviewed.  Patient is not agreeable to inpatient rehab at this time, but states that she will go at some point.  NN will schedule her to be seen in pulmonary clinic as she now states that she will be able to have transportation for appointments.  No new concerns or questions voiced at this time.  NN will continue to follow as needed.        Per current G[employer] Standards, please consider: No ICS in place, NRT at DC, Patient may be out of rescue medication, Outpatient PFT, Pulmonary rehab as appropriate

## 2021-06-21 NOTE — PLAN OF CARE
Goal Outcome Evaluation:           Progress: improving  Outcome Summary: VSS on 3LNC. A/Ox4. Patient rested throughout night. No acute events this shift. Will continue POC.

## 2021-06-21 NOTE — CASE MANAGEMENT/SOCIAL WORK
Continued Stay Note  [facility]     Patient Name: An Abreu  MRN: [id number]  Today's Date: 6/21/2021    Admit Date: 6/14/2021    Discharge Plan     Row Name 06/21/21 1100       Plan    Plan  Home with [facility]    Patient/Family in Agreement with Plan  yes    Plan Comments  Spoke with patient at bedside. Plan is home with [facility]. Referral has been made. Niyanet is on 3L NC at home. CM will continue to follow.    Final Discharge Disposition Code  06 - home with home health care        Discharge Codes    No documentation.       Expected Discharge Date and Time     Expected Discharge Date Expected Discharge Time    Jun 21, 2021             Quinton Hall RN

## 2021-06-21 NOTE — PROGRESS NOTES
Palliative Care Progress Note    Date of Admission: 6/14/2021    Subjective: Patient with no complaints at present.  Current Code Status     Date Active Code Status Order ID Comments User Context       6/15/2021 0403 CPR [id number]  Kyra Salinas PA-C ED     Advance Care Planning Activity      Questions for Current Code Status     Question Answer Comment    Code Status CPR     Medical Interventions (Level of Support Prior to Arrest) Full         No current facility-administered medications on file prior to encounter.     Current Outpatient Medications on File Prior to Encounter   Medication Sig Dispense Refill   • albuterol (PROVENTIL) (2.5 MG/3ML) 0.083% nebulizer solution Take 2.5 mg by nebulization Every 4 (Four) Hours As Needed for Wheezing. 3 mL 12   • albuterol sulfate  (90 Base) MCG/ACT inhaler Inhale 2 puffs Every 4 (Four) Hours As Needed for Wheezing or Shortness of Air. 18 g 2   • Anoro Ellipta 62.5-25 MCG/INH aerosol powder  inhaler Inhale 1 puff Every Morning.     • aspirin 81 MG tablet Take 81 mg by mouth Daily.     • benzonatate (Tessalon Perles) 100 MG capsule Take 1 capsule by mouth 3 (Three) Times a Day As Needed for Cough. 30 capsule 0   • cetirizine (zyrTEC) 10 MG tablet Take 0.5 tablets by mouth Daily. 30 tablet 10   • citalopram (CeleXA) 20 MG tablet Take 1 tablet by mouth Daily. Needs appointment for next refill. 30 tablet 2   • clopidogrel (PLAVIX) 75 MG tablet Take 1 tablet by mouth Daily. 30 tablet 5   • dilTIAZem CD (CARDIZEM CD) 240 MG 24 hr capsule Take 240 mg by mouth Daily.     • esomeprazole (nexIUM) 40 MG capsule Take 40 mg by mouth Every Morning Before Breakfast.     • furosemide (LASIX) 20 MG tablet Take 20 mg by mouth Daily.     • HYDROcodone-acetaminophen (NORCO)  MG per tablet Take 1 tablet by mouth Every 6 (Six) Hours As Needed for Moderate Pain .     • ipratropium-albuterol (DUO-NEB) 0.5-2.5 mg/3 ml nebulizer Take 3 mL by nebulization Every 4 (Four) Hours As  "Needed for Wheezing. 360 mL 5   • lisinopril (PRINIVIL,ZESTRIL) 40 MG tablet Take 40 mg by mouth Daily.     • LORazepam (ATIVAN) 1 MG tablet TAKE 1 TABLET BY MOUTH TWICE DAILY. MUST LAST 30 DAYS. 60 tablet 2   • ondansetron (ZOFRAN) 4 MG tablet Take 1 tablet by mouth Every 6 (Six) Hours As Needed for Nausea or Vomiting. 20 tablet 2   • PHENobarbital (LUMINAL) 100 MG tablet TAKE 1 AND 1/2 TABLETS BY MOUTH EVERY DAY 45 tablet 3   • promethazine (PHENERGAN) 12.5 MG tablet      • rosuvastatin (CRESTOR) 40 MG tablet Take 1 tablet by mouth Daily. (Patient taking differently: Take 40 mg by mouth Every Night.) 30 tablet 2   • ferrous sulfate 325 (65 FE) MG tablet Take 325 mg by mouth Daily With Breakfast.     • guaiFENesin (MUCINEX) 600 MG 12 hr tablet Take 1 tablet by mouth Every 12 (Twelve) Hours. (Patient taking differently: Take 600 mg by mouth Every 12 (Twelve) Hours As Needed for Cough or Congestion.) 60 tablet 0   • nitrofurantoin, macrocrystal-monohydrate, (Macrobid) 100 MG capsule Take 1 capsule by mouth 2 (Two) Times a Day. 10 capsule 0        albuterol sulfate HFA  •  diphenhydrAMINE  •  HYDROcodone-acetaminophen  •  LORazepam  •  magnesium sulfate **OR** magnesium sulfate **OR** magnesium sulfate  •  potassium chloride  •  sodium chloride  •  sodium chloride    Objective: BP (!) 190/82 (BP Location: Left arm, Patient Position: Sitting)   Pulse 91   Temp 98.5 °F (36.9 °C) (Oral)   Resp 18   Ht 149.9 cm (59\")   Wt 41.5 kg (91 lb 9.6 oz)   SpO2 97%   BMI 18.50 kg/m²      Intake/Output Summary (Last 24 hours) at 6/21/2021 1214  Last data filed at 6/21/2021 0811  Gross per 24 hour   Intake 970 ml   Output --   Net 970 ml     Physical Exam:      General Appearance:    Alert, cooperative, in no acute distress   Head:    Normocephalic, without obvious abnormality, atraumatic   Eyes:            Lids and lashes normal, conjunctivae and sclerae normal, no   icterus, no pallor, corneas clear, PERRLA   Ears:    Ears " appear intact with no abnormalities noted   Throat:   No oral lesions, no thrush, oral mucosa moist   Neck:   No adenopathy, supple, trachea midline, no thyromegaly, no     carotid bruit, no JVD   Back:     No kyphosis present, no scoliosis present, no skin lesions,       erythema or scars, no tenderness to percussion or                   palpation,   range of motion normal   Lungs:     Clear to auscultation,respirations regular, even and                   unlabored    Heart:    Regular rhythm and normal rate, normal S1 and S2, no            murmur, no gallop, no rub, no click   Breast Exam:    Deferred   Abdomen:     Normal bowel sounds, no masses, no organomegaly, soft        non-tender, non-distended, no guarding, no rebound                 tenderness   Genitalia:    Deferred   Extremities:   Moves all extremities well, no edema, no cyanosis, no              redness   Pulses:   Pulses palpable and equal bilaterally   Skin:   No bleeding, bruising or rash   Lymph nodes:   No palpable adenopathy   Neurologic:   Cranial nerves 2 - 12 grossly intact, sensation intact, DTR        present and equal bilaterally     Results from last 7 days   Lab Units 06/21/21  0926   WBC 10*3/mm3 6.40   HEMOGLOBIN g/dL 8.1*   HEMATOCRIT % 28.0*   PLATELETS 10*3/mm3 173     Results from last 7 days   Lab Units 06/21/21  0926 06/14/21  2146   SODIUM mmol/L 139 136   POTASSIUM mmol/L 3.6 4.6   CHLORIDE mmol/L 103 98   CO2 mmol/L 30.0* 34.0*   BUN mg/dL 3* 8   CREATININE mg/dL 0.42* 0.79   CALCIUM mg/dL 8.2* 8.7   BILIRUBIN mg/dL  --  <0.2   ALK PHOS U/L  --  103   ALT (SGPT) U/L  --  19   AST (SGOT) U/L  --  22   GLUCOSE mg/dL 92 110*       Impression: GI bleed  COPD  Dyspnea  Debility   GOC  Plan: We will continue patient on current symptom management regimen.  Pt ok for d/c from a palliative stand point        Rajan Pena DO  06/21/21  12:14 EDT

## 2021-06-21 NOTE — PAYOR COMM NOTE
"Ref # [id number]  Princess Saleh RN, BSN  Phone # [phone]  Fax # [phone]  An Abreu (71 y.o. Female)     Date of Birth Social Security Number Address Home Phone MRN    1950  [id number] [id number] [phone] [id number]    Tenriism Marital Status          None        Admission Date Admission Type Admitting Provider Attending Provider Department, Room/Bed    6/14/21 Emergency Lilly Bauer, Lilly Vick DO [facility] 6B, N641/1    Discharge Date Discharge Disposition Discharge Destination                       Attending Provider: Lilly Bauer DO    Allergies: Keflex [Cephalexin], Sulfamethoxazole-trimethoprim, Carbamazepine, Codeine    Isolation: None   Infection: None   Code Status: CPR    Ht: 149.9 cm (59\")   Wt: 41.5 kg (91 lb 9.6 oz)    Admission Cmt: None   Principal Problem: Idiopathic hypotension [I95.0]                 Active Insurance as of 6/14/2021     Primary Coverage     Payor Plan Insurance Group Employer/Plan Group    [employer] [place] MEDICARE REPLACEMENT [facility]      Payor Plan Address Payor Plan Phone Number Payor Plan Fax Number Effective Dates    [address] [address] [phone]  1/31/2018 - None Entered    [facility] [id number]       Subscriber Name Subscriber Birth Date Member ID       AN ABREU [id number]                 Current Facility-Administered Medications   Medication Dose Route Frequency Provider Last Rate Last Admin   • acetaminophen (TYLENOL) tablet 500 mg  500 mg Oral TID Wilfredo Simon MD   500 mg at 06/21/21 0811   • albuterol sulfate HFA (PROVENTIL HFA;VENTOLIN HFA;PROAIR HFA) inhaler 2 puff  2 puff Inhalation Q4H PRN Wilfredo Simon MD   2 puff at 06/20/21 1752   • cetirizine (zyrTEC) tablet 5 mg  5 mg Oral Daily Wilfredo Simon MD   5 mg at 06/21/21 0811   • citalopram (CeleXA) tablet 20 mg  20 mg Oral Daily Wilfredo Simon MD  "  20 mg at 06/21/21 0811   • cyanocobalamin injection 1,000 mcg  1,000 mcg Intramuscular Q28 Days Wilfredo Simon MD   1,000 mcg at 06/19/21 0948   • diphenhydrAMINE (BENADRYL) capsule 25 mg  25 mg Oral Q6H PRN Wilfredo Simon MD   25 mg at 06/21/21 1105   • hydrALAZINE (APRESOLINE) injection 10 mg  10 mg Intravenous Q6H PRN Lilly Bauer DO   10 mg at 06/21/21 1242   • HYDROcodone-acetaminophen (NORCO)  MG per tablet 1 tablet  1 tablet Oral Q4H PRN aRjan Pena, DO       • ipratropium-albuterol (DUO-NEB) nebulizer solution 3 mL  3 mL Nebulization 4x Daily - RT Lilly Bauer DO   3 mL at 06/21/21 1307   • LORazepam (ATIVAN) tablet 1 mg  1 mg Oral BID PRN Wilfredo Simon MD   1 mg at 06/21/21 0811   • Magnesium Sulfate 2 gram Bolus, followed by 8 gram infusion (total Mg dose 10 grams)- Mg less than or equal to 1mg/dL  2 g Intravenous PRN Wilfredo Simon MD        Or   • Magnesium Sulfate 2 gram / 50mL Infusion (GIVE X 3 BAGS TO EQUAL 6GM TOTAL DOSE) - Mg 1.1 - 1.5 mg/dl  2 g Intravenous PRN Wilfredo Simon MD        Or   • Magnesium Sulfate 4 gram infusion- Mg 1.6-1.9 mg/dL  4 g Intravenous PRN Wilfredo Simon MD 25 mL/hr at 06/18/21 2322 4 g at 06/18/21 2322   • pantoprazole (PROTONIX) injection 40 mg  40 mg Intravenous Q12H Wilfredo Simon MD   40 mg at 06/21/21 0811   • PHENobarbital (LUMINAL) tablet 145.8 mg  145.8 mg Oral Daily Wilfredo Simon MD   145.8 mg at 06/21/21 1242   • potassium chloride 20 mEq in 50 mL IVPB  20 mEq Intravenous Q1H PRN Wilfredo Simon MD 50 mL/hr at 06/19/21 0152 20 mEq at 06/19/21 0152   • rosuvastatin (CRESTOR) tablet 40 mg  40 mg Oral Nightly Wilfredo Simon MD   40 mg at 06/20/21 2058   • sodium chloride 0.9 % flush 10 mL  10 mL Intravenous Q12H Wilfredo Simon MD   10 mL at 06/21/21 0812   • sodium chloride 0.9 % flush 10 mL  10 mL Intravenous PRN Wilfredo Simon MD       • sodium chloride 0.9 % flush 20 mL  20 mL Intravenous PRN  Wilfredo Simon MD   20 mL at 06/20/21 2058     Lab Results (last 48 hours)     Procedure Component Value Units Date/Time    Basic Metabolic Panel [[id number]]  (Abnormal) Collected: 06/21/21 0926    Specimen: Blood Updated: 06/21/21 1057     Glucose 92 mg/dL      BUN 3 mg/dL      Creatinine 0.42 mg/dL      Sodium 139 mmol/L      Potassium 3.6 mmol/L      Chloride 103 mmol/L      CO2 30.0 mmol/L      Calcium 8.2 mg/dL      eGFR Non African Amer 148 mL/min/1.73      BUN/Creatinine Ratio 7.1     Anion Gap 6.0 mmol/L     Narrative:      GFR Normal >60  Chronic Kidney Disease <60  Kidney Failure <15      CBC & Differential [[id number]]  (Abnormal) Collected: 06/21/21 0926    Specimen: Blood Updated: 06/21/21 0948    Narrative:      The following orders were created for panel order CBC & Differential.  Procedure                               Abnormality         Status                     ---------                               -----------         ------                     CBC Auto Differential[[id number]]        Abnormal            Final result                 Please view results for these tests on the individual orders.    CBC Auto Differential [[id number]]  (Abnormal) Collected: 06/21/21 0926    Specimen: Blood Updated: 06/21/21 0948     WBC 6.40 10*3/mm3      RBC 2.93 10*6/mm3      Hemoglobin 8.1 g/dL      Hematocrit 28.0 %      MCV 95.6 fL      MCH 27.6 pg      MCHC 28.9 g/dL      RDW 16.5 %      RDW-SD 58.4 fl      MPV 10.0 fL      Platelets 173 10*3/mm3      Neutrophil % 69.6 %      Lymphocyte % 19.1 %      Monocyte % 5.9 %      Eosinophil % 4.4 %      Basophil % 0.2 %      Immature Grans % 0.8 %      Neutrophils, Absolute 4.46 10*3/mm3      Lymphocytes, Absolute 1.22 10*3/mm3      Monocytes, Absolute 0.38 10*3/mm3      Eosinophils, Absolute 0.28 10*3/mm3      Basophils, Absolute 0.01 10*3/mm3      Immature Grans, Absolute 0.05 10*3/mm3      nRBC 0.0 /100 WBC     Blood Culture - Blood, Hand, Digit Left [[id number]]  Collected: 06/18/21 1952    Specimen: Blood from Hand, Digit Left Updated: 06/20/21 2045     Blood Culture No growth at 2 days    Blood Culture - Blood, Hand, Digit Left [[id number]] Collected: 06/18/21 1951    Specimen: Blood from Hand, Digit Left Updated: 06/20/21 2045     Blood Culture No growth at 2 days    POC Glucose Once [[id number]]  (Abnormal) Collected: 06/20/21 0616    Specimen: Blood Updated: 06/20/21 0618     Glucose 135 mg/dL     POC Glucose Once [[id number]]  (Normal) Collected: 06/20/21 0520    Specimen: Blood Updated: 06/20/21 0534     Glucose 70 mg/dL     POC Glucose Once [[id number]]  (Abnormal) Collected: 06/19/21 2352    Specimen: Blood Updated: 06/19/21 2353     Glucose 147 mg/dL           Imaging Results (Last 48 Hours)     ** No results found for the last 48 hours. **        Operative/Procedure Notes (last 48 hours) (Notes from 06/19/21 1456 through 06/21/21 1456)    No notes of this type exist for this encounter.          Physician Progress Notes (last 48 hours) (Notes from 06/19/21 1456 through 06/21/21 1456)      Rajan Pena, DO at 06/21/21 1214          Palliative Care Progress Note    Date of Admission: 6/14/2021    Subjective: Patient with no complaints at present.  Current Code Status     Date Active Code Status Order ID Comments User Context       6/15/2021 0403 CPR [id number]  Kyra Salinas PA-C ED     Advance Care Planning Activity      Questions for Current Code Status     Question Answer Comment    Code Status CPR     Medical Interventions (Level of Support Prior to Arrest) Full         No current facility-administered medications on file prior to encounter.     Current Outpatient Medications on File Prior to Encounter   Medication Sig Dispense Refill   • albuterol (PROVENTIL) (2.5 MG/3ML) 0.083% nebulizer solution Take 2.5 mg by nebulization Every 4 (Four) Hours As Needed for Wheezing. 3 mL 12   • albuterol sulfate  (90 Base) MCG/ACT inhaler Inhale 2 puffs Every 4  (Four) Hours As Needed for Wheezing or Shortness of Air. 18 g 2   • Anoro Ellipta 62.5-25 MCG/INH aerosol powder  inhaler Inhale 1 puff Every Morning.     • aspirin 81 MG tablet Take 81 mg by mouth Daily.     • benzonatate (Tessalon Perles) 100 MG capsule Take 1 capsule by mouth 3 (Three) Times a Day As Needed for Cough. 30 capsule 0   • cetirizine (zyrTEC) 10 MG tablet Take 0.5 tablets by mouth Daily. 30 tablet 10   • citalopram (CeleXA) 20 MG tablet Take 1 tablet by mouth Daily. Needs appointment for next refill. 30 tablet 2   • clopidogrel (PLAVIX) 75 MG tablet Take 1 tablet by mouth Daily. 30 tablet 5   • dilTIAZem CD (CARDIZEM CD) 240 MG 24 hr capsule Take 240 mg by mouth Daily.     • esomeprazole (nexIUM) 40 MG capsule Take 40 mg by mouth Every Morning Before Breakfast.     • furosemide (LASIX) 20 MG tablet Take 20 mg by mouth Daily.     • HYDROcodone-acetaminophen (NORCO)  MG per tablet Take 1 tablet by mouth Every 6 (Six) Hours As Needed for Moderate Pain .     • ipratropium-albuterol (DUO-NEB) 0.5-2.5 mg/3 ml nebulizer Take 3 mL by nebulization Every 4 (Four) Hours As Needed for Wheezing. 360 mL 5   • lisinopril (PRINIVIL,ZESTRIL) 40 MG tablet Take 40 mg by mouth Daily.     • LORazepam (ATIVAN) 1 MG tablet TAKE 1 TABLET BY MOUTH TWICE DAILY. MUST LAST 30 DAYS. 60 tablet 2   • ondansetron (ZOFRAN) 4 MG tablet Take 1 tablet by mouth Every 6 (Six) Hours As Needed for Nausea or Vomiting. 20 tablet 2   • PHENobarbital (LUMINAL) 100 MG tablet TAKE 1 AND 1/2 TABLETS BY MOUTH EVERY DAY 45 tablet 3   • promethazine (PHENERGAN) 12.5 MG tablet      • rosuvastatin (CRESTOR) 40 MG tablet Take 1 tablet by mouth Daily. (Patient taking differently: Take 40 mg by mouth Every Night.) 30 tablet 2   • ferrous sulfate 325 (65 FE) MG tablet Take 325 mg by mouth Daily With Breakfast.     • guaiFENesin (MUCINEX) 600 MG 12 hr tablet Take 1 tablet by mouth Every 12 (Twelve) Hours. (Patient taking differently: Take 600 mg by  "mouth Every 12 (Twelve) Hours As Needed for Cough or Congestion.) 60 tablet 0   • nitrofurantoin, macrocrystal-monohydrate, (Macrobid) 100 MG capsule Take 1 capsule by mouth 2 (Two) Times a Day. 10 capsule 0        albuterol sulfate HFA  •  diphenhydrAMINE  •  HYDROcodone-acetaminophen  •  LORazepam  •  magnesium sulfate **OR** magnesium sulfate **OR** magnesium sulfate  •  potassium chloride  •  sodium chloride  •  sodium chloride    Objective: BP (!) 190/82 (BP Location: Left arm, Patient Position: Sitting)   Pulse 91   Temp 98.5 °F (36.9 °C) (Oral)   Resp 18   Ht 149.9 cm (59\")   Wt 41.5 kg (91 lb 9.6 oz)   SpO2 97%   BMI 18.50 kg/m²      Intake/Output Summary (Last 24 hours) at 6/21/2021 1214  Last data filed at 6/21/2021 0811  Gross per 24 hour   Intake 970 ml   Output --   Net 970 ml     Physical Exam:      General Appearance:    Alert, cooperative, in no acute distress   Head:    Normocephalic, without obvious abnormality, atraumatic   Eyes:            Lids and lashes normal, conjunctivae and sclerae normal, no   icterus, no pallor, corneas clear, PERRLA   Ears:    Ears appear intact with no abnormalities noted   Throat:   No oral lesions, no thrush, oral mucosa moist   Neck:   No adenopathy, supple, trachea midline, no thyromegaly, no     carotid bruit, no JVD   Back:     No kyphosis present, no scoliosis present, no skin lesions,       erythema or scars, no tenderness to percussion or                   palpation,   range of motion normal   Lungs:     Clear to auscultation,respirations regular, even and                   unlabored    Heart:    Regular rhythm and normal rate, normal S1 and S2, no            murmur, no gallop, no rub, no click   Breast Exam:    Deferred   Abdomen:     Normal bowel sounds, no masses, no organomegaly, soft        non-tender, non-distended, no guarding, no rebound                 tenderness   Genitalia:    Deferred   Extremities:   Moves all extremities well, no edema, no " "cyanosis, no              redness   Pulses:   Pulses palpable and equal bilaterally   Skin:   No bleeding, bruising or rash   Lymph nodes:   No palpable adenopathy   Neurologic:   Cranial nerves 2 - 12 grossly intact, sensation intact, DTR        present and equal bilaterally     Results from last 7 days   Lab Units 06/21/21  0926   WBC 10*3/mm3 6.40   HEMOGLOBIN g/dL 8.1*   HEMATOCRIT % 28.0*   PLATELETS 10*3/mm3 173     Results from last 7 days   Lab Units 06/21/21  0926 06/14/21  2146   SODIUM mmol/L 139 136   POTASSIUM mmol/L 3.6 4.6   CHLORIDE mmol/L 103 98   CO2 mmol/L 30.0* 34.0*   BUN mg/dL 3* 8   CREATININE mg/dL 0.42* 0.79   CALCIUM mg/dL 8.2* 8.7   BILIRUBIN mg/dL  --  <0.2   ALK PHOS U/L  --  103   ALT (SGPT) U/L  --  19   AST (SGOT) U/L  --  22   GLUCOSE mg/dL 92 110*       Impression: GI bleed  COPD  Dyspnea  Debility   [facility]  Plan: We will continue patient on current symptom management regimen.  Pt ok for d/c from a palliative stand point        Rajan Pena DO  06/21/21  12:14 EDT        Electronically signed by Rajan Pena DO at 06/21/21 1215     Wilfredo Simon MD at 06/20/21 1450          GI Daily Progress Note  Subjective     An Abreu is a 71 y.o. female who was admitted with Idiopathic hypotension.     Feels better.  Sitting up in chair.  No abdominal pain.  She had her third iron infusion today    Chief Complaint: Anemia    Objective     /55   Pulse 80   Temp 97.8 °F (36.6 °C) (Axillary)   Resp 18   Ht 149.9 cm (59\")   Wt 37.6 kg (82 lb 12.8 oz)   SpO2 98%   BMI 16.72 kg/m²     Intake/Output last 3 shifts:  I/O last 3 completed shifts:  In: 4893.5 [P.O.:600; I.V.:4017.2; IV Piggyback:276.3]  Out: 2100 [Urine:2100]  Intake/Output this shift:  I/O this shift:  In: 600 [P.O.:600]  Out: -       Physical Exam  Wt Readings from Last 3 Encounters:   06/18/21 37.6 kg (82 lb 12.8 oz)   06/03/21 37.6 kg (83 lb)   05/25/21 39.1 kg (86 lb 3.2 oz)   ,body mass index is 16.72 " kg/m².,@FLOWAMB(6)@,@FLOWAMB(5)@,@FLOWAMB(8)@   CONSTITUTIONAL: Sitting in chair   resp CTA; no rhonchi, rales, or wheezes.  Respiration effort normal  CV RRR; no M/R/G. No lower extremity edema  GI Abd soft, NT, ND, normal active bowel sounds.    Psych:     DATA:Results for AN ABREU (MRN [id number]) as of 6/20/2021 14:42   Ref. Range 6/16/2021 05:01 6/16/2021 05:01 6/18/2021 09:44 6/18/2021 17:46 6/19/2021 06:03   Hemoglobin Latest Ref Range: 12.0 - 15.9 g/dL 9.4 (L) 9.4 (L) 9.1 (L) 11.3 (L) 9.5 (L)       Assessment/Plan     Iron deficiency anemia  Suspect  B12 deficiency awaiting methylmalonic acid  Status post subtotal colectomy for mesenteric ischemia   Severe malnutrition  COPD      No sign of active GI bleeding.  Suspect her anemia is a combination of iron deficiency and B12 deficiency.  She currently is getting her last dose of iron infusion today.  She should have received her first B12 shot today.    Will await results of MMA and if negative B12 deficiency will need to institute her on B12 replacement therapy.    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 An Abreu, 71 y.o. female is followed for: Idiopathic hypotension     Subjective -  Interval History     An Abreu is a 71 y.o. female with PMH Chronic Hypoxic Respiratory Failure on 2-3L Home O2, COPD, Ongoing Tobacco Use, Pulmonary Cachexia, Anemia due to LORI/Chronic blood loss, HTN, Seizure DO, PAF (not on anticoagulation due to frequent falls, anemia and GIB), CAD and GERD who was admitted to the Hospitalist service 6/14 after a fall with confusion.  She had c/o progressive weakness with black colored stools.  She also had c/o worsening shorntess of air, CARMONA and worsening wheezing.  In the ED, she was placed on bipap for respiratory distress.  CXR was unremarkable.  She was treated for COPD Exacerbation with nebs and steroids.  CTA abd/pelvis revealed no evidence of hemorrhage, but moderate colonic stool burden and marked distention of her urinary bladder concerning for obstruction.  CTH and CT Cspine were unremarkable.  Initial Hgb was 7.3 with last being 9.9 on 5/23.  She received 1 unit PRBC.  H/H was been stable since then.  She underwent EGD by Dr. Fuentes that revealed small hiatal hernia, gastritis and no gross lesions in the entire examined duodenum. She lost IV access has been unable to have colonoscopy by Dr. Simon.  She had a PICC placed today with colonoscopy planned for tomorrow.  She had an RRT on the floor today due to vasovagal episode while on BSC having BM.  Her BP after returning to bed was 38/22.  She was moved to the ICU for closer monitoring.  Serial H/H has been stable at 9/30.       She has had multiple admissions over the last year for UTIs, COPD Exacerbations and A/C Respiratory failure.  Her most recent hospitalization was 5/22 - 5/25/21 for sepsis due to pneumonia.       On arrival to the ICU the patient is on BiPAP. She is lethargic and hypotensive. Received 1 L of IV fluids on the floor. Currently receiving second liter. Currently on norepinephrine at 0.04 mcg/kg/min. She will wake up and answer questions appropriately though, difficulty obtaining pulse in  the left lower extremity although foot is warm to the touch.     Interval history    She has been off of vasopressor support for over 24 hours  On no continuous infusions  Oxygenating adequately on 2 L/min via nasal cannula  Awake and alert    The patient's relevant past medical, surgical and social history were reviewed and updated in Epic as appropriate.     Objective     Infusions:     Medications:  acetaminophen, 500 mg, Oral, TID  cetirizine, 5 mg, Oral, Daily  citalopram, 20 mg, Oral, Daily  cyanocobalamin, 1,000 mcg, Intramuscular, Q28 Days  ferric gluconate, 250 mg, Intravenous, Daily  pantoprazole, 40 mg, Intravenous, Q12H  PHENobarbital, 145.8 mg, Oral, Daily  rosuvastatin, 40 mg, Oral, Nightly  sodium chloride, 10 mL, Intravenous, Q12H      Intake/Output       06/19/21 0700 - 06/20/21 0659 06/20/21 0700 - 06/21/21 0659    Intake (ml) 2580.6 240    Output (ml) 1700 --    Net (ml) 880.6 240        Vital Sign Min/Max for last 24 hours  Temp  Min: 97 °F (36.1 °C)  Max: 98.1 °F (36.7 °C)   BP  Min: 104/79  Max: 186/78   Pulse  Min: 62  Max: 95   Resp  Min: 15  Max: 20   SpO2  Min: 74 %  Max: 100 %   Flow (L/min)  Min: 2  Max: 6        Physical Exam:   GENERAL: Awake, no distress   HEENT: No adenopathy or thyromegaly   LUNGS: Decreased breath sounds without wheezes or rhonchi   HEART: Regular rate and rhythm without murmurs   GI: Soft, nontender   EXTREMITIES: No clubbing, cyanosis, or edema   NEURO/PSYCH: Awake.  Alert.  Follows commands.  No deficits    Results from last 7 days   Lab Units 06/19/21  0603 06/18/21  1746 06/18/21  0944   WBC 10*3/mm3 9.10 5.67 7.04   HEMOGLOBIN g/dL 9.5* 11.3* 9.1*   PLATELETS 10*3/mm3 180 225 208     Results from last 7 days   Lab Units 06/19/21  0603 06/18/21  1746 06/18/21  0944   SODIUM mmol/L 138 140 138   POTASSIUM mmol/L 5.4* 3.1* 3.7   CO2 mmol/L 26.0 26.0 29.0   BUN mg/dL 6* 8 7*   CREATININE mg/dL 0.61 0.52* 0.56*   MAGNESIUM mg/dL 2.7* 1.6 1.5*   PHOSPHORUS mg/dL 2.9   --   --    GLUCOSE mg/dL 111* 76 141*     Estimated Creatinine Clearance: 37.2 mL/min (by C-G formula based on SCr of 0.61 mg/dL).          Results from last 7 days   Lab Units 06/15/21  0345   PH, ARTERIAL pH units 7.335*   PCO2, ARTERIAL mm Hg 63.8*   PO2 ART mm Hg 73.7*     Lab Results   Component Value Date    LACTATE 1.2 06/18/2021          Images: Previous chest x-ray revealed no consolidation or effusions    I reviewed the patient's results and images.     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  Currently stable for transfer back to the floor.  We will continue to monitor on telemetry  Continue current medications but discontinue antibiotics as no evidence of a systemic infection   Plan of care and goals reviewed with Multidisciplinary Team and Antibiotic Stewardship rounds   I discussed the patient's findings and my recommendations with patient and nursing staff      .     GURVINDER Saleh MD  Pulmonary and Critical Care Medicine       Electronically signed by Santosh Saleh MD at 06/20/21 1506

## 2021-06-22 NOTE — PROGRESS NOTES
[facility] Medicine Services  PROGRESS NOTE    Patient Name: An Abreu  : 1950  MRN: [id number]    Date of Admission: 2021  Primary Care Physician: Zabrina Lemon MD    Subjective   Subjective     CC:  F/u hypotension     HPI:  Patient states she is feeling very short of breath.     ROS:  Gen- No fevers, chills  CV- No chest pain, palpitations  Resp- No cough, +dyspnea  GI- No N/V/D, abd pain        Objective   Objective     Vital Signs:   Temp:  [97 °F (36.1 °C)-98.5 °F (36.9 °C)] 98.5 °F (36.9 °C)  Heart Rate:  [] 113  Resp:  [16-22] 22  BP: (135-190)/(49-82) 167/62        Physical Exam:  Constitutional: No acute distress, awake, chronically ill appearing female sitting up in chair   HENT: NCAT, mucous membranes moist  Respiratory: + expiratory wheeze , respiratory effort mildly labored on 3L   Cardiovascular: RRR, no murmurs, rubs, or gallops  Gastrointestinal: Positive bowel sounds, soft, nontender, nondistended  Musculoskeletal: No bilateral ankle edema  Psychiatric: Appropriate affect, cooperative  Neurologic: Oriented x 3,  speech clear  Skin: No rashes      Results Reviewed:  Results from last 7 days   Lab Units 21  0926 21  0603 21  1746 06/15/21  2104 06/15/21  1439   WBC 10*3/mm3 6.40 9.10 5.67   < > 3.43   HEMOGLOBIN g/dL 8.1* 9.5* 11.3*  --  8.9*   HEMATOCRIT % 28.0* 32.8* 38.4   < > 30.1*   PLATELETS 10*3/mm3 173 180 225   < > 233   INR   --   --   --   --  1.02    < > = values in this interval not displayed.     Results from last 7 days   Lab Units 21  0926 21  0603 21  1746 06/15/21  1439 21  2146   SODIUM mmol/L 139 138 140 132* 136   POTASSIUM mmol/L 3.6 5.4* 3.1* 4.4 4.6   CHLORIDE mmol/L 103 108* 109* 97* 98   CO2 mmol/L 30.0* 26.0 26.0 28.0 34.0*   BUN mg/dL 3* 6* 8 9 8   CREATININE mg/dL 0.42* 0.61 0.52* 0.50* 0.79   GLUCOSE mg/dL 92 111* 76 251* 110*   CALCIUM mg/dL 8.2* 7.8* 7.9* 8.3* 8.7    ALT (SGPT) U/L  --   --   --   --  19   AST (SGOT) U/L  --   --   --   --  22   TROPONIN T ng/mL  --   --   --  0.149* 0.434*     Estimated Creatinine Clearance: 41 mL/min (A) (by C-G formula based on SCr of 0.42 mg/dL (L)).    Microbiology Results Abnormal     Procedure Component Value - Date/Time    Blood Culture - Blood, Hand, Digit Left [[id number]] Collected: 06/18/21 1952    Lab Status: Preliminary result Specimen: Blood from Hand, Digit Left Updated: 06/21/21 2045     Blood Culture No growth at 3 days    Blood Culture - Blood, Hand, Digit Left [[id number]] Collected: 06/18/21 1951    Lab Status: Preliminary result Specimen: Blood from Hand, Digit Left Updated: 06/21/21 2045     Blood Culture No growth at 3 days    Clostridium Difficile Toxin - Stool, Per Rectum [[id number]]  (Normal) Collected: 06/18/21 1900    Lab Status: Final result Specimen: Stool from Per Rectum Updated: 06/18/21 2019    Narrative:      The following orders were created for panel order Clostridium Difficile Toxin - Stool, Per Rectum.  Procedure                               Abnormality         Status                     ---------                               -----------         ------                     Clostridium Difficile To...[[id number]]  Normal              Final result                 Please view results for these tests on the individual orders.    Clostridium Difficile Toxin, PCR - Stool, Per Rectum [[id number]]  (Normal) Collected: 06/18/21 1900    Lab Status: Final result Specimen: Stool from Per Rectum Updated: 06/18/21 2019     C. Difficile Toxins by PCR Not Detected    Narrative:      Performance characteristics of test not established for patients <2 years of age.  Negative for Toxigenic C. Difficile    COVID PRE-OP / PRE-PROCEDURE SCREENING ORDER (NO ISOLATION) - Swab, Nasopharynx [[id number]]  (Normal) Collected: 06/16/21 0838    Lab Status: Final result Specimen: Swab from Nasopharynx Updated: 06/16/21 0909    Narrative:       The following orders were created for panel order COVID PRE-OP / PRE-PROCEDURE SCREENING ORDER (NO ISOLATION) - Swab, Nasopharynx.  Procedure                               Abnormality         Status                     ---------                               -----------         ------                     COVID-19, ABBOTT IN-HOUS...[[id number]]  Normal              Final result                 Please view results for these tests on the individual orders.    COVID-19, ABBOTT IN-HOUSE,NASAL Swab (NO TRANSPORT MEDIA) 2 HR TAT - Swab, Nasopharynx [[id number]]  (Normal) Collected: 06/16/21 0838    Lab Status: Final result Specimen: Swab from Nasopharynx Updated: 06/16/21 0909     COVID19 Presumptive Negative    Narrative:      Fact sheet for providers: https://www.fda.gov/media/737784/download     Fact sheet for patients: https://www.fda.gov/media/968912/download    Test performed by PCR.  If inconsistent with clinical signs and symptoms patient should be tested with different authorized molecular test.          Imaging Results (Last 24 Hours)     ** No results found for the last 24 hours. **          Results for orders placed during the hospital encounter of 05/03/21    Adult Transthoracic Echo Complete W/ Cont if Necessary Per Protocol    Interpretation Summary  · Estimated left ventricular EF = 70%  · Moderate aortic valve regurgitation is present.      I have reviewed the medications:  Scheduled Meds:acetaminophen, 500 mg, Oral, TID  cetirizine, 5 mg, Oral, Daily  citalopram, 20 mg, Oral, Daily  cyanocobalamin, 1,000 mcg, Intramuscular, Q28 Days  ipratropium-albuterol, 3 mL, Nebulization, 4x Daily - RT  pantoprazole, 40 mg, Intravenous, Q12H  PHENobarbital, 145.8 mg, Oral, Daily  rosuvastatin, 40 mg, Oral, Nightly  sodium chloride, 10 mL, Intravenous, Q12H      Continuous Infusions:   PRN Meds:.albuterol sulfate HFA  •  diphenhydrAMINE  •  hydrALAZINE  •  HYDROcodone-acetaminophen  •  LORazepam  •  magnesium sulfate  **OR** magnesium sulfate **OR** magnesium sulfate  •  potassium chloride  •  sodium chloride  •  sodium chloride    Assessment/Plan   Assessment & Plan     Active Hospital Problems    Diagnosis  POA   • **Idiopathic hypotension [I95.0]  No   • Severe malnutrition (CMS/HCC) [E43]  Yes   • COPD (chronic obstructive pulmonary disease) (CMS/HCC) [J44.9]  Yes   • Hypomagnesemia [E83.42]  Yes   • Acute on chronic respiratory failure with hypoxia (CMS/[facility]) [J96.21]  Yes   • Possible Gastrointestinal hemorrhage [K92.2]  Yes   • Iron deficiency anemia due to chronic blood loss [D50.0]  Yes   • 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        Brief Hospital Course to date:  An Abreu is a 71 y.o. female with PMH of Chronic Hypoxic Respiratory Failure on 2-3L Home O2, COPD, Ongoing Tobacco Use, Pulmonary Cachexia, Anemia due to LORI/Chronic blood loss, HTN, Seizure DO, PAF (not on anticoagulation due to frequent falls, anemia and GIB), CAD and GERD who was admitted to the Hospitalist service 6/14 after a fall with confusion. She had weakness and concern for GIB. SHe was placed on BiPap in ED. She underwent EGD by Dr. Fuentes that revealed small hiatal hernia, gastritis and no gross lesions in the entire examined duodenum. Dr Simon performed colonoscopy. She was sent to ICU after being hypotension on the floor. She returned to tele service on 6/21.     Acute on Chronic Respiratory Failure  COPD  -duo neb re-started today    GI Bleed  -stable, GI signed off     Hypotension-resolved    PAF  Seizure disorder  HTN     DVT  prophylaxis:  Mechanical DVT prophylaxis orders are present.       Disposition: I expect the patient to be discharged TBD     CODE STATUS:   Code Status and Medical Interventions:   Ordered at: 06/15/21 0403     Code Status:    CPR     Medical Interventions (Level of Support Prior to Arrest):    Wu Bauer,   06/21/21

## 2021-06-22 NOTE — CASE MANAGEMENT/SOCIAL WORK
Continued Stay Note  [facility]     Patient Name: An Abreu  MRN: [id number]  Today's Date: 6/22/2021    Admit Date: 6/14/2021    Discharge Plan     Row Name 06/22/21 1245       Plan    Plan  Home with [facility]    Patient/Family in Agreement with Plan  unable to assess    Plan Comments  Per RN, patient will need a bedside commode at discharge. Reviewed CM notes from previous admissions, and patient has a BSC at home. Attempted to call patient's daughter/healthcare surrogate, Anel, to confirm DME and the DC plan but was unable to leave a message. Per previous CM note, plan is home with [place]  at DC. Patient may require EMS tansportation home. CM will continue to follow.    Final Discharge Disposition Code  30 - still a patient        Discharge Codes    No documentation.       Expected Discharge Date and Time     Expected Discharge Date Expected Discharge Time    Jun 21, 2021             Estephania Leal RN

## 2021-06-22 NOTE — PLAN OF CARE
Problem: Adult Inpatient Plan of Care  Goal: Plan of Care Review  6/22/2021 0648 by Tony Landon, RNA  Outcome: Ongoing, Not Progressing  Flowsheets (Taken 6/22/2021 0646)  Outcome Summary: Pt remains A/Ox4. SBP at 2221 was 182, PRN hydralazine given with no relief. Paged Dr. Soares, and gave PRN labetalol with relief. BP was elevated again at 0419 at 182/73. PRN hydralazine given with relief. Patient c/o SOB and difficulty breathing during shift. Relieved after neb treatment. Pt remained on 3L humidified NC while awake and wore the BIPAP while sleeping. Potassium replaced  last night. Will continue POC.  6/22/2021 0646 by Tony Landon, RNA  Outcome: Ongoing, Not Progressing  Flowsheets (Taken 6/22/2021 0646)  Outcome Summary: Pt remains A/Ox4. SBP at 2221 was 182, PRN hydralazine given with no relief. Paged Dr. Soares, and gave PRN labetalol with relief. BP was elevated again at 0419 at 182/73. PRN hydralazine given with relief. Patient c/o SOB and difficulty breathing during shift. Relieved after neb treatment. Pt remained on 3L humidified NC while awake and wore the BIPAP while sleeping. Potassium replaced  last night. Will continue POC.   Goal Outcome Evaluation:

## 2021-06-22 NOTE — PROGRESS NOTES
[facility] Medicine Services  PROGRESS NOTE    Patient Name: An Abreu  : 1950  MRN: [id number]    Date of Admission: 2021  Primary Care Physician: Zabrina Lemon MD    Subjective   Subjective     CC:  F/u hypotension     HPI:  Patient is less alert today. She denies any pain or shortness of breath     ROS:  Gen- No fevers, chills  CV- No chest pain, palpitations  Resp- No cough, dyspnea  GI- No N/V/D, abd pain        Objective   Objective     Vital Signs:   Temp:  [96.9 °F (36.1 °C)-98.9 °F (37.2 °C)] 98.5 °F (36.9 °C)  Heart Rate:  [] 67  Resp:  [16-24] 16  BP: (113-204)/(47-82) 120/47        Physical Exam:  Constitutional: No acute distress, awake, chronically ill appearing female resting in bed   HENT: NCAT, mucous membranes moist  Respiratory: + expiratory wheeze , respiratory effort non labored on 3L   Cardiovascular: RRR, no murmurs, rubs, or gallops  Gastrointestinal: Positive bowel sounds, soft, nontender, nondistended  Musculoskeletal: No bilateral ankle edema  Psychiatric: Appropriate affect, cooperative  Neurologic: Oriented x 3,  speech clear  Skin: No rashes      Results Reviewed:  Results from last 7 days   Lab Units 21  0926 21  0603 21  1746 06/15/21  2104 06/15/21  1439   WBC 10*3/mm3 6.40 9.10 5.67   < > 3.43   HEMOGLOBIN g/dL 8.1* 9.5* 11.3*  --  8.9*   HEMATOCRIT % 28.0* 32.8* 38.4   < > 30.1*   PLATELETS 10*3/mm3 173 180 225   < > 233   INR   --   --   --   --  1.02    < > = values in this interval not displayed.     Results from last 7 days   Lab Units 21  0624 21  0926 21  0603 06/15/21  1439   SODIUM mmol/L 138  --  139 138 132*   POTASSIUM mmol/L 4.0 3.3* 3.6 5.4* 4.4   CHLORIDE mmol/L 98  --  103 108* 97*   CO2 mmol/L 35.0*  --  30.0* 26.0 28.0   BUN mg/dL 3*  --  3* 6* 9   CREATININE mg/dL 0.35*  --  0.42* 0.61 0.50*   GLUCOSE mg/dL 97  --  92 111* 251*   CALCIUM mg/dL 9.1  --   8.2* 7.8* 8.3*   TROPONIN T ng/mL  --   --   --   --  0.149*     Estimated Creatinine Clearance: 41 mL/min (A) (by C-G formula based on SCr of 0.35 mg/dL (L)).    Microbiology Results Abnormal     Procedure Component Value - Date/Time    Blood Culture - Blood, Hand, Digit Left [[id number]] Collected: 06/18/21 1952    Lab Status: Preliminary result Specimen: Blood from Hand, Digit Left Updated: 06/21/21 2045     Blood Culture No growth at 3 days    Blood Culture - Blood, Hand, Digit Left [[id number]] Collected: 06/18/21 1951    Lab Status: Preliminary result Specimen: Blood from Hand, Digit Left Updated: 06/21/21 2045     Blood Culture No growth at 3 days    Clostridium Difficile Toxin - Stool, Per Rectum [[id number]]  (Normal) Collected: 06/18/21 1900    Lab Status: Final result Specimen: Stool from Per Rectum Updated: 06/18/21 2019    Narrative:      The following orders were created for panel order Clostridium Difficile Toxin - Stool, Per Rectum.  Procedure                               Abnormality         Status                     ---------                               -----------         ------                     Clostridium Difficile To...[[id number]]  Normal              Final result                 Please view results for these tests on the individual orders.    Clostridium Difficile Toxin, PCR - Stool, Per Rectum [[id number]]  (Normal) Collected: 06/18/21 1900    Lab Status: Final result Specimen: Stool from Per Rectum Updated: 06/18/21 2019     C. Difficile Toxins by PCR Not Detected    Narrative:      Performance characteristics of test not established for patients <2 years of age.  Negative for Toxigenic C. Difficile    COVID PRE-OP / PRE-PROCEDURE SCREENING ORDER (NO ISOLATION) - Swab, Nasopharynx [[id number]]  (Normal) Collected: 06/16/21 0838    Lab Status: Final result Specimen: Swab from Nasopharynx Updated: 06/16/21 0909    Narrative:      The following orders were created for panel order COVID PRE-OP  / PRE-PROCEDURE SCREENING ORDER (NO ISOLATION) - Swab, Nasopharynx.  Procedure                               Abnormality         Status                     ---------                               -----------         ------                     COVID-19, ABBOTT IN-HOUS...[[id number]]  Normal              Final result                 Please view results for these tests on the individual orders.    COVID-19, ABBOTT IN-HOUSE,NASAL Swab (NO TRANSPORT MEDIA) 2 HR TAT - Swab, Nasopharynx [[id number]]  (Normal) Collected: 06/16/21 0838    Lab Status: Final result Specimen: Swab from Nasopharynx Updated: 06/16/21 0909     COVID19 Presumptive Negative    Narrative:      Fact sheet for providers: https://www.fda.gov/media/496524/download     Fact sheet for patients: https://www.fda.gov/media/633945/download    Test performed by PCR.  If inconsistent with clinical signs and symptoms patient should be tested with different authorized molecular test.          Imaging Results (Last 24 Hours)     ** No results found for the last 24 hours. **          Results for orders placed during the hospital encounter of 05/03/21    Adult Transthoracic Echo Complete W/ Cont if Necessary Per Protocol    Interpretation Summary  · Estimated left ventricular EF = 70%  · Moderate aortic valve regurgitation is present.      I have reviewed the medications:  Scheduled Meds:acetaminophen, 500 mg, Oral, TID  cetirizine, 5 mg, Oral, Daily  citalopram, 20 mg, Oral, Daily  cyanocobalamin, 1,000 mcg, Intramuscular, Q28 Days  dilTIAZem CD, 240 mg, Oral, Daily  fluticasone, 2 spray, Each Nare, Daily  furosemide, 20 mg, Oral, Daily  ipratropium-albuterol, 3 mL, Nebulization, 4x Daily - RT  pantoprazole, 40 mg, Intravenous, Q12H  PHENobarbital, 145.8 mg, Oral, Daily  rosuvastatin, 40 mg, Oral, Nightly  sodium chloride, 10 mL, Intravenous, Q12H      Continuous Infusions:   PRN Meds:.albuterol sulfate HFA  •  diphenhydrAMINE  •  hydrALAZINE  •   HYDROcodone-acetaminophen  •  labetalol  •  LORazepam  •  magnesium sulfate **OR** magnesium sulfate **OR** magnesium sulfate  •  potassium chloride  •  sodium chloride  •  sodium chloride    Assessment/Plan   Assessment & Plan     Active Hospital Problems    Diagnosis  POA   • **Idiopathic hypotension [I95.0]  No   • Severe malnutrition (CMS/HCC) [E43]  Yes   • COPD (chronic obstructive pulmonary disease) (CMS/HCC) [J44.9]  Yes   • Hypomagnesemia [E83.42]  Yes   • Acute on chronic respiratory failure with hypoxia (CMS/HCC) [J96.21]  Yes   • Possible Gastrointestinal hemorrhage [K92.2]  Yes   • Iron deficiency anemia due to chronic blood loss [D50.0]  Yes   • 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        Brief Hospital Course to date:  An Abreu is a 71 y.o. female with PMH of Chronic Hypoxic Respiratory Failure on 2-3L Home O2, COPD, Ongoing Tobacco Use, Pulmonary Cachexia, Anemia due to LORI/Chronic blood loss, HTN, Seizure DO, PAF (not on anticoagulation due to frequent falls, anemia and GIB), CAD and GERD who was admitted to the Hospitalist service 6/14 after a fall with confusion. She had weakness and concern for GIB. SHe was placed on BiPap in ED. She underwent EGD by Dr. Fuentes that revealed small hiatal hernia, gastritis and no gross lesions in the entire examined duodenum. Dr Simon performed colonoscopy. She was sent to ICU after being hypotension on the floor. She returned to tele service on 6/21.     Acute on Chronic Respiratory Failure  COPD  -duo neb   -on 2-3 home o2  -appears back to baseline      Anemia  Presumed GI Bleed  -GI saw no signs of active bleed, signed off  -probably iron def and b12 def     Hypotension-resolved  HTN  -resumed diltiazem home med  -resumed lasix   PAF  -not ac   Seizure disorder   -phenobarb     DVT prophylaxis:  Mechanical DVT prophylaxis orders are present.       Disposition: I expect the patient to be discharged home, with HH in 1-2 days     CODE STATUS:   Code Status and Medical Interventions:   Ordered at: 06/15/21 0403     Code Status:    CPR     Medical Interventions (Level of Support Prior to Arrest):    Full       Lilly Bauer, DO  06/22/21

## 2021-06-22 NOTE — PLAN OF CARE
Goal Outcome Evaluation:  Plan of Care Reviewed With: patient (Seen by Dr. Pena)     Progress: no change  Outcome Summary: Pt seen by Dr. Pena, reported pt was fairly drowsy today; had received HCD/APAP prior to his visit. Decrease dose strength of HCD/APAP, continue to utilize for either pain or for dyspnea. Palliative following for continued symptom management, ongoing support with GOC discussion.    [facility] Palliative Team Conference: ERIC Jimenez RN, CHPN; SIVA Shankar; ALDA Cruz LCSW, [facility]-; FRANNY Pena, ; CARLOS Phan, RN, CHPN; BENNY Mcgee, RN; ALDA Maria, [facility]

## 2021-06-22 NOTE — PROGRESS NOTES
Palliative Care Progress Note    Date of Admission: 6/14/2021    Subjective: Patient with no complaints at present.  Current Code Status     Date Active Code Status Order ID Comments User Context       6/15/2021 0403 CPR [id number]  Kyra Salinas PA-C ED     Advance Care Planning Activity      Questions for Current Code Status     Question Answer Comment    Code Status CPR     Medical Interventions (Level of Support Prior to Arrest) Full         No current facility-administered medications on file prior to encounter.     Current Outpatient Medications on File Prior to Encounter   Medication Sig Dispense Refill   • albuterol (PROVENTIL) (2.5 MG/3ML) 0.083% nebulizer solution Take 2.5 mg by nebulization Every 4 (Four) Hours As Needed for Wheezing. 3 mL 12   • albuterol sulfate  (90 Base) MCG/ACT inhaler Inhale 2 puffs Every 4 (Four) Hours As Needed for Wheezing or Shortness of Air. 18 g 2   • Anoro Ellipta 62.5-25 MCG/INH aerosol powder  inhaler Inhale 1 puff Every Morning.     • aspirin 81 MG tablet Take 81 mg by mouth Daily.     • benzonatate (Tessalon Perles) 100 MG capsule Take 1 capsule by mouth 3 (Three) Times a Day As Needed for Cough. 30 capsule 0   • cetirizine (zyrTEC) 10 MG tablet Take 0.5 tablets by mouth Daily. 30 tablet 10   • citalopram (CeleXA) 20 MG tablet Take 1 tablet by mouth Daily. Needs appointment for next refill. 30 tablet 2   • clopidogrel (PLAVIX) 75 MG tablet Take 1 tablet by mouth Daily. 30 tablet 5   • dilTIAZem CD (CARDIZEM CD) 240 MG 24 hr capsule Take 240 mg by mouth Daily.     • esomeprazole (nexIUM) 40 MG capsule Take 40 mg by mouth Every Morning Before Breakfast.     • furosemide (LASIX) 20 MG tablet Take 20 mg by mouth Daily.     • HYDROcodone-acetaminophen (NORCO)  MG per tablet Take 1 tablet by mouth Every 6 (Six) Hours As Needed for Moderate Pain .     • ipratropium-albuterol (DUO-NEB) 0.5-2.5 mg/3 ml nebulizer Take 3 mL by nebulization Every 4 (Four) Hours As  "Needed for Wheezing. 360 mL 5   • lisinopril (PRINIVIL,ZESTRIL) 40 MG tablet Take 40 mg by mouth Daily.     • LORazepam (ATIVAN) 1 MG tablet TAKE 1 TABLET BY MOUTH TWICE DAILY. MUST LAST 30 DAYS. 60 tablet 2   • ondansetron (ZOFRAN) 4 MG tablet Take 1 tablet by mouth Every 6 (Six) Hours As Needed for Nausea or Vomiting. 20 tablet 2   • PHENobarbital (LUMINAL) 100 MG tablet TAKE 1 AND 1/2 TABLETS BY MOUTH EVERY DAY 45 tablet 3   • promethazine (PHENERGAN) 12.5 MG tablet      • rosuvastatin (CRESTOR) 40 MG tablet Take 1 tablet by mouth Daily. (Patient taking differently: Take 40 mg by mouth Every Night.) 30 tablet 2   • ferrous sulfate 325 (65 FE) MG tablet Take 325 mg by mouth Daily With Breakfast.     • guaiFENesin (MUCINEX) 600 MG 12 hr tablet Take 1 tablet by mouth Every 12 (Twelve) Hours. (Patient taking differently: Take 600 mg by mouth Every 12 (Twelve) Hours As Needed for Cough or Congestion.) 60 tablet 0   • nitrofurantoin, macrocrystal-monohydrate, (Macrobid) 100 MG capsule Take 1 capsule by mouth 2 (Two) Times a Day. 10 capsule 0        albuterol sulfate HFA  •  diphenhydrAMINE  •  guaiFENesin  •  hydrALAZINE  •  HYDROcodone-acetaminophen  •  labetalol  •  LORazepam  •  magnesium sulfate **OR** magnesium sulfate **OR** magnesium sulfate  •  potassium chloride  •  sodium chloride  •  sodium chloride    Objective: /47 (BP Location: Left arm, Patient Position: Lying)   Pulse 67   Temp 98.5 °F (36.9 °C) (Oral)   Resp 16   Ht 149.9 cm (59\")   Wt 41.5 kg (91 lb 9.6 oz)   SpO2 97%   BMI 18.50 kg/m²      Intake/Output Summary (Last 24 hours) at 6/22/2021 1408  Last data filed at 6/22/2021 1200  Gross per 24 hour   Intake 1090 ml   Output --   Net 1090 ml     Physical Exam:      General Appearance:    Alert, cooperative, in no acute distress   Head:    Normocephalic, without obvious abnormality, atraumatic   Eyes:            Lids and lashes normal, conjunctivae and sclerae normal, no   icterus, no " pallor, corneas clear, PERRLA   Ears:    Ears appear intact with no abnormalities noted   Throat:   No oral lesions, no thrush, oral mucosa moist   Neck:   No adenopathy, supple, trachea midline, no thyromegaly, no     carotid bruit, no JVD   Back:     No kyphosis present, no scoliosis present, no skin lesions,       erythema or scars, no tenderness to percussion or                   palpation,   range of motion normal   Lungs:     Clear to auscultation,respirations regular, even and                   unlabored    Heart:    Regular rhythm and normal rate, normal S1 and S2, no            murmur, no gallop, no rub, no click   Breast Exam:    Deferred   Abdomen:     Normal bowel sounds, no masses, no organomegaly, soft        non-tender, non-distended, no guarding, no rebound                 tenderness   Genitalia:    Deferred   Extremities:   Moves all extremities well, no edema, no cyanosis, no              redness   Pulses:   Pulses palpable and equal bilaterally   Skin:   No bleeding, bruising or rash   Lymph nodes:   No palpable adenopathy   Neurologic:   Cranial nerves 2 - 12 grossly intact, sensation intact, DTR        present and equal bilaterally     Results from last 7 days   Lab Units 06/21/21  0926   WBC 10*3/mm3 6.40   HEMOGLOBIN g/dL 8.1*   HEMATOCRIT % 28.0*   PLATELETS 10*3/mm3 173     Results from last 7 days   Lab Units 06/22/21  0624   SODIUM mmol/L 138   POTASSIUM mmol/L 4.0   CHLORIDE mmol/L 98   CO2 mmol/L 35.0*   BUN mg/dL 3*   CREATININE mg/dL 0.35*   CALCIUM mg/dL 9.1   GLUCOSE mg/dL 97       Impression: GI bleed  COPD  Dyspnea  Debility   GOC  Plan: We will continue patient on current symptom management regimen.  Pt ok for d/c from a palliative stand point        Rajan Pena DO  06/22/21  14:08 EDT

## 2021-06-23 PROBLEM — K92.2 GASTROINTESTINAL HEMORRHAGE: Status: RESOLVED | Noted: 2021-01-01 | Resolved: 2021-01-01

## 2021-06-23 PROBLEM — I95.0 IDIOPATHIC HYPOTENSION: Status: RESOLVED | Noted: 2021-01-01 | Resolved: 2021-01-01

## 2021-06-23 PROBLEM — J96.21 ACUTE ON CHRONIC RESPIRATORY FAILURE WITH HYPOXIA (HCC): Status: RESOLVED | Noted: 2021-01-01 | Resolved: 2021-01-01

## 2021-06-23 PROBLEM — E83.42 HYPOMAGNESEMIA: Status: RESOLVED | Noted: 2021-01-01 | Resolved: 2021-01-01

## 2021-06-23 NOTE — CASE MANAGEMENT/SOCIAL WORK
Case Management Discharge Note      Final Note: Spoke with patient and daughter, Lory, at bedside regarding discharge plan who report that she is ready to go and Lory is there to transport.  MD notified, patient to discharge today.  No other discharge needs verbalized.  Called [facility]  [phone] and spoke to Mehnaz who request RAFAELA orders be faxed to [phone].  Patient plan is to discharge home and resume [facility]  today via car with her daughter to transport.         Selected Continued Care - Admitted Since 6/14/2021     Destination    No services have been selected for the patient.              Durable Medical Equipment    No services have been selected for the patient.              Dialysis/Infusion    No services have been selected for the patient.              Home Medical Care     Service Provider Selected Services Address Phone Fax Patient Preferred    [facility] [address] [place] [address] [address] [address] [phone] --          Therapy    No services have been selected for the patient.              Community Resources    No services have been selected for the patient.              Community & DME    No services have been selected for the patient.                Selected Continued Care - Prior Encounters Includes selections from prior encounters from 3/16/2021 to 6/23/2021    Discharged on 5/25/2021 Admission date: 5/22/2021 - Discharge disposition: Home-Health Care Svc    Durable Medical Equipment     Service Provider Selected Services Address Phone Fax Patient Preferred    [facility] [place]  Durable Medical Equipment [address], [facility] [address] [phone] --          Home Medical Care     Service Provider Selected Services Address Phone Fax Patient Preferred    [facility] - [address] [address] [phone] --                 Discharged on 5/6/2021 Admission date: 5/3/2021 - Discharge disposition: Home or Self Care    Home Medical Care     Service Provider Selected Services Address Phone Fax Patient Preferred    [facility] - [address] [id number] [phone] --                Discharged on 3/29/2021 Admission date: 3/26/2021 - Discharge disposition: Home or Self Care    Home Medical Care     Service Provider Selected Services Address Phone Fax Patient Preferred    [facility] - [address] [id number] [phone] --                         Final Discharge Disposition Code: 06 - home with home health care

## 2021-06-23 NOTE — DISCHARGE PLACEMENT REQUEST
"An Abreu (71 y.o. Female)     Date of Birth Social Security Number Address Home Phone MRN    1950  [address] [facility] [address] [phone]    Religious Marital Status          None        Admission Date Admission Type Admitting Provider Attending Provider Department, Room/Bed    6/14/21 Emergency Lilly Bauer DO [place], Sarah Kathleen, DO [facility] 6B, N641/1    Discharge Date Discharge Disposition Discharge Destination         Home or Self Care              Attending Provider: Lilly Bauer DO    Allergies: Keflex [Cephalexin], Sulfamethoxazole-trimethoprim, Carbamazepine, Codeine    Isolation: None   Infection: None   Code Status: CPR    Ht: 149.9 cm (59\")   Wt: 39.1 kg (86 lb 4.8 oz)    Admission Cmt: None   Principal Problem: Idiopathic hypotension [I95.0]                 Active Insurance as of 6/14/2021     Primary Coverage     Payor Plan Insurance Group Employer/Plan Group    [facility] WELL[employer]ARE MEDICARE REPLACEMENT      Payor Plan Address Payor Plan Phone Number Payor Plan Fax Number Effective Dates    [address] [address] [phone]  1/31/2018 - None Entered    [facility] [address]       Subscriber Name Subscriber Birth Date Member ID       AN ABREU [id number]                 Emergency Contacts      (Rel.) Home Phone Work Phone Mobile Phone    HEATHER STRINGER (Surrogate) [phone] -- [phone]    MARLENE ABREU (Daughter) [phone] -- [phone]    DEANNA ROTH (Friend) [phone] -- [phone]    STEFANIA ARMENTA (Daughter) [phone] -- --            Insurance Information                [facility] MEDICARE REPLACEMENT/WELLCARE MEDICARE REPLACEMENT Phone: [phone]    Subscriber: An Abreu Subscriber#: [id number]    Group#:  Precert#:         [address]  [address] [facility] " [address]  Phone:  [phone]  Fax:  [phone] Date: 2021      Ambulatory Referral to Home Health     Patient:  An Abreu MRN:  [id number]   [address] [facility] [address] :  1950  SSN:    Phone: [phone] Sex:  F      INSURANCE PAYOR PLAN GROUP # SUBSCRIBER ID   Primary:    [facility] MEDICARE REPLACEMENT [id number]   [id number]      Referring Provider Information:  DANNY FREITAS Phone: [phone] Fax: [phone]      Referral Information:   # Visits:  999 Referral Type: Home Health [42]   Urgency:  Routine Referral Reason: Specialty Services Required   Start Date: 2021 End Date:  To be determined by Insurer   Diagnosis: Impaired functional mobility, balance, gait, and endurance (Z74.09 [ICD-10-CM] V49.89 [ICD-9-CM])  Idiopathic hypotension (I95.0 [ICD-10-CM] 458.9 [ICD-9-CM])      Refer to Dept:   Refer to Provider:   Refer to Facility:  [facility]     Face to Face Visit Date: 2021  Follow-up provider for Plan of Care? I treated the patient in an acute care facility and will not continue treatment after discharge.  Follow-up provider: KENISHA FERRELL [[id number]]  Reason/Clinical Findings: Idiopathic hypotension  Describe mobility limitations that make leaving home difficult: wekaness,  impaired physical mobilty, impaired functional mobility  Nursing/Therapeutic Services Requested: Skilled Nursing  Nursing/Therapeutic Services Requested: Physical Therapy  Nursing/Therapeutic Services Requested: Occupational Therapy  Skilled nursing orders: Other (resume previous HH orders)  PT orders: Therapeutic exercise  PT orders: Gait Training  PT orders: Transfer training  PT orders: Strengthening  PT orders: Home safety assessment  Weight Bearing Status: As Tolerated  Occupational orders: Activities of daily living  Occupational orders: Energy conservation  Occupational orders: Strengthening  Occupational orders: Home safety  assessment     This document serves as a request of services and does not constitute Insurance authorization or approval of services.  To determine eligibility, please contact the members Insurance carrier to verify and review coverage.     If you have medical questions regarding this request for services. Please contact [address] at [phone] during normal business hours.       Verbal Order Mode: Telephone with readback   Authorizing Provider: Lilly Bauer DO  Authorizing Provider's NPI: [id number]     Order Entered By: Stacie Friend RN 6/23/2021  2:17 PM     Electronically signed by: Lilly Bauer DO 6/23/2021  2:28 PM

## 2021-06-23 NOTE — OUTREACH NOTE
Prep Survey      Responses   [facility] [place] patient discharged from?  [place]   Is LACE score < 7 ?  No   Emergency Room discharge w/ pulse ox?  No   Eligibility  [facility]   Date of Admission  06/14/21   Date of Discharge  06/23/21   Discharge Disposition  Home-Health Care Sv   Discharge diagnosis  idiopathic hypotension, GI bleed, COPD   Does the patient have one of the following disease processes/diagnoses(primary or secondary)?  COPD/Pneumonia   Does the patient have Home health ordered?  Yes   What is the Home health agency?   [facility]   Is there a DME ordered?  No   Prep survey completed?  Yes          Kathelen Beckford RN

## 2021-06-23 NOTE — CASE MANAGEMENT/SOCIAL WORK
Continued Stay Note  [facility]     Patient Name: An Abreu  MRN: [id number]  Today's Date: 6/23/2021    Admit Date: 6/14/2021    Discharge Plan     Row Name 06/23/21 1418       Plan    Plan  update    Patient/Family in Agreement with Plan  yes    Plan Comments  Attmepted to call patient Health Care Jimmie, Anel, with no answer and no VM.  Spoke with patient at bedside regarding discharge plan and discussed person to contact regarding ride home and plans for after discharge who indicate her Health Care Surrogate, Anel, is her daughters best friend and has gotten  and is on her Honeymoon.  She also indicates she has spoken with her daughter who will be her transportation when ready to discharge. She reports that she has a Bedside Commode at home that she got approximately 2-3 years ago but nel tit is loosened.  CM advised that she would have to pay out of pocket for another one, patient has declined.  Attmepted to call both telephone numbers listed for her daughter Lory but neither number could accept calls, no voicemail available. No new discharge needs verbalized.  Patient plan is to discharge home with [facility] via car with family to transport.    Final Discharge Disposition Code  06 - home with home health care        Discharge Codes    No documentation.       Expected Discharge Date and Time     Expected Discharge Date Expected Discharge Time    Jun 24, 2021             Stacie Friend RN

## 2021-06-23 NOTE — PLAN OF CARE
Goal Outcome Evaluation:  Plan of Care Reviewed With: patient        Progress: no change  Outcome Summary: VSS. On 3L NC. Norco given per orders. Will continue POC.

## 2021-06-23 NOTE — PROGRESS NOTES
NN spoke with pt at BS.  Pt alert and able to answer questions appropriately. Pt O2 sat  95  % on  3 L currently, home O2 use 3 L. Tobacco cessation encouraged. Patient informed of upcoming pulmonary clinic appointment date and time. COPD action plan reviewed. Deep breathing exercises encouraged. No new concerns or questions voiced at this time.  NN will continue to follow as needed.       Per current GOLD Standards, please consider: No ICS in place, NRT at DC, Patient may be out of rescue medication, Outpatient PFT, Pulmonary rehab as appropriate

## 2021-06-23 NOTE — PLAN OF CARE
Goal Outcome Evaluation:  Plan of Care Reviewed With: patient     Progress: improving  Outcome Summary: Pt sitting up in recliner, smiling, denied pain, dyspnea, any discomfort; reported she believes she is going home today, plans to resume Home Health and home Palliattive Care. Palliative team will notify [facility] when discharged, for resumption of services.    1300 Palliative Team Conference: ERIC Jimenez RN, PN; GURVINDER Soto, SIVA; ALDA Cruz, [facility], [facility]; FRANNY Pena, DO; CARLOS Phan, RN, PN; JJ Anderson, RN, PN    Problem: Palliative Care  Goal: Enhanced Quality of Life  Outcome: Adequate for Care Transition  Intervention: Promote Advance Care Planning  Flowsheets (Taken 6/23/2021 1403)  Life Transition/Adjustment: (resume home Palliative Care at discharge) other (see comments)

## 2021-06-24 NOTE — OUTREACH NOTE
Call Center TCM Note      Responses   [facility] patient discharged from?  [place]   Does the patient have one of the following disease processes/diagnoses(primary or secondary)?  COPD/Pneumonia   Was the primary reason for admission:  COPD exacerbation   TCM attempt successful?  No   Unsuccessful attempts  Attempt 2          Jose Elias Cates RN    6/24/2021, 16:41 EDT

## 2021-06-24 NOTE — OUTREACH NOTE
Call Center TCM Note      Responses   [facility] patient discharged from?  [place]   Does the patient have one of the following disease processes/diagnoses(primary or secondary)?  COPD/Pneumonia   Was the primary reason for admission:  COPD exacerbation   TCM attempt successful?  No [Stacie listed on release, but no number listed]   Unsuccessful attempts  Attempt 1          Jose Elias Cates RN    6/24/2021, 16:25 EDT

## 2021-06-25 NOTE — OUTREACH NOTE
Call Center TCM Note      Responses   [facility] patient discharged from?  [place]   Does the patient have one of the following disease processes/diagnoses(primary or secondary)?  COPD/Pneumonia   Was the primary reason for admission:  COPD exacerbation   TCM attempt successful?  No   Unsuccessful attempts  Attempt 3          Kayleigh Walker RN    6/25/2021, 09:24 EDT

## 2021-06-29 NOTE — DISCHARGE SUMMARY
[facility] Medicine Services  DISCHARGE SUMMARY    Patient Name: An Abreu  : 1950  MRN: [id number]    Date of Admission: 2021  9:32 PM  Date of Discharge:  21  Primary Care Physician: Zabrina Lemon MD    Consults     Date and Time Order Name Status Description    6/15/2021 10:16 AM Inpatient Palliative Care MD Consult Completed     6/15/2021  6:45 AM Gastroenterology Consult Completed     6/15/2021  6:39 AM Inpatient Gastroenterology Consult Completed           Hospital Course     Presenting Problem:   Acute upper GI bleed [K92.2]    Active Hospital Problems    Diagnosis  POA   • Severe malnutrition (CMS/HCC) [E43]  Yes   • COPD (chronic obstructive pulmonary disease) (CMS/HCC) [J44.9]  Yes   • Iron deficiency anemia due to chronic blood loss [D50.0]  Yes   • 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 (CMS/HCC) [J44.9, R64]  Yes   • CAD (coronary artery disease) [I25.10]  Yes   • Tobacco abuse [Z72.0]  Yes   • Paroxysmal atrial fibrillation (CMS/HCC) [I48.0]  Yes   • Seizure disorder (CMS/HCC) [G40.909]  Yes   • Essential hypertension [I10]  Yes   • Gastroesophageal reflux disease [K21.9]  Yes      Resolved Hospital Problems    Diagnosis Date Resolved POA   • **Idiopathic hypotension [I95.0] 2021 No   • Hypomagnesemia [E83.42] 2021 Yes   • Acute on chronic respiratory failure with hypoxia (CMS/HCC) [J96.21] 2021 Yes   • Possible Gastrointestinal hemorrhage [K92.2] 2021 Yes          Hospital Course:  An Abreu is a 71 y.o. female with PMH of Chronic Hypoxic Respiratory Failure on 2-3L Home O2, COPD, Ongoing Tobacco Use, Pulmonary Cachexia, Anemia due to LORI/Chronic blood loss, HTN, Seizure DO, PAF (not on anticoagulation due to frequent falls, anemia and GIB), CAD and GERD who was admitted to the Hospitalist service  after a fall with confusion. She had weakness and concern for GIB. SHe was placed on  BiPap in ED. She underwent EGD by Dr. Fuentes that revealed small hiatal hernia, gastritis and no gross lesions in the entire examined duodenum. Dr Simon performed colonoscopy with no bleeding noted . She became hypotensive on the floor and was sent to ICU but did not require pressers.   The following was addressed during this admission.    Acute on Chronic Respiratory Failure  COPD  -duo neb   -on 2-3 home o2  -returned back to baseline prior to discharge      Anemia  Presumed GI Bleed  -GI saw no signs of active bleed  -probably iron def and b12 def      Hypotension-resolved  HTN  -resumed diltiazem   -resumed lasix   PAF  -not on anticoagulation   Seizure disorder   -phenobarb     Discharge Follow Up Recommendations for outpatient labs/diagnostics:   PCP 1-2 weeks    Day of Discharge     HPI:   Patient currently without complaints    Review of Systems  Gen- No fevers, chills  CV- No chest pain, palpitations  Resp- No cough, dyspnea  GI- No N/V/D, abd pain        Vital Signs:         Physical Exam:  Constitutional: No acute distress, awake, alert  HENT: NCAT, mucous membranes moist  Respiratory: Clear to auscultation bilaterally, respiratory effort normal on 2 L   Cardiovascular: RRR, no murmurs, rubs, or gallops  Gastrointestinal: Positive bowel sounds, soft, nontender, nondistended  Musculoskeletal: No bilateral ankle edema  Psychiatric: Appropriate affect, cooperative  Neurologic: Oriented x 3, speech clear  Skin: No rashes      Pertinent  and/or Most Recent Results     LAB RESULTS:          Lab 06/22/21  0624   SODIUM 138   POTASSIUM 4.0   CHLORIDE 98   CO2 35.0*   ANION GAP 5.0   BUN 3*   CREATININE 0.35*   GLUCOSE 97   CALCIUM 9.1                         Brief Urine Lab Results  (Last result in the past 365 days)      Color   Clarity   Blood   Leuk Est   Nitrite   Protein   CREAT   Urine HCG        06/15/21 0314 Yellow Clear Negative Negative Negative Negative             Microbiology Results (last 10 days)      ** No results found for the last 240 hours. **          No radiology results for the last 10 days    Results for orders placed during the hospital encounter of 09/28/17    Doppler Ankle Brachial Index Multi Level CAR    Interpretation Summary  · Right Conclusion: The right CUCA is normal.  · Left Conclusion: The left CUCA is mildly reduced.      Results for orders placed during the hospital encounter of 09/28/17    Doppler Ankle Brachial Index Multi Level CAR    Interpretation Summary  · Right Conclusion: The right CUCA is normal.  · Left Conclusion: The left CUCA is mildly reduced.      Results for orders placed during the hospital encounter of 05/03/21    Adult Transthoracic Echo Complete W/ Cont if Necessary Per Protocol    Interpretation Summary  · Estimated left ventricular EF = 70%  · Moderate aortic valve regurgitation is present.      Plan for Follow-up of Pending Labs/Results:     Discharge Details        Discharge Medications      New Medications      Instructions Start Date   pantoprazole 40 MG EC tablet  Commonly known as: PROTONIX   40 mg, Oral, 2 Times Daily Before Meals         Changes to Medications      Instructions Start Date   guaiFENesin 600 MG 12 hr tablet  Commonly known as: MUCINEX  What changed:   when to take this  reasons to take this   600 mg, Oral, Every 12 Hours Scheduled      rosuvastatin 40 MG tablet  Commonly known as: CRESTOR  What changed: when to take this   40 mg, Oral, Daily         Continue These Medications      Instructions Start Date   albuterol (2.5 MG/3ML) 0.083% nebulizer solution  Commonly known as: PROVENTIL   2.5 mg, Nebulization, Every 4 Hours PRN      albuterol sulfate  (90 Base) MCG/ACT inhaler  Commonly known as: PROVENTIL HFA;VENTOLIN HFA;PROAIR HFA   2 puffs, Inhalation, Every 4 Hours PRN      Anoro Ellipta 62.5-25 MCG/INH aerosol powder  inhaler  Generic drug: umeclidinium-vilanterol   1 puff, Inhalation, Every Morning      aspirin 81 MG tablet   81 mg,  Oral, Daily      benzonatate 100 MG capsule  Commonly known as: Tessalon Perles   100 mg, Oral, 3 Times Daily PRN      cetirizine 10 MG tablet  Commonly known as: zyrTEC   5 mg, Oral, Daily      citalopram 20 MG tablet  Commonly known as: CeleXA   20 mg, Oral, Daily, Needs appointment for next refill.      clopidogrel 75 MG tablet  Commonly known as: PLAVIX   75 mg, Oral, Daily      dilTIAZem  MG 24 hr capsule  Commonly known as: CARDIZEM CD   240 mg, Oral, Daily      ferrous sulfate 325 (65 FE) MG tablet   325 mg, Oral, Daily With Breakfast      furosemide 20 MG tablet  Commonly known as: LASIX   20 mg, Oral, Daily      HYDROcodone-acetaminophen  MG per tablet  Commonly known as: NORCO   1 tablet, Oral, Every 6 Hours PRN      ipratropium-albuterol 0.5-2.5 mg/3 ml nebulizer  Commonly known as: DUO-NEB   3 mL, Nebulization, Every 4 Hours PRN      lisinopril 40 MG tablet  Commonly known as: PRINIVIL,ZESTRIL   40 mg, Oral, Daily      LORazepam 1 MG tablet  Commonly known as: ATIVAN   TAKE 1 TABLET BY MOUTH TWICE DAILY. MUST LAST 30 DAYS.      ondansetron 4 MG tablet  Commonly known as: ZOFRAN   4 mg, Oral, Every 6 Hours PRN      PHENobarbital 100 MG tablet  Commonly known as: LUMINAL   TAKE 1 AND 1/2 TABLETS BY MOUTH EVERY DAY      promethazine 12.5 MG tablet  Commonly known as: PHENERGAN   No dose, route, or frequency recorded.         Stop These Medications    esomeprazole 40 MG capsule  Commonly known as: nexIUM     nitrofurantoin (macrocrystal-monohydrate) 100 MG capsule  Commonly known as: Macrobid            Allergies   Allergen Reactions   • Keflex [Cephalexin] Shortness Of Breath and Rash     Patients power of  states patient had to be taken to ER due to reaction.    Tolerated Rocephin 2/19/21, zosyn 5/2021   • Sulfamethoxazole-Trimethoprim Shortness Of Breath and Rash     Patients power of  stated patient had to be taken to ER due to reaction.   • Carbamazepine    • Codeine           Discharge Disposition:  Home-Health Care Svc    Diet:  Hospital:No active diet order      Activity:  Activity Instructions     Gradually Increase Activity Until at Pre-Hospitalization Level            Restrictions or Other Recommendations:         CODE STATUS:    Code Status and Medical Interventions:   Ordered at: 06/15/21 0403     Code Status:    CPR     Medical Interventions (Level of Support Prior to Arrest):    Full       Future Appointments   Date Time Provider Department Center   7/1/2021  2:00 PM Kenisha Lemon MD [facility]   7/13/2021  2:30 PM Vikram Leblanc DO [facility]       Additional Instructions for the Follow-ups that You Need to Schedule     Ambulatory Referral to Home Health   As directed      Face to Face Visit Date: 6/18/2021    Follow-up provider for Plan of Care?: I treated the patient in an acute care facility and will not continue treatment after discharge.    Follow-up provider: KENISHA LEMON [[id number]]    Reason/Clinical Findings: respiratory failure    Describe mobility limitations that make leaving home difficult: impaired functional mobility, balance, gait and endurance    Nursing/Therapeutic Services Requested: Skilled Nursing Physical Therapy Occupational Therapy    Skilled nursing orders: Neurovascular assessments Cardiopulmonary assessments    PT orders: Therapeutic exercise Gait Training Transfer training Strengthening Home safety assessment    Weight Bearing Status: As Tolerated    Occupational orders: Activities of daily living Energy conservation Strengthening Home safety assessment    Frequency: 1 Week 1         Ambulatory Referral to Home Health   As directed      Face to Face Visit Date: 6/23/2021    Follow-up provider for Plan of Care?: I treated the patient in an acute care facility and will not continue treatment after discharge.    Follow-up provider: KENISHA LEMON [[id number]]    Reason/Clinical Findings: Idiopathic hypotension     Describe mobility limitations that make leaving home difficult: wekaness,  impaired physical mobilty, impaired functional mobility    Nursing/Therapeutic Services Requested: Skilled Nursing Physical Therapy Occupational Therapy    Skilled nursing orders: Other (resume previous HH orders)    PT orders: Therapeutic exercise Gait Training Transfer training Strengthening Home safety assessment    Weight Bearing Status: As Tolerated    Occupational orders: Activities of daily living Energy conservation Strengthening Home safety assessment         Discharge Follow-up with PCP   As directed       Currently Documented PCP:    Zabrina Lemon MD    PCP Phone Number:    [phone]     Follow Up Details: 1-2 weeks         Discharge Follow-up with Specified Provider: pulmonology clinic; 2 Weeks   As directed      To: pulmonology clinic    Follow Up: 2 Weeks                     Lilly Bauer DO  06/28/21      Time Spent on Discharge:  I spent  35  minutes on this discharge activity which included: face-to-face encounter with the patient, reviewing the data in the system, coordination of the care with the nursing staff as well as consultants, documentation, and entering orders.

## 2021-07-01 PROBLEM — Z99.81 DEPENDENCE ON SUPPLEMENTAL OXYGEN: Chronic | Status: ACTIVE | Noted: 2020-08-29

## 2021-07-01 PROBLEM — I48.0 PAROXYSMAL ATRIAL FIBRILLATION (HCC): Chronic | Status: ACTIVE | Noted: 2017-08-07

## 2021-07-01 PROBLEM — J44.9 PULMONARY CACHEXIA DUE TO CHRONIC OBSTRUCTIVE PULMONARY DISEASE (HCC): Chronic | Status: ACTIVE | Noted: 2021-01-01

## 2021-07-01 PROBLEM — Q21.12 PFO (PATENT FORAMEN OVALE): Status: ACTIVE | Noted: 2021-01-01

## 2021-07-01 PROBLEM — Z86.73 HISTORY OF CVA (CEREBROVASCULAR ACCIDENT): Status: ACTIVE | Noted: 2021-01-01

## 2021-07-01 PROBLEM — Z86.73 HISTORY OF CVA (CEREBROVASCULAR ACCIDENT): Chronic | Status: ACTIVE | Noted: 2021-01-01

## 2021-07-01 PROBLEM — R64 PULMONARY CACHEXIA DUE TO CHRONIC OBSTRUCTIVE PULMONARY DISEASE (HCC): Chronic | Status: ACTIVE | Noted: 2021-01-01

## 2021-07-01 PROBLEM — I25.10 CAD (CORONARY ARTERY DISEASE): Chronic | Status: ACTIVE | Noted: 2020-10-25

## 2021-07-01 PROBLEM — Q21.12 PFO (PATENT FORAMEN OVALE): Chronic | Status: ACTIVE | Noted: 2021-01-01

## 2021-07-01 PROBLEM — Z91.199 NON-COMPLIANCE: Status: ACTIVE | Noted: 2021-01-01

## 2021-07-01 PROBLEM — N30.00 ACUTE CYSTITIS WITHOUT HEMATURIA: Status: ACTIVE | Noted: 2021-01-01

## 2021-07-01 PROBLEM — D64.9 ANEMIA: Chronic | Status: ACTIVE | Noted: 2020-01-01

## 2021-07-01 PROBLEM — J96.22 ACUTE ON CHRONIC RESPIRATORY FAILURE WITH HYPERCAPNIA (HCC): Status: ACTIVE | Noted: 2021-01-01

## 2021-07-01 PROBLEM — R53.81 DEBILITY: Status: ACTIVE | Noted: 2021-01-01

## 2021-07-01 PROBLEM — Z87.19 H/O: GI BLEED: Status: ACTIVE | Noted: 2021-01-01

## 2021-07-01 NOTE — ED PROVIDER NOTES
Subjective   Patient is a 71 y.o. female presenting to the ED complaining of shortness of breath. Patient has a history of COPD and reports wheezing beginning last night. She also reports fatigue, pain in her shoulder, and more shortness of breath than usual. She denies having any new cough or fever. The patient currently wears 2L of oxygen constantly. During examination, the patient was very fatigued and had difficulty answering questions. The patient was admitted to the hospital on 6/14 for malnutrition, iron deficiency, COPD, and asthma. The patient also has a history of seizures an gastroesophageal reflux disease.      History provided by:  Patient and EMS personnel  History limited by:  Mental status change  Shortness of Breath  Severity:  Moderate  Onset quality:  Gradual  Duration:  1 day  Timing:  Constant  Progression:  Worsening  Chronicity:  Chronic  Relieved by:  None tried  Worsened by:  Nothing  Ineffective treatments:  None tried  Associated symptoms: cough    Risk factors: tobacco use        Review of Systems   Unable to perform ROS: Mental status change   Constitutional: Positive for fatigue.   Respiratory: Positive for cough and shortness of breath.    Musculoskeletal:        Pain in shoulder.       Past Medical History:   Diagnosis Date   • Aneurysm (CMS/HCC)    • Angina pectoris (CMS/HCC) 6/20/2016   • Anxiety 6/20/2016   • Arthritis 6/20/2016   • CAD (coronary artery disease)    • Carotid artery stenosis    • CHF (congestive heart failure) (CMS/HCC)    • Chronic coronary artery disease 6/20/2016 2003 CABG LIMA to LAD, VG to OM 2004 VG to OM occluded. LIMA patent Cx intervention and RCA 2008 stent for ISR 2013 ISR and stenting of CX and RCA 2016 normal MPS   • Chronic left-sided low back pain with left-sided sciatica 2/1/2017   • Chronic obstructive pulmonary disease (CMS/HCC) 6/20/2016   • Chronic respiratory failure with hypoxia (CMS/HCC) 3/27/2021   • Cobalamin deficiency 6/20/2016   •  Congestive heart failure (CMS/[facility]) 6/20/2016   • COPD (chronic obstructive pulmonary disease) (CMS/[facility])    • Depression 7/3/2018   • Diverticulosis    • Diverticulosis of large intestine without hemorrhage 5/5/2017   • GERD (gastroesophageal reflux disease)    • GIB (gastrointestinal bleeding)     recurrent    • HTN (hypertension)    • Hypercholesterolemia 6/20/2016   • Hyperlipidemia    • Mesenteric ischemia (CMS/[facility]) 2006    s/p resection    • Mood disorder (CMS/[facility])    • Oxygen dependent     3 liters @ all times.    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    Tolerated Rocephin 2/19/21, zosyn 5/2021   • Sulfamethoxazole-Trimethoprim Shortness Of Breath and Rash     Patients power of  stated patient had to be taken to ER due to reaction.   • Carbamazepine    • Codeine    • Latex Rash       Past Surgical History:   Procedure Laterality Date   • APPENDECTOMY     • CHOLECYSTECTOMY     • COLONOSCOPY N/A 6/19/2021    Procedure: COLONOSCOPY;  Surgeon: Wilfredo Simon MD;  Location:  [facility] ENDOSCOPY;  Service: Gastroenterology;  Laterality: N/A;   • COLOSTOMY  2006    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   • ENDOSCOPY N/A 6/16/2021    Procedure: ESOPHAGOGASTRODUODENOSCOPY;  Surgeon: Jean Fuentes MD;  Location:  [facility] ENDOSCOPY;  Service: Gastroenterology;  Laterality: N/A;   • EXPLORATORY LAPAROTOMY      sec to ovarian cysts   • HYSTERECTOMY     • ILIAC  ARTERY STENT     • REVISION / TAKEDOWN COLOSTOMY  2008       Family History   Problem Relation Age of Onset   • Arthritis Mother    • Cancer Mother    • Heart attack Father    • Hypertension Father    • Hyperlipidemia Father    • Stroke Father    • Heart disease Brother         CAD   • Heart disease Child         CAD   • Heart disease Child         CAD       Social History     Socioeconomic History   • Marital status:      Spouse name: Not on file   • Number of children: Not on file   • Years of education: Not on file   • Highest education level: Not on file   Tobacco Use   • Smoking status: Current Every Day Smoker     Packs/day: 1.00     Years: 40.00     Pack years: 40.00     Types: Cigarettes, Electronic Cigarette   • Smokeless tobacco: Never Used   • Tobacco comment: <0.5ppd    Substance and Sexual Activity   • Alcohol use: Never   • Drug use: Never   • Sexual activity: Defer         Objective   Physical Exam  Vitals and nursing note reviewed.   Constitutional:       General: She is not in acute distress.     Appearance: Normal appearance.      Comments: Fatigued, difficult to arouse.   HENT:      Head: Normocephalic and atraumatic.      Right Ear: External ear normal.      Left Ear: External ear normal.      Nose: Nose normal.   Eyes:      General: No scleral icterus.     Conjunctiva/sclera: Conjunctivae normal.   Cardiovascular:      Rate and Rhythm: Normal rate and regular rhythm.      Heart sounds: Normal heart sounds.   Pulmonary:      Breath sounds: Decreased breath sounds and wheezing present. No rhonchi or rales.      Comments: Tachypnea.  Expiratory wheezes.  Crackles in base and mid-lung fields.  Chest:      Chest wall: No tenderness.   Abdominal:      General: There is no distension.      Palpations: Abdomen is soft.      Tenderness: There is no abdominal tenderness.   Musculoskeletal:         General: Normal range of motion.      Cervical back: Normal range of motion and neck supple.       Right lower leg: No tenderness. No edema.      Left lower leg: No tenderness. No edema.   Skin:     General: Skin is warm and dry.      Comments: No clubbing, cyanosis, or edema.     Neurological:      General: No focal deficit present.      Comments: Patient is very sedate but does awaken and answer questions, but immediately closes her eyes and drifts back to sleep.  She can squeeze my fingers.  Her speech is very slurred.  There is no facial droop.  Pupils are reactive with EOMI.  There is no significant nystagmus.   Psychiatric:         Mood and Affect: Mood normal.         Behavior: Behavior normal.         Intubation    Date/Time: 7/1/2021 12:56 PM  Performed by: Angus Franco MD  Authorized by: Angus Franco MD     Consent:     Consent obtained:  Emergent situation    Consent given by:  Guardian  Pre-procedure details:     Patient status:  Altered mental status    Pretreatment meds: Etomidate.    Paralytics:  Succinylcholine  Procedure details:     Preoxygenation:  BiPAP    CPR in progress: no      Intubation method:  Oral    Laryngoscope size: Low Pro4.    Tube size (mm):  7.5    Tube type:  Cuffed    Number of attempts:  1    Ventilation between attempts: no      Cricoid pressure: no      Tube visualized through cords: yes    Placement assessment:     ETT to lip:  22    Tube secured with:  ETT soria    Breath sounds:  Equal    Placement verification: chest rise, direct visualization, equal breath sounds, ETCO2 detector and tube exhalation    Post-procedure details:     Patient tolerance of procedure:  Tolerated well, no immediate complications  Critical Care  Performed by: Angus Franco MD  Authorized by: Angus Franco MD     Critical care provider statement:     Critical care time (minutes):  40    Critical care time was exclusive of:  Separately billable procedures and treating other patients    Critical care was necessary to treat or prevent imminent or life-threatening deterioration of the  following conditions:  Respiratory failure    Critical care was time spent personally by me on the following activities:  Development of treatment plan with patient or surrogate, discussions with consultants, evaluation of patient's response to treatment, examination of patient, obtaining history from patient or surrogate, ordering and performing treatments and interventions, ordering and review of laboratory studies, ordering and review of radiographic studies, re-evaluation of patient's condition, pulse oximetry and review of old charts    I assumed direction of critical care for this patient from another provider in my specialty: no               ED Course  ED Course as of Jul 01 1410   Thu Jul 01, 2021   1206 Patient was somnolent on initial evaluation.  Her PCO2 was markedly elevated 90.4.  This is greater than her previous CO2's that were only mildly elevated on ABG.    [HH]   1216 Patient's PCO2 was elevated.  She is placed on BiPAP.  She is serially reevaluated and continues to be somnolent and not improved at all since my initial evaluation despite the BiPAPI had a long discussion with her daughter who is her current returning next of kin.  She reports that they have discussed intubation and the patient would like to be intubated if needed to save her life.  I discussed the situation currently with the daughter who understandsI would continue to serve the patient and if she turns around and improves will admit to the ICU on BiPAP.  Otherwise she will be intubated in the ED    [HH]   1218 I discussed case with Dr. Shell who will admit for definitive inpatient care in the ICU    [HH]   1218 Patient's respiratory status has continued to decline. She is intubated and placed on a ventilator. She has a UTI additionally. We will place her on antibiotics    [HH]   1306 Patient's records were reviewed. She has tolerated Rocephin twice in the last 12 months despite her history of Keflex allergy. We will treat her  again with Rocephin    []   1306 I updated the intubation with Dr. Shell and are awaiting ICU bed transfer    []   1306 Patient is seen and evaluated by the ICU team.  They have taken over care.  She is awakening after her intubation.  Her PCO2 is down to the 50s.  The ICU team started propofolI updated the daughter who is in attendance on the evaluation to date at the bedside.  We discussed the admission over the phone and again at the bedside.  Patient is much improved from presentation    []      ED Course User Index  [] Angus Franco MD     Recent Results (from the past 24 hour(s))   ECG 12 Lead    Collection Time: 07/01/21 10:32 AM   Result Value Ref Range    QT Interval 344 ms    QTC Interval 432 ms   Comprehensive Metabolic Panel    Collection Time: 07/01/21 11:06 AM    Specimen: Blood   Result Value Ref Range    Glucose 113 (H) 65 - 99 mg/dL    BUN 13 8 - 23 mg/dL    Creatinine 0.60 0.57 - 1.00 mg/dL    Sodium 140 136 - 145 mmol/L    Potassium 5.2 3.5 - 5.2 mmol/L    Chloride 97 (L) 98 - 107 mmol/L    CO2 39.0 (H) 22.0 - 29.0 mmol/L    Calcium 9.2 8.6 - 10.5 mg/dL    Total Protein 7.3 6.0 - 8.5 g/dL    Albumin 4.10 3.50 - 5.20 g/dL    ALT (SGPT) 17 1 - 33 U/L    AST (SGOT) 29 1 - 32 U/L    Alkaline Phosphatase 114 39 - 117 U/L    Total Bilirubin <0.2 0.0 - 1.2 mg/dL    eGFR Non African Amer 98 >60 mL/min/1.73    Globulin 3.2 gm/dL    A/G Ratio 1.3 g/dL    BUN/Creatinine Ratio 21.7 7.0 - 25.0    Anion Gap 4.0 (L) 5.0 - 15.0 mmol/L   BNP    Collection Time: 07/01/21 11:06 AM    Specimen: Blood   Result Value Ref Range    proBNP 3,221.0 (H) 0.0 - 900.0 pg/mL   Troponin    Collection Time: 07/01/21 11:06 AM    Specimen: Blood   Result Value Ref Range    Troponin T 0.091 (C) 0.000 - 0.030 ng/mL   Green Top (Gel)    Collection Time: 07/01/21 11:06 AM   Result Value Ref Range    Extra Tube Hold for add-ons.    Lavender Top    Collection Time: 07/01/21 11:06 AM   Result Value Ref Range    Extra Tube  hold for add-on    Gold Top - SST    Collection Time: 07/01/21 11:06 AM   Result Value Ref Range    Extra Tube Hold for add-ons.    CBC Auto Differential    Collection Time: 07/01/21 11:06 AM    Specimen: Blood   Result Value Ref Range    WBC 6.69 3.40 - 10.80 10*3/mm3    RBC 3.50 (L) 3.77 - 5.28 10*6/mm3    Hemoglobin 10.2 (L) 12.0 - 15.9 g/dL    Hematocrit 35.3 34.0 - 46.6 %    .9 (H) 79.0 - 97.0 fL    MCH 29.1 26.6 - 33.0 pg    MCHC 28.9 (L) 31.5 - 35.7 g/dL    RDW 20.4 (H) 12.3 - 15.4 %    RDW-SD 74.8 (H) 37.0 - 54.0 fl    MPV 9.8 6.0 - 12.0 fL    Platelets 334 140 - 450 10*3/mm3    Neutrophil % 74.9 42.7 - 76.0 %    Lymphocyte % 15.4 (L) 19.6 - 45.3 %    Monocyte % 7.2 5.0 - 12.0 %    Eosinophil % 1.0 0.3 - 6.2 %    Basophil % 0.3 0.0 - 1.5 %    Immature Grans % 1.2 (H) 0.0 - 0.5 %    Neutrophils, Absolute 5.01 1.70 - 7.00 10*3/mm3    Lymphocytes, Absolute 1.03 0.70 - 3.10 10*3/mm3    Monocytes, Absolute 0.48 0.10 - 0.90 10*3/mm3    Eosinophils, Absolute 0.07 0.00 - 0.40 10*3/mm3    Basophils, Absolute 0.02 0.00 - 0.20 10*3/mm3    Immature Grans, Absolute 0.08 (H) 0.00 - 0.05 10*3/mm3    nRBC 0.0 0.0 - 0.2 /100 WBC   Phenobarbital Level    Collection Time: 07/01/21 11:06 AM    Specimen: Blood   Result Value Ref Range    Phenobarbital 34.1 (H) 10.0 - 30.0 mcg/mL   Lactic Acid, Plasma    Collection Time: 07/01/21 11:06 AM    Specimen: Blood   Result Value Ref Range    Lactate 0.7 0.5 - 2.0 mmol/L   Scan Slide    Collection Time: 07/01/21 11:06 AM    Specimen: Blood   Result Value Ref Range    Anisocytosis Slight/1+ None Seen    WBC Morphology Normal Normal    Platelet Morphology Normal Normal   Procalcitonin    Collection Time: 07/01/21 11:06 AM    Specimen: Blood   Result Value Ref Range    Procalcitonin 0.05 0.00 - 0.25 ng/mL   TSH    Collection Time: 07/01/21 11:06 AM    Specimen: Blood   Result Value Ref Range    TSH 0.755 0.270 - 4.200 uIU/mL   T4, Free    Collection Time: 07/01/21 11:06 AM     Specimen: Blood   Result Value Ref Range    Free T4 0.93 0.93 - 1.70 ng/dL   Urinalysis With Culture If Indicated - Urine, Catheter    Collection Time: 07/01/21 11:10 AM    Specimen: Urine, Catheter   Result Value Ref Range    Color, UA Yellow Yellow, Straw    Appearance, UA Cloudy (A) Clear    pH, UA 6.0 5.0 - 8.0    Specific Gravity, UA 1.015 1.001 - 1.030    Glucose, UA Negative Negative    Ketones, UA Negative Negative    Bilirubin, UA Negative Negative    Blood, UA Negative Negative    Protein,  mg/dL (2+) (A) Negative    Leuk Esterase, UA Moderate (2+) (A) Negative    Nitrite, UA Positive (A) Negative    Urobilinogen, UA 0.2 E.U./dL 0.2 - 1.0 E.U./dL   Urinalysis, Microscopic Only - Urine, Catheter    Collection Time: 07/01/21 11:10 AM    Specimen: Urine, Catheter   Result Value Ref Range    RBC, UA 0-2 None Seen, 0-2 /HPF    WBC, UA Too Numerous to Count (A) None Seen, 0-2 /HPF    Bacteria, UA 4+ (A) None Seen, Trace /HPF    Squamous Epithelial Cells, UA 0-2 None Seen, 0-2 /HPF    Hyaline Casts, UA 0-6 0 - 6 /LPF    Methodology Manual Light Microscopy    Urine Drug Screen - Urine, Catheter    Collection Time: 07/01/21 11:10 AM    Specimen: Urine, Catheter   Result Value Ref Range    THC, Screen, Urine Negative Negative    Phencyclidine (PCP), Urine Negative Negative    Cocaine Screen, Urine Negative Negative    Methamphetamine, Ur Negative Negative    Opiate Screen Negative Negative    Amphetamine Screen, Urine Negative Negative    Benzodiazepine Screen, Urine Positive (A) Negative    Tricyclic Antidepressants Screen Negative Negative    Methadone Screen, Urine Negative Negative    Barbiturates Screen, Urine Positive (A) Negative    Oxycodone Screen, Urine Negative Negative    Propoxyphene Screen Negative Negative    Buprenorphine, Screen, Urine Negative Negative   Blood Gas, Arterial With Co-Ox    Collection Time: 07/01/21 11:20 AM    Specimen: Arterial Blood   Result Value Ref Range    Site Right  Brachial     Adam's Test N/A     pH, Arterial 7.268 (L) 7.350 - 7.450 pH units    pCO2, Arterial 90.4 (C) 35.0 - 45.0 mm Hg    pO2, Arterial 102.0 83.0 - 108.0 mm Hg    HCO3, Arterial 41.3 (H) 20.0 - 26.0 mmol/L    Base Excess, Arterial 12.2 (H) 0.0 - 2.0 mmol/L    Hemoglobin, Blood Gas 8.7 (L) 14 - 18 g/dL    Hematocrit, Blood Gas 26.7 %    Oxyhemoglobin 93.4 (L) 94 - 99 %    Methemoglobin 0.20 0.00 - 1.50 %    Carboxyhemoglobin 4.4 (H) 0 - 2 %    CO2 Content 44.1 (H) 22 - 33 mmol/L    Temperature 37.0 C    Barometric Pressure for Blood Gas      Modality Nasal Cannula     FIO2 28 %    Rate 0 Breaths/minute    PIP 0 cmH2O    IPAP 0     EPAP 0     Note      Notified [facility]     Notified By [id number]     Notified Time 07/01/2021 11:19     pH, Temp Corrected 7.268 pH Units    pCO2, Temperature Corrected 90.4 (H) 35 - 45 mm Hg    pO2, Temperature Corrected 102 83 - 108 mm Hg   Blood Gas, Arterial With Co-Ox    Collection Time: 07/01/21  1:48 PM    Specimen: Arterial Blood   Result Value Ref Range    Site Right Radial     Adam's Test N/A     pH, Arterial 7.502 (H) 7.350 - 7.450 pH units    pCO2, Arterial 51.6 (H) 35.0 - 45.0 mm Hg    pO2, Arterial 273.0 (H) 83.0 - 108.0 mm Hg    HCO3, Arterial 40.4 (H) 20.0 - 26.0 mmol/L    Base Excess, Arterial 15.5 (H) 0.0 - 2.0 mmol/L    Hemoglobin, Blood Gas 8.6 (L) 14 - 18 g/dL    Hematocrit, Blood Gas 26.4 %    Oxyhemoglobin 97.5 94 - 99 %    Methemoglobin 0.20 0.00 - 1.50 %    Carboxyhemoglobin 3.1 (H) 0 - 2 %    CO2 Content 42.0 (H) 22 - 33 mmol/L    Temperature 37.0 C    Barometric Pressure for Blood Gas      Modality Ventilator     FIO2 60 %    Ventilator Mode VC+/AC     Set Tidal Volume 0.35     Set Mech Resp Rate 16     Rate 18 Breaths/minute    PEEP 5.0     Note      pH, Temp Corrected 7.502 pH Units    pCO2, Temperature Corrected 51.6 (H) 35 - 45 mm Hg    pO2, Temperature Corrected 273 (H) 83 - 108 mm Hg     Note: In addition to lab results from this visit, the labs  listed above may include labs taken at another facility or during a different encounter within the last 24 hours. Please correlate lab times with ED admission and discharge times for further clarification of the services performed during this visit.    XR Chest 1 View   Preliminary Result   Chronic changes without acute cardiopulmonary process.       D:  07/01/2021   E:  07/01/2021              XR Chest 1 View    (Results Pending)   XR Chest 1 View    (Results Pending)     Vitals:    07/01/21 1342 07/01/21 1345 07/01/21 1357 07/01/21 1408   BP:  146/83     Pulse: 79 78 83 92   Resp:       Temp:       TempSrc:       SpO2: 100% 100% 100% (!) 67%   Weight:       Height:         Medications   sodium chloride 0.9 % flush 10 mL (has no administration in time range)   ipratropium-albuterol (DUO-NEB) nebulizer solution 3 mL (has no administration in time range)   propofol (DIPRIVAN) infusion 10 mg/mL 100 mL (40 mcg/kg/min × 45.4 kg Intravenous Rate/Dose Change 7/1/21 1359)   cefTRIAXone (ROCEPHIN) 1 g/100 mL 0.9% NS (MBP) (has no administration in time range)   chlorhexidine (PERIDEX) 0.12 % solution 15 mL (has no administration in time range)   sodium chloride 0.9 % flush 10 mL (has no administration in time range)   enoxaparin (LOVENOX) syringe 40 mg (has no administration in time range)   pantoprazole (PROTONIX) injection 40 mg (has no administration in time range)   sodium chloride 0.9 % infusion (has no administration in time range)   naloxone (NARCAN) injection 0.4 mg (0.4 mg Intravenous Given 7/1/21 1214)   etomidate (AMIDATE) injection (20 mg Intravenous Given 7/1/21 1243)   succinylcholine (ANECTINE) injection (90 mg Intravenous Given 7/1/21 1243)     ECG/EMG Results (last 24 hours)     Procedure Component Value Units Date/Time    ECG 12 Lead [[id number]] Collected: 07/01/21 1032     Updated: 07/01/21 1208        ECG 12 Lead   Final Result   Test Reason : SOA Protocol   Blood Pressure :   */*   mmHG   Vent. Rate :   95 BPM     Atrial Rate :  95 BPM      P-R Int : 130 ms          QRS Dur :  70 ms       QT Int : 344 ms       P-R-T Axes :  86  81  51 degrees      QTc Int : 432 ms      Normal sinus rhythm   Cannot rule out Anterior infarct , age undetermined   Abnormal ECG   When compared with ECG of 14-JUN-2021 21:22,   No significant change was found   Confirmed by PABLO FRANCO MD ([id number]) on 7/1/2021 1:47:47 PM      Referred By: EDMD           Confirmed By: PABLO FRANCO MD                                                 [facility]    Final diagnoses:   Acute on chronic respiratory failure with hypercapnia (CMS/HCC)   Somnolence   Chronic obstructive pulmonary disease, unspecified COPD type (CMS/HCC)   Cystitis       Documentation assistance provided by aaliyah Velez.  Information recorded by the scribe was done at my direction and has been verified and validated by me.     Richie Velez  07/01/21 [id number]       Pablo Franco MD  07/01/21 [id number]

## 2021-07-01 NOTE — OUTREACH NOTE
COPD/PN Week 2 Survey      Responses   List of hospitals in [place] patient discharged from?  [facility]   Does the patient have one of the following disease processes/diagnoses(primary or secondary)?  COPD/Pneumonia   Was the primary reason for admission:  COPD exacerbation   Week 2 attempt successful?  No   Rescheduled  Revoked   Revoke  Readmitted          Maureen Hernández LPN

## 2021-07-01 NOTE — H&P
INTENSIVIST   INITIAL HOSPITAL VISIT NOTE     Hospital:  LOS: 0 days     Ms. An Abreu, 71 y.o. female is seen for a Chief Complaint of:  Chief Complaint   Patient presents with   • Shortness of Breath       Acute on chronic respiratory failure with hypercapnia     Gastroesophageal reflux disease    Seizure disorder on phenobarbital     PAD s/p R iliac stent     Paroxysmal AF    Dependence on 3L NC    CAD s/p CABG     Anemia    Pulmonary cachexia 2* COPD    H/O: recent GI bleed    + PFO     H/O CVA     Non-compliance    Debility    Subjective   S     An Abreu is a 71 y.o. female who presents to [facility] ED 07/01/21 for evaluation of shortness of breath.     Patient has a PMH of medical noncompliance with frequent hospital admissions (this marks her 6th admission this year alone), tobacco use, COPD on 2-3L NC, CAD s/p CABG, PAD s/p right iliac stent, PAF (not on OAC), chronic anemia, prior CVAs, PFO, seizures on phenobarbital, and GERD.     She recently presented to our ED on 6/14 with confusion, weakness, and s/p fall and was found to have AoC respiratory failure requiring BiPAP and anemia suspicious for GIB. She was admitted with GI consult - EGD revealed gastritis as well as a small hiatal hernia and colonoscopy was negative. She was discharged on 6/23 with  PT/OT however per chart documentation she refused to participate and cancelled therapy.     Per chart documentation (patient unable to provide history and no family present), last night she became more short of breath than usual with wheezing. She additionally noted fatigue and weakness. Symptoms persisted upon waking up this morning, prompting call to EMS. She was brought to our ED for further evaluation.     On ED arrival patient afebrile, hemodynamically stable, tachypneic (RR 30), and oxygenating appropriately on 2L. ABG was obtained with pH 7.26 / pCO2 90.4 / HCO3 41.3 and she was placed on BiPAP. Lab workup was significant for troponin  0.091, proBNP 3221, lactate 0.7, PCT 0.05, Hgb 10.2, and Hct 35.3. Phenobarb level elevated at 34.1. UDS positive for barbituates and benzos. UA dirty but concerning for UTI and she was initiated on Rocephin. CXR with chronic changes but negative for acute cardiopulmonary process.     While awaiting a bed in the ED she had further deterioration in her mental status and received 0.4 mg IV Narcan without effect. She was subsequently intubated with etomidate and succinylcholine and placed on mechanical ventilation.      PMH: She  has a past medical history of Aneurysm (CMS/[facility]), Angina pectoris (CMS/[facility]) (6/20/2016), Anxiety (6/20/2016), Arthritis (6/20/2016), CAD (coronary artery disease), Carotid artery stenosis, CHF (congestive heart failure) (CMS/[facility]), Chronic coronary artery disease (6/20/2016), Chronic left-sided low back pain with left-sided sciatica (2/1/2017), Chronic obstructive pulmonary disease (CMS/[facility]) (6/20/2016), Chronic respiratory failure with hypoxia (CMS/[facility]) (3/27/2021), Cobalamin deficiency (6/20/2016), Congestive heart failure (CMS/[facility]) (6/20/2016), COPD (chronic obstructive pulmonary disease) (CMS/[facility]), Depression (7/3/2018), Diverticulosis, Diverticulosis of large intestine without hemorrhage (5/5/2017), GERD (gastroesophageal reflux disease), GIB (gastrointestinal bleeding), HTN (hypertension), Hypercholesterolemia (6/20/2016), Hyperlipidemia, Mesenteric ischemia (CMS/[facility]) (2006), Mood disorder (CMS/[facility]), Oxygen dependent, PAF (paroxysmal atrial fibrillation) (CMS/[facility]), Paroxysmal atrial fibrillation (CMS/[facility]) (8/7/2017), PFO (patent foramen ovale), 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   PSxH: She  has a past surgical history that includes  Appendectomy; Cholecystectomy; Colostomy (2006); Hysterectomy; Iliac artery stent; Exploratory laparotomy; Revision / takedown colostomy (2008); Esophagogastroduodenoscopy (N/A, 6/16/2021); and Colonoscopy (N/A, 6/19/2021).      Medications:  No current facility-administered medications on file prior to encounter.     Current Outpatient Medications on File Prior to Encounter   Medication Sig   • albuterol (PROVENTIL) (2.5 MG/3ML) 0.083% nebulizer solution Take 2.5 mg by nebulization Every 4 (Four) Hours As Needed for Wheezing.   • albuterol sulfate  (90 Base) MCG/ACT inhaler Inhale 2 puffs Every 4 (Four) Hours As Needed for Wheezing or Shortness of Air.   • Anoro Ellipta 62.5-25 MCG/INH aerosol powder  inhaler Inhale 1 puff Every Morning.   • aspirin 81 MG tablet Take 81 mg by mouth Daily.   • cetirizine (zyrTEC) 10 MG tablet Take 0.5 tablets by mouth Daily.   • citalopram (CeleXA) 20 MG tablet Take 1 tablet by mouth Daily. Needs appointment for next refill.   • clopidogrel (PLAVIX) 75 MG tablet Take 1 tablet by mouth Daily.   • dilTIAZem CD (CARDIZEM CD) 240 MG 24 hr capsule Take 240 mg by mouth Daily.   • ferrous sulfate 325 (65 FE) MG tablet Take 325 mg by mouth Daily With Breakfast.   • furosemide (LASIX) 20 MG tablet Take 20 mg by mouth Daily.   • guaiFENesin (MUCINEX) 600 MG 12 hr tablet Take 1 tablet by mouth Every 12 (Twelve) Hours. (Patient taking differently: Take 600 mg by mouth Every 12 (Twelve) Hours As Needed for Cough or Congestion.)   • HYDROcodone-acetaminophen (NORCO)  MG per tablet Take 1 tablet by mouth Every 6 (Six) Hours As Needed for Moderate Pain .   • ipratropium-albuterol (DUO-NEB) 0.5-2.5 mg/3 ml nebulizer Take 3 mL by nebulization Every 4 (Four) Hours As Needed for Wheezing.   • lisinopril (PRINIVIL,ZESTRIL) 40 MG tablet Take 40 mg by mouth Daily.   • LORazepam (ATIVAN) 1 MG tablet TAKE 1 TABLET BY MOUTH TWICE DAILY. MUST LAST 30 DAYS.   • ondansetron (ZOFRAN) 4 MG tablet  Take 1 tablet by mouth Every 6 (Six) Hours As Needed for Nausea or Vomiting.   • pantoprazole (PROTONIX) 40 MG EC tablet Take 1 tablet by mouth 2 (Two) Times a Day Before Meals.   • PHENobarbital (LUMINAL) 100 MG tablet TAKE 1 AND 1/2 TABLETS BY MOUTH EVERY DAY   • promethazine (PHENERGAN) 12.5 MG tablet    • rosuvastatin (CRESTOR) 40 MG tablet Take 1 tablet by mouth Daily. (Patient taking differently: Take 40 mg by mouth Every Night.)   • [DISCONTINUED] benzonatate (Tessalon Perles) 100 MG capsule Take 1 capsule by mouth 3 (Three) Times a Day As Needed for Cough.       Allergies: She is allergic to keflex [cephalexin], sulfamethoxazole-trimethoprim, carbamazepine, codeine, and latex.   FH: Her family history includes Arthritis in her mother; Cancer in her mother; Heart attack in her father; Heart disease in her brother, child, and child; Hyperlipidemia in her father; Hypertension in her father; Stroke in her father.   SH: She  reports that she has been smoking cigarettes and electronic cigarette. She has a 40.00 pack-year smoking history. She has never used smokeless tobacco. She reports that she does not drink alcohol and does not use drugs.     The patient's relevant past medical, surgical and social history were reviewed and updated in Epic as appropriate.     ROS (14):   Review of Systems   Unable to perform ROS: Intubated       Objective   O     Vitals    Temp  Min: 98.2 °F (36.8 °C)  Max: 98.2 °F (36.8 °C)  BP  Min: 108/50  Max: 162/70  Pulse  Min: 71  Max: 100  Resp  Min: 20  Max: 30  SpO2  Min: 93 %  Max: 100 % No data recorded     I/O 24 hrs (7:00AM - 6:59 AM)  Intake/Output     None          Medications (drips):  propofol, Last Rate: 40 mcg/kg/min (07/01/21 5279)  sodium chloride        Physical Examination    Telemetry: Normal sinus rhythm.    Constitutional:  No acute distress. Cachectic.   ETT in place on mechanical ventilation.    HEENT: Normocephalic and atraumatic.   Neck:  Normal range of motion.  Neck supple.   No JVD present.   No thyromegaly.    Cardiovascular: Regular rate and rhythm.  No murmurs, rub or gallop.  No peripheral edema.   Respiratory: Normal respiratory effort.  Diminished bilaterally. No wheezing.    Abdominal:  Soft. No masses.   Non-tender. No distension.   No hepatosplenomegaly.   MSK: Normal muscle strength and tone.   Extremities: No digital cyanosis or clubbing.   Skin: Skin is warm and dry.  No rashes, lesions or ulcers noted.    Neurological:   Sedated.    Moves all extremities.   Psychiatric:  Unable to assess.          Results from last 7 days   Lab Units 07/01/21  1106   WBC 10*3/mm3 6.69   HEMOGLOBIN g/dL 10.2*   MCV fL 100.9*   PLATELETS 10*3/mm3 334     Results from last 7 days   Lab Units 07/01/21  1106   SODIUM mmol/L 140   POTASSIUM mmol/L 5.2   CO2 mmol/L 39.0*   CREATININE mg/dL 0.60     Estimated Creatinine Clearance: 44.9 mL/min (by C-G formula based on SCr of 0.6 mg/dL).  Results from last 7 days   Lab Units 07/01/21  1106   ALK PHOS U/L 114   BILIRUBIN mg/dL <0.2   ALT (SGPT) U/L 17   AST (SGOT) U/L 29       Results from last 7 days   Lab Units 07/01/21  1348 07/01/21  1120   PH, ARTERIAL pH units 7.502* 7.268*   PCO2, ARTERIAL mm Hg 51.6* 90.4*   PO2 ART mm Hg 273.0* 102.0   FIO2 % 60 28     Mechanical Ventilator Settings:            Resp Rate (Set): 16 (found)     FiO2 (%): (S) 40 %  PEEP/CPAP (cm H2O): 5 cm H20    Minute Ventilation (L/min) (Obs): 5.8 L/min  Resp Rate (Observed) Vent: 16  I:E Ratio (Set): 1:2  I:E Ratio (Obs): 1:2.4    PIP Observed (cm H2O): 40 cm H2O       Images:    Imaging Results (Last 24 Hours)     Procedure Component Value Units Date/Time    XR Chest 1 View [[id number]] Resulted: 07/01/21 1310     Updated: 07/01/21 1407    XR Chest 1 View [[id number]] Collected: 07/01/21 1112     Updated: 07/01/21 1148    Narrative:      EXAMINATION: XR CHEST 1 VW-      INDICATION: Shortness of air triage protocol.      COMPARISON: Chest x-ray 06/14/2021.      FINDINGS: Cardiac size borderline enlarged status post median sternotomy  and CABG without overt edema. No pneumothorax or pleural effusion.  Degenerative changes of the spine.           Impression:      Chronic changes without acute cardiopulmonary process.     D:  07/01/2021  E:  07/01/2021                  I reviewed the patient's new laboratory and imaging results.  I independently reviewed the patient's new images.    Assessment/Plan   A / P     Ms. Abreu is a 72yo F with end-stage COPD, chronic hypoxic and hypercapnic respiratory failure on 2L home O2, pulmonary cachexia and continued tobacco abuse who presented to [facility] ED on 7/1 with shortness of breath and wheezing. EMS was called and she was transported to the ED. Upon arrival, an ABG was performed which showed a pH of 7.26, pCO2 of 90.4 and she was placed on bipap. UA was performed and reflective of UTI. Rocephin was ordered but she has not received this. CXR negative for pneumonia. She continued to remain somnolent. She was intubated by the ED prior to a repeat ABG or any adjustment of Bipap. ABG after intubation shows a metabolic acidosis. She is grabbing for her ET tube.       Nutrition:   NPO Diet  Advance Directives:   Code Status and Medical Interventions:   Ordered at: 07/01/21 1406     Level Of Support Discussed With:    Health Care Surrogate     Code Status:    CPR     Medical Interventions (Level of Support Prior to Arrest):    Full       Active Hospital Problems    Diagnosis    • **Acute on chronic respiratory failure with hypercapnia     • H/O: recent GI bleed    • + PFO     • H/O CVA       MRI 2017: Old infarcts of the left cerebellum, left frontal lobe and right corona radiata.      • Non-compliance    • Debility    • Pulmonary cachexia 2* COPD    • Anemia    • CAD s/p CABG     • Dependence on 3L NC    • Paroxysmal AF      Not on anticoagulation due to frequent falls, anemia and recurrent GIB     • Gastroesophageal reflux disease    •  PAD s/p R iliac stent     • Seizure disorder on phenobarbital         Assessment / Plan:    Admit to ICU  Continue mechanical ventilation. Attempt to wean in the AM if stable. She may be a difficult wean secondary to severe lung disease. She appears to have had a prior tracheostomy.   Start Solumedrol.   Continue Duonebs.   Start Azithromycin for COPD exacerbation.   Continue Rocephin for UTI. Follow up urine culture.  Consider repeat palliative care consult when extubated. She would likely benefit from Hospice.   She may need a Trilogy vent at discharge though she continues to smoke and this would represent a fire hazard.   Needs to quit smoking  AM labs and CXR    Critically ill secondary to severe respiratory failure requiring mechanical ventilation with adjustment.     Plan of care and goals reviewed during interdisciplinary rounds.  I discussed the patient's findings and my recommendations with nursing staff    Time:   Critical Care Time: was greater than 45 minutes.(excluding time spent on other separately billable procedures or treating other patients).        I have personally seen, interviewed and examined the patient and verified all the key components of the history, physical examination, assessment and plan with Sunshine Roman, DNP, APRN, AGACNP-BC and reviewed the note, which reflects my changes and contributions.    Smiley Kong, DO  Pulmonary and Critical Care Medicine

## 2021-07-01 NOTE — CASE MANAGEMENT/SOCIAL WORK
Discharge Planning Assessment  [facility]     Patient Name: An Abreu  MRN: [id number]  Today's Date: 7/1/2021    Admit Date: 7/1/2021    Discharge Needs Assessment     Row Name 07/01/21 1219       Discharge Needs Assessment    Equipment Currently Used at Home  nebulizer    Row Name 07/01/21 1211       Living Environment    Lives With  child(nash), adult;grandchild(nash)    Name(s) of Who Lives With Patient  Lory Abreu, daughter    Current Living Arrangements  home/apartment/condo    Potentially Unsafe Housing Conditions  unable to assess    Primary Care Provided by  self;child(nash);other (see comments)    Provides Primary Care For  no one, unable/limited ability to care for self    Family Caregiver if Needed  child(nash), adult    Family Caregiver Names  Lory    Quality of Family Relationships  unable to assess    Able to Return to Prior Arrangements  other (see comments)    Living Arrangement Comments  unable to reach daughter, Lory or other contacts       Resource/Environmental Concerns    Resource/Environmental Concerns  financial    Financial Concerns  other (see comments)    Transportation Concerns  car, none       Transition Planning    Patient/Family Anticipates Transition to  home with family    Patient/Family Anticipated Services at Transition  home health care       Discharge Needs Assessment    Readmission Within the Last 30 Days  previous discharge plan unsuccessful    Current Outpatient/Agency/Support Group  homecare agency    Equipment Currently Used at Home  oxygen;walker, rolling    Anticipated Changes Related to Illness  none    Equipment Needed After Discharge  none    Outpatient/Agency/Support Group Needs  homecare agency    Provided Post Acute Provider List?  N/A    N/A Provider List Comment  unable to assess for outpt needs    Current Discharge Risk  chronically ill;dependent with mobility/activities of daily living;lack of support system/caregiver;legal concerns        Discharge Plan      Row Name 07/01/21 1220       Plan    Plan Comments  She was also discharged recently with [facility] for SN services. APS reports have been made in the past per RAMSEY. Palliative Care was also involved at last admission. Plan TBD. PCP is Zabrina Lemon    Final Discharge Disposition Code  30 - still a patient    Row Name 07/01/21 1215       Plan    Plan  IDP information gathered from last admission, unable to reach contact persons    Plan Comments  Pt lives with her daughter and great grandchilfren in [facility]. She not responsive and is wearing venti mask. Hx includes HTN, Seizures, Afib, Smoker, Home O2 (Patient Aids), and PNA.        Continued Care and Services - Admitted Since 7/1/2021    Coordination has not been started for this encounter.     Selected Continued Care - Prior Encounters Includes selections from prior encounters from 4/2/2021 to 7/1/2021    Discharged on 6/23/2021 Admission date: 6/14/2021 - Discharge disposition: Home-Health Care [facility]    Home Medical Care     Service Provider Selected Services Address Phone Fax Patient Preferred    [facility] [address] [place] [address] [address] [address] [phone] --                Discharged on 5/25/2021 Admission date: 5/22/2021 - Discharge disposition: Home-Health Care [facility]    Durable Medical Equipment     Service Provider Selected Services Address Phone Fax Patient Preferred    [facility] [place]  [facility] [address] [address] [phone] --          Home Medical Care     Service Provider Selected Services Address Phone Fax Patient Preferred    [facility] [address] [place] [address] [address] [phone] --                Discharged on 5/6/2021 Admission date: 5/3/2021 - Discharge disposition: Home or Self Care    Home Medical Care     Service Provider Selected Services Address Phone Fax  Patient Preferred    [facility] - [place]  [facility] [address], [place] [id number] [phone] --                      Demographic Summary    No documentation.       Functional Status     Row Name 07/01/21 1210       Functional Status    Usual Activity Tolerance  moderate       Functional Status, IADL    Medications  independent    Meal Preparation  assistive person    Housekeeping  completely dependent    Laundry  completely dependent    Shopping  completely dependent       Mental Status    General Appearance WDL  WDL       Mental Status Summary    Recent Changes in Mental Status/Cognitive Functioning  unable to assess       Employment/    Employment Status  retired        Psychosocial    No documentation.       Abuse/Neglect    No documentation.       Legal    No documentation.       Substance Abuse    No documentation.       Patient Forms    No documentation.           Elvira Trinh RN

## 2021-07-02 NOTE — PLAN OF CARE
Goal Outcome Evaluation:           Progress: improving   Pt extubated @1132. Passed post dysphagia screen. Has tolerated po diet. Pt has been up in chair for six hours. Has been up to BSC x3 voided on own this afternoon. Pt on 2 Liters NC. Sats have been in mid 90s. See 's noted. At this time, Anel is to remain POA until further changes are made.

## 2021-07-02 NOTE — CASE MANAGEMENT/SOCIAL WORK
Continued Stay Note  [facility]     Patient Name: An Abreu  MRN: [id number]  Today's Date: 7/2/2021    Admit Date: 7/1/2021    Discharge Plan     Row Name 07/02/21 1542       Plan    Plan  MSW available    Plan Comments  Pt’s daughter brought in a Living Will and presented to RN. MSW reviewed document. Living will was dated in 2019. Since this time pt has completed a new living will in 3/2021 and this is scanned into epic. Spoke with daughter and pt about this. Daughter upset but was reasonable. Pt states she would like to change her living. Plan is for pt to change living will once she is able to make these changes. Daughter verbalizes understanding. RN states pt’s current [facility] An[facility] Fall requested a call from MSW. MSW called [phone]- no answer.        Discharge Codes    No documentation.       Expected Discharge Date and Time     Expected Discharge Date Expected Discharge Time    Jul 9, 2021             IZZY Smith

## 2021-07-02 NOTE — CASE MANAGEMENT/SOCIAL WORK
Continued Stay Note  [facility]     Patient Name: An Abreu  MRN: [id number]  Today's Date: 7/2/2021    Admit Date: 7/1/2021    Discharge Plan     Row Name 07/02/21 1129       Plan    Plan  TBD    Plan Comments  Patient sedated on vent.  Patient is current with [facility].  Spoke with Mehnaz with [facility] at [phone].  Mehnaz states patient diagnosis is heart failure, emphysema,  and chronic respiratory failure.  Patient is being seen for SN and SW.  Patient has oxygen through [facility].  Spoke with Sarika at [phone] and patient is on 5 Lnc continuous.  Discharge plan at this time in undetermined.  May need  consult once patient is off ventilator.  CM will continue to follow.        Discharge Codes    No documentation.       Expected Discharge Date and Time     Expected Discharge Date Expected Discharge Time    Jul 9, 2021             Sujatha Guzman RN

## 2021-07-02 NOTE — PLAN OF CARE
Goal Outcome Evaluation:  Plan of Care Reviewed With: patient        Progress: no change  Outcome Summary: Pt remains intubated, minimal vent settings. Propofol gtt continued at max dose. Fentanyl pushes added to help with restlessness. Pt did not void this shift, I&O cath output 300 ml. Will continue to monitor pt closely.

## 2021-07-02 NOTE — PROGRESS NOTES
INTENSIVIST   PROGRESS NOTE     Hospital:  LOS: 1 day     Ms. An Abreu, 71 y.o. female is followed for a Chief Complaint of: Respiratory Failure      Subjective   S     Interval History:  No acute events. Awake on mechanical ventilation.        The patient's relevant past medical, surgical and social history were reviewed and updated in Epic as appropriate.      ROS: Unable to obtain secondary to mechanical ventilation.     Objective   O     Vitals:  Temp  Min: 97.4 °F (36.3 °C)  Max: 100.6 °F (38.1 °C)  BP  Min: 91/44  Max: 165/80  Pulse  Min: 62  Max: 92  Resp  Min: 12  Max: 25  SpO2  Min: 67 %  Max: 100 % Flow (L/min)  Min: 3  Max: 4    Intake/Ouptut 24 hrs (7:00AM - 6:59 AM)  Intake & Output (last 3 days)       06/29 0701 - 06/30 0700 06/30 0701 - 07/01 0700 07/01 0701 - 07/02 0700 07/02 0701 - 07/03 0700    I.V. (mL/kg)   1727.2 (38) 349 (7.7)    NG/GT    100    IV Piggyback    250    Total Intake(mL/kg)   1727.2 (38) 699 (15.4)    Urine (mL/kg/hr)   300 450 (1.7)    Stool    0    Total Output   300 450    Net   +1427.2 +249            Stool Unmeasured Occurrence    1 x          Medications (drips):  dextrose, Last Rate: 25 mL/hr (07/02/21 0945)        Mechanical Ventilator Settings:          Resp Rate (Set): 12  Pressure Support (cm H2O): 10 cm H20  FiO2 (%): 40 %  PEEP/CPAP (cm H2O): 5 cm H20    Minute Ventilation (L/min) (Obs): 6.62 L/min  Resp Rate (Observed) Vent: 23  I:E Ratio (Set): 1:3.55  I:E Ratio (Obs): 1:2.50    PIP Observed (cm H2O): 15 cm H2O  Plateau Pressure (cm H2O): (S) 20 cm H2O    Physical Examination  Telemetry:  Normal sinus rhythm.    Constitutional:  No acute distress.  ETT in place on mechanical ventilation.    Eyes: No scleral icterus.   PERRL, EOM intact.    Neck:  Supple, FROM   Cardiovascular: Normal rate, regular and rhythm. Normal heart sounds.  No murmurs, gallop or rub.   Respiratory: No respiratory distress. Normal respiratory effort.  Diminished bilaterally. No  wheezing.    Abdominal:  Soft. No masses. Nontender. No distension. No HSM.   Extremities: No digital cyanosis. No clubbing.  No peripheral edema.   Skin: No rashes, lesions or ulcers   Neurological:   Alert and follows commands on the vent.            Results from last 7 days   Lab Units 07/02/21  0608 07/01/21  1106   WBC 10*3/mm3 8.41 6.69   HEMOGLOBIN g/dL 9.5* 10.2*   MCV fL 97.6* 100.9*   PLATELETS 10*3/mm3 291 334     Results from last 7 days   Lab Units 07/02/21  0608 07/01/21  1106   SODIUM mmol/L 138 140   POTASSIUM mmol/L 4.2 5.2   CO2 mmol/L 33.0* 39.0*   CREATININE mg/dL 0.46* 0.60   GLUCOSE mg/dL 61* 113*   MAGNESIUM mg/dL 2.2  --    PHOSPHORUS mg/dL 2.6  --      Estimated Creatinine Clearance: 44.9 mL/min (A) (by C-G formula based on SCr of 0.46 mg/dL (L)).  Results from last 7 days   Lab Units 07/01/21  1106   ALK PHOS U/L 114   BILIRUBIN mg/dL <0.2   ALT (SGPT) U/L 17   AST (SGOT) U/L 29       Results from last 7 days   Lab Units 07/02/21  1119 07/02/21  0345 07/01/21  1348 07/01/21  1120   PH, ARTERIAL pH units 7.478* 7.490* 7.502* 7.268*   PCO2, ARTERIAL mm Hg 46.9* 46.3* 51.6* 90.4*   PO2 ART mm Hg 93.9 122.0* 273.0* 102.0   FIO2 % 40 40 60 28       Images:  Imaging Results (Last 24 Hours)     Procedure Component Value Units Date/Time    XR Chest 1 View [[id number]] Collected: 07/02/21 0912     Updated: 07/02/21 0958    Narrative:      EXAMINATION: XR CHEST 1 VW- 07/02/2021     INDICATION: Intubated patient; J96.22-Acute and chronic respiratory  failure with hypercapnia; R40.0-Somnolence; J44.9-Chronic obstructive  pulmonary disease, unspecified; N30.90-Cystitis, unspecified without  hematuria     COMPARISON: 07/01/2021     FINDINGS: ET tube and NG tube appear to be in satisfactory position. The  heart is normal in size. The vasculature appears normal. Lungs are  hyperexpanded and appear clear except for subtle patchy interstitial  changes in the left lung base, perhaps mild peribronchial  thickening. No  effusion or pneumothorax is seen. Old irregularly healed left proximal  humerus fracture is again noted.       Impression:      Appearance of mild peribronchial thickening in the left lung  base. No evidence of active chest disease elsewhere.     D:  07/02/2021  E:  07/02/2021           XR Abdomen KUB [[id number]] Collected: 07/01/21 1705     Updated: 07/01/21 2338    Narrative:      EXAMINATION: XR ABDOMEN KUB - 07/01/2021     INDICATION: J96.22-Acute and chronic respiratory failure with  hypercapnia; R40.0-Somnolence; J44.9-Chronic obstructive pulmonary  disease, unspecified; N30.90-Cystitis, unspecified without hematuria. OG  tube placement.     COMPARISON: 10/29/2020 KUB     FINDINGS: NG tube is seen at the GE junction but not yet in the stomach.  Very little bowel gas is visible in the upper abdomen.       Impression:      NG tube tip at or just above the GE junction.     DICTATED:   07/01/2021  EDITED/ls :   07/01/2021         This report was finalized on 7/1/2021 11:35 PM by Dr. Tre Huerta MD.       XR Abdomen KUB [[id number]] Collected: 07/01/21 1817     Updated: 07/01/21 2338    Narrative:      EXAMINATION: XR ABDOMEN KUB - 07/01/2021     INDICATION: J96.22-Acute and chronic respiratory failure with  hypercapnia; R40.0-Somnolence; J44.9-Chronic obstructive pulmonary  disease, unspecified; N30.90-Cystitis, unspecified without hematuria. OG  advancement.     COMPARISON: 4:38 PM exam of same date.     FINDINGS: NG tube has been advanced to the mid or distal stomach, now in  good position. Upper abdominal bowel gas pattern is nonobstructive.       Impression:      NG tube tip is now seen in the mid to distal stomach in good  position.      DICTATED:   07/01/2021  EDITED/ls :   07/01/2021     This report was finalized on 7/1/2021 11:35 PM by Dr. Tre Huerta MD.       XR Chest 1 View [[id number]] Collected: 07/01/21 1511     Updated: 07/01/21 2030    Narrative:         EXAMINATION: XR CHEST 1 VW -  07/01/2021     INDICATION: J96.22-Acute and chronic respiratory failure with  hypercapnia; R40.0-Somnolence; J44.9-Chronic obstructive pulmonary  disease, unspecified; N30.90-Cystitis, unspecified without hematuria.  Post intubation.     COMPARISON: Chest x-ray 07/01/2021     FINDINGS: Status post intubation with endotracheal tube terminating 3 cm  above the level of the sujata in satisfactory positioning. Cardiac size  unchanged without overt edema or effusion. No pneumothorax.       Impression:      Status post intubation with endotracheal tube terminating 3  cm above the level of the sujata in satisfactory positioning.      DICTATED:   07/01/2021  EDITED/ls :   07/01/2021                  Results: Reviewed.  I reviewed the patient's new laboratory and imaging results.  I independently reviewed the patient's new images.    Medications: Reviewed.    Assessment/Plan   A / P     Ms. Abreu is a 70yo F with end-stage COPD, chronic hypoxic and hypercapnic respiratory failure on 2L home O2, pulmonary cachexia and continued tobacco abuse who presented to the [facility] ED on 7/1 with shortness of breath and wheezing. EMS was called and she was transported to the ED. Upon arrival, an ABG was performed which showed a pH of 7.26, pCO2 of 90.4 and she was placed on bipap. UA was performed and reflective of UTI. Rocephin was ordered but she has not received this. CXR negative for pneumonia. She continued to remain somnolent. She was intubated by the ED prior to a repeat ABG or any adjustment of Bipap. ABG after intubation shows a respiratory alkalosis.    She is awake on mechanical ventilation this AM.      Nutrition:   NPO Diet  Advance Directives:   Code Status and Medical Interventions:   Ordered at: 07/01/21 1406     Level Of Support Discussed With:    Health Care Surrogate     Code Status:    CPR     Medical Interventions (Level of Support Prior to Arrest):    Full       Active Hospital Problems    Diagnosis    • **Acute on  chronic respiratory failure with hypercapnia     • H/O: recent GI bleed    • + PFO     • H/O CVA       MRI 2017: Old infarcts of the left cerebellum, left frontal lobe and right corona radiata.      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  Continue Solumedrol today. Plan to transition to Prednisone tomorrow.   Complete a course of Azithromycin and Rocephin.   Consider Palliative Care referral.   She may need a Trilogy vent at discharge.   Needs to quit smoking.   AM labs    High level of risk due to:  severe exacerbation of chronic illness and illness with threat to life or bodily function.    Plan of care and goals reviewed during interdisciplinary rounds.  I discussed the patient's findings and my recommendations with patient and nursing staff      Smiley Kong, DO    Intensive Care Medicine and Pulmonary Medicine

## 2021-07-02 NOTE — PAYOR COMM NOTE
"Sunshine Mitchell, RN Utilization Review [phone]  Fax # [phone]      Notification of admission and initial clinical. STatus is inpatient.      An Abreu (71 y.o. Female)     Date of Birth Social Security Number Address Home Phone MRN    1950  [id number] [id number] [phone] [id number]    [employer] Mar[employer] Status          None        Admission Date Admission Type Admitting Provider Attending Provider Department, Room/Bed    7/1/21 Emergency CaseSmiley DO Case, Theresa V., DO [facility] 2A ICU, N201/1    Discharge Date Discharge Disposition Discharge Destination                       Attending Provider: Smiley Kong DO    Allergies: Keflex [Cephalexin], Sulfamethoxazole-trimethoprim, Carbamazepine, Codeine, Latex    Isolation: None   Infection: None   Code Status: CPR    Ht: 144.8 cm (57.01\")   Wt: 45.4 kg (100 lb)    Admission Cmt: None   Principal Problem: Acute on chronic respiratory failure with hypercapnia  [J96.22]                 Active Insurance as of 7/1/2021     Primary Coverage     Payor Plan Insurance Group Employer/Plan Group    [facility]IC[facility]PLAC[facility]      Payor Plan Address Payor Plan Phone Number Payor Plan Fax Number Effective Dates    [address] [address] [phone]  1/31/2018 - None Entered    [facility] [address]       Subscriber Name Subscriber Birth Date Member ID       AN ABREU [id number]                 Emergency Contacts      (Rel.) Home Phone Work Phone Mobile Phone    HEATHER STRINGER (Surrogate) [phone] -- [phone]    MARLENE ABREU (Daughter) [phone] -- --    DEANNA ROTH (Friend) [phone] -- [phone]    GEOVANYSTEFANIA RICO (Daughter) [phone] -- --               History & Physical      Smiley Kong DO at 07/01/21 1214          INTENSIVIST   INITIAL HOSPITAL VISIT NOTE     Hospital:  LOS: 0 days "     Ms. An Abreu, 71 y.o. female is seen for a Chief Complaint of:  Chief Complaint   Patient presents with   • Shortness of Breath       Acute on chronic respiratory failure with hypercapnia     Gastroesophageal reflux disease    Seizure disorder on phenobarbital     PAD s/p R iliac stent     Paroxysmal AF    Dependence on 3L NC    CAD s/p CABG     Anemia    Pulmonary cachexia 2* COPD    H/O: recent GI bleed    + PFO     H/O CVA     Non-compliance    Debility    Subjective   S     An Abreu is a 71 y.o. female who presents to [facility] ED 07/01/21 for evaluation of shortness of breath.     Patient has a PMH of medical noncompliance with frequent hospital admissions (this marks her 6th admission this year alone), tobacco use, COPD on 2-3L NC, CAD s/p CABG, PAD s/p right iliac stent, PAF (not on OAC), chronic anemia, prior CVAs, PFO, seizures on phenobarbital, and GERD.     She recently presented to our ED on 6/14 with confusion, weakness, and s/p fall and was found to have AoC respiratory failure requiring BiPAP and anemia suspicious for GIB. She was admitted with GI consult - EGD revealed gastritis as well as a small hiatal hernia and colonoscopy was negative. She was discharged on 6/23 with  PT/OT however per chart documentation she refused to participate and cancelled therapy.     Per chart documentation (patient unable to provide history and no family present), last night she became more short of breath than usual with wheezing. She additionally noted fatigue and weakness. Symptoms persisted upon waking up this morning, prompting call to EMS. She was brought to our ED for further evaluation.     On ED arrival patient afebrile, hemodynamically stable, tachypneic (RR 30), and oxygenating appropriately on 2L. ABG was obtained with pH 7.26 / pCO2 90.4 / HCO3 41.3 and she was placed on BiPAP. Lab workup was significant for troponin 0.091, proBNP 3221, lactate 0.7, PCT 0.05, Hgb 10.2, and Hct 35.3.  Phenobarb level elevated at 34.1. UDS positive for barbituates and benzos. UA dirty but concerning for UTI and she was initiated on Rocephin. CXR with chronic changes but negative for acute cardiopulmonary process.     While awaiting a bed in the ED she had further deterioration in her mental status and received 0.4 mg IV Narcan without effect. She was subsequently intubated with etomidate and succinylcholine and placed on mechanical ventilation.      PMH: She  has a past medical history of Aneurysm (CMS/[facility]), Angina pectoris (CMS/[facility]) (6/20/2016), Anxiety (6/20/2016), Arthritis (6/20/2016), CAD (coronary artery disease), Carotid artery stenosis, CHF (congestive heart failure) (CMS/[facility]), Chronic coronary artery disease (6/20/2016), Chronic left-sided low back pain with left-sided sciatica (2/1/2017), Chronic obstructive pulmonary disease (CMS/[facility]) (6/20/2016), Chronic respiratory failure with hypoxia (CMS/[facility]) (3/27/2021), Cobalamin deficiency (6/20/2016), Congestive heart failure (CMS/[facility]) (6/20/2016), COPD (chronic obstructive pulmonary disease) (CMS/[facility]), Depression (7/3/2018), Diverticulosis, Diverticulosis of large intestine without hemorrhage (5/5/2017), GERD (gastroesophageal reflux disease), GIB (gastrointestinal bleeding), HTN (hypertension), Hypercholesterolemia (6/20/2016), Hyperlipidemia, Mesenteric ischemia (CMS/[facility]) (2006), Mood disorder (CMS/[facility]), Oxygen dependent, PAF (paroxysmal atrial fibrillation) (CMS/[facility]), Paroxysmal atrial fibrillation (CMS/[facility]) (8/7/2017), PFO (patent foramen ovale), 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   PSxH: She  has a past surgical history that includes Appendectomy; Cholecystectomy; Colostomy (2006); Hysterectomy; Iliac  artery stent; Exploratory laparotomy; Revision / takedown colostomy (2008); Esophagogastroduodenoscopy (N/A, 6/16/2021); and Colonoscopy (N/A, 6/19/2021).      Medications:  No current facility-administered medications on file prior to encounter.     Current Outpatient Medications on File Prior to Encounter   Medication Sig   • albuterol (PROVENTIL) (2.5 MG/3ML) 0.083% nebulizer solution Take 2.5 mg by nebulization Every 4 (Four) Hours As Needed for Wheezing.   • albuterol sulfate  (90 Base) MCG/ACT inhaler Inhale 2 puffs Every 4 (Four) Hours As Needed for Wheezing or Shortness of Air.   • Anoro Ellipta 62.5-25 MCG/INH aerosol powder  inhaler Inhale 1 puff Every Morning.   • aspirin 81 MG tablet Take 81 mg by mouth Daily.   • cetirizine (zyrTEC) 10 MG tablet Take 0.5 tablets by mouth Daily.   • citalopram (CeleXA) 20 MG tablet Take 1 tablet by mouth Daily. Needs appointment for next refill.   • clopidogrel (PLAVIX) 75 MG tablet Take 1 tablet by mouth Daily.   • dilTIAZem CD (CARDIZEM CD) 240 MG 24 hr capsule Take 240 mg by mouth Daily.   • ferrous sulfate 325 (65 FE) MG tablet Take 325 mg by mouth Daily With Breakfast.   • furosemide (LASIX) 20 MG tablet Take 20 mg by mouth Daily.   • guaiFENesin (MUCINEX) 600 MG 12 hr tablet Take 1 tablet by mouth Every 12 (Twelve) Hours. (Patient taking differently: Take 600 mg by mouth Every 12 (Twelve) Hours As Needed for Cough or Congestion.)   • HYDROcodone-acetaminophen (NORCO)  MG per tablet Take 1 tablet by mouth Every 6 (Six) Hours As Needed for Moderate Pain .   • ipratropium-albuterol (DUO-NEB) 0.5-2.5 mg/3 ml nebulizer Take 3 mL by nebulization Every 4 (Four) Hours As Needed for Wheezing.   • lisinopril (PRINIVIL,ZESTRIL) 40 MG tablet Take 40 mg by mouth Daily.   • LORazepam (ATIVAN) 1 MG tablet TAKE 1 TABLET BY MOUTH TWICE DAILY. MUST LAST 30 DAYS.   • ondansetron (ZOFRAN) 4 MG tablet Take 1 tablet by mouth Every 6 (Six) Hours As Needed for Nausea or  Vomiting.   • pantoprazole (PROTONIX) 40 MG EC tablet Take 1 tablet by mouth 2 (Two) Times a Day Before Meals.   • PHENobarbital (LUMINAL) 100 MG tablet TAKE 1 AND 1/2 TABLETS BY MOUTH EVERY DAY   • promethazine (PHENERGAN) 12.5 MG tablet    • rosuvastatin (CRESTOR) 40 MG tablet Take 1 tablet by mouth Daily. (Patient taking differently: Take 40 mg by mouth Every Night.)   • [DISCONTINUED] benzonatate (Tessalon Perles) 100 MG capsule Take 1 capsule by mouth 3 (Three) Times a Day As Needed for Cough.       Allergies: She is allergic to keflex [cephalexin], sulfamethoxazole-trimethoprim, carbamazepine, codeine, and latex.   FH: Her family history includes Arthritis in her mother; Cancer in her mother; Heart attack in her father; Heart disease in her brother, child, and child; Hyperlipidemia in her father; Hypertension in her father; Stroke in her father.   SH: She  reports that she has been smoking cigarettes and electronic cigarette. She has a 40.00 pack-year smoking history. She has never used smokeless tobacco. She reports that she does not drink alcohol and does not use drugs.     The patient's relevant past medical, surgical and social history were reviewed and updated in Epic as appropriate.     ROS (14):   Review of Systems   Unable to perform ROS: Intubated       Objective   O     Vitals    Temp  Min: 98.2 °F (36.8 °C)  Max: 98.2 °F (36.8 °C)  BP  Min: 108/50  Max: 162/70  Pulse  Min: 71  Max: 100  Resp  Min: 20  Max: 30  SpO2  Min: 93 %  Max: 100 % No data recorded     I/O 24 hrs (7:00AM - 6:59 AM)  Intake/Output     None          Medications (drips):  propofol, Last Rate: 40 mcg/kg/min (07/01/21 5508)  sodium chloride        Physical Examination    Telemetry: Normal sinus rhythm.    Constitutional:  No acute distress. Cachectic.   ETT in place on mechanical ventilation.    HEENT: Normocephalic and atraumatic.   Neck:  Normal range of motion. Neck supple.   No JVD present.   No thyromegaly.    Cardiovascular:  Regular rate and rhythm.  No murmurs, rub or gallop.  No peripheral edema.   Respiratory: Normal respiratory effort.  Diminished bilaterally. No wheezing.    Abdominal:  Soft. No masses.   Non-tender. No distension.   No hepatosplenomegaly.   MSK: Normal muscle strength and tone.   Extremities: No digital cyanosis or clubbing.   Skin: Skin is warm and dry.  No rashes, lesions or ulcers noted.    Neurological:   Sedated.    Moves all extremities.   Psychiatric:  Unable to assess.          Results from last 7 days   Lab Units 07/01/21  1106   WBC 10*3/mm3 6.69   HEMOGLOBIN g/dL 10.2*   MCV fL 100.9*   PLATELETS 10*3/mm3 334     Results from last 7 days   Lab Units 07/01/21  1106   SODIUM mmol/L 140   POTASSIUM mmol/L 5.2   CO2 mmol/L 39.0*   CREATININE mg/dL 0.60     Estimated Creatinine Clearance: 44.9 mL/min (by C-G formula based on SCr of 0.6 mg/dL).  Results from last 7 days   Lab Units 07/01/21  1106   ALK PHOS U/L 114   BILIRUBIN mg/dL <0.2   ALT (SGPT) U/L 17   AST (SGOT) U/L 29       Results from last 7 days   Lab Units 07/01/21  1348 07/01/21  1120   PH, ARTERIAL pH units 7.502* 7.268*   PCO2, ARTERIAL mm Hg 51.6* 90.4*   PO2 ART mm Hg 273.0* 102.0   FIO2 % 60 28     Mechanical Ventilator Settings:            Resp Rate (Set): 16 (found)     FiO2 (%): (S) 40 %  PEEP/CPAP (cm H2O): 5 cm H20    Minute Ventilation (L/min) (Obs): 5.8 L/min  Resp Rate (Observed) Vent: 16  I:E Ratio (Set): 1:2  I:E Ratio (Obs): 1:2.4    PIP Observed (cm H2O): 40 cm H2O       Images:    Imaging Results (Last 24 Hours)     Procedure Component Value Units Date/Time    XR Chest 1 View [[id number]] Resulted: 07/01/21 1310     Updated: 07/01/21 1407    XR Chest 1 View [[id number]] Collected: 07/01/21 1112     Updated: 07/01/21 1148    Narrative:      EXAMINATION: XR CHEST 1 VW-      INDICATION: Shortness of air triage protocol.      COMPARISON: Chest x-ray 06/14/2021.     FINDINGS: Cardiac size borderline enlarged status post median  sternotomy  and CABG without overt edema. No pneumothorax or pleural effusion.  Degenerative changes of the spine.           Impression:      Chronic changes without acute cardiopulmonary process.     D:  07/01/2021  E:  07/01/2021                  I reviewed the patient's new laboratory and imaging results.  I independently reviewed the patient's new images.    Assessment/Plan   A / P     Ms. Abreu is a 70yo F with end-stage COPD, chronic hypoxic and hypercapnic respiratory failure on 2L home O2, pulmonary cachexia and continued tobacco abuse who presented to the [facility] ED on 7/1 with shortness of breath and wheezing. EMS was called and she was transported to the ED. Upon arrival, an ABG was performed which showed a pH of 7.26, pCO2 of 90.4 and she was placed on bipap. UA was performed and reflective of UTI. Rocephin was ordered but she has not received this. CXR negative for pneumonia. She continued to remain somnolent. She was intubated by the ED prior to a repeat ABG or any adjustment of Bipap. ABG after intubation shows a metabolic acidosis. She is grabbing for her ET tube.       Nutrition:   NPO Diet  Advance Directives:   Code Status and Medical Interventions:   Ordered at: 07/01/21 1406     Level Of Support Discussed With:    Health Care Surrogate     Code Status:    CPR     Medical Interventions (Level of Support Prior to Arrest):    Full       Active Hospital Problems    Diagnosis    • **Acute on chronic respiratory failure with hypercapnia     • H/O: recent GI bleed    • + PFO     • H/O CVA       MRI 2017: Old infarcts of the left cerebellum, left frontal lobe and right corona radiata.      • Non-compliance    • Debility    • Pulmonary cachexia 2* COPD    • Anemia    • CAD s/p CABG     • Dependence on 3L NC    • Paroxysmal AF      Not on anticoagulation due to frequent falls, anemia and recurrent GIB     • Gastroesophageal reflux disease    • PAD s/p R iliac stent     • Seizure disorder on phenobarbital          Assessment / Plan:    1. Admit to ICU  2. Continue mechanical ventilation. Attempt to wean in the AM if stable. She may be a difficult wean secondary to severe lung disease. She appears to have had a prior tracheostomy.   3. Start Solumedrol.   4. Continue Duonebs.   5. Start Azithromycin for COPD exacerbation.   6. Continue Rocephin for UTI. Follow up urine culture.  7. Consider repeat palliative care consult when extubated. She would likely benefit from Hospice.   8. She may need a Trilogy vent at discharge though she continues to smoke and this would represent a fire hazard.   9. Needs to quit smoking  10. AM labs and CXR    Critically ill secondary to severe respiratory failure requiring mechanical ventilation with adjustment.     Plan of care and goals reviewed during interdisciplinary rounds.  I discussed the patient's findings and my recommendations with nursing staff    Time:   Critical Care Time: was greater than 45 minutes.(excluding time spent on other separately billable procedures or treating other patients).        I have personally seen, interviewed and examined the patient and verified all the key components of the history, physical examination, assessment and plan with Sunshine Roman, DNP, APRN, AGACNP-BC and reviewed the note, which reflects my changes and contributions.    Smiley Kong DO  Pulmonary and Critical Care Medicine     Electronically signed by Smiley Kong DO at 07/01/21 155          Emergency Department Notes      Anugs Franco MD at 07/01/21 1049      Procedure Orders    1. Intubation [[id number]] ordered by Angus Franco MD    2. Critical Care [[id number]] ordered by Angus Franco MD               Subjective   Patient is a 71 y.o. female presenting to the ED complaining of shortness of breath. Patient has a history of COPD and reports wheezing beginning last night. She also reports fatigue, pain in her shoulder, and more shortness of breath than usual. She denies  having any new cough or fever. The patient currently wears 2L of oxygen constantly. During examination, the patient was very fatigued and had difficulty answering questions. The patient was admitted to the hospital on 6/14 for malnutrition, iron deficiency, COPD, and asthma. The patient also has a history of seizures an gastroesophageal reflux disease.      History provided by:  Patient and EMS personnel  History limited by:  Mental status change  Shortness of Breath  Severity:  Moderate  Onset quality:  Gradual  Duration:  1 day  Timing:  Constant  Progression:  Worsening  Chronicity:  Chronic  Relieved by:  None tried  Worsened by:  Nothing  Ineffective treatments:  None tried  Associated symptoms: cough    Risk factors: tobacco use        Review of Systems   Unable to perform ROS: Mental status change   Constitutional: Positive for fatigue.   Respiratory: Positive for cough and shortness of breath.    Musculoskeletal:        Pain in shoulder.       Past Medical History:   Diagnosis Date   • Aneurysm (CMS/HCC)    • Angina pectoris (CMS/HCC) 6/20/2016   • Anxiety 6/20/2016   • Arthritis 6/20/2016   • CAD (coronary artery disease)    • Carotid artery stenosis    • CHF (congestive heart failure) (CMS/HCC)    • Chronic coronary artery disease 6/20/2016 2003 CABG LIMA to LAD, VG to OM 2004 VG to OM occluded. LIMA patent Cx intervention and RCA 2008 stent for ISR 2013 ISR and stenting of CX and RCA 2016 normal MPS   • Chronic left-sided low back pain with left-sided sciatica 2/1/2017   • Chronic obstructive pulmonary disease (CMS/HCC) 6/20/2016   • Chronic respiratory failure with hypoxia (CMS/HCC) 3/27/2021   • Cobalamin deficiency 6/20/2016   • Congestive heart failure (CMS/HCC) 6/20/2016   • COPD (chronic obstructive pulmonary disease) (CMS/HCC)    • Depression 7/3/2018   • Diverticulosis    • Diverticulosis of large intestine without hemorrhage 5/5/2017   • GERD (gastroesophageal reflux disease)    • GIB  (gastrointestinal bleeding)     recurrent    • HTN (hypertension)    • Hypercholesterolemia 6/20/2016   • Hyperlipidemia    • Mesenteric ischemia (CMS/[facility]) 2006    s/p resection    • Mood disorder (CMS/[facility])    • Oxygen dependent     3 liters @ all times.    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    Tolerated Rocephin 2/19/21, zosyn 5/2021   • Sulfamethoxazole-Trimethoprim Shortness Of Breath and Rash     Patients power of  stated patient had to be taken to ER due to reaction.   • Carbamazepine    • Codeine    • Latex Rash       Past Surgical History:   Procedure Laterality Date   • APPENDECTOMY     • CHOLECYSTECTOMY     • COLONOSCOPY N/A 6/19/2021    Procedure: COLONOSCOPY;  Surgeon: Wilfredo Simon MD;  Location:  [facility];  Service: Gastroenterology;  Laterality: N/A;   • COLOSTOMY  2006    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   • ENDOSCOPY N/A 6/16/2021    Procedure: ESOPHAGOGASTRODUODENOSCOPY;  Surgeon: Jean Fuentes MD;  Location: [email or web address];  Service: Gastroenterology;  Laterality: N/A;   • EXPLORATORY LAPAROTOMY      sec to ovarian cysts   • HYSTERECTOMY     • ILIAC ARTERY STENT     • REVISION / TAKEDOWN COLOSTOMY  2008       Family History   Problem Relation Age of Onset   • Arthritis Mother    • Cancer Mother    • Heart attack Father    • Hypertension Father    • Hyperlipidemia Father    • Stroke Father    • Heart disease Brother          CAD   • Heart disease Child         CAD   • Heart disease Child         CAD       Social History     Socioeconomic History   • Marital status:      Spouse name: Not on file   • Number of children: Not on file   • Years of education: Not on file   • Highest education level: Not on file   Tobacco Use   • Smoking status: Current Every Day Smoker     Packs/day: 1.00     Years: 40.00     Pack years: 40.00     Types: Cigarettes, Electronic Cigarette   • Smokeless tobacco: Never Used   • Tobacco comment: <0.5ppd    Substance and Sexual Activity   • Alcohol use: Never   • Drug use: Never   • Sexual activity: Defer         Objective   Physical Exam  Vitals and nursing note reviewed.   Constitutional:       General: She is not in acute distress.     Appearance: Normal appearance.      Comments: Fatigued, difficult to arouse.   HENT:      Head: Normocephalic and atraumatic.      Right Ear: External ear normal.      Left Ear: External ear normal.      Nose: Nose normal.   Eyes:      General: No scleral icterus.     Conjunctiva/sclera: Conjunctivae normal.   Cardiovascular:      Rate and Rhythm: Normal rate and regular rhythm.      Heart sounds: Normal heart sounds.   Pulmonary:      Breath sounds: Decreased breath sounds and wheezing present. No rhonchi or rales.      Comments: Tachypnea.  Expiratory wheezes.  Crackles in base and mid-lung fields.  Chest:      Chest wall: No tenderness.   Abdominal:      General: There is no distension.      Palpations: Abdomen is soft.      Tenderness: There is no abdominal tenderness.   Musculoskeletal:         General: Normal range of motion.      Cervical back: Normal range of motion and neck supple.      Right lower leg: No tenderness. No edema.      Left lower leg: No tenderness. No edema.   Skin:     General: Skin is warm and dry.      Comments: No clubbing, cyanosis, or edema.     Neurological:      General: No focal deficit present.      Comments: Patient is very sedate  but does awaken and answer questions, but immediately closes her eyes and drifts back to sleep.  She can squeeze my fingers.  Her speech is very slurred.  There is no facial droop.  Pupils are reactive with EOMI.  There is no significant nystagmus.   Psychiatric:         Mood and Affect: Mood normal.         Behavior: Behavior normal.         Intubation    Date/Time: 7/1/2021 12:56 PM  Performed by: Angus Franco MD  Authorized by: Angus Franco MD     Consent:     Consent obtained:  Emergent situation    Consent given by:  Guardian  Pre-procedure details:     Patient status:  Altered mental status    Pretreatment meds: Etomidate.    Paralytics:  Succinylcholine  Procedure details:     Preoxygenation:  BiPAP    CPR in progress: no      Intubation method:  Oral    Laryngoscope size: Low Pro4.    Tube size (mm):  7.5    Tube type:  Cuffed    Number of attempts:  1    Ventilation between attempts: no      Cricoid pressure: no      Tube visualized through cords: yes    Placement assessment:     ETT to lip:  22    Tube secured with:  ETT soria    Breath sounds:  Equal    Placement verification: chest rise, direct visualization, equal breath sounds, ETCO2 detector and tube exhalation    Post-procedure details:     Patient tolerance of procedure:  Tolerated well, no immediate complications  Critical Care  Performed by: Angus Franco MD  Authorized by: Angus Franco MD     Critical care provider statement:     Critical care time (minutes):  40    Critical care time was exclusive of:  Separately billable procedures and treating other patients    Critical care was necessary to treat or prevent imminent or life-threatening deterioration of the following conditions:  Respiratory failure    Critical care was time spent personally by me on the following activities:  Development of treatment plan with patient or surrogate, discussions with consultants, evaluation of patient's response to treatment, examination of  patient, obtaining history from patient or surrogate, ordering and performing treatments and interventions, ordering and review of laboratory studies, ordering and review of radiographic studies, re-evaluation of patient's condition, pulse oximetry and review of old charts    I assumed direction of critical care for this patient from another provider in my specialty: no              ED Course  ED Course as of Jul 01 1410   Thu Jul 01, 2021   1206 Patient was somnolent on initial evaluation.  Her PCO2 was markedly elevated 90.4.  This is greater than her previous CO2's that were only mildly elevated on ABG.    [HH]   1216 Patient's PCO2 was elevated.  She is placed on BiPAP.  She is serially reevaluated and continues to be somnolent and not improved at all since my initial evaluation despite the BiPAPI had a long discussion with her daughter who is her current returning next of kin.  She reports that they have discussed intubation and the patient would like to be intubated if needed to save her life.  I discussed the situation currently with the daughter who understandsI would continue to serve the patient and if she turns around and improves will admit to the ICU on BiPAP.  Otherwise she will be intubated in the ED    [HH]   1218 I discussed case with Dr. Shell who will admit for definitive inpatient care in the ICU    [HH]   1218 Patient's respiratory status has continued to decline. She is intubated and placed on a ventilator. She has a UTI additionally. We will place her on antibiotics    [HH]   1306 Patient's records were reviewed. She has tolerated Rocephin twice in the last 12 months despite her history of Keflex allergy. We will treat her again with Rocephin    []   1306 I updated the intubation with Dr. Shell and are awaiting ICU bed transfer    [HH]   1306 Patient is seen and evaluated by the ICU team.  They have taken over care.  She is awakening after her intubation.  Her PCO2 is down to the 50s.   The ICU team started propofolI updated the daughter who is in attendance on the evaluation to date at the bedside.  We discussed the admission over the phone and again at the bedside.  Patient is much improved from presentation    [HH]      ED Course User Index  [HH] Angus Franco MD     Recent Results (from the past 24 hour(s))   ECG 12 Lead    Collection Time: 07/01/21 10:32 AM   Result Value Ref Range    QT Interval 344 ms    QTC Interval 432 ms   Comprehensive Metabolic Panel    Collection Time: 07/01/21 11:06 AM    Specimen: Blood   Result Value Ref Range    Glucose 113 (H) 65 - 99 mg/dL    BUN 13 8 - 23 mg/dL    Creatinine 0.60 0.57 - 1.00 mg/dL    Sodium 140 136 - 145 mmol/L    Potassium 5.2 3.5 - 5.2 mmol/L    Chloride 97 (L) 98 - 107 mmol/L    CO2 39.0 (H) 22.0 - 29.0 mmol/L    Calcium 9.2 8.6 - 10.5 mg/dL    Total Protein 7.3 6.0 - 8.5 g/dL    Albumin 4.10 3.50 - 5.20 g/dL    ALT (SGPT) 17 1 - 33 U/L    AST (SGOT) 29 1 - 32 U/L    Alkaline Phosphatase 114 39 - 117 U/L    Total Bilirubin <0.2 0.0 - 1.2 mg/dL    eGFR Non African Amer 98 >60 mL/min/1.73    Globulin 3.2 gm/dL    A/G Ratio 1.3 g/dL    BUN/Creatinine Ratio 21.7 7.0 - 25.0    Anion Gap 4.0 (L) 5.0 - 15.0 mmol/L   BNP    Collection Time: 07/01/21 11:06 AM    Specimen: Blood   Result Value Ref Range    proBNP 3,221.0 (H) 0.0 - 900.0 pg/mL   Troponin    Collection Time: 07/01/21 11:06 AM    Specimen: Blood   Result Value Ref Range    Troponin T 0.091 (C) 0.000 - 0.030 ng/mL   Green Top (Gel)    Collection Time: 07/01/21 11:06 AM   Result Value Ref Range    Extra Tube Hold for add-ons.    Lavender Top    Collection Time: 07/01/21 11:06 AM   Result Value Ref Range    Extra Tube hold for add-on    Gold Top - SST    Collection Time: 07/01/21 11:06 AM   Result Value Ref Range    Extra Tube Hold for add-ons.    CBC Auto Differential    Collection Time: 07/01/21 11:06 AM    Specimen: Blood   Result Value Ref Range    WBC 6.69 3.40 - 10.80 10*3/mm3     RBC 3.50 (L) 3.77 - 5.28 10*6/mm3    Hemoglobin 10.2 (L) 12.0 - 15.9 g/dL    Hematocrit 35.3 34.0 - 46.6 %    .9 (H) 79.0 - 97.0 fL    MCH 29.1 26.6 - 33.0 pg    MCHC 28.9 (L) 31.5 - 35.7 g/dL    RDW 20.4 (H) 12.3 - 15.4 %    RDW-SD 74.8 (H) 37.0 - 54.0 fl    MPV 9.8 6.0 - 12.0 fL    Platelets 334 140 - 450 10*3/mm3    Neutrophil % 74.9 42.7 - 76.0 %    Lymphocyte % 15.4 (L) 19.6 - 45.3 %    Monocyte % 7.2 5.0 - 12.0 %    Eosinophil % 1.0 0.3 - 6.2 %    Basophil % 0.3 0.0 - 1.5 %    Immature Grans % 1.2 (H) 0.0 - 0.5 %    Neutrophils, Absolute 5.01 1.70 - 7.00 10*3/mm3    Lymphocytes, Absolute 1.03 0.70 - 3.10 10*3/mm3    Monocytes, Absolute 0.48 0.10 - 0.90 10*3/mm3    Eosinophils, Absolute 0.07 0.00 - 0.40 10*3/mm3    Basophils, Absolute 0.02 0.00 - 0.20 10*3/mm3    Immature Grans, Absolute 0.08 (H) 0.00 - 0.05 10*3/mm3    nRBC 0.0 0.0 - 0.2 /100 WBC   Phenobarbital Level    Collection Time: 07/01/21 11:06 AM    Specimen: Blood   Result Value Ref Range    Phenobarbital 34.1 (H) 10.0 - 30.0 mcg/mL   Lactic Acid, Plasma    Collection Time: 07/01/21 11:06 AM    Specimen: Blood   Result Value Ref Range    Lactate 0.7 0.5 - 2.0 mmol/L   Scan Slide    Collection Time: 07/01/21 11:06 AM    Specimen: Blood   Result Value Ref Range    Anisocytosis Slight/1+ None Seen    WBC Morphology Normal Normal    Platelet Morphology Normal Normal   Procalcitonin    Collection Time: 07/01/21 11:06 AM    Specimen: Blood   Result Value Ref Range    Procalcitonin 0.05 0.00 - 0.25 ng/mL   TSH    Collection Time: 07/01/21 11:06 AM    Specimen: Blood   Result Value Ref Range    TSH 0.755 0.270 - 4.200 uIU/mL   T4, Free    Collection Time: 07/01/21 11:06 AM    Specimen: Blood   Result Value Ref Range    Free T4 0.93 0.93 - 1.70 ng/dL   Urinalysis With Culture If Indicated - Urine, Catheter    Collection Time: 07/01/21 11:10 AM    Specimen: Urine, Catheter   Result Value Ref Range    Color, UA Yellow Yellow, Straw    Appearance, UA  Cloudy (A) Clear    pH, UA 6.0 5.0 - 8.0    Specific Gravity, UA 1.015 1.001 - 1.030    Glucose, UA Negative Negative    Ketones, UA Negative Negative    Bilirubin, UA Negative Negative    Blood, UA Negative Negative    Protein,  mg/dL (2+) (A) Negative    Leuk Esterase, UA Moderate (2+) (A) Negative    Nitrite, UA Positive (A) Negative    Urobilinogen, UA 0.2 E.U./dL 0.2 - 1.0 E.U./dL   Urinalysis, Microscopic Only - Urine, Catheter    Collection Time: 07/01/21 11:10 AM    Specimen: Urine, Catheter   Result Value Ref Range    RBC, UA 0-2 None Seen, 0-2 /HPF    WBC, UA Too Numerous to Count (A) None Seen, 0-2 /HPF    Bacteria, UA 4+ (A) None Seen, Trace /HPF    Squamous Epithelial Cells, UA 0-2 None Seen, 0-2 /HPF    Hyaline Casts, UA 0-6 0 - 6 /LPF    Methodology Manual Light Microscopy    Urine Drug Screen - Urine, Catheter    Collection Time: 07/01/21 11:10 AM    Specimen: Urine, Catheter   Result Value Ref Range    THC, Screen, Urine Negative Negative    Phencyclidine (PCP), Urine Negative Negative    Cocaine Screen, Urine Negative Negative    Methamphetamine, Ur Negative Negative    Opiate Screen Negative Negative    Amphetamine Screen, Urine Negative Negative    Benzodiazepine Screen, Urine Positive (A) Negative    Tricyclic Antidepressants Screen Negative Negative    Methadone Screen, Urine Negative Negative    Barbiturates Screen, Urine Positive (A) Negative    Oxycodone Screen, Urine Negative Negative    Propoxyphene Screen Negative Negative    Buprenorphine, Screen, Urine Negative Negative   Blood Gas, Arterial With Co-Ox    Collection Time: 07/01/21 11:20 AM    Specimen: Arterial Blood   Result Value Ref Range    Site Right Brachial     Adam's Test N/A     pH, Arterial 7.268 (L) 7.350 - 7.450 pH units    pCO2, Arterial 90.4 (C) 35.0 - 45.0 mm Hg    pO2, Arterial 102.0 83.0 - 108.0 mm Hg    HCO3, Arterial 41.3 (H) 20.0 - 26.0 mmol/L    Base Excess, Arterial 12.2 (H) 0.0 - 2.0 mmol/L    Hemoglobin,  Blood Gas 8.7 (L) 14 - 18 g/dL    Hematocrit, Blood Gas 26.7 %    Oxyhemoglobin 93.4 (L) 94 - 99 %    Methemoglobin 0.20 0.00 - 1.50 %    Carboxyhemoglobin 4.4 (H) 0 - 2 %    CO2 Content 44.1 (H) 22 - 33 mmol/L    Temperature 37.0 C    Barometric Pressure for Blood Gas      Modality Nasal Cannula     FIO2 28 %    Rate 0 Breaths/minute    PIP 0 cmH2O    IPAP 0     EPAP 0     Note      Notified Who LINA     Notified By [id number]     Notified Time 07/01/2021 11:19     pH, Temp Corrected 7.268 pH Units    pCO2, Temperature Corrected 90.4 (H) 35 - 45 mm Hg    pO2, Temperature Corrected 102 83 - 108 mm Hg   Blood Gas, Arterial With Co-Ox    Collection Time: 07/01/21  1:48 PM    Specimen: Arterial Blood   Result Value Ref Range    Site Right Radial     Adam's Test N/A     pH, Arterial 7.502 (H) 7.350 - 7.450 pH units    pCO2, Arterial 51.6 (H) 35.0 - 45.0 mm Hg    pO2, Arterial 273.0 (H) 83.0 - 108.0 mm Hg    HCO3, Arterial 40.4 (H) 20.0 - 26.0 mmol/L    Base Excess, Arterial 15.5 (H) 0.0 - 2.0 mmol/L    Hemoglobin, Blood Gas 8.6 (L) 14 - 18 g/dL    Hematocrit, Blood Gas 26.4 %    Oxyhemoglobin 97.5 94 - 99 %    Methemoglobin 0.20 0.00 - 1.50 %    Carboxyhemoglobin 3.1 (H) 0 - 2 %    CO2 Content 42.0 (H) 22 - 33 mmol/L    Temperature 37.0 C    Barometric Pressure for Blood Gas      Modality Ventilator     FIO2 60 %    Ventilator Mode VC+/AC     Set Tidal Volume 0.35     Set Mech Resp Rate 16     Rate 18 Breaths/minute    PEEP 5.0     Note      pH, Temp Corrected 7.502 pH Units    pCO2, Temperature Corrected 51.6 (H) 35 - 45 mm Hg    pO2, Temperature Corrected 273 (H) 83 - 108 mm Hg     Note: In addition to lab results from this visit, the labs listed above may include labs taken at another facility or during a different encounter within the last 24 hours. Please correlate lab times with ED admission and discharge times for further clarification of the services performed during this visit.    XR Chest 1 View    Preliminary Result   Chronic changes without acute cardiopulmonary process.       D:  07/01/2021   E:  07/01/2021              XR Chest 1 View    (Results Pending)   XR Chest 1 View    (Results Pending)     Vitals:    07/01/21 1342 07/01/21 1345 07/01/21 1357 07/01/21 1408   BP:  146/83     Pulse: 79 78 83 92   Resp:       Temp:       TempSrc:       SpO2: 100% 100% 100% (!) 67%   Weight:       Height:         Medications   sodium chloride 0.9 % flush 10 mL (has no administration in time range)   ipratropium-albuterol (DUO-NEB) nebulizer solution 3 mL (has no administration in time range)   propofol (DIPRIVAN) infusion 10 mg/mL 100 mL (40 mcg/kg/min × 45.4 kg Intravenous Rate/Dose Change 7/1/21 1359)   cefTRIAXone (ROCEPHIN) 1 g/100 mL 0.9% NS (MBP) (has no administration in time range)   chlorhexidine (PERIDEX) 0.12 % solution 15 mL (has no administration in time range)   sodium chloride 0.9 % flush 10 mL (has no administration in time range)   enoxaparin (LOVENOX) syringe 40 mg (has no administration in time range)   pantoprazole (PROTONIX) injection 40 mg (has no administration in time range)   sodium chloride 0.9 % infusion (has no administration in time range)   naloxone (NARCAN) injection 0.4 mg (0.4 mg Intravenous Given 7/1/21 1214)   etomidate (AMIDATE) injection (20 mg Intravenous Given 7/1/21 1243)   succinylcholine (ANECTINE) injection (90 mg Intravenous Given 7/1/21 1243)     ECG/EMG Results (last 24 hours)     Procedure Component Value Units Date/Time    ECG 12 Lead [[id number]] Collected: 07/01/21 1032     Updated: 07/01/21 1208        ECG 12 Lead   Final Result   Test Reason : SOA Protocol   Blood Pressure :   */*   mmHG   Vent. Rate :  95 BPM     Atrial Rate :  95 BPM      P-R Int : 130 ms          QRS Dur :  70 ms       QT Int : 344 ms       P-R-T Axes :  86  81  51 degrees      QTc Int : 432 ms      Normal sinus rhythm   Cannot rule out Anterior infarct , age undetermined   Abnormal ECG   When  compared with ECG of 14-JUN-2021 21:22,   No significant change was found   Confirmed by PABLO FRANCO MD ([id number]) on 7/1/2021 1:47:47 PM      Referred By: EDMD           Confirmed By: PABLO FRANCO MD                                                 [facility]    Final diagnoses:   Acute on chronic respiratory failure with hypercapnia (CMS/HCC)   Somnolence   Chronic obstructive pulmonary disease, unspecified COPD type (CMS/HCC)   Cystitis       Documentation assistance provided by aaliyah Velez.  Information recorded by the scribe was done at my direction and has been verified and validated by me.     Richie Velez  07/01/21 1214       Pablo Franco MD  07/01/21 1411      Electronically signed by Pablo Franco MD at 07/01/21 1411       Vital Signs (last day)     Date/Time   Temp   Temp src   Pulse   Resp   BP   Patient Position   SpO2    07/02/21 0815   --   --   78   12   165/80   --   94    07/02/21 0800   97.7 (36.5)   Axillary   72   12   150/71   Lying   97    07/02/21 0745   --   --   70   12   150/70   --   98    07/02/21 0730   --   --   67   12   140/66   --   100    07/02/21 0715   --   --   68   12   142/65   --   100    07/02/21 0700   --   --   67   12   143/64   --   100    07/02/21 0600   --   --   69   12   155/66   Lying   100    07/02/21 0501   --   --   --   12   --   Lying   --    07/02/21 0500   --   --   64   12   145/68   Lying   100    07/02/21 0400   97.7 (36.5)   Oral   64   12   140/57   Lying   100    07/02/21 0301   --   --   --   12   --   Lying   --    07/02/21 0300   --   --   62   12   134/57   Lying   100    07/02/21 0200   --   --   66   12   121/55   Lying   97    07/02/21 0105   --   --   --   12   --   Lying   --    07/02/21 0100   --   --   68   12   131/57   Lying   95    07/02/21 0000   97.4 (36.3)   Oral   73   12   146/61   Lying   100    07/01/21 2300   --   --   69   12   117/51   Lying   100 07/01/21 2253   --   --   --   12   --   Lying   --    07/01/21 2200    --   --   76   12   130/58   Lying   100    07/01/21 2104   --   --   82   12   --   Lying   100    07/01/21 2100   --   --   84   12   134/64   Lying   100 07/01/21 2000   98.2 (36.8)   Oral   76   12   133/61   Lying   96    07/01/21 1921   --   --   --   12   --   Lying   --    07/01/21 1840   --   --   79   --   145/65   --   --    07/01/21 1800   98.9 (37.2)   Oral   79   --   133/58   Lying   100 07/01/21 1703   --   --   --   --   --   --   100 07/01/21 1625   100.4 (38)   Oral   --   --   --   --   --    07/01/21 1600   --   --   81   --   144/67   --   (!) 89    07/01/21 1547   (!) 100.6 (38.1)   Oral   81   --   141/70   --   100 07/01/21 1542   --   --   82   --   --   --   --    07/01/21 1509   --   --   83   --   --   --   --    07/01/21 1504   --   --   80   --   --   --   --    07/01/21 1502   --   --   --   --   --   --   100 07/01/21 1500   --   --   84   --   130/61   --   --    07/01/21 1459   --   --   82   --   --   --   94    07/01/21 1455   --   --   83   --   --   --   97    07/01/21 1450   --   --   83   --   140/56   --   100 07/01/21 1445   --   --   80   --   --   --   100 07/01/21 1435   --   --   81   --   --   --   100 07/01/21 1430   --   --   81   --   91/44   --   100 07/01/21 1415   --   --   82   --   122/55   --   100 07/01/21 1408   --   --   92   --   --   --   (!) 67    07/01/21 1400   --   --   80   --   --   --   100 07/01/21 1357   --   --   83   --   --   --   100 07/01/21 1345   --   --   78   --   146/83   --   100 07/01/21 1342   --   --   79   --   --   --   100 07/01/21 1339   --   --   79   --   --   --   100 07/01/21 1336   --   --   81   --   --   --   100 07/01/21 1333   --   --   81   --   --   --   100 07/01/21 1330   --   --   79   --   148/66   --   100 07/01/21 1327   --   --   87   --   --   --   100 07/01/21 1324   --   --   80   --   --   --   100 07/01/21 1321   --   --   83   --   --   --   100     07/01/21 1318   --   --   91   --   --   --   93    07/01/21 1316   --   --   84   --   --   --   98    07/01/21 1315   --   --   --   --   152/79   --   --    07/01/21 1312   --   --   91   --   --   --   100 07/01/21 1303   --   --   89   --   --   --   100 07/01/21 1300   --   --   90   --   (!) 135/101   --   99    07/01/21 1257   --   --   84   --   --   --   100 07/01/21 1254   --   --   75   --   --   --   96    07/01/21 1253   --   --   76   --   125/56   --   100 07/01/21 1251   --   --   78   --   --   --   100 07/01/21 1248   --   --   85   --   --   --   100 07/01/21 12:46:19   --   --   --   --   --   --   100 07/01/21 1246   --   --   90   --   --   --   100 07/01/21 12:45:54   --   --   85   --   108/50   --   --    07/01/21 1245   --   --   --   --   123/71   --   --    07/01/21 1242   --   --   81   --   --   --   96 07/01/21 1239   --   --   81   --   --   --   98    07/01/21 1236   --   --   71   --   --   --   95    07/01/21 1230   --   --   81   --   108/50   --   99    07/01/21 12:17:34   --   --   --   20   --   --   --    07/01/21 1215   --   --   79   --   129/58   --   100 07/01/21 1200   --   --   84   --   122/56   --   100 07/01/21 1145   --   --   92   --   158/73   --   99    07/01/21 1130   --   --   93   --   155/73   --   98    07/01/21 1115   --   --   90   --   162/70   --   100 07/01/21 1045   --   --   92   --   148/65   --   100    07/01/21 1043   --   --   92   --   --   --   100    07/01/21 1037   --   --   93   --   153/61   --   100    07/01/21 1030   --   --   97   --   --   --   100    07/01/21 1027   98.2 (36.8)   Oral   --   --   --   --   --    07/01/21 1026   --   --   --   (!) 30   153/71   --   --    07/01/21 1023   --   --   100   --   153/71   --   96    07/01/21 1022   --   --   100   --   --   --   --    07/01/21 1021   --   --   --   --   --   --   98                Current Facility-Administered Medications   Medication  Dose Route Frequency Provider Last Rate Last Admin   • acetaminophen (TYLENOL) 160 MG/5ML solution 650 mg  650 mg Nasogastric Q6H PRN Sunshine Roman DNP, APRN       • aspirin chewable tablet 81 mg  81 mg Nasogastric Daily Case, Smiley V., DO   81 mg at 07/02/21 0802   • AZITHROMYCIN 500 MG/250 ML 0.9% NS IVPB (vial-mate)  500 mg Intravenous Daily Case, Smiley V., DO   500 mg at 07/02/21 0803   • cefTRIAXone (ROCEPHIN) IVPB 1 g  1 g Intravenous Q24H Case, Smiley V.,  mL/hr at 07/01/21 1759 1 g at 07/01/21 1759   • chlorhexidine (PERIDEX) 0.12 % solution 15 mL  15 mL Mouth/Throat Q12H Sunshine Roman DNP, APRN   15 mL at 07/02/21 0802   • citalopram (CeleXA) tablet 20 mg  20 mg Nasogastric Daily Case, Smiley V., DO   20 mg at 07/02/21 0802   • clopidogrel (PLAVIX) tablet 75 mg  75 mg Nasogastric Daily Case, Smiley V., DO   75 mg at 07/02/21 0802   • dextrose (D50W) 50 % 25 g/ 50mL Intravenous Solution  - ADS Override Pull            • dilTIAZem (CARDIZEM) tablet 60 mg  60 mg Nasogastric Q6H Case, Smiley V., DO   60 mg at 07/02/21 0643   • enoxaparin (LOVENOX) syringe 40 mg  40 mg Subcutaneous Q24H Sunshine Roman DNP, APRN   40 mg at 07/01/21 1745   • fentaNYL citrate (PF) (SUBLIMAZE) injection 25 mcg  25 mcg Intravenous Q2H PRN Elvira Heredia APRN   25 mcg at 07/02/21 0135   • furosemide (LASIX) tablet 20 mg  20 mg Nasogastric Daily Case, Smiley V., DO   20 mg at 07/02/21 0802   • guaiFENesin (ROBITUSSIN) 100 MG/5ML oral solution 300 mg  300 mg Nasogastric Q6H PRN Case, Smiley V., DO       • ipratropium-albuterol (DUO-NEB) nebulizer solution 3 mL  3 mL Nebulization 4x Daily - RT Case, Smiley V., DO   3 mL at 07/02/21 0815   • methylPREDNISolone sodium succinate (SOLU-Medrol) injection 40 mg  40 mg Intravenous Q12H Case, Smiley V., DO   40 mg at 07/02/21 0803   • pantoprazole (PROTONIX) injection 40 mg  40 mg Intravenous Q24H Sunshine Roman, DOMINGA, APRN   40 mg at 07/02/21 0803    • Pharmacy Consult - MTM   Does not apply Daily Paige Freeman, PharmD       • propofol (DIPRIVAN) infusion 10 mg/mL 100 mL  5-50 mcg/kg/min Intravenous Titrated Sunshine Roman DNP, APRIRENE 13.62 mL/hr at 07/02/21 0420 50 mcg/kg/min at 07/02/21 0420   • rosuvastatin (CRESTOR) tablet 40 mg  40 mg Nasogastric Nightly Case, Smiley V., DO   40 mg at 07/01/21 2015   • sodium chloride 0.9 % flush 10 mL  10 mL Intravenous PRN Angus Franco MD       • sodium chloride 0.9 % flush 10 mL  10 mL Intravenous PRN Sunshine Roman DNP, APRN       • sodium chloride 0.9 % infusion  50 mL/hr Intravenous Continuous Sunshine Roman DNP, APRIRENE 50 mL/hr at 07/01/21 1618 50 mL/hr at 07/01/21 1618       Orders (active)      Start     Ordered    07/02/21 0922  dextrose (D50W) 50 % 25 g/ 50mL Intravenous Solution  - ADS Override Pull     Note to Pharmacy: Created by cabinet override    07/02/21 0922    07/02/21 0013  Restraints Non-Violent or Non-Self Destructive  Calendar Day      07/02/21 0012    07/01/21 2143  fentaNYL citrate (PF) (SUBLIMAZE) injection 25 mcg  Every 2 Hours PRN      07/01/21 2144    07/01/21 2100  rosuvastatin (CRESTOR) tablet 40 mg  Nightly      07/01/21 1546    07/01/21 1836  Ok to use og tube  Nursing Communication  Once     Comments: Ok to use og tube    07/01/21 1939    07/01/21 1800  dilTIAZem (CARDIZEM) tablet 60 mg  Every 6 Hours Scheduled      07/01/21 1546    07/01/21 1745  Pharmacy Consult - MTM  Daily      07/01/21 1647    07/01/21 1645  citalopram (CeleXA) tablet 20 mg  Daily      07/01/21 1546    07/01/21 1645  clopidogrel (PLAVIX) tablet 75 mg  Daily      07/01/21 1546    07/01/21 1645  furosemide (LASIX) tablet 20 mg  Daily      07/01/21 1546    07/01/21 1645  aspirin chewable tablet 81 mg  Daily      07/01/21 1556    07/01/21 1600  Oral Care & Teeth Brushing  Every 4 Hours     Comments: Norvell Teeth at Least 2x/day    07/01/21 1405    07/01/21 1600  enoxaparin (LOVENOX) syringe 40  mg  Every 24 Hours Scheduled      07/01/21 1405    07/01/21 1600  pantoprazole (PROTONIX) injection 40 mg  Every 24 Hours Scheduled      07/01/21 1405    07/01/21 1600  cefTRIAXone (ROCEPHIN) IVPB 1 g  Every 24 Hours      07/01/21 1543    07/01/21 1557  Orogastric Tube Insertion  Once      07/01/21 1557    07/01/21 1556  acetaminophen (TYLENOL) 160 MG/5ML solution 650 mg  Every 6 Hours PRN      07/01/21 1557    07/01/21 1555  guaiFENesin (ROBITUSSIN) 100 MG/5ML oral solution 300 mg  Every 6 Hours PRN      07/01/21 1546    07/01/21 1545  methylPREDNISolone sodium succinate (SOLU-Medrol) injection 40 mg  Every 12 Hours Scheduled      07/01/21 1507    07/01/21 1545  AZITHROMYCIN 500 MG/250 ML 0.9% NS IVPB (vial-mate)  Daily      07/01/21 1510    07/01/21 1509  ipratropium-albuterol (DUO-NEB) nebulizer solution 3 mL  4 Times Daily - RT      07/01/21 1507    07/01/21 1500  Vital Signs Every Hour and Per Hospital Policy Based on Patient Condition  Every Hour      07/01/21 1405    07/01/21 1500  Intake and Output  Every Hour      07/01/21 1405    07/01/21 1409  Ventilator - AC/VC+; (16); 88%; 5; 350  Continuous      07/01/21 1409    07/01/21 1407  chlorhexidine (PERIDEX) 0.12 % solution 15 mL  Every 12 Hours Scheduled      07/01/21 1405    07/01/21 1407  sodium chloride 0.9 % infusion  Continuous      07/01/21 1405    07/01/21 1406  Spontaneous Awakening Trial  Daily      07/01/21 1405    07/01/21 1406  Daily Weights  Daily      07/01/21 1405    07/01/21 1405  Elevate HOB  Continuous      07/01/21 1405    07/01/21 1405  Continuous Pulse Oximetry  Continuous      07/01/21 1405    07/01/21 1405  Height & Weight  Once      07/01/21 1405    07/01/21 1405  Use Mobility Guidelines for Advancement of Activity  Continuous      07/01/21 1405    07/01/21 1405  Insert Peripheral IV  Once      07/01/21 1405    07/01/21 1405  Saline Lock & Maintain IV Access  Continuous      07/01/21 1405    07/01/21 1405  Code Status and Medical  Interventions:  Continuous      07/01/21 1405    07/01/21 1405  Place Sequential Compression Device  Once      07/01/21 1405    07/01/21 1405  Maintain Sequential Compression Device  Continuous      07/01/21 1405    07/01/21 1404  sodium chloride 0.9 % flush 10 mL  As Needed      07/01/21 1405    07/01/21 1305  propofol (DIPRIVAN) infusion 10 mg/mL 100 mL  Titrated      07/01/21 1303    07/01/21 1226  Urine Culture - Urine, Urine, Catheter  Once      07/01/21 1225    07/01/21 1105  BIPAP  Until Discontinued      07/01/21 1104    07/01/21 1024  NPO Diet  Diet Effective Now      07/01/21 1024    07/01/21 1024  Cardiac monitoring  Per Hospital Policy      07/01/21 1024    07/01/21 1024  Insert peripheral IV  Once      07/01/21 1024    07/01/21 1023  sodium chloride 0.9 % flush 10 mL  As Needed      07/01/21 1024    Unscheduled  Oxygen Therapy- Nasal Cannula; 2 LPM; Titrate for SPO2: equal to or greater than, 92%  Continuous PRN      07/01/21 1024    Unscheduled  Subglottic Suctioning Must Be Done Every 6 Hours  As Needed      07/01/21 1405                Operative/Procedure Notes (all)    No notes of this type exist for this encounter.         Physician Progress Notes (all)    No notes of this type exist for this encounter.         Consult Notes (all)    No notes of this type exist for this encounter.

## 2021-07-02 NOTE — PROGRESS NOTES
Clinical Nutrition     Multidisciplinary Rounds      Patient Name: An Abreu  Date of Encounter: 07/02/21 15:55 EDT  MRN: [id number]  Admission date: 7/1/2021      Reason for visit: MDR. RD to continue to follow per protocol.     Additional information obtained during MDR: RD notes pt now extubated, cardiac diet order in place.     Current diet: Diet Regular; Cardiac    EMR reviewed   Labs reviewed    Intervention:  Follow treatment plan  Care plan reviewed    Follow up:   Per protocol      Erinn Venegas RDN, LD, CNSC  15:55 EDT  Time: 10min

## 2021-07-03 NOTE — PLAN OF CARE
Goal Outcome Evaluation:  Plan of Care Reviewed With: patient        Progress: improving  Outcome Summary: VSS. Placed on BiPAP for a total of 9 hours. When not on BiPAP pt is tolerating 2L nasal cannula. No difficulties urinating, output 400 ml. Pt remains alert and oriented. Will continue to monitor pt closely.

## 2021-07-03 NOTE — PLAN OF CARE
Goal Outcome Evaluation:  Plan of Care Reviewed With: patient        Progress: no change  Outcome Summary: PT initial evaluation completed. Pt demonstrates generalized weakness and decreased indep/ functional endurance re: mobility tasks, warranting further skilled PT services to promote PLOF. Limited today by fatigue, but able to ambulate 90 ft with RW, min A. Recommend d/c home with assist and HHPT.

## 2021-07-03 NOTE — THERAPY EVALUATION
Patient Name: An Abreu  : 1950    MRN: [id number]                              Today's Date: 7/3/2021       Admit Date: 2021    Visit Dx:     ICD-10-CM ICD-9-CM   1. Acute on chronic respiratory failure with hypercapnia (CMS/[facility])  J96.22 518.84   2. Somnolence  R40.0 780.09   3. Chronic obstructive pulmonary disease, unspecified COPD type (CMS/[facility])  J44.9 496   4. Cystitis  N30.90 595.9     Patient Active Problem List   Diagnosis   • Gastroesophageal reflux disease   • Essential hypertension   • Seizure disorder on phenobarbital    • PAD s/p R iliac stent    • Paroxysmal AF   • Acute urinary retention   • Fecal occult blood test positive   • Dependence on 3L NC   • Tobacco abuse   • CAD s/p CABG    • Anemia   • Pneumonia   • Fall   • Humerus fracture   • Hypoglycemia   • Elevated troponin   • Metabolic encephalopathy   • Pulmonary cachexia 2* COPD   • Acute on chronic respiratory failure with hypercapnia    • Iron deficiency anemia due to chronic blood loss   • COPD (chronic obstructive pulmonary disease) (CMS/[facility])   • Severe malnutrition (CMS/[facility])   • H/O: recent GI bleed   • + PFO    • H/O CVA    • Non-compliance   • Debility   • Acute cystitis without hematuria     Past Medical History:   Diagnosis Date   • Aneurysm (CMS/[facility])    • Angina pectoris (CMS/[facility]) 2016   • Anxiety 2016   • Arthritis 2016   • CAD (coronary artery disease)    • Carotid artery stenosis    • CHF (congestive heart failure) (CMS/[facility])    • Chronic coronary artery disease 2016 CABG LIMA to LAD, VG to OM  VG to OM occluded. LIMA patent Cx intervention and RCA 2008 stent for ISR  ISR and stenting of CX and RCA  normal MPS   • Chronic left-sided low back pain with left-sided sciatica 2017   • Chronic obstructive pulmonary disease (CMS/HCC) 2016   • Chronic respiratory failure with hypoxia (CMS/[facility]) 3/27/2021   • Cobalamin deficiency 2016   • Congestive heart failure  (CMS/[facility]) 6/20/2016   • COPD (chronic obstructive pulmonary disease) (CMS/[facility])    • Depression 7/3/2018   • Diverticulosis    • Diverticulosis of large intestine without hemorrhage 5/5/2017   • GERD (gastroesophageal reflux disease)    • GIB (gastrointestinal bleeding)     recurrent    • HTN (hypertension)    • Hypercholesterolemia 6/20/2016   • Hyperlipidemia    • Mesenteric ischemia (CMS/[facility]) 2006    s/p resection    • Mood disorder (CMS/[facility])    • Oxygen dependent     3 liters @ all times.    • PAF (paroxysmal atrial fibrillation) (CMS/[facility])    • Paroxysmal atrial fibrillation (CMS/[facility]) 8/7/2017   • PFO (patent foramen ovale)    • 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 (CMS/[facility]) 5/23/2021   • PVD (peripheral vascular disease) (CMS/[facility])    • Restless legs syndrome 6/20/2016   • Seizure disorder (CMS/[facility]) 6/20/2016   • Seizures (CMS/HCC)    • Stenosis of carotid artery 6/20/2016   • Vertigo 9/28/2016     Past Surgical History:   Procedure Laterality Date   • APPENDECTOMY     • CHOLECYSTECTOMY     • COLONOSCOPY N/A 6/19/2021    Procedure: COLONOSCOPY;  Surgeon: Wilfredo Simon MD;  Location:  [facility];  Service: Gastroenterology;  Laterality: N/A;   • COLOSTOMY  2006    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   • ENDOSCOPY N/A 6/16/2021    Procedure: ESOPHAGOGASTRODUODENOSCOPY;  Surgeon: Jean Fuentes MD;  Location:  [facility];  Service: Gastroenterology;  Laterality: N/A;   • EXPLORATORY LAPAROTOMY      sec to ovarian cysts   • HYSTERECTOMY     • ILIAC ARTERY STENT     • REVISION / TAKEDOWN COLOSTOMY  2008     General Information     Row Name 07/03/21 1207          Physical Therapy Time and Intention    Document Type  evaluation  -LS     Mode of Treatment  physical therapy  -LS     Row Name 07/03/21 1200          General Information    Patient Profile Reviewed  yes  -LS     Prior Level of Function  independent:;all household mobility;ADL's  -LS     Existing Precautions/Restrictions   fall;oxygen therapy device and L/min  -LS     Barriers to Rehab  none identified  -LS     Row Name 07/03/21 1207          Living Environment    Lives With  child(nash), adult;grandchild(nash)  -     Row Name 07/03/21 1207          Home Main Entrance    Number of Stairs, Main Entrance  four  -LS     Row Name 07/03/21 1207          Stairs Within Home, Primary    Number of Stairs, Within Home, Primary  none  -LS     Row Name 07/03/21 1207          Cognition    Orientation Status (Cognition)  oriented x 3  -LS     Row Name 07/03/21 1207          Safety Issues, Functional Mobility    Impairments Affecting Function (Mobility)  balance;coordination;endurance/activity tolerance;strength;shortness of breath  -LS       User Key  (r) = Recorded By, (t) = Taken By, (c) = Cosigned By    Initials Name Provider Type    Stephanie Bui, PT Physical Therapist        Mobility     Row Name 07/03/21 1208          Bed Mobility    Comment (Bed Mobility)  UIC  -     Row Name 07/03/21 1208          Sit-Stand Transfer    Sit-Stand Wright (Transfers)  contact guard;verbal cues  -LS     Assistive Device (Sit-Stand Transfers)  walker, front-wheeled  -LS     Row Name 07/03/21 1208          Gait/Stairs (Locomotion)    [place] Level (Gait)  minimum assist (75% patient effort);verbal cues  -LS     Assistive Device (Gait)  walker, front-wheeled  -LS     Distance in Feet (Gait)  90  -LS     Deviations/Abnormal Patterns (Gait)  bilateral deviations;gait speed decreased  -LS     Bilateral Gait Deviations  forward flexed posture;heel strike decreased  -LS     Comment (Gait/Stairs)  VC for upright posture and keeping RW close to body. Distance limited by fatigue.  -LS       User Key  (r) = Recorded By, (t) = Taken By, (c) = Cosigned By    Initials Name Provider Type    Stephanie Bui, PT Physical Therapist        Obj/Interventions     Row Name 07/03/21 1209          Range of Motion Comprehensive    General Range of Motion  bilateral  lower extremity ROM WFL  -LS     Row Name 07/03/21 1209          Strength Comprehensive (MMT)    General Manual Muscle Testing (MMT) Assessment  lower extremity strength deficits identified  -LS     Comment, General Manual Muscle Testing (MMT) Assessment  BLE grossly 4-/5  -     Row Name 07/03/21 1209          Balance    Balance Assessment  sitting static balance;standing static balance  -     Static Sitting Balance  WFL;sitting in chair  -LS     Static Standing Balance  WFL;supported;standing  -     Row Name 07/03/21 1209          Sensory Assessment (Somatosensory)    Sensory Assessment (Somatosensory)  LE sensation intact  -       User Key  (r) = Recorded By, (t) = Taken By, (c) = Cosigned By    Initials Name Provider Type    LS Stephanie Aaron, PT Physical Therapist        Goals/Plan     Row Name 07/03/21 1211          Bed Mobility Goal 1 (PT)    Activity/Assistive Device (Bed Mobility Goal 1, PT)  sit to supine/supine to sit  -LS     [place] Level/Cues Needed (Bed Mobility Goal 1, PT)  independent  -LS     Time Frame (Bed Mobility Goal 1, PT)  2 weeks  -LS     Progress/Outcomes (Bed Mobility Goal 1, PT)  goal ongoing  -     Row Name 07/03/21 1211          Transfer Goal 1 (PT)    Activity/Assistive Device (Transfer Goal 1, PT)  sit-to-stand/stand-to-sit;walker, rolling  -LS     [place] Level/Cues Needed (Transfer Goal 1, PT)  modified independence  -LS     Time Frame (Transfer Goal 1, PT)  2 weeks  -LS     Progress/Outcome (Transfer Goal 1, PT)  goal ongoing  -     Row Name 07/03/21 1211          Gait Training Goal 1 (PT)    Activity/Assistive Device (Gait Training Goal 1, PT)  gait (walking locomotion);walker, rolling  -LS     [place] Level (Gait Training Goal 1, PT)  modified independence  -LS     Distance (Gait Training Goal 1, PT)  150  -LS     Time Frame (Gait Training Goal 1, PT)  2 weeks  -LS     Progress/Outcome (Gait Training Goal 1, PT)  goal ongoing  -     Row Name 07/03/21  1211          Stairs Goal 1 (PT)    Activity/Assistive Device (Stairs Goal 1, PT)  ascending stairs;descending stairs  -LS     [place] Level/Cues Needed (Stairs Goal 1, PT)  supervision required  -LS     Number of Stairs (Stairs Goal 1, PT)  4  -LS     Time Frame (Stairs Goal 1, PT)  2 weeks  -LS     Progress/Outcome (Stairs Goal 1, PT)  goal ongoing  -LS       User Key  (r) = Recorded By, (t) = Taken By, (c) = Cosigned By    Initials Name Provider Type    LS Stephanie Aaron, PT Physical Therapist        Clinical Impression     Row Name 07/03/21 1209          Pain    Additional Documentation  Pain Scale: Numbers Pre/Post-Treatment (Group)  -     Row Name 07/03/21 1209          Pain Scale: Numbers Pre/Post-Treatment    Pretreatment Pain Rating  0/10 - no pain  -LS     Posttreatment Pain Rating  0/10 - no pain  -LS     Row Name 07/03/21 1209          Plan of Care Review    Plan of Care Reviewed With  patient  -LS     Progress  no change  -LS     Outcome Summary  PT initial evaluation completed. Pt demonstrates generalized weakness and decreased indep/ functional endurance re: mobility tasks, warranting further skilled PT services to promote PLOF. Limited today by fatigue, but able to ambulate 90 ft with RW, min A. Recommend d/c home with assist and HHPT.  -     Row Name 07/03/21 1206          Therapy Assessment/Plan (PT)    Patient/Family Therapy Goals Statement (PT)  return to PLOF; to go home  -LS     Rehab Potential (PT)  good, to achieve stated therapy goals  -LS     Criteria for Skilled Interventions Met (PT)  yes;skilled treatment is necessary  -     Row Name 07/03/21 1209          Vital Signs    Pre Systolic BP Rehab  103  -LS     Pre Treatment Diastolic BP  48  -LS     Post Systolic BP Rehab  120  -LS     Post Treatment Diastolic BP  46  -LS     Pretreatment Heart Rate (beats/min)  73  -LS     Posttreatment Heart Rate (beats/min)  72  -LS     Pre SpO2 (%)  91  -LS     O2 Delivery Pre Treatment  nasal  cannula  -LS     O2 Delivery Intra Treatment  nasal cannula  -LS     Post SpO2 (%)  96  -LS     O2 Delivery Post Treatment  nasal cannula  -LS     Pre Patient Position  Sitting  -LS     Intra Patient Position  Standing  -LS     Post Patient Position  Sitting  -LS     Row Name 07/03/21 1209          Positioning and Restraints    Pre-Treatment Position  sitting in chair/recliner  -LS     Post Treatment Position  chair  -LS     In Chair  notified nsg;reclined;call light within reach;encouraged to call for assist;exit alarm on;RUE elevated;LUE elevated;legs elevated;waffle cushion  -       User Key  (r) = Recorded By, (t) = Taken By, (c) = Cosigned By    Initials Name Provider Type    Stephanie Bui, PT Physical Therapist        Outcome Measures     Row Name 07/03/21 1212          How much help from another person do you currently need...    Turning from your back to your side while in flat bed without using bedrails?  3  -LS     Moving from lying on back to sitting on the side of a flat bed without bedrails?  3  -LS     Moving to and from a bed to a chair (including a wheelchair)?  3  -LS     Standing up from a chair using your arms (e.g., wheelchair, bedside chair)?  3  -LS     Climbing 3-5 steps with a railing?  2  -LS     To walk in hospital room?  3  -LS     AM-PAC 6 Clicks Score (PT)  17  -     Row Name 07/03/21 1212          Functional Assessment    Outcome Measure Options  AM-PAC 6 Clicks Basic Mobility (PT)  -       User Key  (r) = Recorded By, (t) = Taken By, (c) = Cosigned By    Initials Name Provider Type    Stephanie Bui, PT Physical Therapist        Physical Therapy Education                 Title: PT OT SLP Therapies (In Progress)     Topic: Physical Therapy (In Progress)     Point: Mobility training (In Progress)     Learning Progress Summary           Patient Acceptance, E,D, NR by MARBIN at 7/3/2021 1213                   Point: Home exercise program (Not Started)     Learner Progress:  Not  documented in this visit.          Point: Body mechanics (In Progress)     Learning Progress Summary           Patient Acceptance, E,D, NR by  at 7/3/2021 1213                   Point: Precautions (In Progress)     Learning Progress Summary           Patient Acceptance, E,D, NR by  at 7/3/2021 1213                               User Key     Initials Effective Dates Name Provider Type Discipline     06/16/21 -  Stephanie Aaron, PT Physical Therapist PT              PT Recommendation and Plan  Planned Therapy Interventions (PT): balance training, bed mobility training, gait training, home exercise program, patient/family education, strengthening, stair training, transfer training  Plan of Care Reviewed With: patient  Progress: no change  Outcome Summary: PT initial evaluation completed. Pt demonstrates generalized weakness and decreased indep/ functional endurance re: mobility tasks, warranting further skilled PT services to promote PLOF. Limited today by fatigue, but able to ambulate 90 ft with RW, min A. Recommend d/c home with assist and HHPT.     Time Calculation:   PT Charges     Row Name 07/03/21 1213             Time Calculation    Start Time  1023  -LS      PT Received On  07/03/21  -      PT Goal Re-Cert Due Date  07/13/21  -         Timed Charges    62242 - Gait Training Minutes   5  -LS      46922 - PT Therapeutic Activity Minutes  4  -LS         Untimed Charges    PT Eval/Re-eval Minutes  32  -LS         Total Minutes    Timed Charges Total Minutes  9  -LS      Untimed Charges Total Minutes  32  -LS       Total Minutes  41  -LS        User Key  (r) = Recorded By, (t) = Taken By, (c) = Cosigned By    Initials Name Provider Type     Stephanie Aaron, PT Physical Therapist        Therapy Charges for Today     Code Description Service Date Service Provider Modifiers Qty    73768003978 HC PT EVAL MOD COMPLEXITY 3 7/3/2021 Stephanie Aaron, PT GP 1    31301975841 HC GAIT TRAINING EA 15 MIN 7/3/2021 Galen  Stephanei YADAV, PT GP 1    [id number]  PT THER SUPP EA 15 MIN 7/3/2021 Stephanie Aaron, PT GP 2          PT G-Codes  Outcome Measure Options: AM-PAC 6 Clicks Basic Mobility (PT)  AM-PAC 6 Clicks Score (PT): 17    Stephanie Aaron, PT  7/3/2021

## 2021-07-03 NOTE — PROGRESS NOTES
Intensive Care Follow-up     Hospital:  LOS: 2 days   Ms. An Abreu, 71 y.o. female is followed for:   Acute on chronic respiratory failure with hypercapnia (CMS/HCC)            History of present illness:   71-year-old female with end-stage COPD with ongoing smoking, chronic hypoxemic and hypercapnic respiratory failure on 3 L nasal cannula oxygen at home, presented to [facility] ED on July 1 with shortness of breath and wheezing.  Patient was found to have blood gas with pH of 7.26, PaCO2 of 90 and she was placed on BiPAP but eventually got intubated in the emergency room.  Patient was placed on Rocephin for UTI as well azithromycin for atypical infection.  Patient was extubated on July 2.      Subjective   Interval History:  Overnight no acute events.  Stayed on BiPAP overnight.  Today morning seen sitting out in chair on 2 L nasal cannula oxygen.  Patient is on 3 L nasal cannula oxygen at home.  Denies any acute issues.  Eating okay.  Labs reviewed and acceptable.                 The patient's past medical, surgical and social history were reviewed and updated in Epic as appropriate.       Objective     Infusions:     Medications:  aspirin, 81 mg, Oral, Daily  azithromycin, 250 mg, Intravenous, Daily  cefTRIAXone, 1 g, Intravenous, Q24H  citalopram, 20 mg, Oral, Daily  clopidogrel, 75 mg, Oral, Daily  dilTIAZem, 60 mg, Oral, Q6H  enoxaparin, 40 mg, Subcutaneous, Q24H  furosemide, 20 mg, Oral, Daily  ipratropium-albuterol, 3 mL, Nebulization, 4x Daily - RT  lisinopril, 40 mg, Oral, Daily  methylPREDNISolone sodium succinate, 40 mg, Intravenous, Q12H  pantoprazole, 40 mg, Oral, Q AM  pharmacy consult - MTM, , Does not apply, Daily  PHENobarbital, 150 mg, Oral, Nightly  rosuvastatin, 40 mg, Oral, Nightly        Vital Sign Min/Max for last 24 hours  Temp  Min: 97.5 °F (36.4 °C)  Max: 98.8 °F (37.1 °C)   BP  Min: 90/44  Max: 142/62   Pulse  Min: 58  Max: 90   Resp  Min: 16  Max: 25   SpO2  Min:  90 %  Max: 100 %   Flow (L/min)  Min: 2  Max: 4       Input/Output for last 24 hour shift  07/02 0701 - 07/03 0700  In: 1494 [P.O.:480; I.V.:564]  Out: 1200 [Urine:1200]      Objective    General Appearance: Awake, alert, in mild resp distress  Head:   Pupils reactive & symmetrical B/L.  Lungs:   B/L Breath sounds present with decreased breath sounds on bases, bilateral wheezing heard, no crackles.   Heart: S1 and S2 present, no murmur  Abdomen: Soft, nontender, no guarding or rigidity, bowel sounds positive.  Extremities: Atraumatic, no cyanosis or clubbing,  no edema, warm to touch.  Pulses: Positive and symmetric.  Neurologic:  Moving all four extremities. Good strength bilaterally.       Results from last 7 days   Lab Units 07/03/21  0920 07/02/21  0608 07/01/21  1106   WBC 10*3/mm3 7.98 8.41 6.69   HEMOGLOBIN g/dL 8.9* 9.5* 10.2*   PLATELETS 10*3/mm3 295 291 334     Results from last 7 days   Lab Units 07/03/21  0920 07/02/21  0608 07/01/21  1106   SODIUM mmol/L 136 138 140   POTASSIUM mmol/L 3.6 4.2 5.2   CO2 mmol/L 32.0* 33.0* 39.0*   BUN mg/dL 8 12 13   CREATININE mg/dL 0.51* 0.46* 0.60   MAGNESIUM mg/dL 1.9 2.2  --    PHOSPHORUS mg/dL 2.9 2.6  --    GLUCOSE mg/dL 120* 61* 113*     Estimated Creatinine Clearance: 44.9 mL/min (A) (by C-G formula based on SCr of 0.51 mg/dL (L)).    Results from last 7 days   Lab Units 07/02/21  1119   PH, ARTERIAL pH units 7.478*   PCO2, ARTERIAL mm Hg 46.9*   PO2 ART mm Hg 93.9       Images:   Chest x-ray done yesterday reviewed and showed hyperinflated lung fields.  Support lines in good position.  Patchy infiltrate noted in the left lung base without any significant effusion or consolidation.    I reviewed the patient's results and images.     Echocardiogram done in May reviewed and showed ejection fraction 70% and moderate aortic regurgitation noted.    Urine culture grew more than 100,000 colonies of gram-negative bacilli.    Moderate gram positive cocci in pairs on  sputum culture.     Assessment/Plan   Impression        Acute on chronic respiratory failure with hypercapnia     Gastroesophageal reflux disease    Seizure disorder on phenobarbital     PAD s/p R iliac stent     Paroxysmal AF    Dependence on 3L NC    Tobacco abuse    CAD s/p CABG     Anemia    Pulmonary cachexia 2* COPD    H/O: recent GI bleed    + PFO     H/O CVA     Non-compliance    Debility    Acute cystitis without hematuria       Plan        1.  Patient with end-stage COPD came in with COPD flareup.  Was intubated and now extubated.  Continue BiPAP at night.  Patient will likely benefit from noninvasive ventilation at home with trilogy ventilator.  2.  Still having wheezing.  Continue systemic steroids, once her respiratory status improves will transition to oral prednisone.  Will add budesonide nebulizer to DuoNeb's.  Continue closer monitoring.  High risk of decline.  3.  Continue current antibiotics with azithromycin and finish off Rocephin.  4.  Continue aspirin, Plavix.  5.  GI and DVT prophylaxis.  6.  Oral diet as tolerated.  7.  PT/OT and mobilize.    Continue monitoring in ICU for another 24 hours.    Plan of care and goals reviewed with multidisciplinary/antibiotic stewardship team during rounds.   I discussed the patient's findings and my recommendations with patient and nursing staff     High level of risk due to:  illness with threat to life or bodily function.    Time spent 30 min (exclusive of procedure time)  including high complexity decision making to assess, manipulate, and support vital organ system failure in this individual who has impairment of one or more vital organ systems such that there is a high probability of imminent or life threatening deterioration in the patient’s condition.      Singh Darnell MD, FCCP  Pulmonary, Critical care and Sleep Medicine

## 2021-07-04 NOTE — PLAN OF CARE
Goal Outcome Evaluation:   VSS. Patient was in chair until 2200, had bowel movement x1, urine output 500ml. Patient has rested well on BiPAP 40% FiO2 all night no complaints. Will continue to monitor.

## 2021-07-04 NOTE — PROGRESS NOTES
Intensive Care Follow-up     Hospital:  LOS: 3 days   Ms. An Abreu, 71 y.o. female is followed for:   Acute on chronic respiratory failure with hypercapnia (CMS/HCC)            History of present illness:   71-year-old female with end-stage COPD with ongoing smoking, chronic hypoxemic and hypercapnic respiratory failure on 3 L nasal cannula oxygen at home, presented to [facility] ED on July 1 with shortness of breath and wheezing.  Patient was found to have blood gas with pH of 7.26, PaCO2 of 90 and she was placed on BiPAP but eventually got intubated in the emergency room.  Patient was placed on Rocephin for UTI as well azithromycin for atypical infection.  Patient was extubated on July 2.      Subjective   Interval History:  Overnight no acute events.  Used BiPAP overnight.  Currently on 2 L nasal cannula oxygen.  Eating well.  Had a bowel movement earlier today.  Walked 90 feet with physical therapy.               The patient's past medical, surgical and social history were reviewed and updated in Epic as appropriate.       Objective     Infusions:     Medications:  aspirin, 81 mg, Oral, Daily  azithromycin, 250 mg, Intravenous, Daily  budesonide, 0.5 mg, Nebulization, BID - RT  citalopram, 20 mg, Oral, Daily  clopidogrel, 75 mg, Oral, Daily  dilTIAZem, 60 mg, Oral, Q6H  enoxaparin, 40 mg, Subcutaneous, Q24H  furosemide, 20 mg, Oral, Daily  ipratropium-albuterol, 3 mL, Nebulization, 4x Daily - RT  lisinopril, 40 mg, Oral, Daily  methylPREDNISolone sodium succinate, 40 mg, Intravenous, Q12H  pantoprazole, 40 mg, Oral, Q AM  pharmacy consult - MTM, , Does not apply, Daily  PHENobarbital, 145.8 mg, Oral, Nightly  rosuvastatin, 40 mg, Oral, Nightly        Vital Sign Min/Max for last 24 hours  Temp  Min: 97 °F (36.1 °C)  Max: 98.2 °F (36.8 °C)   BP  Min: 102/45  Max: 132/63   Pulse  Min: 54  Max: 80   Resp  Min: 18  Max: 22   SpO2  Min: 92 %  Max: 100 %   Flow (L/min)  Min: 2  Max: 3        "Input/Output for last 24 hour shift  07/03 0701 - 07/04 0700  In: 627.5 [P.O.:360; I.V.:17.5]  Out: 500 [Urine:500]      Objective:  Vital signs: (most recent): Blood pressure 118/57, pulse 57, temperature 98.2 °F (36.8 °C), temperature source Oral, resp. rate 18, height 144.8 cm (57.01\"), weight 45.4 kg (100 lb), SpO2 100 %.              General Appearance: Awake, alert, in no acute respiratory distress.  Head:   Pupils reactive & symmetrical B/L.  Lungs:   B/L Breath sounds present with decreased breath sounds on bases, bilateral wheezing heard, no crackles.   Heart: S1 and S2 present, no murmur  Abdomen: Soft, nontender, no guarding or rigidity, bowel sounds positive.  Extremities: Atraumatic, no cyanosis or clubbing,  no edema, warm to touch.  Neurologic:  Moving all four extremities. Good strength bilaterally.       Results from last 7 days   Lab Units 07/04/21  0847 07/03/21  0920 07/02/21  0608   WBC 10*3/mm3 6.32 7.98 8.41   HEMOGLOBIN g/dL 9.1* 8.9* 9.5*   PLATELETS 10*3/mm3 292 295 291     Results from last 7 days   Lab Units 07/04/21  0847 07/03/21  0920 07/02/21  0608   SODIUM mmol/L 133* 136 138   POTASSIUM mmol/L 4.8 3.6 4.2   CO2 mmol/L 31.0* 32.0* 33.0*   BUN mg/dL 8 8 12   CREATININE mg/dL 0.46* 0.51* 0.46*   MAGNESIUM mg/dL 3.0* 1.9 2.2   PHOSPHORUS mg/dL  --  2.9 2.6   GLUCOSE mg/dL 135* 120* 61*     Estimated Creatinine Clearance: 44.9 mL/min (A) (by C-G formula based on SCr of 0.46 mg/dL (L)).    Results from last 7 days   Lab Units 07/02/21  1119   PH, ARTERIAL pH units 7.478*   PCO2, ARTERIAL mm Hg 46.9*   PO2 ART mm Hg 93.9       Images:   No new.    I reviewed the patient's results and images.     Echocardiogram done in May reviewed and showed ejection fraction 70% and moderate aortic regurgitation noted.    Urine culture grew more than 100,000 colonies of gram-negative bacilli.  Klebsiella pneumoniae cultures.  Resistant to ampicillin.    Moderate gram positive cocci in pairs on sputum " culture.  Fungal culture showed normal respiratory anusha.    Assessment/Plan   Impression        Acute on chronic respiratory failure with hypercapnia     Gastroesophageal reflux disease    Seizure disorder on phenobarbital     PAD s/p R iliac stent     Paroxysmal AF    Dependence on 3L NC    Tobacco abuse    CAD s/p CABG     Anemia    Pulmonary cachexia 2* COPD    H/O: recent GI bleed    + PFO     H/O CVA     Non-compliance    Debility    Acute cystitis without hematuria       Plan        1.  Patient with end-stage COPD came in with COPD flareup.  Had to be intubated earlier but doing well post extubation.  Continue BiPAP at night.  Patient will benefit from trilogy ventilator at night due to end-stage COPD with frequent flareups requiring hospitalization and chronic hypercapnic respiratory failure.  2.  Wheezing improving.  Will change Solu-Medrol to prednisone.  Continue budesonide and DuoNeb nebulizers.  3.  Continue current antibiotics with azithromycin and finish off Rocephin.  Klebsiella is sensitive to ceftriaxone.  4.  Continue aspirin, Plavix.  5.  GI and DVT prophylaxis.  6.  Oral diet as tolerated.  7.  PT/OT and mobilize.    We will transition patient out of ICU to telemetry floor.  Sign out to hospitalist team.    Plan of care and goals reviewed with multidisciplinary/antibiotic stewardship team during rounds.   I discussed the patient's findings and my recommendations with patient and nursing staff     High level of risk due to:  illness with threat to life or bodily function.    Time spent 30 min (exclusive of procedure time)  including high complexity decision making to assess, manipulate, and support vital organ system failure in this individual who has impairment of one or more vital organ systems such that there is a high probability of imminent or life threatening deterioration in the patient’s condition.      Singh Darnell MD, [facility]  Pulmonary, Critical care and Sleep Medicine

## 2021-07-05 PROBLEM — N30.00 ACUTE CYSTITIS WITHOUT HEMATURIA: Status: RESOLVED | Noted: 2021-01-01 | Resolved: 2021-01-01

## 2021-07-05 PROBLEM — R53.81 DEBILITY: Chronic | Status: ACTIVE | Noted: 2021-01-01

## 2021-07-05 PROBLEM — J44.1 COPD WITH ACUTE EXACERBATION (HCC): Status: RESOLVED | Noted: 2021-01-01 | Resolved: 2021-01-01

## 2021-07-05 PROBLEM — J96.22 ACUTE ON CHRONIC RESPIRATORY FAILURE WITH HYPERCAPNIA (HCC): Status: RESOLVED | Noted: 2021-01-01 | Resolved: 2021-01-01

## 2021-07-05 PROBLEM — J44.1 COPD WITH ACUTE EXACERBATION (HCC): Status: ACTIVE | Noted: 2021-01-01

## 2021-07-05 PROBLEM — Z87.19 H/O: GI BLEED: Chronic | Status: ACTIVE | Noted: 2021-01-01

## 2021-07-05 PROBLEM — Z72.0 TOBACCO ABUSE: Chronic | Status: ACTIVE | Noted: 2020-08-29

## 2021-07-05 PROBLEM — Z91.199 NON-COMPLIANCE: Chronic | Status: ACTIVE | Noted: 2021-01-01

## 2021-07-05 NOTE — CASE MANAGEMENT/SOCIAL WORK
Continued Stay Note  [facility]     Patient Name: An Abreu  MRN: [id number]  Today's Date: 7/5/2021    Admit Date: 7/1/2021    Discharge Plan     Row Name 07/05/21 1446       Plan    Plan  Home    Patient/Family in Agreement with Plan  yes    Plan Comments  Spoke with pt. at bedside. Order for Bipap has been placed. Her insurance requires a PA and is closed today. [facility] I spoke with the on-call tech with [facility]. Faxed info and order to [facility]. Nurse notified of plan.    Final Discharge Disposition Code  30 - still a patient        Discharge Codes    No documentation.       Expected Discharge Date and Time     Expected Discharge Date Expected Discharge Time    Jul 5, 2021             Celine Stewart RN

## 2021-07-05 NOTE — PLAN OF CARE
Goal Outcome Evaluation:  Plan of Care Reviewed With: patient         Pt slept during the night. No complaints of pain. Vss. Slept wih c pap all night. Will continue to monitor.

## 2021-07-05 NOTE — DISCHARGE PLACEMENT REQUEST
"An Abreu (71 y.o. Female)     Date of Birth Social Security Number Address Home Phone MRN    1950  [address] [address] [phone] [id number]    Yarsani Marital Status          None        Admission Date Admission Type Admitting Provider Attending Provider Department, Room/Bed    21 Emergency Siri Cruz MD Hall, Holly, MD [facility] 6A, N613/    Discharge Date Discharge Disposition Discharge Destination         Home or Self Care              Attending Provider: Siri Cruz MD    Allergies: Keflex [Cephalexin], Sulfamethoxazole-trimethoprim, Carbamazepine, Codeine, Latex    Isolation: None   Infection: None   Code Status: CPR    Ht: 144.8 cm (57.01\")   Wt: 47.2 kg (104 lb 1.6 oz)    Admission Cmt: None   Principal Problem: Acute on chronic respiratory failure with hypercapnia  [J96.22]                 Active Insurance as of 2021     Primary Coverage     Payor Plan Insurance Group Employer/Plan Group    [facility] MEDICARE REPLACEMENT [facility] MEDICARE REPLACEMENT      Payor Plan Address Payor Plan Phone Number Payor Plan Fax Number Effective Dates    [address] [address] [phone]  2018 - None Entered    [facility] [id number]       Subscriber Name Subscriber Birth Date Member ID       AN ABREU 1950 [id number]                 Emergency Contacts      (Rel.) Home Phone Work Phone Mobile Phone    HAETHER STRINGER (Surrogate) [phone] -- [phone]    MARLENE ABREU (Daughter) [phone] -- --    DEANNA ROTH (Friend) [phone] -- [phone]    GEOVANYSTEFANIA RAO (Daughter) [phone] -- --          [address]  [address] [facility] [address]  Dept. Phone:  [phone]  Dept. Fax:  [phone] Date Ordered: 2021         Patient:  An Abreu MRN:  [id number]   [address] [id number] :  1950  SSN:    Phone: " "[phone] Sex:  F     Weight: 47.2 kg (104 lb 1.6 oz)         Ht Readings from Last 1 Encounters:   21 144.8 cm (57.01\")         Miscellaneous DME   (Order ID: [id number])    Diagnosis:  Acute on chronic respiratory failure with hypercapnia (CMS/HCC) (J96.22 [ICD-10-CM] 518.84 [ICD-9-CM])  Chronic obstructive pulmonary disease, unspecified COPD type (CMS/HCC) (J44.9 [ICD-10-CM] 496 [ICD-9-CM])  COPD with acute exacerbation (CMS/HCC) (J44.1 [ICD-10-CM] 491.21 [ICD-9-CM])  Acute on chronic respiratory failure with hypercapnia (CMS/HCC) (J96.22 [ICD-10-CM] 518.84 [ICD-9-CM])  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 (CMS/HCC) (J44.9,R64 [ICD-10-CM] 496,799.4 [ICD-9-CM])  Dependence on supplemental oxygen (Z99.81 [ICD-10-CM] V46.2 [ICD-9-CM])   Quantity:  1     Type of DME: Home sleep study to evaluate for CPAP  Length of Need (99 Months = Lifetime): 99 Months = Lifetime        Authorizing Provider's Phone: [phone]   Authorizing Provider:Siri Cruz MD  Authorizing Provider's NPI: [id number]  Order Entered By: Celine Stewart RN 2021  2:10 PM     Electronically signed by: Siri Cruz MD 2021  2:10 PM            [address] [address]  Phone:  [phone]  Fax:  [phone]        Patient:     An Abreu MRN:  [id number]   [address] [facility] [address] :  1950  SSN:    Phone: [phone] Sex:  F      INSURANCE PAYOR PLAN GROUP # SUBSCRIBER ID   Primary:    [facility] MEDICARE REPLACEMENT [id number]   [id number]   Admitting Diagnosis: Acute on chronic respiratory failure with hypercapnia (CMS/[facility]) [J96.22]  Order Date:  2021         Case Management  Consult       (Order ID: [id number])     Diagnosis:         Priority:  Routine Expected Date:   Expiration Date:        Interval:  Once Count:    Reason for Consult? please set up home Bipap.  dx:  A/C hypercapneic " respiratory failure, COPD     Specimen Type:   Specimen Source:   Specimen Taken Date:   Specimen Taken Time:                   Authorizing Provider:Siri Cruz MD  Authorizing Provider's NPI: [id number]  Order Entered By: Siri Cruz MD 2021  1:56 PM     Electronically signed by: Siri Cruz MD 2021  1:56 PM               Discharge Summary      Siri Cruz MD at 21 1252              [facility] Medicine Services  DISCHARGE SUMMARY    Patient Name: An Abreu  : 1950  MRN: [id number]    Date of Admission: 2021 10:21 AM  Date of Discharge:  21    Primary Care Physician: Zabrina Lemon MD    Consults     Date and Time Order Name Status Description    6/15/2021 10:16 AM Inpatient Palliative Care MD Consult Completed     6/15/2021  6:45 AM Gastroenterology Consult Completed     6/15/2021  6:39 AM Inpatient Gastroenterology Consult Completed           Hospital Course     Presenting Problem:   Acute on chronic respiratory failure with hypercapnia (CMS/HCC) [J96.22]    Active Hospital Problems    Diagnosis  POA   • H/O: recent GI bleed [Z87.19]  Not Applicable   • + PFO  [Q21.1]  Not Applicable   • H/O CVA  [Z86.73]  Not Applicable   • Non-compliance [Z91.19]  Not Applicable   • Debility [R53.81]  Yes   • Pulmonary cachexia 2* COPD [J44.9, R64]  Yes   • Anemia [D64.9]  Yes   • CAD s/p CABG  [I25.10]  Yes   • Dependence on 3L NC [Z99.81]  Not Applicable   • Tobacco abuse [Z72.0]  Yes   • Paroxysmal AF [I48.0]  Yes   • Gastroesophageal reflux disease [K21.9]  Yes   • PAD s/p R iliac stent  [I73.9]  Yes   • Seizure disorder on phenobarbital  [G40.909]  Yes      Resolved Hospital Problems    Diagnosis Date Resolved POA   • **Acute on chronic respiratory failure with hypercapnia  [J96.22] 2021 Yes   • COPD with acute exacerbation (CMS/HCC) [J44.1] 2021 Yes   • Acute cystitis without hematuria [N30.00] 2021 Yes          Hospital  Course:  An Abreu is a 71 y.o. female well-known to [facility] admission service for recurrent COPD exacerbation presentations.  Patient presented on 7/1/2021 with decreased level of alertness related to acute on chronic hypercapnic respiratory failure with initial ABG PCO2 of 90, initially managed with BiPAP and ultimately requiring intubation with mechanical ventilation and ICU stay.  Patient was successfully extubated on 7/2/2021 and has improved from a respiratory standpoint with aggressive pulmonary toilet, consistent nebulizer therapy, and steroids.    Patient was also incidentally found to have Klebsiella UTI and has received full course of IV Rocephin therapy during this hospitalization.    Patient is now back to her baseline oxygen status, she is at her baseline respiratory status, and she is able to ambulate in the halls greater than 90 feet.  She is appropriate for discharge home and has already established follow-up with [facility] on 7/13/2021.    Home Bipap ordered for home use, Pulmonary reccomends Trelegy BiPap which can be authorized via their office as she has planned fu for 7/13/21 with Dr. Leblanc at [facility].         Day of Discharge     HPI:   At baseline CARMONA.  At baseline O2 needs.  Mentation appropriate and interactive.    Vital Signs:   Temp:  [97.4 °F (36.3 °C)-98.1 °F (36.7 °C)] 98 °F (36.7 °C)  Heart Rate:  [54-76] 76  Resp:  [14-22] 18  BP: (110-141)/(51-75) 110/61     Physical Exam:  Constitutional: No acute distress, awake, alert, nontoxic, cachetic body habitus  Respiratory: Coarse, good effort, nonlabored respirations on baseline O2  Cardiovascular: NSR tele  Musculoskeletal: No peripheral edema, normal muscle tone for age  Psychiatric: Appropriate affect, good insight and judgement, cooperative      Pertinent  and/or Most Recent Results     LAB RESULTS:      Lab 07/04/21  0847 07/03/21  0920 07/02/21  0608 07/01/21  1106   WBC 6.32 7.98 8.41 6.69    HEMOGLOBIN 9.1* 8.9* 9.5* 10.2*   HEMATOCRIT 31.6* 30.4* 32.2* 35.3   PLATELETS 292 295 291 334   NEUTROS ABS 5.01  --  5.60 5.01   IMMATURE GRANS (ABS) 0.05  --  0.05 0.08*   LYMPHS ABS 0.96  --  2.00 1.03   MONOS ABS 0.29  --  0.72 0.48   EOS ABS 0.00  --  0.02 0.07   .6* 100.3* 97.6* 100.9*   PROCALCITONIN  --   --   --  0.05   LACTATE  --   --   --  0.7         Lab 07/04/21  0847 07/03/21  0920 07/02/21  0608 07/01/21  1106   SODIUM 133* 136 138 140   POTASSIUM 4.8 3.6 4.2 5.2   CHLORIDE 97* 95* 97* 97*   CO2 31.0* 32.0* 33.0* 39.0*   ANION GAP 5.0 9.0 8.0 4.0*   BUN 8 8 12 13   CREATININE 0.46* 0.51* 0.46* 0.60   GLUCOSE 135* 120* 61* 113*   CALCIUM 8.1* 8.3* 8.7 9.2   MAGNESIUM 3.0* 1.9 2.2  --    PHOSPHORUS  --  2.9 2.6  --    TSH  --   --   --  0.755         Lab 07/01/21  1106   TOTAL PROTEIN 7.3   ALBUMIN 4.10   GLOBULIN 3.2   ALT (SGPT) 17   AST (SGOT) 29   BILIRUBIN <0.2   ALK PHOS 114         Lab 07/01/21  1106   PROBNP 3,221.0*   TROPONIN T 0.091*                 Lab 07/02/21  1119 07/02/21  0345 07/01/21  1348   PH, ARTERIAL 7.478* 7.490* 7.502*   PCO2, ARTERIAL 46.9* 46.3* 51.6*   PO2 ART 93.9 122.0* 273.0*   FIO2 40 40 60   HCO3 ART 34.8* 35.3* 40.4*   BASE EXCESS ART 10.1* 10.8* 15.5*   CARBOXYHEMOGLOBIN 1.0 1.0 3.1*     Brief Urine Lab Results  (Last result in the past 365 days)      Color   Clarity   Blood   Leuk Est   Nitrite   Protein   CREAT   Urine HCG        07/01/21 1110 Yellow Cloudy Negative Moderate (2+) Positive 100 mg/dL (2+)             Microbiology Results (last 10 days)     Procedure Component Value - Date/Time    COVID PRE-OP / PRE-PROCEDURE SCREENING ORDER (NO ISOLATION) - Swab, Nasopharynx [[id number]]  (Normal) Collected: 07/01/21 1739    Lab Status: Final result Specimen: Swab from Nasopharynx Updated: 07/01/21 4561    Narrative:      The following orders were created for panel order COVID PRE-OP / PRE-PROCEDURE SCREENING ORDER (NO ISOLATION) - Swab,  Nasopharynx.  Procedure                               Abnormality         Status                     ---------                               -----------         ------                     COVID-19,CEPHEID,AMA IN-...[[id number]]  Normal              Final result                 Please view results for these tests on the individual orders.    COVID-19,CEPHEID,AMA IN-HOUSE(OR EMERGENT/ADD-ON),NP SWAB IN TRANSPORT MEDIA 3-4 HR TAT - Swab, Nasopharynx [[id number]]  (Normal) Collected: 07/01/21 1739    Lab Status: Final result Specimen: Swab from Nasopharynx Updated: 07/01/21 1856     COVID19 Not Detected    Narrative:      Fact sheet for providers: https://www.fda.gov/media/161205/download     Fact sheet for patients: https://www.fda.gov/media/340130/download  Fact sheet for providers: https://www.fda.gov/media/960981/download     Fact sheet for patients: https://www.fda.gov/media/347066/download    Respiratory Culture - Sputum, Bronchus [[id number]] Collected: 07/01/21 1728    Lab Status: Final result Specimen: Sputum from Bronchus Updated: 07/03/21 1043     Respiratory Culture Light growth (2+) Normal Respiratory Andressa: NO S.aureus/MRSA or Pseudomonas aeruginosa     Gram Stain Moderate (3+) WBCs per low power field      Rare (1+) Epithelial cells per low power field      Moderate (3+) Gram positive cocci in pairs    Urine Culture - Urine, Urine, Catheter [[id number]]  (Abnormal)  (Susceptibility) Collected: 07/01/21 1110    Lab Status: Final result Specimen: Urine, Catheter Updated: 07/03/21 1317     Urine Culture >100,000 CFU/mL Klebsiella pneumoniae ssp pneumoniae    Susceptibility      Klebsiella pneumoniae ssp pneumoniae      FREDDY      Ampicillin Resistant      Ampicillin + Sulbactam Susceptible      Cefazolin Susceptible      Cefepime Susceptible      Ceftazidime Susceptible      Ceftriaxone Susceptible      Gentamicin Susceptible      Levofloxacin Susceptible      Nitrofurantoin Susceptible      Piperacillin +  Tazobactam Susceptible      Tetracycline Susceptible      Trimethoprim + Sulfamethoxazole Susceptible               Linear View                         XR Chest 1 View    Result Date: 7/2/2021  EXAMINATION: XR CHEST 1 VW- 07/02/2021  INDICATION: Intubated patient; J96.22-Acute and chronic respiratory failure with hypercapnia; R40.0-Somnolence; J44.9-Chronic obstructive pulmonary disease, unspecified; N30.90-Cystitis, unspecified without hematuria  COMPARISON: 07/01/2021  FINDINGS: ET tube and NG tube appear to be in satisfactory position. The heart is normal in size. The vasculature appears normal. Lungs are hyperexpanded and appear clear except for subtle patchy interstitial changes in the left lung base, perhaps mild peribronchial thickening. No effusion or pneumothorax is seen. Old irregularly healed left proximal humerus fracture is again noted.      Appearance of mild peribronchial thickening in the left lung base. No evidence of active chest disease elsewhere.  D:  07/02/2021 E:  07/02/2021    This report was finalized on 7/2/2021 11:26 PM by Dr. Tre Huerta MD.      XR Chest 1 View    Result Date: 7/1/2021   EXAMINATION: XR CHEST 1 VW - 07/01/2021  INDICATION: J96.22-Acute and chronic respiratory failure with hypercapnia; R40.0-Somnolence; J44.9-Chronic obstructive pulmonary disease, unspecified; N30.90-Cystitis, unspecified without hematuria. Post intubation.  COMPARISON: Chest x-ray 07/01/2021  FINDINGS: Status post intubation with endotracheal tube terminating 3 cm above the level of the sujata in satisfactory positioning. Cardiac size unchanged without overt edema or effusion. No pneumothorax.      Status post intubation with endotracheal tube terminating 3 cm above the level of the sujata in satisfactory positioning.  DICTATED:   07/01/2021 EDITED/ls :   07/01/2021       XR Chest 1 View    Result Date: 7/1/2021  EXAMINATION: XR CHEST 1 VW-  INDICATION: Shortness of air triage protocol.  COMPARISON: Chest  x-ray 06/14/2021.  FINDINGS: Cardiac size borderline enlarged status post median sternotomy and CABG without overt edema. No pneumothorax or pleural effusion. Degenerative changes of the spine.         Chronic changes without acute cardiopulmonary process.  D:  07/01/2021 E:  07/01/2021       XR Abdomen KUB    Result Date: 7/1/2021  EXAMINATION: XR ABDOMEN KUB - 07/01/2021  INDICATION: J96.22-Acute and chronic respiratory failure with hypercapnia; R40.0-Somnolence; J44.9-Chronic obstructive pulmonary disease, unspecified; N30.90-Cystitis, unspecified without hematuria. OG tube placement.  COMPARISON: 10/29/2020 KUB  FINDINGS: NG tube is seen at the GE junction but not yet in the stomach. Very little bowel gas is visible in the upper abdomen.      NG tube tip at or just above the GE junction.  DICTATED:   07/01/2021 EDITED/ls :   07/01/2021    This report was finalized on 7/1/2021 11:35 PM by Dr. Tre Huerta MD.      XR Abdomen KUB    Result Date: 7/1/2021  EXAMINATION: XR ABDOMEN KUB - 07/01/2021  INDICATION: J96.22-Acute and chronic respiratory failure with hypercapnia; R40.0-Somnolence; J44.9-Chronic obstructive pulmonary disease, unspecified; N30.90-Cystitis, unspecified without hematuria. OG advancement.  COMPARISON: 4:38 PM exam of same date.  FINDINGS: NG tube has been advanced to the mid or distal stomach, now in good position. Upper abdominal bowel gas pattern is nonobstructive.      NG tube tip is now seen in the mid to distal stomach in good position.   DICTATED:   07/01/2021 EDITED/ls :   07/01/2021  This report was finalized on 7/1/2021 11:35 PM by Dr. Tre Huerta MD.        Results for orders placed during the hospital encounter of 09/28/17    Doppler Ankle Brachial Index Multi Level CAR    Interpretation Summary  · Right Conclusion: The right CUCA is normal.  · Left Conclusion: The left CUCA is mildly reduced.      Results for orders placed during the hospital encounter of 09/28/17    Doppler Ankle  Brachial Index Multi Level CAR    Interpretation Summary  · Right Conclusion: The right CUCA is normal.  · Left Conclusion: The left CUCA is mildly reduced.      Results for orders placed during the hospital encounter of 05/03/21    Adult Transthoracic Echo Complete W/ Cont if Necessary Per Protocol    Interpretation Summary  · Estimated left ventricular EF = 70%  · Moderate aortic valve regurgitation is present.      Discharge Details        Discharge Medications      New Medications      Instructions Start Date   predniSONE 20 MG tablet  Commonly known as: DELTASONE   Take 2 tablets by mouth Daily With Breakfast for 3 days, THEN 1 tablet Daily With Breakfast for 3 days, THEN 0.5 tablets Daily With Breakfast for 3 days.   Start Date: July 6, 2021        Changes to Medications      Instructions Start Date   guaiFENesin 600 MG 12 hr tablet  Commonly known as: MUCINEX  What changed:   · when to take this  · reasons to take this   600 mg, Oral, Every 12 Hours Scheduled      rosuvastatin 40 MG tablet  Commonly known as: CRESTOR  What changed: when to take this   40 mg, Oral, Daily         Continue These Medications      Instructions Start Date   albuterol (2.5 MG/3ML) 0.083% nebulizer solution  Commonly known as: PROVENTIL   2.5 mg, Nebulization, Every 4 Hours PRN      albuterol sulfate  (90 Base) MCG/ACT inhaler  Commonly known as: PROVENTIL HFA;VENTOLIN HFA;PROAIR HFA   2 puffs, Inhalation, Every 4 Hours PRN      Anoro Ellipta 62.5-25 MCG/INH aerosol powder  inhaler  Generic drug: umeclidinium-vilanterol   1 puff, Inhalation, Every Morning      aspirin 81 MG tablet   81 mg, Oral, Daily      cetirizine 10 MG tablet  Commonly known as: zyrTEC   5 mg, Oral, Daily      citalopram 20 MG tablet  Commonly known as: CeleXA   20 mg, Oral, Daily, Needs appointment for next refill.      clopidogrel 75 MG tablet  Commonly known as: PLAVIX   75 mg, Oral, Daily      dilTIAZem  MG 24 hr capsule  Commonly known as:  CARDIZEM CD   240 mg, Oral, Daily      furosemide 40 MG tablet  Commonly known as: LASIX   40 mg, Oral, Daily      HYDROcodone-acetaminophen  MG per tablet  Commonly known as: NORCO   1 tablet, Oral, Every 6 Hours PRN      ipratropium-albuterol 0.5-2.5 mg/3 ml nebulizer  Commonly known as: DUO-NEB   3 mL, Nebulization, Every 4 Hours PRN      LORazepam 1 MG tablet  Commonly known as: ATIVAN   TAKE 1 TABLET BY MOUTH TWICE DAILY. MUST LAST 30 DAYS.      ondansetron 4 MG tablet  Commonly known as: ZOFRAN   4 mg, Oral, Every 6 Hours PRN      pantoprazole 40 MG EC tablet  Commonly known as: PROTONIX   40 mg, Oral, 2 Times Daily Before Meals      PHENobarbital 100 MG tablet  Commonly known as: LUMINAL   TAKE 1 AND 1/2 TABLETS BY MOUTH EVERY DAY      promethazine 12.5 MG tablet  Commonly known as: PHENERGAN   12.5 mg, Oral, Daily PRN             Allergies   Allergen Reactions   • Keflex [Cephalexin] Shortness Of Breath and Rash     Patients power of  states patient had to be taken to ER due to reaction.    Tolerated Rocephin 2/19/21, zosyn 5/2021   • Sulfamethoxazole-Trimethoprim Shortness Of Breath and Rash     Patients power of  stated patient had to be taken to ER due to reaction.   • Carbamazepine    • Codeine    • Latex Rash         Discharge Disposition:  Home or Self Care    Diet:  Hospital:  Diet Order   Procedures   • Diet Regular; Cardiac          CODE STATUS:    Code Status and Medical Interventions:   Ordered at: 07/01/21 1406     Level Of Support Discussed With:    Health Care Surrogate     Code Status:    CPR     Medical Interventions (Level of Support Prior to Arrest):    Full       Future Appointments   Date Time Provider Department Center   7/13/2021  2:30 PM Vikram Leblanc DO [facility] AMA AMA                 Siri Cruz MD  07/05/21      Time Spent on Discharge:  I spent  40  minutes on this discharge activity which included: face-to-face encounter with the patient,  reviewing the data in the system, coordination of the care with the nursing staff as well as consultants, documentation, and entering orders.            Electronically signed by Siri Cruz MD at 07/05/21 3430

## 2021-07-05 NOTE — DISCHARGE SUMMARY
[facility] Medicine Services  DISCHARGE SUMMARY    Patient Name: An Abreu  : 1950  MRN: [id number]    Date of Admission: 2021 10:21 AM  Date of Discharge:  21    Primary Care Physician: Zabrina Lemon MD    Consults     Date and Time Order Name Status Description    6/15/2021 10:16 AM Inpatient Palliative Care MD Consult Completed     6/15/2021  6:45 AM Gastroenterology Consult Completed     6/15/2021  6:39 AM Inpatient Gastroenterology Consult Completed           Hospital Course     Presenting Problem:   Acute on chronic respiratory failure with hypercapnia (CMS/HCC) [J96.22]    Active Hospital Problems    Diagnosis  POA   • H/O: recent GI bleed [Z87.19]  Not Applicable   • + PFO  [Q21.1]  Not Applicable   • H/O CVA  [Z86.73]  Not Applicable   • Non-compliance [Z91.19]  Not Applicable   • Debility [R53.81]  Yes   • Pulmonary cachexia 2* COPD [J44.9, R64]  Yes   • Anemia [D64.9]  Yes   • CAD s/p CABG  [I25.10]  Yes   • Dependence on 3L NC [Z99.81]  Not Applicable   • Tobacco abuse [Z72.0]  Yes   • Paroxysmal AF [I48.0]  Yes   • Gastroesophageal reflux disease [K21.9]  Yes   • PAD s/p R iliac stent  [I73.9]  Yes   • Seizure disorder on phenobarbital  [G40.909]  Yes      Resolved Hospital Problems    Diagnosis Date Resolved POA   • **Acute on chronic respiratory failure with hypercapnia  [J96.22] 2021 Yes   • COPD with acute exacerbation (CMS/HCC) [J44.1] 2021 Yes   • Acute cystitis without hematuria [N30.00] 2021 Yes          Hospital Course:  An Abreu is a 71 y.o. female well-known to [facility] admission service for recurrent COPD exacerbation presentations.  Patient presented on 2021 with decreased level of alertness related to acute on chronic hypercapnic respiratory failure with initial ABG PCO2 of 90, initially managed with BiPAP and ultimately requiring intubation with mechanical ventilation and ICU stay.  Patient was  successfully extubated on 7/2/2021 and has improved from a respiratory standpoint with aggressive pulmonary toilet, consistent nebulizer therapy, and steroids.    Patient was also incidentally found to have Klebsiella UTI and has received full course of IV Rocephin therapy during this hospitalization.    Patient is now back to her baseline oxygen status, she is at her baseline respiratory status, and she is able to ambulate in the halls greater than 90 feet.  She is appropriate for discharge home and has already established follow-up with [facility] on 7/13/2021.    Home Bipap ordered for home use, Pulmonary reccomends Trelegy BiPap which can be authorized via their office as she has planned fu for 7/13/21 with Dr. Leblanc at [facility].         Day of Discharge     HPI:   At baseline CARMONA.  At baseline O2 needs.  Mentation appropriate and interactive.    Vital Signs:   Temp:  [97.4 °F (36.3 °C)-98.1 °F (36.7 °C)] 98 °F (36.7 °C)  Heart Rate:  [54-76] 76  Resp:  [14-22] 18  BP: (110-141)/(51-75) 110/61     Physical Exam:  Constitutional: No acute distress, awake, alert, nontoxic, cachetic body habitus  Respiratory: Coarse, good effort, nonlabored respirations on baseline O2  Cardiovascular: NSR tele  Musculoskeletal: No peripheral edema, normal muscle tone for age  Psychiatric: Appropriate affect, good insight and judgement, cooperative      Pertinent  and/or Most Recent Results     LAB RESULTS:      Lab 07/04/21  0847 07/03/21  0920 07/02/21  0608 07/01/21  1106   WBC 6.32 7.98 8.41 6.69   HEMOGLOBIN 9.1* 8.9* 9.5* 10.2*   HEMATOCRIT 31.6* 30.4* 32.2* 35.3   PLATELETS 292 295 291 334   NEUTROS ABS 5.01  --  5.60 5.01   IMMATURE GRANS (ABS) 0.05  --  0.05 0.08*   LYMPHS ABS 0.96  --  2.00 1.03   MONOS ABS 0.29  --  0.72 0.48   EOS ABS 0.00  --  0.02 0.07   .6* 100.3* 97.6* 100.9*   PROCALCITONIN  --   --   --  0.05   LACTATE  --   --   --  0.7         Lab 07/04/21  0847  07/03/21  0920 07/02/21  0608 07/01/21  1106   SODIUM 133* 136 138 140   POTASSIUM 4.8 3.6 4.2 5.2   CHLORIDE 97* 95* 97* 97*   CO2 31.0* 32.0* 33.0* 39.0*   ANION GAP 5.0 9.0 8.0 4.0*   BUN 8 8 12 13   CREATININE 0.46* 0.51* 0.46* 0.60   GLUCOSE 135* 120* 61* 113*   CALCIUM 8.1* 8.3* 8.7 9.2   MAGNESIUM 3.0* 1.9 2.2  --    PHOSPHORUS  --  2.9 2.6  --    TSH  --   --   --  0.755         Lab 07/01/21  1106   TOTAL PROTEIN 7.3   ALBUMIN 4.10   GLOBULIN 3.2   ALT (SGPT) 17   AST (SGOT) 29   BILIRUBIN <0.2   ALK PHOS 114         Lab 07/01/21  1106   PROBNP 3,221.0*   TROPONIN T 0.091*                 Lab 07/02/21  1119 07/02/21  0345 07/01/21  1348   PH, ARTERIAL 7.478* 7.490* 7.502*   PCO2, ARTERIAL 46.9* 46.3* 51.6*   PO2 ART 93.9 122.0* 273.0*   FIO2 40 40 60   HCO3 ART 34.8* 35.3* 40.4*   BASE EXCESS ART 10.1* 10.8* 15.5*   CARBOXYHEMOGLOBIN 1.0 1.0 3.1*     Brief Urine Lab Results  (Last result in the past 365 days)      Color   Clarity   Blood   Leuk Est   Nitrite   Protein   CREAT   Urine HCG        07/01/21 1110 Yellow Cloudy Negative Moderate (2+) Positive 100 mg/dL (2+)             Microbiology Results (last 10 days)     Procedure Component Value - Date/Time    COVID PRE-OP / PRE-PROCEDURE SCREENING ORDER (NO ISOLATION) - Swab, Nasopharynx [[id number]]  (Normal) Collected: 07/01/21 7699    Lab Status: Final result Specimen: Swab from Nasopharynx Updated: 07/01/21 7186    Narrative:      The following orders were created for panel order COVID PRE-OP / PRE-PROCEDURE SCREENING ORDER (NO ISOLATION) - Swab, Nasopharynx.  Procedure                               Abnormality         Status                     ---------                               -----------         ------                     COVID-19,CEPHEID,AMA IN-...[[id number]]  Normal              Final result                 Please view results for these tests on the individual orders.    COVID-19,CEPHEID,AMA IN-HOUSE(OR EMERGENT/ADD-ON),NP SWAB IN TRANSPORT  MEDIA 3-4 HR TAT - Swab, Nasopharynx [[id number]]  (Normal) Collected: 07/01/21 1739    Lab Status: Final result Specimen: Swab from Nasopharynx Updated: 07/01/21 1856     COVID19 Not Detected    Narrative:      Fact sheet for providers: https://www.fda.gov/media/021651/download     Fact sheet for patients: https://www.fda.gov/media/877397/download  Fact sheet for providers: https://www.fda.gov/media/358669/download     Fact sheet for patients: https://www.fda.gov/media/361960/download    Respiratory Culture - Sputum, Bronchus [[id number]] Collected: 07/01/21 1728    Lab Status: Final result Specimen: Sputum from Bronchus Updated: 07/03/21 1043     Respiratory Culture Light growth (2+) Normal Respiratory Andressa: NO S.aureus/MRSA or Pseudomonas aeruginosa     Gram Stain Moderate (3+) WBCs per low power field      Rare (1+) Epithelial cells per low power field      Moderate (3+) Gram positive cocci in pairs    Urine Culture - Urine, Urine, Catheter [[id number]]  (Abnormal)  (Susceptibility) Collected: 07/01/21 1110    Lab Status: Final result Specimen: Urine, Catheter Updated: 07/03/21 1317     Urine Culture >100,000 CFU/mL Klebsiella pneumoniae ssp pneumoniae    Susceptibility      Klebsiella pneumoniae ssp pneumoniae      FREDDY      Ampicillin Resistant      Ampicillin + Sulbactam Susceptible      Cefazolin Susceptible      Cefepime Susceptible      Ceftazidime Susceptible      Ceftriaxone Susceptible      Gentamicin Susceptible      Levofloxacin Susceptible      Nitrofurantoin Susceptible      Piperacillin + Tazobactam Susceptible      Tetracycline Susceptible      Trimethoprim + Sulfamethoxazole Susceptible               Linear View                         XR Chest 1 View    Result Date: 7/2/2021  EXAMINATION: XR CHEST 1 VW- 07/02/2021  INDICATION: Intubated patient; J96.22-Acute and chronic respiratory failure with hypercapnia; R40.0-Somnolence; J44.9-Chronic obstructive pulmonary disease, unspecified; N30.90-Cystitis,  unspecified without hematuria  COMPARISON: 07/01/2021  FINDINGS: ET tube and NG tube appear to be in satisfactory position. The heart is normal in size. The vasculature appears normal. Lungs are hyperexpanded and appear clear except for subtle patchy interstitial changes in the left lung base, perhaps mild peribronchial thickening. No effusion or pneumothorax is seen. Old irregularly healed left proximal humerus fracture is again noted.      Appearance of mild peribronchial thickening in the left lung base. No evidence of active chest disease elsewhere.  D:  07/02/2021 E:  07/02/2021    This report was finalized on 7/2/2021 11:26 PM by Dr. Tre Huerta MD.      XR Chest 1 View    Result Date: 7/1/2021   EXAMINATION: XR CHEST 1 VW - 07/01/2021  INDICATION: J96.22-Acute and chronic respiratory failure with hypercapnia; R40.0-Somnolence; J44.9-Chronic obstructive pulmonary disease, unspecified; N30.90-Cystitis, unspecified without hematuria. Post intubation.  COMPARISON: Chest x-ray 07/01/2021  FINDINGS: Status post intubation with endotracheal tube terminating 3 cm above the level of the sujata in satisfactory positioning. Cardiac size unchanged without overt edema or effusion. No pneumothorax.      Status post intubation with endotracheal tube terminating 3 cm above the level of the sujata in satisfactory positioning.  DICTATED:   07/01/2021 EDITED/ls :   07/01/2021       XR Chest 1 View    Result Date: 7/1/2021  EXAMINATION: XR CHEST 1 VW-  INDICATION: Shortness of air triage protocol.  COMPARISON: Chest x-ray 06/14/2021.  FINDINGS: Cardiac size borderline enlarged status post median sternotomy and CABG without overt edema. No pneumothorax or pleural effusion. Degenerative changes of the spine.         Chronic changes without acute cardiopulmonary process.  D:  07/01/2021 E:  07/01/2021       XR Abdomen KUB    Result Date: 7/1/2021  EXAMINATION: XR ABDOMEN KUB - 07/01/2021  INDICATION: J96.22-Acute and chronic  respiratory failure with hypercapnia; R40.0-Somnolence; J44.9-Chronic obstructive pulmonary disease, unspecified; N30.90-Cystitis, unspecified without hematuria. OG tube placement.  COMPARISON: 10/29/2020 KUB  FINDINGS: NG tube is seen at the GE junction but not yet in the stomach. Very little bowel gas is visible in the upper abdomen.      NG tube tip at or just above the GE junction.  DICTATED:   07/01/2021 EDITED/ls :   07/01/2021    This report was finalized on 7/1/2021 11:35 PM by Dr. Tre Huerta MD.      XR Abdomen KUB    Result Date: 7/1/2021  EXAMINATION: XR ABDOMEN KUB - 07/01/2021  INDICATION: J96.22-Acute and chronic respiratory failure with hypercapnia; R40.0-Somnolence; J44.9-Chronic obstructive pulmonary disease, unspecified; N30.90-Cystitis, unspecified without hematuria. OG advancement.  COMPARISON: 4:38 PM exam of same date.  FINDINGS: NG tube has been advanced to the mid or distal stomach, now in good position. Upper abdominal bowel gas pattern is nonobstructive.      NG tube tip is now seen in the mid to distal stomach in good position.   DICTATED:   07/01/2021 EDITED/ls :   07/01/2021  This report was finalized on 7/1/2021 11:35 PM by Dr. Tre Huerta MD.        Results for orders placed during the hospital encounter of 09/28/17    Doppler Ankle Brachial Index Multi Level CAR    Interpretation Summary  · Right Conclusion: The right CUCA is normal.  · Left Conclusion: The left CUCA is mildly reduced.      Results for orders placed during the hospital encounter of 09/28/17    Doppler Ankle Brachial Index Multi Level CAR    Interpretation Summary  · Right Conclusion: The right CUCA is normal.  · Left Conclusion: The left CUCA is mildly reduced.      Results for orders placed during the hospital encounter of 05/03/21    Adult Transthoracic Echo Complete W/ Cont if Necessary Per Protocol    Interpretation Summary  · Estimated left ventricular EF = 70%  · Moderate aortic valve regurgitation is  present.      Discharge Details        Discharge Medications      New Medications      Instructions Start Date   predniSONE 20 MG tablet  Commonly known as: DELTASONE   Take 2 tablets by mouth Daily With Breakfast for 3 days, THEN 1 tablet Daily With Breakfast for 3 days, THEN 0.5 tablets Daily With Breakfast for 3 days.   Start Date: July 6, 2021        Changes to Medications      Instructions Start Date   guaiFENesin 600 MG 12 hr tablet  Commonly known as: MUCINEX  What changed:   when to take this  reasons to take this   600 mg, Oral, Every 12 Hours Scheduled      rosuvastatin 40 MG tablet  Commonly known as: CRESTOR  What changed: when to take this   40 mg, Oral, Daily         Continue These Medications      Instructions Start Date   albuterol (2.5 MG/3ML) 0.083% nebulizer solution  Commonly known as: PROVENTIL   2.5 mg, Nebulization, Every 4 Hours PRN      albuterol sulfate  (90 Base) MCG/ACT inhaler  Commonly known as: PROVENTIL HFA;VENTOLIN HFA;PROAIR HFA   2 puffs, Inhalation, Every 4 Hours PRN      Anoro Ellipta 62.5-25 MCG/INH aerosol powder  inhaler  Generic drug: umeclidinium-vilanterol   1 puff, Inhalation, Every Morning      aspirin 81 MG tablet   81 mg, Oral, Daily      cetirizine 10 MG tablet  Commonly known as: zyrTEC   5 mg, Oral, Daily      citalopram 20 MG tablet  Commonly known as: CeleXA   20 mg, Oral, Daily, Needs appointment for next refill.      clopidogrel 75 MG tablet  Commonly known as: PLAVIX   75 mg, Oral, Daily      dilTIAZem  MG 24 hr capsule  Commonly known as: CARDIZEM CD   240 mg, Oral, Daily      furosemide 40 MG tablet  Commonly known as: LASIX   40 mg, Oral, Daily      HYDROcodone-acetaminophen  MG per tablet  Commonly known as: NORCO   1 tablet, Oral, Every 6 Hours PRN      ipratropium-albuterol 0.5-2.5 mg/3 ml nebulizer  Commonly known as: DUO-NEB   3 mL, Nebulization, Every 4 Hours PRN      LORazepam 1 MG tablet  Commonly known as: ATIVAN   TAKE 1 TABLET BY  MOUTH TWICE DAILY. MUST LAST 30 DAYS.      ondansetron 4 MG tablet  Commonly known as: ZOFRAN   4 mg, Oral, Every 6 Hours PRN      pantoprazole 40 MG EC tablet  Commonly known as: PROTONIX   40 mg, Oral, 2 Times Daily Before Meals      PHENobarbital 100 MG tablet  Commonly known as: LUMINAL   TAKE 1 AND 1/2 TABLETS BY MOUTH EVERY DAY      promethazine 12.5 MG tablet  Commonly known as: PHENERGAN   12.5 mg, Oral, Daily PRN             Allergies   Allergen Reactions   • Keflex [Cephalexin] Shortness Of Breath and Rash     Patients power of  states patient had to be taken to ER due to reaction.    Tolerated Rocephin 2/19/21, zosyn 5/2021   • Sulfamethoxazole-Trimethoprim Shortness Of Breath and Rash     Patients power of  stated patient had to be taken to ER due to reaction.   • Carbamazepine    • Codeine    • Latex Rash         Discharge Disposition:  Home or Self Care    Diet:  Hospital:  Diet Order   Procedures   • Diet Regular; Cardiac          CODE STATUS:    Code Status and Medical Interventions:   Ordered at: 07/01/21 1406     Level Of Support Discussed With:    Health Care Surrogate     Code Status:    CPR     Medical Interventions (Level of Support Prior to Arrest):    Full       Future Appointments   Date Time Provider Department Center   7/13/2021  2:30 PM Vikram Leblanc DO [facility] A[facility] AMA                 Siri Cruz MD  07/05/21      Time Spent on Discharge:  I spent  40  minutes on this discharge activity which included: face-to-face encounter with the patient, reviewing the data in the system, coordination of the care with the nursing staff as well as consultants, documentation, and entering orders.

## 2021-07-05 NOTE — NURSING NOTE
Pt being d/c. Directed to ask about trilogy BiPAP at pulmonology follow up appointment on 7/13. Dr. Cruz aware and ok with plan.

## 2021-07-05 NOTE — PLAN OF CARE
Goal Outcome Evaluation:  Plan of Care Reviewed With: patient        Progress: improving  Outcome Summary: OT evaluation compelted with pt. demonstrating good ability with sitting task ie donning and doffing socks Matrina and SBA 80 ft with walker, but then with SOA and upper LE pain.  02 sats upper 90's throughout session. OT skills warranted to assist with balance, strength and endurance with impact on ADL's. Recommend home with family assist at discharge.

## 2021-07-05 NOTE — THERAPY EVALUATION
Patient Name: An Abreu  : 1950    MRN: [id number]                              Today's Date: 2021       Admit Date: 2021    Visit Dx:     ICD-10-CM ICD-9-CM   1. Acute on chronic respiratory failure with hypercapnia (CMS/[facility], located [facility])  J96.22 518.84   2. Somnolence  R40.0 780.09   3. Chronic obstructive pulmonary disease, unspecified COPD type (CMS/[facility], located within [facility])  J44.9 496   4. 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)   • Severe malnutrition (CMS/[facility] located [facility])   • H/O: recent GI bleed   • + PFO    • H/O CVA    • Non-compliance   • Debility   • Acute cystitis without hematuria     Past Medical History:   Diagnosis Date   • Aneurysm (CMS/[facility])    • Angina pectoris (CMS/[facility]) 2016   • Anxiety 2016   • Arthritis 2016   • CAD (coronary artery disease)    • Carotid artery stenosis    • CHF (congestive heart failure) (CMS/[facility])    • Chronic coronary artery disease 2016 CABG LIMA to LAD, VG to OM  VG to OM occluded. LIMA patent Cx intervention and RCA 2008 stent for ISR  ISR and stenting of CX and RCA  normal MPS   • Chronic left-sided low back pain with left-sided sciatica 2017   • Chronic obstructive pulmonary disease (CMS/HCC) 2016   • Chronic respiratory failure with hypoxia (CMS/[facility]) 3/27/2021   • Cobalamin deficiency 2016   • Congestive heart failure  (CMS/[facility]) 6/20/2016   • COPD (chronic obstructive pulmonary disease) (CMS/[facility])    • Depression 7/3/2018   • Diverticulosis    • Diverticulosis of large intestine without hemorrhage 5/5/2017   • GERD (gastroesophageal reflux disease)    • GIB (gastrointestinal bleeding)     recurrent    • HTN (hypertension)    • Hypercholesterolemia 6/20/2016   • Hyperlipidemia    • Mesenteric ischemia (CMS/[facility]) 2006    s/p resection    • Mood disorder (CMS/HCC)    • Oxygen dependent     3 liters @ all times.    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 (CMS/[facility]) 5/23/2021   • PVD (peripheral vascular disease) (CMS/HCC)    • Restless legs syndrome 6/20/2016   • Seizure disorder (CMS/HCC) 6/20/2016   • Seizures (CMS/HCC)    • Stenosis of carotid artery 6/20/2016   • Vertigo 9/28/2016     Past Surgical History:   Procedure Laterality Date   • APPENDECTOMY     • CHOLECYSTECTOMY     • COLONOSCOPY N/A 6/19/2021    Procedure: COLONOSCOPY;  Surgeon: Wilfredo Simon MD;  Location:  [facility];  Service: Gastroenterology;  Laterality: N/A;   • COLOSTOMY  2006    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   • ENDOSCOPY N/A 6/16/2021    Procedure: ESOPHAGOGASTRODUODENOSCOPY;  Surgeon: Jean Fuentes MD;  Location:  [facility];  Service: Gastroenterology;  Laterality: N/A;   • EXPLORATORY LAPAROTOMY      sec to ovarian cysts   • HYSTERECTOMY     • ILIAC ARTERY STENT     • REVISION / TAKEDOWN COLOSTOMY  2008     General Information     Row Name 07/05/21 1019          OT Time and Intention    Document Type  evaluation  -CAMERON     Mode of Treatment  individual therapy;occupational therapy  -CAMERON     Row Name 07/05/21 1019          General Information    Patient Profile Reviewed  yes  -CAMERON     Prior Level of Function  independent:;all household mobility;ADL's;dependent:;home  management;driving;shopping  -     Existing Precautions/Restrictions  oxygen therapy device and L/min  -CAMERON     Barriers to Rehab  previous functional deficit  -     Row Name 07/05/21 1019          Living Environment    Lives With  child(nash), adult;grandchild(nash) dtr and grandchildren 14, 15 and 10 yo, per pt. dtr. there most all of the time  -     Row Name 07/05/21 1019          Home Main Entrance    Number of Stairs, Main Entrance  four  -     Row Name 07/05/21 1019          Stairs Within Home, Primary    Stair Railings, Within Home, Primary  none  -CAMERON     Stairs Comment, Within Home, Primary  tub shower, has a shower chair, but prefers to get in bottom and soak, BSC used by bed at night, uses rx wx prn  -     Row Name 07/05/21 1019          Cognition    Orientation Status (Cognition)  oriented x 3  -     Row Name 07/05/21 1019          Safety Issues, Functional Mobility    Impairments Affecting Function (Mobility)  balance;endurance/activity tolerance;strength;pain;shortness of breath  -       User Key  (r) = Recorded By, (t) = Taken By, (c) = Cosigned By    Initials Name Provider Type    Jessica Wasserman, OT Occupational Therapist          Mobility/ADL's     Row Name 07/05/21 1021          Bed Mobility    Comment (Bed Mobility)  UIC on arrival  -     Row Name 07/05/21 1021          Transfers    Transfers  sit-stand transfer  -     Sit-Stand [place] (Transfers)  standby assist  -[facility] Name 07/05/21 1021          Sit-Stand Transfer    Assistive Device (Sit-Stand Transfers)  walker, front-wheeled  -     Row Name 07/05/21 1021          Functional Mobility    Functional Mobility- Ind. Level  contact guard assist  -     Functional Mobility- Device  rolling walker  -     Functional Mobility-Distance (Feet)  80  -CAMERON     Functional Mobility- Safety Issues  step length decreased;supplemental O2  -CAMERON     Functional Mobility- Comment  CGA only first 10 feet then to SBA with rx wx, per  pt. she only uses her walker sporatically  -[facility] Name 07/05/21 1021          Activities of Daily Living    BADL Assessment/Intervention  upper body dressing;lower body dressing;grooming  -[facility] Name 07/05/21 1021          Upper Body Dressing Assessment/Training    [place] Level (Upper Body Dressing)  don;pajama/robe;moderate assist (50% patient effort)  -CAMERON     Position (Upper Body Dressing)  unsupported sitting  -CAMERON     Comment (Upper Body Dressing)  limited by IV  -[facility] Name 07/05/21 1021          Lower Body Dressing Assessment/Training    [place] Level (Lower Body Dressing)  doff;don;socks;independent  -CAMERON     Position (Lower Body Dressing)  unsupported sitting  -CAMERON     Comment (Lower Body Dressing)  pt. with good flexibility in hips and knees to reach feet easily  -[facility] Name 07/05/21 1021          Grooming Assessment/Training    [place] Level (Grooming)  hair care, combing/brushing;independent  -CAMERON     Position (Grooming)  unsupported sitting  -CAMERON       User Key  (r) = Recorded By, (t) = Taken By, (c) = Cosigned By    Initials Name Provider Type    Jessica Wasserman, OT Occupational Therapist        Obj/Interventions     [facility] Name 07/05/21 1023          Sensory Assessment (Somatosensory)    Sensory Assessment (Somatosensory)  UE sensation intact  -[facility] Name 07/05/21 1023          Vision Assessment/Intervention    Visual Impairment/Limitations  corrective lenses full-time per pt. with bifocals and feel she needs trifocals due to glasses not working as well as priorly  -[facility] Name 07/05/21 1023          Range of Motion Comprehensive    General Range of Motion  bilateral upper extremity ROM WNL  -CAMERON     Comment, General Range of Motion  minus  PROM limited grossly 20% in shoulders  -[facility] Name 07/05/21 1023          Strength Comprehensive (MMT)    General Manual Muscle Testing (MMT) Assessment  upper extremity strength deficits identified  -     Comment,  General Manual Muscle Testing (MMT) Assessment  distally grossly 4+/5, shoulders 4/5  -     Row Name 07/05/21 1023          Motor Skills    Motor Skills  functional endurance  -     Functional Endurance  fair  -     Row Name 07/05/21 1023          Balance    Balance Assessment  sitting dynamic balance;standing static balance;standing dynamic balance  -CAMERON     Static Sitting Balance  WFL;unsupported;sitting in chair  -CAMERON     Dynamic Sitting Balance  WFL;unsupported;sitting in chair  -CAMERON     Static Standing Balance  WFL;supported;standing  -CAMERON     Dynamic Standing Balance  WFL;supported;standing  -CAMERON     Balance Interventions  occupation based/functional task  -       User Key  (r) = Recorded By, (t) = Taken By, (c) = Cosigned By    Initials Name Provider Type    Jessica Wasserman, OT Occupational Therapist        Goals/Plan     Row Name 07/05/21 1030          Bed Mobility Goal 1 (OT)    Activity/Assistive Device (Bed Mobility Goal 1, OT)  bed mobility activities, all  -CAMERON     [place] Level/Cues Needed (Bed Mobility Goal 1, OT)  modified independence  -CAMERON     Time Frame (Bed Mobility Goal 1, OT)  long term goal (LTG);10 days  -CAMERON     Progress/Outcomes (Bed Mobility Goal 1, OT)  goal ongoing  -     Row Name 07/05/21 1030          Transfer Goal 1 (OT)    Activity/Assistive Device (Transfer Goal 1, OT)  toilet;commode grab bar  -CAMERON     [place] Level/Cues Needed (Transfer Goal 1, OT)  standby assist  -CAMERON     Time Frame (Transfer Goal 1, OT)  long term goal (LTG);10 days  -CAMERON     Progress/Outcome (Transfer Goal 1, OT)  goal ongoing  -     Row Name 07/05/21 1030          Bathing Goal 1 (OT)    Activity/Device (Bathing Goal 1, OT)  bathing skills, all  -CAMERON     [place] Level/Cues Needed (Bathing Goal 1, OT)  minimum assist (75% or more patient effort)  -CAMERON     Time Frame (Bathing Goal 1, OT)  long term goal (LTG);10 days  -CAMERON     Progress/Outcomes (Bathing Goal 1, OT)  goal ongoing  -     Row Name  07/05/21 1030          Toileting Goal 1 (OT)    Activity/Device (Toileting Goal 1, OT)  toileting skills, all  -CAMERON     [place] Level/Cues Needed (Toileting Goal 1, OT)  modified independence  -CAMERON     Time Frame (Toileting Goal 1, OT)  long term goal (LTG);10 days  -CAMERON     Progress/Outcome (Toileting Goal 1, OT)  goal ongoing  -CAMERON     Row Name 07/05/21 1030          Grooming Goal 1 (OT)    Activity/Device (Grooming Goal 1, OT)  oral care sinkside  -CAMERON     [place] (Grooming Goal 1, OT)  independent  -CAMERON     Time Frame (Grooming Goal 1, OT)  long term goal (LTG);10 days  -CAMERON     Progress/Outcome (Grooming Goal 1, OT)  goal ongoing  -CAMERON     Row Name 07/05/21 1030          Strength Goal 1 (OT)    Strength Goal 1 (OT)  10-20 reps AROM/resistive TE BUE to support ADL independence.  -CAMERON     Time Frame (Strength Goal 1, OT)  long term goal (LTG);10 days  -CAMERON     Progress/Outcome (Strength Goal 1, OT)  goal ongoing  -     Row Name 07/05/21 1030          Therapy Assessment/Plan (OT)    Planned Therapy Interventions (OT)  activity tolerance training;BADL retraining;occupation/activity based interventions;patient/caregiver education/training;ROM/therapeutic exercise;strengthening exercise;transfer/mobility retraining  -       User Key  (r) = Recorded By, (t) = Taken By, (c) = Cosigned By    Initials Name Provider Type    CAMERON Jessica Crane, OT Occupational Therapist        Clinical Impression     Row Name 07/05/21 1025          Pain Assessment    Additional Documentation  Pain Scale: Numbers Pre/Post-Treatment (Group)  -[facility] Name 07/05/21 1025          Pain Scale: Numbers Pre/Post-Treatment    Pretreatment Pain Rating  0/10 - no pain  -CAMERON     Posttreatment Pain Rating  4/10  -CAMERON     Pain Location - Side  Bilateral  -CAMERON     Pain Location - Orientation  lower  -CAMERON     Pain Location  extremity  -CAMERON     Pain Intervention(s)  Medication (See MAR);Repositioned  -[facility] Name 07/05/21 1025          Plan of Care  Review    Plan of Care Reviewed With  patient  -CAMERON     Progress  improving  -CAMERON     Outcome Summary  OT evaluation compelted with pt. demonstrating good ability with sitting task ie donning and doffing socks Martina and SBA 80 ft with walker, but then with SOA and upper LE pain.  02 sats upper 90's throughout session. OT skills warranted to assist with balance, strength and endurance with impact on ADL's. Recommend home with family assist at discharge.  -     Row Name 07/05/21 1025          Therapy Assessment/Plan (OT)    Patient/Family Therapy Goal Statement (OT)  return to PLOF and return home  -CAMERON     Rehab Potential (OT)  good, to achieve stated therapy goals  -CAMERON     Criteria for Skilled Therapeutic Interventions Met (OT)  yes;meets criteria;skilled treatment is necessary  -CAMERON     Therapy Frequency (OT)  daily  -     Row Name 07/05/21 1025          Therapy Plan Review/Discharge Plan (OT)    Equipment Needs Upon Discharge (OT)  other (see comments) grab bars tub  -CAMERON     Anticipated Discharge Disposition (OT)  home with assist  -     Row Name 07/05/21 1025          Vital Signs    Pre Systolic BP Rehab  110  -CAMERON     Pre Treatment Diastolic BP  61  -CAMERON     Pretreatment Heart Rate (beats/min)  76  -CAMERON     Posttreatment Heart Rate (beats/min)  78  -CAMERON     Pre SpO2 (%)  99  -CAMERON     O2 Delivery Pre Treatment  nasal cannula  -CAMERON     O2 Delivery Intra Treatment  nasal cannula  -CAMERON     Post SpO2 (%)  97  -CAMERON     O2 Delivery Post Treatment  nasal cannula  -CAMERON     Pre Patient Position  Sitting  -CAMERON     Intra Patient Position  Standing  -CAMERON     Post Patient Position  Sitting  -CAMERON     Row Name 07/05/21 1025          Positioning and Restraints    Pre-Treatment Position  sitting in chair/recliner  -CAMERON     Post Treatment Position  chair  -CAMERON     In Chair  reclined;call light within reach;encouraged to call for assist;heels elevated;waffle cushion per nurse no alarm needed  -CAMERON       User Key  (r) = Recorded By, (t) = Taken  By, (c) = Cosigned By    Initials Name Provider Type    Jessica Wasserman, [place] Occupational Therapist        Outcome Measures     Row Name 07/05/21 1034          How much help from another is currently needed...    Putting on and taking off regular lower body clothing?  3  -CAMERON     Bathing (including washing, rinsing, and drying)  3  -CAMERON     Toileting (which includes using toilet bed pan or urinal)  3  -CAMERON     Putting on and taking off regular upper body clothing  3  -CAMERON     Taking care of personal grooming (such as brushing teeth)  3 sitting  -CAMERON     Eating meals  4  -CAMERON     AM-PAC 6 Clicks Score (OT)  19  -CAMERON     Row Name 07/05/21 1034          Functional Assessment    Outcome Measure Options  AM-PAC 6 Clicks Daily Activity (OT)  -CAMERON       User Key  (r) = Recorded By, (t) = Taken By, (c) = Cosigned By    Initials Name Provider Type    Jessica Wasserman OT Occupational Therapist        Occupational Therapy Education                 Title: PT OT SLP Therapies (In Progress)     Topic: Occupational Therapy (In Progress)     Point: ADL training (Done)     Description:   Instruct learner(s) on proper safety adaptation and remediation techniques during self care or transfers.   Instruct in proper use of assistive devices.              Learning Progress Summary           Patient Acceptance, E, VU by CAMERON at 7/5/2021 1035    Comment: reason for consult, noted deficits, line safety                   Point: Home exercise program (Not Started)     Description:   Instruct learner(s) on appropriate technique for monitoring, assisting and/or progressing therapeutic exercises/activities.              Learner Progress:  Not documented in this visit.          Point: Precautions (Done)     Description:   Instruct learner(s) on prescribed precautions during self-care and functional transfers.              Learning Progress Summary           Patient Acceptance, E, VU by CAMERON at 7/5/2021 1035    Comment: reason for consult, noted  deficits, line safety                   Point: Body mechanics (Not Started)     Description:   Instruct learner(s) on proper positioning and spine alignment during self-care, functional mobility activities and/or exercises.              Learner Progress:  Not documented in this visit.                      User Key     Initials Effective Dates Name Provider Type Discipline     06/16/21 -  Jessica Crane OT Occupational Therapist OT              OT Recommendation and Plan  Planned Therapy Interventions (OT): activity tolerance training, BADL retraining, occupation/activity based interventions, patient/caregiver education/training, ROM/therapeutic exercise, strengthening exercise, transfer/mobility retraining  Therapy Frequency (OT): daily  Plan of Care Review  Plan of Care Reviewed With: patient  Progress: improving  Outcome Summary: OT evaluation compelted with pt. demonstrating good ability with sitting task ie donning and doffing socks Martina and SBA 80 ft with walker, but then with SOA and upper LE pain.  02 sats upper 90's throughout session. OT skills warranted to assist with balance, strength and endurance with impact on ADL's. Recommend home with family assist at discharge.     Time Calculation:   Time Calculation- OT     Row Name 07/05/21 1036             Time Calculation- OT    OT Start Time  0948  -CAMERON      OT Received On  07/05/21  -CAMERON      OT Goal Re-Cert Due Date  07/15/21  -CAMERON         Untimed Charges    OT Eval/Re-eval Minutes  53  -CAMERON         Total Minutes    Untimed Charges Total Minutes  53  -CAMERON       Total Minutes  53  -CAMERON        User Key  (r) = Recorded By, (t) = Taken By, (c) = Cosigned By    Initials Name Provider Type    Jessica Wasserman OT Occupational Therapist        Therapy Charges for Today     Code Description Service Date Service Provider Modifiers Qty    [id number]  OT EVAL LOW COMPLEXITY 4 7/5/2021 Jessica Crane OT GO 1               Jessica Crane OT  7/5/2021

## 2021-07-05 NOTE — OUTREACH NOTE
Prep Survey      Responses   [facility] patient discharged from?  [facility]   Is LACE score < 7 ?  No   Emergency Room discharge w/ pulse ox?  No   Eligibility  [facility]   Date of Admission  07/01/21   Date of Discharge  07/05/21   Discharge Disposition  Home or Self Care   Discharge diagnosis  COPD, UTI   Does the patient have one of the following disease processes/diagnoses(primary or secondary)?  COPD/Pneumonia   Does the patient have Home health ordered?  No   Is there a DME ordered?  Yes   What DME was ordered?  [facility]DE[facility] approval needed-[facility] closed on Monday   Prep survey completed?  Yes          Kathleen Beckford RN

## 2021-07-05 NOTE — DISCHARGE PLACEMENT REQUEST
"An Abreu (71 y.o. Female)     Date of Birth Social Security Number Address Home Phone MRN    1950  [address] [id number] [phone] [id number]    [employer] Marital Status          None        Admission Date Admission Type Admitting Provider Attending Provider Department, Room/Bed    7/1/21 Emergency Siri Cruz MD Hall, Holly, MD [facility] 6A, N613/1    Discharge Date Discharge Disposition Discharge Destination         Home or Self Care              Attending Provider: Siri Cruz MD    Allergies: Keflex [Cephalexin], Sulfamethoxazole-trimethoprim, Carbamazepine, Codeine, Latex    Isolation: None   Infection: None   Code Status: CPR    Ht: 144.8 cm (57.01\")   Wt: 47.2 kg (104 lb 1.6 oz)    Admission Cmt: None   Principal Problem: Acute on chronic respiratory failure with hypercapnia  [J96.22]                 Active Insurance as of 7/1/2021     Primary Coverage     Payor Plan Insurance Group Employer/Plan Group    [facility] MEDICARE REPLACEMENT [facility] MEDICARE REPLACEMENT      Payor Plan Address Payor Plan Phone Number Payor Plan Fax Number Effective Dates    [address] [address] [phone]  1/31/2018 - None Entered    [facility] [id number]       Subscriber Name Subscriber Birth Date Member ID       AN ABREU [id number]                 Emergency Contacts      (Rel.) Home Phone Work Phone Mobile Phone    HEATHER STRINGER (Surrogate) [phone] -- [phone]    MARLENE ABREU (Daughter) [phone] -- --    DEANNA ROTH (Friend) [phone] -- [phone]    GEOVANYSTEFANIA RICO (Daughter) [phone] -- --            BiPAP;ST  --  --  --     Equipment Type  --  V-60  --  --  --   Equipment ID  --  --  --  --  --   Method of Delivery  --  full face mask  --  --  --   CPAP (cm H2O)  --  --  --  --  --   IPAP (cm H2O)  --  16  --  --  --   IPAP Min (cm H2O)  --  --  --  --  --   IPAP Max (cm H2O)  --  --  --  --  -- "   EPAP (cm H2O)  --  8  --  --  --   EPAP Min (cm H2O)  --  --  --  --  --   EPAP Max (cm H2O)  --  --  --  --  --   Pressure Support (cm H20)  --  --  --  --  --   Pressure Support Max (cm H20)  --  --  --  --  --   Pressure Support Min (cm H20)  --  --  --  --  --   Set Rate (Breaths/Min)  --  8 breaths/min  --  --  --   Respiratory Rate Total (Breaths/Min)  --  29 breaths/min  --  --  --   Oxygen Concentration (%)  --  40  --  --  --   Flow (L/min)  --  --  3  3  3   FiO2 Auto Set  --  --  --  --  --   Biphasic Flow Rate High  --  --  --  --  --   Biphasic Flow Rate Low  --  --  --  --  --   Timed Inspiration (Sec)  --  --  --  --  --   IPAP Rise Time (Sec)  --  2  --  --  --   Tidal Volume Targeted (mL)  --  --  --  --  --   Tidal Volume (mL)  --  342 mL  --  --  --   Minute Ventilation (L/Min)  --  11.8  --  --  --   Peak Inspiratory Pressure (cm H2O)  --  --  --  --  --   TiTOT (%)  --  --  --  --  --   Total Leak (L/Min)  --  22  --  --  --   Humidifier  --  not applicable  --  --  --   Heater Temperature  --  --  --  --  --   Skin Integrity  --  intact  --  --  --   Device Skin Pressure Protection  --  skin-to-device  areas padded             [facility] 6A  [address] [facility] [address]  Phone:  [phone]  Fax:  [phone]        Patient:     An Abreu MRN:  [id number]   [address] [address] :  1950  SSN:    Phone: [phone] Sex:  F      INSURANCE PAYOR PLAN GROUP # SUBSCRIBER ID   Primary:    [employer] [place] MEDICARE REPLACEMENT [id number]   [id number]   Admitting Diagnosis: Acute on chronic respiratory failure with hypercapnia (CMS/HCC) [J96.22]  Order Date:  2021         Case Management  Consult       (Order ID: [id number])     Diagnosis:         Priority:  Routine Expected Date:   Expiration Date:        Interval:  Once Count:    Reason for Consult? please set up home Bipap.  dx:  A/C hypercapneic  respiratory failure, COPD     Specimen Type:   Specimen Source:   Specimen Taken Date:   Specimen Taken Time:                   Authorizing Provider:Siri Cruz MD  Authorizing Provider's NPI: [id number]  Order Entered By: Siri Cruz MD 2021  1:56 PM     Electronically signed by: Siri Cruz MD 2021  1:56 PM             Discharge Summary      Siri Cruz MD at 21 1252              [facility] Medicine Services  DISCHARGE SUMMARY    Patient Name: An Abreu  : 1950  MRN: [id number]    Date of Admission: 2021 10:21 AM  Date of Discharge:  21    Primary Care Physician: Zabrina Lemon MD    Consults     Date and Time Order Name Status Description    6/15/2021 10:16 AM Inpatient Palliative Care MD Consult Completed     6/15/2021  6:45 AM Gastroenterology Consult Completed     6/15/2021  6:39 AM Inpatient Gastroenterology Consult Completed           Hospital Course     Presenting Problem:   Acute on chronic respiratory failure with hypercapnia (CMS/HCC) [J96.22]    Active Hospital Problems    Diagnosis  POA   • H/O: recent GI bleed [Z87.19]  Not Applicable   • + PFO  [Q21.1]  Not Applicable   • H/O CVA  [Z86.73]  Not Applicable   • Non-compliance [Z91.19]  Not Applicable   • Debility [R53.81]  Yes   • Pulmonary cachexia 2* COPD [J44.9, R64]  Yes   • Anemia [D64.9]  Yes   • CAD s/p CABG  [I25.10]  Yes   • Dependence on 3L NC [Z99.81]  Not Applicable   • Tobacco abuse [Z72.0]  Yes   • Paroxysmal AF [I48.0]  Yes   • Gastroesophageal reflux disease [K21.9]  Yes   • PAD s/p R iliac stent  [I73.9]  Yes   • Seizure disorder on phenobarbital  [G40.909]  Yes      Resolved Hospital Problems    Diagnosis Date Resolved POA   • **Acute on chronic respiratory failure with hypercapnia  [J96.22] 2021 Yes   • COPD with acute exacerbation (CMS/HCC) [J44.1] 2021 Yes   • Acute cystitis without hematuria [N30.00] 2021 Yes          Hospital  Course:  An Abreu is a 71 y.o. female well-known to [facility] admission service for recurrent COPD exacerbation presentations.  Patient presented on 7/1/2021 with decreased level of alertness related to acute on chronic hypercapnic respiratory failure with initial ABG PCO2 of 90, initially managed with BiPAP and ultimately requiring intubation with mechanical ventilation and ICU stay.  Patient was successfully extubated on 7/2/2021 and has improved from a respiratory standpoint with aggressive pulmonary toilet, consistent nebulizer therapy, and steroids.    Patient was also incidentally found to have Klebsiella UTI and has received full course of IV Rocephin therapy during this hospitalization.    Patient is now back to her baseline oxygen status, she is at her baseline respiratory status, and she is able to ambulate in the halls greater than 90 feet.  She is appropriate for discharge home and has already established follow-up with [facility] on 7/13/2021.    Home Bipap ordered for home use, Pulmonary reccomends Trelegy BiPap which can be authorized via their office as she has planned fu for 7/13/21 with Dr. Leblanc at [facility].         Day of Discharge     HPI:   At baseline CARMONA.  At baseline O2 needs.  Mentation appropriate and interactive.    Vital Signs:   Temp:  [97.4 °F (36.3 °C)-98.1 °F (36.7 °C)] 98 °F (36.7 °C)  Heart Rate:  [54-76] 76  Resp:  [14-22] 18  BP: (110-141)/(51-75) 110/61     Physical Exam:  Constitutional: No acute distress, awake, alert, nontoxic, cachetic body habitus  Respiratory: Coarse, good effort, nonlabored respirations on baseline O2  Cardiovascular: NSR tele  Musculoskeletal: No peripheral edema, normal muscle tone for age  Psychiatric: Appropriate affect, good insight and judgement, cooperative      Pertinent  and/or Most Recent Results     LAB RESULTS:      Lab 07/04/21  0847 07/03/21  0920 07/02/21  0608 07/01/21  1106   WBC 6.32 7.98 8.41 6.69    HEMOGLOBIN 9.1* 8.9* 9.5* 10.2*   HEMATOCRIT 31.6* 30.4* 32.2* 35.3   PLATELETS 292 295 291 334   NEUTROS ABS 5.01  --  5.60 5.01   IMMATURE GRANS (ABS) 0.05  --  0.05 0.08*   LYMPHS ABS 0.96  --  2.00 1.03   MONOS ABS 0.29  --  0.72 0.48   EOS ABS 0.00  --  0.02 0.07   .6* 100.3* 97.6* 100.9*   PROCALCITONIN  --   --   --  0.05   LACTATE  --   --   --  0.7         Lab 07/04/21  0847 07/03/21  0920 07/02/21  0608 07/01/21  1106   SODIUM 133* 136 138 140   POTASSIUM 4.8 3.6 4.2 5.2   CHLORIDE 97* 95* 97* 97*   CO2 31.0* 32.0* 33.0* 39.0*   ANION GAP 5.0 9.0 8.0 4.0*   BUN 8 8 12 13   CREATININE 0.46* 0.51* 0.46* 0.60   GLUCOSE 135* 120* 61* 113*   CALCIUM 8.1* 8.3* 8.7 9.2   MAGNESIUM 3.0* 1.9 2.2  --    PHOSPHORUS  --  2.9 2.6  --    TSH  --   --   --  0.755         Lab 07/01/21  1106   TOTAL PROTEIN 7.3   ALBUMIN 4.10   GLOBULIN 3.2   ALT (SGPT) 17   AST (SGOT) 29   BILIRUBIN <0.2   ALK PHOS 114         Lab 07/01/21  1106   PROBNP 3,221.0*   TROPONIN T 0.091*                 Lab 07/02/21  1119 07/02/21  0345 07/01/21  1348   PH, ARTERIAL 7.478* 7.490* 7.502*   PCO2, ARTERIAL 46.9* 46.3* 51.6*   PO2 ART 93.9 122.0* 273.0*   FIO2 40 40 60   HCO3 ART 34.8* 35.3* 40.4*   BASE EXCESS ART 10.1* 10.8* 15.5*   CARBOXYHEMOGLOBIN 1.0 1.0 3.1*     Brief Urine Lab Results  (Last result in the past 365 days)      Color   Clarity   Blood   Leuk Est   Nitrite   Protein   CREAT   Urine HCG        07/01/21 1110 Yellow Cloudy Negative Moderate (2+) Positive 100 mg/dL (2+)             Microbiology Results (last 10 days)     Procedure Component Value - Date/Time    COVID PRE-OP / PRE-PROCEDURE SCREENING ORDER (NO ISOLATION) - Swab, Nasopharynx [[id number]]  (Normal) Collected: 07/01/21 1739    Lab Status: Final result Specimen: Swab from Nasopharynx Updated: 07/01/21 1669    Narrative:      The following orders were created for panel order COVID PRE-OP / PRE-PROCEDURE SCREENING ORDER (NO ISOLATION) - Swab,  Nasopharynx.  Procedure                               Abnormality         Status                     ---------                               -----------         ------                     COVID-19,CEPHEID,AMA IN-...[[id number]]  Normal              Final result                 Please view results for these tests on the individual orders.    COVID-19,CEPHEID,AMA IN-HOUSE(OR EMERGENT/ADD-ON),NP SWAB IN TRANSPORT MEDIA 3-4 HR TAT - Swab, Nasopharynx [[id number]]  (Normal) Collected: 07/01/21 1739    Lab Status: Final result Specimen: Swab from Nasopharynx Updated: 07/01/21 1856     COVID19 Not Detected    Narrative:      Fact sheet for providers: https://www.fda.gov/media/189176/download     Fact sheet for patients: https://www.fda.gov/media/657246/download  Fact sheet for providers: https://www.fda.gov/media/871349/download     Fact sheet for patients: https://www.fda.gov/media/143836/download    Respiratory Culture - Sputum, Bronchus [[id number]] Collected: 07/01/21 1728    Lab Status: Final result Specimen: Sputum from Bronchus Updated: 07/03/21 1043     Respiratory Culture Light growth (2+) Normal Respiratory Andressa: NO S.aureus/MRSA or Pseudomonas aeruginosa     Gram Stain Moderate (3+) WBCs per low power field      Rare (1+) Epithelial cells per low power field      Moderate (3+) Gram positive cocci in pairs    Urine Culture - Urine, Urine, Catheter [[id number]]  (Abnormal)  (Susceptibility) Collected: 07/01/21 1110    Lab Status: Final result Specimen: Urine, Catheter Updated: 07/03/21 1317     Urine Culture >100,000 CFU/mL Klebsiella pneumoniae ssp pneumoniae    Susceptibility      Klebsiella pneumoniae ssp pneumoniae      FREDDY      Ampicillin Resistant      Ampicillin + Sulbactam Susceptible      Cefazolin Susceptible      Cefepime Susceptible      Ceftazidime Susceptible      Ceftriaxone Susceptible      Gentamicin Susceptible      Levofloxacin Susceptible      Nitrofurantoin Susceptible      Piperacillin +  Tazobactam Susceptible      Tetracycline Susceptible      Trimethoprim + Sulfamethoxazole Susceptible               Linear View                         XR Chest 1 View    Result Date: 7/2/2021  EXAMINATION: XR CHEST 1 VW- 07/02/2021  INDICATION: Intubated patient; J96.22-Acute and chronic respiratory failure with hypercapnia; R40.0-Somnolence; J44.9-Chronic obstructive pulmonary disease, unspecified; N30.90-Cystitis, unspecified without hematuria  COMPARISON: 07/01/2021  FINDINGS: ET tube and NG tube appear to be in satisfactory position. The heart is normal in size. The vasculature appears normal. Lungs are hyperexpanded and appear clear except for subtle patchy interstitial changes in the left lung base, perhaps mild peribronchial thickening. No effusion or pneumothorax is seen. Old irregularly healed left proximal humerus fracture is again noted.      Appearance of mild peribronchial thickening in the left lung base. No evidence of active chest disease elsewhere.  D:  07/02/2021 E:  07/02/2021    This report was finalized on 7/2/2021 11:26 PM by Dr. Tre Huerta MD.      XR Chest 1 View    Result Date: 7/1/2021   EXAMINATION: XR CHEST 1 VW - 07/01/2021  INDICATION: J96.22-Acute and chronic respiratory failure with hypercapnia; R40.0-Somnolence; J44.9-Chronic obstructive pulmonary disease, unspecified; N30.90-Cystitis, unspecified without hematuria. Post intubation.  COMPARISON: Chest x-ray 07/01/2021  FINDINGS: Status post intubation with endotracheal tube terminating 3 cm above the level of the sujata in satisfactory positioning. Cardiac size unchanged without overt edema or effusion. No pneumothorax.      Status post intubation with endotracheal tube terminating 3 cm above the level of the sujata in satisfactory positioning.  DICTATED:   07/01/2021 EDITED/ls :   07/01/2021       XR Chest 1 View    Result Date: 7/1/2021  EXAMINATION: XR CHEST 1 VW-  INDICATION: Shortness of air triage protocol.  COMPARISON: Chest  x-ray 06/14/2021.  FINDINGS: Cardiac size borderline enlarged status post median sternotomy and CABG without overt edema. No pneumothorax or pleural effusion. Degenerative changes of the spine.         Chronic changes without acute cardiopulmonary process.  D:  07/01/2021 E:  07/01/2021       XR Abdomen KUB    Result Date: 7/1/2021  EXAMINATION: XR ABDOMEN KUB - 07/01/2021  INDICATION: J96.22-Acute and chronic respiratory failure with hypercapnia; R40.0-Somnolence; J44.9-Chronic obstructive pulmonary disease, unspecified; N30.90-Cystitis, unspecified without hematuria. OG tube placement.  COMPARISON: 10/29/2020 KUB  FINDINGS: NG tube is seen at the GE junction but not yet in the stomach. Very little bowel gas is visible in the upper abdomen.      NG tube tip at or just above the GE junction.  DICTATED:   07/01/2021 EDITED/ls :   07/01/2021    This report was finalized on 7/1/2021 11:35 PM by Dr. Tre Huerta MD.      XR Abdomen KUB    Result Date: 7/1/2021  EXAMINATION: XR ABDOMEN KUB - 07/01/2021  INDICATION: J96.22-Acute and chronic respiratory failure with hypercapnia; R40.0-Somnolence; J44.9-Chronic obstructive pulmonary disease, unspecified; N30.90-Cystitis, unspecified without hematuria. OG advancement.  COMPARISON: 4:38 PM exam of same date.  FINDINGS: NG tube has been advanced to the mid or distal stomach, now in good position. Upper abdominal bowel gas pattern is nonobstructive.      NG tube tip is now seen in the mid to distal stomach in good position.   DICTATED:   07/01/2021 EDITED/ls :   07/01/2021  This report was finalized on 7/1/2021 11:35 PM by Dr. Tre Huerta MD.        Results for orders placed during the hospital encounter of 09/28/17    Doppler Ankle Brachial Index Multi Level CAR    Interpretation Summary  · Right Conclusion: The right CUCA is normal.  · Left Conclusion: The left CUCA is mildly reduced.      Results for orders placed during the hospital encounter of 09/28/17    Doppler Ankle  Brachial Index Multi Level CAR    Interpretation Summary  · Right Conclusion: The right CUCA is normal.  · Left Conclusion: The left CUCA is mildly reduced.      Results for orders placed during the hospital encounter of 05/03/21    Adult Transthoracic Echo Complete W/ Cont if Necessary Per Protocol    Interpretation Summary  · Estimated left ventricular EF = 70%  · Moderate aortic valve regurgitation is present.      Discharge Details        Discharge Medications      New Medications      Instructions Start Date   predniSONE 20 MG tablet  Commonly known as: DELTASONE   Take 2 tablets by mouth Daily With Breakfast for 3 days, THEN 1 tablet Daily With Breakfast for 3 days, THEN 0.5 tablets Daily With Breakfast for 3 days.   Start Date: July 6, 2021        Changes to Medications      Instructions Start Date   guaiFENesin 600 MG 12 hr tablet  Commonly known as: MUCINEX  What changed:   · when to take this  · reasons to take this   600 mg, Oral, Every 12 Hours Scheduled      rosuvastatin 40 MG tablet  Commonly known as: CRESTOR  What changed: when to take this   40 mg, Oral, Daily         Continue These Medications      Instructions Start Date   albuterol (2.5 MG/3ML) 0.083% nebulizer solution  Commonly known as: PROVENTIL   2.5 mg, Nebulization, Every 4 Hours PRN      albuterol sulfate  (90 Base) MCG/ACT inhaler  Commonly known as: PROVENTIL HFA;VENTOLIN HFA;PROAIR HFA   2 puffs, Inhalation, Every 4 Hours PRN      Anoro Ellipta 62.5-25 MCG/INH aerosol powder  inhaler  Generic drug: umeclidinium-vilanterol   1 puff, Inhalation, Every Morning      aspirin 81 MG tablet   81 mg, Oral, Daily      cetirizine 10 MG tablet  Commonly known as: zyrTEC   5 mg, Oral, Daily      citalopram 20 MG tablet  Commonly known as: CeleXA   20 mg, Oral, Daily, Needs appointment for next refill.      clopidogrel 75 MG tablet  Commonly known as: PLAVIX   75 mg, Oral, Daily      dilTIAZem  MG 24 hr capsule  Commonly known as:  CARDIZEM CD   240 mg, Oral, Daily      furosemide 40 MG tablet  Commonly known as: LASIX   40 mg, Oral, Daily      HYDROcodone-acetaminophen  MG per tablet  Commonly known as: NORCO   1 tablet, Oral, Every 6 Hours PRN      ipratropium-albuterol 0.5-2.5 mg/3 ml nebulizer  Commonly known as: DUO-NEB   3 mL, Nebulization, Every 4 Hours PRN      LORazepam 1 MG tablet  Commonly known as: ATIVAN   TAKE 1 TABLET BY MOUTH TWICE DAILY. MUST LAST 30 DAYS.      ondansetron 4 MG tablet  Commonly known as: ZOFRAN   4 mg, Oral, Every 6 Hours PRN      pantoprazole 40 MG EC tablet  Commonly known as: PROTONIX   40 mg, Oral, 2 Times Daily Before Meals      PHENobarbital 100 MG tablet  Commonly known as: LUMINAL   TAKE 1 AND 1/2 TABLETS BY MOUTH EVERY DAY      promethazine 12.5 MG tablet  Commonly known as: PHENERGAN   12.5 mg, Oral, Daily PRN             Allergies   Allergen Reactions   • Keflex [Cephalexin] Shortness Of Breath and Rash     Patients power of  states patient had to be taken to ER due to reaction.    Tolerated Rocephin 2/19/21, zosyn 5/2021   • Sulfamethoxazole-Trimethoprim Shortness Of Breath and Rash     Patients power of  stated patient had to be taken to ER due to reaction.   • Carbamazepine    • Codeine    • Latex Rash         Discharge Disposition:  Home or Self Care    Diet:  Hospital:  Diet Order   Procedures   • Diet Regular; Cardiac          CODE STATUS:    Code Status and Medical Interventions:   Ordered at: 07/01/21 1406     Level Of Support Discussed With:    Health Care Surrogate     Code Status:    CPR     Medical Interventions (Level of Support Prior to Arrest):    Full       Future Appointments   Date Time Provider Department Center   7/13/2021  2:30 PM Vikram Leblanc DO [facility] AMA AMA                 Siri Cruz MD  07/05/21      Time Spent on Discharge:  I spent  40  minutes on this discharge activity which included: face-to-face encounter with the patient,  reviewing the data in the system, coordination of the care with the nursing staff as well as consultants, documentation, and entering orders.            Electronically signed by Siri Cruz MD at 07/05/21 7955

## 2021-07-06 NOTE — OUTREACH NOTE
Call Center TCM Note      Responses   [facility] patient discharged from?  [place]   Does the patient have one of the following disease processes/diagnoses(primary or secondary)?  COPD/Pneumonia   Was the primary reason for admission:  COPD exacerbation   TCM attempt successful?  No   Unsuccessful attempts  Attempt 1          Celine Guerra RN    7/6/2021, 15:50 EDT

## 2021-07-06 NOTE — OUTREACH NOTE
Call Center TCM Note      Responses   [facility] patient discharged from?  [place]   Does the patient have one of the following disease processes/diagnoses(primary or secondary)?  COPD/Pneumonia   Was the primary reason for admission:  COPD exacerbation   TCM attempt successful?  No   Unsuccessful attempts  Attempt 2          Celine Guerra RN    7/6/2021, 15:55 EDT

## 2021-07-07 NOTE — OUTREACH NOTE
Call Center TCM Note      Responses   [facility] patient discharged from?  [place]   Does the patient have one of the following disease processes/diagnoses(primary or secondary)?  COPD/Pneumonia   Was the primary reason for admission:  COPD exacerbation   TCM attempt successful?  No   Unsuccessful attempts  Attempt 3 [Verbal release on file for daughter Stacie but no # listed]          Lory Kamara RN    7/7/2021, 15:20 EDT

## 2021-07-13 PROBLEM — I35.1 NONRHEUMATIC AORTIC VALVE INSUFFICIENCY: Status: ACTIVE | Noted: 2021-01-01

## 2021-07-13 PROBLEM — Z99.81 DEPENDENCE ON SUPPLEMENTAL OXYGEN: Chronic | Status: RESOLVED | Noted: 2020-08-29 | Resolved: 2021-01-01

## 2021-07-13 PROBLEM — J18.9 PNEUMONIA: Status: RESOLVED | Noted: 2020-01-01 | Resolved: 2021-01-01

## 2021-07-13 PROBLEM — J44.9 PULMONARY CACHEXIA DUE TO CHRONIC OBSTRUCTIVE PULMONARY DISEASE (HCC): Chronic | Status: RESOLVED | Noted: 2021-01-01 | Resolved: 2021-01-01

## 2021-07-13 PROBLEM — R64 PULMONARY CACHEXIA DUE TO CHRONIC OBSTRUCTIVE PULMONARY DISEASE (HCC): Chronic | Status: RESOLVED | Noted: 2021-01-01 | Resolved: 2021-01-01

## 2021-07-13 PROBLEM — J96.02 ACUTE RESPIRATORY FAILURE WITH HYPOXIA AND HYPERCAPNIA (HCC): Status: ACTIVE | Noted: 2021-01-01

## 2021-07-13 PROBLEM — J96.01 ACUTE RESPIRATORY FAILURE WITH HYPOXIA AND HYPERCAPNIA (HCC): Status: ACTIVE | Noted: 2021-01-01

## 2021-07-13 NOTE — OUTREACH NOTE
COPD/PN Week 2 Survey      Responses   [facility] patient discharged from?  [place]   Does the patient have one of the following disease processes/diagnoses(primary or secondary)?  COPD/Pneumonia   Was the primary reason for admission:  COPD exacerbation   Week 2 attempt successful?  No   Unsuccessful attempts  Attempt 1   Revoke  Decline to participate          Sindhu Bethea LPN

## 2021-07-13 NOTE — PROGRESS NOTES
Follow Up Office Note       Patient Name: An Abreu    Referring Physician: No ref. provider found    Chief Complaint:    Chief Complaint   Patient presents with   • COPD       History of Present Illness: An Abreu is a 71 y.o. female who is here today to follow-up care with Pulmonary.  PMH Chronic Hypoxic Respiratory Failure on 2-3L Home O2, COPD, Ongoing Tobacco Use, Pulmonary Cachexia, Anemia due to LORI/Chronic blood loss, HTN, Seizure DO, PAF (not on anticoagulation due to frequent falls, anemia and GIB), CAD and GERD.  Who initially saw in the ICU for hypotension after vasovagal episode.  She had initially been admitted in June 2021 for COPD exacerbation during the hospitalization she was noted to have a decreasing hemoglobin there was concern for possible GI bleed.  During her colonoscopy prep she developed a vasovagal event while having a bowel movement and was transferred to the ICU.  He was monitored in the ICU for 24 hours hypertension quickly resolved.  She was then readmitted to [facility] ICU on 7/1/2021 after an acute COPD exacerbation where she was intubated, she was successfully extubated on 7/2/2021 and transferred to the floor on 7/5/2021 where she was subsequently discharged.  She was treated with steroids and antibiotics at that time.  She is here today for follow-up.  She notes that she has ongoing shortness of breath.  She has been short of breath for many years but dyspnea worsening over the last 6 months.  She continues to smoke daily.  She denies any fever, chills, nausea, and vomiting.  She been trying to get some type of BiPAP since she left the hospital but this has not been performed as of yet.  She is also been having difficulty with her oxygen company is going to switch over to patient aid from [facility].  She is currently utilizing Anoro and duo nebs.  She is still in the middle of her prednisone taper but this is in in the next few days.  She has no other  "acute complaints.    Review of Systems:   Review of Systems   Constitutional: Positive for activity change and fatigue. Negative for chills and fever.   HENT: Negative for congestion and voice change.    Eyes: Negative for blurred vision.   Respiratory: Positive for cough, shortness of breath and wheezing.    Cardiovascular: Negative for chest pain.   Skin: Negative for dry skin.   Hematological: Negative for adenopathy.   Psychiatric/Behavioral: Negative for agitation and depressed mood.       The following portions of the patient's history were reviewed and updated as appropriate: allergies, current medications, past family history, past medical history, past social history, past surgical history and problem list.    Physical Exam:  Vital Signs:   Vitals:    07/13/21 1422   BP: 124/60   BP Location: Left arm   Patient Position: Sitting   Cuff Size: Adult   Pulse: 80   Resp: 18   Temp: 98.7 °F (37.1 °C)   SpO2: 98%   Weight: 35 kg (77 lb 4 oz)   Height: 144.8 cm (57.01\")   PF: (!) 3 L/min  Comment: Continious at resting       Physical Exam  Vitals and nursing note reviewed.   Constitutional:       General: She is not in acute distress.     Appearance: She is well-developed and normal weight. She is ill-appearing. She is not toxic-appearing.   HENT:      Head: Normocephalic and atraumatic.   Cardiovascular:      Rate and Rhythm: Normal rate and regular rhythm.      Pulses: Normal pulses.      Heart sounds: Normal heart sounds. No murmur heard.   No friction rub. No gallop.    Pulmonary:      Effort: Pulmonary effort is normal. No respiratory distress.      Breath sounds: Normal breath sounds. No wheezing, rhonchi or rales.   Musculoskeletal:      Right lower leg: No edema.      Left lower leg: No edema.   Skin:     General: Skin is warm and dry.   Neurological:      Mental Status: She is alert and oriented to person, place, and time.         Immunization History   Administered Date(s) Administered   • Flu Mist " 12/04/2015   • Flu Vaccine Quad PF >36MO 08/04/2015, 09/05/2016, 10/18/2019   • Flulaval/Fluarix/Fluzone Quad 11/25/2020   • Fluzone High Dose =>65 Years ([employer] ONLY) 12/04/2015, 10/03/2017   • Influenza TIV (IM) 10/04/2007, 09/11/2009   • PEDS-Pneumococcal Conjugate (PCV7) 12/04/2015   • Pneumococcal Conjugate 13-Valent (PCV13) 12/04/2015, 11/21/2017   • Pneumococcal Polysaccharide (PPSV23) 09/30/2008, 08/04/2015, 09/05/2016   • Pneumococcal, Unspecified 12/04/2015       Results Review:   - I personally reviewed the pts imaging from multiple chest x-rays between May and June 2021 all of which showed no acute cardiopulmonary process.  CT of the chest from May 2021 showed a infiltrate in the left lower lobe which was no longer present on the lung windows on the CT abdomen pelvis from June 2021.  - I personally reviewed the pts labs from most recently 7/4/2021 showed a CBC with a normal white blood cell count of 6.32, hemoglobin 9.1, platelet count of 292, BMP was notable for elevated glucose 135, creatinine 0.46, sodium 133, bicarb of 31.  - I personally reviewed the pts Echo report from May 2021 showed an EF of 70% with mild aortic valve regurg  - I personally reviewed the pts extensive chart with multiple hospitalizations.    Assessment / Plan:   1. Mucopurulent chronic bronchitis (CMS/HCC) (Primary)  2. Chronic respiratory failure with hypoxia and hypercapnia (CMS/HCC)  -Suspect patient has severe COPD at baseline she at least has gold class D.  She has at least 1 intubation secondary to COPD in the last year has had 2 hospitalizations at minimal.  She has been on multiple rounds of steroids.  Still has very poor control.  Therefore I am going to go ahead and start her on Trelegy 200 mcg 1 puff once daily (new prescription sent).  I have also refilled her duo nebs which she can use up to 6 times a day if needed.  I counseled the patient on inhaler technique.  I am also given her prescription for a flutter valve  I want her to utilize a flutter valve to help with mucus clearance.  -I am going to go ahead and actually keep her on 5 mg prednisone daily for the next month after she finishes her taper.  A new prescription was sent to the pharmacy.  Although I do not like this is a long-term management option I think we need to get her better optimized before I can completely get her off otherwise I am concerned she would back in the ER within the next few weeks.  At the next visit I will attempt to get her off the prednisone entirely and if needed potentially move her over to either Daliresp or azithromycin for chronic management.  -I have went ahead and ordered the patient a trilogy which I do think she would benefit from.  At baseline she has had multiple hospitalizations with 1 intubation in the last 6 months, baseline bicarb is 31, she is on 3 L nasal cannula at baseline which I want her to continue.  She is on maximal inhaler therapy at this point in time.  Home BiPAP/CPAP considered and ruled out due to the need for adjustable pressure support provided by noninvasive ventilation.  Ordering trilogy for nocturnal and daytime usage.  -The patient is also attempting to switch her oxygen over to patient aid from [facility].  I have sent a new prescription for the patient's home oxygen therapy which is 3 L nasal cannula 24 hours a day.  -I will eventually the patient get a 6-minute walk test and a full set of PFTs but at this point time she is too debilitated to provide adequate testing.    3. Iron deficiency anemia due to chronic blood loss  -Hemoglobin has been stable since discharge.  Recommend she continue to follow with her PCP for long-term management.    4. Nonrheumatic aortic valve insufficiency  -Aortic valve insufficiency is likely adding some contributing factor to the patient's decompensations, therefore I recommend she continue to optimize blood pressure which is currently very well controlled with a systolic of 124.   But in addition to that she should continue to utilize her Lasix 40 mg daily as long as her potassium to prevent any fluid retention.    Level of service justified based on 46 minutes spent in patient care on this date of service including, but not limited to: preparing to see the patient, obtaining and/or reviewing history, performing medically appropriate examination, ordering tests/medicine/procedures, independently interpreting results, documenting clinical information in EHR, and counseling/education of patient/family/caregiver. (Level 4 30-39 minutes; Level 5 40-54 minutes)    Follow Up:   Return in about 4 weeks (around 8/10/2021).       AUDELIA Leblanc, DO  Pulmonary and Critical Care Medicine  Note Electronically Signed    Please note that portions of this note may have been completed with a voice recognition program. Efforts were made to edit the dictations, but occasionally words are mistranscribed.

## 2021-07-20 NOTE — TELEPHONE ENCOUNTER
Pt's daughter Stacie called today requesting a letter to [employer] to fax # [phone] regarding pt using her Rx Trelegy/ Oxygen Machine/ and a Trilogy Machine and needing her electric.

## 2021-08-02 PROBLEM — R77.8 ELEVATED TROPONIN: Status: RESOLVED | Noted: 2021-01-01 | Resolved: 2021-01-01

## 2021-08-02 PROBLEM — R79.89 ELEVATED TROPONIN: Status: RESOLVED | Noted: 2021-01-01 | Resolved: 2021-01-01

## 2021-08-02 PROBLEM — R33.8 ACUTE URINARY RETENTION: Status: RESOLVED | Noted: 2020-08-29 | Resolved: 2021-01-01

## 2021-08-02 NOTE — PROGRESS NOTES
Subjective     An Abreu is a 71 y.o. female.     Chief Complaint   Patient presents with   • Establish Care     f/u on heart dis, copd, cholest   • Med Refill       History of Present Illness     She lives with her dtr    Pt with CAD/PAD,HLD,HTN,AFIB,COPD, h/o CVA and seizure   HTN; Rx was d/c when she was in the hospital as her BP was low  AFIB; doing well on Rx, no new concern   CAD s/p CABG , stable, no new concern, denies CP,SOB    COPD with on/off sx like cough with SOB, she smokes 5 cig/day     H/o Grand mole seizure since age 6 , stable with phenobarb, no S/E reported    On weekly vit B 12 shots , started 1.5 months ago    She is non compliance with F/U and Rx plan     Anxiety with chronic back pain ; doing ok with current Rx , she is on pain Rx      The following portions of the patient's history were reviewed and updated as appropriate: allergies, current medications, past family history, past medical history, past social history, past surgical history and problem list.        Review of Systems   Musculoskeletal: Positive for arthralgias and back pain.   Psychiatric/Behavioral: Positive for sleep disturbance. Negative for agitation, behavioral problems and decreased concentration. The patient is nervous/anxious.        Vitals:    08/02/21 1102   BP: 142/58   Pulse: 86   Temp: 97.1 °F (36.2 °C)   SpO2: 90%           08/02/21  1102   Weight: 36.7 kg (81 lb)         Body mass index is 18.16 kg/m².      Current Outpatient Medications   Medication Sig Dispense Refill   • albuterol (PROVENTIL) (2.5 MG/3ML) 0.083% nebulizer solution Take 2.5 mg by nebulization Every 4 (Four) Hours As Needed for Wheezing. 3 mL 12   • albuterol sulfate  (90 Base) MCG/ACT inhaler Inhale 2 puffs Every 4 (Four) Hours As Needed for Wheezing or Shortness of Air. 18 g 0   • clopidogrel (PLAVIX) 75 MG tablet Take 1 tablet by mouth Daily. 90 tablet 3   • dilTIAZem CD (CARDIZEM CD) 240 MG 24 hr capsule Take 1 capsule by mouth  Daily. 90 capsule 3   • Fluticasone-Umeclidin-Vilant (Trelegy Ellipta) 200-62.5-25 MCG/INH inhaler Inhale 1 puff Daily. 60 each 5   • furosemide (LASIX) 40 MG tablet Take 1 tablet by mouth Daily. 90 tablet 3   • HYDROcodone-acetaminophen (NORCO)  MG per tablet Take 1 tablet by mouth Every 6 (Six) Hours As Needed for Moderate Pain .     • ipratropium-albuterol (DUO-NEB) 0.5-2.5 mg/3 ml nebulizer Take 3 mL by nebulization Every 4 (Four) Hours As Needed for Wheezing. 360 mL 5   • LORazepam (ATIVAN) 1 MG tablet TAKE 1 TABLET BY MOUTH TWICE DAILY. MUST LAST 30 DAYS. (Patient taking differently: 10 mg 2 (two) times a day.) 60 tablet 2   • pantoprazole (PROTONIX) 40 MG EC tablet Take 1 tablet by mouth 2 (Two) Times a Day Before Meals. 60 tablet 0   • PHENobarbital (LUMINAL) 100 MG tablet TAKE 1 AND 1/2 TABLETS BY MOUTH EVERY DAY 45 tablet 3   • promethazine (PHENERGAN) 12.5 MG tablet Take 12.5 mg by mouth Daily As Needed for Nausea.     • rosuvastatin (CRESTOR) 40 MG tablet Take 1 tablet by mouth Every Night. 90 tablet 1   • nicotine (Nicoderm CQ) 7 MG/24HR patch Place 1 patch on the skin as directed by provider Daily. 28 patch 3     No current facility-administered medications for this visit.                Objective   Physical Exam  Vitals and nursing note reviewed.   Constitutional:       Appearance: She is well-developed. She is not ill-appearing or diaphoretic.      Comments: debilitated    HENT:      Head: Normocephalic.      Mouth/Throat:      Mouth: Mucous membranes are moist.      Pharynx: Oropharynx is clear.   Eyes:      Conjunctiva/sclera: Conjunctivae normal.   Neck:      Thyroid: No thyromegaly.   Cardiovascular:      Rate and Rhythm: Normal rate and regular rhythm.      Heart sounds: Normal heart sounds. No murmur heard.   No friction rub. No gallop.    Pulmonary:      Effort: Pulmonary effort is normal. No respiratory distress.      Breath sounds: Normal breath sounds. No stridor. No wheezing, rhonchi  or rales.   Abdominal:      General: Bowel sounds are normal. There is no distension.      Palpations: Abdomen is soft. There is no mass.      Tenderness: There is no abdominal tenderness. There is no guarding or rebound.      Hernia: No hernia is present.   Musculoskeletal:         General: Normal range of motion.      Cervical back: Neck supple.   Skin:     General: Skin is warm.      Findings: No rash.   Neurological:      Mental Status: She is alert and oriented to person, place, and time.      Motor: No abnormal muscle tone.   Psychiatric:         Mood and Affect: Mood normal.         Behavior: Behavior normal.         Thought Content: Thought content normal.         Judgment: Judgment normal.           Assessment/Plan   Diagnoses and all orders for this visit:    1. Coronary artery disease involving native heart without angina pectoris, unspecified vessel or lesion type (Primary)  - Stable, continue current Rx  -     clopidogrel (PLAVIX) 75 MG tablet; Take 1 tablet by mouth Daily.  Dispense: 90 tablet; Refill: 3  -     furosemide (LASIX) 40 MG tablet; Take 1 tablet by mouth Daily.  Dispense: 90 tablet; Refill: 3    2. Seizure disorder on phenobarbital   - Stable, continue current Rx    3. H/O CVA   - Stable, continue current Rx    4. Debility  - Encourage eating high calori diet    5. Tobacco abuse  -     nicotine (Nicoderm CQ) 7 MG/24HR patch; Place 1 patch on the skin as directed by provider Daily.  Dispense: 28 patch; Refill: 3    6. Paroxysmal AF  - Stable, continue current Rx  -     dilTIAZem CD (CARDIZEM CD) 240 MG 24 hr capsule; Take 1 capsule by mouth Daily.  Dispense: 90 capsule; Refill: 3    7. PAD s/p R iliac stent   - Stable, continue current Rx    8. Non-compliance  - Discussed compliance for Rx     9. Mixed hyperlipidemia  -     rosuvastatin (CRESTOR) 40 MG tablet; Take 1 tablet by mouth Every Night.  Dispense: 90 tablet; Refill: 1    10. Pulmonary emphysema, unspecified emphysema type  (CMS/[facility])  - Stable, continue current Rx    11. Vitamin B12 deficiency  -     cyanocobalamin injection 1,000 mcg      I have fully discussed the nature of the medical condition(s) risks, complications, management, safe and proper use of medications.   I have discussed the SIDE EFFECT OF MEDICATION and importance TO report any side effect , the patient expressed good understanding.  Encouraged medication compliance and the importance of keeping scheduled follow up appointments with me and any other providers.    Patient instructed to follow up with our office for results on any labs/imaging ordered during this visit.    Home care discussed  All questions answered  Patient verbalizes understanding and agrees to treatment plan.     Follow up: Return in about 4 weeks (around 8/30/2021) for medicare wellness.

## 2021-08-02 NOTE — PATIENT INSTRUCTIONS

## 2021-08-12 NOTE — PATIENT INSTRUCTIONS
"[email or web address]">   Supporting Someone With Anxiety  Anxiety is a mental health condition that causes nervousness or worry, which interferes with daily activities and relationships. Anxiety disorders include:  · Generalized anxiety disorder (EDGAR).  · Social anxiety.  · Post-traumatic stress disorder (PTSD).  Anxiety affects the person who has it as well as friends and family members. Friends and family can help by offering support and understanding.  What do I need to know about this condition?  Anxiety is the experience of excessive nervousness or worry that feels out of control. Anxiety causes distress and prevents someone from living a normal life. Anxiety may:  · Last for long periods and be unpredictable.  · Cause restlessness, fatigue, extreme emotions, or irritability.  · Make it hard to fall asleep or focus your attention.  What do I need to know about the treatment options?  Anxiety disorders are treated by specialists with one of the following:  · Talk therapy or counseling. This includes cognitive behavioral therapy (CBT), exposure therapy, eye movement desensitization and reprocessing (EMDR), biofeedback, and acceptance and commitment therapy.  · Medicine to treat anxiety and to help control certain emotions and behaviors.  · Mind-body programs. These programs encourage the person with anxiety to be involved in his or her treatment and feel empowered. Mind-body programs may include mindfulness-based stress reduction training, yoga, or isabella chi.  How to care for someone with anxiety    Talk about the condition  Good communication is the key to supporting your friend or family member. Keep these things in mind:  · Ask questions and listen to the person's answers. Validate his or her experience.  · Give the person space if she or he does not feel like talking.  · Do not minimize the person's feelings or suggest that he or she should just get over the feelings.  · Give the person time and space to " "become calm. Stay by his or her side.  · Never ignore what the person says about suicide.  ? Talking about suicide will not make your loved one want to commit suicide.  ? You or your loved one can reach out 24 hours a day to get free, private support (on the phone or a live online chat) from a suicide crisis helpline, such as the National Suicide Prevention Lifeline at 1-708.390.1063.  · If appropriate, go with the person to visits with a counselor or health care provider. If the person agrees, ask the health care provider for any advice he or she may have for you.  Create a safe environment  · For certain types of anxiety, such as PTSD, do these things:  ? Discuss alcohol and drugs. Come up with rules that you both agree to.  ? Discuss guns and other weapons. Agree on where they should be stored. Keep ammunition in a separate locked location.  · Make a written crisis plan. Include important phone numbers, such as the local crisis intervention team. Make sure that:  ? The person with anxiety knows about this plan and agrees with it.  ? Everyone who has regular contact with the person knows about the plan and knows what to do in an emergency.  ? The written plan is easily accessible and can be quickly put into action.  Support the person in other ways  · Make an effort to learn all you can about your loved one's form of anxiety.  · Include your loved one in activities. Invite him or her to go for walks and outings.  · Help your loved one follow his or her treatment plan as directed by health care providers. This could mean driving him or her to therapy sessions or suggesting ways to manage stress.  · Remember that your support really matters. Social support is a huge benefit for someone who is living with anxiety.  How to care for yourself  Supporting someone with anxiety can be demanding. Make sure to take care of yourself.  · Maintain your normal routine.  · Respect your own limits. Say \"no\" to requests that do not " work for you.  · Make time for activities that help you relax, such as spending time with friends.  · Practice deep breathing exercises to lower your stress. Attend some mind-body classes by yourself or with your loved one.  · Get plenty of sleep.  · Exercise several times a week.  · If you are struggling emotionally with guilt, fear, or anger, consider working with a therapist.  What are some signs that the person's condition is getting worse?  Signs that your loved one's condition may be getting worse include:  · Dramatic mood swings, such as irritability, tearfulness, or lacking any emotion.  · Avoiding activities that he or she used to enjoy, or isolating herself or himself.  · Drinking more alcohol than normal.  Where to find support  For both you and your loved one:  · Consider joining self-help and support groups. They can help you feel a sense of hope and connect you with local resources to help you learn more.  · Consider family therapy to help you stay connected.  Where to find more information  A health care provider may make recommendations. They include:  · Substance Abuse and Mental Health Services Administration (SAMHSA): www.samhsa.gov  · National [place] on Mental Illness (NIESHA): www.niesha.org  Get help right away if:  · Your loved one's behavior becomes hard to predict (erratic).  · Your loved one shows behaviors such as seeing, feeling, tasting, or hearing things that are not real (hallucinations) or having flashbacks.  · Your loved one expresses thoughts about harming himself or herself or others.  If you ever feel like your loved one may hurt himself or herself or others, or shares thoughts about taking his or her own life, get help right away. You can go to your nearest emergency department or:  · Call your local emergency services (911 in the [place]  · Call a suicide crisis helpline, such as the National Suicide Prevention Lifeline at 1-581.734.3225. This is open 24 hours a day in the  [place]  · Text the Crisis Text Line at 865273 (in the [place]  Summary  · People with anxiety disorders experience overwhelming feelings of nervousness or worry. Friends and family can help by offering support and understanding.  · Anxiety disorders are treated by mental health specialists. Various forms of talk therapy such as CBT, EMDR, and exposure therapy combined with mind-body programs can be helpful.  · Be compassionate and listen to your loved one. Be available if your loved one wants to talk, but give your loved one space if he or she does not feel like talking.  · Find ways to care for your own body, mind, and well-being while supporting someone with anxiety. Try to maintain your normal routines and make time to do things that you enjoy.  This information is not intended to replace advice given to you by your health care provider. Make sure you discuss any questions you have with your health care provider.  Document Revised: 02/25/2021 Document Reviewed: 10/29/2020  Elsevier Patient Education © 2021 [employer]

## 2021-08-12 NOTE — PROGRESS NOTES
Subjective     An Abreu is a 71 y.o. female.     Chief Complaint   Patient presents with   • Fatigue   • Anxiety   • Pain       History of Present Illness     Pt with CAD/PAD,HLD,HTN,AFIB,COPD, h/o CVA and seizure   Pt feels weak , tired , poor appetite with on/off N.  Discussion was held when she was in the hospital to get in hospice but she refused .  Today after I discussed with her and her dtr , she agrees to go to hospice.     CAD S/P CABG, HTN and Afib ;   -Doing well on Rx, no new concern , her BP being stable since d/c from hospi , she is on CC#.  Denies CP,SOB     COPD ; HER SX ON/OFF with frequent exacerbation including cough with SOB, she smokes 5 cig/day   She is on Oxygen 24/7. She is following with Pulm monthly      Grand mole seizure since childhood; tried many medication all did not work except for phenobarb which works very well, no S/E reported and no more flare up.      Low Vit B12 , SHE IS on weekly vit B 12 shots , feels better.      Anxiety with chronic back pain ; her pain mainly lower back , 4-8/10, worse with walking , she is doing well with Norco 10 , it helps to decrease the pain.  She is also taking Lorazepam 1 mg BID for many years , recently dose decreased to 0.5 mg BID trying to wean her off by another provider. She request to get all her medication from our office.     The following portions of the patient's history were reviewed and updated as appropriate: allergies, current medications, past family history, past medical history, past social history, past surgical history and problem list.        Review of Systems   Constitutional: Positive for activity change, appetite change and fatigue.   Respiratory: Positive for cough, chest tightness and shortness of breath.    Cardiovascular: Negative for chest pain.   Gastrointestinal: Positive for nausea.   Musculoskeletal: Positive for arthralgias and back pain.   Psychiatric/Behavioral: Positive for sleep disturbance. The patient  is nervous/anxious.        Vitals:    08/12/21 1054   BP:    Pulse: 84   Resp:    Temp:    SpO2: 98%           08/12/21  1047   Weight: 36.3 kg (80 lb)         Body mass index is 17.94 kg/m².      Current Outpatient Medications   Medication Sig Dispense Refill   • albuterol (PROVENTIL) (2.5 MG/3ML) 0.083% nebulizer solution Take 2.5 mg by nebulization Every 4 (Four) Hours As Needed for Wheezing. 3 mL 12   • albuterol sulfate  (90 Base) MCG/ACT inhaler Inhale 2 puffs Every 4 (Four) Hours As Needed for Wheezing or Shortness of Air. 18 g 0   • clopidogrel (PLAVIX) 75 MG tablet Take 1 tablet by mouth Daily. 90 tablet 3   • dilTIAZem CD (CARDIZEM CD) 240 MG 24 hr capsule Take 1 capsule by mouth Daily. 90 capsule 3   • Fluticasone-Umeclidin-Vilant (Trelegy Ellipta) 200-62.5-25 MCG/INH inhaler Inhale 1 puff Daily. 60 each 5   • furosemide (LASIX) 40 MG tablet Take 1 tablet by mouth Daily. 90 tablet 3   • ipratropium-albuterol (DUO-NEB) 0.5-2.5 mg/3 ml nebulizer Take 3 mL by nebulization Every 4 (Four) Hours As Needed for Wheezing. 360 mL 5   • pantoprazole (PROTONIX) 40 MG EC tablet Take 1 tablet by mouth 2 (Two) Times a Day Before Meals. 60 tablet 0   • PHENobarbital (LUMINAL) 100 MG tablet Take 1.5 tablets by mouth Daily. 45 tablet 0   • rosuvastatin (CRESTOR) 40 MG tablet Take 1 tablet by mouth Every Night. 90 tablet 1   • HYDROcodone-acetaminophen (NORCO)  MG per tablet Take 1 tablet by mouth Every 12 (Twelve) Hours As Needed for Moderate Pain . 60 tablet 0   • LORazepam (Ativan) 0.5 MG tablet Take 1 tablet by mouth 2 (Two) Times a Day As Needed for Anxiety. 60 tablet 0   • nicotine (Nicoderm CQ) 7 MG/24HR patch Place 1 patch on the skin as directed by provider Daily. 28 patch 3   • promethazine (PHENERGAN) 12.5 MG tablet Take 1 tablet by mouth Daily As Needed for Nausea. 30 tablet 2     No current facility-administered medications for this visit.                Objective   Physical Exam  Vitals and  nursing note reviewed.   Constitutional:       Appearance: She is well-developed. She is not ill-appearing or diaphoretic.      Comments: Very weak , on oxy via nasal canula   cachetic body habitus   HENT:      Head: Normocephalic.      Mouth/Throat:      Mouth: Mucous membranes are moist.      Pharynx: Oropharynx is clear.   Eyes:      Conjunctiva/sclera: Conjunctivae normal.   Neck:      Thyroid: No thyromegaly.   Cardiovascular:      Rate and Rhythm: Normal rate and regular rhythm.      Heart sounds: Normal heart sounds. No murmur heard.   No friction rub. No gallop.    Pulmonary:      Effort: Pulmonary effort is normal. No respiratory distress.      Breath sounds: Normal breath sounds. No stridor. No wheezing, rhonchi or rales.   Abdominal:      General: Bowel sounds are normal. There is no distension.      Palpations: Abdomen is soft. There is no mass.      Tenderness: There is no abdominal tenderness. There is no guarding or rebound.      Hernia: No hernia is present.   Musculoskeletal:      Cervical back: Neck supple.   Skin:     General: Skin is warm.      Coloration: Skin is pale.      Findings: No rash.   Neurological:      Mental Status: She is alert and oriented to person, place, and time.      Motor: No abnormal muscle tone.   Psychiatric:         Mood and Affect: Mood normal.         Behavior: Behavior normal.         Thought Content: Thought content normal.         Judgment: Judgment normal.           Assessment/Plan   Diagnoses and all orders for this visit:    1. Chronic bilateral low back pain without sciatica (Primary);  - As her pain better with Norco , will do the refill   -     HYDROcodone-acetaminophen (NORCO)  MG per tablet; Take 1 tablet by mouth Every 12 (Twelve) Hours As Needed for Moderate Pain .  Dispense: 60 tablet; Refill: 0    2. Anxiety;  - Agree to cut down slowly on ativan , discussed in length with pt and her dtr , both agreed   -     LORazepam (Ativan) 0.5 MG tablet; Take 1  tablet by mouth 2 (Two) Times a Day As Needed for Anxiety.  Dispense: 60 tablet; Refill: 0    3. Nausea  -     promethazine (PHENERGAN) 12.5 MG tablet; Take 1 tablet by mouth Daily As Needed for Nausea.  Dispense: 30 tablet; Refill: 2    4. Vitamin B12 deficiency  -     cyanocobalamin injection 1,000 mcg    5. Debility;  - Advised to eat and drinks regularly     6. Seizure disorder (CMS/HCC);  - Stable with Phenobarb, continue     7. Coronary artery disease involving native heart without angina pectoris, unspecified vessel or lesion type;  - Stable with current Rx, continue     8. COPD mixed type (CMS/HCC)  - Stable with current Rx, continue     9. Paroxysmal AF  - Stable with current Rx, continue         Referral to hospice done       Controlled substance contract reviewed and signed by pt., scanned to chart.  Adverse effects and risks of addiction of medication reviewed with pt.   PDMP checked, looks appropriate.   She is so weak , will postpone UDS   Discussed risk and benefits of medication with pt.   Pt advised to keep medication in a safe place.   Pt advised not to share medication with anyone else.  Avoid alcohol with this medication and do not self escalate medication.      I have fully discussed the nature of the medical condition(s) risks, complications, management, safe and proper use of medications.   I have discussed the SIDE EFFECT OF MEDICATION and importance TO report any side effect , the patient expressed good understanding.  Encouraged medication compliance and the importance of keeping scheduled follow up appointments with me and any other providers.    Patient instructed to follow up with our office for results on any labs/imaging ordered during this visit.    Home care discussed  All questions answered  Patient verbalizes understanding and agrees to treatment plan.     Follow up: Return in about 2 weeks (around 8/26/2021) for follow up on current illness.

## 2021-08-12 NOTE — TELEPHONE ENCOUNTER
Lory, from [facility] care navigators (pallative care) called about patients care.  They received an order for her care in August of 2020 and have only seen her twice.  She is getting meds through them but since they haven't been able to see her and it is a struggle to do so, their care team has decided to discharge her and they wanted to discuss this with Dr. Butterfield.  Lory is asking for a call to discuss this.

## 2021-08-13 PROBLEM — J96.11 CHRONIC RESPIRATORY FAILURE WITH HYPOXIA AND HYPERCAPNIA (HCC): Status: ACTIVE | Noted: 2021-01-01

## 2021-08-13 PROBLEM — J96.02 ACUTE RESPIRATORY FAILURE WITH HYPOXIA AND HYPERCAPNIA (HCC): Status: RESOLVED | Noted: 2021-01-01 | Resolved: 2021-01-01

## 2021-08-13 PROBLEM — J96.01 ACUTE RESPIRATORY FAILURE WITH HYPOXIA AND HYPERCAPNIA (HCC): Status: RESOLVED | Noted: 2021-01-01 | Resolved: 2021-01-01

## 2021-08-13 PROBLEM — J96.12 CHRONIC RESPIRATORY FAILURE WITH HYPOXIA AND HYPERCAPNIA (HCC): Status: ACTIVE | Noted: 2021-01-01

## 2021-08-18 NOTE — TELEPHONE ENCOUNTER
Caller: STEFANIA CONTI    Relationship: Child    Best call back number: [phone]    Medication needed:   LORazepam (Ativan) 0.5 MG tablet  0.5 mg, 2 Times Daily PRN 0 ordered         Summary: Take 1 tablet by mouth 2 (Two) Times a Day As Needed for Anxiety., Starting Thu 8/12/2021, Normal   Dose, Route, Frequency: 0.5 mg, Oral, 2 Times Daily PRN        HYDROcodone-acetaminophen (NORCO)  MG per tablet  1 tablet, Every 12 Hours PRN 0 ordered         Summary: Take 1 tablet by mouth Every 12 (Twelve) Hours As Needed for Moderate Pain ., Starting Thu 8/12/2021, Normal   Dose, Route, Frequency: 1 tablet, Oral, Every 12 Hours PRN        B-12 VALVE AND SYRINGES, ONCE A WEEK  (DISCUSSED DURING LAST OFFICE VISIT 8/12/21 WITH DR. CROW)    When do you need the refill by: TODAY    What additional details did the patient provide when requesting the medication:   PATIENT IS COMPLETELY OUT    Does the patient have less than a 3 day supply:  [x] Yes  [] No    What is the patient's preferred pharmacy:      [address] [phone] [facility] [phone] [facility]    PATIENT HAS BEEN ADVISED THAT THIS REQUEST HAS BEEN MARKED AS A HIGH PRIORITY TO ALLOW 48 HOURS FOR THE CLINICAL TEAM TO FOLLOW UP ON THIS REQUEST,  IF SYMPTOMS WORSENS TO SEEK OUT EMERGENT CARE. PATIENT  FULLY UNDERSTANDS.

## 2021-09-02 NOTE — PATIENT INSTRUCTIONS
Medicare Wellness  Personal Prevention Plan of Service     Date of Office Visit:  2021  Encounter Provider:  Angelika Butterfield MD  Place of Service:  [facility]  Patient Name: An Abreu  :  1950    As part of the Medicare Wellness portion of your visit today, we are providing you with this personalized preventive plan of services (PPPS). This plan is based upon recommendations of the [place] Preventive Services Task Force (USPSTF) and the Advisory Committee on Immunization Practices (ACIP).    This lists the preventive care services that should be considered, and provides dates of when you are due. Items listed as completed are up-to-date and do not require any further intervention.    Health Maintenance   Topic Date Due   • DXA SCAN  Never done   • TDAP/TD VACCINES (1 - Tdap) Never done   • ZOSTER VACCINE (1 of 2) Never done   • HEPATITIS C SCREENING  Never done   • ANNUAL WELLNESS VISIT  Never done   • COVID-19 Vaccine (2 - Pfizer 2-dose series) 2021   • MAMMOGRAM  2022 (Originally 2016)   • INFLUENZA VACCINE  10/01/2021   • LIPID PANEL  2022   • LUNG CANCER SCREENING  2022   • COLORECTAL CANCER SCREENING  2031   • Pneumococcal Vaccine 65+  Completed       No orders of the defined types were placed in this encounter.      No follow-ups on file.

## 2021-09-02 NOTE — PROGRESS NOTES
The ABCs of the Annual Wellness Visit  Subsequent Medicare Wellness Visit    Chief Complaint   Patient presents with   • Medicare Wellness-subsequent   • Earache     believes stitches never fell out       Subjective    History of Present Illness:  An Abreu is a 71 y.o. female who presents for a Subsequent Medicare Wellness Visit.    Pt with CAD with PAD s/p CABG ,HLD,HTN,AFIB,COPD on 2 L of oxy, h/o CVA , smoker , anxiety and seizure   Denies SOB but she has SOB when she walks  She is on statin and plavix      COPD ; sx on/off between cough, wheezing and SOB. She suppose to be on Oxy 24/7 but the Elect power shut down so often , dtr request letter to [employer]. She smokes 5 cig/day     Anxiety ; she is on Lorazepam 1 mg BID for long time, plan was to gradually decrease the dose ,since last OV , dose of lorazepam decreased to 0.5 mg BID , she dose not feel with it , she keeps shaky with tremor , her anxiety gets worse      Grand mole seizure since childhood; doing well on phenobarb.      Low Vit B12 , SHE IS on weekly vit B 12 shots , feels better.     Has mass behind her Rt ear lobe , had surgery before , still the mass bothers her        The following portions of the patient's history were reviewed and   updated as appropriate: allergies, current medications, past family history, past medical history, past social history, past surgical history and problem list.     Compared to one year ago, the patient feels her physical   health is worse.    Compared to one year ago, the patient feels her mental   health is worse.    Recent Hospitalizations:  This patient has had a [facility] admission record on file within the last 365 days.    Current Medical Providers:  Patient Care Team:  Angelika Butterfield MD as PCP - General (Family Medicine)  Nikole Luna APRN as Nurse Practitioner (Family Medicine)    Outpatient Medications Prior to Visit   Medication Sig Dispense Refill   • albuterol (PROVENTIL) (2.5  MG/3ML) 0.083% nebulizer solution Take 2.5 mg by nebulization Every 4 (Four) Hours As Needed for Wheezing. 3 mL 12   • albuterol sulfate  (90 Base) MCG/ACT inhaler Inhale 2 puffs Every 4 (Four) Hours As Needed for Wheezing or Shortness of Air. 18 g 0   • clopidogrel (PLAVIX) 75 MG tablet Take 1 tablet by mouth Daily. 90 tablet 3   • dilTIAZem CD (CARDIZEM CD) 240 MG 24 hr capsule Take 1 capsule by mouth Daily. 90 capsule 3   • Fluticasone-Umeclidin-Vilant (Trelegy Ellipta) 200-62.5-25 MCG/INH inhaler Inhale 1 puff Daily. 60 each 5   • furosemide (LASIX) 40 MG tablet Take 1 tablet by mouth Daily. 90 tablet 3   • HYDROcodone-acetaminophen (NORCO)  MG per tablet Take 1 tablet by mouth Every 12 (Twelve) Hours As Needed for Moderate Pain . 60 tablet 0   • ipratropium-albuterol (DUO-NEB) 0.5-2.5 mg/3 ml nebulizer Take 3 mL by nebulization Every 4 (Four) Hours As Needed for Wheezing. 360 mL 5   • pantoprazole (PROTONIX) 40 MG EC tablet Take 1 tablet by mouth 2 (Two) Times a Day Before Meals. 60 tablet 0   • PHENobarbital (LUMINAL) 100 MG tablet Take 1.5 tablets by mouth Daily. 45 tablet 0   • promethazine (PHENERGAN) 12.5 MG tablet Take 1 tablet by mouth Daily As Needed for Nausea. 30 tablet 2   • rosuvastatin (CRESTOR) 40 MG tablet Take 1 tablet by mouth Every Night. 90 tablet 1   • LORazepam (Ativan) 0.5 MG tablet Take 1 tablet by mouth 2 (Two) Times a Day As Needed for Anxiety. 60 tablet 0   • nicotine (Nicoderm CQ) 7 MG/24HR patch Place 1 patch on the skin as directed by provider Daily. 28 patch 3     No facility-administered medications prior to visit.       Opioid medication/s are on active medication list.  and I have evaluated her active treatment plan and pain score trends (see table).  Vitals:    09/02/21 1333   PainSc:   7     I have reviewed the chart for potential of high risk medication and harmful drug interactions in the elderly.            Aspirin is not on active medication list.  she is on  "plavix .    Patient Active Problem List   Diagnosis   • Gastroesophageal reflux disease   • Essential hypertension   • Seizure disorder on phenobarbital    • PAD s/p R iliac stent    • Paroxysmal AF   • Fecal occult blood test positive   • Tobacco abuse   • CAD s/p CABG    • Anemia   • Fall   • Humerus fracture   • Hypoglycemia   • Metabolic encephalopathy   • Iron deficiency anemia due to chronic blood loss   • Mucopurulent chronic bronchitis (CMS/HCC)   • Severe malnutrition (CMS/HCC)   • H/O: recent GI bleed   • + PFO    • H/O CVA    • Non-compliance   • Debility   • Nonrheumatic aortic valve insufficiency   • Chronic respiratory failure with hypoxia and hypercapnia (CMS/HCC)     Advance Care Planning   Advance Directive is on file.  ACP discussion was held with the patient during this visit. she has one , advised to bring copy     Review of Systems   Constitutional: Negative for activity change.   HENT: Positive for ear pain. Negative for congestion.    Respiratory: Positive for shortness of breath and wheezing. Negative for cough.    Cardiovascular: Negative for chest pain, palpitations and leg swelling.   Skin: Positive for color change.        Skin lesion behind Rt ear lobe    Neurological: Positive for tremors and seizures. Negative for speech difficulty.   Psychiatric/Behavioral: Positive for sleep disturbance. Negative for agitation and behavioral problems.        Objective       Vitals:    09/02/21 1333   BP: 120/58   Pulse: 87   Temp: 97.5 °F (36.4 °C)   TempSrc: Temporal   SpO2: 100%   Weight: 34.8 kg (76 lb 12.8 oz)   Height: 142.2 cm (55.98\")   PainSc:   7     BMI Readings from Last 1 Encounters:   09/02/21 17.23 kg/m²   BMI is below normal parameters. Recommendations include: referral to dietitian    Does the patient have evidence of cognitive impairment? No     Review of Systems   Constitutional: Negative for activity change.   HENT: Positive for ear pain. Negative for congestion.    Respiratory: " Positive for shortness of breath and wheezing. Negative for cough.    Cardiovascular: Negative for chest pain, palpitations and leg swelling.   Skin: Positive for color change.        Skin lesion behind Rt ear lobe    Neurological: Positive for tremors and seizures. Negative for speech difficulty.   Psychiatric/Behavioral: Positive for sleep disturbance. Negative for agitation and behavioral problems.         Physical Exam  Vitals and nursing note reviewed.   Constitutional:       General: She is not in acute distress.     Appearance: She is well-developed. She is not ill-appearing, toxic-appearing or diaphoretic.      Comments: Cachetic body habitus   On oxygen/nasal   HENT:      Head: Normocephalic.      Right Ear: Tympanic membrane, ear canal and external ear normal.      Left Ear: Tympanic membrane, ear canal and external ear normal.      Ears:      Comments: Small lump behind the Rt ear lobe      Mouth/Throat:      Mouth: Mucous membranes are moist.      Pharynx: Oropharynx is clear.   Eyes:      Conjunctiva/sclera: Conjunctivae normal.   Neck:      Thyroid: No thyromegaly.   Cardiovascular:      Rate and Rhythm: Normal rate and regular rhythm.      Heart sounds: Normal heart sounds. No murmur heard.   No friction rub. No gallop.    Pulmonary:      Effort: Pulmonary effort is normal. No respiratory distress.      Breath sounds: No stridor. Wheezing present. No rhonchi or rales.   Abdominal:      General: Bowel sounds are normal. There is no distension.      Palpations: Abdomen is soft. There is no mass.      Tenderness: There is no abdominal tenderness. There is no guarding or rebound.      Hernia: No hernia is present.   Musculoskeletal:      Cervical back: Neck supple.      Right lower leg: No edema.      Left lower leg: No edema.   Skin:     General: Skin is warm.      Findings: No rash.   Neurological:      Mental Status: She is alert and oriented to person, place, and time.      Motor: No abnormal muscle  tone.   Psychiatric:         Mood and Affect: Mood normal.         Behavior: Behavior normal.         Thought Content: Thought content normal.         Judgment: Judgment normal.                 HEALTH RISK ASSESSMENT    Smoking Status:  Social History     Tobacco Use   Smoking Status Current Every Day Smoker   • Packs/day: 1.00   • Years: 40.00   • Pack years: 40.00   • Types: Cigarettes, Electronic Cigarette   Smokeless Tobacco Never Used   Tobacco Comment    <0.5ppd      Alcohol Consumption:  Social History     Substance and Sexual Activity   Alcohol Use Never     Fall Risk Screen:    STEADI Fall Risk Assessment was completed, and patient is at HIGH risk for falls. Assessment completed on:9/2/2021    Depression Screening:  PHQ-2/PHQ-9 Depression Screening 9/2/2021   Little interest or pleasure in doing things 0   Feeling down, depressed, or hopeless 0   Total Score 0       Health Habits and Functional and Cognitive Screening:  Functional & Cognitive Status 9/2/2021   Do you have difficulty preparing food and eating? No   Do you have difficulty bathing yourself, getting dressed or grooming yourself? No   Do you have difficulty using the toilet? No   Do you have difficulty moving around from place to place? Yes   Do you have trouble with steps or getting out of a bed or a chair? Yes   Current Diet Well Balanced Diet   Dental Exam Up to date   Eye Exam Not up to date   Exercise (times per week) 0 times per week   Current Exercises Include No Regular Exercise   Do you need help using the phone?  No   Are you deaf or do you have serious difficulty hearing?  Yes   Do you need help with transportation? Yes   Do you need help shopping? Yes   Do you need help preparing meals?  Yes   Do you need help with housework?  Yes   Do you need help with laundry? Yes   Do you need help taking your medications? Yes   Do you need help managing money? Yes   Do you ever drive or ride in a car without wearing a seat belt? No        Age-appropriate Screening Schedule:  Refer to the list below for future screening recommendations based on patient's age, sex and/or medical conditions. Orders for these recommended tests are listed in the plan section. The patient has been provided with a written plan.    Health Maintenance   Topic Date Due   • DXA SCAN  Never done   • TDAP/TD VACCINES (1 - Tdap) Never done   • ZOSTER VACCINE (1 of 2) Never done   • MAMMOGRAM  08/02/2022 (Originally 5/18/2016)   • INFLUENZA VACCINE  10/01/2021   • LIPID PANEL  05/04/2022              Assessment/Plan     CMS Preventative Services Quick Reference  Risk Factors Identified During Encounter  Cardiovascular Disease  Chronic Pain   Dementia/Memory   Depression/Dysphoria  Hearing Problem  Inactivity/Sedentary  Polypharmacy  Urinary Incontinence  The above risks/problems have been discussed with the patient.  Follow up actions/plans if indicated are seen below in the Assessment/Plan Section.  Pertinent information has been shared with the patient in the After Visit Summary.    Diagnoses and all orders for this visit:    1. Medicare annual wellness visit, subsequent (Primary)    2. SOB (shortness of breath)  -     Ambulatory Referral to Cardiology    3. Coronary artery disease involving native heart without angina pectoris, unspecified vessel or lesion type  -     Ambulatory Referral to Cardiology    4. Mixed hyperlipidemia    5. Chronic obstructive pulmonary disease, unspecified COPD type (CMS/[facility])    6. PAD s/p R iliac stent   -     Ambulatory Referral to Cardiology    7. Tobacco abuse  -     nicotine (Nicoderm CQ) 7 MG/24HR patch; Place 1 patch on the skin as directed by provider Daily.  Dispense: 28 patch; Refill: 3    8. H/O CVA     9. Paroxysmal AF  -     Ambulatory Referral to Cardiology    10. Seizure disorder (CMS/[facility])    11. Debility    12. Essential hypertension  -     Ambulatory Referral to Cardiology    13. Anxiety  -     LORazepam (Ativan) 1 MG tablet;  Take 1 tablet by mouth 2 (Two) Times a Day.  Dispense: 60 tablet; Refill: 0    14. Mass of right ear  -     Ambulatory Referral to ENT (Otolaryngology)    15. Vitamin B12 deficiency  -     cyanocobalamin injection 1,000 mcg  -     cyanocobalamin 1000 MCG/ML injection; Inject 1 mL into the appropriate muscle as directed by prescriber 1 (One) Time Per Week for 5 doses.  Dispense: 5 mL; Refill: 0  -     Syringe, Disposable, (Syringe 2-3 ML) 3 ML misc; One syring per shot  Dispense: 5 each; Refill: 0    16. Skin lesion  -     Ambulatory Referral to Dermatology        Follow Up:   Return in about 1 month (around 10/4/2021) for follow up on current illness.     An After Visit Summary and PPPS were given to the patient.             She still shacky with no rest

## 2021-09-03 NOTE — TELEPHONE ENCOUNTER
I canceled Norco order she had 64 every 12 hour use sent on 8/18/21 too early to refill.  Needs to be seen for antibiotics

## 2021-09-03 NOTE — TELEPHONE ENCOUNTER
Caller: An Abreu    Relationship: Self    Best call back number: [phone]    What medication are you requesting: antibiotic    What are your current symptoms: redness and mass behind ear     How long have you been experiencing symptoms: for several years    Have you had these symptoms before:    [] Yes  [] No    Have you been treated for these symptoms before:   [] Yes  [] No    If a prescription is needed, what is your preferred pharmacy and phone number:   [facility] [address] [place], [place] [phone]     Additional notes:        DELETE AFTER READING TO PATIENT: “Thank you for sharing this information with me. I will send a message to the clinical team. Please allow 48 hours for the clinical staff to follow up on this request.”

## 2021-09-03 NOTE — TELEPHONE ENCOUNTER
Caller: An Abreu    Relationship: Self    Best call back number:  [phone]  Medication needed:   Requested Prescriptions     Pending Prescriptions Disp Refills   • HYDROcodone-acetaminophen (NORCO)  MG per tablet 60 tablet 0     Sig: Take 1 tablet by mouth Every 12 (Twelve) Hours As Needed for Moderate Pain .       When do you need the refill by: ASAP    What additional details did the patient provide when requesting the medication:     Does the patient have less than a 3 day supply:  [x] Yes  [] No    What is the patient's preferred pharmacy:      [facility] [address] [address] [place] [address] [phone] [facility] [phone]

## 2021-09-04 NOTE — TELEPHONE ENCOUNTER
A user error has taken place: encounter opened in error, closed for administrative reasons.     Negative

## 2021-09-07 NOTE — TELEPHONE ENCOUNTER
LMOVM she will need an appointment for an antibiotic.  Refill for norco was denied b/c she is too early refill.

## 2021-09-13 NOTE — TELEPHONE ENCOUNTER
Caller: STEFANIA    Relationship:     Best call back number: [phone]    Medication needed:   Requested Prescriptions     Pending Prescriptions Disp Refills   • HYDROcodone-acetaminophen (NORCO)  MG per tablet 60 tablet 0     Sig: Take 1 tablet by mouth Every 12 (Twelve) Hours As Needed for Moderate Pain .       When do you need the refill by: TODAY      What additional details did the patient provide when requesting the medication: PATIENT IS OUT OF MEDICATION    Does the patient have less than a 3 day supply:  [x] Yes  [] No    What is the patient's preferred pharmacy: [facility] [address] [address] [place] [address] [phone] [facility] [phone]

## 2021-10-05 NOTE — PROGRESS NOTES
Subjective     An Abreu is a 71 y.o. female.     Chief Complaint   Patient presents with   • Hypertension     f/u, wants esomeprazole instead of pantoprazole, needs insulin syringes    • Med Refill     promethazine        History of Present Illness     Pt with CAD/PAD,HLD,HTN,AFIB,COPD, h/o CVA and seizure   Her dtr taking care of her 24/7    She has RLS , her legs pain gets worse lately , worse at night. Has N when she has the pain.    GERD; her sx better with Nexium , request refill    COPD ; sx on /off including SOB, wheezing and cough, she still smokes 1 PPD , down from 2 PPD   She is on Oxygen 24/7 + INH . She is following with Pulm.      CAD S/P CABG, HTN and Afib ; stable, no new concern .  Denies CP     Low Vit B12 , SHE IS on weekly vit B 12 shots , feels better. request small needles for the Vit B12 SHOTS      Anxiety with chronic lower back pain ;  she is doing better with Norco 10 , it helps to decrease the pain.  She is also taking Lorazepam 1 mg BID for many years , she had worsening sx with 0.5 mg BID so I put er back on 1 mg BID.    The following portions of the patient's history were reviewed and updated as appropriate: allergies, current medications, past family history, past medical history, past social history, past surgical history and problem list.        Review of Systems   Respiratory: Positive for cough, shortness of breath and wheezing.    Musculoskeletal: Positive for arthralgias, back pain and myalgias.   Psychiatric/Behavioral: The patient is nervous/anxious.        Vitals:    10/05/21 0935   BP: 142/62   Pulse: 78   Temp: 96.8 °F (36 °C)   SpO2: 99%           10/05/21  0935   Weight: 36.8 kg (81 lb 3.2 oz)         Body mass index is 18.22 kg/m².      Current Outpatient Medications   Medication Sig Dispense Refill   • albuterol (PROVENTIL) (2.5 MG/3ML) 0.083% nebulizer solution Take 2.5 mg by nebulization Every 4 (Four) Hours As Needed for Wheezing. 3 mL 12   • albuterol sulfate  " (90 Base) MCG/ACT inhaler Inhale 2 puffs Every 4 (Four) Hours As Needed for Wheezing or Shortness of Air. 18 g 3   • clopidogrel (PLAVIX) 75 MG tablet Take 1 tablet by mouth Daily. 90 tablet 3   • dilTIAZem CD (CARDIZEM CD) 240 MG 24 hr capsule Take 1 capsule by mouth Daily. 90 capsule 3   • Fluticasone-Umeclidin-Vilant (Trelegy Ellipta) 200-62.5-25 MCG/INH inhaler Inhale 1 puff Daily. 60 each 5   • furosemide (LASIX) 40 MG tablet Take 1 tablet by mouth Daily. 90 tablet 3   • HYDROcodone-acetaminophen (NORCO)  MG per tablet Take 1 tablet by mouth Every 12 (Twelve) Hours As Needed for Moderate Pain . 60 tablet 0   • ipratropium-albuterol (DUO-NEB) 0.5-2.5 mg/3 ml nebulizer Take 3 mL by nebulization Every 4 (Four) Hours As Needed for Wheezing. 360 mL 5   • LORazepam (Ativan) 1 MG tablet Take 1 tablet by mouth 2 (Two) Times a Day. 60 tablet 0   • PHENobarbital (LUMINAL) 100 MG tablet Take 1.5 tablets by mouth Daily. 45 tablet 0   • promethazine (PHENERGAN) 12.5 MG tablet Take 1 tablet by mouth Daily As Needed for Nausea. 30 tablet 2   • rosuvastatin (CRESTOR) 40 MG tablet Take 1 tablet by mouth Every Night. 90 tablet 1   • Syringe, Disposable, (Syringe 2-3 ML) 3 ML misc One syring per shot 5 each 0   • esomeprazole (nexIUM) 20 MG capsule Take 1 capsule by mouth 2 (two) times a day. 60 capsule 5   • Insulin Syringe-Needle U-100 25G X 1\" 1 ML misc Use one every week 10 each 0   • nicotine (Nicoderm CQ) 7 MG/24HR patch Place 1 patch on the skin as directed by provider Daily. 28 patch 3   • rOPINIRole (REQUIP) 0.25 MG tablet Take 1-2 tablets by mouth Every Night. Take 1 hour before bedtime. 60 tablet 1     No current facility-administered medications for this visit.                Objective   Physical Exam  Vitals and nursing note reviewed.   Constitutional:       Appearance: She is not diaphoretic.      Comments: Cachetic body habitus, very tinny malnourished    HENT:      Nose: No congestion or rhinorrhea. "      Mouth/Throat:      Mouth: Mucous membranes are moist.   Cardiovascular:      Rate and Rhythm: Normal rate and regular rhythm.      Heart sounds: Normal heart sounds. No murmur heard.   No friction rub. No gallop.    Pulmonary:      Effort: Pulmonary effort is normal. No respiratory distress.      Breath sounds: No stridor. Wheezing present. No rales.   Musculoskeletal:         General: No swelling or tenderness.   Skin:     General: Skin is warm.      Findings: No erythema.   Neurological:      Mental Status: She is alert and oriented to person, place, and time.      Gait: Gait abnormal.   Psychiatric:         Mood and Affect: Mood normal.         Behavior: Behavior normal.         Thought Content: Thought content normal.           Assessment/Plan   Diagnoses and all orders for this visit:    1. RLS (restless legs syndrome) (Primary);  - Will start on;   -     rOPINIRole (REQUIP) 0.25 MG tablet; Take 1-2 tablets by mouth Every Night. Take 1 hour before bedtime.  Dispense: 60 tablet; Refill: 1    2. Chronic GERD  -     esomeprazole (nexIUM) 20 MG capsule; Take 1 capsule by mouth 2 (two) times a day.  Dispense: 60 capsule; Refill: 5    3. Nausea  -     promethazine (PHENERGAN) 12.5 MG tablet; Take 1 tablet by mouth Daily As Needed for Nausea.  Dispense: 30 tablet; Refill: 2    4. Tobacco abuse;  -              Tobacco abuse - The patient has significant smoking history.  I had a discussion with the patient about the importance of quitting smoking.   I specifically discussed strategies for quitting including, changing habits, picking a quit date and the use of aids such as nicotine gum, a patch, and prescriptions such as Chantix (varenicline).   The patient verbalized understanding that quitting smoking would be the most important thing that could be done to improve overall health.      5. Essential hypertension  - Stable, continue current Rx    6. Anxiety  - Stable, continue current Rx    7. COPD mixed type  "(HCC)  - Stable, continue current Rx    8. Coronary artery disease involving native heart without angina pectoris, unspecified vessel or lesion type  - Stable, continue current Rx    Other orders  -     Insulin Syringe-Needle U-100 25G X 1\" 1 ML misc; Use one every week  Dispense: 10 each; Refill: 0         I have fully discussed the nature of the medical condition(s) risks, complications, management, safe and proper use of medications.   I have discussed the SIDE EFFECT OF MEDICATION and importance TO report any side effect , the patient expressed good understanding.  Encouraged medication compliance and the importance of keeping scheduled follow up appointments with me and any other providers.    Patient instructed to follow up with our office for results on any labs/imaging ordered during this visit.    Home care discussed  All questions answered  Patient verbalizes understanding and agrees to treatment plan.     Follow up: Return in about 8 days (around 10/13/2021) for follow up on current illness.           "

## 2021-10-05 NOTE — PATIENT INSTRUCTIONS
"Healthy Eating  Following a healthy eating pattern may help you to achieve and maintain a healthy body weight, reduce the risk of chronic disease, and live a long and productive life. It is important to follow a healthy eating pattern at an appropriate calorie level for your body. Your nutritional needs should be met primarily through food by choosing a variety of nutrient-rich foods.  What are tips for following this plan?  Reading food labels  · Read labels and choose the following:  ? Reduced or low sodium.  ? Juices with 100% fruit juice.  ? Foods with low saturated fats and high polyunsaturated and monounsaturated fats.  ? Foods with whole grains, such as whole wheat, cracked wheat, brown rice, and wild rice.  ? Whole grains that are fortified with folic acid. This is recommended for women who are pregnant or who want to become pregnant.  · Read labels and avoid the following:  ? Foods with a lot of added sugars. These include foods that contain brown sugar, corn sweetener, corn syrup, dextrose, fructose, glucose, high-fructose corn syrup, honey, invert sugar, lactose, malt syrup, maltose, molasses, raw sugar, sucrose, trehalose, or turbinado sugar.  § Do not eat more than the following amounts of added sugar per day:  § 6 teaspoons (25 g) for women.  § 9 teaspoons (38 g) for men.  ? Foods that contain processed or refined starches and grains.  ? Refined grain products, such as white flour, degermed cornmeal, white bread, and white rice.  Shopping  · Choose nutrient-rich snacks, such as vegetables, whole fruits, and nuts. Avoid high-calorie and high-sugar snacks, such as potato chips, fruit snacks, and candy.  · Use oil-based dressings and spreads on foods instead of solid fats such as butter, stick margarine, or cream cheese.  · Limit pre-made sauces, mixes, and \"instant\" products such as flavored rice, instant noodles, and ready-made pasta.  · Try more plant-protein sources, such as tofu, tempeh, black beans, " edamame, lentils, nuts, and seeds.  · Explore eating plans such as the Mediterranean diet or vegetarian diet.  Cooking  · Use oil to sauté or stir-johnson foods instead of solid fats such as butter, stick margarine, or lard.  · Try baking, boiling, grilling, or broiling instead of frying.  · Remove the fatty part of meats before cooking.  · Steam vegetables in water or broth.  Meal planning    · At meals, imagine dividing your plate into fourths:  ? One-half of your plate is fruits and vegetables.  ? One-fourth of your plate is whole grains.  ? One-fourth of your plate is protein, especially lean meats, poultry, eggs, tofu, beans, or nuts.  · Include low-fat dairy as part of your daily diet.    Lifestyle  · Choose healthy options in all settings, including home, work, school, restaurants, or stores.  · Prepare your food safely:  ? Wash your hands after handling raw meats.  ? Keep food preparation surfaces clean by regularly washing with hot, soapy water.  ? Keep raw meats separate from ready-to-eat foods, such as fruits and vegetables.  ? , meat, poultry, and eggs to the recommended internal temperature.  ? Store foods at safe temperatures. In general:  § Keep cold foods at 40°F (4.4°C) or below.  § Keep hot foods at 140°F (60°C) or above.  § Keep your freezer at 0°F (-17.8°C) or below.  § Foods are no longer safe to eat when they have been between the temperatures of 40°-140°F (4.4-60°C) for more than 2 hours.  What foods should I eat?  Fruits  Aim to eat 2 cup-equivalents of fresh, canned (in natural juice), or frozen fruits each day. Examples of 1 cup-equivalent of fruit include 1 small apple, 8 large strawberries, 1 cup canned fruit, ½ cup dried fruit, or 1 cup 100% juice.  Vegetables  Aim to eat 2½-3 cup-equivalents of fresh and frozen vegetables each day, including different varieties and colors. Examples of 1 cup-equivalent of vegetables include 2 medium carrots, 2 cups raw, leafy greens, 1 cup  chopped vegetable (raw or cooked), or 1 medium baked potato.  Grains  Aim to eat 6 ounce-equivalents of whole grains each day. Examples of 1 ounce-equivalent of grains include 1 slice of bread, 1 cup ready-to-eat cereal, 3 cups popcorn, or ½ cup cooked rice, pasta, or cereal.  Meats and other proteins  Aim to eat 5-6 ounce-equivalents of protein each day. Examples of 1 ounce-equivalent of protein include 1 egg, 1/2 cup nuts or seeds, or 1 tablespoon (16 g) peanut butter. A cut of meat or fish that is the size of a deck of cards is about 3-4 ounce-equivalents.  · Of the protein you eat each week, try to have at least 8 ounces come from seafood. This includes salmon, trout, herring, and anchovies.  Dairy  Aim to eat 3 cup-equivalents of fat-free or low-fat dairy each day. Examples of 1 cup-equivalent of dairy include 1 cup (240 mL) milk, 8 ounces (250 g) yogurt, 1½ ounces (44 g) natural cheese, or 1 cup (240 mL) fortified soy milk.  Fats and oils  · Aim for about 5 teaspoons (21 g) per day. Choose monounsaturated fats, such as canola and olive oils, avocados, peanut butter, and most nuts, or polyunsaturated fats, such as sunflower, corn, and soybean oils, walnuts, pine nuts, sesame seeds, sunflower seeds, and flaxseed.  Beverages  · Aim for six 8-oz glasses of water per day. Limit coffee to three to five 8-oz cups per day.  · Limit caffeinated beverages that have added calories, such as soda and energy drinks.  · Limit alcohol intake to no more than 1 drink a day for nonpregnant women and 2 drinks a day for men. One drink equals 12 oz of beer (355 mL), 5 oz of wine (148 mL), or 1½ oz of hard liquor (44 mL).  Seasoning and other foods  · Avoid adding excess amounts of salt to your foods. Try flavoring foods with herbs and spices instead of salt.  · Avoid adding sugar to foods.  · Try using oil-based dressings, sauces, and spreads instead of solid fats.  This information is based on general [place] nutrition guidelines.  For more information, visit choosemyplate.gov. Exact amounts may vary based on your nutrition needs.  Summary  · A healthy eating plan may help you to maintain a healthy weight, reduce the risk of chronic diseases, and stay active throughout your life.  · Plan your meals. Make sure you eat the right portions of a variety of nutrient-rich foods.  · Try baking, boiling, grilling, or broiling instead of frying.  · Choose healthy options in all settings, including home, work, school, restaurants, or stores.  This information is not intended to replace advice given to you by your health care provider. Make sure you discuss any questions you have with your health care provider.  Document Revised: 04/01/2019 Document Reviewed: 04/01/2019  Elsevier Patient Education © 2021 [employer]

## 2021-10-06 NOTE — TELEPHONE ENCOUNTER
I'm not for sure what paperwork is needed for her to move facilities. Also you saw her yesterday for her RLS.

## 2021-10-06 NOTE — TELEPHONE ENCOUNTER
Caller: An Abreu    Relationship to patient: Self    Best call back number: [phone]     PATIENT IS CALLING IN TO ASK QUESTIONS ABOUT MOVING TO A NURSING HOME AND WHAT ALL THAT INVOLVES.     SHE ALSO SAYS THE LEGS DR CROW PRESCRIBED IS NOT WORKING. SHE HAD A FLARE UP THIS MORNING AND IT WAS SO PAINFUL IT MADE HER SICK.    PLEASE CALL AND ADVISE.

## 2021-10-06 NOTE — TELEPHONE ENCOUNTER
I do not know what paper work needed for moving to nursing home .   Ask Whit she may has an answer.    Angelika Butterfield MD

## 2021-10-07 NOTE — TELEPHONE ENCOUNTER
She is on to many medication for pain and anxiety /control sub   I prescribed new medication , advised to take 1 pill at bed time, if no better she can take 2 pils  I will do evaluate her in 1 week     Angelika Butterfield MD

## 2021-10-11 NOTE — TELEPHONE ENCOUNTER
Incoming Refill Request      Medication requested (name and dose): PHENobarbital (LUMINAL) 100 MG tablet   albuterol (PROVENTIL) (2.5 MG/3ML) 0.083% nebulizer solution      albuterol sulfate  (90 Base) MCG/ACT inhaler   LORazepam (Ativan) 1 MG tablet  Fluticasone-Umeclidin-Vilant (Trelegy Ellipta) 200-62.5-25 MCG/INH inhaler  CYAN OCOBALAMIN 1000 MCH ML B-12    [facility]    Additional details provided by patient: PATIENT IS COMPLETLEY OUT OF MEDICATIONS     Best call back number: [phone]    Does the patient have less than a 3 day supply:  [x] Yes  [] No    Aubrey Ca Rep  10/11/21, 11:52 [id number]}

## 2021-10-12 NOTE — PROGRESS NOTES
Subjective     An Abreu is a 71 y.o. female.     Chief Complaint   Patient presents with   • Hypertension   • Med Refill     hydrocodone,lorazepam,trelegy albuterol, vitamin b12, needs 30G insulin needles,promethazine 12.5 isn't helping        History of Present Illness     She fell from the chair 2 days ago , fell on the Rt side of her chest , hit the floor, she declined going to ER. She is c/o Rt side chest pain worse with breathing. She is not able to sleep due to the pain.     Her dtr taking care of her 24/7    COPD ; with chronic cough, SOB and wheezing.  She still smokes 1 PPD , down from 2 PPD   She is on Oxygen 24/7 + INH . She is following with Pulm.    Anxiety with chronic lower back pain ;  her pain 9/10 without the medicine , she is doing better with Norco 10 , it helps to decrease the pain.  She is also taking Lorazepam 1 mg BID for many years , she is doing well on 1 mg BID.  Her pain associated with N , taking Phenergan 12.5 dose not help, pt take 2 of those with great help.      CAD S/P CABG, HTN and Afib ; stable, no new concern. Denies CP/HA      Low Vit B12 , SHE IS on weekly vit B 12 shots , feels better. Requests small needles for the Vit B12 SHOTS        The following portions of the patient's history were reviewed and updated as appropriate: allergies, current medications, past family history, past medical history, past social history, past surgical history and problem list.        Review of Systems   Respiratory: Positive for cough, shortness of breath and wheezing.    Cardiovascular: Negative for chest pain, palpitations and leg swelling.   Musculoskeletal: Positive for arthralgias and gait problem.       Vitals:    10/12/21 1333   BP: 138/58   Pulse: 88   Temp: 96.6 °F (35.9 °C)   SpO2: 94%           10/12/21  1333   Weight: 37.6 kg (82 lb 12.8 oz)         Body mass index is 18.57 kg/m².      Current Outpatient Medications   Medication Sig Dispense Refill   • albuterol (PROVENTIL)  (2.5 MG/3ML) 0.083% nebulizer solution Take 2.5 mg by nebulization Every 4 (Four) Hours As Needed for Wheezing. 3 mL 12   • albuterol sulfate  (90 Base) MCG/ACT inhaler Inhale 2 puffs Every 4 (Four) Hours As Needed for Wheezing or Shortness of Air. 18 g 3   • clopidogrel (PLAVIX) 75 MG tablet Take 1 tablet by mouth Daily. 90 tablet 3   • dilTIAZem CD (CARDIZEM CD) 240 MG 24 hr capsule Take 1 capsule by mouth Daily. 90 capsule 3   • esomeprazole (nexIUM) 20 MG capsule Take 1 capsule by mouth 2 (two) times a day. 60 capsule 5   • Fluticasone-Umeclidin-Vilant (Trelegy Ellipta) 200-62.5-25 MCG/INH inhaler Inhale 1 puff Daily. 60 each 5   • furosemide (LASIX) 40 MG tablet Take 1 tablet by mouth Daily. 90 tablet 3   • HYDROcodone-acetaminophen (NORCO)  MG per tablet Take 1 tablet by mouth Every 12 (Twelve) Hours As Needed for Moderate Pain . 60 tablet 0   • ipratropium-albuterol (DUO-NEB) 0.5-2.5 mg/3 ml nebulizer Take 3 mL by nebulization Every 4 (Four) Hours As Needed for Wheezing. 360 mL 5   • LORazepam (Ativan) 1 MG tablet Take 1 tablet by mouth 2 (Two) Times a Day. 60 tablet 0   • PHENobarbital (LUMINAL) 100 MG tablet Take 1.5 tablets by mouth Daily. 45 tablet 0   • rOPINIRole (REQUIP) 0.25 MG tablet Take 1-2 tablets by mouth Every Night. Take 1 hour before bedtime. 60 tablet 1   • rosuvastatin (CRESTOR) 40 MG tablet Take 1 tablet by mouth Every Night. 90 tablet 1   • Syringe, Disposable, (Syringe 2-3 ML) 3 ML misc One syring per shot 5 each 0   • Insulin Syringe-Needle U-100 30G 0.3 ML misc Weekly shot 3 each 0   • lidocaine (LIDODERM) 5 % Place 1 patch on the skin as directed by provider Daily. Remove & Discard patch within 12 hours or as directed by MD 30 patch 2   • nicotine (Nicoderm CQ) 7 MG/24HR patch Place 1 patch on the skin as directed by provider Daily. 28 patch 3   • promethazine (PHENERGAN) 25 MG tablet Take 1 tablet by mouth Every 6 (Six) Hours As Needed for Nausea or Vomiting. 30 tablet 3      No current facility-administered medications for this visit.                Objective   Physical Exam  Vitals and nursing note reviewed.   Constitutional:       Appearance: She is ill-appearing. She is not diaphoretic.   HENT:      Mouth/Throat:      Mouth: Mucous membranes are moist.   Cardiovascular:      Rate and Rhythm: Normal rate and regular rhythm.      Heart sounds: Normal heart sounds. No murmur heard.  No friction rub. No gallop.    Pulmonary:      Effort: Pulmonary effort is normal. No respiratory distress.      Breath sounds: Normal breath sounds. No stridor. No wheezing or rhonchi.      Comments: TTP over the Rt side of the chest wall   No bruising   Chest:      Chest wall: Tenderness present.   Skin:     Findings: No bruising.   Neurological:      Mental Status: She is alert and oriented to person, place, and time.      Gait: Gait abnormal.   Psychiatric:         Mood and Affect: Mood normal.         Behavior: Behavior normal.         Thought Content: Thought content normal.         Judgment: Judgment normal.           Assessment/Plan   Diagnoses and all orders for this visit:    1. H/O fall (Primary)  -     XR Ribs Right With PA Chest; Future    2. Right-sided chest wall pain  -     XR Ribs Right With PA Chest; Future  -     lidocaine (LIDODERM) 5 %; Place 1 patch on the skin as directed by provider Daily. Remove & Discard patch within 12 hours or as directed by MD  Dispense: 30 patch; Refill: 2    3. Rib pain on right side  -     XR Ribs Right With PA Chest; Future  -     lidocaine (LIDODERM) 5 %; Place 1 patch on the skin as directed by provider Daily. Remove & Discard patch within 12 hours or as directed by MD  Dispense: 30 patch; Refill: 2    4. Nausea;  - Will increase dose to 25 mg for better relive   -     promethazine (PHENERGAN) 25 MG tablet; Take 1 tablet by mouth Every 6 (Six) Hours As Needed for Nausea or Vomiting.  Dispense: 30 tablet; Refill: 3    5. Chronic bilateral low back pain  without sciatica  -     HYDROcodone-acetaminophen (NORCO)  MG per tablet; Take 1 tablet by mouth Every 12 (Twelve) Hours As Needed for Moderate Pain .  Dispense: 60 tablet; Refill: 0  -     lidocaine (LIDODERM) 5 %; Place 1 patch on the skin as directed by provider Daily. Remove & Discard patch within 12 hours or as directed by MD  Dispense: 30 patch; Refill: 2    6. Mucopurulent chronic bronchitis (HCC)  - Stable, continue current Rx  -     Fluticasone-Umeclidin-Vilant (Trelegy Ellipta) 200-62.5-25 MCG/INH inhaler; Inhale 1 puff Daily.  Dispense: 60 each; Refill: 5    7. Paroxysmal AF  - Stable, continue current Rx  -     dilTIAZem CD (CARDIZEM CD) 240 MG 24 hr capsule; Take 1 capsule by mouth Daily.  Dispense: 90 capsule; Refill: 3    8. Anxiety;  - Stable, continue current Rx  -     LORazepam (Ativan) 1 MG tablet; Take 1 tablet by mouth 2 (Two) Times a Day.  Dispense: 60 tablet; Refill: 0    Other orders  -     Insulin Syringe-Needle U-100 30G 0.3 ML misc; Weekly shot  Dispense: 3 each; Refill: 0      I have fully discussed the nature of the medical condition(s) risks, complications, management, safe and proper use of medications.   I have discussed the SIDE EFFECT OF MEDICATION and importance TO report any side effect , the patient expressed good understanding.  Encouraged medication compliance and the importance of keeping scheduled follow up appointments with me and any other providers.    Patient instructed to follow up with our office for results on any labs/imaging ordered during this visit.    Home care discussed  All questions answered  Patient verbalizes understanding and agrees to treatment plan.     Follow up: Return in about 29 days (around 11/10/2021).

## 2021-10-12 NOTE — PATIENT INSTRUCTIONS
Fall Prevention in Hospitals, Adult  Being a patient in the hospital puts you at risk for falling. Falls can cause serious injury and harm, but they can be prevented. It is important to understand what puts you at risk for falling and what you and your health care team can do to prevent you from falling. If you or a loved one falls at the hospital, it is important to tell hospital staff about it.  What increases the risk for falls?  Certain conditions and treatments may increase your risk of falling in the hospital. These include:  · Being in an unfamiliar environment, especially when using the bathroom at night.  · Having surgery.  · Being on bed rest.  · Taking many medicines or certain types of medicines, such as sleeping pills.  · Having tubes in place, such as IV lines or catheters.  Other risk factors for falls in a hospital include:  · Having difficulty with hearing or vision.  · Having a change in thinking or behavior, such as confusion.  · Having depression.  · Having trouble with balance.  · Being a male.  · Feeling dizzy.  · Needing to use the toilet frequently.  · Having fallen during the past three months.  · Having low blood pressure.  What are some strategies for preventing falls?  If you or a loved one has to stay in the hospital:  · Ask about which fall prevention strategies will be in place. Do not hesitate to speak up if you notice that the fall prevention plan has changed.  · Ask for help moving around, especially after surgery or when feeling unwell.  · If you have been asked to call for help when getting up, do not get up by yourself. Asking for help with getting up is for your safety, and the staff is there to help you.  · Wear nonskid footwear.  · Get up slowly, and sit at the side of the bed for a few minutes before standing up.  · Keep items you need, such as the nurse call button or a phone, close to you so that you do not need to reach for them.  · Wear eyeglasses or hearing aids if you  have them.  · Have someone stay in the hospital with you or your loved one.  · Ask if sleeping pills or other medicines that can cause confusion are necessary.  What does the hospital staff do to help prevent falls?  Hospitals have systems in place to prevent falls and accidents, which may involve:  · Discussing your fall risks and making a personalized fall prevention plan.  · Checking in regularly to see if you need help.  · Placing an arm band on your wrist or a sign near your room to alert other staff of your needs.  · Using an alarm on your hospital bed. This is an alarm that goes off if you get out of bed and forget to call for help.  · Keeping the bed in a low and locked position.  · Keeping the area around the bed and bathroom well-lit and free from clutter.  · Keeping your room quiet, so that you can sleep and be well-rested.  · Using safety equipment, such as:  ? A belt around your waist.  ? Walkers, crutches, and other devices for support.  ? Safety beds, such as low beds or cushions on the floor next to the bed.  · Having a staff person stay with you (one-on-one observation), even when you are using the bathroom. This is for your safety.  · Using video monitoring. This allows a staff member to come to help you if you need help.  What other actions can I take to lower my risk of falls?  · Check in regularly with your health care provider or pharmacist to review all of the medicines that you take.  · Make sure that you have a regular exercise program to stay fit. This will help you maintain your balance.  · Talk with a physical therapist or  if recommended by your health care provider. They can help you to improve your strength, balance, and endurance.  · If you are over age 65:  ? Ask your health care provider if you need a calcium or vitamin D supplement.  ? Have your eyes and hearing checked every year.  ? Have your feet checked every year.  Where to find more information  You can find more  information about fall prevention from the Centers for Disease Control and Prevention: www.cdc.gov/steadi  Summary  · Being in an unfamiliar environment, such as the hospital, increases your risk for falling.  · If you have been asked to call for help when getting up, do not get up by yourself. Asking for help with getting up is for your safety, and the staff is there to help you.  · Ask about which fall prevention strategies will be in place. Do not hesitate to speak up if you notice that the fall prevention plan has changed.  · If you or a loved one falls, tell the hospital staff. This is important.  This information is not intended to replace advice given to you by your health care provider. Make sure you discuss any questions you have with your health care provider.  Document Revised: 11/30/2018 Document Reviewed: 08/01/2018  [employer]vier Patient Education © 2021 [employer]

## 2021-10-14 NOTE — TELEPHONE ENCOUNTER
Caller: STEFANIA ARMENTA    Relationship: Emergency Contact    Best call back number: [phone] (H)    Caller requesting test results:   PATIENT DAUGHTER STEFANIA CONTI    What test was performed: XRAY OF RIGHT RIB    When was the test performed: 10/12/21    [facility]    Additional notes:   PATIENT DAUGHTER CALLED TO OBTAIN THE RESULTS FROM THE MOST RECENT XRAY OF THE PATIENT RIGHT RIB      PATIENT HAS BEEN ADVISED TO PLEASE ALLOW 48 HOURS FOR OUR CLINICAL TEAM WILL FOLLOW UP ON THIS REQUEST.

## 2021-10-14 NOTE — TELEPHONE ENCOUNTER
Pn lft vm per DUARTE     Angelika Butterfield MD  [facility] [address] [facility]  PLEASE call for xr results, no new rib fracture noticed on XR

## 2021-10-15 NOTE — TELEPHONE ENCOUNTER
HUB: IF PATIENT'S DAUGHTER STEFANIA RETURNS CALL PLEASE LET HER KNOW THE X-RAYS SHOWED NO NEW RIB FRACTURES.     Called and lvm for Stefania to return call to office, number given.       Angelika Butterfield MD [facility] [address] [facility]  PLEASE call for xr results, no new rib fracture noticed on XR

## 2021-10-17 PROBLEM — D72.829 LEUKOCYTOSIS: Status: ACTIVE | Noted: 2021-01-01

## 2021-10-17 PROBLEM — J44.9 COPD (CHRONIC OBSTRUCTIVE PULMONARY DISEASE) (HCC): Status: ACTIVE | Noted: 2021-01-01

## 2021-10-17 PROBLEM — J18.9 COMMUNITY ACQUIRED PNEUMONIA: Status: ACTIVE | Noted: 2021-01-01

## 2021-10-17 PROBLEM — I50.32 CHRONIC DIASTOLIC CHF (CONGESTIVE HEART FAILURE) (HCC): Status: ACTIVE | Noted: 2021-01-01

## 2021-10-17 NOTE — ED PROVIDER NOTES
Subjective   71-year-old female presents for evaluation of cough and shortness of breath.  Of note, the patient is fully vaccinated against COVID-19.  She has a history of COPD as well as CHF.  She typically wears home oxygen as needed at night.  She states that over the past 4 days she has had gradually worsening dyspnea when compared to baseline.  She also endorses a productive cough and subjective fever today.  She denies any known sick contacts or any known exposures to anyone with COVID-19.  On EMS arrival to the patient's home, the patient was hypoxic with oxygen saturations in the low 80s on 4 L.  She was noted to have audible wheezes.  She was given a breathing treatment and supplemental oxygen, and she was separately brought to our facility to be evaluated.  Denies any chest pain.  She is unsure as to what may have triggered her symptoms.          Review of Systems   Constitutional: Positive for fever.   Respiratory: Positive for cough and shortness of breath.    All other systems reviewed and are negative.      Past Medical History:   Diagnosis Date   • Aneurysm ([facility])    • Angina pectoris ([facility]) 6/20/2016   • Anxiety 6/20/2016   • Arthritis 6/20/2016   • CAD (coronary artery disease)    • Carotid artery stenosis    • CHF (congestive heart failure) ([facility])    • Chronic coronary artery disease 6/20/2016 2003 CABG LIMA to LAD, VG to OM 2004 VG to OM occluded. LIMA patent Cx intervention and RCA 2008 stent for ISR 2013 ISR and stenting of CX and RCA 2016 normal MPS   • Chronic left-sided low back pain with left-sided sciatica 2/1/2017   • Chronic obstructive pulmonary disease ([facility]) 6/20/2016   • Chronic respiratory failure with hypoxia ([facility]) 3/27/2021   • Cobalamin deficiency 6/20/2016   • Congestive heart failure (HCC) 6/20/2016   • COPD (chronic obstructive pulmonary disease) ([facility])    • Depression 7/3/2018   • Diverticulosis    • Diverticulosis of large intestine without hemorrhage 5/5/2017   • GERD  (gastroesophageal reflux disease)    • GIB (gastrointestinal bleeding)     recurrent    • HTN (hypertension)    • Hypercholesterolemia 6/20/2016   • Hyperlipidemia    • Mesenteric ischemia ([facility]) 2006    s/p resection    • Mood disorder ([facility])    • Oxygen dependent     3 liters @ all times.    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    Tolerated Rocephin 2/19/21, zosyn 5/2021   • Sulfamethoxazole-Trimethoprim Shortness Of Breath and Rash     Patients power of  stated patient had to be taken to ER due to reaction.   • Carbamazepine    • Codeine    • Latex Rash       Past Surgical History:   Procedure Laterality Date   • APPENDECTOMY     • CHOLECYSTECTOMY     • COLONOSCOPY N/A 6/19/2021    Procedure: COLONOSCOPY;  Surgeon: Wilfredo Simon MD;  Location:  [facility];  Service: Gastroenterology;  Laterality: N/A;   • COLOSTOMY  2006    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   • ENDOSCOPY N/A 6/16/2021    Procedure: ESOPHAGOGASTRODUODENOSCOPY;  Surgeon: Jean Fuentes MD;  Location:  [facility];  Service: Gastroenterology;  Laterality: N/A;   • EXPLORATORY LAPAROTOMY      sec to ovarian cysts   • HYSTERECTOMY     • ILIAC ARTERY STENT     • REVISION / TAKEDOWN COLOSTOMY  2008       Family History   Problem Relation Age of Onset   • Arthritis Mother    • Cancer Mother    • Heart attack Father    • Hypertension Father    • Hyperlipidemia Father    • Stroke Father    • Heart  disease Brother         CAD   • Heart disease Child         CAD   • Heart disease Child         CAD       Social History     Socioeconomic History   • Marital status:    Tobacco Use   • Smoking status: Current Every Day Smoker     Packs/day: 1.00     Years: 40.00     Pack years: 40.00     Types: Cigarettes, Electronic Cigarette   • Smokeless tobacco: Never Used   • Tobacco comment: <0.5ppd    Substance and Sexual Activity   • Alcohol use: Never   • Drug use: Never   • Sexual activity: Defer           Objective   Physical Exam  Vitals and nursing note reviewed.   Constitutional:       Appearance: She is not diaphoretic.      Comments: Chronically ill-appearing elderly female   HENT:      Head: Normocephalic and atraumatic.   Eyes:      Pupils: Pupils are equal, round, and reactive to light.   Cardiovascular:      Rate and Rhythm: Normal rate and regular rhythm.      Heart sounds: Normal heart sounds. No murmur heard.  No friction rub. No gallop.    Pulmonary:      Breath sounds: Wheezing and rales present.      Comments: Breathing appears mildly labored, tachypneic, audible rales and wheezes noted in posterior lung fields bilaterally  Abdominal:      General: Bowel sounds are normal. There is no distension.      Palpations: Abdomen is soft. There is no mass.      Tenderness: There is no abdominal tenderness. There is no guarding or rebound.   Musculoskeletal:         General: Normal range of motion.      Cervical back: Neck supple.      Right lower leg: No edema.      Left lower leg: No edema.   Skin:     General: Skin is warm and dry.      Findings: No erythema or rash.   Neurological:      General: No focal deficit present.      Mental Status: She is alert and oriented to person, place, and time.   Psychiatric:         Mood and Affect: Mood normal.         Thought Content: Thought content normal.         Judgment: Judgment normal.         Critical Care  Performed by: Yovany Hooper MD  Authorized by:  Yovany Hooper MD     Critical care provider statement:     Critical care time (minutes):  35    Critical care was necessary to treat or prevent imminent or life-threatening deterioration of the following conditions:  Respiratory failure    Critical care was time spent personally by me on the following activities:  Ordering and performing treatments and interventions, ordering and review of laboratory studies, pulse oximetry, re-evaluation of patient's condition, development of treatment plan with patient or surrogate, evaluation of patient's response to treatment, examination of patient and obtaining history from patient or surrogate               ED Course  ED Course as of 10/17/21 1945   Sun Oct 17, 2021   1719 71-year-old female presents for evaluation of cough and shortness of breath. Of note, the patient is vaccinated against COVID-19. She has a history of COPD and CHF. She wears home oxygen as needed at night. She has had gradually worsening dyspnea over the past 4 days as well as a productive cough and subjective fever. She called EMS regarding her symptoms today and was noted to have audible wheezes and hypoxia. Oxygen saturations were initially in the mid 80s on 4 L. [DD]   1720 She was given a breathing treatment and brought to our facility for evaluation. On arrival, the patient appears chronically ill. She has mild wheezes and audible rales in her posterior lung fields bilaterally. Supplemental oxygen given. Repeat nebs given. Steroids given. We will obtain labs and imaging, and we will likely seek admission to the hospital. [DD]   1825 I personally viewed the patient's x-ray images myself, and I am in agreement with the radiologist's reading for final interpretation.     [DD]   1856 EKG revealed normal sinus rhythm with a heart rate of 76 and nonspecific T wave abnormality to lateral precordial and high lateral leads. [DD]   1857 Troponin is mildly elevated at 0.38.  Review of the patient's  records reveals that her troponin is chronically elevated. [DD]   1914 COVID-19 swab is negative. [DD]   1920 Doxycycline initiated to cover for community-acquired pneumonia. Blood cultures obtained. The patient is maintaining oxygen saturations in the 90s on 3 l. I feel that she is stable for the floor at this time. Awaiting callback from the hospitalist. [DD]   1928 I discussed the patient's case with Dr. Coronado, and the patient will be admitted under his care for further evaluation and treatment. She is aware/agreeable with the plan at this time. [DD]      ED Course User Index  [DD] Yovany Hooper MD                                   Recent Results (from the past 24 hour(s))   COVID-19 and FLU A/B PCR - Swab, Nasopharynx    Collection Time: 10/17/21  5:25 PM    Specimen: Nasopharynx; Swab   Result Value Ref Range    COVID19 Not Detected Not Detected - Ref. Range    Influenza A PCR Not Detected Not Detected    Influenza B PCR Not Detected Not Detected   Comprehensive Metabolic Panel    Collection Time: 10/17/21  6:10 PM    Specimen: Blood   Result Value Ref Range    Glucose 84 65 - 99 mg/dL    BUN 12 8 - 23 mg/dL    Creatinine 0.58 0.57 - 1.00 mg/dL    Sodium 133 (L) 136 - 145 mmol/L    Potassium 3.5 3.5 - 5.2 mmol/L    Chloride 91 (L) 98 - 107 mmol/L    CO2 28.0 22.0 - 29.0 mmol/L    Calcium 9.3 8.6 - 10.5 mg/dL    Total Protein 7.4 6.0 - 8.5 g/dL    Albumin 3.90 3.50 - 5.20 g/dL    ALT (SGPT) 24 1 - 33 U/L    AST (SGOT) 39 (H) 1 - 32 U/L    Alkaline Phosphatase 189 (H) 39 - 117 U/L    Total Bilirubin 0.3 0.0 - 1.2 mg/dL    eGFR Non African Amer 102 >60 mL/min/1.73    Globulin 3.5 gm/dL    A/G Ratio 1.1 g/dL    BUN/Creatinine Ratio 20.7 7.0 - 25.0    Anion Gap 14.0 5.0 - 15.0 mmol/L   Protime-INR    Collection Time: 10/17/21  6:10 PM    Specimen: Blood   Result Value Ref Range    Protime 13.5 11.4 - 14.4 Seconds    INR 1.07 0.85 - 1.16   BNP    Collection Time: 10/17/21  6:10 PM    Specimen: Blood   Result  Value Ref Range    proBNP 12,091.0 (H) 0.0 - 900.0 pg/mL   Troponin    Collection Time: 10/17/21  6:10 PM    Specimen: Blood   Result Value Ref Range    Troponin T 0.387 (C) 0.000 - 0.030 ng/mL   Lactic Acid, Plasma    Collection Time: 10/17/21  6:10 PM    Specimen: Blood   Result Value Ref Range    Lactate 1.2 0.5 - 2.0 mmol/L   Procalcitonin    Collection Time: 10/17/21  6:10 PM    Specimen: Blood   Result Value Ref Range    Procalcitonin 0.10 0.00 - 0.25 ng/mL   CBC Auto Differential    Collection Time: 10/17/21  6:10 PM    Specimen: Blood   Result Value Ref Range    WBC 13.70 (H) 3.40 - 10.80 10*3/mm3    RBC 4.50 3.77 - 5.28 10*6/mm3    Hemoglobin 13.9 12.0 - 15.9 g/dL    Hematocrit 42.6 34.0 - 46.6 %    MCV 94.7 79.0 - 97.0 fL    MCH 30.9 26.6 - 33.0 pg    MCHC 32.6 31.5 - 35.7 g/dL    RDW 15.8 (H) 12.3 - 15.4 %    RDW-SD 54.4 (H) 37.0 - 54.0 fl    MPV 10.4 6.0 - 12.0 fL    Platelets 246 140 - 450 10*3/mm3    Neutrophil % 91.2 (H) 42.7 - 76.0 %    Lymphocyte % 5.0 (L) 19.6 - 45.3 %    Monocyte % 3.1 (L) 5.0 - 12.0 %    Eosinophil % 0.2 (L) 0.3 - 6.2 %    Basophil % 0.1 0.0 - 1.5 %    Immature Grans % 0.4 0.0 - 0.5 %    Neutrophils, Absolute 12.50 (H) 1.70 - 7.00 10*3/mm3    Lymphocytes, Absolute 0.68 (L) 0.70 - 3.10 10*3/mm3    Monocytes, Absolute 0.42 0.10 - 0.90 10*3/mm3    Eosinophils, Absolute 0.03 0.00 - 0.40 10*3/mm3    Basophils, Absolute 0.02 0.00 - 0.20 10*3/mm3    Immature Grans, Absolute 0.05 0.00 - 0.05 10*3/mm3    nRBC 0.0 0.0 - 0.2 /100 WBC   Scan Slide    Collection Time: 10/17/21  6:10 PM    Specimen: Blood   Result Value Ref Range    RBC Morphology Normal Normal    WBC Morphology Normal Normal    Platelet Morphology Normal Normal     Note: In addition to lab results from this visit, the labs listed above may include labs taken at another facility or during a different encounter within the last 24 hours. Please correlate lab times with ED admission and discharge times for further clarification  of the services performed during this visit.    XR Chest 1 View   Preliminary Result   Interval development of bilateral pneumonia left greater   than right, suspicious for Covid 19.       DICTATED:   10/17/2021   EDITED/lfs:   10/17/2021               CT Angiogram Chest With & Without Contrast    (Results Pending)     Vitals:    10/17/21 1845 10/17/21 1900 10/17/21 1912 10/17/21 1942   BP:  150/64     Pulse:  72 73 72   Resp:       Temp:       TempSrc:       SpO2: 97% 96% 97% 96%   Weight:       Height:         Medications   doxycycline (VIBRAMYCIN) 100 mg/100 mL 0.9% NS MBP (has no administration in time range)   furosemide (LASIX) injection 40 mg (has no administration in time range)   methylPREDNISolone sodium succinate (SOLU-Medrol) injection 125 mg (125 mg Intravenous Given 10/17/21 1724)   albuterol sulfate HFA (PROVENTIL HFA;VENTOLIN HFA;PROAIR HFA) inhaler 2 puff (2 puffs Inhalation Given 10/17/21 1744)     ECG/EMG Results (last 24 hours)     Procedure Component Value Units Date/Time    ECG 12 Lead [[id number]] Collected: 10/17/21 1731     Updated: 10/17/21 1728        ECG 12 Lead                     MDM    Final diagnoses:   Community acquired pneumonia, unspecified laterality   Acute on chronic respiratory failure with hypoxia (HCC)   Leukocytosis, unspecified type   COPD exacerbation (HCC)   Elevated troponin   History of CHF (congestive heart failure)       ED Disposition  ED Disposition     ED Disposition Condition Comment    Decision to Admit            No follow-up provider specified.       Medication List      No changes were made to your prescriptions during this visit.          Yovany Hooper MD  10/17/21 1947

## 2021-10-18 NOTE — MBS/VFSS/FEES
Acute Care - Speech Language Pathology   Swallow Initial Evaluation  [place]   Modified Barium Swallow Study (MBS)     Patient Name: An Abreu  : 1950  MRN: [id number]  Today's Date: 10/18/2021               Admit Date: 10/17/2021    Visit Dx:     ICD-10-CM ICD-9-CM   1. Community acquired pneumonia, unspecified laterality  J18.9 486   2. Acute on chronic respiratory failure with hypoxia ([facility])  J96.21 518.84     799.02   3. Leukocytosis, unspecified type  D72.829 288.60   4. COPD exacerbation ([facility])  J44.1 491.21   5. Elevated troponin  R77.8 790.6   6. History of CHF (congestive heart failure)  Z86.79 V12.59   7. Oropharyngeal dysphagia  R13.12 787.22     Patient Active Problem List   Diagnosis   • Gastroesophageal reflux disease   • Essential hypertension   • Seizure disorder on phenobarbital    • PAD s/p R iliac stent    • Paroxysmal AF   • Fecal occult blood test positive   • Tobacco abuse   • CAD s/p CABG    • Anemia   • Fall   • Humerus fracture   • Hypoglycemia   • Metabolic encephalopathy   • Iron deficiency anemia due to chronic blood loss   • Mucopurulent chronic bronchitis ([facility])   • Severe malnutrition ([facility])   • H/O: recent GI bleed   • + PFO    • H/O CVA    • Non-compliance   • Debility   • Nonrheumatic aortic valve insufficiency   • Chronic respiratory failure with hypoxia and hypercapnia ([facility])   • Community acquired pneumonia   • COPD (chronic obstructive pulmonary disease) ([facility])   • Chronic diastolic CHF (congestive heart failure) ([facility])   • Leukocytosis     Past Medical History:   Diagnosis Date   • Aneurysm ([facility])    • Angina pectoris ([facility]) 2016   • Anxiety 2016   • Arthritis 2016   • CAD (coronary artery disease)    • Carotid artery stenosis    • CHF (congestive heart failure) ([facility])    • Chronic coronary artery disease 2016 CABG LIMA to LAD, VG to OM  VG to OM occluded. LIMA patent Cx intervention and RCA  stent for ISR 2013 ISR and stenting of  CX and RCA 2016 normal MPS   • Chronic left-sided low back pain with left-sided sciatica 2/1/2017   • Chronic obstructive pulmonary disease (HCC) 6/20/2016   • Chronic respiratory failure with hypoxia (HCC) 3/27/2021   • Cobalamin deficiency 6/20/2016   • Congestive heart failure (HCC) 6/20/2016   • COPD (chronic obstructive pulmonary disease) (HCC)    • Depression 7/3/2018   • Diverticulosis    • Diverticulosis of large intestine without hemorrhage 5/5/2017   • GERD (gastroesophageal reflux disease)    • GIB (gastrointestinal bleeding)     recurrent    • HTN (hypertension)    • Hypercholesterolemia 6/20/2016   • Hyperlipidemia    • Mesenteric ischemia (HCC) 2006    s/p resection    • Mood disorder (HCC)    • Oxygen dependent     3 liters @ all times.    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/23/2021   • PVD (peripheral vascular disease) ([facility])    • Restless legs syndrome 6/20/2016   • Seizure disorder (HCC) 6/20/2016   • Seizures ([facility])    • Stenosis of carotid artery 6/20/2016   • Vertigo 9/28/2016     Past Surgical History:   Procedure Laterality Date   • APPENDECTOMY     • CHOLECYSTECTOMY     • COLONOSCOPY N/A 6/19/2021    Procedure: COLONOSCOPY;  Surgeon: Wilfredo Simon MD;  Location:  [facility];  Service: Gastroenterology;  Laterality: N/A;   • COLOSTOMY  2006    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   • ENDOSCOPY N/A 6/16/2021    Procedure: ESOPHAGOGASTRODUODENOSCOPY;  Surgeon: Jean Fuentes MD;  Location:  [facility];  Service: Gastroenterology;  Laterality: N/A;   • EXPLORATORY LAPAROTOMY      sec to ovarian cysts   • HYSTERECTOMY     • ILIAC ARTERY STENT     • REVISION / TAKEDOWN COLOSTOMY  2008       SLP Recommendation and Plan  SLP Swallowing Diagnosis: mild, oral dysphagia, moderate, pharyngeal dysphagia (10/18/21 1435)  SLP Diet  Recommendation: mechanical soft with no mixed consistencies, honey thick liquids (10/18/21 1435)  Recommended Precautions and Strategies: upright posture during/after eating, small bites of food and sips of liquid, general aspiration precautions, reflux precautions, other (see comments) (small single sips) (10/18/21 1435)  SLP Rec. for Method of Medication Administration: meds whole, meds crushed, with pudding or applesauce, as tolerated (10/18/21 1435)     Monitor for Signs of Aspiration: yes, notify SLP if any concerns (10/18/21 1435)  Recommended Diagnostics: VFSS ([facility]) (10/18/21 0900)  Swallow Criteria for Skilled Therapeutic Interventions Met: demonstrates skilled criteria (10/18/21 1435)  Anticipated Discharge Disposition (SLP): unknown, anticipate therapy at next level of care (10/18/21 1435)  Rehab Potential/Prognosis, Swallowing: good, to achieve stated therapy goals (10/18/21 1435)  Therapy Frequency (Swallow): 5 days per week (10/18/21 1435)  Predicted Duration Therapy Intervention (Days): until discharge (10/18/21 1435)                         Plan of Care Reviewed With: patient      SWALLOW EVALUATION (last 72 hours)     SLP Adult Swallow Evaluation     Row Name 10/18/21 1435                Rehab Evaluation    Document Type evaluation  -RD       Subjective Information complains of; pain  -RD       Patient Observations alert; cooperative; agree to therapy  -RD       Patient/Family/Caregiver Comments/Observations No family present; pt confused this PM  -RD       Care Plan Review evaluation/treatment results reviewed; care plan/treatment goals reviewed; risks/benefits reviewed; current/potential barriers reviewed; patient/other agree to care plan  -RD       Patient Effort good  -RD               General Information    Patient Profile Reviewed yes  -RD       Pertinent History Of Current Problem Adm w/ PNA. C/o cough & SOB. Hx of GERD, tobacco abuse, COPD, CHF, incr'd WBC, HTN, sz d/o, PFO, CVA, CAD s/p  CABG. Hx silent aspiration per MBS 10/30/20- recs for HTLs & Lvl 3, small sips. Consult for swallowing. MBS to r/o silent aspiration  -RD       Current Method of Nutrition NPO  -RD       Precautions/Limitations, Vision WFL with corrective lenses; for purposes of eval  -RD       Precautions/Limitations, Hearing WFL; for purposes of eval  -RD       Prior Level of Function-Communication other (see comments)  hx CVA  -RD       Prior Level of Function-Swallowing other (see comments)  hx of dysphagia post CVA w/ silent aspiration  -RD       Plans/Goals Discussed with patient; agreed upon  -RD       Barriers to Rehab previous functional deficit  -RD       Patient's Goals for Discharge return to PO diet; eat/drink without coughing/choking  -RD               Pain    Additional Documentation Pain Scale: Numbers Pre/Post-Treatment (Group)  -RD               Pain Scale: Numbers Pre/Post-Treatment    Pretreatment Pain Rating 6/10  -RD       Posttreatment Pain Rating 6/10  -RD       Pre/Posttreatment Pain Comment c/o generalized pain in lungs  -RD            MBS/VFSS    Utensils Used spoon; cup; straw  -RD       Consistencies Trialed thin liquids; nectar/syrup-thick liquids; honey-thick liquids; pudding thick; regular textures  -RD               MBS/VFSS Interpretation    Oral Prep Phase impaired oral phase of swallowing  -RD       Oral Transit Phase impaired  -RD       Oral Residue impaired  -RD               Oral Preparatory Phase    Oral Preparatory Phase prolonged manipulation  -RD       Prolonged Manipulation regular textures; secondary to reduced lingual strength  -RD               Oral Transit Phase    Impaired Oral Transit Phase increased A-P transit time  -RD       Increased A-P Transit Time regular textures; secondary to reduced lingual control  -RD               Oral Residue    Impaired Oral Residue lingual residue  -RD       Lingual Residue all consistencies tested; secondary to reduced lingual strength  -RD        Response to Oral Residue unable to clear residue  -RD               Initiation of Pharyngeal Swallow    Initiation of Pharyngeal Swallow bolus in pyriform sinuses  -RD       Pharyngeal Phase impaired pharyngeal phase of swallowing  -RD       Penetration During the Swallow thin liquids; nectar-thick liquids; honey-thick liquids; secondary to delayed swallow initiation or mistiming; secondary to reduced vestibular closure  -RD       Aspiration During the Swallow nectar-thick liquids; secondary to delayed swallow initiation or mistiming; secondary to reduced vestibular closure  -RD       Aspiration After the Swallow thin liquids; nectar-thick liquids; secondary to residue; in laryngeal vestibule  -RD       Response to Penetration no response  -RD       Response to Aspiration no response, silent aspiration  -RD       Rosenbek's Scale thin:; nectar:; 8--->level 8; honey:; 2--->level 2; pudding/puree:; regular textures:; 1--->level 1  -RD       Pharyngeal Residue all consistencies tested; diffuse within pharynx; secondary to reduced base of tongue retraction; secondary to reduced posterior pharyngeal wall stripping; secondary to reduced laryngeal elevation; secondary to reduced hyolaryngeal excursion; other (see comments)  mild-mod  -RD       Response to Residue cleared residue with spontaneous subsequent swallow; cleared residue with cued swallow; other (see comments)  mostly  -RD       Attempted Compensatory Maneuvers bolus size; bolus presentation style; additional subsequent swallow; throat clear after swallow  -RD       Response to Attempted Compensatory Maneuvers prevented penetration; prevented aspiration; other (see comments)  w/ small single tsp/cup sips of honey-thick liquids  -RD       Pharyngeal Phase, Comment Moderate pharyngeal dysphagia. Penetration during the swallow to the level of the true vocal folds w/ eventual aspiration of vestibular residue w/ thin liquids 2' delay & reduced vestibular closure.  Penetration/aspiration during the swallow w/ nectar-thick liquids w/ cont'd aspiration of vestibular residue as well. Penetration during the swallow w/ large drinks of honey-thick liquids that appeared to clear upon completion of the swallow. No penetration/aspiration w/ small single tsp/cup sips of honey-thick liquids, pudding, or solids. Mild-moderate diffuse pharyngeal residue (> w/ liquids) 2' diffuse generalized pharyngeal weakness. Pt inconsistently able to clear residue w/ subsequent swallows. Aspiration during this exam was completely silent. Pt more confused this PM, so compensations were limited. Will continue to address w/ modified diet & pharyngeal strengthening during treatment.  -RD               Esophageal Phase    Esophageal Phase esophageal retention with retrograde flow below PES; other (see comments)  trace in cervical esophagus  -RD               Clinical Impression    SLP Swallowing Diagnosis mild; oral dysphagia; moderate; pharyngeal dysphagia  -RD       Functional Impact risk of aspiration/pneumonia; risk of malnutrition; risk of dehydration  -RD       Rehab Potential/Prognosis, Swallowing good, to achieve stated therapy goals  -RD       Swallow Criteria for Skilled Therapeutic Interventions Met demonstrates skilled criteria  -RD               Recommendations    Therapy Frequency (Swallow) 5 days per week  -RD       Predicted Duration Therapy Intervention (Days) until discharge  -RD       SLP Diet Recommendation mechanical soft with no mixed consistencies; honey thick liquids  -RD       Recommended Diagnostics --       Recommended Precautions and Strategies upright posture during/after eating; small bites of food and sips of liquid; no straws; general aspiration precautions; reflux precautions; other (see comments)  small single sips  -RD       Oral Care Recommendations Oral Care BID/PRN; Suction toothbrush  -RD       SLP Rec. for Method of Medication Administration meds whole; meds crushed;  with pudding or applesauce; as tolerated  -RD       Monitor for Signs of Aspiration yes; notify SLP if any concerns  -RD       Anticipated Discharge Disposition (SLP) unknown; anticipate therapy at next level of care  -RD             User Key  (r) = Recorded By, (t) = Taken By, (c) = Cosigned By    Initials Name Effective Dates    RD Nhung Miller, MS CCC-SLP 06/16/21 -                 EDUCATION  The patient has been educated in the following areas:   Dysphagia (Swallowing Impairment) Oral Care/Hydration Modified Diet Instruction.        SLP GOALS     Row Name 10/18/21 3950             Oral Nutrition/Hydration Goal 1 (SLP)    Oral Nutrition/Hydration Goal 1, SLP LTG: pt will return to regular diet & thin liquids w/o s/s of aspiration or complications w/ 100% accuracy w/o cues  -RD      Time Frame (Oral Nutrition/Hydration Goal 1, SLP) by discharge  -RD              Oral Nutrition/Hydration Goal 2 (SLP)    Oral Nutrition/Hydration Goal 2, SLP Pt will tolerate small single tsp/cup sips of honey-thick liquids & soft no mixed diet w/o s/s of aspiration or complications w/ 100% accuracy w/o cues.  -RD      Time Frame (Oral Nutrition/Hydration Goal 2, SLP) short term goal (STG)  -RD              Lingual Strengthening Goal 1 (SLP)    Activity (Lingual Strengthening Goal 1, SLP) increase lingual tone/sensation/control/coordination/movement  -RD      Increase Lingual Tone/Sensation/Control/Coordination/Movement lingual resistance exercises  -RD      [place]/Accuracy (Lingual Strengthening Goal 1, SLP) with minimal cues (75-90% accuracy)  -RD      Time Frame (Lingual Strengthening Goal 1, SLP) short term goal (STG)  -RD              Pharyngeal Strengthening Exercise Goal 1 (SLP)    Activity (Pharyngeal Strengthening Goal 1, SLP) increase timing; increase superior movement of the hyolaryngeal complex; increase anterior movement of the hyolaryngeal complex; increase closure at entrance to airway/closure of airway at  glottis; increase squeeze/positive pressure generation; increase tongue base retraction  -RD      Increase Timing prepping - 3 second prep or suck swallow or 3-step swallow  -RD      Increase Superior Movement of the Hyolaryngeal Complex falsetto  -RD      Increase Anterior Movement of the Hyolaryngeal Complex chin tuck against resistance (CTAR)  -RD      Increase Closure at Entrance to Airway/Closure of Airway at Glottis super-supraglottic swallow  -RD      Increase Squeeze/Positive Pressure Generation effortful pitch glide (falsetto + pharyngeal squeeze)  -RD      Increase Tongue Base Retraction hard effortful swallow  -RD      [place]/Accuracy (Pharyngeal Strengthening Goal 1, SLP) with minimal cues (75-90% accuracy)  -RD      Time Frame (Pharyngeal Strengthening Goal 1, SLP) short term goal (STG)  -RD              Swallow Compensatory Strategies Goal 1 (SLP)    Activity (Swallow Compensatory Strategies/Techniques Goal 1, SLP) compensatory strategies; postural techniques; during meal intake; small bites; small cup sips; other (see comments)  small single sips; no straws  -RD      [place]/Accuracy (Swallow Compensatory Strategies/Techniques Goal 1, SLP) independently (over 90% accuracy)  -RD      Time Frame (Swallow Compensatory Strategies/Techniques Goal 1, SLP) short term goal (STG)  -RD            User Key  (r) = Recorded By, (t) = Taken By, (c) = Cosigned By    Initials Name Provider Type    Nhung Mensah MS CCC-SLP Speech and Language Pathologist                   Time Calculation:    Time Calculation- SLP     Row Name 10/18/21 1655 10/18/21 1008          Time Calculation- SLP    SLP Start Time 1435  -RD 0900  -RD     SLP Received On 10/18/21  -RD 10/18/21  -RD            Untimed Charges    SLP Eval/Re-eval  ST Motion Fluoro Eval Swallow - 38853  -RD ST Eval Oral Pharyng Swallow - 52019  -RD     [id number] Eval Oral Pharyng Swallow Minutes -- 40  -RD     56376-[id number] Motion Fluoro Eval Swallow  Minutes 55  -RD --            Total Minutes    Untimed Charges Total Minutes 55  -RD 40  -RD      Total Minutes 55  -RD 40  -RD           User Key  (r) = Recorded By, (t) = Taken By, (c) = Cosigned By    Initials Name Provider Type    Nhung Mensah MS CCC-SLP Speech and Language Pathologist                Therapy Charges for Today     Code Description Service Date Service Provider Modifiers Qty    36206327285 HC ST EVAL ORAL PHARYNG SWALLOW 3 10/18/2021 Nhung Miller MS CCC-SLP GN 1    [id number] HC ST MOTION FLUORO EVAL SWALLOW 4 10/18/2021 Nhung Miller MS CCC-SLP GN 1        Patient was not wearing a face mask and did exhibit coughing during this therapy encounter.  Procedure performed was aerosolizing, involved close contact (within 6 feet for at least 15 minutes or longer), and did not involve contact with infectious secretions or specimens.  Therapist used appropriate personal protective equipment including gloves, standard procedure mask and eye protection.  Appropriate PPE was worn during the entire therapy session.  Hand hygiene was completed before and after therapy session.        MS TEGAN Davis  10/18/2021

## 2021-10-18 NOTE — CASE MANAGEMENT/SOCIAL WORK
Discharge Planning Assessment  [facility]     Patient Name: An Abreu  MRN: [id number]  Today's Date: 10/18/2021    Admit Date: 10/17/2021     Discharge Needs Assessment     Row Name 10/18/21 1109       Living Environment    Lives With child(nash), adult; grandchild(nash)    Current Living Arrangements home/apartment/condo    Primary Care Provided by self    Provides Primary Care For no one    Family Caregiver if Needed child(nash), adult    Quality of Family Relationships unable to assess    Able to Return to Prior Arrangements yes       Resource/Environmental Concerns    Resource/Environmental Concerns none       Transition Planning    Patient/Family Anticipates Transition to home with family    Patient/Family Anticipated Services at Transition ; rehabilitation services    Transportation Anticipated family or friend will provide       Discharge Needs Assessment    Equipment Currently Used at Home walker, rolling; oxygen    Concerns to be Addressed discharge planning    Anticipated Changes Related to Illness none               Discharge Plan     Row Name 10/18/21 110       Plan    Plan Home    Patient/Family in Agreement with Plan yes    Plan Comments Spoke with patient in room to initiate discharge planning.  She lives with her daughter in [facility].  Prior to admission, she used a rolling walker and required assistance with ADL's.  She also has a BiPAP and continuous home O2@3L through [employer].  She is not current with home health.  Her PCP is Angelika Butterfield.  Ms. Abreu has RX coverage and has her scripts filled at [facility].  Her plan is to return home at discharge.  No needs voiced at this time.  CM will continue to follow.  Lizzy Mejia RN [phone]    Final Discharge Disposition Code 30 - still a patient              Continued Care and Services - Admitted Since 10/17/2021    Coordination has not been started for this encounter.       Expected Discharge Date and Time      Expected Discharge Date Expected Discharge Time    Oct 22, 2021          Demographic Summary     Row Name 10/18/21 1105       General Information    Admission Type inpatient    Arrived From emergency department    Referral Source admission list    Reason for Consult discharge planning    Preferred Language English     Used During This Interaction no               Functional Status     Row Name 10/18/21 1105       Functional Status    Usual Activity Tolerance fair    Current Activity Tolerance fair       Functional Status, IADL    Medications assistive person    Meal Preparation completely dependent    Housekeeping completely dependent    Laundry completely dependent    Shopping completely dependent               Psychosocial    No documentation.                Abuse/Neglect    No documentation.                Legal    No documentation.                Substance Abuse    No documentation.                Patient Forms    No documentation.                   Patria Mejia RN

## 2021-10-18 NOTE — MBS/VFSS/FEES
Acute Care - Speech Language Pathology   Swallow Initial Evaluation [facility]   Clinical Swallow Evaluation     Patient Name: An Abreu  : 1950  MRN: [id number]  Today's Date: 10/18/2021               Admit Date: 10/17/2021    Visit Dx:     ICD-10-CM ICD-9-CM   1. Community acquired pneumonia, unspecified laterality  J18.9 486   2. Acute on chronic respiratory failure with hypoxia ([facility])  J96.21 518.84     799.02   3. Leukocytosis, unspecified type  D72.829 288.60   4. COPD exacerbation ([facility])  J44.1 491.21   5. Elevated troponin  R77.8 790.6   6. History of CHF (congestive heart failure)  Z86.79 V12.59   7. Dysphagia, unspecified type  R13.10 787.20     Patient Active Problem List   Diagnosis   • Gastroesophageal reflux disease   • Essential hypertension   • Seizure disorder on phenobarbital    • PAD s/p R iliac stent    • Paroxysmal AF   • Fecal occult blood test positive   • Tobacco abuse   • CAD s/p CABG    • Anemia   • Fall   • Humerus fracture   • Hypoglycemia   • Metabolic encephalopathy   • Iron deficiency anemia due to chronic blood loss   • Mucopurulent chronic bronchitis ([facility])   • Severe malnutrition ([facility])   • H/O: recent GI bleed   • + PFO    • H/O CVA    • Non-compliance   • Debility   • Nonrheumatic aortic valve insufficiency   • Chronic respiratory failure with hypoxia and hypercapnia ([facility])   • Community acquired pneumonia   • COPD (chronic obstructive pulmonary disease) ([facility])   • Chronic diastolic CHF (congestive heart failure) ([facility])   • Leukocytosis     Past Medical History:   Diagnosis Date   • Aneurysm ([facility])    • Angina pectoris ([facility]) 2016   • Anxiety 2016   • Arthritis 2016   • CAD (coronary artery disease)    • Carotid artery stenosis    • CHF (congestive heart failure) ([facility])    • Chronic coronary artery disease 2016 CABG LIMA to LAD, VG to OM  VG to OM occluded. LIMA patent Cx intervention and RCA 2008 stent for ISR  ISR and stenting of CX  and RCA 2016 normal MPS   • Chronic left-sided low back pain with left-sided sciatica 2/1/2017   • Chronic obstructive pulmonary disease (HCC) 6/20/2016   • Chronic respiratory failure with hypoxia (HCC) 3/27/2021   • Cobalamin deficiency 6/20/2016   • Congestive heart failure (HCC) 6/20/2016   • COPD (chronic obstructive pulmonary disease) (HCC)    • Depression 7/3/2018   • Diverticulosis    • Diverticulosis of large intestine without hemorrhage 5/5/2017   • GERD (gastroesophageal reflux disease)    • GIB (gastrointestinal bleeding)     recurrent    • HTN (hypertension)    • Hypercholesterolemia 6/20/2016   • Hyperlipidemia    • Mesenteric ischemia (HCC) 2006    s/p resection    • Mood disorder (HCC)    • Oxygen dependent     3 liters @ all times.    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/23/2021   • PVD (peripheral vascular disease) ([facility])    • Restless legs syndrome 6/20/2016   • Seizure disorder (HCC) 6/20/2016   • Seizures ([facility])    • Stenosis of carotid artery 6/20/2016   • Vertigo 9/28/2016     Past Surgical History:   Procedure Laterality Date   • APPENDECTOMY     • CHOLECYSTECTOMY     • COLONOSCOPY N/A 6/19/2021    Procedure: COLONOSCOPY;  Surgeon: Wilfredo Simon MD;  Location:  [facility];  Service: Gastroenterology;  Laterality: N/A;   • COLOSTOMY  2006    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   • ENDOSCOPY N/A 6/16/2021    Procedure: ESOPHAGOGASTRODUODENOSCOPY;  Surgeon: Jean Fuentes MD;  Location:  [facility];  Service: Gastroenterology;  Laterality: N/A;   • EXPLORATORY LAPAROTOMY      sec to ovarian cysts   • HYSTERECTOMY     • ILIAC ARTERY STENT     • REVISION / TAKEDOWN COLOSTOMY  2008       SLP Recommendation and Plan  SLP Swallowing Diagnosis: R/O pharyngeal dysphagia (10/18/21 0900)  SLP Diet Recommendation: NPO (10/18/21 0900)      SLP Rec. for Method of Medication Administration: meds whole, meds crushed, with pudding or applesauce, as tolerated (10/18/21 0900)     Monitor for Signs of Aspiration: yes, notify SLP if any concerns (10/18/21 0900)  Recommended Diagnostics: VFSS (MBS) (10/18/21 0900)  Swallow Criteria for Skilled Therapeutic Interventions Met: demonstrates skilled criteria (10/18/21 0900)  Anticipated Discharge Disposition (SLP): unknown, anticipate therapy at next level of care (10/18/21 0900)  Rehab Potential/Prognosis, Swallowing: good, to achieve stated therapy goals (10/18/21 0900)                               Plan of Care Reviewed With: patient      SWALLOW EVALUATION (last 72 hours)     SLP Adult Swallow Evaluation     Row Name 10/18/21 0900                   Rehab Evaluation    Document Type evaluation  -RD        Subjective Information complains of; pain  -RD        Patient Observations alert; cooperative; agree to therapy  -RD        Patient/Family/Caregiver Comments/Observations no family present  -RD        Care Plan Review evaluation/treatment results reviewed; care plan/treatment goals reviewed; risks/benefits reviewed; current/potential barriers reviewed; patient/other agree to care plan  -RD        Patient Effort good  -RD                  General Information    Patient Profile Reviewed yes  -RD        Pertinent History Of Current Problem Adm w/ PNA. C/o cough & SOB. Hx of GERD, tobacco abuse, COPD, CHF, incr'd WBC, HTN, sz d/o, PFO, CVA, CAD s/p CABG. Hx silent aspiration per MBS 10/30/20- recs for HTLs & Lvl 3, small sips. Consult for swallowing.  -RD        Current Method of Nutrition NPO  -RD        Precautions/Limitations, Vision WFL with corrective lenses; for purposes of eval  -RD        Precautions/Limitations, Hearing WFL; for purposes of eval  -RD        Prior Level of Function-Communication other (see comments)  hx CVA  -RD        Prior Level of Function-Swallowing other (see comments)  hx of  dysphagia post CVA  -RD        Plans/Goals Discussed with patient; agreed upon  -RD        Barriers to Rehab previous functional deficit  -RD        Patient's Goals for Discharge eat/drink without coughing/choking; return to PO diet  -RD                  Pain    Additional Documentation Pain Scale: FACES Pre/Post-Treatment (Group)  -RD                  Pain Scale: FACES Pre/Post-Treatment    Pain: FACES Scale, Pretreatment 2-->hurts little bit  -RD        Posttreatment Pain Rating 2-->hurts little bit  -RD        Pain Location - Side Bilateral  -RD        Pain Location - Orientation generalized  -RD        Pre/Posttreatment Pain Comment c/o pain in lungs, RN aware  -RD                  Oral Motor Structure and Function    Dentition Assessment upper dentures/partial in place; lower dentures/partial in place  -RD        Secretion Management WNL/WFL  -RD        Mucosal Quality dry  -RD        Volitional Swallow delayed  -RD        Volitional Cough weak  -RD                  Oral Musculature and Cranial Nerve Assessment    Oral Motor General Assessment WFL  -RD                  General Eating/Swallowing Observations    Respiratory Support Currently in Use nasal cannula  -RD        Eating/Swallowing Skills fed by SLP; self-fed; appropriate self-feeding skills observed  -RD        Positioning During Eating upright 90 degree; upright in bed  -RD        Utensils Used spoon; cup; straw  -RD        Consistencies Trialed thin liquids; nectar/syrup-thick liquids; pudding thick; regular textures  -RD                  Respiratory    Respiratory Status increase in respiratory rate; during swallowing/eating  -RD                  Clinical Swallow Eval    Oral Prep Phase WFL  -RD        Oral Residue WFL  -RD        Pharyngeal Phase suspected pharyngeal impairment  -RD                  Pharyngeal Phase Concerns    Pharyngeal Phase Concerns cough; other (see comments)  incr'd RR  -RD        Cough other (see comments)  delayed at end of  PO  -RD        Pharyngeal Phase Concerns, Comment Overt s/s of aspiration c/b incr'd WOB t/o PO trials. Pt also noted w/ delayed coughing at end of PO trials. Need to r/o pharyngeal dysphagia. Pt has a hx of silent aspiration post CVA in 2020. Pt agreeable to MBS.  -RD                  Clinical Impression    SLP Swallowing Diagnosis R/O pharyngeal dysphagia  -RD        Functional Impact risk of aspiration/pneumonia; risk of malnutrition; risk of dehydration  -RD        Rehab Potential/Prognosis, Swallowing good, to achieve stated therapy goals  -RD        Swallow Criteria for Skilled Therapeutic Interventions Met demonstrates skilled criteria  -RD                  Recommendations    SLP Diet Recommendation NPO  -RD        Recommended Diagnostics VFSS (MBS)  -RD        Oral Care Recommendations Oral Care BID/PRN  -RD        SLP Rec. for Method of Medication Administration meds whole; meds crushed; with pudding or applesauce; as tolerated  -RD        Monitor for Signs of Aspiration yes; notify SLP if any concerns  -RD        Anticipated Discharge Disposition (SLP) unknown; anticipate therapy at next level of care  -RD              User Key  (r) = Recorded By, (t) = Taken By, (c) = Cosigned By    Initials Name Effective Dates    RD Nhung Miller MS CCC-SLP 06/16/21 -                 EDUCATION  The patient has been educated in the following areas:   Dysphagia (Swallowing Impairment) Oral Care/Hydration NPO rationale.              Time Calculation:    Time Calculation- SLP     Row Name 10/18/21 1008             Time Calculation- SLP    SLP Start Time 0900  -RD      SLP Received On 10/18/21  -RD              Untimed Charges    SLP Eval/Re-eval  ST Eval Oral Pharyng Swallow - 90034  -RD      [id number] Eval Oral Pharyng Swallow Minutes 40  -RD              Total Minutes    Untimed Charges Total Minutes 40  -RD       Total Minutes 40  -RD            User Key  (r) = Recorded By, (t) = Taken By, (c) = Cosigned By     Initials Name Provider Type    RD Nhung Miller MS CCC-SLP Speech and Language Pathologist                Therapy Charges for Today     Code Description Service Date Service Provider Modifiers Qty    [id number] HC ST EVAL ORAL PHARYNG SWALLOW 3 10/18/2021 Nhung Miller MS CCC-SLP GN 1            Patient was not wearing a face mask and did exhibit coughing during this therapy encounter.  Procedure performed was aerosolizing, involved close contact (within 6 feet for at least 15 minutes or longer), and did not involve contact with infectious secretions or specimens.  Therapist used appropriate personal protective equipment including gloves, standard procedure mask and eye protection.  Appropriate PPE was worn during the entire therapy session.  Hand hygiene was completed before and after therapy session.     Nhung Miller MS CCC-SLP  10/18/2021

## 2021-10-18 NOTE — DISCHARGE INSTR - DIET
MBS/VFSS 10/18/21  Reason for Referral  Patient was referred for a MBS to assess the efficiency of his/her swallow function, rule out aspiration and make recommendations regarding safe dietary consistencies, effective compensatory strategies, and safe eating environment.             Recommendations/Treatment  SLP Swallowing Diagnosis: mild, oral dysphagia, moderate, pharyngeal dysphagia (10/18/21 1435)  Functional Impact: risk of aspiration/pneumonia, risk of malnutrition, risk of dehydration (10/18/21 1435)  Rehab Potential/Prognosis, Swallowing: good, to achieve stated therapy goals (10/18/21 1435)  Swallow Criteria for Skilled Therapeutic Interventions Met: demonstrates skilled criteria (10/18/21 1435)  Therapy Frequency (Swallow): 5 days per week (10/18/21 1435)  Predicted Duration Therapy Intervention (Days): until discharge (10/18/21 1435)  SLP Diet Recommendation: mechanical soft with no mixed consistencies, honey thick liquids (10/18/21 1435)  Recommended Diagnostics: VFSS (MBS) (10/18/21 0900)  Recommended Precautions and Strategies: upright posture during/after eating, small bites of food and sips of liquid, general aspiration precautions, reflux precautions, other (see comments) (small single sips) (10/18/21 1435)  SLP Rec. for Method of Medication Administration: meds whole, meds crushed, with pudding or applesauce, as tolerated (10/18/21 1435)  Monitor for Signs of Aspiration: yes, notify SLP if any concerns (10/18/21 1435)  Anticipated Discharge Disposition (SLP): unknown, anticipate therapy at next level of care (10/18/21 1435)    Instrumental Set-up  Utensils Used: spoon, cup, straw (10/18/21 1435)  Consistencies Trialed: thin liquids, nectar/syrup-thick liquids, honey-thick liquids, pudding thick, regular textures (10/18/21 1435)    Oral Preparation/ Oral Phase  Oral Prep Phase: impaired oral phase of swallowing (10/18/21 1435)  Oral Transit Phase: impaired (10/18/21 1435)  Oral Residue: impaired  (10/18/21 1435)  Oral Preparatory Phase: prolonged manipulation (10/18/21 1435)  Prolonged Manipulation: regular textures, secondary to reduced lingual strength (10/18/21 1435)  Impaired Oral Transit Phase: increased A-P transit time (10/18/21 1435)  Impaired Oral Residue: lingual residue (10/18/21 1435)  Lingual Residue: all consistencies tested, secondary to reduced lingual strength (10/18/21 1435)  Response to Oral Residue: unable to clear residue (10/18/21 1435)    Pharyngeal Phase  Initiation of Pharyngeal Swallow: bolus in pyriform sinuses (10/18/21 1435)  Pharyngeal Phase: impaired pharyngeal phase of swallowing (10/18/21 1435)  Penetration During the Swallow: thin liquids, nectar-thick liquids, honey-thick liquids, secondary to delayed swallow initiation or mistiming, secondary to reduced vestibular closure (10/18/21 1435)  Aspiration During the Swallow: nectar-thick liquids, secondary to delayed swallow initiation or mistiming, secondary to reduced vestibular closure (10/18/21 1435)  Aspiration After the Swallow: thin liquids, nectar-thick liquids, secondary to residue, in laryngeal vestibule (10/18/21 1435)  Response to Penetration: no response (10/18/21 1435)  Response to Aspiration: no response, silent aspiration (10/18/21 1435)  Pharyngeal Residue: all consistencies tested, diffuse within pharynx, secondary to reduced base of tongue retraction, secondary to reduced posterior pharyngeal wall stripping, secondary to reduced laryngeal elevation, secondary to reduced hyolaryngeal excursion, other (see comments) (mild-mod) (10/18/21 1435)  Response to Residue: cleared residue with spontaneous subsequent swallow, cleared residue with cued swallow, other (see comments) (mostly) (10/18/21 1435)  Attempted Compensatory Maneuvers: bolus size, bolus presentation style, additional subsequent swallow, throat clear after swallow (10/18/21 1435)  Response to Attempted Compensatory Maneuvers: prevented penetration,  prevented aspiration, other (see comments) (w/ small single tsp/cup sips of honey-thick liquids) (10/18/21 1435)  Pharyngeal Phase, Comment: Moderate pharyngeal dysphagia. Penetration during the swallow to the level of the true vocal folds w/ eventual aspiration of vestibular residue w/ thin liquids 2' delay & reduced vestibular closure. Penetration/aspiration during the swallow w/ nectar-thick liquids w/ cont'd aspiration of vestibular residue as well. Penetration during the swallow w/ large drinks of honey-thick liquids that appeared to clear upon completion of the swallow. No penetration/aspiration w/ small single tsp/cup sips of honey-thick liquids, pudding, or solids. Mild-moderate diffuse pharyngeal residue (> w/ liquids) 2' diffuse generalized pharyngeal weakness. Pt inconsistently able to clear residue w/ subsequent swallows. Aspiration during this exam was completely silent. Pt more confused this PM, so compensations were limited. Will continue to address w/ modified diet & pharyngeal strengthening during treatment. (10/18/21 1435)    Cervical Esophageal Phase  Esophageal Phase: esophageal retention with retrograde flow below PES, other (see comments) (trace in cervical esophagus) (10/18/21 1435)

## 2021-10-18 NOTE — H&P
[facility]  HISTORY AND PHYSICAL    Patient Name: An Abreu  : 1950  MRN: [id number]  Primary Care Physician: Angelika Butterfield MD  Date of admission: 10/17/2021    Subjective   Subjective     Chief Complaint:  Cough, Shortness of breath    HPI:  An Abreu is a 71 y.o. female with a history of COPD, current smoker, Coronary Artery Disease s/p CABG in , Hypertension, Diastolic CHF, and paroxysmal atrial Fib on Plavix who presents to [facility] ED today for complaint of a productive cough and shortness of breath. She states this has been going on for the last 4 days. Cough productive of green sputum. States she has felt like she has had a fever but did not check it. She states she was feeling somewhat worse today, so she called EMS. Denies chest pain, palpitations, nausea, vomiting, or diarrhea. She has known COPD and states she gets these symptoms quite often. EMS noted audible wheezing and hypoxia with an O2 sat in the mid 80's on arrival. Smokes about a half pack a day. Has not received the Covid vaccine. She wears oxygen at night at home.       COVID Details:        Symptoms: [] NONE [x] Fever [x]  Cough [x] Shortness of breath [] Change in taste or smell  The patient has started, but not completed, their COVID-19 vaccination series.    Review of Systems   Constitutional: Positive for chills, fatigue and fever. Negative for diaphoresis.   HENT: Negative.  Negative for ear pain, sinus pressure, sore throat and trouble swallowing.    Eyes: Negative.  Negative for photophobia and visual disturbance.   Respiratory: Positive for cough, shortness of breath and wheezing.    Cardiovascular: Negative.  Negative for chest pain, palpitations and leg swelling.   Gastrointestinal: Negative.  Negative for abdominal pain, diarrhea, nausea and vomiting.   Genitourinary: Negative.  Negative for dysuria and hematuria.   Musculoskeletal: Positive for  arthralgias and myalgias. Joint swelling: Generalized. Chronic.   Skin: Negative.    Neurological: Negative.  Negative for dizziness, syncope, weakness and headaches.   Psychiatric/Behavioral: Negative.         All other systems reviewed and are negative.     Personal History     Past Medical History:   Diagnosis Date   • Aneurysm ([facility])    • Angina pectoris (HCC) 6/20/2016   • Anxiety 6/20/2016   • Arthritis 6/20/2016   • CAD (coronary artery disease)    • Carotid artery stenosis    • CHF (congestive heart failure) ([facility])    • Chronic coronary artery disease 6/20/2016 2003 CABG LIMA to LAD, VG to OM 2004 VG to OM occluded. LIMA patent Cx intervention and RCA 2008 stent for ISR 2013 ISR and stenting of CX and RCA 2016 normal MPS   • Chronic left-sided low back pain with left-sided sciatica 2/1/2017   • Chronic obstructive pulmonary disease ([facility]) 6/20/2016   • Chronic respiratory failure with hypoxia ([facility]) 3/27/2021   • Cobalamin deficiency 6/20/2016   • Congestive heart failure ([facility]) 6/20/2016   • COPD (chronic obstructive pulmonary disease) ([facility])    • Depression 7/3/2018   • Diverticulosis    • Diverticulosis of large intestine without hemorrhage 5/5/2017   • GERD (gastroesophageal reflux disease)    • GIB (gastrointestinal bleeding)     recurrent    • HTN (hypertension)    • Hypercholesterolemia 6/20/2016   • Hyperlipidemia    • Mesenteric ischemia ([facility]) 2006    s/p resection    • Mood disorder ([facility])    • Oxygen dependent     3 liters @ all times.    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 ([facility]) 5/23/2021   • PVD (peripheral vascular disease) ([facility])    • Restless legs syndrome 6/20/2016   • Seizure disorder ([facility]) 6/20/2016   • Seizures ([facility])    • Stenosis of carotid artery 6/20/2016   • Vertigo 9/28/2016       Past Surgical History:   Procedure Laterality Date    • APPENDECTOMY     • CHOLECYSTECTOMY     • COLONOSCOPY N/A 6/19/2021    Procedure: COLONOSCOPY;  Surgeon: Wilfredo Simon MD;  Location:  [facility];  Service: Gastroenterology;  Laterality: N/A;   • COLOSTOMY  2006    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   • ENDOSCOPY N/A 6/16/2021    Procedure: ESOPHAGOGASTRODUODENOSCOPY;  Surgeon: Jean Fuentes MD;  Location:  [facility];  Service: Gastroenterology;  Laterality: N/A;   • EXPLORATORY LAPAROTOMY      sec to ovarian cysts   • HYSTERECTOMY     • ILIAC ARTERY STENT     • REVISION / TAKEDOWN COLOSTOMY  2008       Family History:  family history includes Arthritis in her mother; Cancer in her mother; Heart attack in her father; Heart disease in her brother, child, and child; Hyperlipidemia in her father; Hypertension in her father; Stroke in her father. Otherwise pertinent FHx was reviewed and unremarkable.     Social History:  reports that she has been smoking cigarettes and electronic cigarette. She has a 40.00 pack-year smoking history. She has never used smokeless tobacco. She reports that she does not drink alcohol and does not use drugs.  Social History     Social History Narrative    Lives w/ her daughter. Ambulates w/ a walker. Independent w/ most ADLs.        Medications:  Fluticasone-Umeclidin-Vilant, HYDROcodone-acetaminophen, Insulin Syringe-Needle U-100, LORazepam, PHENobarbital, Syringe 2-3 ML, albuterol, albuterol sulfate HFA, clopidogrel, dilTIAZem CD, esomeprazole, furosemide, ipratropium-albuterol, lidocaine, nicotine, promethazine, rOPINIRole, and rosuvastatin    Allergies   Allergen Reactions   • Keflex [Cephalexin] Shortness Of Breath and Rash     Patients power of  states patient had to be taken to ER due to reaction.    Tolerated Rocephin 2/19/21, zosyn 5/2021   • Sulfamethoxazole-Trimethoprim Shortness Of Breath and Rash     Patients power of  stated patient had to be taken to ER due to reaction.   • Carbamazepine    •  Codeine    • Latex Rash       Objective   Objective     Vital Signs:   Temp:  [98.8 °F (37.1 °C)] 98.8 °F (37.1 °C)  Heart Rate:  [67-79] 71  Resp:  [20-22] 22  BP: (136-176)/(58-82) 176/82  Flow (L/min):  [3-4] 4    Physical Exam  Constitutional:       General: She is not in acute distress.     Appearance: Normal appearance.   HENT:      Head: Atraumatic.      Right Ear: External ear normal.      Left Ear: External ear normal.      Nose: Nose normal.   Eyes:      Extraocular Movements: Extraocular movements intact.      Conjunctiva/sclera: Conjunctivae normal.      Pupils: Pupils are equal, round, and reactive to light.   Cardiovascular:      Rate and Rhythm: Normal rate and regular rhythm.      Pulses: Normal pulses.      Heart sounds: Normal heart sounds. No murmur heard.      Pulmonary:      Comments: Diffuse rales and wheeze noted throughout lung field bilaterally. Breathing unlabored.   Abdominal:      General: Bowel sounds are normal. There is no distension.      Tenderness: There is no abdominal tenderness. There is no guarding or rebound.   Musculoskeletal:         General: Normal range of motion.      Cervical back: No rigidity.   Skin:     General: Skin is warm and dry.      Coloration: Skin is not jaundiced.      Findings: No lesion or rash.   Neurological:      General: No focal deficit present.      Mental Status: She is alert and oriented to person, place, and time.   Psychiatric:         Attention and Perception: Attention normal.         Mood and Affect: Mood normal.         Behavior: Behavior normal.         Thought Content: Thought content normal.            Result Review:  I have personally reviewed the results from the time of this admission to 10/17/21 9:42 PM EDT and agree with these findings:  [x]  Laboratory  [x]  Microbiology  [x]  Radiology  [x]  EKG/Telemetry   []  Cardiology/Vascular   []  Pathology  []  Old records  []  Other:        LAB RESULTS:      Lab 10/17/21  1810   WBC 13.70*    HEMOGLOBIN 13.9   HEMATOCRIT 42.6   PLATELETS 246   NEUTROS ABS 12.50*   IMMATURE GRANS (ABS) 0.05   LYMPHS ABS 0.68*   MONOS ABS 0.42   EOS ABS 0.03   MCV 94.7   CRP 20.19*   PROCALCITONIN 0.10   LACTATE 1.2   PROTIME 13.5   INR 1.07         Lab 10/17/21  1810   SODIUM 133*   POTASSIUM 3.5   CHLORIDE 91*   CO2 28.0   ANION GAP 14.0   BUN 12   CREATININE 0.58   GLUCOSE 84   CALCIUM 9.3         Lab 10/17/21  1810   TOTAL PROTEIN 7.4   ALBUMIN 3.90   GLOBULIN 3.5   ALT (SGPT) 24   AST (SGOT) 39*   BILIRUBIN 0.3   ALK PHOS 189*         Lab 10/17/21  1810   PROBNP 12,091.0*   TROPONIN T 0.387*             Lab 10/17/21  1810   FERRITIN 75.99         UA    Urinalysis 5/22/21 5/22/21 5/22/21 6/15/21 7/1/21 7/1/21    1823 1823 1823  1110 1110   Squamous Epithelial Cells, UA   None Seen   0-2   Specific [place], UA 1.013 1.013  >=1.030 1.015    Ketones, UA Negative Negative  Negative Negative    Blood, UA Negative Negative  Negative Negative    Leukocytes, UA Negative Negative  Negative Moderate (2+) (A)    Nitrite, UA Negative Negative  Negative Positive (A)    RBC, UA   0-2   0-2   WBC, UA   None Seen   Too Numerous to Count (A)   Bacteria, UA   None Seen   4+ (A)   (A) Abnormal value            Microbiology Results (last 10 days)     Procedure Component Value - Date/Time    COVID-19 and FLU A/B PCR - Swab, Nasopharynx [[id number]]  (Normal) Collected: 10/17/21 1725    Lab Status: Final result Specimen: Swab from Nasopharynx Updated: 10/17/21 1911     COVID19 Not Detected     Influenza A PCR Not Detected     Influenza B PCR Not Detected    Narrative:      Fact sheet for providers: https://www.fda.gov/media/520505/download    Fact sheet for patients: https://www.fda.gov/media/013026/download    Test performed by PCR.          CT Angiogram Chest With & Without Contrast    Result Date: 10/17/2021  CT ANGIOGRAM CHEST W WO CONTRAST INDICATION: Hypoxia with pneumonia TECHNIQUE: CT angiogram of the chest with 100 cc of  Isovue-300 IV contrast. 3-D reconstructions were obtained and reviewed.   Radiation dose reduction techniques included automated exposure control or exposure modulation based on body size. Count of known CT and cardiac nuc med studies performed in previous 12 months: 2. COMPARISON: 5/22/2021 FINDINGS: Chest images of mediastinal and pulmonary emboli. The central pulmonary arteries are dilated consistent with chronic pulmonary hypertension. No adenopathy or effusions. Chest images at lung windows show patchy bilateral infiltrates predominantly in the lingula and right middle lobe. Dense consolidation in the left lower lobe seen on the previous CT has resolved. Imaging features can be seen with COVID-19 pneumonia, although are nonspecific and can can occur with a variety of infectious and noninfectious processes.     Impression: Chronic pulmonary hypertension. No evidence of pulmonary embolism. Bilateral multifocal pneumonia, mild-to-moderate in severity and most prominent in the left upper and right middle lobes. Signer Name: Nabil Vidales MD  Signed: 10/17/2021 8:35 PM  Workstation Name: [id number]  [facility]    XR Chest 1 View    Result Date: 10/17/2021  EXAMINATION: XR CHEST 1 VW - 10/17/2021  INDICATION: Shortness of breath.  COMPARISON: 10/12/2021  FINDINGS: Sternotomy wires noted. Heart and vasculature are normal in size. There is extensive left mid and lower lung multifocal interstitial disease and milder right basilar disease, with groundglass appearance, suspicious for Covid 19 pneumonia. No effusion or pneumothorax is seen. Incidental note is made of patient's older irregularly healed left clavicle fracture and left proximal humerus fracture. No acute bony abnormality is appreciated.      Impression: Interval development of bilateral pneumonia left greater than right, suspicious for Covid 19.  DICTATED:   10/17/2021 EDITED/lfs:   10/17/2021          Results for orders placed  during the hospital encounter of 05/03/21    Adult Transthoracic Echo Complete W/ Cont if Necessary Per Protocol    Interpretation Summary  · Estimated left ventricular EF = 70%  · Moderate aortic valve regurgitation is present.      Assessment/Plan   Assessment & Plan       Gastroesophageal reflux disease    Paroxysmal AF    Tobacco abuse    CAD s/p CABG     Community acquired pneumonia    COPD (chronic obstructive pulmonary disease) (HCC)    Chronic diastolic CHF (congestive heart failure) ([facility])    Leukocytosis    Bilateral Pneumonia (favor bacterial)  COPD exacerbation  Chronic hypoxic resp failure (chronic 3Lnc)  Leukocytosis  -partially vaccinated  - CXR w/ bilateral patchy infiltrates  -CT angio chest no pe, bilateral patchy pneumonia (most prominent left upper and right middle), not typical appearance of covid pneumonia  -covid 19 pcr negative; -respiratory viral pcr ordered & pending  -azactam & doxycycline day # 1 empiric antibiotics  -received solumedrol 125mg iv in ed; start prednisone 20mg daily tomorrow (small stature, markedly improved now)  -symbicort bid, duonebs scheduled & prn  - SLP to see in the am      CAD (previous cabg)  Paroxysmal Atrial Fib  A/C DHF  Moderate Aortic Insuffiency (last echo 5/2021 w/ normal ef)  Mildly elevated troponin, chronically elevated  -continue cardizem; currently in sinus rhythm  -currently on Plavix, continue  -not on anticoagulation due to frequent falls, anemia, previous gi bleed  -lasix 40mg iv once (continue once daily po lasix)  -trend troponins, appears chronically elevated; ekg unchanged, No chest pain (if trending upwards consider echo)    GERD  Hx previous gi bleed  - Continue home Nexium    Hyperlipidemia  -continue crestor    Tobacco Abuse  - An Abreu  reports that she has been smoking cigarettes and electronic cigarette. She has a 40.00 pack-year smoking history. She has never used smokeless tobacco.. I have educated her on the risk of diseases  from using tobacco products such as cancer, COPD and heart disease.   I advised her to quit and she is not willing to quit.  I spent 4 minutes counseling the patient.       DVT prophylaxis:  Plavix, SCDS      CODE STATUS:  Full        This note has been completed as part of a split-shared workflow.   Signature: Electronically signed by Mary Crabtree PA-C, 10/17/21, 9:42 PM EDT      Attending   Admission Attestation       I have seen and examined the patient, performing an independent face-to-face diagnostic evaluation with plan of care reviewed and developed with the advanced practice clinician (APC).      Brief Summary Statement:   An Abreu is a 71 y.o. female w/ hx o2 dep copd, ongoing tobacco abuse, parox afib (no anticoagulation due to falls, anemia, previous gi bleed), cad, chronic dhf, moderate aortic regurgitation who presented w/worsening dyspnea, cough and yellow sputum x 3-4 days. Per discussion w/ ED provider initialy oxygen sats were in the lower 80's. Cxr showed patchy bilateral infiltrates. Troponin 0.3 but noted to be chronically elevated. No chest pain and ekg unchanged. probnp >12,000. Given iv solumedrol and nebs in the ED and admitted to hostpialist service  Remainder of detailed HPI is as noted by APC and has been reviewed and/or edited by me for completeness.    Attending Physical Exam:  Constitutional:Alert, oriented x 3, appears older than age, frail, nontoxic, frequent cough productive yellow sputum  Psych:Normal/appropriate affect  HEENT:NCAT, oropharynx clear  Neck: neck supple, full range of motion  Neuro: Face symmetric, speech clear, equal , moves all extremities  Cardiac: RRR; No pretibial pitting edema  Resp: CTAB, normal effort  GI: abd soft, nontender  Skin: No extremity rash  Musculoskeletal/extremities: no cyanosis of extremities; no significant ankle edema            Brief Assessment/Plan :  See detailed assessment and plan developed with APC which I have  reviewed and/or edited for completeness.        Admission Status: I believe that this patient meets inpatient status due to need for supplemental oxygen, steroids, and iv diuresis w/ expected stay > 2 midnights      Vikram Coronado MD  10/17/21

## 2021-10-18 NOTE — PROGRESS NOTES
[facility] Medicine Services  PROGRESS NOTE    Patient Name: An Abreu  : 1950  MRN: [id number]    Date of Admission: 10/17/2021  Primary Care Physician: Angelika Buttrefield MD    Subjective   Subjective     CC:  COPD/PNA    HPI:  Appears uncomfortable, lying on her side as she reports that makes her feel somewhat better. On 5L, home baseline 3L    ROS:  Gen- No fevers, chills  CV- No chest pain, palpitations  Resp- No cough, + dyspnea  GI- No N/V/D, abd pain        Objective   Objective     Vital Signs:   Temp:  [98.3 °F (36.8 °C)-98.9 °F (37.2 °C)] 98.3 °F (36.8 °C)  Heart Rate:  [61-79] 66  Resp:  [18-22] 22  BP: (129-176)/(47-82) 130/56  Flow (L/min):  [3-5] 5     Physical Exam:  GEN-appears frail, chronically ill   HEENT- atraumatic, normocephalic, eomi  NECK- supple, trachea midline, no masses  RESP effort diminished and labored, wheezing, on 5L  CV: no murmurs, s1/s2, rrr  MSK: no edema noted, spontaneous movement of all extremities  NEURO: alert, oriented, no focal deficits  SKIN: no rashes  PSYCH: appropriate mood and affect       Results Reviewed:  LAB RESULTS:      Lab 10/18/21  0644 10/17/21  1810   WBC 13.74* 13.70*   HEMOGLOBIN 13.4 13.9   HEMATOCRIT 40.3 42.6   PLATELETS 235 246   NEUTROS ABS 11.72* 12.50*   IMMATURE GRANS (ABS) 0.06* 0.05   LYMPHS ABS 1.31 0.68*   MONOS ABS 0.63 0.42   EOS ABS 0.00 0.03   MCV 93.9 94.7   CRP  --  20.19*   PROCALCITONIN  --  0.10   LACTATE  --  1.2   PROTIME  --  13.5         Lab 10/18/21  0644 10/17/21  1810   SODIUM 135* 133*   POTASSIUM 3.4* 3.5   CHLORIDE 92* 91*   CO2 29.0 28.0   ANION GAP 14.0 14.0   BUN 15 12   CREATININE 0.58 0.58   GLUCOSE 82 84   CALCIUM 8.8 9.3         Lab 10/17/21  1810   TOTAL PROTEIN 7.4   ALBUMIN 3.90   GLOBULIN 3.5   ALT (SGPT) 24   AST (SGOT) 39*   BILIRUBIN 0.3   ALK PHOS 189*         Lab 10/18/21  0644 10/17/21  1810   PROBNP  --  12,091.0*   TROPONIN T 0.376* 0.387*   PROTIME  --  13.5   INR   --  1.07         Lab 10/18/21  0644   CHOLESTEROL 211*   LDL CHOL 138*   HDL CHOL 52   TRIGLYCERIDES 117         Lab 10/17/21  1810   FERRITIN 75.99         Brief Urine Lab Results  (Last result in the past 365 days)      Color   Clarity   Blood   Leuk Est   Nitrite   Protein   CREAT   Urine HCG        10/17/21 2101 Yellow   Clear   Negative   Negative   Negative   30 mg/dL (1+)                 Microbiology Results Abnormal     Procedure Component Value - Date/Time    COVID PRE-OP / PRE-PROCEDURE SCREENING ORDER (NO ISOLATION) - Swab, Nasopharynx [[id number]]  (Normal) Collected: 10/17/21 2355    Lab Status: Final result Specimen: Swab from Nasopharynx Updated: 10/18/21 0049    Narrative:      The following orders were created for panel order COVID PRE-OP / PRE-PROCEDURE SCREENING ORDER (NO ISOLATION) - Swab, Nasopharynx.  Procedure                               Abnormality         Status                     ---------                               -----------         ------                     Respiratory Panel PCR w/...[[id number]]  Normal              Final result                 Please view results for these tests on the individual orders.    Respiratory Panel PCR w/COVID-19(SARS-CoV-2) RYNE/AMA/SYLVIA/PAD/COR/MAD/BLANCA In-House, NP Swab in UTM/VTM, 3-4 HR TAT - Swab, Nasopharynx [[id number]]  (Normal) Collected: 10/17/21 2355    Lab Status: Final result Specimen: Swab from Nasopharynx Updated: 10/18/21 0049     ADENOVIRUS, PCR Not Detected     Coronavirus 229E Not Detected     Coronavirus HKU1 Not Detected     Coronavirus NL63 Not Detected     Coronavirus OC43 Not Detected     COVID19 Not Detected     Human Metapneumovirus Not Detected     Human Rhinovirus/Enterovirus Not Detected     Influenza A PCR Not Detected     Influenza B PCR Not Detected     Parainfluenza Virus 1 Not Detected     Parainfluenza Virus 2 Not Detected     Parainfluenza Virus 3 Not Detected     Parainfluenza Virus 4 Not Detected     RSV, PCR Not  Detected     Bordetella pertussis pcr Not Detected     Bordetella parapertussis PCR Not Detected     Chlamydophila pneumoniae PCR Not Detected     Mycoplasma pneumo by PCR Not Detected    Narrative:      In the setting of a positive respiratory panel with a viral infection PLUS a negative procalcitonin without other underlying concern for bacterial infection, consider observing off antibiotics or discontinuation of antibiotics and continue supportive care. If the respiratory panel is positive for atypical bacterial infection (Bordetella pertussis, Chlamydophila pneumoniae, or Mycoplasma pneumoniae), consider antibiotic de-escalation to target atypical bacterial infection.    COVID-19 and FLU A/B PCR - Swab, Nasopharynx [[id number]]  (Normal) Collected: 10/17/21 1725    Lab Status: Final result Specimen: Swab from Nasopharynx Updated: 10/17/21 1911     COVID19 Not Detected     Influenza A PCR Not Detected     Influenza B PCR Not Detected    Narrative:      Fact sheet for providers: https://www.fda.gov/media/271947/download    Fact sheet for patients: https://www.fda.gov/media/339931/download    Test performed by PCR.          CT Angiogram Chest With & Without Contrast    Result Date: 10/17/2021  CT ANGIOGRAM CHEST W WO CONTRAST INDICATION: Hypoxia with pneumonia TECHNIQUE: CT angiogram of the chest with 100 cc of Isovue-300 IV contrast. 3-D reconstructions were obtained and reviewed.   Radiation dose reduction techniques included automated exposure control or exposure modulation based on body size. Count of known CT and cardiac nuc med studies performed in previous 12 months: 2. COMPARISON: 5/22/2021 FINDINGS: Chest images of mediastinal and pulmonary emboli. The central pulmonary arteries are dilated consistent with chronic pulmonary hypertension. No adenopathy or effusions. Chest images at lung windows show patchy bilateral infiltrates predominantly in the lingula and right middle lobe. Dense consolidation in the  left lower lobe seen on the previous CT has resolved. Imaging features can be seen with COVID-19 pneumonia, although are nonspecific and can can occur with a variety of infectious and noninfectious processes.     Impression: Chronic pulmonary hypertension. No evidence of pulmonary embolism. Bilateral multifocal pneumonia, mild-to-moderate in severity and most prominent in the left upper and right middle lobes. Signer Name: Nabil Vidales MD  Signed: 10/17/2021 8:35 PM  Workstation Name: [id number]  [facility]    XR Chest 1 View    Result Date: 10/18/2021  EXAMINATION: XR CHEST 1 VW - 10/17/2021  INDICATION: Shortness of breath.  COMPARISON: 10/12/2021  FINDINGS: Sternotomy wires noted. Heart and vasculature are normal in size. There is extensive left mid and lower lung multifocal interstitial disease and milder right basilar disease, with groundglass appearance, suspicious for Covid 19 pneumonia. No effusion or pneumothorax is seen. Incidental note is made of patient's older irregularly healed left clavicle fracture and left proximal humerus fracture. No acute bony abnormality is appreciated.      Impression: Interval development of bilateral pneumonia left greater than right, suspicious for Covid 19.  DICTATED:   10/17/2021 EDITED/lfs:   10/17/2021    This report was finalized on 10/18/2021 9:32 AM by Dr. Tre Huerta MD.        Results for orders placed during the hospital encounter of 05/03/21    Adult Transthoracic Echo Complete W/ Cont if Necessary Per Protocol    Interpretation Summary  · Estimated left ventricular EF = 70%  · Moderate aortic valve regurgitation is present.      I have reviewed the medications:  Scheduled Meds:aztreonam, 2 g, Intravenous, Q8H  budesonide-formoterol, 2 puff, Inhalation, BID - RT  clopidogrel, 75 mg, Oral, Daily  dilTIAZem CD, 240 mg, Oral, Daily  doxycycline, 100 mg, Oral, Q12H  furosemide, 40 mg, Oral, Daily  nicotine, 1 patch, Transdermal,  Q24H  pantoprazole, 40 mg, Oral, Q AM  pharmacy consult - MTM, , Does not apply, Daily  predniSONE, 20 mg, Oral, Daily With Breakfast  rOPINIRole, 0.25 mg, Oral, Nightly  rosuvastatin, 40 mg, Oral, Nightly  sodium chloride, 10 mL, Intravenous, Q12H      Continuous Infusions:Pharmacy Consult - Pharmacy to dose,       PRN Meds:.•  albuterol  •  HYDROcodone-acetaminophen  •  ipratropium-albuterol  •  ondansetron **OR** ondansetron  •  Pharmacy Consult - Pharmacy to dose  •  potassium chloride **OR** potassium chloride **OR** potassium chloride  •  sodium chloride    Assessment/Plan   Assessment & Plan     Active Hospital Problems    Diagnosis  POA   • Community acquired pneumonia [J18.9]  Yes   • COPD (chronic obstructive pulmonary disease) ([facility]) [J44.9]  Yes   • Chronic diastolic CHF (congestive heart failure) ([facility]) [I50.32]  Yes   • Leukocytosis [D72.829]  Unknown   • CAD s/p CABG  [I25.10]  Yes   • Tobacco abuse [Z72.0]  Yes   • Paroxysmal AF [I48.0]  Yes   • Gastroesophageal reflux disease [K21.9]  Yes      Resolved Hospital Problems   No resolved problems to display.        Brief Hospital Course to date:  An Abreu is a 71 y.o. female with a history of COPD, current smoker, Coronary Artery Disease s/p CABG in 2006, Hypertension, Diastolic CHF, and paroxysmal atrial Fib on Plavix who [facility] for complaint of a productive cough and shortness of breath.     This patient's problems and plans were partially entered by my partner and updated as appropriate by me 10/18/21.        Bilateral Pneumonia (favor bacterial)  COPD exacerbation  Chronic hypoxic resp failure (chronic 3Lnc)  Leukocytosis  -partially vaccinated but covid neg x2  - CXR w/ bilateral patchy infiltrates  -CT angio chest no pe, bilateral patchy pneumonia (most prominent left upper and right middle), not typical appearance of covid pneumonia  -covid 19 pcr negative; resp viral panel negative   -azactam & doxycycline day #  2 empiric antibiotics  -received solumedrol 125mg iv in ed; currently on pred 20- continue for now  -symbicort bid, duonebs scheduled & prn  - SLP to see in the am        CAD (previous cabg)  Paroxysmal Atrial Fib  A/C DHF  Moderate Aortic Insuffiency (last echo 5/2021 w/ normal ef)  Mildly elevated troponin, chronically elevated  -continue cardizem; currently in sinus rhythm  -currently on Plavix, continue  -not on anticoagulation due to frequent falls, anemia, previous gi bleed  -lasix 40mg iv once (continue once daily po lasix)  -trend troponins, appears chronically elevated; ekg unchanged, No chest pain (if trending upwards consider echo)     GERD  Hx previous gi bleed  - Continue home Nexium     Hyperlipidemia  -continue crestor     Tobacco Abuse  - An Abreu  reports that she has been smoking cigarettes and electronic cigarette. She has a 40.00 pack-year smoking history. She has never used smokeless tobacco..She continues to smoke and I have again counseled her on risks including hospitalizations related to lung dz. She reports that she cannot afford the patch          DVT prophylaxis:  Mechanical DVT prophylaxis orders are present.       AM-PAC 6 Clicks Score (PT): 20 (10/17/21 2200)    Disposition: I expect the patient to be discharged pending improvement    CODE STATUS:   Code Status and Medical Interventions:   Ordered at: 10/17/21 2215     Code Status:    CPR     Medical Interventions (Level of Support Prior to Arrest):    Full       Lilly Ceballos MD  10/18/21

## 2021-10-18 NOTE — THERAPY EVALUATION
Acute Care - Speech Language Pathology   Swallow Initial Evaluation [facility]   Clinical Swallow Evaluation     Patient Name: An Abreu  : 1950  MRN: [id number]  Today's Date: 10/18/2021               Admit Date: 10/17/2021    Visit Dx:     ICD-10-CM ICD-9-CM   1. Community acquired pneumonia, unspecified laterality  J18.9 486   2. Acute on chronic respiratory failure with hypoxia ([facility])  J96.21 518.84     799.02   3. Leukocytosis, unspecified type  D72.829 288.60   4. COPD exacerbation ([facility])  J44.1 491.21   5. Elevated troponin  R77.8 790.6   6. History of CHF (congestive heart failure)  Z86.79 V12.59   7. Oropharyngeal dysphagia  R13.12 787.22     Patient Active Problem List   Diagnosis   • Gastroesophageal reflux disease   • Essential hypertension   • Seizure disorder on phenobarbital    • PAD s/p R iliac stent    • Paroxysmal AF   • Fecal occult blood test positive   • Tobacco abuse   • CAD s/p CABG    • Anemia   • Fall   • Humerus fracture   • Hypoglycemia   • Metabolic encephalopathy   • Iron deficiency anemia due to chronic blood loss   • Mucopurulent chronic bronchitis ([facility])   • Severe malnutrition ([facility])   • H/O: recent GI bleed   • + PFO    • H/O CVA    • Non-compliance   • Debility   • Nonrheumatic aortic valve insufficiency   • Chronic respiratory failure with hypoxia and hypercapnia ([facility])   • Community acquired pneumonia   • COPD (chronic obstructive pulmonary disease) ([facility])   • Chronic diastolic CHF (congestive heart failure) ([facility])   • Leukocytosis     Past Medical History:   Diagnosis Date   • Aneurysm ([facility])    • Angina pectoris ([facility]) 2016   • Anxiety 2016   • Arthritis 2016   • CAD (coronary artery disease)    • Carotid artery stenosis    • CHF (congestive heart failure) ([facility])    • Chronic coronary artery disease 2016 CABG LIMA to LAD, VG to OM  VG to OM occluded. LIMA patent Cx intervention and RCA 2008 stent for ISR  ISR and stenting of CX and  RCA 2016 normal MPS   • Chronic left-sided low back pain with left-sided sciatica 2/1/2017   • Chronic obstructive pulmonary disease (HCC) 6/20/2016   • Chronic respiratory failure with hypoxia (HCC) 3/27/2021   • Cobalamin deficiency 6/20/2016   • Congestive heart failure (HCC) 6/20/2016   • COPD (chronic obstructive pulmonary disease) (HCC)    • Depression 7/3/2018   • Diverticulosis    • Diverticulosis of large intestine without hemorrhage 5/5/2017   • GERD (gastroesophageal reflux disease)    • GIB (gastrointestinal bleeding)     recurrent    • HTN (hypertension)    • Hypercholesterolemia 6/20/2016   • Hyperlipidemia    • Mesenteric ischemia (HCC) 2006    s/p resection    • Mood disorder (HCC)    • Oxygen dependent     3 liters @ all times.    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/23/2021   • PVD (peripheral vascular disease) ([facility])    • Restless legs syndrome 6/20/2016   • Seizure disorder (HCC) 6/20/2016   • Seizures ([facility])    • Stenosis of carotid artery 6/20/2016   • Vertigo 9/28/2016     Past Surgical History:   Procedure Laterality Date   • APPENDECTOMY     • CHOLECYSTECTOMY     • COLONOSCOPY N/A 6/19/2021    Procedure: COLONOSCOPY;  Surgeon: Wilfredo Simon MD;  Location:  [facility];  Service: Gastroenterology;  Laterality: N/A;   • COLOSTOMY  2006    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   • ENDOSCOPY N/A 6/16/2021    Procedure: ESOPHAGOGASTRODUODENOSCOPY;  Surgeon: Jean Fuentes MD;  Location: [facility];  Service: Gastroenterology;  Laterality: N/A;   • EXPLORATORY LAPAROTOMY      sec to ovarian cysts   • HYSTERECTOMY     • ILIAC ARTERY STENT     • REVISION / TAKEDOWN COLOSTOMY  2008       SLP Recommendation and Plan  SLP Swallowing Diagnosis: mild, oral dysphagia, moderate, pharyngeal dysphagia (10/18/21 1435)  SLP Diet Recommendation:  mechanical soft with no mixed consistencies, honey thick liquids (10/18/21 1435)  Recommended Precautions and Strategies: upright posture during/after eating, small bites of food and sips of liquid, no straw, general aspiration precautions, reflux precautions, other (see comments) (small single sips) (10/18/21 1435)  SLP Rec. for Method of Medication Administration: meds whole, meds crushed, with pudding or applesauce, as tolerated (10/18/21 1435)     Monitor for Signs of Aspiration: yes, notify SLP if any concerns (10/18/21 1435)  Recommended Diagnostics: VFSS (MBS) (10/18/21 0900)  Swallow Criteria for Skilled Therapeutic Interventions Met: demonstrates skilled criteria (10/18/21 1435)  Anticipated Discharge Disposition (SLP): unknown, anticipate therapy at next level of care (10/18/21 1435)  Rehab Potential/Prognosis, Swallowing: good, to achieve stated therapy goals (10/18/21 1435)  Therapy Frequency (Swallow): 5 days per week (10/18/21 1435)  Predicted Duration Therapy Intervention (Days): until discharge (10/18/21 1435)                         Plan of Care Reviewed With: patient      SWALLOW EVALUATION (last 72 hours)     SLP Adult Swallow Evaluation     Row Name 10/18/21 0900                Rehab Evaluation    Document Type evaluation  -RD       Subjective Information complains of; pain  -RD       Patient Observations alert; cooperative; agree to therapy  -RD       Patient/Family/Caregiver Comments/Observations no family present  -RD       Care Plan Review evaluation/treatment results reviewed; care plan/treatment goals reviewed; risks/benefits reviewed; current/potential barriers reviewed; patient/other agree to care plan  -RD       Patient Effort good  -RD               General Information    Patient Profile Reviewed yes  -RD       Pertinent History Of Current Problem Adm w/ PNA. C/o cough & SOB. Hx of GERD, tobacco abuse, COPD, CHF, incr'd WBC, HTN, sz d/o, PFO, CVA, CAD s/p CABG. Hx silent aspiration per  MBS 10/30/20- recs for HTLs & Lvl 3, small sips. Consult for swallowing.  -RD       Current Method of Nutrition NPO  -RD       Precautions/Limitations, Vision WFL with corrective lenses; for purposes of eval  -RD       Precautions/Limitations, Hearing WFL; for purposes of eval  -RD       Prior Level of Function-Communication other (see comments)  hx CVA  -RD       Prior Level of Function-Swallowing other (see comments)  hx of dysphagia post CVA  -RD       Plans/Goals Discussed with patient; agreed upon  -RD       Barriers to Rehab previous functional deficit  -RD       Patient's Goals for Discharge eat/drink without coughing/choking; return to PO diet  -RD               Pain    Additional Documentation Pain Scale: FACES Pre/Post-Treatment (Group)  -RD               Pain Scale: Numbers Pre/Post-Treatment    Pretreatment Pain Rating        Posttreatment Pain Rating        Pre/Posttreatment Pain Comment --               Pain Scale: FACES Pre/Post-Treatment    Pain: FACES Scale, Pretreatment 2-->hurts little bit  -RD       Posttreatment Pain Rating 2-->hurts little bit  -RD       Pain Location - Side Bilateral  -RD       Pain Location - Orientation generalized  -RD       Pre/Posttreatment Pain Comment c/o pain in lungs, RN aware  -RD               Oral Motor Structure and Function    Dentition Assessment upper dentures/partial in place; lower dentures/partial in place  -RD       Secretion Management WNL/WFL  -RD       Mucosal Quality dry  -RD       Volitional Swallow delayed  -RD       Volitional Cough weak  -RD               Oral Musculature and Cranial Nerve Assessment    Oral Motor General Assessment WFL  -RD               General Eating/Swallowing Observations    Respiratory Support Currently in Use nasal cannula  -RD       Eating/Swallowing Skills fed by SLP; self-fed; appropriate self-feeding skills observed  -RD       Positioning During Eating upright 90 degree; upright in bed  -RD       Utensils Used spoon; cup;  straw  -RD       Consistencies Trialed thin liquids; nectar/syrup-thick liquids; pudding thick; regular textures  -RD               Respiratory    Respiratory Status increase in respiratory rate; during swallowing/eating  -RD               Clinical Swallow Eval    Oral Prep Phase WFL  -RD       Oral Residue WFL  -RD       Pharyngeal Phase suspected pharyngeal impairment  -RD               Pharyngeal Phase Concerns    Pharyngeal Phase Concerns cough; other (see comments)  incr'd RR  -RD       Cough other (see comments)  delayed at end of PO  -RD       Pharyngeal Phase Concerns, Comment Overt s/s of aspiration c/b incr'd WOB t/o PO trials. Pt also noted w/ delayed coughing at end of PO trials. Need to r/o pharyngeal dysphagia. Pt has a hx of silent aspiration post CVA in 2020. Pt agreeable to MBS.  -RD               Clinical Impression    SLP Swallowing Diagnosis R/O pharyngeal dysphagia  -RD       Functional Impact risk of aspiration/pneumonia; risk of malnutrition; risk of dehydration  -RD       Rehab Potential/Prognosis, Swallowing good, to achieve stated therapy goals  -RD       Swallow Criteria for Skilled Therapeutic Interventions Met demonstrates skilled criteria  -RD               Recommendations    SLP Diet Recommendation NPO  -RD       Recommended Diagnostics VFSS ([facility] [place] [place])  -RD       Recommended Precautions and Strategies --       Oral Care Recommendations Oral Care BID/PRN  -RD       SLP Rec. for Method of Medication Administration meds whole; meds crushed; with pudding or applesauce; as tolerated  -RD       Monitor for Signs of Aspiration yes; notify SLP if any concerns  -RD       Anticipated Discharge Disposition (SLP) unknown; anticipate therapy at next level of care  -RD             User Key  (r) = Recorded By, (t) = Taken By, (c) = Cosigned By    Initials Name Effective Dates    Nhung Mensah MS CCC-SLP 06/16/21 -                 EDUCATION  The patient has been educated in the following areas:    Dysphagia (Swallowing Impairment) Oral Care/Hydration NPO rationale.        User Key  (r) = Recorded By, (t) = Taken By, (c) = Cosigned By    Initials Name Provider Type    Nhung Mensah MS CCC-SLP Speech and Language Pathologist                   Time Calculation:    Time Calculation- SLP     Row Name 10/18/21 1008          Time Calculation- SLP    SLP Start Time 0900  -RD     SLP Received On 10/18/21  -RD            Untimed Charges    SLP Eval/Re-eval  ST Eval Oral Pharyng Swallow - 02367  -RD     18950-HJ Eval Oral Pharyng Swallow Minutes 40  -RD     85154-[facility] Eval Swallow Minutes --            Total Minutes    Untimed Charges Total Minutes 40  -RD      Total Minutes 40  -RD           User Key  (r) = Recorded By, (t) = Taken By, (c) = Cosigned By    Initials Name Provider Type    Nhung Mensah MS CCC-SLP Speech and Language Pathologist                Therapy Charges for Today     Code Description Service Date Service Provider Modifiers Qty    [id number]  ST EVAL ORAL PHARYNG SWALLOW 3 10/18/2021 Nhung Miller MS CCC-SLP GN 1        Patient was not wearing a face mask and did exhibit coughing during this therapy encounter.  Procedure performed was aerosolizing, involved close contact (within 6 feet for at least 15 minutes or longer), and did not involve contact with infectious secretions or specimens.  Therapist used appropriate personal protective equipment including gloves, standard procedure mask and eye protection.  Appropriate PPE was worn during the entire therapy session.  Hand hygiene was completed before and after therapy session.        MS TEGAN Davis  10/18/2021

## 2021-10-18 NOTE — ACP (ADVANCE CARE PLANNING)
Patient requested that her living will on file be updated.  She has changed her primary HCS to be her daughter, Stacie Agee.  If at end of life, the patient wishes for her HCS to make the end of life decisions.   notarized the document, faxed a copy to medical records, placed a copy in her chart and returned the original plus 2 copies to the patient.

## 2021-10-18 NOTE — PROGRESS NOTES
"                  Clinical Nutrition     Nutrition Assessment  Reason for Visit:   MST score 2+, Reduced oral intake, \"Unsure\" unintentional weight loss      Patient Name: An Abreu  YOB: 1950  MRN: [id number]  Date of Encounter: 10/18/21 08:46 EDT  Admission date: 10/17/2021        Nutrition Assessment   Assessment       Applicable diagnosis/conditions/procedures this adm:  Community acquired pneumonia  COPD exacerbation     Applicable/additional applicable PMH/PSxH:   CAD s/p CABG  Paroxysmal AF  Gastroesophageal reflux disease  Chronic diastolic CHF (congestive heart failure) ([facility])  Chronic Respiratory failure-3LNC  Tobacco abuse    PMH: She  has a past medical history of Aneurysm ([facility]), Angina pectoris ([facility]) (6/20/2016), Anxiety (6/20/2016), Arthritis (6/20/2016), CAD (coronary artery disease), Carotid artery stenosis, CHF (congestive heart failure) ([facility]), Chronic coronary artery disease (6/20/2016), Chronic left-sided low back pain with left-sided sciatica (2/1/2017), Chronic obstructive pulmonary disease ([facility]) (6/20/2016), Chronic respiratory failure with hypoxia ([facility]) (3/27/2021), Cobalamin deficiency (6/20/2016), Congestive heart failure ([facility]) (6/20/2016), COPD (chronic obstructive pulmonary disease) ([facility]), Depression (7/3/2018), Diverticulosis, Diverticulosis of large intestine without hemorrhage (5/5/2017), GERD (gastroesophageal reflux disease), GIB (gastrointestinal bleeding), HTN (hypertension), Hypercholesterolemia (6/20/2016), Hyperlipidemia, Mesenteric ischemia ([facility]) (2006), Mood disorder ([facility]), Oxygen dependent, PAF (paroxysmal atrial fibrillation) ([facility]), Paroxysmal atrial fibrillation ([facility]) (8/7/2017), PFO (patent foramen ovale), 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   PSxH: She  has a past surgical history that includes Appendectomy; Cholecystectomy; Colostomy (2006); Hysterectomy; Iliac artery stent; Exploratory laparotomy; Revision / takedown colostomy (2008); Esophagogastroduodenoscopy (N/A, 6/16/2021); and Colonoscopy (N/A, 6/19/2021).         Reported/Observed/Food/Nutrition Related History:     10/18) Pt reports she has not lost weight recently. She states her UBW is in the 180-185 range. She states she has been \"eating like a hog\". He reports PO intake has been less during the past 3-4 days due to feeling ill. She reports she loves Boost ONS in chocolate flavor.       Anthropometrics     Height: 144.8 cm (57\")  Last filed wt: Weight: 37 kg (81 lb 8 oz) (10/17/21 2200)  Weight Method: Bed scale    BMI: BMI (Calculated): 17.6  Underweight:<18.5kg/m2    Ideal Body Weight (IBW) (kg): 39    Last 15 Recorded Weights  View Complete Flowsheet  Weight Weight (kg) Weight (lbs) Weight Method VISIT REPORT   10/17/2021 36.968 kg 81 lb 8 oz Bed scale -   10/17/2021 37.649 kg 83 lb Stated -   10/12/2021 37.558 kg 82 lb 12.8 oz - Report   10/5/2021 36.832 kg 81 lb 3.2 oz - Report   9/2/2021 34.836 kg 76 lb 12.8 oz - Report   8/12/2021 36.288 kg 80 lb - Report   8/2/2021 36.741 kg 81 lb - Report   7/13/2021 35.04 kg 77 lb 4 oz - Report   7/5/2021 47.219 kg 104 lb 1.6 oz Bed scale -   7/1/2021 45.36 kg 100 lb - -   6/23/2021 39.145 kg 86 lb 4.8 oz Bed scale -   6/21/2021 41.549 kg 91 lb 9.6 oz - -   6/18/2021 37.558 kg 82 lb 12.8 oz Bed scale -   6/17/2021 37.422 kg 82 lb 8 oz Bed scale  -   6/17/2021 38.556 kg 85 lb Bed scale -       Weight Change   UBW:  Weight change:   % wt change:   Time frame of weight loss:     Labs reviewed     Results from last 7 days   Lab Units 10/18/21  0644 10/17/21  1810   GLUCOSE mg/dL 82 84   BUN mg/dL 15 12   CREATININE mg/dL 0.58 0.58   SODIUM mmol/L 135* 133*   CHLORIDE mmol/L 92* 91*   POTASSIUM mmol/L 3.4* 3.5 "   ALT (SGPT) U/L  --  24     Results from last 7 days   Lab Units 10/18/21  0644 10/17/21  1810   ALBUMIN g/dL  --  3.90   CRP mg/dL  --  20.19*   CHOLESTEROL mg/dL 211*  --    TRIGLYCERIDES mg/dL 117  --              Lab Results   Lab Value Date/Time    HGBA1C 5.50 05/04/2021 0716    HGBA1C 5.10 11/22/2020 0549         Medications reviewed     Intake/Ouptut 24 hrs (7:00AM - 6:59 AM)     Intake & Output (last day)       10/17 0701  10/18 0700 10/18 0701  10/19 0700    Urine (mL/kg/hr) 1000     Total Output 1000     Net -1000           Urine Unmeasured Occurrence 1 x              Current Nutrition Prescription     PO: NPO Diet  No active supplement orders      Intake (average):          Nutrition Diagnosis     10/18  Problem Predicted suboptimal energy intake   Etiology Clinical status, PO intake trend PTA   Signs/Symptoms Poor PO intake PTA per report-NPO as present time for SLP eval       Nutrition Intervention     1.  Follow treatment progress, Care plan reviewed  2. Advised available snacks, Interview for preferences, Encourage intake, Supplement provided Boost Plus chocolate BID for use once she has an active PO diet       Goal:     General: Nutrition support treatment  PO: Advace diet as medically feasible/appropriate  Additional goals:  Avoid significant weight loss     Monitoring/Evaluation:   Per protocol, PO intake, Supplement intake, Weight, Swallow function      Will Continue to follow per protocol      Erinn Venegas RDN, LD, CNSC  Time Spent: 30min

## 2021-10-18 NOTE — PLAN OF CARE
Goal Outcome Evaluation:  Plan of Care Reviewed With: patient   SLP evaluation completed. Will address dysphagia w/ plan for MBS. Please see note for further details and recommendations.

## 2021-10-18 NOTE — PAYOR COMM NOTE
"Sunshine Mitchell RN Utilization Review [phone]  Fax # [phone]      Notification of admission. Status is inpatient.         An Abreu (71 y.o. Female)             Date of Birth Social Security Number Address Home Phone MRN    1950  [address] [address] [phone] [id number]    [employer] Marital Status             None        Admission Date Admission Type Admitting Provider Attending Provider Department, Room/Bed    10/17/21 Emergency Lilly Cebalols MD Hunter, Sarah M, MD [facility] 4G, S462/1    Discharge Date Discharge Disposition Discharge Destination                         Attending Provider: Lilly Ceballos MD    Allergies: Keflex [Cephalexin], Sulfamethoxazole-trimethoprim, Carbamazepine, Codeine, Latex    Isolation: None   Infection: None   Code Status: CPR   Advance Care Planning Activity    Ht: 144.8 cm (57\")   Wt: 37 kg (81 lb 8 oz)    Admission Cmt: None   Principal Problem: None                Active Insurance as of 10/17/2021     Primary Coverage     Payor Plan Insurance Group Employer/Plan Group    [facility] MEDICARE REPLACEMENT [employer] MEDICARE REPLACEMENT      Payor Plan Address Payor Plan Phone Number Payor Plan Fax Number Effective Dates    [address] [address] [phone]  2018 - None Entered    [facility] [id number]       Subscriber Name Subscriber Birth Date Member ID       AN ABREU 1950 [id number]                 Emergency Contacts      (Rel.) Home Phone Work Phone Mobile Phone    STEFANIA ARMENTA (Daughter) [phone] -- --    HEATHER STRINGER (Surrogate) [phone] -- [phone]               History & Physical      West, Vikram MENJIVAR MD at 10/17/21 2141              [facility] Medicine Services  HISTORY AND PHYSICAL    Patient Name: An Abreu  : 1950  MRN: [id number]  Primary Care Physician: Angelika Butterfield MD  Date of admission: " 10/17/2021    Subjective   Subjective     Chief Complaint:  Cough, Shortness of breath    HPI:  An Abreu is a 71 y.o. female with a history of COPD, current smoker, Coronary Artery Disease s/p CABG in 2006, Hypertension, Diastolic CHF, and paroxysmal atrial Fib on Plavix who presents to [facility] ED today for complaint of a productive cough and shortness of breath. She states this has been going on for the last 4 days. Cough productive of green sputum. States she has felt like she has had a fever but did not check it. She states she was feeling somewhat worse today, so she called EMS. Denies chest pain, palpitations, nausea, vomiting, or diarrhea. She has known COPD and states she gets these symptoms quite often. EMS noted audible wheezing and hypoxia with an O2 sat in the mid 80's on arrival. Smokes about a half pack a day. Has not received the Covid vaccine. She wears oxygen at night at home.       COVID Details:        Symptoms: [] NONE [x] Fever [x]  Cough [x] Shortness of breath [] Change in taste or smell  The patient has started, but not completed, their COVID-19 vaccination series.    Review of Systems   Constitutional: Positive for chills, fatigue and fever. Negative for diaphoresis.   HENT: Negative.  Negative for ear pain, sinus pressure, sore throat and trouble swallowing.    Eyes: Negative.  Negative for photophobia and visual disturbance.   Respiratory: Positive for cough, shortness of breath and wheezing.    Cardiovascular: Negative.  Negative for chest pain, palpitations and leg swelling.   Gastrointestinal: Negative.  Negative for abdominal pain, diarrhea, nausea and vomiting.   Genitourinary: Negative.  Negative for dysuria and hematuria.   Musculoskeletal: Positive for arthralgias and myalgias. Joint swelling: Generalized. Chronic.   Skin: Negative.    Neurological: Negative.  Negative for dizziness, syncope, weakness and headaches.   Psychiatric/Behavioral: Negative.         All other  systems reviewed and are negative.     Personal History     Past Medical History:   Diagnosis Date   • Aneurysm ([facility])    • Angina pectoris ([facility]) 6/20/2016   • Anxiety 6/20/2016   • Arthritis 6/20/2016   • CAD (coronary artery disease)    • Carotid artery stenosis    • CHF (congestive heart failure) ([facility])    • Chronic coronary artery disease 6/20/2016 2003 CABG LIMA to LAD, VG to OM 2004 VG to OM occluded. LIMA patent Cx intervention and RCA 2008 stent for ISR 2013 ISR and stenting of CX and RCA 2016 normal MPS   • Chronic left-sided low back pain with left-sided sciatica 2/1/2017   • Chronic obstructive pulmonary disease ([facility]) 6/20/2016   • Chronic respiratory failure with hypoxia ([facility]) 3/27/2021   • Cobalamin deficiency 6/20/2016   • Congestive heart failure ([facility]) 6/20/2016   • COPD (chronic obstructive pulmonary disease) ([facility])    • Depression 7/3/2018   • Diverticulosis    • Diverticulosis of large intestine without hemorrhage 5/5/2017   • GERD (gastroesophageal reflux disease)    • GIB (gastrointestinal bleeding)     recurrent    • HTN (hypertension)    • Hypercholesterolemia 6/20/2016   • Hyperlipidemia    • Mesenteric ischemia ([facility]) 2006    s/p resection    • Mood disorder ([facility])    • Oxygen dependent     3 liters @ all times.    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/2021   • PVD (peripheral vascular disease) ([facility])    • Restless legs syndrome 6/20/2016   • Seizure disorder ([facility]) 6/20/2016   • Seizures ([facility])    • Stenosis of carotid artery 6/20/2016   • Vertigo 9/28/2016       Past Surgical History:   Procedure Laterality Date   • APPENDECTOMY     • CHOLECYSTECTOMY     • COLONOSCOPY N/A 6/19/2021    Procedure: COLONOSCOPY;  Surgeon: Wilfredo Simon MD;  Location: [facility] END[facility]COPY;  Service: Gastroenterology;  Laterality: N/A;   • COLOSTOMY   2006    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   • ENDOSCOPY N/A 6/16/2021    Procedure: ESOPHAGOGASTRODUODENOSCOPY;  Surgeon: Jean Fuentes MD;  Location: [facility] ENDOSCOPY;  Service: Gastroenterology;  Laterality: N/A;   • EXPLORATORY LAPAROTOMY      sec to ovarian cysts   • HYSTERECTOMY     • ILIAC ARTERY STENT     • REVISION / TAKEDOWN COLOSTOMY  2008       Family History:  family history includes Arthritis in her mother; Cancer in her mother; Heart attack in her father; Heart disease in her brother, child, and child; Hyperlipidemia in her father; Hypertension in her father; Stroke in her father. Otherwise pertinent FHx was reviewed and unremarkable.     Social History:  reports that she has been smoking cigarettes and electronic cigarette. She has a 40.00 pack-year smoking history. She has never used smokeless tobacco. She reports that she does not drink alcohol and does not use drugs.  Social History     Social History Narrative    Lives w/ her daughter. Ambulates w/ a walker. Independent w/ most ADLs.        Medications:  Fluticasone-Umeclidin-Vilant, HYDROcodone-acetaminophen, Insulin Syringe-Needle U-100, LORazepam, PHENobarbital, Syringe 2-3 ML, albuterol, albuterol sulfate HFA, clopidogrel, dilTIAZem CD, esomeprazole, furosemide, ipratropium-albuterol, lidocaine, nicotine, promethazine, rOPINIRole, and rosuvastatin    Allergies   Allergen Reactions   • Keflex [Cephalexin] Shortness Of Breath and Rash     Patients power of  states patient had to be taken to ER due to reaction.    Tolerated Rocephin 2/19/21, zosyn 5/2021   • Sulfamethoxazole-Trimethoprim Shortness Of Breath and Rash     Patients power of  stated patient had to be taken to ER due to reaction.   • Carbamazepine    • Codeine    • Latex Rash       Objective   Objective     Vital Signs:   Temp:  [98.8 °F (37.1 °C)] 98.8 °F (37.1 °C)  Heart Rate:  [67-79] 71  Resp:  [20-22] 22  BP: (136-176)/(58-82) 176/82  Flow (L/min):  [3-4] 4    Physical  Exam  Constitutional:       General: She is not in acute distress.     Appearance: Normal appearance.   HENT:      Head: Atraumatic.      Right Ear: External ear normal.      Left Ear: External ear normal.      Nose: Nose normal.   Eyes:      Extraocular Movements: Extraocular movements intact.      Conjunctiva/sclera: Conjunctivae normal.      Pupils: Pupils are equal, round, and reactive to light.   Cardiovascular:      Rate and Rhythm: Normal rate and regular rhythm.      Pulses: Normal pulses.      Heart sounds: Normal heart sounds. No murmur heard.      Pulmonary:      Comments: Diffuse rales and wheeze noted throughout lung field bilaterally. Breathing unlabored.   Abdominal:      General: Bowel sounds are normal. There is no distension.      Tenderness: There is no abdominal tenderness. There is no guarding or rebound.   Musculoskeletal:         General: Normal range of motion.      Cervical back: No rigidity.   Skin:     General: Skin is warm and dry.      Coloration: Skin is not jaundiced.      Findings: No lesion or rash.   Neurological:      General: No focal deficit present.      Mental Status: She is alert and oriented to person, place, and time.   Psychiatric:         Attention and Perception: Attention normal.         Mood and Affect: Mood normal.         Behavior: Behavior normal.         Thought Content: Thought content normal.            Result Review:  I have personally reviewed the results from the time of this admission to 10/17/21 9:42 PM EDT and agree with these findings:  [x]  Laboratory  [x]  Microbiology  [x]  Radiology  [x]  EKG/Telemetry   []  Cardiology/Vascular   []  Pathology  []  Old records  []  Other:        LAB RESULTS:      Lab 10/17/21  1810   WBC 13.70*   HEMOGLOBIN 13.9   HEMATOCRIT 42.6   PLATELETS 246   NEUTROS ABS 12.50*   IMMATURE GRANS (ABS) 0.05   LYMPHS ABS 0.68*   MONOS ABS 0.42   EOS ABS 0.03   MCV 94.7   CRP 20.19*   PROCALCITONIN 0.10   LACTATE 1.2   PROTIME 13.5    INR 1.07         Lab 10/17/21  1810   SODIUM 133*   POTASSIUM 3.5   CHLORIDE 91*   CO2 28.0   ANION GAP 14.0   BUN 12   CREATININE 0.58   GLUCOSE 84   CALCIUM 9.3         Lab 10/17/21  1810   TOTAL PROTEIN 7.4   ALBUMIN 3.90   GLOBULIN 3.5   ALT (SGPT) 24   AST (SGOT) 39*   BILIRUBIN 0.3   ALK PHOS 189*         Lab 10/17/21  1810   PROBNP 12,091.0*   TROPONIN T 0.387*             Lab 10/17/21  1810   FERRITIN 75.99         UA    Urinalysis 5/22/21 5/22/21 5/22/21 6/15/21 7/1/21 7/1/21    1823 1823 1823  1110 1110   Squamous Epithelial Cells, UA   None Seen   0-2   Specific [place], UA 1.013 1.013  >=1.030 1.015    Ketones, UA Negative Negative  Negative Negative    Blood, UA Negative Negative  Negative Negative    Leukocytes, UA Negative Negative  Negative Moderate (2+) (A)    Nitrite, UA Negative Negative  Negative Positive (A)    RBC, UA   0-2   0-2   WBC, UA   None Seen   Too Numerous to Count (A)   Bacteria, UA   None Seen   4+ (A)   (A) Abnormal value            Microbiology Results (last 10 days)     Procedure Component Value - Date/Time    COVID-19 and FLU A/B PCR - Swab, Nasopharynx [[id number]]  (Normal) Collected: 10/17/21 1725    Lab Status: Final result Specimen: Swab from Nasopharynx Updated: 10/17/21 1911     COVID19 Not Detected     Influenza A PCR Not Detected     Influenza B PCR Not Detected    Narrative:      Fact sheet for providers: https://www.fda.gov/media/555884/download    Fact sheet for patients: https://www.fda.gov/media/311513/download    Test performed by PCR.          CT Angiogram Chest With & Without Contrast    Result Date: 10/17/2021  CT ANGIOGRAM CHEST W WO CONTRAST INDICATION: Hypoxia with pneumonia TECHNIQUE: CT angiogram of the chest with 100 cc of Isovue-300 IV contrast. 3-D reconstructions were obtained and reviewed.   Radiation dose reduction techniques included automated exposure control or exposure modulation based on body size. Count of known CT and cardiac nuc med studies  performed in previous 12 months: 2. COMPARISON: 5/22/2021 FINDINGS: Chest images of mediastinal and pulmonary emboli. The central pulmonary arteries are dilated consistent with chronic pulmonary hypertension. No adenopathy or effusions. Chest images at lung windows show patchy bilateral infiltrates predominantly in the lingula and right middle lobe. Dense consolidation in the left lower lobe seen on the previous CT has resolved. Imaging features can be seen with COVID-19 pneumonia, although are nonspecific and can can occur with a variety of infectious and noninfectious processes.     Impression: Chronic pulmonary hypertension. No evidence of pulmonary embolism. Bilateral multifocal pneumonia, mild-to-moderate in severity and most prominent in the left upper and right middle lobes. Signer Name: Nabil Vidales MD  Signed: 10/17/2021 8:35 PM  Workstation Name: [id number]  [facility]    XR Chest 1 View    Result Date: 10/17/2021  EXAMINATION: XR CHEST 1 VW - 10/17/2021  INDICATION: Shortness of breath.  COMPARISON: 10/12/2021  FINDINGS: Sternotomy wires noted. Heart and vasculature are normal in size. There is extensive left mid and lower lung multifocal interstitial disease and milder right basilar disease, with groundglass appearance, suspicious for Covid 19 pneumonia. No effusion or pneumothorax is seen. Incidental note is made of patient's older irregularly healed left clavicle fracture and left proximal humerus fracture. No acute bony abnormality is appreciated.      Impression: Interval development of bilateral pneumonia left greater than right, suspicious for Covid 19.  DICTATED:   10/17/2021 EDITED/lfs:   10/17/2021          Results for orders placed during the hospital encounter of 05/03/21    Adult Transthoracic Echo Complete W/ Cont if Necessary Per Protocol    Interpretation Summary  · Estimated left ventricular EF = 70%  · Moderate aortic valve regurgitation is  present.      Assessment/Plan   Assessment & Plan       Gastroesophageal reflux disease    Paroxysmal AF    Tobacco abuse    CAD s/p CABG     Community acquired pneumonia    COPD (chronic obstructive pulmonary disease) (HCC)    Chronic diastolic CHF (congestive heart failure) (HCC)    Leukocytosis    Bilateral Pneumonia (favor bacterial)  COPD exacerbation  Chronic hypoxic resp failure (chronic 3Lnc)  Leukocytosis  -partially vaccinated  - CXR w/ bilateral patchy infiltrates  -CT angio chest no pe, bilateral patchy pneumonia (most prominent left upper and right middle), not typical appearance of covid pneumonia  -covid 19 pcr negative; -respiratory viral pcr ordered & pending  -azactam & doxycycline day # 1 empiric antibiotics  -received solumedrol 125mg iv in ed; start prednisone 20mg daily tomorrow (small stature, markedly improved now)  -symbicort bid, duonebs scheduled & prn  - SLP to see in the am      CAD (previous cabg)  Paroxysmal Atrial Fib  A/C DHF  Moderate Aortic Insuffiency (last echo 5/2021 w/ normal ef)  Mildly elevated troponin, chronically elevated  -continue cardizem; currently in sinus rhythm  -currently on Plavix, continue  -not on anticoagulation due to frequent falls, anemia, previous gi bleed  -lasix 40mg iv once (continue once daily po lasix)  -trend troponins, appears chronically elevated; ekg unchanged, No chest pain (if trending upwards consider echo)    GERD  Hx previous gi bleed  - Continue home Nexium    Hyperlipidemia  -continue crestor    Tobacco Abuse  - Annicholas Abreu  reports that she has been smoking cigarettes and electronic cigarette. She has a 40.00 pack-year smoking history. She has never used smokeless tobacco.. I have educated her on the risk of diseases from using tobacco products such as cancer, COPD and heart disease.   I advised her to quit and she is not willing to quit.  I spent 4 minutes counseling the patient.       DVT prophylaxis:  Plavix, SCDS      CODE STATUS:   Full        This note has been completed as part of a split-shared workflow.   Signature: Electronically signed by Mary Crabtree PA-C, 10/17/21, 9:42 PM EDT      Attending   Admission Attestation       I have seen and examined the patient, performing an independent face-to-face diagnostic evaluation with plan of care reviewed and developed with the advanced practice clinician (APC).      Brief Summary Statement:   An Abreu is a 71 y.o. female w/ hx o2 dep copd, ongoing tobacco abuse, parox afib (no anticoagulation due to falls, anemia, previous gi bleed), cad, chronic dhf, moderate aortic regurgitation who presented w/worsening dyspnea, cough and yellow sputum x 3-4 days. Per discussion w/ ED provider initialy oxygen sats were in the lower 80's. Cxr showed patchy bilateral infiltrates. Troponin 0.3 but noted to be chronically elevated. No chest pain and ekg unchanged. probnp >12,000. Given iv solumedrol and nebs in the ED and admitted to hostpialist service  Remainder of detailed HPI is as noted by APC and has been reviewed and/or edited by me for completeness.    Attending Physical Exam:  Constitutional:Alert, oriented x 3, appears older than age, frail, nontoxic, frequent cough productive yellow sputum  Psych:Normal/appropriate affect  HEENT:NCAT, oropharynx clear  Neck: neck supple, full range of motion  Neuro: Face symmetric, speech clear, equal , moves all extremities  Cardiac: RRR; No pretibial pitting edema  Resp: CTAB, normal effort  GI: abd soft, nontender  Skin: No extremity rash  Musculoskeletal/extremities: no cyanosis of extremities; no significant ankle edema            Brief Assessment/Plan :  See detailed assessment and plan developed with APC which I have reviewed and/or edited for completeness.        Admission Status: I believe that this patient meets inpatient status due to need for supplemental oxygen, steroids, and iv diuresis w/ expected stay > 2  midnights      Vikram Coronado MD  10/17/21                    Electronically signed by Vikram Coronado MD at 10/17/21 5959          Emergency Department Notes      Yovany Hooper MD at 10/17/21 5280      Procedure Orders    1. Critical Care [[id number]] ordered by Yovany Hooper MD               Subjective   71-year-old female presents for evaluation of cough and shortness of breath.  Of note, the patient is fully vaccinated against COVID-19.  She has a history of COPD as well as CHF.  She typically wears home oxygen as needed at night.  She states that over the past 4 days she has had gradually worsening dyspnea when compared to baseline.  She also endorses a productive cough and subjective fever today.  She denies any known sick contacts or any known exposures to anyone with COVID-19.  On EMS arrival to the patient's home, the patient was hypoxic with oxygen saturations in the low 80s on 4 L.  She was noted to have audible wheezes.  She was given a breathing treatment and supplemental oxygen, and she was separately brought to our facility to be evaluated.  Denies any chest pain.  She is unsure as to what may have triggered her symptoms.          Review of Systems   Constitutional: Positive for fever.   Respiratory: Positive for cough and shortness of breath.    All other systems reviewed and are negative.      Past Medical History:   Diagnosis Date   • Aneurysm ([facility])    • Angina pectoris ([facility]) 6/20/2016   • Anxiety 6/20/2016   • Arthritis 6/20/2016   • CAD (coronary artery disease)    • Carotid artery stenosis    • CHF (congestive heart failure) ([facility])    • Chronic coronary artery disease 6/20/2016 2003 CABG LIMA to LAD, VG to OM 2004 VG to OM occluded. LIMA patent Cx intervention and RCA 2008 stent for ISR 2013 ISR and stenting of CX and RCA 2016 normal MPS   • Chronic left-sided low back pain with left-sided sciatica 2/1/2017   • Chronic obstructive pulmonary disease (HCC) 6/20/2016   •  Chronic respiratory failure with hypoxia ([facility]) 3/27/2021   • Cobalamin deficiency 6/20/2016   • Congestive heart failure ([facility]) 6/20/2016   • COPD (chronic obstructive pulmonary disease) ([facility])    • Depression 7/3/2018   • Diverticulosis    • Diverticulosis of large intestine without hemorrhage 5/5/2017   • GERD (gastroesophageal reflux disease)    • GIB (gastrointestinal bleeding)     recurrent    • HTN (hypertension)    • Hypercholesterolemia 6/20/2016   • Hyperlipidemia    • Mesenteric ischemia ([facility]) 2006    s/p resection    • Mood disorder ([facility])    • Oxygen dependent     3 liters @ all times.    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    Tolerated Rocephin 2/19/21, zosyn 5/2021   • Sulfamethoxazole-Trimethoprim Shortness Of Breath and Rash     Patients power of  stated patient had to be taken to ER due to reaction.   • Carbamazepine    • Codeine    • Latex Rash       Past Surgical History:   Procedure Laterality Date   • APPENDECTOMY     • CHOLECYSTECTOMY     • COLONOSCOPY N/A 6/19/2021    Procedure: COLONOSCOPY;  Surgeon: Wilfredo Simon MD;  Location:  [facility] ENDOSCOPY;  Service: Gastroenterology;  Laterality: N/A;   • COLOSTOMY  2006    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   • ENDOSCOPY N/A 6/16/2021    Procedure: ESOPHAGOGASTRODUODENOSCOPY;  Surgeon: Jean Fuentes MD;  Location:  [facility] ENDOSCOPY;  Service: Gastroenterology;  Laterality: N/A;   • EXPLORATORY LAPAROTOMY       sec to ovarian cysts   • HYSTERECTOMY     • ILIAC ARTERY STENT     • REVISION / TAKEDOWN COLOSTOMY  2008       Family History   Problem Relation Age of Onset   • Arthritis Mother    • Cancer Mother    • Heart attack Father    • Hypertension Father    • Hyperlipidemia Father    • Stroke Father    • Heart disease Brother         CAD   • Heart disease Child         CAD   • Heart disease Child         CAD       Social History     Socioeconomic History   • Marital status:    Tobacco Use   • Smoking status: Current Every Day Smoker     Packs/day: 1.00     Years: 40.00     Pack years: 40.00     Types: Cigarettes, Electronic Cigarette   • Smokeless tobacco: Never Used   • Tobacco comment: <0.5ppd    Substance and Sexual Activity   • Alcohol use: Never   • Drug use: Never   • Sexual activity: Defer           Objective   Physical Exam  Vitals and nursing note reviewed.   Constitutional:       Appearance: She is not diaphoretic.      Comments: Chronically ill-appearing elderly female   HENT:      Head: Normocephalic and atraumatic.   Eyes:      Pupils: Pupils are equal, round, and reactive to light.   Cardiovascular:      Rate and Rhythm: Normal rate and regular rhythm.      Heart sounds: Normal heart sounds. No murmur heard.  No friction rub. No gallop.    Pulmonary:      Breath sounds: Wheezing and rales present.      Comments: Breathing appears mildly labored, tachypneic, audible rales and wheezes noted in posterior lung fields bilaterally  Abdominal:      General: Bowel sounds are normal. There is no distension.      Palpations: Abdomen is soft. There is no mass.      Tenderness: There is no abdominal tenderness. There is no guarding or rebound.   Musculoskeletal:         General: Normal range of motion.      Cervical back: Neck supple.      Right lower leg: No edema.      Left lower leg: No edema.   Skin:     General: Skin is warm and dry.      Findings: No erythema or rash.   Neurological:      General: No  focal deficit present.      Mental Status: She is alert and oriented to person, place, and time.   Psychiatric:         Mood and Affect: Mood normal.         Thought Content: Thought content normal.         Judgment: Judgment normal.         Critical Care  Performed by: Yovany Hooper MD  Authorized by: Yovany Hooper MD     Critical care provider statement:     Critical care time (minutes):  35    Critical care was necessary to treat or prevent imminent or life-threatening deterioration of the following conditions:  Respiratory failure    Critical care was time spent personally by me on the following activities:  Ordering and performing treatments and interventions, ordering and review of laboratory studies, pulse oximetry, re-evaluation of patient's condition, development of treatment plan with patient or surrogate, evaluation of patient's response to treatment, examination of patient and obtaining history from patient or surrogate              ED Course  ED Course as of 10/17/21 1945   Sun Oct 17, 2021   1719 71-year-old female presents for evaluation of cough and shortness of breath. Of note, the patient is vaccinated against COVID-19. She has a history of COPD and CHF. She wears home oxygen as needed at night. She has had gradually worsening dyspnea over the past 4 days as well as a productive cough and subjective fever. She called EMS regarding her symptoms today and was noted to have audible wheezes and hypoxia. Oxygen saturations were initially in the mid 80s on 4 L. [DD]   1720 She was given a breathing treatment and brought to our facility for evaluation. On arrival, the patient appears chronically ill. She has mild wheezes and audible rales in her posterior lung fields bilaterally. Supplemental oxygen given. Repeat nebs given. Steroids given. We will obtain labs and imaging, and we will likely seek admission to the hospital. [DD]   1825 I personally viewed the patient's x-ray images myself,  and I am in agreement with the radiologist's reading for final interpretation.     [DD]   1856 EKG revealed normal sinus rhythm with a heart rate of 76 and nonspecific T wave abnormality to lateral precordial and high lateral leads. [DD]   1857 Troponin is mildly elevated at 0.38.  Review of the patient's records reveals that her troponin is chronically elevated. [DD]   1914 COVID-19 swab is negative. [DD]   1920 Doxycycline initiated to cover for community-acquired pneumonia. Blood cultures obtained. The patient is maintaining oxygen saturations in the 90s on 3 l. I feel that she is stable for the floor at this time. Awaiting callback from the hospitalist. [DD]   1928 I discussed the patient's case with Dr. Coronado, and the patient will be admitted under his care for further evaluation and treatment. She is aware/agreeable with the plan at this time. [DD]      ED Course User Index  [DD] Yovany Hooper MD                                   Recent Results (from the past 24 hour(s))   COVID-19 and FLU A/B PCR - Swab, Nasopharynx    Collection Time: 10/17/21  5:25 PM    Specimen: Nasopharynx; Swab   Result Value Ref Range    COVID19 Not Detected Not Detected - Ref. Range    Influenza A PCR Not Detected Not Detected    Influenza B PCR Not Detected Not Detected   Comprehensive Metabolic Panel    Collection Time: 10/17/21  6:10 PM    Specimen: Blood   Result Value Ref Range    Glucose 84 65 - 99 mg/dL    BUN 12 8 - 23 mg/dL    Creatinine 0.58 0.57 - 1.00 mg/dL    Sodium 133 (L) 136 - 145 mmol/L    Potassium 3.5 3.5 - 5.2 mmol/L    Chloride 91 (L) 98 - 107 mmol/L    CO2 28.0 22.0 - 29.0 mmol/L    Calcium 9.3 8.6 - 10.5 mg/dL    Total Protein 7.4 6.0 - 8.5 g/dL    Albumin 3.90 3.50 - 5.20 g/dL    ALT (SGPT) 24 1 - 33 U/L    AST (SGOT) 39 (H) 1 - 32 U/L    Alkaline Phosphatase 189 (H) 39 - 117 U/L    Total Bilirubin 0.3 0.0 - 1.2 mg/dL    eGFR Non African Amer 102 >60 mL/min/1.73    Globulin 3.5 gm/dL    A/G Ratio 1.1 g/dL     BUN/Creatinine Ratio 20.7 7.0 - 25.0    Anion Gap 14.0 5.0 - 15.0 mmol/L   Protime-INR    Collection Time: 10/17/21  6:10 PM    Specimen: Blood   Result Value Ref Range    Protime 13.5 11.4 - 14.4 Seconds    INR 1.07 0.85 - 1.16   BNP    Collection Time: 10/17/21  6:10 PM    Specimen: Blood   Result Value Ref Range    proBNP 12,091.0 (H) 0.0 - 900.0 pg/mL   Troponin    Collection Time: 10/17/21  6:10 PM    Specimen: Blood   Result Value Ref Range    Troponin T 0.387 (C) 0.000 - 0.030 ng/mL   Lactic Acid, Plasma    Collection Time: 10/17/21  6:10 PM    Specimen: Blood   Result Value Ref Range    Lactate 1.2 0.5 - 2.0 mmol/L   Procalcitonin    Collection Time: 10/17/21  6:10 PM    Specimen: Blood   Result Value Ref Range    Procalcitonin 0.10 0.00 - 0.25 ng/mL   CBC Auto Differential    Collection Time: 10/17/21  6:10 PM    Specimen: Blood   Result Value Ref Range    WBC 13.70 (H) 3.40 - 10.80 10*3/mm3    RBC 4.50 3.77 - 5.28 10*6/mm3    Hemoglobin 13.9 12.0 - 15.9 g/dL    Hematocrit 42.6 34.0 - 46.6 %    MCV 94.7 79.0 - 97.0 fL    MCH 30.9 26.6 - 33.0 pg    MCHC 32.6 31.5 - 35.7 g/dL    RDW 15.8 (H) 12.3 - 15.4 %    RDW-SD 54.4 (H) 37.0 - 54.0 fl    MPV 10.4 6.0 - 12.0 fL    Platelets 246 140 - 450 10*3/mm3    Neutrophil % 91.2 (H) 42.7 - 76.0 %    Lymphocyte % 5.0 (L) 19.6 - 45.3 %    Monocyte % 3.1 (L) 5.0 - 12.0 %    Eosinophil % 0.2 (L) 0.3 - 6.2 %    Basophil % 0.1 0.0 - 1.5 %    Immature Grans % 0.4 0.0 - 0.5 %    Neutrophils, Absolute 12.50 (H) 1.70 - 7.00 10*3/mm3    Lymphocytes, Absolute 0.68 (L) 0.70 - 3.10 10*3/mm3    Monocytes, Absolute 0.42 0.10 - 0.90 10*3/mm3    Eosinophils, Absolute 0.03 0.00 - 0.40 10*3/mm3    Basophils, Absolute 0.02 0.00 - 0.20 10*3/mm3    Immature Grans, Absolute 0.05 0.00 - 0.05 10*3/mm3    nRBC 0.0 0.0 - 0.2 /100 WBC   Scan Slide    Collection Time: 10/17/21  6:10 PM    Specimen: Blood   Result Value Ref Range    RBC Morphology Normal Normal    WBC Morphology Normal Normal     Platelet Morphology Normal Normal     Note: In addition to lab results from this visit, the labs listed above may include labs taken at another facility or during a different encounter within the last 24 hours. Please correlate lab times with ED admission and discharge times for further clarification of the services performed during this visit.    XR Chest 1 View   Preliminary Result   Interval development of bilateral pneumonia left greater   than right, suspicious for Covid 19.       DICTATED:   10/17/2021   EDITED/lfs:   10/17/2021               CT Angiogram Chest With & Without Contrast    (Results Pending)     Vitals:    10/17/21 1845 10/17/21 1900 10/17/21 1912 10/17/21 1942   BP:  150/64     Pulse:  72 73 72   Resp:       Temp:       TempSrc:       SpO2: 97% 96% 97% 96%   Weight:       Height:         Medications   doxycycline (VIBRAMYCIN) 100 mg/100 mL 0.9% NS MBP (has no administration in time range)   furosemide (LASIX) injection 40 mg (has no administration in time range)   methylPREDNISolone sodium succinate (SOLU-Medrol) injection 125 mg (125 mg Intravenous Given 10/17/21 1724)   albuterol sulfate HFA (PROVENTIL HFA;VENTOLIN HFA;PROAIR HFA) inhaler 2 puff (2 puffs Inhalation Given 10/17/21 1744)     ECG/EMG Results (last 24 hours)     Procedure Component Value Units Date/Time    ECG 12 Lead [[id number]] Collected: 10/17/21 1731     Updated: 10/17/21 1728        ECG 12 Lead                     MDM    Final diagnoses:   Community acquired pneumonia, unspecified laterality   Acute on chronic respiratory failure with hypoxia (HCC)   Leukocytosis, unspecified type   COPD exacerbation (HCC)   Elevated troponin   History of CHF (congestive heart failure)       ED Disposition  ED Disposition     ED Disposition Condition Comment    Decision to Admit            No follow-up provider specified.       Medication List      No changes were made to your prescriptions during this visit.          Yovany Hooper,  MD  10/17/21 1947      Electronically signed by Yovany Hooper MD at 10/17/21 1947       Vital Signs (last day)     Date/Time Temp Temp src Pulse Resp BP Patient Position SpO2    10/18/21 0951 -- -- 66 -- 130/56 -- --    10/18/21 0606 -- -- 79 22 -- -- 92    10/18/21 0600 -- -- 74 -- -- -- 91    10/18/21 0500 -- -- 66 -- -- -- 95    10/18/21 0400 -- -- 73 -- 129/51 -- 92    10/18/21 0300 98.3 (36.8) Oral 61 18 129/51 Lying 95    10/18/21 0200 -- -- 63 -- -- -- 95    10/18/21 0100 -- -- 64 -- -- -- 92    10/18/21 0000 -- -- 66 -- -- -- 96    10/17/21 2300 98.9 (37.2) Oral 65 18 130/47 Lying 89    10/17/21 2202 -- -- -- -- 129/67 Lying --    10/17/21 2200 98.5 (36.9) Oral 73 18 132/52 Lying 99    10/17/21 2114 -- -- 71 -- -- -- 97    10/17/21 2100 -- -- 78 -- 176/82 -- 96    10/17/21 2044 -- -- 79 -- -- -- 98    10/17/21 2038 -- -- 67 -- -- -- 98    10/17/21 2014 -- -- 71 -- -- -- 96    10/17/21 2008 -- -- 73 -- -- -- 96    10/17/21 2000 -- -- 71 -- 154/58 -- 95    10/17/21 1945 -- -- 71 -- -- -- 96    10/17/21 1944 -- -- 74 -- -- -- 96    10/17/21 1942 -- -- 72 -- -- -- 96    10/17/21 1938 -- -- 73 -- -- -- 96    10/17/21 1914 -- -- 72 -- -- -- 97    10/17/21 1912 -- -- 73 -- -- -- 97    10/17/21 1908 -- -- 74 -- -- -- 97    10/17/21 1900 -- -- 72 -- 150/64 -- 96    10/17/21 1845 -- -- -- -- -- -- 97    10/17/21 1844 -- -- 75 -- -- -- --    10/17/21 1842 -- -- 74 -- -- -- 97    10/17/21 1838 -- -- 73 -- -- -- 95    10/17/21 1830 -- -- 75 -- 147/64 -- --    10/17/21 1815 -- -- 75 -- 141/58 -- 92    10/17/21 1814 -- -- 77 -- -- -- 93    10/17/21 1812 -- -- 75 -- -- -- 93    10/17/21 1808 -- -- 75 -- -- -- 93    10/17/21 1744 -- -- 77 22 -- -- 96    10/17/21 1742 -- -- 74 -- -- -- 95    10/17/21 1738 -- -- 73 -- -- -- 95    10/17/21 1722 -- -- -- -- 136/61 -- --    10/17/21 1719 98.8 (37.1) Oral 79 20 -- Lying 89          Oxygen Therapy (last day)     Date/Time SpO2 Device (Oxygen Therapy) Flow (L/min) Oxygen  Concentration (%) ETCO2 (mmHg)    10/18/21 0606 92 nasal cannula 4 -- --    10/18/21 0600 91 nasal cannula 4 -- --    10/18/21 0500 95 -- -- -- --    10/18/21 0400 92 nasal cannula 4 -- --    10/18/21 0300 95 -- -- -- --    10/18/21 0200 95 nasal cannula 4 -- --    10/18/21 0100 92 -- -- -- --    10/18/21 0000 96 nasal cannula 4 -- --    10/17/21 2300 89 -- -- -- --    10/17/21 2200 99 nasal cannula 4 -- --    10/17/21 2114 97 -- -- -- --    10/17/21 2100 96 -- -- -- --    10/17/21 2044 98 -- -- -- --    10/17/21 2038 98 -- -- -- --    10/17/21 2014 96 -- -- -- --    10/17/21 2008 96 -- -- -- --    10/17/21 2000 95 -- -- -- --    10/17/21 1945 96 -- -- -- --    10/17/21 1944 96 -- -- -- --    10/17/21 1942 96 -- -- -- --    10/17/21 1938 96 -- -- -- --    10/17/21 1914 97 -- -- -- --    10/17/21 1912 97 -- -- -- --    10/17/21 1908 97 -- -- -- --    10/17/21 1900 96 -- -- -- --    10/17/21 1845 97 nasal cannula 4 -- --    10/17/21 1842 97 -- -- -- --    10/17/21 1838 95 -- -- -- --    10/17/21 1815 92 -- -- -- --    10/17/21 1814 93 -- -- -- --    10/17/21 1812 93 -- -- -- --    10/17/21 1808 93 -- -- -- --    10/17/21 1744 96 nasal cannula 3 -- --    10/17/21 1742 95 -- -- -- --    10/17/21 1738 95 -- -- -- --    10/17/21 1719 89 room air -- -- --            Current Facility-Administered Medications   Medication Dose Route Frequency Provider Last Rate Last Admin   • albuterol (PROVENTIL) nebulizer solution 0.083% 2.5 mg/3mL  2.5 mg Nebulization Q4H PRN Mary Crabtree PA-C       • aztreonam (AZACTAM) 2 g/100 mL 0.9% NS (mbp)  2 g Intravenous Q8H Vikram Coronado MD   2 g at 10/18/21 0552   • budesonide-formoterol (SYMBICORT) 80-4.5 MCG/ACT inhaler 2 puff  2 puff Inhalation BID - RT Vikram Coronado MD   2 puff at 10/18/21 0606   • clopidogrel (PLAVIX) tablet 75 mg  75 mg Oral Daily Mary Crabtree PA-C   75 mg at 10/18/21 0950   • dilTIAZem CD (CARDIZEM CD) 24 hr capsule 240 mg  240 mg  Oral Daily Mary Crabtree PA-C   240 mg at 10/18/21 0951   • doxycycline (MONODOX) capsule 100 mg  100 mg Oral Q12H Vikram Coronado MD   100 mg at 10/18/21 0950   • furosemide (LASIX) tablet 40 mg  40 mg Oral Daily Mary Crabtree PA-C   40 mg at 10/18/21 0951   • ipratropium-albuterol (DUO-NEB) nebulizer solution 3 mL  3 mL Nebulization Q4H PRN Mary Crabtree PA-C   3 mL at 10/18/21 0606   • nicotine (NICODERM CQ) 7 MG/24HR patch 1 patch  1 patch Transdermal Q24H Mary Crabtree PA-C   1 patch at 10/17/21 2327   • ondansetron (ZOFRAN) tablet 4 mg  4 mg Oral Q6H PRN Mary Crabtree PA-C        Or   • ondansetron (ZOFRAN) injection 4 mg  4 mg Intravenous Q6H PRN Mary Crabtree PA-C       • pantoprazole (PROTONIX) EC tablet 40 mg  40 mg Oral Q AM Mary Crabtree PA-C       • Pharmacy Consult - MTM   Does not apply Daily Eli Martinez, PharmD       • Pharmacy Consult - Pharmacy to dose   Does not apply Continuous PRN Vikram Coronado MD       • predniSONE (DELTASONE) tablet 20 mg  20 mg Oral Daily With Breakfast Vikram Coronado MD   20 mg at 10/18/21 0950   • rOPINIRole (REQUIP) tablet 0.25 mg  0.25 mg Oral Nightly Mary Crabtree PA-C       • rosuvastatin (CRESTOR) tablet 40 mg  40 mg Oral Nightly Mary Crabtree PA-C       • sodium chloride 0.9 % flush 10 mL  10 mL Intravenous Q12H Mary Crabtree PA-C   10 mL at 10/17/21 2328   • sodium chloride 0.9 % flush 10 mL  10 mL Intravenous PRN Mary Crabtree PA-C         Orders (active)      Start     Ordered    10/18/21 1200  Dietary Nutrition Supplements Boost Plus; chocolate  Daily With Breakfast & Lunch      Comments: Once she has an active diet order    10/18/21 0945    10/18/21 0916  SLP video swallow  Once         10/18/21 0915    10/18/21 0916  FL Video Swallow With Speech Single Contrast  1 Time Imaging         10/18/21 0915    10/18/21 0900   doxycycline (MONODOX) capsule 100 mg  Every 12 Hours Scheduled         10/17/21 2131    10/18/21 0900  clopidogrel (PLAVIX) tablet 75 mg  Daily         10/17/21 2215    10/18/21 0900  dilTIAZem CD (CARDIZEM CD) 24 hr capsule 240 mg  Daily         10/17/21 2215    10/18/21 0900  furosemide (LASIX) tablet 40 mg  Daily         10/17/21 2215    10/18/21 0900  Pharmacy Consult - MTM  Daily         10/18/21 0735    10/18/21 0841  NPO Diet  Diet Effective Midnight        Comments: Pending swallow eval    10/18/21 0841    10/18/21 0800  predniSONE (DELTASONE) tablet 20 mg  Daily With Breakfast         10/17/21 2254    10/18/21 0600  aztreonam (AZACTAM) 2 g/100 mL 0.9% NS (mbp)  Every 8 Hours         10/17/21 2136    10/18/21 0600  pantoprazole (PROTONIX) EC tablet 40 mg  Every Early Morning         10/17/21 2215    10/18/21 0600  Incentive Spirometry  Every 4 Hours While Awake       10/17/21 2215    10/18/21 0600  Basic Metabolic Panel  Daily       10/17/21 2215    10/18/21 0600  CBC & Differential  Daily       10/17/21 2215    10/18/21 0000  Vital Signs  Every 4 Hours       10/17/21 2215    10/17/21 2345  budesonide-formoterol (SYMBICORT) 80-4.5 MCG/ACT inhaler 2 puff  2 Times Daily - RT         10/17/21 2250    10/17/21 2325  SLP Consult: Eval & Treat RN Dysphagia Screen Failed  Once         10/17/21 2324    10/17/21 2315  nicotine (NICODERM CQ) 7 MG/24HR patch 1 patch  Every 24 Hours         10/17/21 2215    10/17/21 2315  rosuvastatin (CRESTOR) tablet 40 mg  Nightly         10/17/21 2215    10/17/21 2315  rOPINIRole (REQUIP) tablet 0.25 mg  Nightly         10/17/21 2215    10/17/21 2315  sodium chloride 0.9 % flush 10 mL  Every 12 Hours Scheduled         10/17/21 2215    10/17/21 2240  Getting troponin now. If troponin is lower than the last one no need to call, just trending unless develops chest pain  Nursing Communication  Once        Comments: Getting troponin now. If troponin is lower than the last one no need to  "call, just trending unless develops chest pain    10/17/21 2241    10/17/21 2213  ondansetron (ZOFRAN) tablet 4 mg  Every 6 Hours PRN        \"Or\" Linked Group Details    10/17/21 2215    10/17/21 2213  ondansetron (ZOFRAN) injection 4 mg  Every 6 Hours PRN        \"Or\" Linked Group Details    10/17/21 2215    10/17/21 2212  Maintain Sequential Compression Device  Continuous         10/17/21 2215    10/17/21 2212  Code Status and Medical Interventions:  Continuous         10/17/21 2215    10/17/21 2212  Cardiac Monitoring  Continuous         10/17/21 2215    10/17/21 2212  Pulse Oximetry, Continuous  Continuous         10/17/21 2215    10/17/21 2212  Oxygen Therapy- Nasal Cannula; Titrate for SPO2: 90% - 95%  Continuous         10/17/21 2215    10/17/21 2211  Intake & Output  Every Shift       10/17/21 2215    10/17/21 2211  Weigh Patient  Once         10/17/21 2215    10/17/21 2211  Insert Peripheral IV  Once         10/17/21 2215    10/17/21 2211  Saline Lock & Maintain IV Access  Continuous         10/17/21 2215    10/17/21 2211  Tobacco Cessation Education  Once         10/17/21 2215    10/17/21 2211  Pneumonia Education  Once        Comments: pneumonia    10/17/21 2215    10/17/21 2211  Notify Provider if Patient Requires Greater Than 4LPM of Oxygen  Until Discontinued         10/17/21 2215    10/17/21 2211  Nursing Dysphagia Screening (Complete Prior to Giving Anything By Mouth)  Once        Comments: Use Dysphagia Screen for Pneumonia    10/17/21 2215    10/17/21 2211  RN to Place Order SLP Consult - Eval & Treat Choosing Reason of RN Dysphagia Screen Failed  Per Order Details        Comments: RN to Place Order SLP Consult - Eval & Treat Choosing Reason of RN Dysphagia Screen Failed    10/17/21 2215    10/17/21 2211  Nurse to Call MD or Nutrition Services for Diet if Patient Passes Dysphagia Screen  Once         10/17/21 2215    10/17/21 2210  sodium chloride 0.9 % flush 10 mL  As Needed         10/17/21 2215 "    10/17/21 2208  ipratropium-albuterol (DUO-NEB) nebulizer solution 3 mL  Every 4 Hours PRN         10/17/21 2215    10/17/21 2206  albuterol (PROVENTIL) nebulizer solution 0.083% 2.5 mg/3mL  Every 4 Hours PRN         10/17/21 2215    10/17/21 2129  Pharmacy Consult - Pharmacy to dose  Continuous PRN         10/17/21 2129    10/17/21 2129  Repeat covid 19 testing w/ cepheid panel please  Nursing Communication  Once        Comments: Repeat covid 19 testing w/ cepheid panel please    10/17/21 2128    10/17/21 2100  Strict Intake & Output  Every Hour       10/17/21 2013    10/17/21 1719  Blood Culture - Blood, Arm, Right  Once         10/17/21 1718    10/17/21 1719  Blood Culture - Blood, Arm, Right  Once         10/17/21 1718    Unscheduled  Blood Gas, Arterial -With Co-Ox Panel: Yes  As Needed      Comments: Respiratory Distress      10/17/21 2215    Unscheduled  Up With Assistance  As Needed       10/17/21 2215                Operative/Procedure Notes (all)    No notes of this type exist for this encounter.         Physician Progress Notes (all)    No notes of this type exist for this encounter.         Consult Notes (all)    No notes of this type exist for this encounter.

## 2021-10-19 NOTE — PLAN OF CARE
Goal Outcome Evaluation:  Plan of Care Reviewed With: patient        Progress: improving  Outcome Summary: PT eval is completed patient demonstrates impaired transfer and gait as well as decreased endurace for activity and decreased strength. patient stood with CGA and ambulated 70 ft with mostly CGA but at the end of walking patient became more usafe with walker and had one LOB requiring PT to correct. anticipate patient to go home with family assist at D/C she may benefit from HH

## 2021-10-19 NOTE — CONSULTS
Palliative Care Initial Consult     Attending Physician: Lilly Ceballos MD  Referring Provider: Lilly Ceballos MD  Reason for Consult:  assistance with clarification of goals of care  Code Status:   Code Status and Medical Interventions:   Ordered at: 10/17/21 3762     Code Status:    CPR     Medical Interventions (Level of Support Prior to Arrest):    Full      Advanced Directives: Living Will on file 10/18/21   Healthcare surrogate: Dtr/HCS Stacie Ward, see new Living will 10/18/21  Goals of Care: Initiated and ongoing.    HPI:  An Abreu is a 71 y.o. female admitted on 10/17/21 with c/o cough and SOB from home, found to have bilat PNA currently receiving ABT. Chronic home O2 use, hx of COPD. Recently enrolled under hospice care with [facility] effective 10/15/21 per dtr's report. She is found sitting in chair in room, NAD, A/O x 4. C/O pain in her back and right rib area, worse with movement and deep breathing, rates 8/10. Relieved with Norco 10 mg q12 prn but dose not lasting long enough. Lidocaine patch recently ordered per attending, not placed yet. C/O congested cough and mild SOB, wearing 3 L NC at present. Using incentive spirometer often. Denies N/V/D/C. Not sleeping well, chronic issue,  reports taking Ativan at home for this. Asking when her seizure medication will be resumed. Requesting that I speak with her dtr currently on phone during visit. Lives with dtr. Uses walker at home, independent of most ADL's.       ROS:  Review of Systems -Review of Systems   Constitutional: Positive for fatigue.   HENT: Negative.    Eyes: Negative.    Respiratory: Positive for cough, chest tightness and shortness of breath.    Cardiovascular: Negative.    Gastrointestinal: Negative.    Endocrine: Negative.    Genitourinary: Negative.    Musculoskeletal: Positive for back pain.   Skin: Negative.    Neurological: Negative.    Psychiatric/Behavioral: Positive for sleep disturbance.          Past Medical History:    Diagnosis Date   • Aneurysm ([facility])    • Angina pectoris (HCC) 6/20/2016   • Anxiety 6/20/2016   • Arthritis 6/20/2016   • CAD (coronary artery disease)    • Carotid artery stenosis    • CHF (congestive heart failure) ([facility])    • Chronic coronary artery disease 6/20/2016 2003 CABG LIMA to LAD, VG to OM 2004 VG to OM occluded. LIMA patent Cx intervention and RCA 2008 stent for ISR 2013 ISR and stenting of CX and RCA 2016 normal MPS   • Chronic left-sided low back pain with left-sided sciatica 2/1/2017   • Chronic obstructive pulmonary disease ([facility]) 6/20/2016   • Chronic respiratory failure with hypoxia ([facility]) 3/27/2021   • Cobalamin deficiency 6/20/2016   • Congestive heart failure ([facility]) 6/20/2016   • COPD (chronic obstructive pulmonary disease) ([facility])    • Depression 7/3/2018   • Diverticulosis    • Diverticulosis of large intestine without hemorrhage 5/5/2017   • GERD (gastroesophageal reflux disease)    • GIB (gastrointestinal bleeding)     recurrent    • HTN (hypertension)    • Hypercholesterolemia 6/20/2016   • Hyperlipidemia    • Mesenteric ischemia ([facility]) 2006    s/p resection    • Mood disorder ([facility])    • Oxygen dependent     3 liters @ all times.    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 ([facility]) 5/23/2021   • PVD (peripheral vascular disease) ([facility])    • Restless legs syndrome 6/20/2016   • Seizure disorder ([facility]) 6/20/2016   • Seizures ([facility])    • Stenosis of carotid artery 6/20/2016   • Vertigo 9/28/2016     Past Surgical History:   Procedure Laterality Date   • APPENDECTOMY     • CHOLECYSTECTOMY     • COLONOSCOPY N/A 6/19/2021    Procedure: COLONOSCOPY;  Surgeon: Wilfredo Simon MD;  Location: [facility] ENDOSCOPY;  Service: Gastroenterology;  Laterality: N/A;   • COLOSTOMY  2006    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   • ENDOSCOPY N/A 6/16/2021    Procedure:  ESOPHAGOGASTRODUODENOSCOPY;  Surgeon: Jean Fuentes MD;  Location: [facility] ENDOSCOPY;  Service: Gastroenterology;  Laterality: N/A;   • EXPLORATORY LAPAROTOMY      sec to ovarian cysts   • HYSTERECTOMY     • ILIAC ARTERY STENT     • REVISION / TAKEDOWN COLOSTOMY  2008     Social History     Socioeconomic History   • Marital status:    Tobacco Use   • Smoking status: Current Every Day Smoker     Packs/day: 1.00     Years: 40.00     Pack years: 40.00     Types: Cigarettes, Electronic Cigarette   • Smokeless tobacco: Never Used   • Tobacco comment: <0.5ppd    Substance and Sexual Activity   • Alcohol use: Never   • Drug use: Never   • Sexual activity: Defer     Family History   Problem Relation Age of Onset   • Arthritis Mother    • Cancer Mother    • Heart attack Father    • Hypertension Father    • Hyperlipidemia Father    • Stroke Father    • Heart disease Brother         CAD   • Heart disease Child         CAD   • Heart disease Child         CAD       Allergies   Allergen Reactions   • Keflex [Cephalexin] Shortness Of Breath and Rash     Patients power of  states patient had to be taken to ER due to reaction.    Tolerated Rocephin 2/19/21, zosyn 5/2021   • Sulfamethoxazole-Trimethoprim Shortness Of Breath and Rash     Patients power of  stated patient had to be taken to ER due to reaction.   • Carbamazepine    • Codeine    • Latex Rash       Current Facility-Administered Medications   Medication Dose Route Frequency Provider Last Rate Last Admin   • albuterol (PROVENTIL) nebulizer solution 0.083% 2.5 mg/3mL  2.5 mg Nebulization Q4H PRN Mary Crabtree, PA-C       • budesonide-formoterol (SYMBICORT) 80-4.5 MCG/ACT inhaler 2 puff  2 puff Inhalation BID - RT Vikram Coronado MD   2 puff at 10/19/21 0945   • cefTRIAXone (ROCEPHIN) 1 g/100 mL 0.9% NS (MBP)  1 g Intravenous Q24H Lilly Ceballos MD       • clopidogrel (PLAVIX) tablet 75 mg  75 mg Oral Daily Mary Crabtree,  ANNE   75 mg at 10/19/21 0838   • dilTIAZem CD (CARDIZEM CD) 24 hr capsule 240 mg  240 mg Oral Daily Mary Crabtree PA-C   240 mg at 10/18/21 0951   • doxycycline (MONODOX) capsule 100 mg  100 mg Oral Q12H Vikram Coronado MD   100 mg at 10/19/21 0839   • furosemide (LASIX) tablet 40 mg  40 mg Oral Daily Mary Crabtree PA-C   40 mg at 10/19/21 0854   • HYDROcodone-acetaminophen (NORCO)  MG per tablet 1 tablet  1 tablet Oral Q12H PRN Lilly Ceballos MD   1 tablet at 10/19/21 0002   • ipratropium-albuterol (DUO-NEB) nebulizer solution 3 mL  3 mL Nebulization Q4H PRN Mary Crabtree PA-C   3 mL at 10/18/21 2023   • lidocaine (LIDODERM) 5 % 1 patch  1 patch Transdermal Q24H Lilly Ceballos MD   1 patch at 10/19/21 1112   • nicotine (NICODERM CQ) 7 MG/24HR patch 1 patch  1 patch Transdermal Q24H Mary Crabtree PA-C   1 patch at 10/18/21 2330   • ondansetron (ZOFRAN) tablet 4 mg  4 mg Oral Q6H PRN Mary Crabtree PA-C        Or   • ondansetron (ZOFRAN) injection 4 mg  4 mg Intravenous Q6H PRN Mary Crabtree PA-C       • pantoprazole (PROTONIX) EC tablet 40 mg  40 mg Oral Q AM Mary Crabtree PA-C   40 mg at 10/19/21 0539   • Pharmacy Consult - MTM   Does not apply Daily Eli Martinez, PharmD       • potassium chloride (MICRO-K) CR capsule 40 mEq  40 mEq Oral PRN Lilly Ceballos MD   40 mEq at 10/18/21 1514    Or   • potassium chloride (KLOR-CON) packet 40 mEq  40 mEq Oral PRN Lilly Ceballos MD        Or   • potassium chloride 10 mEq in 100 mL IVPB  10 mEq Intravenous Q1H PRN Lilly Ceballos MD       • predniSONE (DELTASONE) tablet 20 mg  20 mg Oral Daily With Breakfast Vikram Coronado MD   20 mg at 10/19/21 0839   • rOPINIRole (REQUIP) tablet 0.25 mg  0.25 mg Oral Nightly Mary Crabtree PA-C   0.25 mg at 10/18/21 2035   • rosuvastatin (CRESTOR) tablet 40 mg  40 mg Oral Nightly Mary Crabtree PA-C   40 mg at  "10/18/21 2034   • sodium chloride 0.9 % flush 10 mL  10 mL Intravenous Q12H Mary Crabtree PA-C   10 mL at 10/19/21 0842   • sodium chloride 0.9 % flush 10 mL  10 mL Intravenous PRN Mary Crabtree PA-C            •  albuterol  •  HYDROcodone-acetaminophen  •  ipratropium-albuterol  •  ondansetron **OR** ondansetron  •  potassium chloride **OR** potassium chloride **OR** potassium chloride  •  sodium chloride    Current medication reviewed for route, type, dose and frequency and are current per MAR.    Palliative Performance Scale Score: 40%   /49   Pulse 65   Temp 97.9 °F (36.6 °C) (Oral)   Resp 16   Ht 144.8 cm (57\")   Wt 37 kg (81 lb 8 oz)   SpO2 95%   BMI 17.64 kg/m²     Intake/Output Summary (Last 24 hours) at 10/19/2021 1151  Last data filed at 10/19/2021 0830  Gross per 24 hour   Intake 580 ml   Output --   Net 580 ml       PE:  General Appearance:    Chronically ill appearing, alert, cooperative, NAD   HEENT:    NC/AT, without obvious abnormality, EOMI, anicteric    Neck:   supple, trachea midline, no JVD   Lungs:     Bilat course rhonchi, diminished bases, non-labored, 3L via NC    Heart:    RRR, normal S1 and S2, no M/R/G   Abdomen:     Soft, NT, ND, NABS    Extremities:   Moves all extremities, no edema   Pulses:   Pulses palpable and equal bilaterally   Skin:   Warm, dry   Neurologic:   A/Ox3, cooperative   Psych:   Calm, appropriate     Objective:      Labs:   Last HgbA1C noted to be 5.50 on 5/4/21.    Results from last 7 days   Lab Units 10/18/21  0644   WBC 10*3/mm3 13.74*   HEMOGLOBIN g/dL 13.4   HEMATOCRIT % 40.3   PLATELETS 10*3/mm3 235     Results from last 7 days   Lab Units 10/18/21  1823 10/18/21  0644 10/18/21  0644   SODIUM mmol/L  --   --  135*   POTASSIUM mmol/L 4.2   < > 3.4*   CHLORIDE mmol/L  --   --  92*   CO2 mmol/L  --   --  29.0   BUN mg/dL  --   --  15   CREATININE mg/dL  --   --  0.58   GLUCOSE mg/dL  --   --  82   CALCIUM mg/dL  --   --  8.8    < > " = values in this interval not displayed.     Results from last 7 days   Lab Units 10/18/21  1823 10/18/21  0644 10/18/21  0644 10/17/21  1810 10/17/21  1810   SODIUM mmol/L  --   --  135*   < > 133*   POTASSIUM mmol/L 4.2   < > 3.4*   < > 3.5   CHLORIDE mmol/L  --   --  92*   < > 91*   CO2 mmol/L  --   --  29.0   < > 28.0   BUN mg/dL  --   --  15   < > 12   CREATININE mg/dL  --   --  0.58   < > 0.58   CALCIUM mg/dL  --   --  8.8   < > 9.3   BILIRUBIN mg/dL  --   --   --   --  0.3   ALK PHOS U/L  --   --   --   --  189*   ALT (SGPT) U/L  --   --   --   --  24   AST (SGOT) U/L  --   --   --   --  39*   GLUCOSE mg/dL  --   --  82   < > 84    < > = values in this interval not displayed.     Imaging Results (Last 72 Hours)     Procedure Component Value Units Date/Time    FL Video Swallow With Speech Single Contrast [[id number]] Collected: 10/18/21 1619     Updated: 10/18/21 2204    Narrative:      EXAMINATION: FL VIDEO SWALLOW W SPEECH SINGLE-CONTRAST-     INDICATION: DYSPHAGIA, OROPHARYNGEAL, HAS ATTRIBUTABLE CAUSE     TECHNIQUE: 1 minute and 36 seconds of fluoroscopic time was used for  this exam. 12 associated fluoroscopic loops were saved. The patient was  evaluated in the seated lateral position while taking a variety of  consistencies of barium by mouth under the direction of speech  pathology.     COMPARISON: NONE     FINDINGS: 1 minute and 36 seconds of fluoroscopy provided for a modified  barium swallow. Please see speech therapy report for full details and  recommendations.          Impression:      Fluoroscopy provided for a modified barium swallow. Please  see speech therapy report for full details and recommendations.         This report was finalized on 10/18/2021 10:01 PM by Dr. Tre Huerta MD.       XR Chest 1 View [[id number]] Collected: 10/17/21 1808     Updated: 10/18/21 0939    Narrative:      EXAMINATION: XR CHEST 1 VW - 10/17/2021     INDICATION: Shortness of breath.     COMPARISON: 10/12/2021      FINDINGS: Sternotomy wires noted. Heart and vasculature are normal in  size. There is extensive left mid and lower lung multifocal interstitial  disease and milder right basilar disease, with groundglass appearance,  suspicious for Covid 19 pneumonia. No effusion or pneumothorax is seen.  Incidental note is made of patient's older irregularly healed left  clavicle fracture and left proximal humerus fracture. No acute bony  abnormality is appreciated.       Impression:      Interval development of bilateral pneumonia left greater  than right, suspicious for Covid 19.     DICTATED:   10/17/2021  EDITED/lfs:   10/17/2021         This report was finalized on 10/18/2021 9:32 AM by Dr. Tre Huerta MD.       CT Angiogram Chest With & Without Contrast [[id number]] Collected: 10/17/21 2035     Updated: 10/17/21 2038    Narrative:      CT ANGIOGRAM CHEST W WO CONTRAST    INDICATION:   Hypoxia with pneumonia    TECHNIQUE:   CT angiogram of the chest with 100 cc of Isovue-300 IV contrast. 3-D reconstructions were obtained and reviewed.   Radiation dose reduction techniques included automated exposure control or exposure modulation based on body size. Count of known CT and  cardiac nuc med studies performed in previous 12 months: 2.     COMPARISON:   5/22/2021    FINDINGS:   Chest images of mediastinal and pulmonary emboli. The central pulmonary arteries are dilated consistent with chronic pulmonary hypertension. No adenopathy or effusions.    Chest images at lung windows show patchy bilateral infiltrates predominantly in the lingula and right middle lobe. Dense consolidation in the left lower lobe seen on the previous CT has resolved. Imaging features can be seen with COVID-19 pneumonia,  although are nonspecific and can can occur with a variety of infectious and noninfectious processes.      Impression:      Chronic pulmonary hypertension. No evidence of pulmonary embolism. Bilateral multifocal pneumonia, mild-to-moderate in  "severity and most prominent in the left upper and right middle lobes.    Signer Name: Nabil Vidales MD   Signed: 10/17/2021 8:35 PM   Workstation Name: [id number]    [facility]          Diagnostics: Reviewed    A: An Abreu is a 71 y.o. female with COPD and chronic O2 use being treated for bilateral pneumonia. Currently under home hospice care admitted 10/15/21. Having chronic musculoskeletal pain r/t arthritis, Dyspnea r/t COPD and PNA, chronic insomnia, and hx of seizure disorder managed with phenobarbital.     Symptoms:  -Chronic musculoskeletal pain  -Dyspnea  -Insomnia  -Seizure disorder    P:   -Increased Norco 10 mg po from q12 prn to q8 prn. Continue to monitor. Hold for oversedation. Attending notified.    -Encouraged cough and deep breathing, use of IS. Norco will aid with SOB as well. Continue O2 via NC and to monitor.    -Added Melatonin 5 mg QHS. Attending notified. Continue to monitor.    -Resumed Phenobarbital 150 mg daily as taken at home after approved by attending.    GOC:  Spoke with patient in person and her Dtr/HCS Stacie Agee today via phone. Introduced role of palliative care including symptom management and GOC. Patient confirms Full Code status today stating she would want everything done to help her live. Dtr also states she would want everything done including full treat and full code. They are provided education on general healthcare related topics to discuss such as code status changes, feeding tubes, Trach, dialysis, EOL care options such as palliative and hospice services. Patient did state she would not want a trach or PEG if offered. This conflicts with her recent living will. Patient states she would leave decisions up to Stacie and keeps asking her what she would want done during discussion. Discussed role of HCS as being advocate for her wishes. Patient states multiple times \" I just want to die in my sleep\". Dtr reports that patient is " under hospice care, but later denies having completed forms. Hospice agency ([facility]) confirms patient was admitted under hospice care on 10/15/21. Was unaware of hospitalization. Agency reports patient was previously discharged from [facility] r/t several missed appts and medication inconsistencies. Patient and dtr are interested in continuing home palliative or hospice services upon discharge. Dtr reports wanting to speak to someone in person about GOC, but does not have transportation to come to hospital. Patient ends discussion stating she would like to rest now and can talk more at a later time. Will continue to follow. Hospice consult placed. Attending notified.    We appreciate the consult and the opportunity to participate in An Abreu's care. We will continue to follow along. Please do not hesitate to contact us regarding further symptom management or goals of care needs, including after hours or on weekends via our on call provider at [phone].     Time: 60 minutes spent reviewing medical and medication records, assessing and examining patient, discussing with family, answering questions, providing some guidance about a plan and documentation of care, and coordinating care with other healthcare members, with > 50% time spent face to face.     Janis Glasgow, APRN    10/19/2021

## 2021-10-19 NOTE — THERAPY EVALUATION
Patient Name: An Abreu  : 1950    MRN: [id number]                              Today's Date: 10/19/2021       Admit Date: 10/17/2021    Visit Dx:     ICD-10-CM ICD-9-CM   1. Community acquired pneumonia, unspecified laterality  J18.9 486   2. Acute on chronic respiratory failure with hypoxia ([facility])  J96.21 518.84     799.02   3. Leukocytosis, unspecified type  D72.829 288.60   4. COPD exacerbation ([facility])  J44.1 491.21   5. Elevated troponin  R77.8 790.6   6. History of CHF (congestive heart failure)  Z86.79 V12.59   7. Oropharyngeal dysphagia  R13.12 787.22     Patient Active Problem List   Diagnosis   • Gastroesophageal reflux disease   • Essential hypertension   • Seizure disorder on phenobarbital    • PAD s/p R iliac stent    • Paroxysmal AF   • Fecal occult blood test positive   • Tobacco abuse   • CAD s/p CABG    • Anemia   • Fall   • Humerus fracture   • Hypoglycemia   • Metabolic encephalopathy   • Iron deficiency anemia due to chronic blood loss   • Mucopurulent chronic bronchitis ([facility])   • Severe malnutrition ([facility])   • H/O: recent GI bleed   • + PFO    • H/O CVA    • Non-compliance   • Debility   • Nonrheumatic aortic valve insufficiency   • Chronic respiratory failure with hypoxia and hypercapnia ([facility])   • Community acquired pneumonia   • COPD (chronic obstructive pulmonary disease) ([facility])   • Chronic diastolic CHF (congestive heart failure) ([facility])   • Leukocytosis     Past Medical History:   Diagnosis Date   • Aneurysm ([facility])    • Angina pectoris ([facility]) 2016   • Anxiety 2016   • Arthritis 2016   • CAD (coronary artery disease)    • Carotid artery stenosis    • CHF (congestive heart failure) ([facility])    • Chronic coronary artery disease 2016    2003 CABG LIMA to LAD, VG to OM  VG to OM occluded. LIMA patent Cx intervention and RCA  stent for ISR  ISR and stenting of CX and RCA  normal MPS   • Chronic left-sided low back pain with left-sided sciatica 2017   •  Chronic obstructive pulmonary disease (HCC) 6/20/2016   • Chronic respiratory failure with hypoxia (HCC) 3/27/2021   • Cobalamin deficiency 6/20/2016   • Congestive heart failure (HCC) 6/20/2016   • COPD (chronic obstructive pulmonary disease) (HCC)    • Depression 7/3/2018   • Diverticulosis    • Diverticulosis of large intestine without hemorrhage 5/5/2017   • GERD (gastroesophageal reflux disease)    • GIB (gastrointestinal bleeding)     recurrent    • HTN (hypertension)    • Hypercholesterolemia 6/20/2016   • Hyperlipidemia    • Mesenteric ischemia (HCC) 2006    s/p resection    • Mood disorder (HCC)    • Oxygen dependent     3 liters @ all times.    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 ([facility]) 5/23/2021   • PVD (peripheral vascular disease) ([facility])    • Restless legs syndrome 6/20/2016   • Seizure disorder ([facility]) 6/20/2016   • Seizures ([facility])    • Stenosis of carotid artery 6/20/2016   • Vertigo 9/28/2016     Past Surgical History:   Procedure Laterality Date   • APPENDECTOMY     • CHOLECYSTECTOMY     • COLONOSCOPY N/A 6/19/2021    Procedure: COLONOSCOPY;  Surgeon: Wilfredo Simon MD;  Location:  [facility] ENDOSCOPY;  Service: Gastroenterology;  Laterality: N/A;   • COLOSTOMY  2006    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   • ENDOSCOPY N/A 6/16/2021    Procedure: ESOPHAGOGASTRODUODENOSCOPY;  Surgeon: Jean Fuentes MD;  Location: [facility] ENDOSCOPY;  Service: Gastroenterology;  Laterality: N/A;   • EXPLORATORY LAPAROTOMY      sec to ovarian cysts   • HYSTERECTOMY     • ILIAC ARTERY STENT     • REVISION / TAKEDOWN COLOSTOMY  2008      General Information     Row Name 10/19/21 3667          Physical Therapy Time and Intention    Document Type evaluation  -CAMERON     Mode of Treatment physical therapy  -CAMERON     Row Name 10/19/21 5130          General Information    Patient Profile  Reviewed yes  -CAMERON     Prior Level of Function independent:; gait; transfer; bed mobility; ADL's  -CAMERON     Existing Precautions/Restrictions oxygen therapy device and L/min  -CAMERON     Barriers to Rehab medically complex  -CAMERON     Row Name 10/19/21 1355          Living Environment    Lives With child(nash), adult  -CAMERON     Row Name 10/19/21 1351          Cognition    Orientation Status (Cognition) oriented x 4  -CAMERON     Row Name 10/19/21 1359          Safety Issues, Functional Mobility    Safety Issues Affecting Function (Mobility) safety precautions follow-through/compliance  -CAMERON     Impairments Affecting Function (Mobility) balance; endurance/activity tolerance; shortness of breath  -CAMERON           User Key  (r) = Recorded By, (t) = Taken By, (c) = Cosigned By    Initials Name Provider Type    Sharmila Granados PT Physical Therapist               Mobility     Row Name 10/19/21 [facility]          Bed Mobility    Comment (Bed Mobility) patient is OOB and returns to the chair  -CAMERON     Row Name 10/19/21 [facility]          Bed-Chair Transfer    Bed-Chair [place] (Transfers) contact guard  -CAMERON     Assistive Device (Bed-Chair Transfers) walker, front-wheeled  -CAMERON     Row Name 10/19/21 [facility]          Sit-Stand Transfer    Sit-Stand [place] (Transfers) contact guard  -CAMERON     Assistive Device (Sit-Stand Transfers) walker, front-wheeled  -CAMERON     Row Name 10/19/21 [facility]          Gait/Stairs (Locomotion)    [place] Level (Gait) minimum assist (75% patient effort)  -CAMERON     Assistive Device (Gait) walker, front-wheeled  -CAMERON     Distance in Feet (Gait) 70  -CAMERON     Deviations/Abnormal Patterns (Gait) stride length decreased  -CAMERON     Bilateral Gait Deviations forward flexed posture  -CAMERON     Comment (Gait/Stairs) patient gets too far from walker with fatigue and has a significant LOB needing therapist to correct.  -CAMERON           User Key  (r) = Recorded By, (t) = Taken By, (c) = Cosigned By    Initials Name Provider Type    CAMERON Amador  Sharmila DOWNEY PT Physical Therapist               Obj/Interventions     [facility] Name 10/19/21 1358          Range of Motion Comprehensive    General Range of Motion no range of motion deficits identified  -[facility] Name 10/19/21 1358          Strength Comprehensive (MMT)    Comment, General Manual Muscle Testing (MMT) Assessment generalixed weakness 4-/5  -[facility] Name 10/19/21 1358          Motor Skills    Therapeutic Exercise hip; knee; ankle  -[facility] Name 10/19/21 1358          Hip (Therapeutic Exercise)    Hip (Therapeutic Exercise) AROM (active range of motion)  -     Hip AROM (Therapeutic Exercise) bilateral; flexion; extension; 10 repetitions; sitting  -[facility] Name 10/19/21 1358          Knee (Therapeutic Exercise)    Knee (Therapeutic Exercise) AROM (active range of motion)  -     Knee AROM (Therapeutic Exercise) bilateral; flexion; extension; 10 repetitions; sitting  -[facility] Name 10/19/21 1358          Ankle (Therapeutic Exercise)    Ankle (Therapeutic Exercise) AROM (active range of motion)  -     Ankle AROM (Therapeutic Exercise) bilateral; dorsiflexion; plantarflexion; 10 repetitions; sitting  -[facility] Name 10/19/21 1355          Balance    Balance Assessment sitting static balance; sitting dynamic balance; standing static balance; standing dynamic balance  -CAMERON     Static Sitting Balance WFL; unsupported  -CAMERON     Dynamic Sitting Balance WFL; unsupported  -CAMERON     Static Standing Balance WFL; supported  -CAMERON     Dynamic Standing Balance mild impairment; supported  -CAMERON     Balance Interventions standing; dynamic; weight shifting activity  -CAMERON           User Key  (r) = Recorded By, (t) = Taken By, (c) = Cosigned By    Initials Name Provider Type    CAMERON Sharmila Amador PT Physical Therapist               Goals/Plan     Row Name 10/19/21 0701          Bed Mobility Goal 1 (PT)    [place] Level/Cues Needed (Bed Mobility Goal 1, PT) independent  -CAMERON     Time Frame (Bed Mobility Goal 1,  PT) long term goal (LTG); 10 days  -CAMERON     Progress/Outcomes (Bed Mobility Goal 1, PT) goal ongoing  -CAMERON     Row Name 10/19/21 1402          Transfer Goal 1 (PT)    Activity/Assistive Device (Transfer Goal 1, PT) sit-to-stand/stand-to-sit  -CAMEORN     [place] Level/Cues Needed (Transfer Goal 1, PT) independent  -CAMERON     Time Frame (Transfer Goal 1, PT) long term goal (LTG); 10 days  -CAMERON     Progress/Outcome (Transfer Goal 1, PT) goal ongoing  -CAMERON     Row Name 10/19/21 1402          Gait Training Goal 1 (PT)    Activity/Assistive Device (Gait Training Goal 1, PT) gait (walking locomotion)  -CAMERON     [place] Level (Gait Training Goal 1, PT) independent  -CAMERON     Distance (Gait Training Goal 1, PT) 250  -CAMERON     Time Frame (Gait Training Goal 1, PT) long term goal (LTG); 10 days  -CAMERON     Progress/Outcome (Gait Training Goal 1, PT) goal ongoing  -CAMERON           User Key  (r) = Recorded By, (t) = Taken By, (c) = Cosigned By    Initials Name Provider Type    Sharmila Granados, PT Physical Therapist               Clinical Impression     Row Name 10/19/21 8814          Pain Scale: Numbers Pre/Post-Treatment    Pretreatment Pain Rating 0/10 - no pain  -CAMERON     Posttreatment Pain Rating 0/10 - no pain  -CAMERON     Row Name 10/19/21 9157          Plan of Care Review    Plan of Care Reviewed With patient  -CAMERON     Progress improving  -CAMERON     Outcome Summary PT eval is completed patient demonstrates impaired transfer and gait as well as decreased endurace for activity and decreased strength. patient stood with CGA and ambulated 70 ft with mostly CGA but at the end of walking patient became more usafe with walker and had one LOB requiring PT to correct. anticipate patient to go home with family assist at D/C she may benefit from   -CAMERON     Row Name 10/19/21 9207          Therapy Assessment/Plan (PT)    Patient/Family Therapy Goals Statement (PT) I am going home  -CAMERON     Rehab Potential (PT) good, to achieve stated therapy goals   -CAMERON     Criteria for Skilled Interventions Met (PT) yes; skilled treatment is necessary  -CAMERON     Row Name 10/19/21 1359          Vital Signs    Pre Systolic BP Rehab 127  -CAMERON     Pre Treatment Diastolic BP 60  -CAMERON     Pretreatment Heart Rate (beats/min) 70  -CAMERON     Posttreatment Heart Rate (beats/min) 86  -CAMERON     Pre SpO2 (%) 95  -CAMERON     O2 Delivery Pre Treatment nasal cannula  -CAMERON     Post SpO2 (%) 93  -CAMERON     O2 Delivery Post Treatment nasal cannula  -CAMERON     Pre Patient Position Sitting  -CAMERON     Intra Patient Position Standing  -CAMERON     Post Patient Position Sitting  -CAMERON     Row Name 10/19/21 1359          Positioning and Restraints    Pre-Treatment Position sitting in chair/recliner  -CAMERON     Post Treatment Position chair  -CAMERON     In Chair notified nsg; reclined; sitting; call light within reach; encouraged to call for assist  -CAMERON           User Key  (r) = Recorded By, (t) = Taken By, (c) = Cosigned By    Initials Name Provider Type    Sharmila Granados PT Physical Therapist               Outcome Measures     Row Name 10/19/21 1404 10/19/21 0830       How much help from another person do you currently need...    Turning from your back to your side while in flat bed without using bedrails? 4  -CAMERON 4  -JR    Moving from lying on back to sitting on the side of a flat bed without bedrails? 4  -CAMERON 4  -JR    Moving to and from a bed to a chair (including a wheelchair)? 3  -CAMERON 3  -JR    Standing up from a chair using your arms (e.g., wheelchair, bedside chair)? 3  -CAMERON 3  -JR    Climbing 3-5 steps with a railing? 2  -CAMERON 2  -JR    To walk in hospital room? 3  -CAMERON 3  -JR    AM-PAC 6 Clicks Score (PT) 19  -CAMERON 19  -JR          User Key  (r) = Recorded By, (t) = Taken By, (c) = Cosigned By    Initials Name Provider Type    Sharmila Granados PT Physical Therapist    Daylin Rogel RN Registered Nurse                             Physical Therapy Education                 Title: PT OT SLP Therapies (In  Progress)     Topic: Physical Therapy (In Progress)     Point: Mobility training (In Progress)     Learning Progress Summary           Patient Acceptance, E, NR by CAMERON at 10/19/2021 1300                   Point: Home exercise program (In Progress)     Learning Progress Summary           Patient Acceptance, E, NR by CAMERON at 10/19/2021 1300                   Point: Body mechanics (In Progress)     Learning Progress Summary           Patient Acceptance, E, NR by CAMERON at 10/19/2021 1300                   Point: Precautions (In Progress)     Learning Progress Summary           Patient Acceptance, E, NR by CAMERON at 10/19/2021 1300                               User Key     Initials Effective Dates Name Provider Type Discipline     06/16/21 -  Sharmila Amador, PT Physical Therapist PT              PT Recommendation and Plan  Planned Therapy Interventions (PT): balance training, bed mobility training, gait training, home exercise program, strengthening, transfer training  Plan of Care Reviewed With: patient  Progress: improving  Outcome Summary: PT eval is completed patient demonstrates impaired transfer and gait as well as decreased endurace for activity and decreased strength. patient stood with CGA and ambulated 70 ft with mostly CGA but at the end of walking patient became more usafe with walker and had one LOB requiring PT to correct. anticipate patient to go home with family assist at D/C she may benefit from HH     Time Calculation:    PT Charges     Row Name 10/19/21 1406             Time Calculation    Start Time 1300  -CAMERON      PT Received On 10/19/21  -      PT Goal Re-Cert Due Date 10/29/21  -              Time Calculation- PT    Total Timed Code Minutes- PT 23 minute(s)  -CAMERON              Timed Charges    19715 - PT Therapeutic Activity Minutes 23  -CAMERON              Untimed Charges    PT Eval/Re-eval Minutes 25  -CAMERON              Total Minutes    Timed Charges Total Minutes 23  -CAMERON      Untimed Charges Total  Minutes 25  -CAMERON       Total Minutes 48  -CAMERON            User Key  (r) = Recorded By, (t) = Taken By, (c) = Cosigned By    Initials Name Provider Type    Sharmila Granados, PT Physical Therapist              Therapy Charges for Today     Code Description Service Date Service Provider Modifiers Qty    15064829184  PT THERAPEUTIC ACT EA 15 MIN 10/19/2021 Sharmila Amador, PT GP 2    92994416530 HC PT EVAL MOD COMPLEXITY 2 10/19/2021 Sharmila Amador, PT GP 1          PT G-Codes  AM-PAC 6 Clicks Score (PT): 19    Sharmila Amador, PT  10/19/2021

## 2021-10-19 NOTE — PLAN OF CARE
Goal Outcome Evaluation:  Plan of Care Reviewed With: patient        Progress: no change  Outcome Summary: New Palliative consult for assistance with GOC. SIVA Jett saw pt. this morning.  Pt sitting up in bed complains of pain at a 6 in bilat legs and back.  Pt. is smoker but refuses nicotine patches.  On dysphagia 4 honey thick diet.  No dyspnea but does admit some depression due to decline .  Palliative care continuing to follow for support, plan of care and symptom management.        1300 Palliative Team Conference:  FRANNY Pena DO ; KARLO Braga RN, CHPN; GURVINDER Soto, APRN; ERIC Jimenez RN, ALISA; SHASHI Huang, [facility]; KARLO Glasgow, APRN; BENNY Freeman RN      Problem: Palliative Care  Goal: Enhanced Quality of Life  10/19/2021 1236 by Nieves Braga RN  Outcome: Ongoing, Progressing  10/19/2021 1235 by Nieves Braga RN  Outcome: Ongoing, Progressing  Intervention: Maximize Comfort  10/19/2021 1236 by Nieves Braga RN  Flowsheets (Taken 10/19/2021 1236)  Pain Management Interventions: awakened for pain meds per patient request  Complementary Therapy: music therapy  10/19/2021 1235 by Nieves Braga RN  Flowsheets  Taken 10/19/2021 1230  Complementary Therapy: music therapy  Taken 10/19/2021 0956  Pain Management Interventions: see MAR  Intervention: Optimize Function  10/19/2021 1236 by Nieves Braga RN  Flowsheets (Taken 10/19/2021 1236)  Fatigue Management: activity schedule adjusted  10/19/2021 1235 by Nieves Braga RN  Flowsheets  Taken 10/19/2021 1230  Sensory Stimulation Regulation: auditory stimulation minimized  Sleep/Rest Enhancement:  • awakenings minimized  • consistent schedule promoted  Taken 10/19/2021 0956  Sensory Stimulation Regulation: auditory stimulation minimized  Sleep/Rest Enhancement:  • awakenings minimized  • family presence promoted  Intervention: Promote Advance Care Planning  10/19/2021 1236 by Nieves Braga RN  Flowsheets (Taken 10/19/2021 1230)  Life  Transition/Adjustment:  • palliative care initiated  • palliative care discussed  10/19/2021 1235 by Nieves Braga RN  Flowsheets  Taken 10/19/2021 1230  Life Transition/Adjustment:  • palliative care initiated  • palliative care discussed  Taken 10/19/2021 0956  Life Transition/Adjustment:  • decision-making facilitated  • palliative care discussed  • palliative care initiated

## 2021-10-19 NOTE — PROGRESS NOTES
Continued Stay Note   [place]     Patient Name: An Abreu  MRN: [id number]  Today's Date: 10/19/2021    Admit Date: 10/17/2021     Discharge Plan     Row Name 10/19/21 1632       Plan    Plan Revocation of hospice benefit    Plan Comments Patient elected to revoke hospice care due to wanting to continue to aggressive treatment, relays that it is her desire to remain full code, and she relayed that she only had hospice to pay for medications.  Educated regarding ability to re enroll in hospice at any time and to call with questions at any time. Contact information left with patient. SW left message for  to explore care plan for going home. SW to follow up tomorrow.               Discharge Codes    No documentation.               Expected Discharge Date and Time     Expected Discharge Date Expected Discharge Time    Oct 22, 2021             Destiny Moctezuma RN CHPN

## 2021-10-19 NOTE — SIGNIFICANT NOTE
10/19/21 1601   SLP Deferred Reason   SLP Deferred Reason Routine; Patient unavailable for treatment  (Attempted x2 for dysphagia tx this pm. Pt unavailable, will defer and f/u for continued dysphagia tx)

## 2021-10-19 NOTE — PROGRESS NOTES
[facility] Medicine Services  PROGRESS NOTE    Patient Name: An Abreu  : 1950  MRN: [id number]    Date of Admission: 10/17/2021  Primary Care Physician: Angelika Butterfield MD    Subjective   Subjective     CC:  COPD/PNA    HPI:  Appears uncomfortable, didn't tolerate BIPAP overnight due to coughing and having to take it off. Had d/w patient about code status and her living will. Continues to want to be full code    ROS:  Gen- No fevers, chills  CV- No chest pain, palpitations  Resp- No cough, + dyspnea  GI- No N/V/D, abd pain        Objective   Objective     Vital Signs:   Temp:  [97.7 °F (36.5 °C)-98 °F (36.7 °C)] 97.9 °F (36.6 °C)  Heart Rate:  [64-76] 65  Resp:  [16-26] 16  BP: (111-135)/(49-61) 115/49  Flow (L/min):  [4-6] 4     Physical Exam:  GEN-appears frail, chronically ill   HEENT- atraumatic, normocephalic, eomi  NECK- supple, trachea midline, no masses  RESP effort diminished and slightly less labored today, on 4L  CV: no murmurs, s1/s2, rrr  MSK: no edema noted, spontaneous movement of all extremities  NEURO: alert, oriented, no focal deficits  SKIN: no rashes  PSYCH: appropriate mood and affect       Results Reviewed:  LAB RESULTS:      Lab 10/18/21  0644 10/17/21  1810   WBC 13.74* 13.70*   HEMOGLOBIN 13.4 13.9   HEMATOCRIT 40.3 42.6   PLATELETS 235 246   NEUTROS ABS 11.72* 12.50*   IMMATURE GRANS (ABS) 0.06* 0.05   LYMPHS ABS 1.31 0.68*   MONOS ABS 0.63 0.42   EOS ABS 0.00 0.03   MCV 93.9 94.7   CRP  --  20.19*   PROCALCITONIN  --  0.10   LACTATE  --  1.2   PROTIME  --  13.5         Lab 10/18/21  1823 10/18/21  0644 10/17/21  1810   SODIUM  --  135* 133*   POTASSIUM 4.2 3.4* 3.5   CHLORIDE  --  92* 91*   CO2  --  29.0 28.0   ANION GAP  --  14.0 14.0   BUN  --  15 12   CREATININE  --  0.58 0.58   GLUCOSE  --  82 84   CALCIUM  --  8.8 9.3         Lab 10/17/21  1810   TOTAL PROTEIN 7.4   ALBUMIN 3.90   GLOBULIN 3.5   ALT (SGPT) 24   AST (SGOT) 39*   BILIRUBIN  0.3   ALK PHOS 189*         Lab 10/18/21  0644 10/17/21  1810   PROBNP  --  12,091.0*   TROPONIN T 0.376* 0.387*   PROTIME  --  13.5   INR  --  1.07         Lab 10/18/21  0644   CHOLESTEROL 211*   LDL CHOL 138*   HDL CHOL 52   TRIGLYCERIDES 117         Lab 10/17/21  1810   FERRITIN 75.99         Brief Urine Lab Results  (Last result in the past 365 days)      Color   Clarity   Blood   Leuk Est   Nitrite   Protein   CREAT   Urine HCG        10/17/21 2101 Yellow   Clear   Negative   Negative   Negative   30 mg/dL (1+)                 Microbiology Results Abnormal     Procedure Component Value - Date/Time    Blood Culture - Blood, Arm, Right [[id number]]  (Normal) Collected: 10/17/21 1725    Lab Status: Preliminary result Specimen: Blood from Arm, Right Updated: 10/18/21 1901     Blood Culture No growth at 24 hours    Blood Culture - Blood, Arm, Right [[id number]]  (Normal) Collected: 10/17/21 1750    Lab Status: Preliminary result Specimen: Blood from Arm, Right Updated: 10/18/21 1845     Blood Culture No growth at 24 hours    COVID PRE-OP / PRE-PROCEDURE SCREENING ORDER (NO ISOLATION) - Swab, Nasopharynx [[id number]]  (Normal) Collected: 10/17/21 2355    Lab Status: Final result Specimen: Swab from Nasopharynx Updated: 10/18/21 0049    Narrative:      The following orders were created for panel order COVID PRE-OP / PRE-PROCEDURE SCREENING ORDER (NO ISOLATION) - Swab, Nasopharynx.  Procedure                               Abnormality         Status                     ---------                               -----------         ------                     Respiratory Panel PCR w/...[[id number]]  Normal              Final result                 Please view results for these tests on the individual orders.    Respiratory Panel PCR w/COVID-19(SARS-CoV-2) RYNE/AMA/SYLVIA/PAD/COR/MAD/BLANCA In-House, NP Swab in [facility], 3-4 HR TAT - Swab, Nasopharynx [[id number]]  (Normal) Collected: 10/17/21 2355    Lab Status: Final result Specimen:  Swab from Nasopharynx Updated: 10/18/21 0049     ADENOVIRUS, PCR Not Detected     Coronavirus 229E Not Detected     Coronavirus HKU1 Not Detected     Coronavirus NL63 Not Detected     Coronavirus OC43 Not Detected     COVID19 Not Detected     Human Metapneumovirus Not Detected     Human Rhinovirus/Enterovirus Not Detected     Influenza A PCR Not Detected     Influenza B PCR Not Detected     Parainfluenza Virus 1 Not Detected     Parainfluenza Virus 2 Not Detected     Parainfluenza Virus 3 Not Detected     Parainfluenza Virus 4 Not Detected     RSV, PCR Not Detected     Bordetella pertussis pcr Not Detected     Bordetella parapertussis PCR Not Detected     Chlamydophila pneumoniae PCR Not Detected     Mycoplasma pneumo by PCR Not Detected    Narrative:      In the setting of a positive respiratory panel with a viral infection PLUS a negative procalcitonin without other underlying concern for bacterial infection, consider observing off antibiotics or discontinuation of antibiotics and continue supportive care. If the respiratory panel is positive for atypical bacterial infection (Bordetella pertussis, Chlamydophila pneumoniae, or Mycoplasma pneumoniae), consider antibiotic de-escalation to target atypical bacterial infection.    COVID-19 and FLU A/B PCR - Swab, Nasopharynx [[id number]]  (Normal) Collected: 10/17/21 1725    Lab Status: Final result Specimen: Swab from Nasopharynx Updated: 10/17/21 1911     COVID19 Not Detected     Influenza A PCR Not Detected     Influenza B PCR Not Detected    Narrative:      Fact sheet for providers: https://www.fda.gov/media/456779/download    Fact sheet for patients: https://www.fda.gov/media/312920/download    Test performed by PCR.          CT Angiogram Chest With & Without Contrast    Result Date: 10/17/2021  CT ANGIOGRAM CHEST W WO CONTRAST INDICATION: Hypoxia with pneumonia TECHNIQUE: CT angiogram of the chest with 100 cc of Isovue-300 IV contrast. 3-D reconstructions were  obtained and reviewed.   Radiation dose reduction techniques included automated exposure control or exposure modulation based on body size. Count of known CT and cardiac nuc med studies performed in previous 12 months: 2. COMPARISON: 5/22/2021 FINDINGS: Chest images of mediastinal and pulmonary emboli. The central pulmonary arteries are dilated consistent with chronic pulmonary hypertension. No adenopathy or effusions. Chest images at lung windows show patchy bilateral infiltrates predominantly in the lingula and right middle lobe. Dense consolidation in the left lower lobe seen on the previous CT has resolved. Imaging features can be seen with COVID-19 pneumonia, although are nonspecific and can can occur with a variety of infectious and noninfectious processes.     Impression: Chronic pulmonary hypertension. No evidence of pulmonary embolism. Bilateral multifocal pneumonia, mild-to-moderate in severity and most prominent in the left upper and right middle lobes. Signer Name: Nabil Vidales MD  Signed: 10/17/2021 8:35 PM  Workstation Name: [id number]  [facility]    FL Video Swallow With Speech Single Contrast    Result Date: 10/18/2021  EXAMINATION: FL VIDEO SWALLOW W SPEECH SINGLE-CONTRAST-  INDICATION: DYSPHAGIA, OROPHARYNGEAL, HAS ATTRIBUTABLE CAUSE  TECHNIQUE: 1 minute and 36 seconds of fluoroscopic time was used for this exam. 12 associated fluoroscopic loops were saved. The patient was evaluated in the seated lateral position while taking a variety of consistencies of barium by mouth under the direction of speech pathology.  COMPARISON: NONE  FINDINGS: 1 minute and 36 seconds of fluoroscopy provided for a modified barium swallow. Please see speech therapy report for full details and recommendations.       Impression: Fluoroscopy provided for a modified barium swallow. Please see speech therapy report for full details and recommendations.    This report was finalized on 10/18/2021  10:01 PM by Dr. Tre Huerta MD.      XR Chest 1 View    Result Date: 10/18/2021  EXAMINATION: XR CHEST 1 VW - 10/17/2021  INDICATION: Shortness of breath.  COMPARISON: 10/12/2021  FINDINGS: Sternotomy wires noted. Heart and vasculature are normal in size. There is extensive left mid and lower lung multifocal interstitial disease and milder right basilar disease, with groundglass appearance, suspicious for Covid 19 pneumonia. No effusion or pneumothorax is seen. Incidental note is made of patient's older irregularly healed left clavicle fracture and left proximal humerus fracture. No acute bony abnormality is appreciated.      Impression: Interval development of bilateral pneumonia left greater than right, suspicious for Covid 19.  DICTATED:   10/17/2021 EDITED/lfs:   10/17/2021    This report was finalized on 10/18/2021 9:32 AM by Dr. Tre Huerta MD.        Results for orders placed during the hospital encounter of 05/03/21    Adult Transthoracic Echo Complete W/ Cont if Necessary Per Protocol    Interpretation Summary  · Estimated left ventricular EF = 70%  · Moderate aortic valve regurgitation is present.      I have reviewed the medications:  Scheduled Meds:budesonide-formoterol, 2 puff, Inhalation, BID - RT  cefTRIAXone, 1 g, Intravenous, Q24H  clopidogrel, 75 mg, Oral, Daily  dilTIAZem CD, 240 mg, Oral, Daily  doxycycline, 100 mg, Oral, Q12H  furosemide, 40 mg, Oral, Daily  lidocaine, 1 patch, Transdermal, Q24H  nicotine, 1 patch, Transdermal, Q24H  pantoprazole, 40 mg, Oral, Q AM  pharmacy consult - MTM, , Does not apply, Daily  predniSONE, 20 mg, Oral, Daily With Breakfast  rOPINIRole, 0.25 mg, Oral, Nightly  rosuvastatin, 40 mg, Oral, Nightly  sodium chloride, 10 mL, Intravenous, Q12H      Continuous Infusions:   PRN Meds:.•  albuterol  •  HYDROcodone-acetaminophen  •  ipratropium-albuterol  •  ondansetron **OR** ondansetron  •  potassium chloride **OR** potassium chloride **OR** potassium chloride  •  sodium  chloride    Assessment/Plan   Assessment & Plan     Active Hospital Problems    Diagnosis  POA   • Community acquired pneumonia [J18.9]  Yes   • COPD (chronic obstructive pulmonary disease) ([facility]) [J44.9]  Yes   • Chronic diastolic CHF (congestive heart failure) ([facility]) [I50.32]  Yes   • Leukocytosis [D72.829]  Unknown   • CAD s/p CABG  [I25.10]  Yes   • Tobacco abuse [Z72.0]  Yes   • Paroxysmal AF [I48.0]  Yes   • Gastroesophageal reflux disease [K21.9]  Yes      Resolved Hospital Problems   No resolved problems to display.        Brief Hospital Course to date:  An Abreu is a 71 y.o. female with a history of COPD, current smoker, Coronary Artery Disease s/p CABG in 2006, Hypertension, Diastolic CHF, and paroxysmal atrial Fib on Plavix who presents to [facility] ED today for complaint of a productive cough and shortness of breath.     This patient's problems and plans were partially entered by my partner and updated as appropriate by me 10/19/21.        Bilateral Pneumonia (favor bacterial)  COPD exacerbation  Chronic hypoxic resp failure (chronic 3Lnc)  Leukocytosis  -partially vaccinated but covid neg x2  - CXR w/ bilateral patchy infiltrates  -CT angio chest no pe, bilateral patchy pneumonia (most prominent left upper and right middle), not typical appearance of covid pneumonia  -covid 19 pcr negative; resp viral panel negative   -azactam & doxycycline day # 3 empiric antibiotics- augment to rocephin/doxy as has tolerated in the past per pharmacy  -received solumedrol 125mg iv in ed; currently on pred 20- continue for now  -symbicort bid, duonebs scheduled & prn  - SLP has seen - planning MBS        CAD (previous cabg)  Paroxysmal Atrial Fib  A/C DHF  Moderate Aortic Insuffiency (last echo 5/2021 w/ normal ef)  Mildly elevated troponin, chronically elevated  -continue cardizem; currently in sinus rhythm  -currently on Plavix, continue  -not on anticoagulation due to frequent falls, anemia, previous gi  bleed  -lasix 40mg iv once (continue once daily po lasix)  -trend troponins, appears chronically elevated; ekg unchanged, No chest pain (if trending upwards consider echo)     GERD  Hx previous gi bleed  - Continue home Nexium     Hyperlipidemia  -continue crestor     Tobacco Abuse  - An Abreu  reports that she has been smoking cigarettes and electronic cigarette. She has a 40.00 pack-year smoking history. She has never used smokeless tobacco..She continues to smoke and I have again counseled her on risks including hospitalizations related to lung dz. She reports that she cannot afford the patch     Had d/w patient about code status and her living will. Continues to want to be full code despite my d/w her that I do not think she will do well on a ventilator. Palliative consult today       DVT prophylaxis:  Mechanical DVT prophylaxis orders are present.       AM-PAC 6 Clicks Score (PT): 19 (10/19/21 0524)    Disposition: I expect the patient to be discharged pending improvement    CODE STATUS:   Code Status and Medical Interventions:   Ordered at: 10/17/21 3360     Code Status:    CPR     Medical Interventions (Level of Support Prior to Arrest):    Full       Lilly Ceballos MD  10/19/21

## 2021-10-19 NOTE — PLAN OF CARE
Goal Outcome Evaluation:         Pt had meeting with palliative and hospice to discuss GOC. Continue complaints of intermittent pain, administered lidocaine. VSS, 4L NC.

## 2021-10-20 NOTE — THERAPY TREATMENT NOTE
Acute Care - Speech Language Pathology   Swallow Treatment Note [facility]     Patient Name: An Abreu  : 1950  MRN: [id number]  Today's Date: 10/20/2021               Admit Date: 10/17/2021    Visit Dx:     ICD-10-CM ICD-9-CM   1. Community acquired pneumonia, unspecified laterality  J18.9 486   2. Acute on chronic respiratory failure with hypoxia ([facility])  J96.21 518.84     799.02   3. Leukocytosis, unspecified type  D72.829 288.60   4. COPD exacerbation ([facility])  J44.1 491.21   5. Elevated troponin  R77.8 790.6   6. History of CHF (congestive heart failure)  Z86.79 V12.59   7. Oropharyngeal dysphagia  R13.12 787.22     Patient Active Problem List   Diagnosis   • Gastroesophageal reflux disease   • Essential hypertension   • Seizure disorder on phenobarbital    • PAD s/p R iliac stent    • Paroxysmal AF   • Fecal occult blood test positive   • Tobacco abuse   • CAD s/p CABG    • Anemia   • Fall   • Humerus fracture   • Hypoglycemia   • Metabolic encephalopathy   • Iron deficiency anemia due to chronic blood loss   • Mucopurulent chronic bronchitis ([facility])   • Severe malnutrition ([facility])   • H/O: recent GI bleed   • + PFO    • H/O CVA    • Non-compliance   • Debility   • Nonrheumatic aortic valve insufficiency   • Chronic respiratory failure with hypoxia and hypercapnia ([facility])   • Community acquired pneumonia   • COPD (chronic obstructive pulmonary disease) ([facility])   • Chronic diastolic CHF (congestive heart failure) ([facility])   • Leukocytosis     Past Medical History:   Diagnosis Date   • Aneurysm ([facility])    • Angina pectoris ([facility]) 2016   • Anxiety 2016   • Arthritis 2016   • CAD (coronary artery disease)    • Carotid artery stenosis    • CHF (congestive heart failure) ([facility])    • Chronic coronary artery disease 2016 CABG LIMA to LAD, VG to OM  VG to OM occluded. LIMA patent Cx intervention and RCA  stent for ISR  ISR and stenting of CX and RCA  normal MPS   • Chronic  left-sided low back pain with left-sided sciatica 2/1/2017   • Chronic obstructive pulmonary disease (HCC) 6/20/2016   • Chronic respiratory failure with hypoxia ([facility]) 3/27/2021   • Cobalamin deficiency 6/20/2016   • Congestive heart failure (HCC) 6/20/2016   • COPD (chronic obstructive pulmonary disease) (HCC)    • Depression 7/3/2018   • Diverticulosis    • Diverticulosis of large intestine without hemorrhage 5/5/2017   • GERD (gastroesophageal reflux disease)    • GIB (gastrointestinal bleeding)     recurrent    • HTN (hypertension)    • Hypercholesterolemia 6/20/2016   • Hyperlipidemia    • Mesenteric ischemia (HCC) 2006    s/p resection    • Mood disorder (HCC)    • Oxygen dependent     3 liters @ all times.    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 ([facility]) 5/23/2021   • PVD (peripheral vascular disease) ([facility])    • Restless legs syndrome 6/20/2016   • Seizure disorder ([facility]) 6/20/2016   • Seizures ([facility])    • Stenosis of carotid artery 6/20/2016   • Vertigo 9/28/2016     Past Surgical History:   Procedure Laterality Date   • APPENDECTOMY     • CHOLECYSTECTOMY     • COLONOSCOPY N/A 6/19/2021    Procedure: COLONOSCOPY;  Surgeon: Wilfredo Simon MD;  Location: [facility];  Service: Gastroenterology;  Laterality: N/A;   • COLOSTOMY  2006    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   • ENDOSCOPY N/A 6/16/2021    Procedure: ESOPHAGOGASTRODUODENOSCOPY;  Surgeon: Jean Fuentes MD;  Location: [facility];  Service: Gastroenterology;  Laterality: N/A;   • EXPLORATORY LAPAROTOMY      sec to ovarian cysts   • HYSTERECTOMY     • ILIAC ARTERY STENT     • REVISION / TAKEDOWN COLOSTOMY  2008       SLP Recommendation and Plan  SLP Swallowing Diagnosis: mild, oral dysphagia, moderate, pharyngeal dysphagia (10/20/21 1045)  SLP Diet Recommendation: mechanical soft with no mixed  consistencies, honey thick liquids (10/20/21 1045)  Recommended Precautions and Strategies: upright posture during/after eating, small bites of food and sips of liquid, no straw, general aspiration precautions, reflux precautions, other (see comments) (10/20/21 1045)  SLP Rec. for Method of Medication Administration: meds whole, meds crushed, with pudding or applesauce, as tolerated (10/20/21 1045)     Monitor for Signs of Aspiration: yes, notify SLP if any concerns (10/20/21 1045)     Swallow Criteria for Skilled Therapeutic Interventions Met: demonstrates skilled criteria (10/20/21 1045)  Anticipated Discharge Disposition (SLP): unknown, anticipate therapy at next level of care (10/20/21 1045)  Rehab Potential/Prognosis, Swallowing: good, to achieve stated therapy goals (10/20/21 1045)  Therapy Frequency (Swallow): 5 days per week (10/20/21 1045)  Predicted Duration Therapy Intervention (Days): until discharge (10/20/21 1045)     Daily Summary of Progress (SLP): progress toward functional goals as expected (10/20/21 1045)    Plan for Continued Treatment (SLP): Pt participated in dysphagia tx this am at bedside. Pt w/ good participation in all tx exercises. Pt motivated to improve swallowing fnx. No overt s/s of aspiration w/ honey thick liquid presentations. Provided copy of dysphagia tx exercises and educated pt on importance of completing throughout the day. She verbalized understanding. Continue current modified po diet. (10/20/21 1045)              Plan of Care Reviewed With: patient  Progress: improving      SWALLOW EVALUATION (last 72 hours)     SLP Adult Swallow Evaluation     Row Name 10/20/21 1045       Rehab Evaluation    Document Type therapy note (daily note)  -    Subjective Information no complaints  -    Patient Observations alert; cooperative; agree to therapy  -    Patient/Family/Caregiver Comments/Observations no family present  -    Care Plan Review evaluation/treatment results reviewed;  care plan/treatment goals reviewed; patient/other agree to care plan  -    Patient Effort good  -    Symptoms Noted During/After Treatment none  -            General Information    Patient Profile Reviewed --    Pertinent History Of Current Problem --    Current Method of Nutrition --    Precautions/Limitations, Vision --    Precautions/Limitations, Hearing --    Prior Level of Function-Communication --    Prior Level of Function-Swallowing --    Plans/Goals Discussed with --    Barriers to Rehab --    Patient's Goals for Discharge --            Pain    Additional Documentation Pain Scale: FACES Pre/Post-Treatment (Group)  -            Pain Scale: Numbers Pre/Post-Treatment    Pretreatment Pain Rating --    Posttreatment Pain Rating --    Pre/Posttreatment Pain Comment --            Pain Scale: FACES Pre/Post-Treatment    Pain: FACES Scale, Pretreatment 0-->no hurt  -CJ    Posttreatment Pain Rating 0-->no hurt  -    Pain Location - Side --    Pain Location - Orientation --    Pre/Posttreatment Pain Comment --                    Clinical Impression    Daily Summary of Progress (SLP) progress toward functional goals as expected  -    SLP Swallowing Diagnosis mild; oral dysphagia; moderate; pharyngeal dysphagia  -    Functional Impact risk of aspiration/pneumonia; risk of malnutrition; risk of dehydration  -    Rehab Potential/Prognosis, Swallowing good, to achieve stated therapy goals  -    Swallow Criteria for Skilled Therapeutic Interventions Met demonstrates skilled criteria  -    Plan for Continued Treatment (SLP) Pt participated in dysphagia tx this am at bedside. Pt w/ good participation in all tx exercises. Pt motivated to improve swallowing fnx. No overt s/s of aspiration w/ honey thick liquid presentations. Provided copy of dysphagia tx exercises and educated pt on importance of completing throughout the day. She verbalized understanding. Continue current modified po diet.  -             Recommendations    Therapy Frequency (Swallow) 5 days per week  -    Predicted Duration Therapy Intervention (Days) until discharge  -    SLP Diet Recommendation mechanical soft with no mixed consistencies; honey thick liquids  -    Recommended Diagnostics --    Recommended Precautions and Strategies upright posture during/after eating; small bites of food and sips of liquid; no straw; general aspiration precautions; reflux precautions; other (see comments)  -    Oral Care Recommendations Oral Care BID/PRN; Suction toothbrush  -    SLP Rec. for Method of Medication Administration meds whole; meds crushed; with pudding or applesauce; as tolerated  -    Monitor for Signs of Aspiration yes; notify SLP if any concerns  -    Anticipated Discharge Disposition (SLP) unknown; anticipate therapy at next level of care  -          User Key  (r) = Recorded By, (t) = Taken By, (c) = Cosigned By    Initials Name Effective Dates    RD Nhung Miller, MS CCC-SLP 06/16/21 -     Jillian Juarez, MS CCC-SLP 06/16/21 -                 EDUCATION  The patient has been educated in the following areas:   Dysphagia (Swallowing Impairment) Oral Care/Hydration Modified Diet Instruction.        SLP GOALS     Row Name 10/20/21 1049       Oral Nutrition/Hydration Goal 1 (SLP)    Oral Nutrition/Hydration Goal 1, SLP LTG: pt will return to regular diet & thin liquids w/o s/s of aspiration or complications w/ 100% accuracy w/o cues  -    Time Frame (Oral Nutrition/Hydration Goal 1, SLP) by discharge  -    Progress/Outcomes (Oral Nutrition/Hydration Goal 1, SLP) continuing progress toward goal  -           Oral Nutrition/Hydration Goal 2 (SLP)    Oral Nutrition/Hydration Goal 2, SLP Pt will tolerate small single tsp/cup sips of honey-thick liquids & soft no mixed diet w/o s/s of aspiration or complications w/ 100% accuracy w/o cues.  -    Time Frame (Oral Nutrition/Hydration Goal 2, SLP) short term goal (STG)  -     Barriers (Oral Nutrition/Hydration Goal 2, SLP) No overt s/s of aspiration w/ ~6 oz of honey thick liquids  -CJ    Progress/Outcomes (Oral Nutrition/Hydration Goal 2, SLP) continuing progress toward goal  -CJ           Lingual Strengthening Goal 1 (SLP)    Activity (Lingual Strengthening Goal 1, SLP) increase lingual tone/sensation/control/coordination/movement  -CJ    Increase Lingual Tone/Sensation/Control/Coordination/Movement lingual resistance exercises  -CJ    [place]/Accuracy (Lingual Strengthening Goal 1, SLP) with minimal cues (75-90% accuracy)  -CJ    Time Frame (Lingual Strengthening Goal 1, SLP) short term goal (STG)  -CJ    Barriers (Lingual Strengthening Goal 1, SLP) Pt performed x2 sets x5 reps  -CJ    Progress/Outcomes (Lingual Strengthening Goal 1, SLP) continuing progress toward goal  -CJ           Pharyngeal Strengthening Exercise Goal 1 (SLP)    Activity (Pharyngeal Strengthening Goal 1, SLP) increase timing; increase superior movement of the hyolaryngeal complex; increase anterior movement of the hyolaryngeal complex; increase closure at entrance to airway/closure of airway at glottis; increase squeeze/positive pressure generation; increase tongue base retraction  -CJ    Increase Timing prepping - 3 second prep or suck swallow or 3-step swallow  -CJ    Increase Superior Movement of the Hyolaryngeal Complex falsetto  -CJ    Increase Anterior Movement of the Hyolaryngeal Complex chin tuck against resistance (CTAR)  -CJ    Increase Closure at Entrance to Airway/Closure of Airway at Glottis super-supraglottic swallow  -CJ    Increase Squeeze/Positive Pressure Generation effortful pitch glide (falsetto + pharyngeal squeeze)  -CJ    Increase Tongue Base Retraction hard effortful swallow  -CJ    [place]/Accuracy (Pharyngeal Strengthening Goal 1, SLP) with minimal cues (75-90% accuracy)  -CJ    Time Frame (Pharyngeal Strengthening Goal 1, SLP) short term goal (STG)  -CJ    Barriers (Pharyngeal  Strengthening Goal 1, SLP) Pt performed 3 sec prep x2 sets x5 reps; falsetto x2 sets x5 reps; CTAR x2 sets x3 reps; SG x2 sets x5 reps; effortful swallow x2 sets x 5 reps  -CJ    Progress/Outcomes (Pharyngeal Strengthening Goal 1, SLP) continuing progress toward goal  -CJ           Swallow Compensatory Strategies Goal 1 (SLP)    Activity (Swallow Compensatory Strategies/Techniques Goal 1, SLP) compensatory strategies; postural techniques; during meal intake; small bites; small cup sips; other (see comments)  no straws, small single sips  -CJ    [place]/Accuracy (Swallow Compensatory Strategies/Techniques Goal 1, SLP) independently (over 90% accuracy)  -CJ    Time Frame (Swallow Compensatory Strategies/Techniques Goal 1, SLP) short term goal (STG)  -CJ    Barriers (Swallow Compensatory Strategies/Techniques Goal 1, SLP) Pt able to perform after initial cue  -CJ    Progress/Outcomes (Swallow Compensatory Strategies/Techniques Goal 1, SLP) continuing progress toward goal  -CJ          User Key  (r) = Recorded By, (t) = Taken By, (c) = Cosigned By    Initials Name Provider Type    Nhung Mensah MS CCC-SLP Speech and Language Pathologist    Jillian Juarez MS CCC-SLP Speech and Language Pathologist                   Time Calculation:    Time Calculation- SLP     Row Name 10/20/21 1133             Time Calculation- SLP    SLP Start Time 1045  -CJ      SLP Received On 10/20/21  -CJ              Untimed Charges    26850-DD Treatment Swallow Minutes 45  -CJ              Total Minutes    Untimed Charges Total Minutes 45  -CJ       Total Minutes 45  -CJ            User Key  (r) = Recorded By, (t) = Taken By, (c) = Cosigned By    Initials Name Provider Type    Jillian Juarez MS CCC-SLP Speech and Language Pathologist                Therapy Charges for Today     Code Description Service Date Service Provider Modifiers Qty    [id number]  ST TREATMENT SWALLOW 3 10/20/2021 Jillian Watson, MS CCC-SLP GN  1      Patient was not wearing a face mask and did exhibit coughing during this therapy encounter.  Procedure performed was aerosolizing, involved close contact (within 6 feet for at least 15 minutes or longer), and did not involve contact with infectious secretions or specimens.  Therapist used appropriate personal protective equipment including gloves, standard procedure mask and eye protection.  Appropriate PPE was worn during the entire therapy session.  Hand hygiene was completed before and after therapy session.            Jillian Watson MS CCC-SLP  10/20/2021

## 2021-10-20 NOTE — PLAN OF CARE
Goal Outcome Evaluation:  Plan of Care Reviewed With: patient        Progress: no change  Outcome Summary: Pt. was anxious today and relayed that she has just received very bad news from the Dr.  States she has 6 months or so left to live.  Dr. Ceballos discussed code status with pt. again today but pt. wants to remain a full code.  Pt. complaining about her thickened liquids but I explained the risk of aspiration was too great with thin liquids and we could not offer thin liquids at this time.  Pt. did not have complaints of pain, nausea or other symptoms at time of visit.  Palliative care to follow for support and symptom management.      [facility] Palliative Team Conference:  KARLO Braga RN, CHPN; GURVINDER Soto, APRN; ERIC Jimenez RN, CHERRY;  ALDA Maria, [facility]; SHASHI Huang, W; SHASHI Espinoza RN      Problem: Palliative Care  Goal: Enhanced Quality of Life  Outcome: Ongoing, Progressing  Intervention: Promote Advance Care Planning  Flowsheets (Taken 10/20/2021 [id number])  Life Transition/Adjustment:   palliative care discussed   decision-making facilitated

## 2021-10-20 NOTE — PROGRESS NOTES
[facility] Medicine Services  PROGRESS NOTE    Patient Name: An Abreu  : 1950  MRN: [id number]    Date of Admission: 10/17/2021  Primary Care Physician: Angelika Butterfield MD    Subjective   Subjective     CC:  COPD/PNA    HPI:  Resting in bed. Didn't tolerate BIPAP again as coughing. On 4L. D/w daughter via phone    ROS:  Gen- No fevers, chills  CV- No chest pain, palpitations  Resp- No cough, + dyspnea  GI- No N/V/D, abd pain        Objective   Objective     Vital Signs:   Temp:  [97.8 °F (36.6 °C)-99.9 °F (37.7 °C)] 98.2 °F (36.8 °C)  Heart Rate:  [63-77] 76  Resp:  [16-18] 18  BP: (121-129)/(51-61) 121/51  Flow (L/min):  [4] 4     Physical Exam:  GEN-appears frail, chronically ill   HEENT- atraumatic, normocephalic, eomi  NECK- supple, trachea midline, no masses  RESP effort diminished and slightly less labored today, on 4L  CV: no murmurs, s1/s2, rrr  MSK: no edema noted, spontaneous movement of all extremities  NEURO: alert, oriented, no focal deficits  SKIN: no rashes  PSYCH: appropriate mood and affect       Results Reviewed:  LAB RESULTS:      Lab 10/20/21  0555 10/19/21  1333 10/18/21  0644 10/17/21  1810   WBC 5.76 7.03 13.74* 13.70*   HEMOGLOBIN 10.9* 12.3 13.4 13.9   HEMATOCRIT 34.8 39.1 40.3 42.6   PLATELETS 202 221 235 246   NEUTROS ABS 3.67 6.36 11.72* 12.50*   IMMATURE GRANS (ABS) 0.02 0.03 0.06* 0.05   LYMPHS ABS 1.67 0.36* 1.31 0.68*   MONOS ABS 0.35 0.11 0.63 0.42   EOS ABS 0.04 0.16 0.00 0.03   MCV 97.8* 98.7* 93.9 94.7   CRP  --   --   --  20.19*   PROCALCITONIN  --   --   --  0.10   LACTATE  --   --   --  1.2   PROTIME  --   --   --  13.5         Lab 10/20/21  0555 10/19/21  1333 10/18/21  1823 10/18/21  0644 10/17/21  1810   SODIUM 139 137  --  135* 133*   POTASSIUM 4.4 4.5 4.2 3.4* 3.5   CHLORIDE 99 96*  --  92* 91*   CO2 32.0* 32.0*  --  29.0 28.0   ANION GAP 8.0 9.0  --  14.0 14.0   BUN 17 19  --  15 12   CREATININE 0.50* 0.74  --  0.58 0.58    GLUCOSE 83 205*  --  82 84   CALCIUM 8.6 8.8  --  8.8 9.3         Lab 10/20/21  0555 10/19/21  1333 10/17/21  1810   TOTAL PROTEIN 6.0 6.8 7.4   ALBUMIN 3.00* 3.40* 3.90   GLOBULIN 3.0 3.4 3.5   ALT (SGPT) 16 16 24   AST (SGOT) 18 19 39*   BILIRUBIN <0.2 <0.2 0.3   ALK PHOS 119* 142* 189*         Lab 10/18/21  0644 10/17/21  1810   PROBNP  --  12,091.0*   TROPONIN T 0.376* 0.387*   PROTIME  --  13.5   INR  --  1.07         Lab 10/18/21  0644   CHOLESTEROL 211*   LDL CHOL 138*   HDL CHOL 52   TRIGLYCERIDES 117         Lab 10/17/21  1810   FERRITIN 75.99         Brief Urine Lab Results  (Last result in the past 365 days)      Color   Clarity   Blood   Leuk Est   Nitrite   Protein   CREAT   Urine HCG        10/17/21 2101 Yellow   Clear   Negative   Negative   Negative   30 mg/dL (1+)                 Microbiology Results Abnormal     Procedure Component Value - Date/Time    Blood Culture - Blood, Arm, Right [[id number]]  (Normal) Collected: 10/17/21 1725    Lab Status: Preliminary result Specimen: Blood from Arm, Right Updated: 10/19/21 1900     Blood Culture No growth at 2 days    Blood Culture - Blood, Arm, Right [[id number]]  (Normal) Collected: 10/17/21 1750    Lab Status: Preliminary result Specimen: Blood from Arm, Right Updated: 10/19/21 1845     Blood Culture No growth at 2 days    COVID PRE-OP / PRE-PROCEDURE SCREENING ORDER (NO ISOLATION) - Swab, Nasopharynx [[id number]]  (Normal) Collected: 10/17/21 2355    Lab Status: Final result Specimen: Swab from Nasopharynx Updated: 10/18/21 0049    Narrative:      The following orders were created for panel order COVID PRE-OP / PRE-PROCEDURE SCREENING ORDER (NO ISOLATION) - Swab, Nasopharynx.  Procedure                               Abnormality         Status                     ---------                               -----------         ------                     Respiratory Panel PCR w/...[[id number]]  Normal              Final result                 Please view  results for these tests on the individual orders.    Respiratory Panel PCR w/COVID-19(SARS-CoV-2) RYNE/AMA/SYLVIA/PAD/COR/MAD/BLANCA In-House, NP Swab in UTM/VTM, 3-4 HR TAT - Swab, Nasopharynx [[id number]]  (Normal) Collected: 10/17/21 3056    Lab Status: Final result Specimen: Swab from Nasopharynx Updated: 10/18/21 0049     ADENOVIRUS, PCR Not Detected     Coronavirus 229E Not Detected     Coronavirus HKU1 Not Detected     Coronavirus NL63 Not Detected     Coronavirus OC43 Not Detected     COVID19 Not Detected     Human Metapneumovirus Not Detected     Human Rhinovirus/Enterovirus Not Detected     Influenza A PCR Not Detected     Influenza B PCR Not Detected     Parainfluenza Virus 1 Not Detected     Parainfluenza Virus 2 Not Detected     Parainfluenza Virus 3 Not Detected     Parainfluenza Virus 4 Not Detected     RSV, PCR Not Detected     Bordetella pertussis pcr Not Detected     Bordetella parapertussis PCR Not Detected     Chlamydophila pneumoniae PCR Not Detected     Mycoplasma pneumo by PCR Not Detected    Narrative:      In the setting of a positive respiratory panel with a viral infection PLUS a negative procalcitonin without other underlying concern for bacterial infection, consider observing off antibiotics or discontinuation of antibiotics and continue supportive care. If the respiratory panel is positive for atypical bacterial infection (Bordetella pertussis, Chlamydophila pneumoniae, or Mycoplasma pneumoniae), consider antibiotic de-escalation to target atypical bacterial infection.    COVID-19 and FLU A/B PCR - Swab, Nasopharynx [[id number]]  (Normal) Collected: 10/17/21 1725    Lab Status: Final result Specimen: Swab from Nasopharynx Updated: 10/17/21 1911     COVID19 Not Detected     Influenza A PCR Not Detected     Influenza B PCR Not Detected    Narrative:      Fact sheet for providers: https://www.fda.gov/media/727275/download    Fact sheet for patients: https://www.fda.gov/media/329637/download    Test  performed by PCR.          FL Video Swallow With Speech Single Contrast    Result Date: 10/18/2021  EXAMINATION: FL VIDEO SWALLOW W SPEECH SINGLE-CONTRAST-  INDICATION: DYSPHAGIA, OROPHARYNGEAL, HAS ATTRIBUTABLE CAUSE  TECHNIQUE: 1 minute and 36 seconds of fluoroscopic time was used for this exam. 12 associated fluoroscopic loops were saved. The patient was evaluated in the seated lateral position while taking a variety of consistencies of barium by mouth under the direction of speech pathology.  COMPARISON: NONE  FINDINGS: 1 minute and 36 seconds of fluoroscopy provided for a modified barium swallow. Please see speech therapy report for full details and recommendations.       Impression: Fluoroscopy provided for a modified barium swallow. Please see speech therapy report for full details and recommendations.    This report was finalized on 10/18/2021 10:01 PM by Dr. Tre Huerta MD.        Results for orders placed during the hospital encounter of 05/03/21    Adult Transthoracic Echo Complete W/ Cont if Necessary Per Protocol    Interpretation Summary  · Estimated left ventricular EF = 70%  · Moderate aortic valve regurgitation is present.      I have reviewed the medications:  Scheduled Meds:budesonide-formoterol, 2 puff, Inhalation, BID - RT  cefTRIAXone, 1 g, Intravenous, Q24H  clopidogrel, 75 mg, Oral, Daily  dilTIAZem CD, 240 mg, Oral, Daily  doxycycline, 100 mg, Oral, Q12H  furosemide, 40 mg, Oral, Daily  lidocaine, 1 patch, Transdermal, Q24H  melatonin, 5 mg, Oral, Nightly  nicotine, 1 patch, Transdermal, Q24H  pantoprazole, 40 mg, Oral, BID AC  pharmacy consult - MTM, , Does not apply, Daily  PHENobarbital, 145.8 mg, Oral, Daily  predniSONE, 20 mg, Oral, Daily With Breakfast  rOPINIRole, 0.25 mg, Oral, Nightly  rosuvastatin, 40 mg, Oral, Nightly  sodium chloride, 10 mL, Intravenous, Q12H      Continuous Infusions:   PRN Meds:.•  albuterol  •  HYDROcodone-acetaminophen  •  ipratropium-albuterol  •   LORazepam  •  ondansetron **OR** ondansetron  •  potassium chloride **OR** potassium chloride **OR** potassium chloride  •  sodium chloride    Assessment/Plan   Assessment & Plan     Active Hospital Problems    Diagnosis  POA   • Community acquired pneumonia [J18.9]  Yes   • COPD (chronic obstructive pulmonary disease) ([facility]) [J44.9]  Yes   • Chronic diastolic CHF (congestive heart failure) ([facility]) [I50.32]  Yes   • Leukocytosis [D72.829]  Unknown   • CAD s/p CABG  [I25.10]  Yes   • Tobacco abuse [Z72.0]  Yes   • Paroxysmal AF [I48.0]  Yes   • Gastroesophageal reflux disease [K21.9]  Yes      Resolved Hospital Problems   No resolved problems to display.        Brief Hospital Course to date:  An Abreu is a 71 y.o. female with a history of COPD, current smoker, Coronary Artery Disease s/p CABG in 2006, Hypertension, Diastolic CHF, and paroxysmal atrial Fib on Plavix who presents to [facility] ED today for complaint of a productive cough and shortness of breath.     This patient's problems and plans were partially entered by my partner and updated as appropriate by me 10/20/21.        Bilateral Pneumonia (favor bacterial)  COPD exacerbation  Chronic hypoxic resp failure (chronic 3Lnc)  Leukocytosis  -partially vaccinated but covid neg x2  - CXR w/ bilateral patchy infiltrates  -CT angio chest no pe, bilateral patchy pneumonia (most prominent left upper and right middle), not typical appearance of covid pneumonia  -covid 19 pcr negative; resp viral panel negative   -azactam & doxycycline day # 4 empiric antibiotics- augment to rocephin/doxy as has tolerated in the past per pharmacy  -received solumedrol 125mg iv in ed; currently on pred 20- continue for now  -symbicort bid, duonebs scheduled & prn  - SLP has seen - on modified diet. Patient is unhappy with this and requesting regular diet. Have d/w patient that she cannot have regular diet if she is adamant to remain FULL CODE. She can have comfort diet if she  wishes to go comfort route        CAD (previous cabg)  Paroxysmal Atrial Fib  A/C DHF  Moderate Aortic Insuffiency (last echo 5/2021 w/ normal ef)  Mildly elevated troponin, chronically elevated  -continue cardizem; currently in sinus rhythm  -currently on Plavix, continue  -not on anticoagulation due to frequent falls, anemia, previous gi bleed  -continue once daily po lasix)  -trop chronically elevated; ekg unchanged, No chest pain (if trending upwards consider echo)     GERD  Hx previous gi bleed  - Continue home Nexium     Hyperlipidemia  -continue crestor     Tobacco Abuse  - An Abreu  reports that she has been smoking cigarettes and electronic cigarette. She has a 40.00 pack-year smoking history. She has never used smokeless tobacco..She continues to smoke and I have again counseled her on risks including hospitalizations related to lung dz. She reports that she cannot afford the patch     Had another d/w patient about code status and her living will. Continues to want to be full code despite my d/w her that I do not think she will do well on a ventilator. Palliative consulted and she has now revoked hospice. I d/w her that I feel she would do best with hospice at home for the resources.     Long d/w her daughter Stacie via phone 11am. Stacie buys the cigarette for her and d/w her that she could consider stopping this as it is enabling her mother. She is inagreement but reports she smokes herself and cannot say no to her mother.     Relayed my honest opinion that as this is her 6th admission this calendar year, she continues to smoke and decline, she will not do well and I strongly recommend hospice.        35 minutes was spent on patient care in regards to chart review on patient's prolonged clinical course, and specifically >50% of this time was spent face to face with patient, discussing with consultants, nursing staff and case management.       DVT prophylaxis:  Mechanical DVT prophylaxis orders  are present.       AM-PAC 6 Clicks Score (PT): 19 (10/20/21 3428)    Disposition: I expect the patient to be discharged pending improvement    CODE STATUS:   Code Status and Medical Interventions:   Ordered at: 10/17/21 1151     Code Status:    CPR     Medical Interventions (Level of Support Prior to Arrest):    Full       Lilly Ceballos MD  10/20/21

## 2021-10-20 NOTE — CASE MANAGEMENT/SOCIAL WORK
Continued Stay Note  [facility]     Patient Name: An Abreu  MRN: [id number]  Today's Date: 10/20/2021    Admit Date: 10/17/2021     Discharge Plan     Row Name 10/20/21 1402       Plan    Plan Home w/Home Health    Patient/Family in Agreement with Plan yes    Plan Comments Spoke with patient in room.  Her plan is to retuirn home with home health at discharge.  Referral called to Kam with [facility] for PT, OT, SLP and skille dnursing.  CM will continue to follow.  Patria Mejia RN [phone]    Final Discharge Disposition Code 06 - home with home health care               Discharge Codes    No documentation.               Expected Discharge Date and Time     Expected Discharge Date Expected Discharge Time    Oct 22, 2021             Patria Mejia RN     Not applicable

## 2021-10-21 NOTE — PROGRESS NOTES
[facility] Medicine Services  PROGRESS NOTE    Patient Name: An Abreu  : 1950  MRN: [id number]    Date of Admission: 10/17/2021  Primary Care Physician: Angelika Butterfield MD    Subjective   Subjective     CC:  COPD/PNA    HPI:  Resting in bed. Drowsy. D/w nursing      Saw patient again at bedside and she is now agreeable to wanting hospice/comfort diet and wants to be DNR  ROS:  Gen- No fevers, chills  CV- No chest pain, palpitations  Resp- No cough, + dyspnea  GI- No N/V/D, abd pain        Objective   Objective     Vital Signs:   Temp:  [97.4 °F (36.3 °C)-98.3 °F (36.8 °C)] 98.1 °F (36.7 °C)  Heart Rate:  [63-76] 76  Resp:  [18] 18  BP: (113-141)/(56-67) 141/62  Flow (L/min):  [4-5] 4     Physical Exam:  GEN-appears frail, chronically ill   HEENT- atraumatic, normocephalic, eomi  NECK- supple, trachea midline, no masses  RESP effort diminished and slightly less labored today, on 4L  CV: no murmurs, s1/s2, rrr  MSK: no edema noted, spontaneous movement of all extremities  NEURO: alert, oriented, no focal deficits  SKIN: no rashes  PSYCH: appropriate mood and affect       Results Reviewed:  LAB RESULTS:      Lab 10/21/21  0547 10/20/21  0555 10/19/21  1333 10/18/21  0644 10/17/21  1810   WBC 6.84 5.76 7.03 13.74* 13.70*   HEMOGLOBIN 11.6* 10.9* 12.3 13.4 13.9   HEMATOCRIT 36.1 34.8 39.1 40.3 42.6   PLATELETS 242 202 221 235 246   NEUTROS ABS 4.06 3.67 6.36 11.72* 12.50*   IMMATURE GRANS (ABS) 0.03 0.02 0.03 0.06* 0.05   LYMPHS ABS 2.09 1.67 0.36* 1.31 0.68*   MONOS ABS 0.46 0.35 0.11 0.63 0.42   EOS ABS 0.19 0.04 0.16 0.00 0.03   MCV 97.3* 97.8* 98.7* 93.9 94.7   CRP  --   --   --   --  20.19*   PROCALCITONIN  --   --   --   --  0.10   LACTATE  --   --   --   --  1.2   PROTIME  --   --   --   --  13.5         Lab 10/21/21  0547 10/20/21  0555 10/19/21  1333 10/18/21  1823 10/18/21  0644 10/17/21  1810 10/17/21  1810   SODIUM 136 139 137  --  135*  --  133*   POTASSIUM 4.7  4.4 4.5 4.2 3.4*   < > 3.5   CHLORIDE 98 99 96*  --  92*  --  91*   CO2 30.0* 32.0* 32.0*  --  29.0  --  28.0   ANION GAP 8.0 8.0 9.0  --  14.0  --  14.0   BUN 23 17 19  --  15  --  12   CREATININE 0.54* 0.50* 0.74  --  0.58  --  0.58   GLUCOSE 75 83 205*  --  82  --  84   CALCIUM 8.8 8.6 8.8  --  8.8  --  9.3    < > = values in this interval not displayed.         Lab 10/21/21  0547 10/20/21  0555 10/19/21  1333 10/17/21  1810   TOTAL PROTEIN 6.1 6.0 6.8 7.4   ALBUMIN 2.90* 3.00* 3.40* 3.90   GLOBULIN 3.2 3.0 3.4 3.5   ALT (SGPT) 33 16 16 24   AST (SGOT) 52* 18 19 39*   BILIRUBIN <0.2 <0.2 <0.2 0.3   ALK PHOS 124* 119* 142* 189*         Lab 10/18/21  0644 10/17/21  1810   PROBNP  --  12,091.0*   TROPONIN T 0.376* 0.387*   PROTIME  --  13.5   INR  --  1.07         Lab 10/18/21  0644   CHOLESTEROL 211*   LDL CHOL 138*   HDL CHOL 52   TRIGLYCERIDES 117         Lab 10/17/21  1810   FERRITIN 75.99         Brief Urine Lab Results  (Last result in the past 365 days)      Color   Clarity   Blood   Leuk Est   Nitrite   Protein   CREAT   Urine HCG        10/17/21 2101 Yellow   Clear   Negative   Negative   Negative   30 mg/dL (1+)                 Microbiology Results Abnormal     Procedure Component Value - Date/Time    Blood Culture - Blood, Arm, Right [[id number]]  (Normal) Collected: 10/17/21 1725    Lab Status: Preliminary result Specimen: Blood from Arm, Right Updated: 10/20/21 1900     Blood Culture No growth at 3 days    Blood Culture - Blood, Arm, Right [[id number]]  (Normal) Collected: 10/17/21 1750    Lab Status: Preliminary result Specimen: Blood from Arm, Right Updated: 10/20/21 1845     Blood Culture No growth at 3 days    COVID PRE-OP / PRE-PROCEDURE SCREENING ORDER (NO ISOLATION) - Swab, Nasopharynx [[id number]]  (Normal) Collected: 10/17/21 6637    Lab Status: Final result Specimen: Swab from Nasopharynx Updated: 10/18/21 0049    Narrative:      The following orders were created for panel order COVID PRE-OP /  PRE-PROCEDURE SCREENING ORDER (NO ISOLATION) - Swab, Nasopharynx.  Procedure                               Abnormality         Status                     ---------                               -----------         ------                     Respiratory Panel PCR w/...[[id number]]  Normal              Final result                 Please view results for these tests on the individual orders.    Respiratory Panel PCR w/COVID-19(SARS-CoV-2) RYNE/AMA/SYLVIA/PAD/COR/MAD/BLANCA In-House, NP Swab in UTM/VTM, 3-4 HR TAT - Swab, Nasopharynx [[id number]]  (Normal) Collected: 10/17/21 2453    Lab Status: Final result Specimen: Swab from Nasopharynx Updated: 10/18/21 0049     ADENOVIRUS, PCR Not Detected     Coronavirus 229E Not Detected     Coronavirus HKU1 Not Detected     Coronavirus NL63 Not Detected     Coronavirus OC43 Not Detected     COVID19 Not Detected     Human Metapneumovirus Not Detected     Human Rhinovirus/Enterovirus Not Detected     Influenza A PCR Not Detected     Influenza B PCR Not Detected     Parainfluenza Virus 1 Not Detected     Parainfluenza Virus 2 Not Detected     Parainfluenza Virus 3 Not Detected     Parainfluenza Virus 4 Not Detected     RSV, PCR Not Detected     Bordetella pertussis pcr Not Detected     Bordetella parapertussis PCR Not Detected     Chlamydophila pneumoniae PCR Not Detected     Mycoplasma pneumo by PCR Not Detected    Narrative:      In the setting of a positive respiratory panel with a viral infection PLUS a negative procalcitonin without other underlying concern for bacterial infection, consider observing off antibiotics or discontinuation of antibiotics and continue supportive care. If the respiratory panel is positive for atypical bacterial infection (Bordetella pertussis, Chlamydophila pneumoniae, or Mycoplasma pneumoniae), consider antibiotic de-escalation to target atypical bacterial infection.    COVID-19 and FLU A/B PCR - Swab, Nasopharynx [[id number]]  (Normal) Collected: 10/17/21  1725    Lab Status: Final result Specimen: Swab from Nasopharynx Updated: 10/17/21 1911     COVID19 Not Detected     Influenza A PCR Not Detected     Influenza B PCR Not Detected    Narrative:      Fact sheet for providers: https://www.fda.gov/media/077993/download    Fact sheet for patients: https://www.fda.gov/media/400852/download    Test performed by PCR.          No radiology results from the last 24 hrs    Results for orders placed during the hospital encounter of 05/03/21    Adult Transthoracic Echo Complete W/ Cont if Necessary Per Protocol    Interpretation Summary  · Estimated left ventricular EF = 70%  · Moderate aortic valve regurgitation is present.      I have reviewed the medications:  Scheduled Meds:budesonide-formoterol, 2 puff, Inhalation, BID - RT  cefTRIAXone, 1 g, Intravenous, Q24H  clopidogrel, 75 mg, Oral, Daily  dilTIAZem CD, 240 mg, Oral, Daily  doxycycline, 100 mg, Oral, Q12H  furosemide, 40 mg, Oral, Daily  lidocaine, 1 patch, Transdermal, Q24H  melatonin, 5 mg, Oral, Nightly  nicotine, 1 patch, Transdermal, Q24H  pantoprazole, 40 mg, Oral, BID AC  pharmacy consult - MTM, , Does not apply, Daily  PHENobarbital, 145.8 mg, Oral, Daily  predniSONE, 20 mg, Oral, Daily With Breakfast  rOPINIRole, 0.25 mg, Oral, Nightly  rosuvastatin, 40 mg, Oral, Nightly  sodium chloride, 10 mL, Intravenous, Q12H      Continuous Infusions:   PRN Meds:.•  albuterol  •  HYDROcodone-acetaminophen  •  ipratropium-albuterol  •  LORazepam  •  ondansetron **OR** ondansetron  •  potassium chloride **OR** potassium chloride **OR** potassium chloride  •  sodium chloride    Assessment/Plan   Assessment & Plan     Active Hospital Problems    Diagnosis  POA   • Community acquired pneumonia [J18.9]  Yes   • COPD (chronic obstructive pulmonary disease) ([facility]) [J44.9]  Yes   • Chronic diastolic CHF (congestive heart failure) ([facility]) [I50.32]  Yes   • Leukocytosis [D72.829]  Unknown   • CAD s/p CABG  [I25.10]  Yes   • Tobacco  abuse [Z72.0]  Yes   • Paroxysmal AF [I48.0]  Yes   • Gastroesophageal reflux disease [K21.9]  Yes      Resolved Hospital Problems   No resolved problems to display.        Brief Hospital Course to date:  An Abreu is a 71 y.o. female with a history of COPD, current smoker, Coronary Artery Disease s/p CABG in 2006, Hypertension, Diastolic CHF, and paroxysmal atrial Fib on Plavix who presents to [facility] ED today for complaint of a productive cough and shortness of breath.     This patient's problems and plans were partially entered by my partner and updated as appropriate by me 10/21/21.        Bilateral Pneumonia (favor bacterial)  COPD exacerbation  Chronic hypoxic resp failure (chronic 3Lnc)  Leukocytosis  -partially vaccinated but covid neg x2  - CXR w/ bilateral patchy infiltrates  -CT angio chest no pe, bilateral patchy pneumonia (most prominent left upper and right middle), not typical appearance of covid pneumonia  -covid 19 pcr negative; resp viral panel negative   -abx day # 4 empiric antibiotics- augment to rocephin/doxy as has tolerated in the past per pharmacy.   -received solumedrol 125mg iv in ed; has now completed 5 day course of steroids  -symbicort bid, duonebs scheduled & prn  - SLP has seen - on modified diet. Patient is unhappy with this and requesting regular diet. Have d/w patient that she cannot have regular diet if she is adamant to remain FULL CODE. She can have comfort diet if she wishes to go comfort route        CAD (previous cabg)  Paroxysmal Atrial Fib  A/C DHF  Moderate Aortic Insuffiency (last echo 5/2021 w/ normal ef)  Mildly elevated troponin, chronically elevated  -continue cardizem; currently in sinus rhythm  -currently on Plavix, continue  -not on anticoagulation due to frequent falls, anemia, previous gi bleed  -continue once daily po lasix)  -trop chronically elevated; ekg unchanged, No chest pain      GERD  Hx previous gi bleed  - Continue home  Nexium     Hyperlipidemia  -continue crestor     Tobacco Abuse  - An Abreu  reports that she has been smoking cigarettes and electronic cigarette. She has a 40.00 pack-year smoking history. She has never used smokeless tobacco..She continues to smoke and I have again counseled her on risks including hospitalizations related to lung dz. She reports that she cannot afford the patch. This was run through IP pharmacy and patch cost per month would be 53.60 (not 400 dollars she reported). Have d/w patient and daughter.        [facility]  Have had long daily d/w patient about code status and her living will. Continues to want to be full code despite my d/w her that I do not think she will do well on a ventilator. Palliative consulted and she has now revoked hospice (previously had a home but reported this was only so they could pay for her medicines). I d/w her that I feel she would do best with hospice at home for the resources.   Relayed my honest opinion that as this is her 6th admission this calendar year, she continues to smoke and decline, she will not do well and I strongly recommend hospice    Ultimately patient has changed her mind today and wishes to be DNR/DNI, go home with hospice and wishes to focus on comfort- wants comfort diet and I have changed to accomodate this. Will work on transitioning patient home with hospice as soon as this can be arranged    I have also d/w her daughter Stacie via phone.     35 minutes was spent on patient care in regards to chart review on patient's prolonged clinical course, and specifically >50% of this time was spent face to face with patient, discussing with consultants, nursing staff and case management.         DVT prophylaxis:  Mechanical DVT prophylaxis orders are present.       AM-PAC 6 Clicks Score (PT): 17 (10/21/21 0412)    Disposition: I expect the patient to be discharged pending improvement. If she wishes to return home, possible she could go tomorrow after  completion of abx and hopefully weaning of oxygen    CODE STATUS:   Code Status and Medical Interventions:   Ordered at: 10/17/21 1165     Code Status:    CPR     Medical Interventions (Level of Support Prior to Arrest):    Full       Lilly Ceballos MD  10/21/21

## 2021-10-21 NOTE — PLAN OF CARE
Goal Outcome Evaluation:  Plan of Care Reviewed With: patient, daughter        Progress: no change       X1 dose of PO Ativan and Hydrocodone given this AM. Pt has been more drowsy and resting in between care today, but easily arouses. Pt impulsive at times and quick to get out of bed. Accidentally removed IV. Pt requested to go home with hospice, see orders and code status change. Poor PO intake. VSS on 4LNC.

## 2021-10-21 NOTE — CASE MANAGEMENT/SOCIAL WORK
Continued Stay Note  [facility]     Patient Name: An Abreu  MRN: [id number]  Today's Date: 10/21/2021    Admit Date: 10/17/2021     Discharge Plan     Row Name 10/21/21 1458       Plan    Plan Will follow    Plan Comments Spoke with Stacie Bhat, pt’s daughter. She states she has applied for assistance with the [employer] [place] funds and  she has appt to for Nov 4th with [facility]. She reports she did receive assistance from a local [employer] for the gas bill. States she plans to take pt to a friend’s home upon d/c until the electric is turned back on. Daughter reports she receive food stamps but has to reapply for her disability. \[facility] [place] [facility]\"" pt receives 1200.00 from social security but that her house payment is 800.00/ month which leaves very little for bills. Discussed other financial resources and mentioned considering refinancing the mortgage for a lower monthly bill.    Row Name 10/21/21 1325       Plan    Plan Home with     Patient/Family in Agreement with Plan yes    Plan Comments I spoke with the pts daughter by phone. The pt and her daughter plan to stay [facility] until they can get their electric turned on. It is close to where they live. The pts daughter will call me back with the adress. The pts daughter will move the pts DME to the friends home.    Final Discharge Disposition Code 06 - home with home health care    Row Name 10/21/21 1227       Plan    Plan Will follow    Plan Comments Called [facility] at [phone] and spoke with Candice. \[facility] [place] [facility]\"" service has been cut off at this time. [facility] pt is not eligible for a medical extension at this time. Called pt’s daughter/ [facility] Stacie Agee who states she will call MSW back.               Discharge Codes    No documentation.               Expected Discharge Date and Time     Expected Discharge Date Expected Discharge Time    Oct 22, 2021             IZZY Smith

## 2021-10-21 NOTE — PROGRESS NOTES
Clinical Nutrition     Nutrition Assessment  Reason for Visit:   Follow-up protocol      Patient Name: An Abreu  YOB: 1950  MRN: [id number]  Date of Encounter: 10/21/21 10:45 EDT  Admission date: 10/17/2021        Nutrition Assessment   Assessment       Applicable diagnosis/conditions/procedures this adm:  Community acquired pneumonia  COPD exacerbation   10/20) S/p SLP evaluation: SLP Recommendation and Plan: SLP Swallowing Diagnosis: mild, oral dysphagia, moderate, pharyngeal dysphagia (10/20/21 1045). SLP Diet Recommendation: mechanical soft with no mixed consistencies, honey thick liquids (10/20/21 1045)      Applicable/additional applicable PMH/PSxH:   CAD s/p CABG  Paroxysmal AF  Gastroesophageal reflux disease  Chronic diastolic CHF (congestive heart failure) ([facility])  Chronic Respiratory failure-3LNC  Tobacco abuse    PMH: She  has a past medical history of Aneurysm ([facility]), Angina pectoris ([facility]) (6/20/2016), Anxiety (6/20/2016), Arthritis (6/20/2016), CAD (coronary artery disease), Carotid artery stenosis, CHF (congestive heart failure) ([facility]), Chronic coronary artery disease (6/20/2016), Chronic left-sided low back pain with left-sided sciatica (2/1/2017), Chronic obstructive pulmonary disease ([facility]) (6/20/2016), Chronic respiratory failure with hypoxia ([facility]) (3/27/2021), Cobalamin deficiency (6/20/2016), Congestive heart failure ([facility]) (6/20/2016), COPD (chronic obstructive pulmonary disease) ([facility]), Depression (7/3/2018), Diverticulosis, Diverticulosis of large intestine without hemorrhage (5/5/2017), GERD (gastroesophageal reflux disease), GIB (gastrointestinal bleeding), HTN (hypertension), Hypercholesterolemia (6/20/2016), Hyperlipidemia, Mesenteric ischemia ([facility]) (2006), Mood disorder ([facility]), Oxygen dependent, PAF (paroxysmal atrial fibrillation) ([facility]), Paroxysmal atrial fibrillation ([facility]) (8/7/2017), PFO (patent foramen ovale), 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   PSxH: She  has a past surgical history that includes Appendectomy; Cholecystectomy; Colostomy (2006); Hysterectomy; Iliac artery stent; Exploratory laparotomy; Revision / takedown colostomy (2008); Esophagogastroduodenoscopy (N/A, 6/16/2021); and Colonoscopy (N/A, 6/19/2021).         Reported/Observed/Food/Nutrition Related History:     10/21) Per MD note today pt is now agreeable to wanting hospice/comfort diet and wants to be DNR. Now on comfort diet. PO intake adequacy effected by Dys diet with honey thick liquids.     10/18) Pt reports she has not lost weight recently. She states her UBW is in the 180-185 range. She states she has been \"eating like a hog\". He reports PO intake has been less during the past 3-4 days due to feeling ill. She reports she loves Boost ONS in chocolate flavor.       Anthropometrics     Height: 144.8 cm (57\")  Last filed wt: Weight: 37 kg (81 lb 8 oz) (10/17/21 2200)  Weight Method: Bed scale    BMI: BMI (Calculated): 17.6  Underweight:<18.5kg/m2    Ideal Body Weight (IBW) (kg): 39    Last 15 Recorded Weights  View Complete Flowsheet  Weight Weight (kg) Weight (lbs) Weight Method VISIT REPORT   10/17/2021 36.968 kg 81 lb 8 oz Bed scale -   10/17/2021 37.649 kg 83 lb Stated -   10/12/2021 37.558 kg 82 lb 12.8 oz - Report   10/5/2021 36.832 kg 81 lb 3.2 oz - Report   9/2/2021 34.836 kg 76 lb 12.8 oz - Report   8/12/2021 36.288 kg 80 lb - Report   8/2/2021 36.741 kg 81 lb - Report   7/13/2021 35.04 kg 77 lb 4 oz - Report   7/5/2021 47.219 kg 104 lb 1.6 oz Bed scale -   7/1/2021 45.36 kg 100 lb - -   6/23/2021 39.145 kg 86 lb 4.8 oz Bed scale -   6/21/2021 41.549 kg 91 lb 9.6 oz - -   6/18/2021 37.558 kg 82 lb 12.8 oz Bed scale -   6/17/2021 37.422 " kg 82 lb 8 oz Bed scale  -   6/17/2021 38.556 kg 85 lb Bed scale -       Weight Change   UBW:  Weight change:   % wt change:   Time frame of weight loss:     Labs reviewed     Results from last 7 days   Lab Units 10/21/21  0547 10/20/21  0555 10/19/21  1333   GLUCOSE mg/dL 75 83 205*   BUN mg/dL 23 17 19   CREATININE mg/dL 0.54* 0.50* 0.74   SODIUM mmol/L 136 139 137   CHLORIDE mmol/L 98 99 96*   POTASSIUM mmol/L 4.7 4.4 4.5   ALT (SGPT) U/L 33 16 16     Results from last 7 days   Lab Units 10/21/21  0547 10/20/21  0555 10/19/21  1333 10/18/21  0644 10/17/21  1810 10/17/21  1810   ALBUMIN g/dL 2.90* 3.00* 3.40*  --    < > 3.90   CRP mg/dL  --   --   --   --   --  20.19*   CHOLESTEROL mg/dL  --   --   --  211*  --   --    TRIGLYCERIDES mg/dL  --   --   --  117  --   --     < > = values in this interval not displayed.             Lab Results   Lab Value Date/Time    HGBA1C 5.50 05/04/2021 0716    HGBA1C 5.10 11/22/2020 0549         Medications reviewed     Intake/Ouptut 24 hrs (7:00AM - 6:59 AM)     Intake & Output (last day)       10/20 0701  10/21 0700 10/21 0701  10/22 0700    P.O.  360    IV Piggyback 100     Total Intake(mL/kg) 100 (2.7) 360 (9.7)    Urine (mL/kg/hr) 1200 (1.4)     Stool 0     Total Output 1200     Net -1100 +360          Stool Unmeasured Occurrence 1 x              Current Nutrition Prescription     PO: Diet Regular    Nutrition Supplement:       Dietary Nutrition Supplements Boost Plus; chocolate BID      Intake (average): 25% x 5 meals       Nutrition Diagnosis     10/21  Problem Inadequate oral intake   Etiology Clinical status, altered diet r/t dysphagia    Signs/Symptoms Poor PO intake PTA per report-NPO as present time for SLP eval       10/18  Problem Predicted suboptimal energy intake   Etiology Clinical status, PO intake trend PTA   Signs/Symptoms Poor PO intake PTA per report-NPO as present time for SLP eval       Nutrition Intervention     1.  Follow treatment progress, Care plan  reviewed  2. Advised available snacks, Interview for preferences, Encourage intake   3. Will continue to send Boost Plus chocolate BID       Goal:     General: comfort focused POC  PO: Increase intake  Additional goals:  Avoid significant weight loss     Monitoring/Evaluation:   Per protocol, PO intake, Supplement intake, Weight, Swallow function      Will Continue to follow per protocol      Erinn Venegas RDN, LD, CNSC  Time Spent: 30min

## 2021-10-21 NOTE — CASE MANAGEMENT/SOCIAL WORK
Continued Stay Note  [facility]     Patient Name: An Abreu  MRN: [id number]  Today's Date: 10/21/2021    Admit Date: 10/17/2021     Discharge Plan     Row Name 10/21/21 1047       Plan    Plan Will follow    Plan Comments Spoke with pt’s [facility] Stacie Agee. She states their electric bill is $1800.00. States she has contacted [employer] and has an appt on Nov 4th to apply for assistance. States the cut of date is today. MSW left a letter on chart for a medical extension for MD to sign.    Row Name 10/21/21 0835       Plan    Final Discharge Disposition Code 06 - home with home health care               Discharge Codes    No documentation.               Expected Discharge Date and Time     Expected Discharge Date Expected Discharge Time    Oct 22, 2021             IZZY Smith

## 2021-10-21 NOTE — THERAPY TREATMENT NOTE
Patient Name: An Abreu  : 1950    MRN: [id number]                              Today's Date: 10/21/2021       Admit Date: 10/17/2021    Visit Dx:     ICD-10-CM ICD-9-CM   1. Community acquired pneumonia, unspecified laterality  J18.9 486   2. Acute on chronic respiratory failure with hypoxia ([facility])  J96.21 518.84     799.02   3. Leukocytosis, unspecified type  D72.829 288.60   4. COPD exacerbation ([facility])  J44.1 491.21   5. Elevated troponin  R77.8 790.6   6. History of CHF (congestive heart failure)  Z86.79 V12.59   7. Oropharyngeal dysphagia  R13.12 787.22     Patient Active Problem List   Diagnosis   • Gastroesophageal reflux disease   • Essential hypertension   • Seizure disorder on phenobarbital    • PAD s/p R iliac stent    • Paroxysmal AF   • Fecal occult blood test positive   • Tobacco abuse   • CAD s/p CABG    • Anemia   • Fall   • Humerus fracture   • Hypoglycemia   • Metabolic encephalopathy   • Iron deficiency anemia due to chronic blood loss   • Mucopurulent chronic bronchitis ([facility])   • Severe malnutrition ([facility])   • H/O: recent GI bleed   • + PFO    • H/O CVA    • Non-compliance   • Debility   • Nonrheumatic aortic valve insufficiency   • Chronic respiratory failure with hypoxia and hypercapnia ([facility])   • Community acquired pneumonia   • COPD (chronic obstructive pulmonary disease) ([facility])   • Chronic diastolic CHF (congestive heart failure) ([facility])   • Leukocytosis     Past Medical History:   Diagnosis Date   • Aneurysm ([facility])    • Angina pectoris ([facility]) 2016   • Anxiety 2016   • Arthritis 2016   • CAD (coronary artery disease)    • Carotid artery stenosis    • CHF (congestive heart failure) ([facility])    • Chronic coronary artery disease 2016    2003 CABG LIMA to LAD, VG to OM  VG to OM occluded. LIMA patent Cx intervention and RCA  stent for ISR  ISR and stenting of CX and RCA  normal MPS   • Chronic left-sided low back pain with left-sided sciatica 2017   •  Chronic obstructive pulmonary disease (HCC) 6/20/2016   • Chronic respiratory failure with hypoxia (HCC) 3/27/2021   • Cobalamin deficiency 6/20/2016   • Congestive heart failure (HCC) 6/20/2016   • COPD (chronic obstructive pulmonary disease) (HCC)    • Depression 7/3/2018   • Diverticulosis    • Diverticulosis of large intestine without hemorrhage 5/5/2017   • GERD (gastroesophageal reflux disease)    • GIB (gastrointestinal bleeding)     recurrent    • HTN (hypertension)    • Hypercholesterolemia 6/20/2016   • Hyperlipidemia    • Mesenteric ischemia (HCC) 2006    s/p resection    • Mood disorder (HCC)    • Oxygen dependent     3 liters @ all times.    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 ([facility]) 5/23/2021   • PVD (peripheral vascular disease) ([facility])    • Restless legs syndrome 6/20/2016   • Seizure disorder ([facility]) 6/20/2016   • Seizures ([facility])    • Stenosis of carotid artery 6/20/2016   • Vertigo 9/28/2016     Past Surgical History:   Procedure Laterality Date   • APPENDECTOMY     • CHOLECYSTECTOMY     • COLONOSCOPY N/A 6/19/2021    Procedure: COLONOSCOPY;  Surgeon: Wilfredo Simon MD;  Location:  [facility];  Service: Gastroenterology;  Laterality: N/A;   • COLOSTOMY  2006    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   • ENDOSCOPY N/A 6/16/2021    Procedure: ESOPHAGOGASTRODUODENOSCOPY;  Surgeon: Jean Fuentes MD;  Location: [facility];  Service: Gastroenterology;  Laterality: N/A;   • EXPLORATORY LAPAROTOMY      sec to ovarian cysts   • HYSTERECTOMY     • ILIAC ARTERY STENT     • REVISION / TAKEDOWN COLOSTOMY  2008      General Information     Row Name 10/21/21 0922          Physical Therapy Time and Intention    Document Type therapy note (daily note)  -AS     Mode of Treatment physical therapy  -AS     Row Name 10/21/21 0922          General Information     "Patient Profile Reviewed yes  -AS     Existing Precautions/Restrictions oxygen therapy device and L/min  -AS     Barriers to Rehab medically complex  -AS     Row Name 10/21/21 0922          Cognition    Orientation Status (Cognition) oriented x 4  -AS     Row Name 10/21/21 0922          Safety Issues, Functional Mobility    Safety Issues Affecting Function (Mobility) safety precaution awareness; safety precautions follow-through/compliance; positioning of assistive device; sequencing abilities  -AS     Impairments Affecting Function (Mobility) balance; endurance/activity tolerance; shortness of breath  -AS     Comment, Safety Issues/Impairments (Mobility) patient crying throughout session, reported DM gave \"bad news yesterday\"  -AS           User Key  (r) = Recorded By, (t) = Taken By, (c) = Cosigned By    Initials Name Provider Type    AS Princess Rivas PTA Physical Therapy Assistant               Mobility     Row Name 10/21/21 0924          Bed Mobility    Supine-Sit [place] (Bed Mobility) verbal cues; minimum assist (75% patient effort); 1 person assist  -AS     Assistive Device (Bed Mobility) bed rails; head of bed elevated  -AS     Comment (Bed Mobility) cues for sequencing, increased time and effort to reach EOB  -AS     Row Name 10/21/21 0924          Transfers    Comment (Transfers) bed>BSC>recliner  -AS     Row Name 10/21/21 0924          Bed-Chair Transfer    Bed-Chair [place] (Transfers) verbal cues; minimum assist (75% patient effort); 1 person assist  -AS     Assistive Device (Bed-Chair Transfers) walker, front-wheeled  -AS     Row Name 10/21/21 0924          Sit-Stand Transfer    Sit-Stand [place] (Transfers) verbal cues; contact guard; 1 person assist  -AS     Assistive Device (Sit-Stand Transfers) walker, front-wheeled  -AS     Row Name 10/21/21 0924          Gait/Stairs (Locomotion)    [place] Level (Gait) verbal cues; minimum assist (75% patient effort); 1 person assist  " -AS     Assistive Device (Gait) walker, front-wheeled  -AS     Distance in Feet (Gait) 35  -AS     Deviations/Abnormal Patterns (Gait) bilateral deviations; margarito decreased; gait speed decreased  -AS     Bilateral Gait Deviations forward flexed posture; weight shift ability decreased  -AS     Comment (Gait/Stairs) [place] [place] [facility] in room and transfered to recliner after walk, cues for walker placement and improvement in posture. Distance limited by weakness and fatigue.  -AS           User Key  (r) = Recorded By, (t) = Taken By, (c) = Cosigned By    Initials Name Provider Type    AS Princess Rivas PTA Physical Therapy Assistant               Obj/Interventions     Row Name 10/21/21 0926          Motor Skills    Therapeutic Exercise knee; ankle; shoulder  -AS     Row Name 10/21/21 0926          Shoulder (Therapeutic Exercise)    Shoulder (Therapeutic Exercise) AROM (active range of motion)  -AS     Shoulder AROM (Therapeutic Exercise) bilateral; flexion; aBduction; aDduction; sitting; 10 repetitions  -AS     Row Name 10/21/21 0926          Knee (Therapeutic Exercise)    Knee (Therapeutic Exercise) strengthening exercise  -AS     Knee Strengthening (Therapeutic Exercise) bilateral; marching while seated; LAQ (long arc quad); sitting; 10 repetitions  -AS     Row Name 10/21/21 0926          Ankle (Therapeutic Exercise)    Ankle (Therapeutic Exercise) AROM (active range of motion)  -AS     Ankle AROM (Therapeutic Exercise) bilateral; dorsiflexion; plantarflexion; sitting; 10 repetitions  -AS           User Key  (r) = Recorded By, (t) = Taken By, (c) = Cosigned By    Initials Name Provider Type    AS Princess Rivas PTA Physical Therapy Assistant               Goals/Plan    No documentation.                Clinical Impression     Row Name 10/21/21 0926          Pain    Additional Documentation Pain Scale: Numbers Pre/Post-Treatment (Group)  -AS     Row Name 10/21/21 0926          Pain  Scale: Numbers Pre/Post-Treatment    Pretreatment Pain Rating 0/10 - no pain  -AS     Posttreatment Pain Rating 0/10 - no pain  -AS     Pain Intervention(s) Ambulation/increased activity; Repositioned  -AS     Row Name 10/21/21 0926          Plan of Care Review    Plan of Care Reviewed With patient  -AS     Progress improving  -AS     Outcome Summary patient transfered bed>C then ambulated in room and transfered to recliner after walk, cues for walker placement and improvement in posture. Distance limited by weakness and fatigue. Completed B LE/UE exercises.  -AS     Row Name 10/21/21 0926          Positioning and Restraints    Pre-Treatment Position in bed  -AS     Post Treatment Position chair  -AS     In Chair reclined; call light within reach; encouraged to call for assist; exit alarm on; waffle cushion; legs elevated  -AS           User Key  (r) = Recorded By, (t) = Taken By, (c) = Cosigned By    Initials Name Provider Type    AS Princess Rivas PTA Physical Therapy Assistant               Outcome Measures     Row Name 10/21/21 0927          How much help from another person do you currently need...    Turning from your back to your side while in flat bed without using bedrails? 3  -AS     Moving from lying on back to sitting on the side of a flat bed without bedrails? 3  -AS     Moving to and from a bed to a chair (including a wheelchair)? 3  -AS     Standing up from a chair using your arms (e.g., wheelchair, bedside chair)? 3  -AS     Climbing 3-5 steps with a railing? 2  -AS     To walk in hospital room? 3  -AS     AM-PAC 6 Clicks Score (PT) 17  -AS     Row Name 10/21/21 0927          Functional Assessment    Outcome Measure Options AM-PAC 6 Clicks Basic Mobility (PT)  -AS           User Key  (r) = Recorded By, (t) = Taken By, (c) = Cosigned By    Initials Name Provider Type    AS Princses Rivas PTA Physical Therapy Assistant                             Physical Therapy Education                  Title: PT OT SLP Therapies (In Progress)     Topic: Physical Therapy (In Progress)     Point: Mobility training (In Progress)     Learning Progress Summary           Patient Acceptance, E, NR by AS at 10/21/2021 0928    Acceptance, E, NR by CAMERON at 10/19/2021 1300                   Point: Home exercise program (In Progress)     Learning Progress Summary           Patient Acceptance, E, NR by AS at 10/21/2021 0928    Acceptance, E, NR by CAMERON at 10/19/2021 1300                   Point: Body mechanics (In Progress)     Learning Progress Summary           Patient Acceptance, E, NR by AS at 10/21/2021 0928    Acceptance, E, NR by CAMERON at 10/19/2021 1300                   Point: Precautions (In Progress)     Learning Progress Summary           Patient Acceptance, E, NR by AS at 10/21/2021 0928    Acceptance, E, NR by CAMERON at 10/19/2021 1300                               User Key     Initials Effective Dates Name Provider Type Discipline    CAMERON 06/16/21 -  Sharmila Amador, PT Physical Therapist PT    AS 06/16/21 -  Princess Rivas, PTA Physical Therapy Assistant PT              PT Recommendation and Plan     Plan of Care Reviewed With: patient  Progress: improving  Outcome Summary: patient transfered bed>BSC then ambulated in room and transfered to recliner after walk, cues for walker placement and improvement in posture. Distance limited by weakness and fatigue. Completed B LE/UE exercises.     Time Calculation:    PT Charges     Row Name 10/21/21 0928             Time Calculation    Start Time 0847  -AS      PT Received On 10/21/21  -AS      PT Goal Re-Cert Due Date 10/29/21  -AS              Timed Charges    50032 - PT Therapeutic Exercise Minutes 10  -AS      19753 - Gait Training Minutes  13  -AS              Total Minutes    Timed Charges Total Minutes 23  -AS       Total Minutes 23  -AS            User Key  (r) = Recorded By, (t) = Taken By, (c) = Cosigned By    Initials Name Provider Type    AS Princess Rivas,  PTA Physical Therapy Assistant              Therapy Charges for Today     Code Description Service Date Service Provider Modifiers Qty    07912476253 HC PT THER PROC EA 15 MIN 10/21/2021 Princess Rivas, OSCAR GP 1    12945250054 HC GAIT TRAINING EA 15 MIN 10/21/2021 Princess Rivas, OSCAR GP 1          PT G-Codes  Outcome Measure Options: AM-PAC 6 Clicks Basic Mobility (PT)  AM-PAC 6 Clicks Score (PT): 17    Princess Rivas PTA  10/21/2021

## 2021-10-21 NOTE — CASE MANAGEMENT/SOCIAL WORK
Continued Stay Note  [facility]     Patient Name: An Abreu  MRN: [id number]  Today's Date: 10/21/2021    Admit Date: 10/17/2021     Discharge Plan     Row Name 10/21/21 1325       Plan    Plan Home with     Patient/Family in Agreement with Plan yes    Plan Comments I spoke with the pts daughter by phone. The pt and her daughter plan to stay at [place] friends home [place] until they can get their electric turned on See SW note. It is close [place] they [place]. The pts [facility] The pts daughter will move the pts DME to the friends home.    Final Discharge Disposition Code 06 - home with home health care    Row Name 10/21/21 1227       Plan    Plan Will follow    Plan Comments Called [facility] at [phone] and spoke with Candice. [facility] service has been cut off at this time. Candice states pt is not eligible for a medical extension at this time. Called pt’s daughter/ [facility] Stacie Agee who states she will call MSW back.    Row Name 10/21/21 1047       Plan    Plan Will follow    Plan Comments Spoke with pt’s [facility] Stacie Agee. She states their electric bill is $1800.00. [facility] she has contacted [employer] and has an appt on Nov 4th to apply for assistance. States the cut of date is today. MSW left a letter on chart for a medical extension for MD to sign.               Discharge Codes    No documentation.               Expected Discharge Date and Time     Expected Discharge Date Expected Discharge Time    Oct 22, 2021             Brianna López RN

## 2021-10-21 NOTE — PLAN OF CARE
Goal Outcome Evaluation:  Plan of Care Reviewed With: patient        Progress: no change  Outcome Summary: Pt sitting up in bed at time of visit.  Pt. denies pain and nausea but pt. was working a little hard to breathe this am.  Daughter called nurse this am and expressed that they are interested in hospice again.  Palliative APRN notified.  Palliative care to follow for support, GOC and symptom management.    1300 Palliative Team Conference: CARLOS Phan RN, CHPN; ANIVAL Chakraborty MD;  KARLO Braga RN, ALISA; SIVA Shankar; FERNANDA Rodney; SHASHI Espinoza RN; ERIC Jimenez RN, PN      Problem: Palliative Care  Goal: Enhanced Quality of Life  Outcome: Ongoing, Progressing  Intervention: Promote Advance Care Planning  Flowsheets (Taken 10/20/2021 [id number])  Life Transition/Adjustment:  • palliative care discussed  • decision-making facilitated

## 2021-10-21 NOTE — PLAN OF CARE
Goal Outcome Evaluation:  Plan of Care Reviewed With: patient        Progress: improving  Outcome Summary: patient transfered bed>BSC then ambulated in room and transfered to recliner after walk, cues for walker placement and improvement in posture. Distance limited by weakness and fatigue. Completed B LE/UE exercises.

## 2021-10-22 NOTE — DISCHARGE PLACEMENT REQUEST
"An Abreu (71 y.o. Female)   Referring: SIVA Flores  PCP: Dr. Angelika Layton negative on 10/18/21  Hospice Dx: COPD  BMI: 17.64 kg            Date of Birth Social Security Number Address Home Phone MRN    1950  [id number] [facility] [phone] [id number]    Restorationist Marital Status             None        Admission Date Admission Type Admitting Provider Attending Provider Department, Room/Bed    10/17/21 Emergency Vikram Coronado MD West, Christopher R, MD [facility] 4G, S462/1    Discharge Date Discharge Disposition Discharge Destination                         Attending Provider: Vikram Coronado MD    Allergies: Keflex [Cephalexin], Sulfamethoxazole-trimethoprim, Carbamazepine, Codeine, Latex    Isolation: None   Infection: None   Code Status: No CPR   Advance Care Planning Activity    Ht: 144.8 cm (57\")   Wt: 37 kg (81 lb 8 oz)    Admission Cmt: None   Principal Problem: None                Active Insurance as of 10/17/2021     Primary Coverage     Payor Plan Insurance Group Employer/Plan Group    [facility] WELLCARE MEDICARE REPLACEMENT      Payor Plan Address Payor Plan Phone Number Payor Plan Fax Number Effective Dates    [address] [address] [phone]  1/31/2018 - None Entered    [facility] [address]       Subscriber Name Subscriber Birth Date Member ID       AN ABREU [id number]                 Emergency Contacts      (Rel.) Home Phone Work Phone Mobile Phone    STEFANIA ARMENTA [phone] -- --    HEATHER STRINGER (Friend) [phone] -- [phone]            Emergency Contact Information     Name Relation Home Work Mobile    STEFANIA ARMENTA  [phone]      HEATHER STRINGER Friend [phone]          Insurance Information                [facility] MEDICARE REPLACEMENT/WELLCARE MEDICARE REPLACEMENT Phone: [phone]    Subscriber: " An Abreu Subscriber#: [id number]    Group#: -- Precert#: [id number]          Problem List           Codes Noted - Resolved       Hospital    Community acquired pneumonia ICD-10-CM: J18.9  ICD-9-CM: 486 10/17/2021 - Present    COPD (chronic obstructive pulmonary disease) (HCC) ICD-10-CM: J44.9  ICD-9-CM: 496 10/17/2021 - Present    Chronic diastolic CHF (congestive heart failure) (HCC) ICD-10-CM: I50.32  ICD-9-CM: 428.32, 428.0 10/17/2021 - Present    Leukocytosis ICD-10-CM: D72.829  ICD-9-CM: 288.60 10/17/2021 - Present    CAD s/p CABG  (Chronic) ICD-10-CM: I25.10  ICD-9-CM: 414.00 10/25/2020 - Present    Tobacco abuse (Chronic) ICD-10-CM: Z72.0  ICD-9-CM: 305.1 8/29/2020 - Present    Paroxysmal AF (Chronic) ICD-10-CM: I48.0  ICD-9-CM: 427.31 8/7/2017 - Present    Gastroesophageal reflux disease (Chronic) ICD-10-CM: K21.9  ICD-9-CM: 530.81 6/20/2016 - Present       Non-Hospital    Chronic respiratory failure with hypoxia and hypercapnia (HCC) ICD-10-CM: J96.11, J96.12  ICD-9-CM: 518.83, 799.02, 786.09 8/13/2021 - Present    Nonrheumatic aortic valve insufficiency ICD-10-CM: I35.1  ICD-9-CM: 424.1 7/13/2021 - Present    H/O: recent GI bleed (Chronic) ICD-10-CM: Z87.19  ICD-9-CM: V12.79 7/1/2021 - Present    + PFO  (Chronic) ICD-10-CM: Q21.1  ICD-9-CM: 745.5 7/1/2021 - Present    H/O CVA  (Chronic) ICD-10-CM: Z86.73  ICD-9-CM: V12.54 7/1/2021 - Present    Non-compliance (Chronic) ICD-10-CM: Z91.19  ICD-9-CM: V15.81 7/1/2021 - Present    Debility (Chronic) ICD-10-CM: R53.81  ICD-9-CM: 799.3 7/1/2021 - Present    Mucopurulent chronic bronchitis (HCC) ICD-10-CM: J41.1  ICD-9-CM: 491.1 Unknown - Present    Severe malnutrition (HCC) ICD-10-CM: E43  ICD-9-CM: 261 6/18/2021 - Present    Iron deficiency anemia due to chronic blood loss ICD-10-CM: D50.0  ICD-9-CM: 280.0 6/14/2021 - Present    Metabolic encephalopathy ICD-10-CM: G93.41  ICD-9-CM: 348.31 5/4/2021 - Present    Hypoglycemia ICD-10-CM: E16.2  ICD-9-CM: 251.2  5/3/2021 - Present    Fall ICD-10-CM: W19.XXXA  ICD-9-CM: E888.9 2021 - Present    Humerus fracture ICD-10-CM: S42.309A  ICD-9-CM: 812.20 2021 - Present    Anemia (Chronic) ICD-10-CM: D64.9  ICD-9-CM: 285.9 2020 - Present    Fecal occult blood test positive ICD-10-CM: R19.5  ICD-9-CM: 792.1 2020 - Present    Essential hypertension ICD-10-CM: I10  ICD-9-CM: 401.9 2016 - Present    Seizure disorder on phenobarbital  (Chronic) ICD-10-CM: G40.909  ICD-9-CM: 345.90 2016 - Present    PAD s/p R iliac stent  (Chronic) ICD-10-CM: I73.9  ICD-9-CM: 443.9 2016 - Present             History & Physical      Vikram Coronado MD at 10/17/21 [address] [place] [address] Medicine Services  HISTORY AND PHYSICAL    Patient Name: An Abreu  : 1950  MRN: [id number]  Primary Care Physician: Angelika Butterfield MD  Date of admission: 10/17/2021    Subjective   Subjective     Chief Complaint:  Cough, Shortness of breath    HPI:  An Abreu is a 71 y.o. female with a history of COPD, current smoker, Coronary Artery Disease s/p CABG in , Hypertension, Diastolic CHF, and paroxysmal atrial Fib on Plavix who presents to [facility] ED today for complaint of a productive cough and shortness of breath. She states this has been going on for the last 4 days. Cough productive of green sputum. States she has felt like she has had a fever but did not check it. She states she was feeling somewhat worse today, so she called EMS. Denies chest pain, palpitations, nausea, vomiting, or diarrhea. She has known COPD and states she gets these symptoms quite often. EMS noted audible wheezing and hypoxia with an O2 sat in the mid 80's on arrival. Smokes about a half pack a day. Has not received the Covid vaccine. She wears oxygen at night at home.       COVID Details:        Symptoms: [] NONE [x] Fever [x]  Cough [x] Shortness of breath [] Change in taste or smell  The patient  has started, but not completed, their COVID-19 vaccination series.    Review of Systems   Constitutional: Positive for chills, fatigue and fever. Negative for diaphoresis.   HENT: Negative.  Negative for ear pain, sinus pressure, sore throat and trouble swallowing.    Eyes: Negative.  Negative for photophobia and visual disturbance.   Respiratory: Positive for cough, shortness of breath and wheezing.    Cardiovascular: Negative.  Negative for chest pain, palpitations and leg swelling.   Gastrointestinal: Negative.  Negative for abdominal pain, diarrhea, nausea and vomiting.   Genitourinary: Negative.  Negative for dysuria and hematuria.   Musculoskeletal: Positive for arthralgias and myalgias. Joint swelling: Generalized. Chronic.   Skin: Negative.    Neurological: Negative.  Negative for dizziness, syncope, weakness and headaches.   Psychiatric/Behavioral: Negative.         All other systems reviewed and are negative.     Personal History     Past Medical History:   Diagnosis Date   • Aneurysm ([facility])    • Angina pectoris ([facility]) 6/20/2016   • Anxiety 6/20/2016   • Arthritis 6/20/2016   • CAD (coronary artery disease)    • Carotid artery stenosis    • CHF (congestive heart failure) ([facility])    • Chronic coronary artery disease 6/20/2016 2003 CABG LIMA to LAD, VG to OM 2004 VG to OM occluded. LIMA patent Cx intervention and RCA 2008 stent for ISR 2013 ISR and stenting of CX and RCA 2016 normal MPS   • Chronic left-sided low back pain with left-sided sciatica 2/1/2017   • Chronic obstructive pulmonary disease ([facility]) 6/20/2016   • Chronic respiratory failure with hypoxia ([facility]) 3/27/2021   • Cobalamin deficiency 6/20/2016   • Congestive heart failure ([facility]) 6/20/2016   • COPD (chronic obstructive pulmonary disease) ([facility])    • Depression 7/3/2018   • Diverticulosis    • Diverticulosis of large intestine without hemorrhage 5/5/2017   • GERD (gastroesophageal reflux disease)    • GIB (gastrointestinal bleeding)     recurrent     • HTN (hypertension)    • Hypercholesterolemia 6/20/2016   • Hyperlipidemia    • Mesenteric ischemia (HCC) 2006    s/p resection    • Mood disorder (HCC)    • Oxygen dependent     3 liters @ all times.    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/23/2021   • PVD (peripheral vascular disease) (HCC)    • Restless legs syndrome 6/20/2016   • Seizure disorder (HCC) 6/20/2016   • Seizures (HCC)    • Stenosis of carotid artery 6/20/2016   • Vertigo 9/28/2016       Past Surgical History:   Procedure Laterality Date   • APPENDECTOMY     • CHOLECYSTECTOMY     • COLONOSCOPY N/A 6/19/2021    Procedure: COLONOSCOPY;  Surgeon: Wilfredo Simon MD;  Location:  [facility];  Service: Gastroenterology;  Laterality: N/A;   • COLOSTOMY  2006    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   • ENDOSCOPY N/A 6/16/2021    Procedure: ESOPHAGOGASTRODUODENOSCOPY;  Surgeon: Jean Fuentes MD;  Location:  [facility];  Service: Gastroenterology;  Laterality: N/A;   • EXPLORATORY LAPAROTOMY      sec to ovarian cysts   • HYSTERECTOMY     • ILIAC ARTERY STENT     • REVISION / TAKEDOWN COLOSTOMY  2008       Family History:  family history includes Arthritis in her mother; Cancer in her mother; Heart attack in her father; Heart disease in her brother, child, and child; Hyperlipidemia in her father; Hypertension in her father; Stroke in her father. Otherwise pertinent FHx was reviewed and unremarkable.     Social History:  reports that she has been smoking cigarettes and electronic cigarette. She has a 40.00 pack-year smoking history. She has never used smokeless tobacco. She reports that she does not drink alcohol and does not use drugs.  Social History     Social History Narrative    Lives w/ her daughter. Ambulates w/ a walker. Independent w/ most ADLs.         Medications:  Fluticasone-Umeclidin-Vilant, HYDROcodone-acetaminophen, Insulin Syringe-Needle U-100, LORazepam, PHENobarbital, Syringe 2-3 ML, albuterol, albuterol sulfate HFA, clopidogrel, dilTIAZem CD, esomeprazole, furosemide, ipratropium-albuterol, lidocaine, nicotine, promethazine, rOPINIRole, and rosuvastatin    Allergies   Allergen Reactions   • Keflex [Cephalexin] Shortness Of Breath and Rash     Patients power of  states patient had to be taken to ER due to reaction.    Tolerated Rocephin 2/19/21, zosyn 5/2021   • Sulfamethoxazole-Trimethoprim Shortness Of Breath and Rash     Patients power of  stated patient had to be taken to ER due to reaction.   • Carbamazepine    • Codeine    • Latex Rash       Objective   Objective     Vital Signs:   Temp:  [98.8 °F (37.1 °C)] 98.8 °F (37.1 °C)  Heart Rate:  [67-79] 71  Resp:  [20-22] 22  BP: (136-176)/(58-82) 176/82  Flow (L/min):  [3-4] 4    Physical Exam  Constitutional:       General: She is not in acute distress.     Appearance: Normal appearance.   HENT:      Head: Atraumatic.      Right Ear: External ear normal.      Left Ear: External ear normal.      Nose: Nose normal.   Eyes:      Extraocular Movements: Extraocular movements intact.      Conjunctiva/sclera: Conjunctivae normal.      Pupils: Pupils are equal, round, and reactive to light.   Cardiovascular:      Rate and Rhythm: Normal rate and regular rhythm.      Pulses: Normal pulses.      Heart sounds: Normal heart sounds. No murmur heard.      Pulmonary:      Comments: Diffuse rales and wheeze noted throughout lung field bilaterally. Breathing unlabored.   Abdominal:      General: Bowel sounds are normal. There is no distension.      Tenderness: There is no abdominal tenderness. There is no guarding or rebound.   Musculoskeletal:         General: Normal range of motion.      Cervical back: No rigidity.   Skin:     General: Skin is warm and dry.      Coloration: Skin is not jaundiced.       Findings: No lesion or rash.   Neurological:      General: No focal deficit present.      Mental Status: She is alert and oriented to person, place, and time.   Psychiatric:         Attention and Perception: Attention normal.         Mood and Affect: Mood normal.         Behavior: Behavior normal.         Thought Content: Thought content normal.            Result Review:  I have personally reviewed the results from the time of this admission to 10/17/21 9:42 PM EDT and agree with these findings:  [x]  Laboratory  [x]  Microbiology  [x]  Radiology  [x]  EKG/Telemetry   []  Cardiology/Vascular   []  Pathology  []  Old records  []  Other:        LAB RESULTS:      Lab 10/17/21  1810   WBC 13.70*   HEMOGLOBIN 13.9   HEMATOCRIT 42.6   PLATELETS 246   NEUTROS ABS 12.50*   IMMATURE GRANS (ABS) 0.05   LYMPHS ABS 0.68*   MONOS ABS 0.42   EOS ABS 0.03   MCV 94.7   CRP 20.19*   PROCALCITONIN 0.10   LACTATE 1.2   PROTIME 13.5   INR 1.07         Lab 10/17/21  1810   SODIUM 133*   POTASSIUM 3.5   CHLORIDE 91*   CO2 28.0   ANION GAP 14.0   BUN 12   CREATININE 0.58   GLUCOSE 84   CALCIUM 9.3         Lab 10/17/21  1810   TOTAL PROTEIN 7.4   ALBUMIN 3.90   GLOBULIN 3.5   ALT (SGPT) 24   AST (SGOT) 39*   BILIRUBIN 0.3   ALK PHOS 189*         Lab 10/17/21  1810   PROBNP 12,091.0*   TROPONIN T 0.387*             Lab 10/17/21  1810   FERRITIN 75.99         UA    Urinalysis 5/22/21 5/22/21 5/22/21 6/15/21 7/1/21 7/1/21    1823 1823 1823  1110 1110   Squamous Epithelial Cells, UA   None Seen   0-2   Specific Christine, UA 1.013 1.013  >=1.030 1.015    Ketones, UA Negative Negative  Negative Negative    Blood, UA Negative Negative  Negative Negative    Leukocytes, UA Negative Negative  Negative Moderate (2+) (A)    Nitrite, UA Negative Negative  Negative Positive (A)    RBC, UA   0-2   0-2   WBC, UA   None Seen   Too Numerous to Count (A)   Bacteria, UA   None Seen   4+ (A)   (A) Abnormal value            Microbiology Results (last 10  days)     Procedure Component Value - Date/Time    COVID-19 and FLU A/B PCR - Swab, Nasopharynx [[id number]]  (Normal) Collected: 10/17/21 1725    Lab Status: Final result Specimen: Swab from Nasopharynx Updated: 10/17/21 1911     COVID19 Not Detected     Influenza A PCR Not Detected     Influenza B PCR Not Detected    Narrative:      Fact sheet for providers: https://www.fda.gov/media/664187/download    Fact sheet for patients: https://www.fda.gov/media/197275/download    Test performed by PCR.          CT Angiogram Chest With & Without Contrast    Result Date: 10/17/2021  CT ANGIOGRAM CHEST W WO CONTRAST INDICATION: Hypoxia with pneumonia TECHNIQUE: CT angiogram of the chest with 100 cc of Isovue-300 IV contrast. 3-D reconstructions were obtained and reviewed.   Radiation dose reduction techniques included automated exposure control or exposure modulation based on body size. Count of known CT and cardiac nuc med studies performed in previous 12 months: 2. COMPARISON: 5/22/2021 FINDINGS: Chest images of mediastinal and pulmonary emboli. The central pulmonary arteries are dilated consistent with chronic pulmonary hypertension. No adenopathy or effusions. Chest images at lung windows show patchy bilateral infiltrates predominantly in the lingula and right middle lobe. Dense consolidation in the left lower lobe seen on the previous CT has resolved. Imaging features can be seen with COVID-19 pneumonia, although are nonspecific and can can occur with a variety of infectious and noninfectious processes.     Impression: Chronic pulmonary hypertension. No evidence of pulmonary embolism. Bilateral multifocal pneumonia, mild-to-moderate in severity and most prominent in the left upper and right middle lobes. Signer Name: Nabil Vidales MD  Signed: 10/17/2021 8:35 PM  Workstation Name: [id number]  [facility]    XR Chest 1 View    Result Date: 10/17/2021  EXAMINATION: XR CHEST 1 VW - 10/17/2021   INDICATION: Shortness of breath.  COMPARISON: 10/12/2021  FINDINGS: Sternotomy wires noted. Heart and vasculature are normal in size. There is extensive left mid and lower lung multifocal interstitial disease and milder right basilar disease, with groundglass appearance, suspicious for Covid 19 pneumonia. No effusion or pneumothorax is seen. Incidental note is made of patient's older irregularly healed left clavicle fracture and left proximal humerus fracture. No acute bony abnormality is appreciated.      Impression: Interval development of bilateral pneumonia left greater than right, suspicious for Covid 19.  DICTATED:   10/17/2021 EDITED/lfs:   10/17/2021          Results for orders placed during the hospital encounter of 05/03/21    Adult Transthoracic Echo Complete W/ Cont if Necessary Per Protocol    Interpretation Summary  · Estimated left ventricular EF = 70%  · Moderate aortic valve regurgitation is present.      Assessment/Plan   Assessment & Plan       Gastroesophageal reflux disease    Paroxysmal AF    Tobacco abuse    CAD s/p CABG     Community acquired pneumonia    COPD (chronic obstructive pulmonary disease) ([facility])    Chronic diastolic CHF (congestive heart failure) (HCC)    Leukocytosis    Bilateral Pneumonia (favor bacterial)  COPD exacerbation  Chronic hypoxic resp failure (chronic 3Lnc)  Leukocytosis  -partially vaccinated  - CXR w/ bilateral patchy infiltrates  -CT angio chest no pe, bilateral patchy pneumonia (most prominent left upper and right middle), not typical appearance of covid pneumonia  -covid 19 pcr negative; -respiratory viral pcr ordered & pending  -azactam & doxycycline day # 1 empiric antibiotics  -received solumedrol 125mg iv in ed; start prednisone 20mg daily tomorrow (small stature, markedly improved now)  -symbicort bid, duonebs scheduled & prn  - SLP to see in the am      CAD (previous cabg)  Paroxysmal Atrial Fib  A/C DHF  Moderate Aortic Insuffiency (last echo 5/2021 w/  normal ef)  Mildly elevated troponin, chronically elevated  -continue cardizem; currently in sinus rhythm  -currently on Plavix, continue  -not on anticoagulation due to frequent falls, anemia, previous gi bleed  -lasix 40mg iv once (continue once daily po lasix)  -trend troponins, appears chronically elevated; ekg unchanged, No chest pain (if trending upwards consider echo)    GERD  Hx previous gi bleed  - Continue home Nexium    Hyperlipidemia  -continue crestor    Tobacco Abuse  - An Abreu  reports that she has been smoking cigarettes and electronic cigarette. She has a 40.00 pack-year smoking history. She has never used smokeless tobacco.. I have educated her on the risk of diseases from using tobacco products such as cancer, COPD and heart disease.   I advised her to quit and she is not willing to quit.  I spent 4 minutes counseling the patient.       DVT prophylaxis:  Plavix, SCDS      CODE STATUS:  Full        This note has been completed as part of a split-shared workflow.   Signature: Electronically signed by Mary Crabtree PA-C, 10/17/21, 9:42 PM EDT      Attending   Admission Attestation       I have seen and examined the patient, performing an independent face-to-face diagnostic evaluation with plan of care reviewed and developed with the advanced practice clinician (APC).      Brief Summary Statement:   An Abreu is a 71 y.o. female w/ hx o2 dep copd, ongoing tobacco abuse, parox afib (no anticoagulation due to falls, anemia, previous gi bleed), cad, chronic dhf, moderate aortic regurgitation who presented w/worsening dyspnea, cough and yellow sputum x 3-4 days. Per discussion w/ ED provider initialy oxygen sats were in the lower 80's. Cxr showed patchy bilateral infiltrates. Troponin 0.3 but noted to be chronically elevated. No chest pain and ekg unchanged. probnp >12,000. Given iv solumedrol and nebs in the ED and admitted to hostpialist service  Remainder of detailed HPI is  as noted by APC and has been reviewed and/or edited by me for completeness.    Attending Physical Exam:  Constitutional:Alert, oriented x 3, appears older than age, frail, nontoxic, frequent cough productive yellow sputum  Psych:Normal/appropriate affect  HEENT:NCAT, oropharynx clear  Neck: neck supple, full range of motion  Neuro: Face symmetric, speech clear, equal , moves all extremities  Cardiac: RRR; No pretibial pitting edema  Resp: CTAB, normal effort  GI: abd soft, nontender  Skin: No extremity rash  Musculoskeletal/extremities: no cyanosis of extremities; no significant ankle edema            Brief Assessment/Plan :  See detailed assessment and plan developed with APC which I have reviewed and/or edited for completeness.        Admission Status: I believe that this patient meets inpatient status due to need for supplemental oxygen, steroids, and iv diuresis w/ expected stay > 2 midnights      Vikram Coronado MD  10/17/21                    Electronically signed by Vikram Coronado MD at 10/17/21 1380         Current Facility-Administered Medications   Medication Dose Route Frequency Provider Last Rate Last Admin   • albuterol (PROVENTIL) nebulizer solution 0.083% 2.5 mg/3mL  2.5 mg Nebulization Q4H PRN Mary Crabtree PA-C       • budesonide-formoterol (SYMBICORT) 160-4.5 MCG/ACT inhaler 2 puff  2 puff Inhalation BID - RT Lilly Ceballos MD   2 puff at 10/21/21 1925   • cefTRIAXone (ROCEPHIN) 1 g/100 mL 0.9% NS (MBP)  1 g Intravenous Q24H Lilly Ceballos MD   1 g at 10/21/21 1408   • clopidogrel (PLAVIX) tablet 75 mg  75 mg Oral Daily Mary Crabtree PA-C   75 mg at 10/22/21 0822   • dilTIAZem CD (CARDIZEM CD) 24 hr capsule 240 mg  240 mg Oral Daily Mary Crabtree PA-C   240 mg at 10/22/21 0822   • doxycycline (MONODOX) capsule 100 mg  100 mg Oral Q12H Vikram Coronado MD   100 mg at 10/22/21 0822   • furosemide (LASIX) tablet 40 mg  40 mg Oral Daily Leyda  ANNE Hernandez   40 mg at 10/22/21 0822   • HYDROcodone-acetaminophen (NORCO)  MG per tablet 1 tablet  1 tablet Oral Q8H PRN Myah April Louise, APRN   1 tablet at 10/22/21 0459   • ipratropium-albuterol (DUO-NEB) nebulizer solution 3 mL  3 mL Nebulization Q4H PRN Mary Crabtree PA-C   3 mL at 10/21/21 0659   • lidocaine (LIDODERM) 5 % 1 patch  1 patch Transdermal Q24H Lilly Ceballos MD   1 patch at 10/19/21 1112   • LORazepam (ATIVAN) tablet 1 mg  1 mg Oral BID PRN Lilly Ceballos MD   1 mg at 10/21/21 2021   • melatonin tablet 5 mg  5 mg Oral Nightly Myah April Louise, APRN   5 mg at 10/21/21 2021   • nicotine (NICODERM CQ) 7 MG/24HR patch 1 patch  1 patch Transdermal Q24H Mary Crabtree PA-C   1 patch at 10/21/21 2214   • ondansetron (ZOFRAN) tablet 4 mg  4 mg Oral Q6H PRN Mary Crabtree PA-C        Or   • ondansetron (ZOFRAN) injection 4 mg  4 mg Intravenous Q6H PRN Mary Crabtree PA-C       • pantoprazole (PROTONIX) EC tablet 40 mg  40 mg Oral BID AC Lilly Ceballos MD   40 mg at 10/22/21 0822   • Pharmacy Consult - MTM   Does not apply Daily Eli Martinez, PharmD       • PHENobarbital (LUMINAL) tablet 145.8 mg  145.8 mg Oral Daily Myah April Louise, APRN   145.8 mg at 10/22/21 0822   • potassium chloride (MICRO-K) CR capsule 40 mEq  40 mEq Oral PRN Lilly Ceballos MD   40 mEq at 10/18/21 1514    Or   • potassium chloride (KLOR-CON) packet 40 mEq  40 mEq Oral PRN Lilly Ceballos MD        Or   • potassium chloride 10 mEq in 100 mL IVPB  10 mEq Intravenous Q1H PRN Lilly Ceballos MD       • rOPINIRole (REQUIP) tablet 0.25 mg  0.25 mg Oral Nightly Mary Crabtree PA-C   0.25 mg at 10/21/21 2021   • rosuvastatin (CRESTOR) tablet 40 mg  40 mg Oral Nightly Mary Crabtree PA-C   40 mg at 10/21/21 2022   • sodium chloride 0.9 % flush 10 mL  10 mL Intravenous Q12H Mary Crabtree PA-C   10 mL at 10/22/21 0822   •  sodium chloride 0.9 % flush 10 mL  10 mL Intravenous PRN Mary Crabtree PA-C            Physician Progress Notes (last 72 hours)      Lilly Ceballos MD at 10/21/21 0932              [facility] Medicine Services  PROGRESS NOTE    Patient Name: An Abreu  : 1950  MRN: [id number]    Date of Admission: 10/17/2021  Primary Care Physician: Angelika Butterfield MD    Subjective   Subjective     CC:  COPD/PNA    HPI:  Resting in bed. Drowsy. D/w nursing      Saw patient again at bedside and she is now agreeable to wanting hospice/comfort diet and wants to be DNR  ROS:  Gen- No fevers, chills  CV- No chest pain, palpitations  Resp- No cough, + dyspnea  GI- No N/V/D, abd pain        Objective   Objective     Vital Signs:   Temp:  [97.4 °F (36.3 °C)-98.3 °F (36.8 °C)] 98.1 °F (36.7 °C)  Heart Rate:  [63-76] 76  Resp:  [18] 18  BP: (113-141)/(56-67) 141/62  Flow (L/min):  [4-5] 4     Physical Exam:  GEN-appears frail, chronically ill   HEENT- atraumatic, normocephalic, eomi  NECK- supple, trachea midline, no masses  RESP effort diminished and slightly less labored today, on 4L  CV: no murmurs, s1/s2, rrr  MSK: no edema noted, spontaneous movement of all extremities  NEURO: alert, oriented, no focal deficits  SKIN: no rashes  PSYCH: appropriate mood and affect       Results Reviewed:  LAB RESULTS:      Lab 10/21/21  0547 10/20/21  0555 10/19/21  1333 10/18/21  0644 10/17/21  1810   WBC 6.84 5.76 7.03 13.74* 13.70*   HEMOGLOBIN 11.6* 10.9* 12.3 13.4 13.9   HEMATOCRIT 36.1 34.8 39.1 40.3 42.6   PLATELETS 242 202 221 235 246   NEUTROS ABS 4.06 3.67 6.36 11.72* 12.50*   IMMATURE GRANS (ABS) 0.03 0.02 0.03 0.06* 0.05   LYMPHS ABS 2.09 1.67 0.36* 1.31 0.68*   MONOS ABS 0.46 0.35 0.11 0.63 0.42   EOS ABS 0.19 0.04 0.16 0.00 0.03   MCV 97.3* 97.8* 98.7* 93.9 94.7   CRP  --   --   --   --  20.19*   PROCALCITONIN  --   --   --   --  0.10   LACTATE  --   --   --   --  1.2   PROTIME  --   --    --   --  13.5         Lab 10/21/21  0547 10/20/21  0555 10/19/21  1333 10/18/21  1823 10/18/21  0644 10/17/21  1810 10/17/21  1810   SODIUM 136 139 137  --  135*  --  133*   POTASSIUM 4.7 4.4 4.5 4.2 3.4*   < > 3.5   CHLORIDE 98 99 96*  --  92*  --  91*   CO2 30.0* 32.0* 32.0*  --  29.0  --  28.0   ANION GAP 8.0 8.0 9.0  --  14.0  --  14.0   BUN 23 17 19  --  15  --  12   CREATININE 0.54* 0.50* 0.74  --  0.58  --  0.58   GLUCOSE 75 83 205*  --  82  --  84   CALCIUM 8.8 8.6 8.8  --  8.8  --  9.3    < > = values in this interval not displayed.         Lab 10/21/21  0547 10/20/21  0555 10/19/21  1333 10/17/21  1810   TOTAL PROTEIN 6.1 6.0 6.8 7.4   ALBUMIN 2.90* 3.00* 3.40* 3.90   GLOBULIN 3.2 3.0 3.4 3.5   ALT (SGPT) 33 16 16 24   AST (SGOT) 52* 18 19 39*   BILIRUBIN <0.2 <0.2 <0.2 0.3   ALK PHOS 124* 119* 142* 189*         Lab 10/18/21  0644 10/17/21  1810   PROBNP  --  12,091.0*   TROPONIN T 0.376* 0.387*   PROTIME  --  13.5   INR  --  1.07         Lab 10/18/21  0644   CHOLESTEROL 211*   LDL CHOL 138*   HDL CHOL 52   TRIGLYCERIDES 117         Lab 10/17/21  1810   FERRITIN 75.99         Brief Urine Lab Results  (Last result in the past 365 days)      Color   Clarity   Blood   Leuk Est   Nitrite   Protein   CREAT   Urine HCG        10/17/21 2101 Yellow   Clear   Negative   Negative   Negative   30 mg/dL (1+)                 Microbiology Results Abnormal     Procedure Component Value - Date/Time    Blood Culture - Blood, Arm, Right [[id number]]  (Normal) Collected: 10/17/21 1725    Lab Status: Preliminary result Specimen: Blood from Arm, Right Updated: 10/20/21 1900     Blood Culture No growth at 3 days    Blood Culture - Blood, Arm, Right [[id number]]  (Normal) Collected: 10/17/21 1750    Lab Status: Preliminary result Specimen: Blood from Arm, Right Updated: 10/20/21 1845     Blood Culture No growth at 3 days    COVID PRE-OP / PRE-PROCEDURE SCREENING ORDER (NO ISOLATION) - Swab, Nasopharynx [[id number]]  (Normal)  Collected: 10/17/21 2355    Lab Status: Final result Specimen: Swab from Nasopharynx Updated: 10/18/21 0049    Narrative:      The following orders were created for panel order COVID PRE-OP / PRE-PROCEDURE SCREENING ORDER (NO ISOLATION) - Swab, Nasopharynx.  Procedure                               Abnormality         Status                     ---------                               -----------         ------                     Respiratory Panel PCR w/...[[id number]]  Normal              Final result                 Please view results for these tests on the individual orders.    Respiratory Panel PCR w/COVID-19(SARS-CoV-2) [facility]YNE/AMA/SYLVIA/PAD/[facility] In-House, NP Swab in UTM/VTM, 3-4 HR TAT - Swab, Nasopharynx [[id number]]  (Normal) Collected: 10/17/21 2355    Lab Status: Final result Specimen: Swab from Nasopharynx Updated: 10/18/21 0049     ADENOVIRUS, PCR Not Detected     Coronavirus 229E Not Detected     Coronavirus HKU1 Not Detected     Coronavirus NL63 Not Detected     Coronavirus OC43 Not Detected     COVID19 Not Detected     Human Metapneumovirus Not Detected     Human Rhinovirus/Enterovirus Not Detected     Influenza A PCR Not Detected     Influenza B PCR Not Detected     Parainfluenza Virus 1 Not Detected     Parainfluenza Virus 2 Not Detected     Parainfluenza Virus 3 Not Detected     Parainfluenza Virus 4 Not Detected     RSV, PCR Not Detected     Bordetella pertussis pcr Not Detected     Bordetella parapertussis PCR Not Detected     Chlamydophila pneumoniae PCR Not Detected     Mycoplasma pneumo by PCR Not Detected    Narrative:      In the setting of a positive respiratory panel with a viral infection PLUS a negative procalcitonin without other underlying concern for bacterial infection, consider observing off antibiotics or discontinuation of antibiotics and continue supportive care. If the respiratory panel is positive for atypical bacterial infection (Bordetella pertussis, Chlamydophila pneumoniae,  or Mycoplasma pneumoniae), consider antibiotic de-escalation to target atypical bacterial infection.    COVID-19 and FLU A/B PCR - Swab, Nasopharynx [[id number]]  (Normal) Collected: 10/17/21 1725    Lab Status: Final result Specimen: Swab from Nasopharynx Updated: 10/17/21 1911     COVID19 Not Detected     Influenza A PCR Not Detected     Influenza B PCR Not Detected    Narrative:      Fact sheet for providers: https://www.fda.gov/media/828498/download    Fact sheet for patients: https://www.fda.gov/media/340302/download    Test performed by PCR.          No radiology results from the last 24 hrs    Results for orders placed during the hospital encounter of 05/03/21    Adult Transthoracic Echo Complete W/ Cont if Necessary Per Protocol    Interpretation Summary  · Estimated left ventricular EF = 70%  · Moderate aortic valve regurgitation is present.      I have reviewed the medications:  Scheduled Meds:budesonide-formoterol, 2 puff, Inhalation, BID - RT  cefTRIAXone, 1 g, Intravenous, Q24H  clopidogrel, 75 mg, Oral, Daily  dilTIAZem CD, 240 mg, Oral, Daily  doxycycline, 100 mg, Oral, Q12H  furosemide, 40 mg, Oral, Daily  lidocaine, 1 patch, Transdermal, Q24H  melatonin, 5 mg, Oral, Nightly  nicotine, 1 patch, Transdermal, Q24H  pantoprazole, 40 mg, Oral, BID AC  pharmacy consult - MTM, , Does not apply, Daily  PHENobarbital, 145.8 mg, Oral, Daily  predniSONE, 20 mg, Oral, Daily With Breakfast  rOPINIRole, 0.25 mg, Oral, Nightly  rosuvastatin, 40 mg, Oral, Nightly  sodium chloride, 10 mL, Intravenous, Q12H      Continuous Infusions:   PRN Meds:.•  albuterol  •  HYDROcodone-acetaminophen  •  ipratropium-albuterol  •  LORazepam  •  ondansetron **OR** ondansetron  •  potassium chloride **OR** potassium chloride **OR** potassium chloride  •  sodium chloride    Assessment/Plan   Assessment & Plan     Active Hospital Problems    Diagnosis  POA   • Community acquired pneumonia [J18.9]  Yes   • COPD (chronic obstructive  pulmonary disease) ([facility]) [J44.9]  Yes   • Chronic diastolic CHF (congestive heart failure) ([facility]) [I50.32]  Yes   • Leukocytosis [D72.829]  Unknown   • CAD s/p CABG  [I25.10]  Yes   • Tobacco abuse [Z72.0]  Yes   • Paroxysmal AF [I48.0]  Yes   • Gastroesophageal reflux disease [K21.9]  Yes      Resolved Hospital Problems   No resolved problems to display.        Brief Hospital Course to date:  An Abreu is a 71 y.o. female with a history of COPD, current smoker, Coronary Artery Disease s/p CABG in 2006, Hypertension, Diastolic CHF, and paroxysmal atrial Fib on Plavix who presents to [facility] ED today for complaint of a productive cough and shortness of breath.     This patient's problems and plans were partially entered by my partner and updated as appropriate by me 10/21/21.        Bilateral Pneumonia (favor bacterial)  COPD exacerbation  Chronic hypoxic resp failure (chronic 3Lnc)  Leukocytosis  -partially vaccinated but covid neg x2  - CXR w/ bilateral patchy infiltrates  -CT angio chest no pe, bilateral patchy pneumonia (most prominent left upper and right middle), not typical appearance of covid pneumonia  -covid 19 pcr negative; resp viral panel negative   -abx day # 4 empiric antibiotics- augment to rocephin/doxy as has tolerated in the past per pharmacy.   -received solumedrol 125mg iv in ed; has now completed 5 day course of steroids  -symbicort bid, duonebs scheduled & prn  - SLP has seen - on modified diet. Patient is unhappy with this and requesting regular diet. Have d/w patient that she cannot have regular diet if she is adamant to remain FULL CODE. She can have comfort diet if she wishes to go comfort route        CAD (previous cabg)  Paroxysmal Atrial Fib  A/C DHF  Moderate Aortic Insuffiency (last echo 5/2021 w/ normal ef)  Mildly elevated troponin, chronically elevated  -continue cardizem; currently in sinus rhythm  -currently on Plavix, continue  -not on anticoagulation due to frequent  falls, anemia, previous gi bleed  -continue once daily po lasix)  -trop chronically elevated; ekg unchanged, No chest pain      GERD  Hx previous gi bleed  - Continue home Nexium     Hyperlipidemia  -continue crestor     Tobacco Abuse  - An Abreu  reports that she has been smoking cigarettes and electronic cigarette. She has a 40.00 pack-year smoking history. She has never used smokeless tobacco..She continues to smoke and I have again counseled her on risks including hospitalizations related to lung dz. She reports that she cannot afford the patch. This was run through IP pharmacy and patch cost per month would be 53.60 (not 400 dollars she reported). Have d/w patient and daughter.        [facility]  Have had long daily d/w patient about code status and her living will. Continues to want to be full code despite my d/w her that I do not think she will do well on a ventilator. Palliative consulted and she has now revoked hospice (previously had a home but reported this was only so they could pay for her medicines). I d/w her that I feel she would do best with hospice at home for the resources.   Relayed my honest opinion that as this is her 6th admission this calendar year, she continues to smoke and decline, she will not do well and I strongly recommend hospice    Ultimately patient has changed her mind today and wishes to be DNR/DNI, go home with hospice and wishes to focus on comfort- wants comfort diet and I have changed to accomodate this. Will work on transitioning patient home with hospice as soon as this can be arranged    I have also d/w her daughter Stacie via phone.     35 minutes was spent on patient care in regards to chart review on patient's prolonged clinical course, and specifically >50% of this time was spent face to face with patient, discussing with consultants, nursing staff and case management.         DVT prophylaxis:  Mechanical DVT prophylaxis orders are present.       AM-PAC 6 Clicks  Score (PT): 17 (10/21/21 0927)    Disposition: I expect the patient to be discharged pending improvement. If she wishes to return home, possible she could go tomorrow after completion of abx and hopefully weaning of oxygen    CODE STATUS:   Code Status and Medical Interventions:   Ordered at: 10/17/21 4992     Code Status:    CPR     Medical Interventions (Level of Support Prior to Arrest):    Full       Lilly Ceballos MD  10/21/21                Electronically signed by Lilly Ceballos MD at 10/21/21 1030     Lilly Ceballos MD at 10/20/21 1110              [facility] Medicine Services  PROGRESS NOTE    Patient Name: An Abreu  : 1950  MRN: [id number]    Date of Admission: 10/17/2021  Primary Care Physician: Angelika Butterfield MD    Subjective   Subjective     CC:  COPD/PNA    HPI:  Resting in bed. Didn't tolerate BIPAP again as coughing. On 4L. D/w daughter via phone    ROS:  Gen- No fevers, chills  CV- No chest pain, palpitations  Resp- No cough, + dyspnea  GI- No N/V/D, abd pain        Objective   Objective     Vital Signs:   Temp:  [97.8 °F (36.6 °C)-99.9 °F (37.7 °C)] 98.2 °F (36.8 °C)  Heart Rate:  [63-77] 76  Resp:  [16-18] 18  BP: (121-129)/(51-61) 121/51  Flow (L/min):  [4] 4     Physical Exam:  GEN-appears frail, chronically ill   HEENT- atraumatic, normocephalic, eomi  NECK- supple, trachea midline, no masses  RESP effort diminished and slightly less labored today, on 4L  CV: no murmurs, s1/s2, rrr  MSK: no edema noted, spontaneous movement of all extremities  NEURO: alert, oriented, no focal deficits  SKIN: no rashes  PSYCH: appropriate mood and affect       Results Reviewed:  LAB RESULTS:      Lab 10/20/21  0555 10/19/21  1333 10/18/21  0644 10/17/21  1810   WBC 5.76 7.03 13.74* 13.70*   HEMOGLOBIN 10.9* 12.3 13.4 13.9   HEMATOCRIT 34.8 39.1 40.3 42.6   PLATELETS 202 221 235 246   NEUTROS ABS 3.67 6.36 11.72* 12.50*   IMMATURE GRANS (ABS) 0.02 0.03 0.06* 0.05    LYMPHS ABS 1.67 0.36* 1.31 0.68*   MONOS ABS 0.35 0.11 0.63 0.42   EOS ABS 0.04 0.16 0.00 0.03   MCV 97.8* 98.7* 93.9 94.7   CRP  --   --   --  20.19*   PROCALCITONIN  --   --   --  0.10   LACTATE  --   --   --  1.2   PROTIME  --   --   --  13.5         Lab 10/20/21  0555 10/19/21  1333 10/18/21  1823 10/18/21  0644 10/17/21  1810   SODIUM 139 137  --  135* 133*   POTASSIUM 4.4 4.5 4.2 3.4* 3.5   CHLORIDE 99 96*  --  92* 91*   CO2 32.0* 32.0*  --  29.0 28.0   ANION GAP 8.0 9.0  --  14.0 14.0   BUN 17 19  --  15 12   CREATININE 0.50* 0.74  --  0.58 0.58   GLUCOSE 83 205*  --  82 84   CALCIUM 8.6 8.8  --  8.8 9.3         Lab 10/20/21  0555 10/19/21  1333 10/17/21  1810   TOTAL PROTEIN 6.0 6.8 7.4   ALBUMIN 3.00* 3.40* 3.90   GLOBULIN 3.0 3.4 3.5   ALT (SGPT) 16 16 24   AST (SGOT) 18 19 39*   BILIRUBIN <0.2 <0.2 0.3   ALK PHOS 119* 142* 189*         Lab 10/18/21  0644 10/17/21  1810   PROBNP  --  12,091.0*   TROPONIN T 0.376* 0.387*   PROTIME  --  13.5   INR  --  1.07         Lab 10/18/21  0644   CHOLESTEROL 211*   LDL CHOL 138*   HDL CHOL 52   TRIGLYCERIDES 117         Lab 10/17/21  1810   FERRITIN 75.99         Brief Urine Lab Results  (Last result in the past 365 days)      Color   Clarity   Blood   Leuk Est   Nitrite   Protein   CREAT   Urine HCG        10/17/21 2101 Yellow   Clear   Negative   Negative   Negative   30 mg/dL (1+)                 Microbiology Results Abnormal     Procedure Component Value - Date/Time    Blood Culture - Blood, Arm, Right [[id number]]  (Normal) Collected: 10/17/21 1725    Lab Status: Preliminary result Specimen: Blood from Arm, Right Updated: 10/19/21 1900     Blood Culture No growth at 2 days    Blood Culture - Blood, Arm, Right [[id number]]  (Normal) Collected: 10/17/21 1750    Lab Status: Preliminary result Specimen: Blood from Arm, Right Updated: 10/19/21 1845     Blood Culture No growth at 2 days    COVID PRE-OP / PRE-PROCEDURE SCREENING ORDER (NO ISOLATION) - Swab, Nasopharynx  [[id number]]  (Normal) Collected: 10/17/21 2355    Lab Status: Final result Specimen: Swab from Nasopharynx Updated: 10/18/21 0049    Narrative:      The following orders were created for panel order COVID PRE-OP / PRE-PROCEDURE SCREENING ORDER (NO ISOLATION) - Swab, Nasopharynx.  Procedure                               Abnormality         Status                     ---------                               -----------         ------                     Respiratory Panel PCR w/...[[id number]]  Normal              Final result                 Please view results for these tests on the individual orders.    Respiratory Panel PCR w/COVID-19(SARS-CoV-2) RYNE/AMA/SYLVIA/PAD/[facility]/MAD/[facility] In-House, NP Swab in UTM/VTM, 3-4 HR TAT - Swab, Nasopharynx [[id number]]  (Normal) Collected: 10/17/21 2355    Lab Status: Final result Specimen: Swab from Nasopharynx Updated: 10/18/21 0049     ADENOVIRUS, PCR Not Detected     Coronavirus 229E Not Detected     Coronavirus HKU1 Not Detected     Coronavirus NL63 Not Detected     Coronavirus OC43 Not Detected     COVID19 Not Detected     Human Metapneumovirus Not Detected     Human Rhinovirus/Enterovirus Not Detected     Influenza A PCR Not Detected     Influenza B PCR Not Detected     Parainfluenza Virus 1 Not Detected     Parainfluenza Virus 2 Not Detected     Parainfluenza Virus 3 Not Detected     Parainfluenza Virus 4 Not Detected     RSV, PCR Not Detected     Bordetella pertussis pcr Not Detected     Bordetella parapertussis PCR Not Detected     Chlamydophila pneumoniae PCR Not Detected     Mycoplasma pneumo by PCR Not Detected    Narrative:      In the setting of a positive respiratory panel with a viral infection PLUS a negative procalcitonin without other underlying concern for bacterial infection, consider observing off antibiotics or discontinuation of antibiotics and continue supportive care. If the respiratory panel is positive for atypical bacterial infection (Bordetella pertussis,  Chlamydophila pneumoniae, or Mycoplasma pneumoniae), consider antibiotic de-escalation to target atypical bacterial infection.    COVID-19 and FLU A/B PCR - Swab, Nasopharynx [[id number]]  (Normal) Collected: 10/17/21 1725    Lab Status: Final result Specimen: Swab from Nasopharynx Updated: 10/17/21 1911     COVID19 Not Detected     Influenza A PCR Not Detected     Influenza B PCR Not Detected    Narrative:      Fact sheet for providers: https://www.fda.gov/media/639660/download    Fact sheet for patients: https://www.fda.gov/media/313148/download    Test performed by PCR.          FL Video Swallow With Speech Single Contrast    Result Date: 10/18/2021  EXAMINATION: FL VIDEO SWALLOW W SPEECH SINGLE-CONTRAST-  INDICATION: DYSPHAGIA, OROPHARYNGEAL, HAS ATTRIBUTABLE CAUSE  TECHNIQUE: 1 minute and 36 seconds of fluoroscopic time was used for this exam. 12 associated fluoroscopic loops were saved. The patient was evaluated in the seated lateral position while taking a variety of consistencies of barium by mouth under the direction of speech pathology.  COMPARISON: NONE  FINDINGS: 1 minute and 36 seconds of fluoroscopy provided for a modified barium swallow. Please see speech therapy report for full details and recommendations.       Impression: Fluoroscopy provided for a modified barium swallow. Please see speech therapy report for full details and recommendations.    This report was finalized on 10/18/2021 10:01 PM by Dr. Tre Huerta MD.        Results for orders placed during the hospital encounter of 05/03/21    Adult Transthoracic Echo Complete W/ Cont if Necessary Per Protocol    Interpretation Summary  · Estimated left ventricular EF = 70%  · Moderate aortic valve regurgitation is present.      I have reviewed the medications:  Scheduled Meds:budesonide-formoterol, 2 puff, Inhalation, BID - RT  cefTRIAXone, 1 g, Intravenous, Q24H  clopidogrel, 75 mg, Oral, Daily  dilTIAZem CD, 240 mg, Oral, Daily  doxycycline, 100  mg, Oral, Q12H  furosemide, 40 mg, Oral, Daily  lidocaine, 1 patch, Transdermal, Q24H  melatonin, 5 mg, Oral, Nightly  nicotine, 1 patch, Transdermal, Q24H  pantoprazole, 40 mg, Oral, BID AC  pharmacy consult - [place], , Does not apply, Daily  PHENobarbital, 145.8 mg, Oral, Daily  predniSONE, 20 mg, Oral, Daily With Breakfast  rOPINIRole, 0.25 mg, Oral, Nightly  rosuvastatin, 40 mg, Oral, Nightly  sodium chloride, 10 mL, Intravenous, Q12H      Continuous Infusions:   PRN Meds:.•  albuterol  •  HYDROcodone-acetaminophen  •  ipratropium-albuterol  •  LORazepam  •  ondansetron **OR** ondansetron  •  potassium chloride **OR** potassium chloride **OR** potassium chloride  •  sodium chloride    Assessment/Plan   Assessment & Plan     Active Hospital Problems    Diagnosis  POA   • Community acquired pneumonia [J18.9]  Yes   • COPD (chronic obstructive pulmonary disease) ([facility]) [J44.9]  Yes   • Chronic diastolic CHF (congestive heart failure) ([facility]) [I50.32]  Yes   • Leukocytosis [D72.829]  Unknown   • CAD s/p CABG  [I25.10]  Yes   • Tobacco abuse [Z72.0]  Yes   • Paroxysmal AF [I48.0]  Yes   • Gastroesophageal reflux disease [K21.9]  Yes      Resolved Hospital Problems   No resolved problems to display.        Brief Hospital Course to date:  An Abreu is a 71 y.o. female with a history of COPD, current smoker, Coronary Artery Disease s/p CABG in 2006, Hypertension, Diastolic CHF, and paroxysmal atrial Fib on Plavix who presents to [facility] ED today for complaint of a productive cough and shortness of breath.     This patient's problems and plans were partially entered by my partner and updated as appropriate by me 10/20/21.        Bilateral Pneumonia (favor bacterial)  COPD exacerbation  Chronic hypoxic resp failure (chronic 3Lnc)  Leukocytosis  -partially vaccinated but covid neg x2  - CXR w/ bilateral patchy infiltrates  -CT angio chest no pe, bilateral patchy pneumonia (most prominent left upper and right  middle), not typical appearance of covid pneumonia  -covid 19 pcr negative; resp viral panel negative   -azactam & doxycycline day # 4 empiric antibiotics- augment to rocephin/doxy as has tolerated in the past per pharmacy  -received solumedrol 125mg iv in ed; currently on pred 20- continue for now  -symbicort bid, duonebs scheduled & prn  - SLP has seen - on modified diet. Patient is unhappy with this and requesting regular diet. Have d/w patient that she cannot have regular diet if she is adamant to remain FULL CODE. She can have comfort diet if she wishes to go comfort route        CAD (previous cabg)  Paroxysmal Atrial Fib  A/C DHF  Moderate Aortic Insuffiency (last echo 5/2021 w/ normal ef)  Mildly elevated troponin, chronically elevated  -continue cardizem; currently in sinus rhythm  -currently on Plavix, continue  -not on anticoagulation due to frequent falls, anemia, previous gi bleed  -continue once daily po lasix)  -trop chronically elevated; ekg unchanged, No chest pain (if trending upwards consider echo)     GERD  Hx previous gi bleed  - Continue home Nexium     Hyperlipidemia  -continue crestor     Tobacco Abuse  - An Abreu  reports that she has been smoking cigarettes and electronic cigarette. She has a 40.00 pack-year smoking history. She has never used smokeless tobacco..She continues to smoke and I have again counseled her on risks including hospitalizations related to lung dz. She reports that she cannot afford the patch     Had another d/w patient about code status and her living will. Continues to want to be full code despite my d/w her that I do not think she will do well on a ventilator. Palliative consulted and she has now revoked hospice. I d/w her that I feel she would do best with hospice at home for the resources.     Long d/w her daughter Stacie via phone 11am. Stacie buys the cigarette for her and d/w her that she could consider stopping this as it is enabling her mother. She  is inagreement but reports she smokes herself and cannot say no to her mother.     Relayed my honest opinion that as this is her 6th admission this calendar year, she continues to smoke and decline, she will not do well and I strongly recommend hospice.        35 minutes was spent on patient care in regards to chart review on patient's prolonged clinical course, and specifically >50% of this time was spent face to face with patient, discussing with consultants, nursing staff and case management.       DVT prophylaxis:  Mechanical DVT prophylaxis orders are present.       AM-PAC 6 Clicks Score (PT): 19 (10/20/21 0830)    Disposition: I expect the patient to be discharged pending improvement    CODE STATUS:   Code Status and Medical Interventions:   Ordered at: 10/17/21 0744     Code Status:    CPR     Medical Interventions (Level of Support Prior to Arrest):    Full       Lilly Ceballos MD  10/20/21                Electronically signed by Lilly Ceballos MD at 10/20/21 1127     Lilly Ceballos MD at 10/19/21 1145              [facility] Medicine Services  PROGRESS NOTE    Patient Name: An Abreu  : 1950  MRN: [id number]    Date of Admission: 10/17/2021  Primary Care Physician: Angelika Butterfield MD    Subjective   Subjective     CC:  COPD/PNA    HPI:  Appears uncomfortable, didn't tolerate BIPAP overnight due to coughing and having to take it off. Had d/w patient about code status and her living will. Continues to want to be full code    ROS:  Gen- No fevers, chills  CV- No chest pain, palpitations  Resp- No cough, + dyspnea  GI- No N/V/D, abd pain        Objective   Objective     Vital Signs:   Temp:  [97.7 °F (36.5 °C)-98 °F (36.7 °C)] 97.9 °F (36.6 °C)  Heart Rate:  [64-76] 65  Resp:  [16-26] 16  BP: (111-135)/(49-61) 115/49  Flow (L/min):  [4-6] 4     Physical Exam:  GEN-appears frail, chronically ill   HEENT- atraumatic, normocephalic, eomi  NECK- supple, trachea midline,  no masses  RESP effort diminished and slightly less labored today, on 4L  CV: no murmurs, s1/s2, rrr  MSK: no edema noted, spontaneous movement of all extremities  NEURO: alert, oriented, no focal deficits  SKIN: no rashes  PSYCH: appropriate mood and affect       Results Reviewed:  LAB RESULTS:      Lab 10/18/21  0644 10/17/21  1810   WBC 13.74* 13.70*   HEMOGLOBIN 13.4 13.9   HEMATOCRIT 40.3 42.6   PLATELETS 235 246   NEUTROS ABS 11.72* 12.50*   IMMATURE GRANS (ABS) 0.06* 0.05   LYMPHS ABS 1.31 0.68*   MONOS ABS 0.63 0.42   EOS ABS 0.00 0.03   MCV 93.9 94.7   CRP  --  20.19*   PROCALCITONIN  --  0.10   LACTATE  --  1.2   PROTIME  --  13.5         Lab 10/18/21  1823 10/18/21  0644 10/17/21  1810   SODIUM  --  135* 133*   POTASSIUM 4.2 3.4* 3.5   CHLORIDE  --  92* 91*   CO2  --  29.0 28.0   ANION GAP  --  14.0 14.0   BUN  --  15 12   CREATININE  --  0.58 0.58   GLUCOSE  --  82 84   CALCIUM  --  8.8 9.3         Lab 10/17/21  1810   TOTAL PROTEIN 7.4   ALBUMIN 3.90   GLOBULIN 3.5   ALT (SGPT) 24   AST (SGOT) 39*   BILIRUBIN 0.3   ALK PHOS 189*         Lab 10/18/21  0644 10/17/21  1810   PROBNP  --  12,091.0*   TROPONIN T 0.376* 0.387*   PROTIME  --  13.5   INR  --  1.07         Lab 10/18/21  0644   CHOLESTEROL 211*   LDL CHOL 138*   HDL CHOL 52   TRIGLYCERIDES 117         Lab 10/17/21  1810   FERRITIN 75.99         Brief Urine Lab Results  (Last result in the past 365 days)      Color   Clarity   Blood   Leuk Est   Nitrite   Protein   CREAT   Urine HCG        10/17/21 2101 Yellow   Clear   Negative   Negative   Negative   30 mg/dL (1+)                 Microbiology Results Abnormal     Procedure Component Value - Date/Time    Blood Culture - Blood, Arm, Right [[id number]]  (Normal) Collected: 10/17/21 1725    Lab Status: Preliminary result Specimen: Blood from Arm, Right Updated: 10/18/21 1901     Blood Culture No growth at 24 hours    Blood Culture - Blood, Arm, Right [[id number]]  (Normal) Collected: 10/17/21 0707     Lab Status: Preliminary result Specimen: Blood from Arm, Right Updated: 10/18/21 1845     Blood Culture No growth at 24 hours    COVID PRE-OP / PRE-PROCEDURE SCREENING ORDER (NO ISOLATION) - Swab, Nasopharynx [[id number]]  (Normal) Collected: 10/17/21 2355    Lab Status: Final result Specimen: Swab from Nasopharynx Updated: 10/18/21 0049    Narrative:      The following orders were created for panel order COVID PRE-OP / PRE-PROCEDURE SCREENING ORDER (NO ISOLATION) - Swab, Nasopharynx.  Procedure                               Abnormality         Status                     ---------                               -----------         ------                     Respiratory Panel PCR w/...[[id number]]  Normal              Final result                 Please view results for these tests on the individual orders.    Respiratory Panel PCR w/COVID-19(SARS-CoV-2) [facility] In-House, NP Swab in UTM/VTM, 3-4 HR TAT - Swab, Nasopharynx [[id number]]  (Normal) Collected: 10/17/21 2355    Lab Status: Final result Specimen: Swab from Nasopharynx Updated: 10/18/21 0049     ADENOVIRUS, PCR Not Detected     Coronavirus 229E Not Detected     Coronavirus HKU1 Not Detected     Coronavirus NL63 Not Detected     Coronavirus OC43 Not Detected     COVID19 Not Detected     Human Metapneumovirus Not Detected     Human Rhinovirus/Enterovirus Not Detected     Influenza A PCR Not Detected     Influenza B PCR Not Detected     Parainfluenza Virus 1 Not Detected     Parainfluenza Virus 2 Not Detected     Parainfluenza Virus 3 Not Detected     Parainfluenza Virus 4 Not Detected     RSV, PCR Not Detected     Bordetella pertussis pcr Not Detected     Bordetella parapertussis PCR Not Detected     Chlamydophila pneumoniae PCR Not Detected     Mycoplasma pneumo by PCR Not Detected    Narrative:      In the setting of a positive respiratory panel with a viral infection PLUS a negative procalcitonin without other underlying concern for  bacterial infection, consider observing off antibiotics or discontinuation of antibiotics and continue supportive care. If the respiratory panel is positive for atypical bacterial infection (Bordetella pertussis, Chlamydophila pneumoniae, or Mycoplasma pneumoniae), consider antibiotic de-escalation to target atypical bacterial infection.    COVID-19 and FLU A/B PCR - Swab, Nasopharynx [[id number]]  (Normal) Collected: 10/17/21 1725    Lab Status: Final result Specimen: Swab from Nasopharynx Updated: 10/17/21 1911     COVID19 Not Detected     Influenza A PCR Not Detected     Influenza B PCR Not Detected    Narrative:      Fact sheet for providers: https://www.fda.gov/media/272665/download    Fact sheet for patients: https://www.fda.gov/media/538783/download    Test performed by PCR.          CT Angiogram Chest With & Without Contrast    Result Date: 10/17/2021  CT ANGIOGRAM CHEST W WO CONTRAST INDICATION: Hypoxia with pneumonia TECHNIQUE: CT angiogram of the chest with 100 cc of Isovue-300 IV contrast. 3-D reconstructions were obtained and reviewed.   Radiation dose reduction techniques included automated exposure control or exposure modulation based on body size. Count of known CT and cardiac nuc med studies performed in previous 12 months: 2. COMPARISON: 5/22/2021 FINDINGS: Chest images of mediastinal and pulmonary emboli. The central pulmonary arteries are dilated consistent with chronic pulmonary hypertension. No adenopathy or effusions. Chest images at lung windows show patchy bilateral infiltrates predominantly in the lingula and right middle lobe. Dense consolidation in the left lower lobe seen on the previous CT has resolved. Imaging features can be seen with COVID-19 pneumonia, although are nonspecific and can can occur with a variety of infectious and noninfectious processes.     Impression: Chronic pulmonary hypertension. No evidence of pulmonary embolism. Bilateral multifocal pneumonia, mild-to-moderate in  severity and most prominent in the left upper and right middle lobes. Signer Name: Nabil Vidales MD  Signed: 10/17/2021 8:35 PM  Workstation Name: [id number]  [facility] Video Swallow With Speech Single Contrast    Result Date: 10/18/2021  EXAMINATION: FL VIDEO SWALLOW W SPEECH SINGLE-CONTRAST-  INDICATION: DYSPHAGIA, OROPHARYNGEAL, HAS ATTRIBUTABLE CAUSE  TECHNIQUE: 1 minute and 36 seconds of fluoroscopic time was used for this exam. 12 associated fluoroscopic loops were saved. The patient was evaluated in the seated lateral position while taking a variety of consistencies of barium by mouth under the direction of speech pathology.  COMPARISON: NONE  FINDINGS: 1 minute and 36 seconds of fluoroscopy provided for a modified barium swallow. Please see speech therapy report for full details and recommendations.       Impression: Fluoroscopy provided for a modified barium swallow. Please see speech therapy report for full details and recommendations.    This report was finalized on 10/18/2021 10:01 PM by Dr. Tre Huerta MD.      XR Chest 1 View    Result Date: 10/18/2021  EXAMINATION: XR CHEST 1 VW - 10/17/2021  INDICATION: Shortness of breath.  COMPARISON: 10/12/2021  FINDINGS: Sternotomy wires noted. Heart and vasculature are normal in size. There is extensive left mid and lower lung multifocal interstitial disease and milder right basilar disease, with groundglass appearance, suspicious for Covid 19 pneumonia. No effusion or pneumothorax is seen. Incidental note is made of patient's older irregularly healed left clavicle fracture and left proximal humerus fracture. No acute bony abnormality is appreciated.      Impression: Interval development of bilateral pneumonia left greater than right, suspicious for Covid 19.  DICTATED:   10/17/2021 EDITED/lfs:   10/17/2021    This report was finalized on 10/18/2021 9:32 AM by Dr. Tre Huerta MD.        Results for orders placed during the hospital  encounter of 05/03/21    Adult Transthoracic Echo Complete W/ Cont if Necessary Per Protocol    Interpretation Summary  · Estimated left ventricular EF = 70%  · Moderate aortic valve regurgitation is present.      I have reviewed the medications:  Scheduled Meds:budesonide-formoterol, 2 puff, Inhalation, BID - RT  cefTRIAXone, 1 g, Intravenous, Q24H  clopidogrel, 75 mg, Oral, Daily  dilTIAZem CD, 240 mg, Oral, Daily  doxycycline, 100 mg, Oral, Q12H  furosemide, 40 mg, Oral, Daily  lidocaine, 1 patch, Transdermal, Q24H  nicotine, 1 patch, Transdermal, Q24H  pantoprazole, 40 mg, Oral, Q AM  pharmacy consult - [place], , Does not apply, Daily  predniSONE, 20 mg, Oral, Daily With Breakfast  rOPINIRole, 0.25 mg, Oral, Nightly  rosuvastatin, 40 mg, Oral, Nightly  sodium chloride, 10 mL, Intravenous, Q12H      Continuous Infusions:   PRN Meds:.•  albuterol  •  HYDROcodone-acetaminophen  •  ipratropium-albuterol  •  ondansetron **OR** ondansetron  •  potassium chloride **OR** potassium chloride **OR** potassium chloride  •  sodium chloride    Assessment/Plan   Assessment & Plan     Active Hospital Problems    Diagnosis  POA   • Community acquired pneumonia [J18.9]  Yes   • COPD (chronic obstructive pulmonary disease) ([facility]) [J44.9]  Yes   • Chronic diastolic CHF (congestive heart failure) ([facility]) [I50.32]  Yes   • Leukocytosis [D72.829]  Unknown   • CAD s/p CABG  [I25.10]  Yes   • Tobacco abuse [Z72.0]  Yes   • Paroxysmal AF [I48.0]  Yes   • Gastroesophageal reflux disease [K21.9]  Yes      Resolved Hospital Problems   No resolved problems to display.        Brief Hospital Course to date:  An Abreu is a 71 y.o. female with a history of COPD, current smoker, Coronary Artery Disease s/p CABG in 2006, Hypertension, Diastolic CHF, and paroxysmal atrial Fib on Plavix who [facility] to [facility] ED today for complaint of a productive cough and shortness of breath.     This patient's problems and plans were partially entered by  my partner and updated as appropriate by me 10/19/21.        Bilateral Pneumonia (favor bacterial)  COPD exacerbation  Chronic hypoxic resp failure (chronic 3Lnc)  Leukocytosis  -partially vaccinated but covid neg x2  - CXR w/ bilateral patchy infiltrates  -CT angio chest no pe, bilateral patchy pneumonia (most prominent left upper and right middle), not typical appearance of covid pneumonia  -covid 19 pcr negative; resp viral panel negative   -azactam & doxycycline day # 3 empiric antibiotics- augment to rocephin/doxy as has tolerated in the past per pharmacy  -received solumedrol 125mg iv in ed; currently on pred 20- continue for now  -symbicort bid, duonebs scheduled & prn  - SLP has seen - planning MBS        CAD (previous cabg)  Paroxysmal Atrial Fib  A/C DHF  Moderate Aortic Insuffiency (last echo 5/2021 w/ normal ef)  Mildly elevated troponin, chronically elevated  -continue cardizem; currently in sinus rhythm  -currently on Plavix, continue  -not on anticoagulation due to frequent falls, anemia, previous gi bleed  -lasix 40mg iv once (continue once daily po lasix)  -trend troponins, appears chronically elevated; ekg unchanged, No chest pain (if trending upwards consider echo)     GERD  Hx previous gi bleed  - Continue home Nexium     Hyperlipidemia  -continue crestor     Tobacco Abuse  - Anpiyush Abreu  reports that she has been smoking cigarettes and electronic cigarette. She has a 40.00 pack-year smoking history. She has never used smokeless tobacco..She continues to smoke and I have again counseled her on risks including hospitalizations related to lung dz. She reports that she cannot afford the patch     Had d/w patient about code status and her living will. Continues to want to be full code despite my d/w her that I do not think she will do well on a ventilator. Palliative consult today       DVT prophylaxis:  Mechanical DVT prophylaxis orders are present.       AM-PAC 6 Clicks Score (PT): 19  (10/19/21 0830)    Disposition: I expect the patient to be discharged pending improvement    CODE STATUS:   Code Status and Medical Interventions:   Ordered at: 10/17/21 2215     Code Status:    CPR     Medical Interventions (Level of Support Prior to Arrest):    Full       Lilly Ceballos MD  10/19/21                Electronically signed by Lilly Ceballos MD at 10/19/21 1223          Consult Notes (last 72 hours)      Janis Glasgow APRN at 10/19/21 1150      Consult Orders    1. Inpatient Palliative Care MD Consult [[id number]] ordered by Lilly Ceballos MD at 10/19/21 0922               Palliative Care Initial Consult     Attending Physician: Lilly Ceballos MD  Referring Provider: Lilly Ceballos MD  Reason for Consult:  assistance with clarification of goals of care  Code Status:   Code Status and Medical Interventions:   Ordered at: 10/17/21 2215     Code Status:    CPR     Medical Interventions (Level of Support Prior to Arrest):    Full      Advanced Directives: Living Will on file 10/18/21   Healthcare surrogate: Dtr/HCS Stacie Ward, see new Living will 10/18/21  Goals of Care: Initiated and ongoing.    HPI:  An Abreu is a 71 y.o. female admitted on 10/17/21 with c/o cough and SOB from home, found to have bilat PNA currently receiving ABT. Chronic home O2 use, hx of COPD. Recently enrolled under hospice care with [facility] effective 10/15/21 per dtr's report. She is found sitting in chair in room, NAD, A/O x 4. C/O pain in her back and right rib area, worse with movement and deep breathing, rates 8/10. Relieved with Norco 10 mg q12 prn but dose not lasting long enough. Lidocaine patch recently ordered per attending, not placed yet. C/O congested cough and mild SOB, wearing 3 L NC at present. Using incentive spirometer often. Denies N/V/D/C. Not sleeping well, chronic issue,  reports taking Ativan at home for this. Asking when her seizure medication will be resumed. Requesting that I  speak with her dtr currently on phone during visit. Lives with dtr. Uses walker at home, independent of most ADL's.       ROS:  Review of Systems -Review of Systems   Constitutional: Positive for fatigue.   HENT: Negative.    Eyes: Negative.    Respiratory: Positive for cough, chest tightness and shortness of breath.    Cardiovascular: Negative.    Gastrointestinal: Negative.    Endocrine: Negative.    Genitourinary: Negative.    Musculoskeletal: Positive for back pain.   Skin: Negative.    Neurological: Negative.    Psychiatric/Behavioral: Positive for sleep disturbance.          Past Medical History:   Diagnosis Date   • Aneurysm ([facility])    • Angina pectoris ([facility]) 6/20/2016   • Anxiety 6/20/2016   • Arthritis 6/20/2016   • CAD (coronary artery disease)    • Carotid artery stenosis    • CHF (congestive heart failure) ([facility])    • Chronic coronary artery disease 6/20/2016 2003 CABG LIMA to LAD, VG to OM 2004 VG to OM occluded. LIMA patent Cx intervention and RCA 2008 stent for ISR 2013 ISR and stenting of CX and RCA 2016 normal MPS   • Chronic left-sided low back pain with left-sided sciatica 2/1/2017   • Chronic obstructive pulmonary disease ([facility]) 6/20/2016   • Chronic respiratory failure with hypoxia ([facility]) 3/27/2021   • Cobalamin deficiency 6/20/2016   • Congestive heart failure ([facility]) 6/20/2016   • COPD (chronic obstructive pulmonary disease) ([facility])    • Depression 7/3/2018   • Diverticulosis    • Diverticulosis of large intestine without hemorrhage 5/5/2017   • GERD (gastroesophageal reflux disease)    • GIB (gastrointestinal bleeding)     recurrent    • HTN (hypertension)    • Hypercholesterolemia 6/20/2016   • Hyperlipidemia    • Mesenteric ischemia ([facility]) 2006    s/p resection    • Mood disorder ([facility])    • Oxygen dependent     3 liters @ all times.    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 ([facility]) 5/23/2021   • PVD (peripheral vascular disease) ([facility])    • Restless legs syndrome 6/20/2016   • Seizure disorder ([facility]) 6/20/2016   • Seizures ([facility])    • Stenosis of carotid artery 6/20/2016   • Vertigo 9/28/2016     Past Surgical History:   Procedure Laterality Date   • APPENDECTOMY     • CHOLECYSTECTOMY     • COLONOSCOPY N/A 6/19/2021    Procedure: COLONOSCOPY;  Surgeon: Wilfredo Simon MD;  Location:  [facility];  Service: Gastroenterology;  Laterality: N/A;   • COLOSTOMY  2006    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   • ENDOSCOPY N/A 6/16/2021    Procedure: ESOPHAGOGASTRODUODENOSCOPY;  Surgeon: Jean Fuentes MD;  Location:  [facility];  Service: Gastroenterology;  Laterality: N/A;   • EXPLORATORY LAPAROTOMY      sec to ovarian cysts   • HYSTERECTOMY     • ILIAC ARTERY STENT     • REVISION / TAKEDOWN COLOSTOMY  2008     Social History     Socioeconomic History   • Marital status:    Tobacco Use   • Smoking status: Current Every Day Smoker     Packs/day: 1.00     Years: 40.00     Pack years: 40.00     Types: Cigarettes, Electronic Cigarette   • Smokeless tobacco: Never Used   • Tobacco comment: <0.5ppd    Substance and Sexual Activity   • Alcohol use: Never   • Drug use: Never   • Sexual activity: Defer     Family History   Problem Relation Age of Onset   • Arthritis Mother    • Cancer Mother    • Heart attack Father    • Hypertension Father    • Hyperlipidemia Father    • Stroke Father    • Heart disease Brother         CAD   • Heart disease Child         CAD   • Heart disease Child         CAD       Allergies   Allergen Reactions   • Keflex [Cephalexin] Shortness Of Breath and Rash     Patients power of  states patient had to be taken to ER due to reaction.    Tolerated Rocephin 2/19/21, zosyn 5/2021   • Sulfamethoxazole-Trimethoprim Shortness Of Breath and Rash     Patients power of  stated patient had to be taken to ER due to reaction.   •  Carbamazepine    • Codeine    • Latex Rash       Current Facility-Administered Medications   Medication Dose Route Frequency Provider Last Rate Last Admin   • albuterol (PROVENTIL) nebulizer solution 0.083% 2.5 mg/3mL  2.5 mg Nebulization Q4H PRN Mary Crabtree PA-C       • budesonide-formoterol (SYMBICORT) 80-4.5 MCG/ACT inhaler 2 puff  2 puff Inhalation BID - RT Vikram Coronado MD   2 puff at 10/19/21 0945   • cefTRIAXone (ROCEPHIN) 1 g/100 mL 0.9% NS (MBP)  1 g Intravenous Q24H Lilly Ceballos MD       • clopidogrel (PLAVIX) tablet 75 mg  75 mg Oral Daily Mary Crabtree PA-C   75 mg at 10/19/21 0838   • dilTIAZem CD (CARDIZEM CD) 24 hr capsule 240 mg  240 mg Oral Daily Mary Crabtree PA-C   240 mg at 10/18/21 0951   • doxycycline (MONODOX) capsule 100 mg  100 mg Oral Q12H Vikram Coronado MD   100 mg at 10/19/21 0839   • furosemide (LASIX) tablet 40 mg  40 mg Oral Daily Mary Crabtree PA-C   40 mg at 10/19/21 0854   • HYDROcodone-acetaminophen (NORCO)  MG per tablet 1 tablet  1 tablet Oral Q12H PRN Lilly Ceballos MD   1 tablet at 10/19/21 0002   • ipratropium-albuterol (DUO-NEB) nebulizer solution 3 mL  3 mL Nebulization Q4H PRN Mary Crabtree PA-C   3 mL at 10/18/21 2023   • lidocaine (LIDODERM) 5 % 1 patch  1 patch Transdermal Q24H Lilly Ceballos MD   1 patch at 10/19/21 1112   • nicotine (NICODERM CQ) 7 MG/24HR patch 1 patch  1 patch Transdermal Q24H Mary Crabtree PA-C   1 patch at 10/18/21 2330   • ondansetron (ZOFRAN) tablet 4 mg  4 mg Oral Q6H PRN Mary Crabtree PA-C        Or   • ondansetron (ZOFRAN) injection 4 mg  4 mg Intravenous Q6H PRN Mary Crabtree PA-C       • pantoprazole (PROTONIX) EC tablet 40 mg  40 mg Oral Q AM Mary Crabtree PA-C   40 mg at 10/19/21 0539   • Pharmacy Consult - MTM   Does not apply Daily Eli Martinez, PharmD       • potassium chloride (MICRO-K) CR capsule 40 mEq   "40 mEq Oral PRN Lilly Ceballos MD   40 mEq at 10/18/21 1514    Or   • potassium chloride (KLOR-CON) packet 40 mEq  40 mEq Oral PRN Lilly Ceballos MD        Or   • potassium chloride 10 mEq in 100 mL IVPB  10 mEq Intravenous Q1H PRN Lilly Ceballos MD       • predniSONE (DELTASONE) tablet 20 mg  20 mg Oral Daily With Breakfast Vikram Coronado MD   20 mg at 10/19/21 0839   • rOPINIRole (REQUIP) tablet 0.25 mg  0.25 mg Oral Nightly Mary Crabtree PA-C   0.25 mg at 10/18/21 2035   • rosuvastatin (CRESTOR) tablet 40 mg  40 mg Oral Nightly Mary Crabtree PA-C   40 mg at 10/18/21 2034   • sodium chloride 0.9 % flush 10 mL  10 mL Intravenous Q12H Mary Crabtree PA-C   10 mL at 10/19/21 0842   • sodium chloride 0.9 % flush 10 mL  10 mL Intravenous PRN Mary Crabtree PA-C            •  albuterol  •  HYDROcodone-acetaminophen  •  ipratropium-albuterol  •  ondansetron **OR** ondansetron  •  potassium chloride **OR** potassium chloride **OR** potassium chloride  •  sodium chloride    Current medication reviewed for route, type, dose and frequency and are current per MAR.    Palliative Performance Scale Score: 40%   /49   Pulse 65   Temp 97.9 °F (36.6 °C) (Oral)   Resp 16   Ht 144.8 cm (57\")   Wt 37 kg (81 lb 8 oz)   SpO2 95%   BMI 17.64 kg/m²     Intake/Output Summary (Last 24 hours) at 10/19/2021 1151  Last data filed at 10/19/2021 0830  Gross per 24 hour   Intake 580 ml   Output --   Net 580 ml       PE:  General Appearance:    Chronically ill appearing, alert, cooperative, NAD   HEENT:    NC/AT, without obvious abnormality, EOMI, anicteric    Neck:   supple, trachea midline, no JVD   Lungs:     Bilat course rhonchi, diminished bases, non-labored, 3L via NC    Heart:    RRR, normal S1 and S2, no M/R/G   Abdomen:     Soft, NT, ND, NABS    Extremities:   Moves all extremities, no edema   Pulses:   Pulses palpable and equal bilaterally   Skin:   Warm, dry "   Neurologic:   A/Ox3, cooperative   Psych:   Calm, appropriate     Objective:      Labs:   Last HgbA1C noted to be 5.50 on 5/4/21.    Results from last 7 days   Lab Units 10/18/21  0644   WBC 10*3/mm3 13.74*   HEMOGLOBIN g/dL 13.4   HEMATOCRIT % 40.3   PLATELETS 10*3/mm3 235     Results from last 7 days   Lab Units 10/18/21  1823 10/18/21  0644 10/18/21  0644   SODIUM mmol/L  --   --  135*   POTASSIUM mmol/L 4.2   < > 3.4*   CHLORIDE mmol/L  --   --  92*   CO2 mmol/L  --   --  29.0   BUN mg/dL  --   --  15   CREATININE mg/dL  --   --  0.58   GLUCOSE mg/dL  --   --  82   CALCIUM mg/dL  --   --  8.8    < > = values in this interval not displayed.     Results from last 7 days   Lab Units 10/18/21  1823 10/18/21  0644 10/18/21  0644 10/17/21  1810 10/17/21  1810   SODIUM mmol/L  --   --  135*   < > 133*   POTASSIUM mmol/L 4.2   < > 3.4*   < > 3.5   CHLORIDE mmol/L  --   --  92*   < > 91*   CO2 mmol/L  --   --  29.0   < > 28.0   BUN mg/dL  --   --  15   < > 12   CREATININE mg/dL  --   --  0.58   < > 0.58   CALCIUM mg/dL  --   --  8.8   < > 9.3   BILIRUBIN mg/dL  --   --   --   --  0.3   ALK PHOS U/L  --   --   --   --  189*   ALT (SGPT) U/L  --   --   --   --  24   AST (SGOT) U/L  --   --   --   --  39*   GLUCOSE mg/dL  --   --  82   < > 84    < > = values in this interval not displayed.     Imaging Results (Last 72 Hours)     Procedure Component Value Units Date/Time    FL Video Swallow With Speech Single Contrast [[id number]] Collected: 10/18/21 1619     Updated: 10/18/21 2204    Narrative:      EXAMINATION: FL VIDEO SWALLOW W SPEECH SINGLE-CONTRAST-     INDICATION: DYSPHAGIA, OROPHARYNGEAL, HAS ATTRIBUTABLE CAUSE     TECHNIQUE: 1 minute and 36 seconds of fluoroscopic time was used for  this exam. 12 associated fluoroscopic loops were saved. The patient was  evaluated in the seated lateral position while taking a variety of  consistencies of barium by mouth under the direction of speech  pathology.     COMPARISON:  NONE     FINDINGS: 1 minute and 36 seconds of fluoroscopy provided for a modified  barium swallow. Please see speech therapy report for full details and  recommendations.          Impression:      Fluoroscopy provided for a modified barium swallow. Please  see speech therapy report for full details and recommendations.         This report was finalized on 10/18/2021 10:01 PM by Dr. Tre Huerta MD.       XR Chest 1 View [[id number]] Collected: 10/17/21 1808     Updated: 10/18/21 0935    Narrative:      EXAMINATION: XR CHEST 1 VW - 10/17/2021     INDICATION: Shortness of breath.     COMPARISON: 10/12/2021     FINDINGS: Sternotomy wires noted. Heart and vasculature are normal in  size. There is extensive left mid and lower lung multifocal interstitial  disease and milder right basilar disease, with groundglass appearance,  suspicious for Covid 19 pneumonia. No effusion or pneumothorax is seen.  Incidental note is made of patient's older irregularly healed left  clavicle fracture and left proximal humerus fracture. No acute bony  abnormality is appreciated.       Impression:      Interval development of bilateral pneumonia left greater  than right, suspicious for Covid 19.     DICTATED:   10/17/2021  EDITED/lfs:   10/17/2021         This report was finalized on 10/18/2021 9:32 AM by Dr. Tre Huerta MD.       CT Angiogram Chest With & Without Contrast [[id number]] Collected: 10/17/21 2035     Updated: 10/17/21 2038    Narrative:      CT ANGIOGRAM CHEST W WO CONTRAST    INDICATION:   Hypoxia with pneumonia    TECHNIQUE:   CT angiogram of the chest with 100 cc of Isovue-300 IV contrast. 3-D reconstructions were obtained and reviewed.   Radiation dose reduction techniques included automated exposure control or exposure modulation based on body size. Count of known CT and  cardiac nuc med studies performed in previous 12 months: 2.     COMPARISON:   5/22/2021    FINDINGS:   Chest images of mediastinal and pulmonary emboli. The  central pulmonary arteries are dilated consistent with chronic pulmonary hypertension. No adenopathy or effusions.    Chest images at lung windows show patchy bilateral infiltrates predominantly in the lingula and right middle lobe. Dense consolidation in the left lower lobe seen on the previous CT has resolved. Imaging features can be seen with COVID-19 pneumonia,  although are nonspecific and can can occur with a variety of infectious and noninfectious processes.      Impression:      Chronic pulmonary hypertension. No evidence of pulmonary embolism. Bilateral multifocal pneumonia, mild-to-moderate in severity and most prominent in the left upper and right middle lobes.    Signer Name: Nabil Vidales MD   Signed: 10/17/2021 8:35 PM   Workstation Name: [id number]    [facility]          Diagnostics: Reviewed    A: An Abreu is a 71 y.o. female with COPD and chronic O2 use being treated for bilateral pneumonia. Currently under home hospice care admitted 10/15/21. Having chronic musculoskeletal pain r/t arthritis, Dyspnea r/t COPD and PNA, chronic insomnia, and hx of seizure disorder managed with phenobarbital.     Symptoms:  -Chronic musculoskeletal pain  -Dyspnea  -Insomnia  -Seizure disorder    P:   -Increased Norco 10 mg po from q12 prn to q8 prn. Continue to monitor. Hold for oversedation. Attending notified.    -Encouraged cough and deep breathing, use of IS. Norco will aid with SOB as well. Continue O2 via NC and to monitor.    -Added Melatonin 5 mg QHS. Attending notified. Continue to monitor.    -Resumed Phenobarbital 150 mg daily as taken at home after approved by attending.    GOC:  Spoke with patient in person and her Dtr/HCS Stacie Agee today via phone. Introduced role of palliative care including symptom management and GOC. Patient confirms Full Code status today stating she would want everything done to help her live. Dtr also states she would want everything done  "including full treat and full code. They are provided education on general healthcare related topics to discuss such as code status changes, feeding tubes, Trach, dialysis, EOL care options such as palliative and hospice services. Patient did state she would not want a trach or PEG if offered. This conflicts with her recent living will. Patient states she would leave decisions up to Stacie and keeps asking her what she would want done during discussion. Discussed role of HCS as being advocate for her wishes. Patient states multiple times \" I just want to die in my sleep\". Dtr reports that patient is under hospice care, but later denies having completed forms. Hospice agency ([facility]) confirms patient was admitted under hospice care on 10/15/21. Was unaware of hospitalization. Agency reports patient was previously discharged from [facility] r/t several missed appts and medication inconsistencies. Patient and dtr are interested in continuing home palliative or hospice services upon discharge. Dtr reports wanting to speak to someone in person about GOC, but does not have transportation to come to hospital. Patient ends discussion stating she would like to rest now and can talk more at a later time. Will continue to follow. Hospice consult placed. Attending notified.    We appreciate the consult and the opportunity to participate in An Abreu's care. We will continue to follow along. Please do not hesitate to contact us regarding further symptom management or goals of care needs, including after hours or on weekends via our on call provider at [phone].     Time: 60 minutes spent reviewing medical and medication records, assessing and examining patient, discussing with family, answering questions, providing some guidance about a plan and documentation of care, and coordinating care with other healthcare members, with > 50% time spent face to face.     Janis Glasgow, " APRN    10/19/2021    Electronically signed by Janis Glasgow APRN at 10/19/21 7745

## 2021-10-22 NOTE — THERAPY DISCHARGE NOTE
Patient Name: An Abreu  : 1950    MRN: [id number]                              Today's Date: 10/22/2021       Admit Date: 10/17/2021    Visit Dx:     ICD-10-CM ICD-9-CM   1. Community acquired pneumonia, unspecified laterality  J18.9 486   2. Acute on chronic respiratory failure with hypoxia (formerly [facility])  J96.21 518.84     799.02   3. Leukocytosis, unspecified type  D72.829 288.60   4. COPD exacerbation (formerly [facility])  J44.1 491.21   5. Elevated troponin  R77.8 790.6   6. History of CHF (congestive heart failure)  Z86.79 V12.59   7. Oropharyngeal dysphagia  R13.12 787.22     Patient Active Problem List   Diagnosis   • Gastroesophageal reflux disease   • Essential hypertension   • Seizure disorder on phenobarbital    • PAD s/p R iliac stent    • Paroxysmal AF   • Fecal occult blood test positive   • Tobacco abuse   • CAD s/p CABG    • Anemia   • Fall   • Humerus fracture   • Hypoglycemia   • Metabolic encephalopathy   • Iron deficiency anemia due to chronic blood loss   • Mucopurulent chronic bronchitis (formerly [facility])   • Severe malnutrition (formerly [facility])   • H/O: recent GI bleed   • + PFO    • H/O CVA    • Non-compliance   • Debility   • Nonrheumatic aortic valve insufficiency   • Chronic respiratory failure with hypoxia and hypercapnia (formerly [facility])   • Community acquired pneumonia   • COPD (chronic obstructive pulmonary disease) (formerly [facility])   • Chronic diastolic CHF (congestive heart failure) (formerly [facility])   • Leukocytosis     Past Medical History:   Diagnosis Date   • Aneurysm (formerly [facility])    • Angina pectoris (formerly [facility]) 2016   • Anxiety 2016   • Arthritis 2016   • CAD (coronary artery disease)    • Carotid artery stenosis    • CHF (congestive heart failure) (formerly [facility])    • Chronic coronary artery disease 2016    2003 CABG LIMA to LAD, VG to OM  VG to OM occluded. LIMA patent Cx intervention and RCA  stent for ISR  ISR and stenting of CX and RCA  normal MPS   • Chronic left-sided low back pain with left-sided sciatica 2017   •  Chronic obstructive pulmonary disease (HCC) 6/20/2016   • Chronic respiratory failure with hypoxia (HCC) 3/27/2021   • Cobalamin deficiency 6/20/2016   • Congestive heart failure (HCC) 6/20/2016   • COPD (chronic obstructive pulmonary disease) (HCC)    • Depression 7/3/2018   • Diverticulosis    • Diverticulosis of large intestine without hemorrhage 5/5/2017   • GERD (gastroesophageal reflux disease)    • GIB (gastrointestinal bleeding)     recurrent    • HTN (hypertension)    • Hypercholesterolemia 6/20/2016   • Hyperlipidemia    • Mesenteric ischemia (HCC) 2006    s/p resection    • Mood disorder (HCC)    • Oxygen dependent     3 liters @ all times.    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/23/2021   • PVD (peripheral vascular disease) ([facility])    • Restless legs syndrome 6/20/2016   • Seizure disorder ([facility]) 6/20/2016   • Seizures ([facility])    • Stenosis of carotid artery 6/20/2016   • Vertigo 9/28/2016     Past Surgical History:   Procedure Laterality Date   • APPENDECTOMY     • CHOLECYSTECTOMY     • COLONOSCOPY N/A 6/19/2021    Procedure: COLONOSCOPY;  Surgeon: Wilfredo Simon MD;  Location: [facility];  Service: Gastroenterology;  Laterality: N/A;   • COLOSTOMY  2006    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   • ENDOSCOPY N/A 6/16/2021    Procedure: ESOPHAGOGASTRODUODENOSCOPY;  Surgeon: Jean Fuentes MD;  Location: [facility];  Service: Gastroenterology;  Laterality: N/A;   • EXPLORATORY LAPAROTOMY      sec to ovarian cysts   • HYSTERECTOMY     • ILIAC ARTERY STENT     • REVISION / TAKEDOWN COLOSTOMY  2008      General Information     Row Name 10/22/21 1456          Physical Therapy Time and Intention    Document Type discharge treatment (P)   -TA     Mode of Treatment physical therapy (P)   -TA     Row Name 10/22/21 1456          General Information     Patient Profile Reviewed yes (P)   -TA     Existing Precautions/Restrictions oxygen therapy device and L/min; fall; seizures (P)   -TA     Barriers to Rehab medically complex (P)   -TA     Row Name 10/22/21 1456          Cognition    Orientation Status (Cognition) oriented x 4 (P)   -TA     Row Name 10/22/21 1456          Safety Issues, Functional Mobility    Safety Issues Affecting Function (Mobility) safety precaution awareness; safety precautions follow-through/compliance; positioning of assistive device; sequencing abilities (P)   -TA     Impairments Affecting Function (Mobility) balance; endurance/activity tolerance; shortness of breath (P)   -TA           User Key  (r) = Recorded By, (t) = Taken By, (c) = Cosigned By    Initials Name Provider Type    TA Emilie Alba R, PT Student PT Student               Mobility     Row Name 10/22/21 1457          Bed Mobility    Comment (Bed Mobility) Pt UIC upon arrival and returned to chair (P)   -TA     Row Name 10/22/21 1457          Transfers    Comment (Transfers) STS x1; VC for HP (P)   -TA     Row Name 10/22/21 1457          Sit-Stand Transfer    Sit-Stand [place] (Transfers) verbal cues; contact guard; 1 person assist (P)   -TA     Assistive Device (Sit-Stand Transfers) walker, front-wheeled (P)   -TA     Row Name 10/22/21 1457          Gait/Stairs (Locomotion)    [place] Level (Gait) contact guard; verbal cues; 1 person assist (P)   -TA     Assistive Device (Gait) walker, front-wheeled (P)   -TA     Distance in Feet (Gait) 60 (P)   -TA     Deviations/Abnormal Patterns (Gait) bilateral deviations; margarito decreased; gait speed decreased (P)   -TA     Bilateral Gait Deviations forward flexed posture; weight shift ability decreased (P)   -TA     Comment (Gait/Stairs) Pt ambulated with a step through gait pattern and slow speed. VC to maintain upright posture. Gait limited by fatigue (P)   -TA           User Key  (r) = Recorded By, (t) = Taken By, (c) =  Cosigned By    Initials Name Provider Type    Emilie Montoya, PT Student PT Student               Obj/Interventions     Row Name 10/22/21 1458          Motor Skills    Motor Skills therapeutic exercise (P)   -TA     Therapeutic Exercise knee; ankle (P)   -TA     Row Name 10/22/21 1458          Knee (Therapeutic Exercise)    Knee (Therapeutic Exercise) AROM (active range of motion); isometric exercises (P)   -TA     Knee AROM (Therapeutic Exercise) bilateral; LAQ (long arc quad); sitting; 10 repetitions (P)   -TA     Knee Isometrics (Therapeutic Exercise) bilateral; quad sets; sitting; 5 repetitions (P)   caused increased back pain.  -TA     Row Name 10/22/21 1458          Ankle (Therapeutic Exercise)    Ankle (Therapeutic Exercise) AROM (active range of motion) (P)   -TA     Ankle AROM (Therapeutic Exercise) bilateral; dorsiflexion; plantarflexion; sitting; 10 repetitions (P)   -TA     Row Name 10/22/21 1458          Balance    Balance Assessment sitting static balance; sitting dynamic balance; standing static balance; standing dynamic balance (P)   -TA     Static Sitting Balance WFL; sitting in chair (P)   -TA     Dynamic Sitting Balance WFL; sitting in chair (P)   -TA     Static Standing Balance WFL; supported; standing (P)   -TA     Dynamic Standing Balance mild impairment; supported; standing (P)   -TA     Balance Interventions sitting; standing; sit to stand; static; dynamic (P)   -TA     Comment, Balance no LOB noted. RW for stability (P)   -TA           User Key  (r) = Recorded By, (t) = Taken By, (c) = Cosigned By    Initials Name Provider Type    Emilie Montoya, PT Student PT Student               Goals/Plan     Row Name 10/22/21 1505          Bed Mobility Goal 1 (PT)    [place] Level/Cues Needed (Bed Mobility Goal 1, PT) independent (P)   -TA     Time Frame (Bed Mobility Goal 1, PT) long term goal (LTG); 10 days (P)   -TA     Progress/Outcomes (Bed Mobility Goal 1, PT) goal not met (P)    did not perform this date  -TA     Row Name 10/22/21 1505          Transfer Goal 1 (PT)    Activity/Assistive Device (Transfer Goal 1, PT) sit-to-stand/stand-to-sit (P)   -TA     [place] Level/Cues Needed (Transfer Goal 1, PT) independent (P)   -TA     Time Frame (Transfer Goal 1, PT) long term goal (LTG); 10 days (P)   -TA     Progress/Outcome (Transfer Goal 1, PT) goal not met (P)   -TA     Row Name 10/22/21 1505          Gait Training Goal 1 (PT)    Activity/Assistive Device (Gait Training Goal 1, PT) gait (walking locomotion) (P)   -TA     [place] Level (Gait Training Goal 1, PT) independent (P)   -TA     Distance (Gait Training Goal 1, PT) 250 (P)   -TA     Time Frame (Gait Training Goal 1, PT) long term goal (LTG); 10 days (P)   -TA     Progress/Outcome (Gait Training Goal 1, PT) goal not met (P)   -TA           User Key  (r) = Recorded By, (t) = Taken By, (c) = Cosigned By    Initials Name Provider Type    TA Emilie Alba, PT Student PT Student               Clinical Impression     Row Name 10/22/21 1500          Pain Scale: Numbers Pre/Post-Treatment    Pretreatment Pain Rating 0/10 - no pain (P)   -TA     Posttreatment Pain Rating 6/10 (P)   -TA     Pain Location back (P)   -TA     Pre/Posttreatment Pain Comment Notified nsg. Tolerated session (P)   -TA     Pain Intervention(s) Repositioned; Ambulation/increased activity (P)   -TA     Row Name 10/22/21 1500          Plan of Care Review    Plan of Care Reviewed With patient (P)   -TA     Progress improving (P)   -TA     Outcome Summary Pt increased ambulation to 60' with CGA and RW. VC for posture. Pt performed STS x1 with CGA and RW. Pt performed LE exercises well. Complained of back pain during quad sets, deferred doing after stated. Pt is d/c with hospice tomorrow. (P)   -TA     Row Name 10/22/21 1500          Vital Signs    Pre Systolic BP Rehab 106 (P)   -TA     Pre Treatment Diastolic BP 49 (P)   -TA     Post Systolic BP Rehab 119  (P)   -TA     Post Treatment Diastolic BP 43 (P)   -TA     Pretreatment Heart Rate (beats/min) 72 (P)   -TA     Posttreatment Heart Rate (beats/min) 72 (P)   -TA     Pre SpO2 (%) 93 (P)   -TA     O2 Delivery Pre Treatment nasal cannula (P)   -TA     O2 Delivery Intra Treatment nasal cannula (P)   -TA     Post SpO2 (%) 95 (P)   -TA     O2 Delivery Post Treatment nasal cannula (P)   -TA     Pre Patient Position Sitting (P)   -TA     Intra Patient Position Standing (P)   -TA     Post Patient Position Sitting (P)   -TA     Row Name 10/22/21 1500          Positioning and Restraints    Pre-Treatment Position sitting in chair/recliner (P)   -TA     Post Treatment Position chair (P)   -TA     In Chair notified nsg; reclined; call light within reach; encouraged to call for assist; exit alarm on; legs elevated; LUE elevated; RUE elevated (P)   -TA           User Key  (r) = Recorded By, (t) = Taken By, (c) = Cosigned By    Initials Name Provider Type    Emilie Montoya, PT Student PT Student               Outcome Measures     Row Name 10/22/21 1505 10/22/21 0810       How much help from another person do you currently need...    Turning from your back to your side while in flat bed without using bedrails? 3 (P)   -TA 3  -CB    Moving from lying on back to sitting on the side of a flat bed without bedrails? 3 (P)   -TA 3  -CB    Moving to and from a bed to a chair (including a wheelchair)? 3 (P)   -TA 3  -CB    Standing up from a chair using your arms (e.g., wheelchair, bedside chair)? 3 (P)   -TA 3  -CB    Climbing 3-5 steps with a railing? 3 (P)   -TA 2  -CB    To walk in hospital room? 3 (P)   -TA 3  -CB    AM-PAC 6 Clicks Score (PT) 18 (P)   -TA 17  -CB          User Key  (r) = Recorded By, (t) = Taken By, (c) = Cosigned By    Initials Name Provider Type    CB Sola Crane RN Registered Nurse    Emilie Montoya, PT Student PT Student              Physical Therapy Education                 Title: PT OT SLP  Therapies (Done)     Topic: Physical Therapy (Done)     Point: Mobility training (Done)     Learning Progress Summary           Patient Acceptance, E, VU,DU by TA at 10/22/2021 1506    Acceptance, E, NR by AS at 10/21/2021 0928    Acceptance, E, NR by CAMERON at 10/19/2021 1300                   Point: Home exercise program (Done)     Learning Progress Summary           Patient Acceptance, E, VU,DU by TA at 10/22/2021 1506    Acceptance, E, NR by AS at 10/21/2021 0928    Acceptance, E, NR by CAMERON at 10/19/2021 1300                   Point: Body mechanics (Done)     Learning Progress Summary           Patient Acceptance, E, VU,DU by TA at 10/22/2021 1506    Acceptance, E, NR by AS at 10/21/2021 0928    Acceptance, E, NR by CAMERON at 10/19/2021 1300                   Point: Precautions (Done)     Learning Progress Summary           Patient Acceptance, E, VU,DU by TA at 10/22/2021 1506    Acceptance, E, NR by AS at 10/21/2021 0928    Acceptance, E, NR by CAMERON at 10/19/2021 1300                               User Key     Initials Effective Dates Name Provider Type Discipline    CAMERON 06/16/21 -  Sharmila Amador, PT Physical Therapist PT    AS 06/16/21 -  Princess Rivas, PTA Physical Therapy Assistant PT    TA 07/12/21 -  Emilie Alba PT Student PT Student PT              PT Recommendation and Plan     Plan of Care Reviewed With: (P) patient  Progress: (P) improving  Outcome Summary: (P) Pt increased ambulation to 60' with CGA and RW. VC for posture. Pt performed STS x1 with CGA and RW. Pt performed LE exercises well. Complained of back pain during quad sets, deferred doing after stated. Pt is d/c with hospice tomorrow.     Time Calculation:    PT Charges     Row Name 10/22/21 142             Time Calculation    Start Time 1426 (P)   -TA      PT Received On 10/22/21 (P)   -TA      PT Goal Re-Cert Due Date 10/27/21 (P)   -TA              Time Calculation- PT    Total Timed Code Minutes- PT 24 minute(s) (P)   -TA               Timed Charges    25916 - PT Therapeutic Exercise Minutes 10 (P)   -TA      35444 - Gait Training Minutes  14 (P)   -TA              Total Minutes    Timed Charges Total Minutes 24 (P)   -TA       Total Minutes 24 (P)   -TA            User Key  (r) = Recorded By, (t) = Taken By, (c) = Cosigned By    Initials Name Provider Type    TA Emilie Alba, PT Student PT Student              Therapy Charges for Today     Code Description Service Date Service Provider Modifiers Qty    34853597193 HC PT THER PROC EA 15 MIN 10/22/2021 Emilie Alba, PT Student GP 1    03104746594 HC GAIT TRAINING EA 15 MIN 10/22/2021 Emilie Alba, PT Student GP 1          PT G-Codes  Outcome Measure Options: AM-PAC 6 Clicks Basic Mobility (PT)  AM-PAC 6 Clicks Score (PT): (P) 18         Emilie Alba PT Student  10/22/2021

## 2021-10-22 NOTE — CASE MANAGEMENT/SOCIAL WORK
Continued Stay Note  [facility]     Patient Name: An Abreu  MRN: [id number]  Today's Date: 10/22/2021    Admit Date: 10/17/2021     Discharge Plan     Row Name 10/22/21 0737       Plan    Plan Will follow    Plan Comments Spoke with pt’s daughter. She reports the household income is pt’ 1200.00 from Social security. Reports the mortgage payment is 800.00/ month. Daughter reports she does receive food stamps. Daughter plans to reapply for her disability benefits. Daughter reached out to [employer] to apply for [employer] [place] assistance with the electric bill and has appt for Nov 4th. She has received assistance from local [employer] for their gas bill. MSW explored other financial options and encouraged daughter to look into refinancing home for a lower monthly mortgage payment.               Discharge Codes    No documentation.               Expected Discharge Date and Time     Expected Discharge Date Expected Discharge Time    Oct 22, 2021             IZZY Smith

## 2021-10-22 NOTE — PROGRESS NOTES
"Palliative Care Progress Note    Date of Admission: 10/17/2021    Code Status:   Current Code Status     Date Active Code Status Order ID Comments User Context       10/21/2021 1029 No CPR [id number]  Lilly Ceballos MD Inpatient     Advance Care Planning Activity      Questions for Current Code Status     Question Answer    Code Status No CPR    Medical Interventions (Level of Support Prior to Arrest) Limited    Limited Support to NOT Include Cardioversion/Defibrillation     Dialysis     Intubation     Vasopressors    Comments d/w patient and she wishes to go toward hospice approach        Subjective:  Patient up in chair, moves independently to bedside commode.   Reports primary chronic pain is Right scapular pain, reports consistently takes two hydrocodone/APAP 10mg twice a day and one extra if needed.   + anxiety - takes lorazepam in morning and night.  No nausea or dyspnea currently.   Good appetite.     Reviewed current scheduled and prn medications for route, type, dose, and frequency.  Reviewed medical record     •  albuterol  •  HYDROcodone-acetaminophen  •  ipratropium-albuterol  •  LORazepam  •  ondansetron **OR** ondansetron  •  potassium chloride **OR** potassium chloride **OR** potassium chloride  •  sodium chloride    Objective:  PPS 60%  /56 (BP Location: Left arm, Patient Position: Lying)   Pulse 67   Temp 98.1 °F (36.7 °C) (Oral)   Resp 18   Ht 144.8 cm (57\")   Wt 37 kg (81 lb 8 oz)   SpO2 97%   BMI 17.64 kg/m²    Intake & Output (last day)       10/21 0701  10/22 0700 10/22 0701  10/23 0700    P.O. 1080     IV Piggyback 100 100    Total Intake(mL/kg) 1180 (31.9) 100 (2.7)    Urine (mL/kg/hr) 700 (0.8)     Stool 0     Total Output 700     Net +480 +100          Urine Unmeasured Occurrence 1 x 1 x    Stool Unmeasured Occurrence 2 x 2 x        Lab Results (last 24 hours)     Procedure Component Value Units Date/Time    Comprehensive Metabolic Panel [[id number]]  (Abnormal) Collected: " 10/22/21 0605    Specimen: Blood Updated: 10/22/21 0724     Glucose 84 mg/dL      BUN 28 mg/dL      Creatinine 0.54 mg/dL      Sodium 137 mmol/L      Potassium 4.7 mmol/L      Chloride 95 mmol/L      CO2 34.0 mmol/L      Calcium 9.0 mg/dL      Total Protein 6.4 g/dL      Albumin 3.10 g/dL      ALT (SGPT) 52 U/L      AST (SGOT) 58 U/L      Alkaline Phosphatase 139 U/L      Total Bilirubin <0.2 mg/dL      eGFR Non African Amer 111 mL/min/1.73      Globulin 3.3 gm/dL      A/G Ratio 0.9 g/dL      BUN/Creatinine Ratio 51.9     Anion Gap 8.0 mmol/L     Narrative:      GFR Normal >60  Chronic Kidney Disease <60  Kidney Failure <15      CBC & Differential [[id number]]  (Abnormal) Collected: 10/22/21 0605    Specimen: Blood Updated: 10/22/21 0702    Narrative:      The following orders were created for panel order CBC & Differential.  Procedure                               Abnormality         Status                     ---------                               -----------         ------                     CBC Auto Differential[[id number]]        Abnormal            Final result                 Please view results for these tests on the individual orders.    CBC Auto Differential [[id number]]  (Abnormal) Collected: 10/22/21 0605    Specimen: Blood Updated: 10/22/21 0702     WBC 6.38 10*3/mm3      RBC 4.14 10*6/mm3      Hemoglobin 12.7 g/dL      Hematocrit 40.6 %      MCV 98.1 fL      MCH 30.7 pg      MCHC 31.3 g/dL      RDW 15.9 %      RDW-SD 56.9 fl      MPV 9.9 fL      Platelets 255 10*3/mm3      Neutrophil % 63.0 %      Lymphocyte % 28.8 %      Monocyte % 6.7 %      Eosinophil % 0.3 %      Basophil % 0.3 %      Immature Grans % 0.9 %      Neutrophils, Absolute 4.01 10*3/mm3      Lymphocytes, Absolute 1.84 10*3/mm3      Monocytes, Absolute 0.43 10*3/mm3      Eosinophils, Absolute 0.02 10*3/mm3      Basophils, Absolute 0.02 10*3/mm3      Immature Grans, Absolute 0.06 10*3/mm3      nRBC 0.0 /100 WBC     Blood Culture - Blood,  Arm, Right [[id number]]  (Normal) Collected: 10/17/21 1725    Specimen: Blood from Arm, Right Updated: 10/21/21 1900     Blood Culture No growth at 4 days    Blood Culture - Blood, Arm, Right [[id number]]  (Normal) Collected: 10/17/21 1750    Specimen: Blood from Arm, Right Updated: 10/21/21 1845     Blood Culture No growth at 4 days        Imaging Results (Last 24 Hours)     ** No results found for the last 24 hours. **        Physical Exam:  Gen: NAD, up in chair  Skin: warm, dry   Eyes: KELLY, conjunctivae clear and moist   Resp/thorax: even effort, nonlabored, CTA, diminished in bases  CV: RRR   ABD: soft, nontender  Ext: no edema, no redness   Neuro: alert, interactive, no myoclonus  Psych: appropriate conversation and mood     Reviewed labs and diagnostic results.   Lab Results   Component Value Date    HGBA1C 5.50 05/04/2021     Results from last 7 days   Lab Units 10/22/21  0605   WBC 10*3/mm3 6.38   HEMOGLOBIN g/dL 12.7   HEMATOCRIT % 40.6   PLATELETS 10*3/mm3 255     Results from last 7 days   Lab Units 10/22/21  0605   SODIUM mmol/L 137   POTASSIUM mmol/L 4.7   CHLORIDE mmol/L 95*   CO2 mmol/L 34.0*   BUN mg/dL 28*   CREATININE mg/dL 0.54*   CALCIUM mg/dL 9.0   BILIRUBIN mg/dL <0.2   ALK PHOS U/L 139*   ALT (SGPT) U/L 52*   AST (SGOT) U/L 58*   GLUCOSE mg/dL 84       Impression: 71 y.o. female with acute exacerbation of ES COPD    Plan:   Chronic Right scapular pain - continue hydrocodone/APAP 10mg per [facility], filled #60 on 10/13    Chronic anxiety - continue lorazepam 1mg bid, per [facility], filled #60 on 10/12.     Dysphagia - patient preference, decline recommended modified diet.     Goals of care - home with hospice care tomorrow  Time: 30 minutes   > 50% time spent in counseling and education concerning current orders for symptom management with patient    SIVA Cavazos  [phone]  10/22/21  15:26 EDT

## 2021-10-22 NOTE — PLAN OF CARE
Goal Outcome Evaluation:  Plan of Care Reviewed With: (P) patient        Progress: (P) improving  Outcome Summary: (P) Pt increased ambulation to 60' with CGA and RW. VC for posture. Pt performed STS x1 with CGA and RW. Pt performed LE exercises well. Complained of back pain during quad sets, deferred doing after stated. Pt is d/c with hospice tomorrow.

## 2021-10-22 NOTE — PROGRESS NOTES
Continued Stay Note  [facility]     Patient Name: An Abreu  MRN: [id number]  Today's Date: 10/22/2021    Admit Date: 10/17/2021     Discharge Plan     Row Name 10/22/21 2840       Plan    [facility]     Patient/Family in Agreement with Plan yes    Plan Comments Spoke with staff RN, Sola, who states that the pt.’s home may not have electric. Call placed to the pt.’s daughter, Stacie, to f/u to see if they were able to get the electric reconnected. Stacie states that she is currently @ [employer] working to do so, but that the pt “has plenty of back up 02 tanks”. Writer explained that with the cooler temperatures, it could be colder than it has been recently. Stacie states that they heat with gas. Writer asked about their ability to prepare meals & Stacie states that a friend that lives nearby has offered to allow them to come & cook @ their home. Stacie was asked to contact writer once she finishes @ [facility]. Call placed to Hospice pharmacy to ask about meds that were sent out to the pt.’s home, prior to her coming to the hospital. Pharmacist reports that only Senna-S was sent. Miranda, Palliative Care, has been update & will send scripts to [email or web address] @ [facility] for a 3 day only supply of Ativan & [place]. Hospice Intake Team has been updated.    Visit made with pt. & she does confirm that she would like to d/c home with hospice. Pt was agreeable to completing her EMS DNR for transport. Writer spoke with pt about the electric being off it the home. Pt states that it had been off prior to this hospitalization. PCS & EMS DNR have been placed on the pt.'s chart. Palliative was able to verify that the pt had refills for her Ativan & Norco on the 12th & 13th of this month. Hospice Intake Team have been notified. If further assistance is needed tomorrow please call [phone].    Row Name 10/22/21 6010                        Discharge Codes    No documentation.                Expected Discharge Date and Time     Expected Discharge Date Expected Discharge Time    Oct 22, 2021             Jaclyn Huang

## 2021-10-22 NOTE — THERAPY TREATMENT NOTE
Acute Care - Speech Language Pathology   Swallow Treatment Note [facility]     Patient Name: An Abreu  : 1950  MRN: [id number]  Today's Date: 10/22/2021               Admit Date: 10/17/2021    Visit Dx:     ICD-10-CM ICD-9-CM   1. Community acquired pneumonia, unspecified laterality  J18.9 486   2. Acute on chronic respiratory failure with hypoxia ([facility])  J96.21 518.84     799.02   3. Leukocytosis, unspecified type  D72.829 288.60   4. COPD exacerbation ([facility])  J44.1 491.21   5. Elevated troponin  R77.8 790.6   6. History of CHF (congestive heart failure)  Z86.79 V12.59   7. Oropharyngeal dysphagia  R13.12 787.22     Patient Active Problem List   Diagnosis   • Gastroesophageal reflux disease   • Essential hypertension   • Seizure disorder on phenobarbital    • PAD s/p R iliac stent    • Paroxysmal AF   • Fecal occult blood test positive   • Tobacco abuse   • CAD s/p CABG    • Anemia   • Fall   • Humerus fracture   • Hypoglycemia   • Metabolic encephalopathy   • Iron deficiency anemia due to chronic blood loss   • Mucopurulent chronic bronchitis ([facility])   • Severe malnutrition ([facility])   • H/O: recent GI bleed   • + PFO    • H/O CVA    • Non-compliance   • Debility   • Nonrheumatic aortic valve insufficiency   • Chronic respiratory failure with hypoxia and hypercapnia ([facility])   • Community acquired pneumonia   • COPD (chronic obstructive pulmonary disease) ([facility])   • Chronic diastolic CHF (congestive heart failure) ([facility])   • Leukocytosis     Past Medical History:   Diagnosis Date   • Aneurysm ([facility])    • Angina pectoris ([facility]) 2016   • Anxiety 2016   • Arthritis 2016   • CAD (coronary artery disease)    • Carotid artery stenosis    • CHF (congestive heart failure) ([facility])    • Chronic coronary artery disease 2016 CABG LIMA to LAD, VG to OM  VG to OM occluded. LIMA patent Cx intervention and RCA  stent for ISR  ISR and stenting of CX and RCA  normal MPS   • Chronic  left-sided low back pain with left-sided sciatica 2/1/2017   • Chronic obstructive pulmonary disease (HCC) 6/20/2016   • Chronic respiratory failure with hypoxia ([facility]) 3/27/2021   • Cobalamin deficiency 6/20/2016   • Congestive heart failure (HCC) 6/20/2016   • COPD (chronic obstructive pulmonary disease) (HCC)    • Depression 7/3/2018   • Diverticulosis    • Diverticulosis of large intestine without hemorrhage 5/5/2017   • GERD (gastroesophageal reflux disease)    • GIB (gastrointestinal bleeding)     recurrent    • HTN (hypertension)    • Hypercholesterolemia 6/20/2016   • Hyperlipidemia    • Mesenteric ischemia (HCC) 2006    s/p resection    • Mood disorder ([facility])    • Oxygen dependent     3 liters @ all times.    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 ([facility]) 5/23/2021   • PVD (peripheral vascular disease) ([facility])    • Restless legs syndrome 6/20/2016   • Seizure disorder ([facility]) 6/20/2016   • Seizures ([facility])    • Stenosis of carotid artery 6/20/2016   • Vertigo 9/28/2016     Past Surgical History:   Procedure Laterality Date   • APPENDECTOMY     • CHOLECYSTECTOMY     • COLONOSCOPY N/A 6/19/2021    Procedure: COLONOSCOPY;  Surgeon: Wilfredo Simon MD;  Location: [facility];  Service: Gastroenterology;  Laterality: N/A;   • COLOSTOMY  2006    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   • ENDOSCOPY N/A 6/16/2021    Procedure: ESOPHAGOGASTRODUODENOSCOPY;  Surgeon: Jean Fuentes MD;  Location: [facility];  Service: Gastroenterology;  Laterality: N/A;   • EXPLORATORY LAPAROTOMY      sec to ovarian cysts   • HYSTERECTOMY     • ILIAC ARTERY STENT     • REVISION / TAKEDOWN COLOSTOMY  2008       SLP Recommendation and Plan  SLP Swallowing Diagnosis: mild, oral dysphagia, moderate, pharyngeal dysphagia (10/22/21 0905)  SLP Diet Recommendation: mechanical soft with no mixed  consistencies, honey thick liquids, other (see comments) (however, has chosen COMFORT diet, regular & thin liquids) (10/22/21 0905)  Recommended Precautions and Strategies: upright posture during/after eating, small bites of food and sips of liquid, no straw, general aspiration precautions, reflux precautions, other (see comments) (10/22/21 0905)  SLP Rec. for Method of Medication Administration: meds whole, meds crushed, with pudding or applesauce, as tolerated (10/22/21 0905)     Monitor for Signs of Aspiration: yes, notify SLP if any concerns (10/22/21 0905)     Swallow Criteria for Skilled Therapeutic Interventions Met: demonstrates skilled criteria (10/22/21 0905)  Anticipated Discharge Disposition (SLP): unknown, anticipate therapy at next level of care (10/22/21 0905)  Rehab Potential/Prognosis, Swallowing: good, to achieve stated therapy goals (10/22/21 0905)  Therapy Frequency (Swallow): 5 days per week (10/22/21 0905)  Predicted Duration Therapy Intervention (Days): until discharge (10/22/21 0905)     Daily Summary of Progress (SLP): progress toward functional goals as expected (10/22/21 0905)    Plan for Continued Treatment (SLP): Patient participated in dysphagia tx this am. Good effort. Patient has decided on comfort diet of regular and thin liquids. No s/s of aspiration or discomfort with trials of regualr or thin liquids. Continue to follow to address dysphagia in tx. (10/22/21 0905)                     SWALLOW EVALUATION (last 72 hours)     SLP Adult Swallow Evaluation     Row Name 10/22/21 0905 10/20/21 0732                Rehab Evaluation    Document Type therapy note (daily note)  - therapy note (daily note)  -       Subjective Information no complaints  - no complaints  -       Patient Observations alert; cooperative; agree to therapy  - alert; cooperative; agree to therapy  -       Patient/Family/Caregiver Comments/Observations No family present  - no family present  -       Care  Plan Review evaluation/treatment results reviewed; care plan/treatment goals reviewed; risks/benefits reviewed; current/potential barriers reviewed; patient/other agree to care plan  - evaluation/treatment results reviewed; care plan/treatment goals reviewed; patient/other agree to care plan  -       Patient Effort good  -CH good  -CJ       Symptoms Noted During/After Treatment none  -CH none  -CJ                General Information    Patient Profile Reviewed yes  -CH --                Pain    Additional Documentation -- Pain Scale: FACES Pre/Post-Treatment (Group)  -CJ                Pain Scale: Numbers Pre/Post-Treatment    Pretreatment Pain Rating 0/10 - no pain  -CH --       Posttreatment Pain Rating 0/10 - no pain  - --                Pain Scale: FACES Pre/Post-Treatment    Pain: FACES Scale, Pretreatment 0-->no hurt  -CH 0-->no hurt  -CJ       Posttreatment Pain Rating 0-->no hurt  -CH 0-->no hurt  -CJ                Clinical Impression    Daily Summary of Progress (SLP) progress toward functional goals as expected  -CH progress toward functional goals as expected  -CJ       SLP Swallowing Diagnosis mild; oral dysphagia; moderate; pharyngeal dysphagia  - mild; oral dysphagia; moderate; pharyngeal dysphagia  -       Functional Impact risk of aspiration/pneumonia; risk of malnutrition; risk of dehydration  -CH risk of aspiration/pneumonia; risk of malnutrition; risk of dehydration  -CJ       Rehab Potential/Prognosis, Swallowing good, to achieve stated therapy goals  -CH good, to achieve stated therapy goals  -CJ       Swallow Criteria for Skilled Therapeutic Interventions Met demonstrates skilled criteria  -CH demonstrates skilled criteria  -CJ       Plan for Continued Treatment (SLP) Patient participated in dysphagia tx this am. Good effort. Patient has decided on comfort diet of regular and thin liquids. No s/s of aspiration or discomfort with trials of regualr or thin liquids. Continue to follow to  address dysphagia in tx.  - Pt participated in dysphagia tx this am at bedside. Pt w/ good participation in all tx exercises. Pt motivated to improve swallowing fnx. No overt s/s of aspiration w/ honey thick liquid presentations. Provided copy of dysphagia tx exercises and educated pt on importance of completing throughout the day. She verbalized understanding. Continue current modified po diet.  -                Recommendations    Therapy Frequency (Swallow) 5 days per week  - 5 days per week  -       Predicted Duration Therapy Intervention (Days) until discharge  - until discharge  -       SLP Diet Recommendation mechanical soft with no mixed consistencies; honey thick liquids; other (see comments)  however, has chosen COMFORT diet, regular & thin liquids  - mechanical soft with no mixed consistencies; honey thick liquids  -       Recommended Precautions and Strategies upright posture during/after eating; small bites of food and sips of liquid; no straw; general aspiration precautions; reflux precautions; other (see comments)  - upright posture during/after eating; small bites of food and sips of liquid; no straw; general aspiration precautions; reflux precautions; other (see comments)  -       Oral Care Recommendations Oral Care BID/PRN; Suction toothbrush  - Oral Care BID/PRN; Suction toothbrush  -       SLP Rec. for Method of Medication Administration meds whole; meds crushed; with pudding or applesauce; as tolerated  - meds whole; meds crushed; with pudding or applesauce; as tolerated  -       Monitor for Signs of Aspiration yes; notify SLP if any concerns  - yes; notify SLP if any concerns  -       Anticipated Discharge Disposition (SLP) unknown; anticipate therapy at next level of care  - unknown; anticipate therapy at next level of care  -             User Key  (r) = Recorded By, (t) = Taken By, (c) = Cosigned By    Initials Name Effective Dates    Jillian Juarez, MS  CCC-SLP 06/16/21 -     CH Bibi Ceballos, MS CCC-SLP 06/16/21 -                 EDUCATION  The patient has been educated in the following areas:   Home Exercise Program (HEP) Dysphagia (Swallowing Impairment) Oral Care/Hydration Modified Diet Instruction.        SLP GOALS     Row Name 10/22/21 0905 10/20/21 1045          Oral Nutrition/Hydration Goal 1 (SLP)    Oral Nutrition/Hydration Goal 1, SLP LTG: pt will return to regular diet & thin liquids w/o s/s of aspiration or complications w/ 100% accuracy w/o cues  -CH LTG: pt will return to regular diet & thin liquids w/o s/s of aspiration or complications w/ 100% accuracy w/o cues  -CJ     Time Frame (Oral Nutrition/Hydration Goal 1, SLP) by discharge  -CH by discharge  -CJ     Progress/Outcomes (Oral Nutrition/Hydration Goal 1, SLP) continuing progress toward goal  -CH continuing progress toward goal  -CJ            Oral Nutrition/Hydration Goal 2 (SLP)    Oral Nutrition/Hydration Goal 2, SLP Pt will tolerate comfort diet of regular and thin liquids without s/s of distress w/ 100% accuracy w/o cues.  -CH Pt will tolerate small single tsp/cup sips of honey-thick liquids & soft no mixed diet w/o s/s of aspiration or complications w/ 100% accuracy w/o cues.  -CJ     Time Frame (Oral Nutrition/Hydration Goal 2, SLP) short term goal (STG)  - short term goal (STG)  -CJ     Barriers (Oral Nutrition/Hydration Goal 2, SLP) Pt has opted for COMFORT diet of regular and thin liquids  -CH No overt s/s of aspiration w/ ~6 oz of honey thick liquids  -CJ     Progress/Outcomes (Oral Nutrition/Hydration Goal 2, SLP) goal revised this date  - continuing progress toward goal  -CJ            Lingual Strengthening Goal 1 (SLP)    Activity (Lingual Strengthening Goal 1, SLP) increase lingual tone/sensation/control/coordination/movement  -CH increase lingual tone/sensation/control/coordination/movement  -CJ     Increase Lingual Tone/Sensation/Control/Coordination/Movement lingual  resistance exercises  -CH lingual resistance exercises  -CJ     [place]/Accuracy (Lingual Strengthening Goal 1, SLP) with minimal cues (75-90% accuracy)  -CH with minimal cues (75-90% accuracy)  -CJ     Time Frame (Lingual Strengthening Goal 1, SLP) short term goal (STG)  -CH short term goal (STG)  -CJ     Barriers (Lingual Strengthening Goal 1, SLP) Pt completed all exercises x 5 each  -CH Pt performed x2 sets x5 reps  -CJ     Progress/Outcomes (Lingual Strengthening Goal 1, SLP) continuing progress toward goal  -CH continuing progress toward goal  -CJ            Pharyngeal Strengthening Exercise Goal 1 (SLP)    Activity (Pharyngeal Strengthening Goal 1, SLP) increase timing; increase superior movement of the hyolaryngeal complex; increase anterior movement of the hyolaryngeal complex; increase closure at entrance to airway/closure of airway at glottis; increase squeeze/positive pressure generation; increase tongue base retraction  - increase timing; increase superior movement of the hyolaryngeal complex; increase anterior movement of the hyolaryngeal complex; increase closure at entrance to airway/closure of airway at glottis; increase squeeze/positive pressure generation; increase tongue base retraction  -CJ     Increase Timing prepping - 3 second prep or suck swallow or 3-step swallow  -CH prepping - 3 second prep or suck swallow or 3-step swallow  -CJ     Increase Superior Movement of the Hyolaryngeal Complex falsetto  -CH falsetto  -CJ     Increase Anterior Movement of the Hyolaryngeal Complex chin tuck against resistance (CTAR)  -CH chin tuck against resistance (CTAR)  -CJ     Increase Closure at Entrance to Airway/Closure of Airway at Glottis super-supraglottic swallow  -CH super-supraglottic swallow  -CJ     Increase Squeeze/Positive Pressure Generation effortful pitch glide (falsetto + pharyngeal squeeze)  -CH effortful pitch glide (falsetto + pharyngeal squeeze)  -CJ     Increase Tongue Base  Retraction hard effortful swallow  -CH hard effortful swallow  -CJ     [place]/Accuracy (Pharyngeal Strengthening Goal 1, SLP) with minimal cues (75-90% accuracy)  -CH with minimal cues (75-90% accuracy)  -CJ     Time Frame (Pharyngeal Strengthening Goal 1, SLP) short term goal (STG)  -CH short term goal (STG)  -CJ     Barriers (Pharyngeal Strengthening Goal 1, SLP) Pt completed all exercises x 5 each  -CH Pt performed 3 sec prep x2 sets x5 reps; falsetto x2 sets x5 reps; CTAR x2 sets x3 reps; SG x2 sets x5 reps; effortful swallow x2 sets x 5 reps  -CJ     Progress/Outcomes (Pharyngeal Strengthening Goal 1, SLP) continuing progress toward goal  -CH continuing progress toward goal  -CJ            Swallow Compensatory Strategies Goal 1 (SLP)    Activity (Swallow Compensatory Strategies/Techniques Goal 1, SLP) compensatory strategies; postural techniques; during meal intake; small bites; small cup sips; other (see comments)  -CH compensatory strategies; postural techniques; during meal intake; small bites; small cup sips; other (see comments)  no straws, small single sips  -CJ     [place]/Accuracy (Swallow Compensatory Strategies/Techniques Goal 1, SLP) independently (over 90% accuracy)  -CH independently (over 90% accuracy)  -CJ     Time Frame (Swallow Compensatory Strategies/Techniques Goal 1, SLP) short term goal (STG)  -CH short term goal (STG)  -CJ     Barriers (Swallow Compensatory Strategies/Techniques Goal 1, SLP) Pt able to perform after initial cue  -CH Pt able to perform after initial cue  -CJ     Progress/Outcomes (Swallow Compensatory Strategies/Techniques Goal 1, SLP) continuing progress toward goal  -CH continuing progress toward goal  -CJ           User Key  (r) = Recorded By, (t) = Taken By, (c) = Cosigned By    Initials Name Provider Type    Jillian Juarez MS CCC-SLP Speech and Language Pathologist    Bibi Kerns MS CCC-SLP Speech and Language Pathologist                    Time Calculation:    Time Calculation- SLP     Row Name 10/22/21 1121             Time Calculation- SLP    SLP Start Time 0905  -CH      SLP Received On 10/22/21  -CH              Untimed Charges    08465-[id number] Treatment Swallow Minutes 38  -CH              Total Minutes    Untimed Charges Total Minutes 38  -CH       Total Minutes 38  -CH            User Key  (r) = Recorded By, (t) = Taken By, (c) = Cosigned By    Initials Name Provider Type     Bibi Ceballos MS CCC-SLP Speech and Language Pathologist                Therapy Charges for Today     Code Description Service Date Service Provider Modifiers Qty    [id number]  ST TREATMENT SWALLOW 3 10/22/2021 Bibi Ceballos MS CCC-SLP GN 1               MS TEGAN Ahumada  10/22/2021     Patient was not wearing a face mask and did not exhibit coughing during this therapy encounter.  Procedure performed was aerosolizing, involved close contact (within 6 feet for at least 15 minutes or longer), and did not involve contact with infectious secretions or specimens.  Therapist used appropriate personal protective equipment including gloves, standard procedure mask and eye protection.  Appropriate PPE was worn during the entire therapy session.  Hand hygiene was completed before and after therapy session.

## 2021-10-22 NOTE — PROGRESS NOTES
[facility] Medicine Services  PROGRESS NOTE    Patient Name: An Abreu  : 1950  MRN: [id number]    Date of Admission: 10/17/2021  Primary Care Physician: Angelika Butterfield MD    Subjective   Subjective     CC:  COPD/PNA    HPI:  Comforable, breathing reasaonably comfortably    ROS:  Gen- No fevers, chills  CV- No chest pain, palpitations  Resp- dyspnea improved  GI- No N/V/D, abd pain        Objective   Objective     Vital Signs:   Temp:  [97.6 °F (36.4 °C)-98.2 °F (36.8 °C)] 98.1 °F (36.7 °C)  Heart Rate:  [60-85] 85  Resp:  [16-18] 18  BP: (112-126)/(53-61) 112/56  Flow (L/min):  [4] 4     Physical Exam:  GEN-appears frail, chronically ill , frail, no distress  HEENT- atraumatic, normocephalic, eomi  NECK- supple, trachea midline, no masses, nasal canula in place  RESP: distant, prolonged expiratory phase, no overt wheezes today  CV: no murmurs, s1/s2, rrr  MSK: no edema noted, spontaneous movement of all extremities  NEURO: alert, oriented, no focal deficits  SKIN: no rashes  PSYCH: appropriate mood and affect       Results Reviewed:  LAB RESULTS:      Lab 10/22/21  0605 10/21/21  0547 10/20/21  0555 10/19/21  1333 10/18/21  0644 10/17/21  1810 10/17/21  1810   WBC 6.38 6.84 5.76 7.03 13.74*   < > 13.70*   HEMOGLOBIN 12.7 11.6* 10.9* 12.3 13.4   < > 13.9   HEMATOCRIT 40.6 36.1 34.8 39.1 40.3   < > 42.6   PLATELETS 255 242 202 221 235   < > 246   NEUTROS ABS 4.01 4.06 3.67 6.36 11.72*   < > 12.50*   IMMATURE GRANS (ABS) 0.06* 0.03 0.02 0.03 0.06*   < > 0.05   LYMPHS ABS 1.84 2.09 1.67 0.36* 1.31   < > 0.68*   MONOS ABS 0.43 0.46 0.35 0.11 0.63   < > 0.42   EOS ABS 0.02 0.19 0.04 0.16 0.00   < > 0.03   MCV 98.1* 97.3* 97.8* 98.7* 93.9   < > 94.7   CRP  --   --   --   --   --   --  20.19*   PROCALCITONIN  --   --   --   --   --   --  0.10   LACTATE  --   --   --   --   --   --  1.2   PROTIME  --   --   --   --   --   --  13.5    < > = values in this interval not displayed.          Lab 10/22/21  0605 10/21/21  0547 10/20/21  0555 10/19/21  1333 10/18/21  1823 10/18/21  0644 10/18/21  0644   SODIUM 137 136 139 137  --   --  135*   POTASSIUM 4.7 4.7 4.4 4.5 4.2   < > 3.4*   CHLORIDE 95* 98 99 96*  --   --  92*   CO2 34.0* 30.0* 32.0* 32.0*  --   --  29.0   ANION GAP 8.0 8.0 8.0 9.0  --   --  14.0   BUN 28* 23 17 19  --   --  15   CREATININE 0.54* 0.54* 0.50* 0.74  --   --  0.58   GLUCOSE 84 75 83 205*  --   --  82   CALCIUM 9.0 8.8 8.6 8.8  --   --  8.8    < > = values in this interval not displayed.         Lab 10/22/21  0605 10/21/21  0547 10/20/21  0555 10/19/21  1333 10/17/21  1810   TOTAL PROTEIN 6.4 6.1 6.0 6.8 7.4   ALBUMIN 3.10* 2.90* 3.00* 3.40* 3.90   GLOBULIN 3.3 3.2 3.0 3.4 3.5   ALT (SGPT) 52* 33 16 16 24   AST (SGOT) 58* 52* 18 19 39*   BILIRUBIN <0.2 <0.2 <0.2 <0.2 0.3   ALK PHOS 139* 124* 119* 142* 189*         Lab 10/18/21  0644 10/17/21  1810   PROBNP  --  12,091.0*   TROPONIN T 0.376* 0.387*   PROTIME  --  13.5   INR  --  1.07         Lab 10/18/21  0644   CHOLESTEROL 211*   LDL CHOL 138*   HDL CHOL 52   TRIGLYCERIDES 117         Lab 10/17/21  1810   FERRITIN 75.99         Brief Urine Lab Results  (Last result in the past 365 days)      Color   Clarity   Blood   Leuk Est   Nitrite   Protein   CREAT   Urine HCG        10/17/21 2101 Yellow   Clear   Negative   Negative   Negative   30 mg/dL (1+)                 Microbiology Results Abnormal     Procedure Component Value - Date/Time    Blood Culture - Blood, Arm, Right [[id number]]  (Normal) Collected: 10/17/21 1725    Lab Status: Preliminary result Specimen: Blood from Arm, Right Updated: 10/21/21 1900     Blood Culture No growth at 4 days    Blood Culture - Blood, Arm, Right [[id number]]  (Normal) Collected: 10/17/21 1750    Lab Status: Preliminary result Specimen: Blood from Arm, Right Updated: 10/21/21 1845     Blood Culture No growth at 4 days    COVID PRE-OP / PRE-PROCEDURE SCREENING ORDER (NO ISOLATION) - Swab,  Nasopharynx [[id number]]  (Normal) Collected: 10/17/21 2355    Lab Status: Final result Specimen: Swab from Nasopharynx Updated: 10/18/21 0049    Narrative:      The following orders were created for panel order COVID PRE-OP / PRE-PROCEDURE SCREENING ORDER (NO ISOLATION) - Swab, Nasopharynx.  Procedure                               Abnormality         Status                     ---------                               -----------         ------                     Respiratory Panel PCR w/...[[id number]]  Normal              Final result                 Please view results for these tests on the individual orders.    Respiratory Panel PCR w/COVID-19(SARS-CoV-2) RYNE/AMA/SYLVIA/PAD/COR/MAD/BLANCA In-House, NP Swab in UTM/VTM, 3-4 HR TAT - Swab, Nasopharynx [[id number]]  (Normal) Collected: 10/17/21 2355    Lab Status: Final result Specimen: Swab from Nasopharynx Updated: 10/18/21 0049     ADENOVIRUS, PCR Not Detected     Coronavirus 229E Not Detected     Coronavirus HKU1 Not Detected     Coronavirus NL63 Not Detected     Coronavirus OC43 Not Detected     COVID19 Not Detected     Human Metapneumovirus Not Detected     Human Rhinovirus/Enterovirus Not Detected     Influenza A PCR Not Detected     Influenza B PCR Not Detected     Parainfluenza Virus 1 Not Detected     Parainfluenza Virus 2 Not Detected     Parainfluenza Virus 3 Not Detected     Parainfluenza Virus 4 Not Detected     RSV, PCR Not Detected     Bordetella pertussis pcr Not Detected     Bordetella parapertussis PCR Not Detected     Chlamydophila pneumoniae PCR Not Detected     Mycoplasma pneumo by PCR Not Detected    Narrative:      In the setting of a positive respiratory panel with a viral infection PLUS a negative procalcitonin without other underlying concern for bacterial infection, consider observing off antibiotics or discontinuation of antibiotics and continue supportive care. If the respiratory panel is positive for atypical bacterial infection (Bordetella  pertussis, Chlamydophila pneumoniae, or Mycoplasma pneumoniae), consider antibiotic de-escalation to target atypical bacterial infection.    COVID-19 and FLU A/B PCR - Swab, Nasopharynx [[id number]]  (Normal) Collected: 10/17/21 1725    Lab Status: Final result Specimen: Swab from Nasopharynx Updated: 10/17/21 1911     COVID19 Not Detected     Influenza A PCR Not Detected     Influenza B PCR Not Detected    Narrative:      Fact sheet for providers: https://www.fda.gov/media/561255/download    Fact sheet for patients: https://www.fda.gov/media/392219/download    Test performed by PCR.          No radiology results from the last 24 hrs    Results for orders placed during the hospital encounter of 05/03/21    Adult Transthoracic Echo Complete W/ Cont if Necessary Per Protocol    Interpretation Summary  · Estimated left ventricular EF = 70%  · Moderate aortic valve regurgitation is present.      I have reviewed the medications:  Scheduled Meds:budesonide-formoterol, 2 puff, Inhalation, BID - RT  clopidogrel, 75 mg, Oral, Daily  dilTIAZem CD, 240 mg, Oral, Daily  doxycycline, 100 mg, Oral, Q12H  furosemide, 40 mg, Oral, Daily  lidocaine, 1 patch, Transdermal, Q24H  melatonin, 5 mg, Oral, Nightly  nicotine, 1 patch, Transdermal, Q24H  pantoprazole, 40 mg, Oral, BID AC  pharmacy consult - MTM, , Does not apply, Daily  PHENobarbital, 145.8 mg, Oral, Daily  rOPINIRole, 0.25 mg, Oral, Nightly  rosuvastatin, 40 mg, Oral, Nightly  sodium chloride, 10 mL, Intravenous, Q12H      Continuous Infusions:   PRN Meds:.•  albuterol  •  HYDROcodone-acetaminophen  •  ipratropium-albuterol  •  LORazepam  •  ondansetron **OR** ondansetron  •  potassium chloride **OR** potassium chloride **OR** potassium chloride  •  sodium chloride    Assessment/Plan   Assessment & Plan     Active Hospital Problems    Diagnosis  POA   • Community acquired pneumonia [J18.9]  Yes   • COPD (chronic obstructive pulmonary disease) (HCC) [J44.9]  Yes   • Chronic  diastolic CHF (congestive heart failure) (HCC) [I50.32]  Yes   • Leukocytosis [D72.829]  Unknown   • CAD s/p CABG  [I25.10]  Yes   • Tobacco abuse [Z72.0]  Yes   • Paroxysmal AF [I48.0]  Yes   • Gastroesophageal reflux disease [K21.9]  Yes      Resolved Hospital Problems   No resolved problems to display.        Brief Hospital Course to date:  An Abreu is a 71 y.o. female with a history of COPD, current smoker, Coronary Artery Disease s/p CABG in 2006, Hypertension, Diastolic CHF, and paroxysmal atrial Fib on Plavix who presents to [facility] ED today for complaint of a productive cough and shortness of breath.     This patient's problems and plans were partially entered by my partner and updated as appropriate by me 10/22/21.        Bilateral Pneumonia (favor bacterial)  COPD exacerbation  Chronic hypoxic resp failure (chronic 3-4Lnc)  Leukocytosis  Recurrent admissions for copd exacerbations  -partially vaccinated but covid neg x2  - CXR w/ bilateral patchy infiltrates  -CT angio chest no pe, bilateral patchy pneumonia (most prominent left upper and right middle), not typical appearance of covid pneumonia  -covid 19 pcr negative; resp viral panel negative   -abx day # 5/5 empiric antibiotics- augment to rocephin/doxy as has tolerated in the past per pharmacy (completes antibiotics today)  -received solumedrol 125mg iv in ed; has now completed 5 day course of steroids  -continue symbicort and duonebs qid, prn albuterol  - Comfort diet (per patient request)  -planning home w/ hospice 10/23/21        CAD (previous cabg)  Paroxysmal Atrial Fib  A/C DHF  Moderate Aortic Insuffiency (last echo 5/2021 w/ normal ef)  Mildly elevated troponin, chronically elevated  -continue cardizem; currently in sinus rhythm  -continue plavix, cardizem (no anticoagulation due to falls, anemia, previous gi bleed)  -continue once daily po lasix  -trop chronically elevated; ekg unchanged, No chest pain; no further  workup     GERD  Hx previous gi bleed  - Continue home Nexium     Hyperlipidemia  -continue crestor     Tobacco Abuse  - An Abreu  reports that she has been smoking cigarettes and electronic cigarette. She has a 40.00 pack-year smoking history. She has never used smokeless tobacco..She continues to smoke and I have again counseled her on risks including hospitalizations related to lung dz. She reports that she cannot afford the patch. This was run through IP pharmacy and patch cost per month would be 53.60 (not 400 dollars she reported). Have d/w patient and daughter.        Dispo:   -DNR/DNI, no heroics; focussing on comfort; home w/ hoxpice 10/23/21  -My partner discussed w/ daughter Stacie via phone on 10/21/21        DVT prophylaxis:  Mechanical DVT prophylaxis orders are present.       AM-PAC 6 Clicks Score (PT): 18 (10/22/21 1505)    Disposition: HOME W/ HOSPICE TOMORROW 10/23/21 @ 1400    CODE STATUS:   Code Status and Medical Interventions:   Ordered at: 10/21/21 1029     Limited Support to NOT Include:    Cardioversion/Defibrillation    Dialysis    Intubation    Vasopressors     Code Status:    No CPR     Medical Interventions (Level of Support Prior to Arrest):    Limited     Comments:    d/w patient and she wishes to go toward hospice approach       Vikram Coronado MD  10/22/21

## 2021-10-22 NOTE — CASE MANAGEMENT/SOCIAL WORK
Continued Stay Note  [facility]     Patient Name: An Abreu  MRN: [id number]  Today's Date: 10/22/2021    Admit Date: 10/17/2021     Discharge Plan     Row Name 10/22/21 1201       Plan    Plan Home with hospice    Patient/Family in Agreement with Plan other (see comments)    Plan Comments Received a message the pt will be going home with hospice at [place]. I have cancelled [facility].    Final Discharge Disposition Code 50 - home with hospice               Discharge Codes    No documentation.               Expected Discharge Date and Time     Expected Discharge Date Expected Discharge Time    Oct 22, 2021             Brianna López RN

## 2021-10-22 NOTE — DISCHARGE INSTR - OTHER ORDERS
Please remember to call Hospice @ [phone] when you arrive home so that the nurse can come & complete your admission paperwork. Thank you.

## 2021-10-22 NOTE — PLAN OF CARE
Goal Outcome Evaluation:  Plan of Care Reviewed With: patient        Progress: no change  Outcome Summary: Pt. sitting up in chair and had just finished breakfast tray.  States pain in right side is an 8.  Educated on medication timingand instructed pt. to notify nurse a little closer to time for prn med.  Pt to go home with hospice services later today.    1300 Palliative Team Conference: CARLOS Phan RN, CHPN; FRANNY Pena, ;  KARLO Braga RN, CHERRY; SHASHI Huang, FERNANDA; SIVA Shankar; SHASHI Espinoza, RN

## 2021-10-22 NOTE — PROGRESS NOTES
Continued Stay Note  [facility]     Patient Name: An Abreu  MRN: [id number]  Today's Date: 10/22/2021    Admit Date: 10/17/2021     Discharge Plan     Row Name 10/22/21 1203       Plan    Plan Home with [facility]    Patient/Family in Agreement with Plan yes    Plan Comments Call placed to the pt.’s daughter, Stacie, to confirm that they would want hospice services @ d/c. Stacie states that they do. Stacie also states that Hospice has not  the DME yet. Writer did ask Stacie if she would be transporting the pt. home. Stacie states they do not have a vehicle@ this time. Spoke with BENNIE Dixon, they can transport the pt. via w/c tomorrow @ 1400. Care Team has been notified. Stacie has been updated. Referral has been sent to [facility]. Please call [phone], if further assistance is needed today. If assistance is needed tomorrow, please call [phone].    Row Name 10/22/21 1201                                            Discharge Codes    No documentation.               Expected Discharge Date and Time     Expected Discharge Date Expected Discharge Time    Oct 22, 2021             Jaclyn Huang

## 2021-10-22 NOTE — PLAN OF CARE
Goal Outcome Evaluation:  Plan of Care Reviewed With: patient    SLP treatment completed. Will continue to address dysphagia. Please see note for further details and recommendations.

## 2021-10-23 NOTE — DISCHARGE SUMMARY
[facility]  DISCHARGE SUMMARY    Patient Name: An Abreu  : 1950  MRN: [id number]    Date of Admission: 10/17/2021  5:18 PM  Date of Discharge:  10/23/2021  Primary Care Physician: Angelika Butterfield MD    Consults     Date and Time Order Name Status Description    10/19/2021  9:22 AM Inpatient Palliative Care MD Consult Completed           Hospital Course     Presenting Problem:   Community acquired pneumonia, unspecified laterality [J18.9]    Active Hospital Problems    Diagnosis  POA   • Community acquired pneumonia [J18.9]  Yes   • COPD (chronic obstructive pulmonary disease) ([facility]) [J44.9]  Yes   • Chronic diastolic CHF (congestive heart failure) ([facility]) [I50.32]  Yes   • Leukocytosis [D72.829]  Unknown   • CAD s/p CABG  [I25.10]  Yes   • Tobacco abuse [Z72.0]  Yes   • Paroxysmal AF [I48.0]  Yes   • Gastroesophageal reflux disease [K21.9]  Yes      Resolved Hospital Problems   No resolved problems to display.      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 female w/ hx severe copd (3-4L baseline dependence), cad, parox afib, DHF & valvular dz (moderate AI) w/ multiple admissions this year w/ copd exacerbations. Presented to [facility] and was admitted w/ bilateral pneumonia and copd exacerbation. Discussions were held by my partner w/ patient and family, and patient decided to pursue home hospice care and being discharged home today w/ hospice to follow at  "home.      Discharge Follow Up Recommendations for outpatient labs/diagnostics:  pcp via telemedicine 1 week    Day of Discharge     HPI:   Feeling \"ok\", dyspnea stable. Mild increased wheezing but overall no significant changes. No pain    Review of Systems  No headache, no n/v, + fatigue    Vital Signs:   Temp:  [98 °F (36.7 °C)] 98 °F (36.7 °C)  Heart Rate:  [60-85] 67  Resp:  [18] 18  BP: (127)/(65) 127/65     Physical Exam:  Alert, ox3, chronically ill/frail appearing but no distress  Ncat, nasal canula in place  Prolonged exp phase bilaterally w/ very faint end exp wheezes, normal resp effort at rest  abd soft, nontender  No cce  No extremity rash    Pertinent  and/or Most Recent Results     LAB RESULTS:      Lab 10/22/21  0605 10/21/21  0547 10/20/21  0555 10/19/21  1333 10/18/21  0644 10/17/21  1810 10/17/21  1810   WBC 6.38 6.84 5.76 7.03 13.74*   < > 13.70*   HEMOGLOBIN 12.7 11.6* 10.9* 12.3 13.4   < > 13.9   HEMATOCRIT 40.6 36.1 34.8 39.1 40.3   < > 42.6   PLATELETS 255 242 202 221 235   < > 246   NEUTROS ABS 4.01 4.06 3.67 6.36 11.72*   < > 12.50*   IMMATURE GRANS (ABS) 0.06* 0.03 0.02 0.03 0.06*   < > 0.05   LYMPHS ABS 1.84 2.09 1.67 0.36* 1.31   < > 0.68*   MONOS ABS 0.43 0.46 0.35 0.11 0.63   < > 0.42   EOS ABS 0.02 0.19 0.04 0.16 0.00   < > 0.03   MCV 98.1* 97.3* 97.8* 98.7* 93.9   < > 94.7   CRP  --   --   --   --   --   --  20.19*   PROCALCITONIN  --   --   --   --   --   --  0.10   LACTATE  --   --   --   --   --   --  1.2   PROTIME  --   --   --   --   --   --  13.5    < > = values in this interval not displayed.         Lab 10/22/21  0605 10/21/21  0547 10/20/21  0555 10/19/21  1333 10/18/21  1823 10/18/21  0644 10/18/21  0644   SODIUM 137 136 139 137  --   --  135*   POTASSIUM 4.7 4.7 4.4 4.5 4.2   < > 3.4*   CHLORIDE 95* 98 99 96*  --   --  92*   CO2 34.0* 30.0* 32.0* 32.0*  --   --  29.0   ANION GAP 8.0 8.0 8.0 9.0  --   --  14.0   BUN 28* 23 17 19  --   --  15   CREATININE 0.54* 0.54* 0.50* " 0.74  --   --  0.58   GLUCOSE 84 75 83 205*  --   --  82   CALCIUM 9.0 8.8 8.6 8.8  --   --  8.8    < > = values in this interval not displayed.         Lab 10/22/21  0605 10/21/21  0547 10/20/21  0555 10/19/21  1333 10/17/21  1810   TOTAL PROTEIN 6.4 6.1 6.0 6.8 7.4   ALBUMIN 3.10* 2.90* 3.00* 3.40* 3.90   GLOBULIN 3.3 3.2 3.0 3.4 3.5   ALT (SGPT) 52* 33 16 16 24   AST (SGOT) 58* 52* 18 19 39*   BILIRUBIN <0.2 <0.2 <0.2 <0.2 0.3   ALK PHOS 139* 124* 119* 142* 189*         Lab 10/18/21  0644 10/17/21  1810   PROBNP  --  12,091.0*   TROPONIN T 0.376* 0.387*   PROTIME  --  13.5   INR  --  1.07         Lab 10/18/21  0644   CHOLESTEROL 211*   LDL CHOL 138*   HDL CHOL 52   TRIGLYCERIDES 117         Lab 10/17/21  1810   FERRITIN 75.99         Brief Urine Lab Results  (Last result in the past 365 days)      Color   Clarity   Blood   Leuk Est   Nitrite   Protein   CREAT   Urine HCG        10/17/21 2101 Yellow   Clear   Negative   Negative   Negative   30 mg/dL (1+)               Microbiology Results (last 10 days)     Procedure Component Value - Date/Time    COVID PRE-OP / PRE-PROCEDURE SCREENING ORDER (NO ISOLATION) - Swab, Nasopharynx [[id number]]  (Normal) Collected: 10/17/21 4425    Lab Status: Final result Specimen: Swab from Nasopharynx Updated: 10/18/21 0049    Narrative:      The following orders were created for panel order COVID PRE-OP / PRE-PROCEDURE SCREENING ORDER (NO ISOLATION) - Swab, Nasopharynx.  Procedure                               Abnormality         Status                     ---------                               -----------         ------                     Respiratory Panel PCR w/...[[id number]]  Normal              Final result                 Please view results for these tests on the individual orders.    Respiratory Panel PCR w/COVID-19(SARS-CoV-2) RYNE/AMA/SYLVIA/PAD/COR/MAD/BLANCA In-House, NP Swab in UTM/VTM, 3-4 HR TAT - Swab, Nasopharynx [[id number]]  (Normal) Collected: 10/17/21 1914    Lab  Status: Final result Specimen: Swab from Nasopharynx Updated: 10/18/21 0049     ADENOVIRUS, PCR Not Detected     Coronavirus 229E Not Detected     Coronavirus HKU1 Not Detected     Coronavirus NL63 Not Detected     Coronavirus OC43 Not Detected     COVID19 Not Detected     Human Metapneumovirus Not Detected     Human Rhinovirus/Enterovirus Not Detected     Influenza A PCR Not Detected     Influenza B PCR Not Detected     Parainfluenza Virus 1 Not Detected     Parainfluenza Virus 2 Not Detected     Parainfluenza Virus 3 Not Detected     Parainfluenza Virus 4 Not Detected     RSV, PCR Not Detected     Bordetella pertussis pcr Not Detected     Bordetella parapertussis PCR Not Detected     Chlamydophila pneumoniae PCR Not Detected     Mycoplasma pneumo by PCR Not Detected    Narrative:      In the setting of a positive respiratory panel with a viral infection PLUS a negative procalcitonin without other underlying concern for bacterial infection, consider observing off antibiotics or discontinuation of antibiotics and continue supportive care. If the respiratory panel is positive for atypical bacterial infection (Bordetella pertussis, Chlamydophila pneumoniae, or Mycoplasma pneumoniae), consider antibiotic de-escalation to target atypical bacterial infection.    Blood Culture - Blood, Arm, Right [[id number]]  (Normal) Collected: 10/17/21 1750    Lab Status: Final result Specimen: Blood from Arm, Right Updated: 10/22/21 1845     Blood Culture No growth at 5 days    Blood Culture - Blood, Arm, Right [[id number]]  (Normal) Collected: 10/17/21 1725    Lab Status: Final result Specimen: Blood from Arm, Right Updated: 10/22/21 1900     Blood Culture No growth at 5 days    COVID-19 and FLU A/B PCR - Swab, Nasopharynx [[id number]]  (Normal) Collected: 10/17/21 1725    Lab Status: Final result Specimen: Swab from Nasopharynx Updated: 10/17/21 1911     COVID19 Not Detected     Influenza A PCR Not Detected     Influenza B PCR Not  Detected    Narrative:      Fact sheet for providers: https://www.fda.gov/media/386117/download    Fact sheet for patients: https://www.fda.gov/media/226831/download    Test performed by PCR.          XR Ribs Right With PA Chest    Result Date: 10/13/2021  EXAMINATION: XR RIBS RIGHT W PA CHEST-  INDICATION: h/o fall on her Rt side of the back, c/o pain of mark rt back ribs with SOB; Z91.81-History of falling; R07.89-Other chest pain; R07.81-Pleurodynia  COMPARISON: 7/2/2021  FINDINGS: Resolving postoperative changes without persistent focal opacity, effusion or distinct pneumothorax. Median sternotomy wires are intact. There is unchanged minimal cortical irregularity of the right lateral 10th rib, also present on July 2021 comparison, without additional evidence of acute displaced right-sided rib fracture.      Resolving postoperative changes without persistent focal opacity, effusion or distinct pneumothorax. Median sternotomy wires are intact. There is unchanged minimal cortical irregularity of the right lateral 10th rib, also present on July 2021 comparison, without additional evidence of acute displaced right-sided rib fracture.  This report was finalized on 10/13/2021 12:29 PM by Jose L Ferrara.      CT Angiogram Chest With & Without Contrast    Result Date: 10/17/2021  CT ANGIOGRAM CHEST W WO CONTRAST INDICATION: Hypoxia with pneumonia TECHNIQUE: CT angiogram of the chest with 100 cc of Isovue-300 IV contrast. 3-D reconstructions were obtained and reviewed.   Radiation dose reduction techniques included automated exposure control or exposure modulation based on body size. Count of known CT and cardiac nuc med studies performed in previous 12 months: 2. COMPARISON: 5/22/2021 FINDINGS: Chest images of mediastinal and pulmonary emboli. The central pulmonary arteries are dilated consistent with chronic pulmonary hypertension. No adenopathy or effusions. Chest images at lung windows show patchy bilateral  infiltrates predominantly in the lingula and right middle lobe. Dense consolidation in the left lower lobe seen on the previous CT has resolved. Imaging features can be seen with COVID-19 pneumonia, although are nonspecific and can can occur with a variety of infectious and noninfectious processes.     Chronic pulmonary hypertension. No evidence of pulmonary embolism. Bilateral multifocal pneumonia, mild-to-moderate in severity and most prominent in the left upper and right middle lobes. Signer Name: Nabil Vidales MD  Signed: 10/17/2021 8:35 PM  Workstation Name: [id number]  [facility]    FL Video Swallow With Speech Single Contrast    Result Date: 10/18/2021  EXAMINATION: FL VIDEO SWALLOW W SPEECH SINGLE-CONTRAST-  INDICATION: DYSPHAGIA, OROPHARYNGEAL, HAS ATTRIBUTABLE CAUSE  TECHNIQUE: 1 minute and 36 seconds of fluoroscopic time was used for this exam. 12 associated fluoroscopic loops were saved. The patient was evaluated in the seated lateral position while taking a variety of consistencies of barium by mouth under the direction of speech pathology.  COMPARISON: NONE  FINDINGS: 1 minute and 36 seconds of fluoroscopy provided for a modified barium swallow. Please see speech therapy report for full details and recommendations.       Fluoroscopy provided for a modified barium swallow. Please see speech therapy report for full details and recommendations.    This report was finalized on 10/18/2021 10:01 PM by Dr. Tre Huerta MD.      XR Chest 1 View    Result Date: 10/18/2021  EXAMINATION: XR CHEST 1 VW - 10/17/2021  INDICATION: Shortness of breath.  COMPARISON: 10/12/2021  FINDINGS: Sternotomy wires noted. Heart and vasculature are normal in size. There is extensive left mid and lower lung multifocal interstitial disease and milder right basilar disease, with groundglass appearance, suspicious for Covid 19 pneumonia. No effusion or pneumothorax is seen. Incidental note is made of patient's  older irregularly healed left clavicle fracture and left proximal humerus fracture. No acute bony abnormality is appreciated.      Interval development of bilateral pneumonia left greater than right, suspicious for Covid 19.  DICTATED:   10/17/2021 EDITED/lfs:   10/17/2021    This report was finalized on 10/18/2021 9:32 AM by Dr. Tre Huerta MD.        Results for orders placed during the hospital encounter of 09/28/17    Doppler Ankle Brachial Index Multi Level CAR    Interpretation Summary  · Right Conclusion: The right CUCA is normal.  · Left Conclusion: The left CUCA is mildly reduced.      Results for orders placed during the hospital encounter of 09/28/17    Doppler Ankle Brachial Index Multi Level CAR    Interpretation Summary  · Right Conclusion: The right CUCA is normal.  · Left Conclusion: The left CUCA is mildly reduced.      Results for orders placed during the hospital encounter of 05/03/21    Adult Transthoracic Echo Complete W/ Cont if Necessary Per Protocol    Interpretation Summary  · Estimated left ventricular EF = 70%  · Moderate aortic valve regurgitation is present.      Plan for Follow-up of Pending Labs/Results: pcp    Discharge Details        Discharge Medications      New Medications      Instructions Start Date   melatonin 5 MG tablet tablet   5 mg, Oral, Nightly      ondansetron 4 MG tablet  Commonly known as: ZOFRAN   4 mg, Oral, Every 6 Hours PRN      predniSONE 20 MG tablet  Commonly known as: DELTASONE   40 mg, Oral, Once      predniSONE 20 MG tablet  Commonly known as: DELTASONE   20 mg, Oral, Daily   Start Date: October 24, 2021     predniSONE 10 MG tablet  Commonly known as: DELTASONE   10 mg, Oral, Daily   Start Date: October 26, 2021        Continue These Medications      Instructions Start Date   albuterol sulfate  (90 Base) MCG/ACT inhaler  Commonly known as: PROVENTIL HFA;VENTOLIN HFA;PROAIR HFA   2 puffs, Inhalation, Every 4 Hours PRN      albuterol (2.5 MG/3ML) 0.083%  nebulizer solution  Commonly known as: PROVENTIL   2.5 mg, Nebulization, Every 4 Hours PRN      clopidogrel 75 MG tablet  Commonly known as: PLAVIX   75 mg, Oral, Daily      Cyanocobalamin 1000 MCG/ML kit   1,000 mg, Injection, Every 28 Days      dilTIAZem  MG 24 hr capsule  Commonly known as: CARDIZEM CD   240 mg, Oral, Daily      esomeprazole 20 MG capsule  Commonly known as: nexIUM   20 mg, Oral, 2 times daily      furosemide 40 MG tablet  Commonly known as: LASIX   40 mg, Oral, Daily      HYDROcodone-acetaminophen  MG per tablet  Commonly known as: NORCO   1 tablet, Oral, Every 12 Hours PRN      lidocaine 5 %  Commonly known as: LIDODERM   1 patch, Transdermal, Every 24 Hours, Remove & Discard patch within 12 hours or as directed by MD      LORazepam 1 MG tablet  Commonly known as: Ativan   1 mg, Oral, 2 Times Daily      nicotine 7 MG/24HR patch  Commonly known as: Nicoderm CQ   1 patch, Transdermal, Every 24 Hours      PHENobarbital 100 MG tablet  Commonly known as: LUMINAL   150 mg, Oral, Daily      promethazine 25 MG tablet  Commonly known as: PHENERGAN   25 mg, Oral, Every 6 Hours PRN      rOPINIRole 0.25 MG tablet  Commonly known as: REQUIP   0.25-0.5 mg, Oral, Nightly, Take 1 hour before bedtime.      rosuvastatin 40 MG tablet  Commonly known as: CRESTOR   40 mg, Oral, Nightly      Trelegy Ellipta 200-62.5-25 MCG/INH inhaler  Generic drug: Fluticasone-Umeclidin-Vilant   1 puff, Inhalation, Daily - RT             Allergies   Allergen Reactions   • Keflex [Cephalexin] Shortness Of Breath and Rash     Patients power of  states patient had to be taken to ER due to reaction.    Tolerated Rocephin 2/19/21, zosyn 5/2021   • Sulfamethoxazole-Trimethoprim Shortness Of Breath and Rash     Patients power of  stated patient had to be taken to ER due to reaction.   • Carbamazepine    • Codeine    • Latex Rash         Discharge Disposition:  Hospice/Home    Diet:  Hospital:  Diet Order    Procedures   • Diet Regular       Activity:           CODE STATUS:    Code Status and Medical Interventions:   Ordered at: 10/21/21 1029     Limited Support to NOT Include:    Cardioversion/Defibrillation    Dialysis    Intubation    Vasopressors     Code Status:    No CPR     Medical Interventions (Level of Support Prior to Arrest):    Limited     Comments:    d/w patient and she wishes to go toward hospice approach       Future Appointments   Date Time Provider Department Center   11/2/2021  1:15 PM Angelika Butterfield MD [facility] AMA   11/16/2021  2:00 PM Andres Eid MD [facility] AMA   11/29/2021 11:45 AM Vikram Leblanc DO [facility] AMA       Additional Instructions for the Follow-ups that You Need to Schedule     Ambulatory Referral to Home Health   As directed      Face to Face Visit Date: 10/20/2021    Follow-up provider for Plan of Care?: I treated the patient in an acute care facility and will not continue treatment after discharge.    Follow-up provider: ANGELIKA BUTTERFIELD [[id number]]    Reason/Clinical Findings: pneumonia, copd    Describe mobility limitations that make leaving home difficult: imapired functional mobility, balance, gait and endurance    Nursing/Therapeutic Services Requested: Skilled Nursing Physical Therapy Occupational Therapy Speech Therapy    Skilled nursing orders: Cardiopulmonary assessments Neurovascular assessments COPD management    PT orders: Therapeutic exercise Transfer training Strengthening Home safety assessment    Occupational orders: Activities of daily living Energy conservation Home safety assessment Strengthening    SLP orders: Dysphagia therapy    Frequency: 1 Week 1         Discharge Follow-up with PCP   As directed       Currently Documented PCP:    Angelika Butterfield MD    PCP Phone Number:    [phone]     Follow Up Details: 1 week after discharge via telehealth                     Vikram Coronado MD  10/23/21      Time Spent on  Discharge:  I spent  35 minutes on this discharge activity which included: face-to-face encounter with the patient, reviewing the data in the system, coordination of the care with the nursing staff as well as consultants, documentation, and entering orders.

## 2021-10-27 NOTE — ACP (ADVANCE CARE PLANNING)
Advance Care Planning received request to inactivate Anle Fall, formerly identified as a healthcare surrogate, from patient's chart. Ms Abreu has completed a new living will naming Stacie Cyndie as her healthcare surrogate. The living will is on file and the correct surrogate relationship is identified.    I have inactivated Anel Fall from the chart and marked the previous living will documents as .

## 2022-08-02 NOTE — TELEPHONE ENCOUNTER
Returned call pt needs neb machine to go to [facility]. She is asking for a letter to the water department. Due to high bill her water was disconnected.   none

## 2023-06-18 NOTE — PROGRESS NOTES
"GI Daily Progress Note  Subjective     An Abreu is a 71 y.o. female who was admitted with Acute respiratory failure with hypercapnia (CMS/HCC).     She had her prep for colonoscopy yesterday but did not have IV access.  IV access is still not been obtained.  EGD showed mild gastritis.  Chief Complaint: Symptomatic anemia    Objective     /53 (BP Location: Right arm, Patient Position: Lying)   Pulse 78   Temp 97.4 °F (36.3 °C) (Oral)   Resp 18   Ht 149.9 cm (59\")   Wt 37.6 kg (82 lb 12.8 oz)   SpO2 98%   BMI 16.72 kg/m²     Intake/Output last 3 shifts:  I/O last 3 completed shifts:  In: 1180 [P.O.:1180]  Out: 1600 [Urine:1600]  Intake/Output this shift:  No intake/output data recorded.      Physical Exam  Wt Readings from Last 3 Encounters:   06/18/21 37.6 kg (82 lb 12.8 oz)   06/03/21 37.6 kg (83 lb)   05/25/21 39.1 kg (86 lb 3.2 oz)   ,body mass index is 16.72 kg/m².,@FLOWAMB(6)@,@FLOWAMB(5)@,@FLOWAMB(8)@   CONSTITUTIONAL:  Resp CTA; no rhonchi, rales, or wheezes.  Respiration effort normal  CV RRR; no M/R/G. No lower extremity edema  GI Abd soft, NT, ND, normal active bowel sounds.    Psych:     DATA:  Antral biopsy  Final Diagnosis   1. DUODENUM BIOPSY:  No significant histopathologic abnormalities.   2. ANTRUM BIOPSY:  Reactive gastropathic changes; no significant inflammation identified.  Negative for H. pylori on routine stains.    DGD/[facility]    Results for AN ABREU (MRN [id number]) as of 6/18/2021 10:07   Ref. Range 6/14/2021 21:46 6/15/2021 14:39 6/15/2021 21:04 6/16/2021 00:01 6/16/2021 05:01 6/16/2021 05:01   Hemoglobin Latest Ref Range: 12.0 - 15.9 g/dL 7.3 (L) 8.9 (L) 9.6 (L) 9.1 (L) 9.4 (L) 9.4 (L)     Results for AN ABREU (MRN [id number]) as of 6/18/2021 10:07   Ref. Range 6/15/2021 14:39   Vitamin B-12 Latest Ref Range: 211 - 946 pg/mL 259   Results for AN ABREU (MRN [id number]) as of 6/18/2021 10:07   Ref. Range 6/15/2021 14:39   Iron Latest Ref Range: " 37 - 145 mcg/dL 21 (L)   Ferritin Latest Ref Range: 13.00 - 150.00 ng/mL 13.16   Iron Saturation Latest Ref Range: 20 - 50 % 6 (L)   Transferrin Latest Ref Range: 200 - 360 mg/dL 237   TIBC Latest Ref Range: 298 - 536 mcg/dL 353     Assessment/Plan     1.  Suspected GI bleeding.  2Acute blood loss anemia  3.  History of perforated gastric ulcer  3 Gastritis  4  History of end-stage ischemia  5  PAF    Will obtain PICC line today and plan for colonoscopy tomorrow.  Hemoglobin stable  Borderline low B12 will check methylmalonic acid  Begin IV iron replacement     has been on B12 in the past.  Will begin B12 injections tomorrow      Acute respiratory failure with hypercapnia (CMS/HCC)    Chronic obstructive pulmonary disease with acute exacerbation (CMS/HCC)    Essential hypertension    Seizure disorder (CMS/HCC)    Paroxysmal atrial fibrillation (CMS/HCC)    Fecal occult blood test positive    Tobacco abuse    CAD (coronary artery disease)    Anemia    Elevated troponin    Metabolic encephalopathy    Hypomagnesemia    GI bleed    Acute upper GI bleed    Acute-on-chronic respiratory failure (CMS/HCC)    Underweight    Acute on chronic respiratory failure with hypercapnia (CMS/HCC)    Acute on chronic respiratory failure (CMS/HCC)    Gastrointestinal hemorrhage    Iron deficiency anemia due to chronic blood loss       LOS: 3 days     Wilfredo Simon MD  06/18/21  10:05 EDT   0

## 2023-07-10 NOTE — PROGRESS NOTES
Clinical Nutrition       Patient Name: An Abreu  YOB: 1950  MRN: [id number]  Date of Encounter: 21 19:00 EDT  Admission date: 2021      Reason for Visit   Follow-up protocol      EMR  Reviewed   Yes  Pt w anemia w/o active GIB or infection. Receiving supplemental Fe and vit B12.        Reported/Observed/Food/Nutrition Related - Comments     Pt now allows will try Boost Plus suppl. Gives menu choices.     Current Nutrition Prescription     Diet Regular; Cardiac  Orders Placed This Encounter      Dietary Nutrition Supplements Boost Plus; chocolate  2x/da added per RD    Average Intake from [employer]% x 5 meals.      Actions     Follow treatment progress, Care plan reviewed, Advise alternate selection, Menu provided, Menu adjusted, Adjusted supplement    Monitor Per Protocol      Taryn Khan RD,   Time Spent: 25 min.         Stelara Counseling:  I discussed with the patient the risks of ustekinumab including but not limited to immunosuppression, malignancy, posterior leukoencephalopathy syndrome, and serious infections.  The patient understands that monitoring is required including a PPD at baseline and must alert us or the primary physician if symptoms of infection or other concerning signs are noted.

## (undated) DEVICE — "MH-438 A/W VLVE F/140 EVIS-140": Brand: AIR/WATER VALVE

## (undated) DEVICE — SUCTION CANISTER, 1000CC,SAFELINER: Brand: DEROYAL

## (undated) DEVICE — TUBING, SUCTION, 1/4" X 10', STRAIGHT: Brand: MEDLINE

## (undated) DEVICE — Device: Brand: AIR/WATER CHANNEL CLEANING ADAPTER

## (undated) DEVICE — INTRO ACCSR BLNT TP

## (undated) DEVICE — "MH-443 SUCTION VALVE F/EVIS140 EVIS160": Brand: SUCTION VALVE

## (undated) DEVICE — SOL IRR H2O BTL 1000ML STRL

## (undated) DEVICE — SPNG VERSALON 4X4 4PLY NONSTRL LF BG/200

## (undated) DEVICE — THE BITE BLOCK MAXI, LATEX FREE STRAP IS USED TO PROTECT THE ENDOSCOPE INSERTION TUBE FROM BEING BITTEN BY THE PATIENT.

## (undated) DEVICE — LUBE GEL ENDOGLIDE 1.1OZ

## (undated) DEVICE — CONTN GRAD MEAS TRIANG 32OZ BLK

## (undated) DEVICE — SINGLE-USE BIOPSY FORCEPS: Brand: RADIAL JAW 4

## (undated) DEVICE — HYBRID CO2 TUBING/CAP SET FOR OLYMPUS® SCOPES & CO2 SOURCE: Brand: ERBE

## (undated) DEVICE — SYR LUERLOK 50ML

## (undated) DEVICE — ENDOGATOR HYBRID TUBING KIT FOR USE WITH ENDOGATOR IRRIGATION PUMP, OLYMPUS PUMP, GI4000 ESU, AND TORRENT IRRIGATION PUMP.: Brand: ENDOGATOR KIT

## (undated) DEVICE — KT ORCA ORCAPOD DISP STRL